# Patient Record
Sex: FEMALE | Race: BLACK OR AFRICAN AMERICAN | NOT HISPANIC OR LATINO | Employment: OTHER | URBAN - METROPOLITAN AREA
[De-identification: names, ages, dates, MRNs, and addresses within clinical notes are randomized per-mention and may not be internally consistent; named-entity substitution may affect disease eponyms.]

---

## 2017-01-09 ENCOUNTER — HOSPITAL ENCOUNTER (OUTPATIENT)
Dept: RADIOLOGY | Facility: CLINIC | Age: 67
Discharge: HOME | End: 2017-01-09
Attending: OBSTETRICS & GYNECOLOGY
Payer: MEDICARE

## 2017-01-09 DIAGNOSIS — M81.8 OTHER OSTEOPOROSIS WITHOUT CURRENT PATHOLOGICAL FRACTURE: ICD-10-CM

## 2017-01-09 DIAGNOSIS — M85.9 DISORDER OF BONE DENSITY AND STRUCTURE, UNSPECIFIED: ICD-10-CM

## 2017-01-09 PROCEDURE — 77080 DXA BONE DENSITY AXIAL: CPT | Mod: TC

## 2017-01-09 PROCEDURE — 77080 DXA BONE DENSITY AXIAL: CPT | Mod: 26,,, | Performed by: INTERNAL MEDICINE

## 2017-01-11 ENCOUNTER — TELEPHONE (OUTPATIENT)
Dept: OBSTETRICS AND GYNECOLOGY | Facility: CLINIC | Age: 67
End: 2017-01-11

## 2017-02-15 RX ORDER — ATENOLOL 25 MG/1
TABLET ORAL
Qty: 30 TABLET | Refills: 12 | Status: SHIPPED | OUTPATIENT
Start: 2017-02-15 | End: 2018-03-26

## 2017-02-16 ENCOUNTER — OFFICE VISIT (OUTPATIENT)
Dept: HEMATOLOGY/ONCOLOGY | Facility: CLINIC | Age: 67
End: 2017-02-16
Payer: MEDICARE

## 2017-02-16 ENCOUNTER — LAB VISIT (OUTPATIENT)
Dept: LAB | Facility: HOSPITAL | Age: 67
End: 2017-02-16
Attending: INTERNAL MEDICINE
Payer: MEDICARE

## 2017-02-16 VITALS
SYSTOLIC BLOOD PRESSURE: 159 MMHG | HEIGHT: 69 IN | TEMPERATURE: 98 F | HEART RATE: 62 BPM | WEIGHT: 275.81 LBS | RESPIRATION RATE: 18 BRPM | DIASTOLIC BLOOD PRESSURE: 88 MMHG | OXYGEN SATURATION: 100 % | BODY MASS INDEX: 40.85 KG/M2

## 2017-02-16 DIAGNOSIS — I10 ESSENTIAL HYPERTENSION: ICD-10-CM

## 2017-02-16 DIAGNOSIS — N28.1 CYST OF KIDNEY, ACQUIRED: ICD-10-CM

## 2017-02-16 DIAGNOSIS — D47.2 SMOLDERING MULTIPLE MYELOMA (SMM): Primary | ICD-10-CM

## 2017-02-16 DIAGNOSIS — C90.00 MULTIPLE MYELOMA: ICD-10-CM

## 2017-02-16 DIAGNOSIS — Q61.2 ADPKD (AUTOSOMAL DOMINANT POLYCYSTIC KIDNEY DISEASE): ICD-10-CM

## 2017-02-16 LAB
ALBUMIN SERPL BCP-MCNC: 3.4 G/DL
ALBUMIN SERPL BCP-MCNC: 3.4 G/DL
ALP SERPL-CCNC: 123 U/L
ALT SERPL W/O P-5'-P-CCNC: 14 U/L
ANION GAP SERPL CALC-SCNC: 5 MMOL/L
ANION GAP SERPL CALC-SCNC: 5 MMOL/L
AST SERPL-CCNC: 14 U/L
BASOPHILS # BLD AUTO: 0.01 K/UL
BASOPHILS NFR BLD: 0.2 %
BILIRUB SERPL-MCNC: 0.8 MG/DL
BUN SERPL-MCNC: 12 MG/DL
BUN SERPL-MCNC: 12 MG/DL
CALCIUM SERPL-MCNC: 9.3 MG/DL
CALCIUM SERPL-MCNC: 9.3 MG/DL
CHLORIDE SERPL-SCNC: 105 MMOL/L
CHLORIDE SERPL-SCNC: 105 MMOL/L
CO2 SERPL-SCNC: 30 MMOL/L
CO2 SERPL-SCNC: 30 MMOL/L
CREAT SERPL-MCNC: 0.9 MG/DL
CREAT SERPL-MCNC: 0.9 MG/DL
DIFFERENTIAL METHOD: ABNORMAL
EOSINOPHIL # BLD AUTO: 0 K/UL
EOSINOPHIL NFR BLD: 0.2 %
ERYTHROCYTE [DISTWIDTH] IN BLOOD BY AUTOMATED COUNT: 11.6 %
EST. GFR  (AFRICAN AMERICAN): >60 ML/MIN/1.73 M^2
EST. GFR  (AFRICAN AMERICAN): >60 ML/MIN/1.73 M^2
EST. GFR  (NON AFRICAN AMERICAN): >60 ML/MIN/1.73 M^2
EST. GFR  (NON AFRICAN AMERICAN): >60 ML/MIN/1.73 M^2
GLUCOSE SERPL-MCNC: 83 MG/DL
GLUCOSE SERPL-MCNC: 83 MG/DL
HCT VFR BLD AUTO: 37.4 %
HGB BLD-MCNC: 12.2 G/DL
IGA SERPL-MCNC: 105 MG/DL
IGG SERPL-MCNC: 1860 MG/DL
IGM SERPL-MCNC: 31 MG/DL
LYMPHOCYTES # BLD AUTO: 1.4 K/UL
LYMPHOCYTES NFR BLD: 32.8 %
MCH RBC QN AUTO: 31.1 PG
MCHC RBC AUTO-ENTMCNC: 32.6 %
MCV RBC AUTO: 95 FL
MONOCYTES # BLD AUTO: 0.3 K/UL
MONOCYTES NFR BLD: 7.4 %
NEUTROPHILS # BLD AUTO: 2.5 K/UL
NEUTROPHILS NFR BLD: 58.9 %
PHOSPHATE SERPL-MCNC: 2.8 MG/DL
PLATELET # BLD AUTO: 234 K/UL
PMV BLD AUTO: 10 FL
POTASSIUM SERPL-SCNC: 4.2 MMOL/L
POTASSIUM SERPL-SCNC: 4.2 MMOL/L
PROT SERPL-MCNC: 7.7 G/DL
RBC # BLD AUTO: 3.92 M/UL
SODIUM SERPL-SCNC: 140 MMOL/L
SODIUM SERPL-SCNC: 140 MMOL/L
WBC # BLD AUTO: 4.21 K/UL

## 2017-02-16 PROCEDURE — 3079F DIAST BP 80-89 MM HG: CPT | Mod: S$GLB,,, | Performed by: INTERNAL MEDICINE

## 2017-02-16 PROCEDURE — 99214 OFFICE O/P EST MOD 30 MIN: CPT | Mod: S$GLB,,, | Performed by: INTERNAL MEDICINE

## 2017-02-16 PROCEDURE — 99999 PR PBB SHADOW E&M-EST. PATIENT-LVL III: CPT | Mod: PBBFAC,,, | Performed by: INTERNAL MEDICINE

## 2017-02-16 PROCEDURE — 3077F SYST BP >= 140 MM HG: CPT | Mod: S$GLB,,, | Performed by: INTERNAL MEDICINE

## 2017-02-16 PROCEDURE — 99499 UNLISTED E&M SERVICE: CPT | Mod: S$GLB,,, | Performed by: INTERNAL MEDICINE

## 2017-02-16 RX ORDER — BENZONATATE 100 MG/1
100 CAPSULE ORAL DAILY
COMMUNITY
Start: 2017-01-22 | End: 2017-03-16 | Stop reason: ALTCHOICE

## 2017-02-16 RX ORDER — MULTIVITAMIN
0.4 TABLET ORAL DAILY
COMMUNITY
Start: 2016-12-15

## 2017-02-16 RX ORDER — OSELTAMIVIR PHOSPHATE 75 MG/1
75 CAPSULE ORAL 2 TIMES DAILY
COMMUNITY
Start: 2017-01-22 | End: 2017-03-16 | Stop reason: ALTCHOICE

## 2017-02-16 RX ORDER — LANOLIN ALCOHOL/MO/W.PET/CERES
400 CREAM (GRAM) TOPICAL DAILY
COMMUNITY
Start: 2016-11-17

## 2017-02-16 NOTE — PROGRESS NOTES
SECTION OF HEMATOLOGY AND BONE MARROW TRANSPLANT  Return Patient Visit   02/17/2017    CHIEF COMPLAINT:   No chief complaint on file.      HISTORY OF PRESENT ILLNESS:   Ms. Aranda is here for  IgG kappa smoldering myeloma.,  After reviewing epic it appears she was first noted have a paraprotein in 2010.  There is an unremarkable skeletal survey from 2010.  The first hematology clinic note I can find in epic is from  June 2013.  She has had 2 bone marrow biopsies.    The first from June 2013 showed approximtely 6% clonal plasma cells with a normal karyotype.   She also had an unremarkable skeletal survey at that time.  Appears she was monitored until October 2014 when a repeat bone marrow biopsy was done due to risking kappa light chains and FLCR.  It demonstrated an increase in clonal plasma cells to 13% qualifying her disease as smoldering multiple myeloma.    Serial monitoring since then has never revealed a M protein >3.  Her kappa free LC and FLCR continue to minimally increase. She is asymptomatic in clinic today.  She continues to follow up with Nephrology for her PCKD and her renal function appears stable.  She denies any focal pain or symptoms c/w anemia or hypercalcemia.   Continues to see her pcp dr. gamble regularly and is up to date on all age appropriate health screening.    She is currently working with her for URI symptoms.   Denies fever, chills, nightsweats, bleeding, brusing, lymphadenopathy, signs/symptoms of splenomegaly.        PAST MEDICAL HISTORY:   Past Medical History   Diagnosis Date    Allergy     Cholelithiases     Cyst of kidney, acquired     Diverticulitis     Elevated TSH     Glaucoma     Hx of colonic polyps     Hypertension 1981    Liver cyst     MGUS (monoclonal gammopathy of unknown significance)     Migraine headache     Mitral valve problem      leakage    Paraproteinemia     Tricuspid valve disease      leakage       PAST SURGICAL HISTORY:   Past Surgical History    Procedure Laterality Date    Appendectomy      Hysterectomy  1978 or 1979    Oophorectomy      Colonoscopy N/A 10/23/2015     Procedure: COLONOSCOPY;  Surgeon: Brandon Ruelas MD;  Location: [facility] ([address] [place] [address]);  Service: Endoscopy;  Laterality: N/A;  Had divertiulitis in 5/29/2015 with a recommendation for colonoscopy in 8 weeks    Dr. Ruelas is her GI physician       PAST SOCIAL HISTORY:   reports that she has quit smoking. She quit after 3.00 years of use. She has never used smokeless tobacco. She reports that she does not drink alcohol or use illicit drugs.    FAMILY HISTORY:  Family History   Problem Relation Age of Onset    Heart disease Mother      MI    Heart disease Maternal Aunt      MI    Heart disease Maternal Aunt      MI    Cancer Paternal Grandmother      GYN cancer - unknown cancer    Colon cancer Maternal Uncle     Hypertension Father     Heart disease Sister      fast heart rate    Cataracts Sister     Glaucoma Sister     Melanoma Neg Hx     Breast cancer Neg Hx     Ovarian cancer Neg Hx     Colon polyps Neg Hx     Rectal cancer Neg Hx     Stomach cancer Neg Hx     Esophageal cancer Neg Hx     Ulcerative colitis Neg Hx     Crohn's disease Neg Hx     Amblyopia Neg Hx     Blindness Neg Hx     Macular degeneration Neg Hx     Strabismus Neg Hx     Retinal detachment Neg Hx        CURRENT MEDICATIONS:   Current Outpatient Prescriptions   Medication Sig    atenolol (TENORMIN) 25 MG tablet TAKE 1 TABLET(25 MG) BY MOUTH EVERY DAY    benzonatate (TESSALON) 100 MG capsule Take 100 mg by mouth once daily.    conjugated estrogens (PREMARIN) vaginal cream Place vaginally daily as needed.     diclofenac sodium (VOLTAREN) 1 % Gel Apply 4 grams to effected area up to three times daily as needed.    isometheptene-apap-dichloralphenazone [id number] (MIDRIN) -325 mg per capsule Take 1 capsule by mouth as directed. 2 at onset then repeat 1 every 30-60min, max 5caps/attack  "(Patient taking differently: Take 1 capsule by mouth as directed. 2 at onset then repeat 1 every 30-60min, max 5caps/attack)    magnesium 500 mg Tab Take by mouth 2 (two) times daily.     magnesium oxide (MAG-OX) 400 mg tablet Take 400 mg by mouth once daily.    methimazole (TAPAZOLE) 5 MG Tab Take 1 tablet (5 mg total) by mouth 3 (three) times daily.    multivit with min-folic acid 0.4 mg Tab Take 0.4 mg by mouth once daily.    multivitamin capsule Take 1 capsule by mouth once daily.     ondansetron (ZOFRAN) 4 MG tablet Take 1-2 tablets (4-8 mg total) by mouth every 8 (eight) hours as needed for Nausea. (Patient taking differently: Take 4-8 mg by mouth every 8 (eight) hours as needed for Nausea. )    oseltamivir (TAMIFLU) 75 MG capsule Take 75 mg by mouth 2 (two) times daily.    quinapril (ACCUPRIL) 20 MG tablet Take 1 tablet (20 mg total) by mouth once daily.    rizatriptan (MAXALT) 5 MG tablet Take 5 mg by mouth every 2 (two) hours as needed for Migraine.     No current facility-administered medications for this visit.      ALLERGIES:   Review of patient's allergies indicates:  No Known Allergies      ASSESSMENTS:   PAIN ASSESSMENT (Patient reports Pain):   Vitals:    02/16/17 1020   BP: (!) 159/88   Pulse: 62   Resp: 18   Temp: 98.4 °F (36.9 °C)   TempSrc: Oral   SpO2: 100%   Weight: 125.1 kg (275 lb 12.7 oz)   Height: 5' 9" (1.753 m)   PainSc: 0-No pain     REVIEW OF SYSTEMS:     General ROS: negative  Psychological ROS: negative  Ophthalmic ROS: negative  ENT ROS: negative  Allergy and Immunology ROS: negative  Hematological and Lymphatic ROS: negative  Endocrine ROS: negative  Respiratory ROS: no cough, shortness of breath, or wheezing  Gastrointestinal ROS: no abdominal pain, change in bowel habits, or black or bloody stools  Genito-Urinary ROS: no dysuria, trouble voiding, or hematuria  Musculoskeletal ROS: negative  Neurological ROS: no TIA or stroke symptoms  Dermatological ROS: " negative  PHYSICAL EXAM:     Vitals:    02/16/17 1020   BP: (!) 159/88   Pulse: 62   Resp: 18   Temp: 98.4 °F (36.9 °C)       General - well developed, well nourished, no apparent distress  HEENT - oropharynx clear  Chest and Lung - clear to auscultation bilaterally   Cardiovascular - RRR with no MGR, normal S1 and S2  Abdomen-  soft, nontender, no palpable hepatomegaly or splenomegaly  Lymph - no palpable lymphadenopathy  Heme - no bruising, petechiae, pallor  Skin - no rashes or lesions  Psych - appropriate mood and affect      ECOG Performance Status: (foot note - ECOG PS provided by [employer]cology [employer]) 0 - Asymptomatic    Karnofsky Performance Score:  100%- Normal, No Complaints, No Evidence of Disease  DATA:   Lab Results   Component Value Date    WBC 4.21 02/16/2017    HGB 12.2 02/16/2017    HCT 37.4 02/16/2017    MCV 95 02/16/2017     02/16/2017     Gran #   Date Value Ref Range Status   02/16/2017 2.5 1.8 - 7.7 K/uL Final     Gran%   Date Value Ref Range Status   02/16/2017 58.9 38.0 - 73.0 % Final     Lymph #   Date Value Ref Range Status   02/16/2017 1.4 1.0 - 4.8 K/uL Final     Lymph%   Date Value Ref Range Status   02/16/2017 32.8 18.0 - 48.0 % Final     Kappa Free Light Chains   Date Value Ref Range Status   02/16/2017 16.85 (H) 0.33 - 1.94 mg/dL Final   11/18/2016 17.49 (H) 0.33 - 1.94 mg/dL Final   08/08/2016 15.17 (H) 0.33 - 1.94 mg/dL Final     Lambda Free Light Chains   Date Value Ref Range Status   02/16/2017 1.55 0.57 - 2.63 mg/dL Final   11/18/2016 1.25 0.57 - 2.63 mg/dL Final   08/08/2016 1.19 0.57 - 2.63 mg/dL Final     Kappa/Lambda FLC Ratio   Date Value Ref Range Status   02/16/2017 10.87 (H) 0.26 - 1.65 Final   11/18/2016 13.99 (H) 0.26 - 1.65 Final   08/08/2016 12.75 (H) 0.26 - 1.65 Final     Gamma grams/dl   Date Value Ref Range Status   12/08/2015 1.89 (H) 0.67 - 1.58 g/dL Final   07/16/2015 2.13 (H) 0.67 - 1.58 g/dL Final   04/16/2015 2.05 (H) 0.67 - 1.58 g/dL  Final     IgG - Serum   Date Value Ref Range Status   02/16/2017 1860 (H) 650 - 1600 mg/dL Final     Comment:     IgG Cord Blood Reference Range: 650-1600 mg/dL.     IgA   Date Value Ref Range Status   02/16/2017 105 40 - 350 mg/dL Final     Comment:     IgA Cord Blood Reference Range: <5 mg/dL.     IgM   Date Value Ref Range Status   02/16/2017 31 (L) 50 - 300 mg/dL Final     Comment:     IgM Cord Blood Reference Range: <25 mg/dL.       Bone Marrow Biopsy - 6/26/13  BONE MARROW ASPIRATE, BONE MARROW CLOT, AND BONE MARROW CORE BIOPSY WITH:  CELLULARITY= 70%, TRILINEAGE HEMATOPOIETIC ACTIVITY (M/E= 1.5:1).  PLASMA CELL DYSCRASIA, SEE COMMENT.  CD20  INCREASED STORAGE IRON.  ADEQUATE NUMBER OF MEGAKARYOCYTES.  COMMENT: Flow cytometry analysis of bone marrow aspirate shows lymph gate(27%) containing T and B cells.  No B cell clonality or T-cell aberrancy is evident. Blast gate is not increased. Plasma cell study shows a kappa light  chain restricted plasma cell population without coexpression of CD38/CD56.  Immunohistochemical studies were performed on the core biopsy for further architecture evaluation with adequate  positive and negative controls. Scattered mixed T cells (CD3 positive) and B cells (CD20 positive, cyclin D1  negative) are evident. About 6-7% plasma cells ( positive) are noted. 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 Correlate clinically and with a cytogenetics report.  Diagnosed by: Sylvie Mckeon M.D.  (Electronically Signed: 2013-06-27 15:43:02)  Microscopic Examination  BONE MARROW ASPIRATE DIFFERENTIAL:  Blasts---------------------------------------------- 2.5 %  Promyelocytes--------------------------------- 0 %  Myelocytes-------------------------------------- 4.5 %  Metamyelocytes------------------------------ 6 %  Bands---------------------------------------------- 5 %  PMN Neutrophils------------------------------ 19 %  Eosinophils------------------------------------------ 2  %  Basophils--------------------------------------- 0 %  Monocytes------------------------------------ 3.5 %  Lymphocytes------------------------------------- 25.5 %  Plasma cells------------------------------------------ 4.5 %  Erythroid precursors-------------------------- 27.5 %  BONE MARROW ASPIRATE:  Myeloid and erythroid maturation shows M:E ratio at 1.5:1. No significant dysplasia is evident. Stainable iron is  present without increased ringed sideroblasts. Megakaryocytes are seen.  BONE MARROW CLOT:  Cellularity is 70 % with trilineage hematopoiesis. No abnormal infiltrates are seen. Megakaryocytes are adequate in  number. Stainable iron is present.  BONE MARROW CORE BIOPSY:  Cellularity is 70 %. No abnormal infiltrates are seen. Stainable iron is increased. Megakaryocytes are adequate in  number      Bone marrow biopsy - 10/2/14  Bone marrow karyotype results: 46, XX[20], female karyotype.  Prolif (PCPD), FISH:  Comments: Abnormality Result #Abn/Total Cells --------------------------------------------------------- 14q32(IGH sep)  Abnormal 7/11 +14(3'IGH/5'IGH)x3 Normal 0/11 t(14;16) IGH/MAF fusion Abnormal  4/7 NOMENCLATURE: nuc eliza(IGHx3),(MAFx3),(IGH con MAFx2) [58 total plasma cells identified]  Interp, Plasma Cell Prolif (PCPD), FISH:  Comments: The result is abnormal and indicates a plasma cell clone with IGH/MAF fusion. Insufficient plasma cells  were observed with all other probe sets. Since only a limited number of plasma cells were identified, the  abnormalities associated with this plasma cell clone were not sufficiently evaluated and a specific prognostic  significance cannot be defined. Clinical and pathologic correlation is recommended.      SS/long bone survey - 5/22/15  Long bone survey does not show any lesions characteristic of multiple myeloma. Degenerative changes are seen in knees.      Skull shows no characteristic lesions of multiple myeloma. C5 shows a small cystic area anterior  superiorly that could be of focus of bone destruction or a benign cyst. Otherwise I don't see any evidence of multiple myeloma. Degenerative changes of a   moderate degree seen scattered throughout the entire spine.      ASSESSMENT AND PLAN:   Encounter Diagnosis   Name Primary?    Smoldering multiple myeloma (SMM) Yes     -her Kappa free LC has minimally decreased since our interval visit  -she has mild intermittent anemia since at least 2010, but this has normalized   -no other CRAB criteria or concerning clinical symptoms  -SS from November 2016 with JOSE; plan to repeat prior to our next appt in 3 months    -she continues to have smoldering myeloma with no indication for bisphophanates for anti myeloma therapy       Follow Up: -cbc, cmp, serum free light chains, quantitative immunoglobulins, serum electropheresis, serum immunofixation in 3 months  cbc, cmp, serum free light chains, quantitative immunoglobulins, serum electropheresis, serum immunofixation  And MD appt in 6 months  -instructed to call earlier is symptoms develop      Rasta Alba MD  Hematology/Oncology/Bone Marrow Transplant

## 2017-02-16 NOTE — Clinical Note
-cbc, cmp, serum free light chains, quantitative immunoglobulins, serum electropheresis, serum immunofixation in 3 months cbc, cmp, serum free light chains, quantitative immunoglobulins, serum electropheresis, serum immunofixation  And MD appt in 6 months

## 2017-02-17 LAB
KAPPA LC SER QL IA: 16.85 MG/DL
KAPPA LC/LAMBDA SER IA: 10.87
LAMBDA LC SER QL IA: 1.55 MG/DL

## 2017-03-06 ENCOUNTER — OFFICE VISIT (OUTPATIENT)
Dept: NEPHROLOGY | Facility: CLINIC | Age: 67
End: 2017-03-06
Payer: MEDICARE

## 2017-03-06 VITALS
OXYGEN SATURATION: 92 % | SYSTOLIC BLOOD PRESSURE: 120 MMHG | HEART RATE: 60 BPM | BODY MASS INDEX: 41.01 KG/M2 | WEIGHT: 276.88 LBS | HEIGHT: 69 IN | DIASTOLIC BLOOD PRESSURE: 80 MMHG

## 2017-03-06 DIAGNOSIS — I12.9 BENIGN HYPERTENSIVE RENAL DISEASE WITHOUT RENAL FAILURE: ICD-10-CM

## 2017-03-06 DIAGNOSIS — I10 ESSENTIAL HYPERTENSION: ICD-10-CM

## 2017-03-06 DIAGNOSIS — Q61.2 ADPKD (AUTOSOMAL DOMINANT POLYCYSTIC KIDNEY DISEASE): Primary | ICD-10-CM

## 2017-03-06 DIAGNOSIS — D47.2 SMOLDERING MULTIPLE MYELOMA (SMM): ICD-10-CM

## 2017-03-06 DIAGNOSIS — D89.2 PARAPROTEINEMIA: ICD-10-CM

## 2017-03-06 PROCEDURE — 3074F SYST BP LT 130 MM HG: CPT | Mod: S$GLB,,, | Performed by: INTERNAL MEDICINE

## 2017-03-06 PROCEDURE — 99213 OFFICE O/P EST LOW 20 MIN: CPT | Mod: S$GLB,,, | Performed by: INTERNAL MEDICINE

## 2017-03-06 PROCEDURE — 3079F DIAST BP 80-89 MM HG: CPT | Mod: S$GLB,,, | Performed by: INTERNAL MEDICINE

## 2017-03-06 PROCEDURE — 1160F RVW MEDS BY RX/DR IN RCRD: CPT | Mod: S$GLB,,, | Performed by: INTERNAL MEDICINE

## 2017-03-06 PROCEDURE — 99999 PR PBB SHADOW E&M-EST. PATIENT-LVL III: CPT | Mod: PBBFAC,,, | Performed by: INTERNAL MEDICINE

## 2017-03-06 PROCEDURE — 99499 UNLISTED E&M SERVICE: CPT | Mod: S$GLB,,, | Performed by: INTERNAL MEDICINE

## 2017-03-06 NOTE — PROGRESS NOTES
"Subjective:       Patient ID: Manju Aranda is a 66 y.o. Black or  female who presents for follow-up evaluation of CKD.     HPI   Mrs. Aranda is a very pleasant 65 year old female with a history of PCKD , liver cysts and MGUS with  and stable eGFR.  Baseline creatinine is 0.9.  Overall renal size is favorable, 15.0 cm and 14.1 cm.  She denies LUTS, hematuria, SOB, CP.  She drinks > 2 L of water daily, and is on  a low salt diet.  Several recent UA's with microscopic hematuria, no known UTI  No FH of PCKD      Review of Systems   Constitutional: Negative for activity change, appetite change, chills, diaphoresis, fatigue, fever and unexpected weight change.   HENT: Negative for congestion, facial swelling and trouble swallowing.    Eyes: Negative for pain, discharge, redness and visual disturbance.   Respiratory: Negative for cough, shortness of breath and wheezing.    Cardiovascular: Negative for chest pain, palpitations and leg swelling.   Gastrointestinal: Negative for abdominal distention, abdominal pain, constipation and diarrhea.   Endocrine: Negative for cold intolerance and heat intolerance.   Genitourinary: Negative for decreased urine volume, difficulty urinating, dysuria, flank pain, frequency and urgency.   Musculoskeletal: Negative for arthralgias, joint swelling and myalgias.   Skin: Negative for color change.   Allergic/Immunologic: Negative for immunocompromised state.   Neurological: Negative for dizziness, tremors, syncope, speech difficulty, weakness, light-headedness and numbness.   Hematological: Negative for adenopathy.   Psychiatric/Behavioral: Negative for agitation, confusion, decreased concentration and dysphoric mood.       Objective:     Blood pressure 120/80, pulse 60, height 5' 9" (1.753 m), weight 125.6 kg (276 lb 14.4 oz), SpO2 (!) 92 %.      Physical Exam   Constitutional: She is oriented to person, place, and time. She appears well-developed and well-nourished. "   HENT:   Head: Normocephalic and atraumatic.   Eyes: Conjunctivae and EOM are normal. Pupils are equal, round, and reactive to light.   Neck: Neck supple.   Cardiovascular: Normal rate, regular rhythm, normal heart sounds and intact distal pulses.    Pulmonary/Chest: Effort normal and breath sounds normal.   Abdominal: Soft. Bowel sounds are normal.   Musculoskeletal: Normal range of motion.   Neurological: She is alert and oriented to person, place, and time. She has normal reflexes.   Skin: Skin is warm and dry.   Psychiatric: She has a normal mood and affect. Her behavior is normal.   Nursing note and vitals reviewed.      Assessment:       1. ADPKD (autosomal dominant polycystic kidney disease)    2. Paraproteinemia    3. Benign hypertensive renal disease without renal failure    4. Essential hypertension    5. Smoldering multiple myeloma (SMM)        Plan:       1. CKD 2 with renal cysts: 67 yo AAF with PCKD and liver cysts, with no FH of PCKD. No HTN, No proteinuria, stable GFR.   Recent CT of Abdomen and pelvis reveals stable renal cysts. Serum creatinine stable as well.    Maintain urine output of 2.5 -3 L    No proteinuria, continue RAAS blockade    Hematuria: likely related to PCKD, but with MGUS, will refer to urology and complete urine cytology  May want to consider 24 hr stone w/u as well.     stable  Lab Results   Component Value Date    CREATININE 0.9 02/16/2017    CREATININE 0.9 02/16/2017     Protein Creatinine Ratios: Repeat next visit.       Acid-Base: stable  Lab Results   Component Value Date     02/16/2017     02/16/2017    K 4.2 02/16/2017    K 4.2 02/16/2017    CO2 30 (H) 02/16/2017    CO2 30 (H) 02/16/2017     2. HTN: Welll Controlled on ACE-I for renal protection.     3. Renal osteodystrophy: Monitor annually  Lab Results   Component Value Date    CALCIUM 9.3 02/16/2017    CALCIUM 9.3 02/16/2017    PHOS 2.8 02/16/2017       4. Anemia: At goal. Asymptomatic. Monitor.  On RAAS  blockade  Lab Results   Component Value Date    HGB 12.2 02/16/2017       6. Lipid management: stable  Lab Results   Component Value Date    LDLCALC 64.2 07/25/2016     7.  Referral: urology for hematuria    Follow up in one year with labs.

## 2017-03-06 NOTE — PATIENT INSTRUCTIONS
Urology referral  UA and urine cytology prior to urology visit    rtc 1 year    Drink 2.5-3 L fluid dalily

## 2017-03-16 ENCOUNTER — OFFICE VISIT (OUTPATIENT)
Dept: INTERNAL MEDICINE | Facility: CLINIC | Age: 67
End: 2017-03-16
Payer: MEDICARE

## 2017-03-16 VITALS
BODY MASS INDEX: 41.07 KG/M2 | SYSTOLIC BLOOD PRESSURE: 118 MMHG | HEIGHT: 69 IN | HEART RATE: 64 BPM | WEIGHT: 277.31 LBS | DIASTOLIC BLOOD PRESSURE: 80 MMHG | RESPIRATION RATE: 18 BRPM

## 2017-03-16 DIAGNOSIS — D89.2 PARAPROTEINEMIA: ICD-10-CM

## 2017-03-16 DIAGNOSIS — D47.2 SMOLDERING MULTIPLE MYELOMA (SMM): ICD-10-CM

## 2017-03-16 DIAGNOSIS — K76.89 LIVER CYST: ICD-10-CM

## 2017-03-16 DIAGNOSIS — Q61.2 ADPKD (AUTOSOMAL DOMINANT POLYCYSTIC KIDNEY DISEASE): ICD-10-CM

## 2017-03-16 DIAGNOSIS — I10 ESSENTIAL HYPERTENSION: ICD-10-CM

## 2017-03-16 DIAGNOSIS — Z00.00 ENCOUNTER FOR PREVENTIVE HEALTH EXAMINATION: Primary | ICD-10-CM

## 2017-03-16 DIAGNOSIS — I07.9 TRICUSPID VALVE DISEASE: ICD-10-CM

## 2017-03-16 DIAGNOSIS — E05.90 HYPERTHYROIDISM: ICD-10-CM

## 2017-03-16 DIAGNOSIS — I05.9 MITRAL VALVE DISORDER: ICD-10-CM

## 2017-03-16 DIAGNOSIS — R00.0 TACHYCARDIA: ICD-10-CM

## 2017-03-16 DIAGNOSIS — I12.9 BENIGN HYPERTENSIVE RENAL DISEASE WITHOUT RENAL FAILURE: ICD-10-CM

## 2017-03-16 DIAGNOSIS — E66.01 MORBID OBESITY, UNSPECIFIED OBESITY TYPE: ICD-10-CM

## 2017-03-16 DIAGNOSIS — I70.0 AORTIC ATHEROSCLEROSIS: ICD-10-CM

## 2017-03-16 DIAGNOSIS — G43.C0 PERIODIC HEADACHE SYNDROME, NOT INTRACTABLE: ICD-10-CM

## 2017-03-16 PROCEDURE — 99499 UNLISTED E&M SERVICE: CPT | Mod: S$GLB,,, | Performed by: NURSE PRACTITIONER

## 2017-03-16 PROCEDURE — 3079F DIAST BP 80-89 MM HG: CPT | Mod: S$GLB,,, | Performed by: NURSE PRACTITIONER

## 2017-03-16 PROCEDURE — G0439 PPPS, SUBSEQ VISIT: HCPCS | Mod: S$GLB,,, | Performed by: NURSE PRACTITIONER

## 2017-03-16 PROCEDURE — 99999 PR PBB SHADOW E&M-EST. PATIENT-LVL III: CPT | Mod: PBBFAC,,, | Performed by: NURSE PRACTITIONER

## 2017-03-16 PROCEDURE — 3074F SYST BP LT 130 MM HG: CPT | Mod: S$GLB,,, | Performed by: NURSE PRACTITIONER

## 2017-03-16 NOTE — PATIENT INSTRUCTIONS
Counseling and Referral of Other Preventative  (Italic type indicates deductible and co-insurance are waived)    Patient Name: Manju Aranda  Today's Date: 3/16/2017      SERVICE LIMITATIONS RECOMMENDATION    Vaccines    · Pneumococcal (once after 65)    · Influenza (annually)    · Hepatitis B (if medium/high risk)    · Prevnar 13      Hepatitis B medium/high risk factors:       - End-stage renal disease       - Hemophiliacs who received Factor VII or         IX concentrates       - Clients of institutions for the mentally             retarded       - Persons who live in the same house as          a HepB carrier       - Homosexual men       - Illicit injectable drug abusers     Pneumococcal: Done, no repeat necessary  DUE ON/AFTER 08/4/2017     Influenza: Done, repeat in one year   09/8/2016     Hepatitis B: N/A DEFER TO PCP FOR RECOMMENDATIONS     Prevnar 13: DONE; NO REPEAT IS NECESSARY  08/04/2016    Mammogram (biennial age 50-74)  Annually (age 40 or over)  Last done 09/28/2016, recommend to repeat every 1  years    Pap (up to age 70 and after 70 if unknown history or abnormal study last 10 years)    N/A     The USPSTF recommends against screening for cervical cancer in women older than age 65 years who have had adequate prior screening and are not otherwise at high risk for cervical cancer.      Colorectal cancer screening (to age 75)    · Fecal occult blood test (annual)  · Flexible sigmoidoscopy (5y)  · Screening colonoscopy (10y)  · Barium enema   Last done 10/23/2015, recommend to repeat every 3  years    Diabetes self-management training (no USPSTF recommendations)  Requires referral by treating physician for patient with diabetes or renal disease. 10 hours of initial DSMT sessions of no less than 30 minutes each in a continuous 12-month period. 2 hours of follow-up DSMT in subsequent years.  N/A    Bone mass measurements (age 65 & older, biennial)  Requires diagnosis related to osteoporosis or estrogen  deficiency. Biennial benefit unless patient has history of long-term glucocorticoid  Last done 01/09/2017, recommend to repeat every 2-4  years    Glaucoma screening (no USPSTF recommendation)  Diabetes mellitus, family history   , age 50 or over    American, age 65 or over  Last done 10/2016, recommend to repeat every 1  years    Medical nutrition therapy for diabetes or renal disease (no recommended schedule)  Requires referral by treating physician for patient with diabetes or renal disease or kidney transplant within the past 3 years.  Can be provided in same year as diabetes self-management training (DSMT), and CMS recommends medical nutrition therapy take place after DSMT. Up to 3 hours for initial year and 2 hours in subsequent years.  N/A    Cardiovascular screening blood tests (every 5 years)  · Fasting lipid panel  Order as a panel if possible  Last done 7/25/2016, recommend to repeat every 1  years    Diabetes screening tests (at least every 3 years, Medicare covers annually or at 6-month intervals for prediabetic patients)  · Fasting blood sugar (FBS) or glucose tolerance test (GTT)  Patient must be diagnosed with one of the following:       - Hypertension       - Dyslipidemia       - Obesity (BMI 30kg/m2)       - Previous elevated impaired FBS or GTT       ... or any two of the following:       - Overweight (BMI 25 but <30)       - Family history of diabetes       - Age 65 or older       - History of gestational diabetes or birth of baby weighing more than 9 pounds  Last done 02/16/2017, recommend to repeat every 6  months    Abdominal aortic aneurysm screening (once)  · Sonogram   Limited to patients who meet one of the following criteria:       - Men who are 65-75 years old and have smoked more than 100 cigarette in their lifetime       - Anyone with a family history of abdominal aortic aneurysm       - Anyone recommended for screening by the USPSTF  N/A    HIV screening  (annually for increased risk patients)  · HIV-1 and HIV-2 by EIA, or NOEL, rapid antibody test or oral mucosa transudate  Patients must be at increased risk for HIV infection per USPSTF guidelines or pregnant. Tests covered annually for patient at increased risk or as requested by the patient. Pregnant patients may receive up to 3 tests during pregnancy.  Risks discussed, screening is not recommended    Smoking cessation counseling (up to 8 sessions per year)  Patients must be asymptomatic of tobacco-related conditions to receive as a preventative service.  former smoker    Subsequent annual wellness visit  At least 12 months since last AWV  Return in one year     The following information is provided to all patients.  This information is to help you find resources for any of the problems found today that may be affecting your health:                Living healthy guide: www.[facility].[place].[facility]      Understanding Diabetes: www.diabetes.org      Eating healthy: www.cdc.gov/healthyweight      [facility] home safety checklist: www.cdc.gov/steadi/patient.html      Agency on Aging: www.goea.[place].[facility]      Alcoholics anonymous (AA): www.aa.org      Physical Activity: www.saji.nih.gov/al5himk      Tobacco use: www.quitwithusla.org     Aerobic Exercise for a Healthy Heart  Exercise is a lot more than an energy booster and a stress reliever. It also strengthens your heart muscle, lowers your blood pressure and cholesterol, and burns calories. It can also improve your resting muscle tone, and your mood.     Remember, some activity is better than none.    Choose an aerobic activity  Choose an activity that makes your heart and lungs work harder than they do when you rest or walk normally. This aerobic exercise can improve the way your heart and other muscles use oxygen. Make it fun by exercising with a friend and choosing an activity you enjoy. Here are some ideas:  · Walking  · Swimming  · Bicycling  · Stair  climbing  · Dancing  · Jogging  · Gardening  Exercise regularly  If you havent been exercising regularly,  get your doctors OK first. Then start slowly.  Here are some tips:  · Begin exercising 3 times a week for 5 to 10 minutes at a time.  · When you feel comfortable, add a few minutes each session.  · Slowly build up to exercising 3 to 4 times each week. Each session should last for 40 minutes, on average, and involve moderate- to vigorous-intensity physical activity.  · If you have been given nitroglycerin, be sure to carry it when you exercise.  · If you get chest pain (angina) when youre exercising, stop what youre doing, take your nitroglycerin, and call your doctor.  Date Last Reviewed: 6/2/2016 © 2000-2016 [employer]. [address] [place] [address], [place] [address]. All rights reserved. This information is not intended as a substitute for professional medical care. Always follow your healthcare professional's instructions.        Eating Heart-Healthy Food: Using the DASH Plan    Eating for your heart doesnt have to be hard or boring. You just need to know how to make healthier choices. The DASH eating plan has been developed to help you do just that. DASH stands for Dietary Approaches to Stop Hypertension. It is a plan that has been proven to be healthier for your heart and to lower your risk for high blood pressure. It can also help lower your risk for cancer, heart disease, osteoporosis, and diabetes.  Choosing from each food group  Choose foods from each of the food groups below each day. Try to get the recommended number of servings for each food group. The serving numbers are based on a diet of 2,000 calories a day. Talk to your doctor if youre unsure about your calorie needs. Along with getting the correct servings, the DASH plan also recommends a sodium intake less than 2,300 mg per day.        Grains  Servings: 6 to 8 a day  A serving is:  · 1 slice bread  · 1 ounce dry cereal  · Half  a cup cooked rice, pasta or cereal  Best choices: Whole grains and any grains high in fiber. Vegetables  Servings: 4 to 5 a day  A serving is:  · 1 cup raw leafy vegetable  · Half a cup cut-up raw or cooked vegetable  · Half a cup vegetable juice  Best choices: Fresh or frozen vegetables prepared without added salt or fat.   Fruits  Servings: 4 to 5 a day  A serving is:  · 1 medium fruit  · One-quarter cup dried fruit  · Half a cup fresh, frozen, or canned fruit  · Half a cup of 100% fruit juices  Best choices: A variety of fresh fruits of different colors. Whole fruits are a better choice than fruit juices. Low-fat or fat-free dairy  Servings: 2 to 3 a day  A serving is:  · 1 cup milk  · 1 cup yogurt  · One and a half ounces cheese  Best choices: Skim or 1% milk, low-fat or fat-free yogurt or buttermilk, and low-fat cheeses.         Lean meats, poultry, fish  Servings: 6 or fewer a day  A serving is:  · 1 ounce cooked meats, poultry, or fish  · 1 egg  Best choices: Lean poultry and fish. Trim away visible fat. Broil, grill, roast, or boil instead of frying. Remove skin from poultry before eating. Limit how much red meat you eat.  Nuts, seeds, beans  Servings: 4 to 5 a week  A serving is:  · One-third cup nuts (one and a half ounces)  · 2 tablespoons nut butter or seeds  · Half a cup cooked dry beans or legumes  Best choices: Dry roasted nuts with no salt added, lentils, kidney beans, garbanzo beans, and whole perez beans.   Fats and oils  Servings: 2 to 3 a day  A serving is:  · 1 teaspoon vegetable oil  · 1 teaspoon soft margarine  · 1 tablespoon mayonnaise  · 2 tablespoons salad dressing  Best choices: Nut and vegetable oils (nontropical vegetable oils), such as olive and canola oil. Sweets  Servings: 5 a week or fewer  A serving is:  · 1 tablespoon sugar, maple syrup, or honey  · 1 tablespoon jam or jelly  · 1 half-ounce jelly beans (about 15)  · 1 cup lemonade  Best choices: Dried fruit can be a satisfying  sweet. Choose low-fat sweets. And watch your serving sizes!      For more on the DASH eating plan, visit:  www.nhlbi.nih.gov/health/health-topics/topics/dash   Date Last Reviewed: 6/1/2016 © 2000-2016 [employer]. [address] [place] [address], [place] [address]. All rights reserved. This information is not intended as a substitute for professional medical care. Always follow your healthcare professional's instructions.

## 2017-03-16 NOTE — PROGRESS NOTES
"Manju Aranda presented for a  Medicare AWV and comprehensive Health Risk Assessment today. The following components were reviewed and updated:    · Medical history  · Family History  · Social history  · Allergies and Current Medications  · Health Risk Assessment  · Health Maintenance  · Care Team     ** See Completed Assessments for Annual Wellness Visit within the encounter summary.**       The following assessments were completed:  · Living Situation  · Depression Screening  · Timed Get Up and Go  · Whisper Test  · Cognitive Function Screening      · Nutrition Screening  · ADL Screening  · PAQ Screening    Vitals:    03/16/17 0915   BP: 118/80   BP Location: Left arm   Patient Position: Sitting   BP Method: Manual   Pulse: 64   Resp: 18   Weight: 125.8 kg (277 lb 5.4 oz)   Height: 5' 9" (1.753 m)     Body mass index is 40.96 kg/(m^2).  Physical Exam   Constitutional: She is oriented to person, place, and time. She appears well-developed and well-nourished.   HENT:   Head: Normocephalic and atraumatic.   Mouth/Throat: Oropharynx is clear and moist.   Eyes: Pupils are equal, round, and reactive to light.   Neck: Normal range of motion. Neck supple.   Cardiovascular: Normal rate, regular rhythm, normal heart sounds and intact distal pulses.  Exam reveals no gallop and no friction rub.    No murmur heard.  Pulmonary/Chest: Effort normal and breath sounds normal. No respiratory distress. She has no wheezes.   Abdominal: Soft. Bowel sounds are normal. She exhibits no distension. There is no tenderness.   obese   Musculoskeletal: Normal range of motion.   Neurological: She is alert and oriented to person, place, and time.   Skin: Skin is warm and dry.   Psychiatric: She has a normal mood and affect. Her behavior is normal.   Nursing note and vitals reviewed.        Diagnoses and health risks identified today and associated recommendations/orders:    1. Encounter for preventive health examination      2. Morbid obesity, " unspecified obesity type  Discussed with patient the importance of proper diet and regular exercise.  Literature [facility] database reviewed with patient. Followed by PCP.    -Ambulatory Referral to [facility]    3. Aortic atherosclerosis   Stable and controlled.   Noted on CT imaging dated 09/07/2016.  Continue current treatment plan as previously prescribed by PCP.       4. Smoldering multiple myeloma (SMM)  Stable and controlled. Continue current treatment plan as previously prescribed by Hem/Onc.          5. Benign hypertensive renal disease without renal failure  Stable and controlled. Continue current treatment plan as previously prescribed by Endocrinology.       6. Essential hypertension  Stable and controlled. Continue current treatment plan as previously prescribed by Nephrology.     -Ambulatory Referral to Medical Fitness    7.Hyperthyroidism   Stable and controlled. Continue current treatment plan as previously prescribed by PCP.       8.  ADPKD (autosomal dominant polycystic kidney disease)  Stable and controlled. Continue current treatment plan as previously prescribed by Nephrology.       9. Liver Cyst  Stable and controlled. Continue current treatment plan as previously prescribed by PCP.       10.Periodic headache syndrome, not intractable  Stable and controlled. Continue current treatment plan as previously prescribed by PCP.       11.  Paraproteinemia  Stable and controlled. Continue current treatment plan as previously prescribed by Nephrology.    12. Tricuspid valve disease  Stable and controlled. Continue current treatment plan as previously prescribed by PCP.       13. Tachycardia  Stable and controlled. Continue current treatment plan as previously prescribed by PCP.       14. Mitral valve disorder  Stable and controlled. Continue current treatment plan as previously prescribed by PCP.         Provided Manju with a 5-10 year written screening schedule and personal prevention  plan. Recommendations were developed using the USPSTF age appropriate recommendations. Education, counseling, and referrals were provided as needed. After Visit Summary printed and given to patient which includes a list of additional screenings\tests needed.    Return in about 7 weeks (around 5/2/2017) for Follow up with PCP, SOONER IF NEEDED, HRA VISIT IN 1 YEAR.    Wilma Arita, JOHNNAC

## 2017-03-16 NOTE — MR AVS SNAPSHOT
[facility] Internal Medicine  [address]  [place] [address]  Phone: [phone]  Fax: [phone]                  Manju Aranda   3/16/2017 9:00 AM   Office Visit    Description:  Female : 1950   Provider:  RUBEN ACOSTA 4   Department:  [facility]           Reason for Visit     Health Risk Assessment           Diagnoses this Visit        Comments    Morbid obesity, unspecified obesity type         Encounter for preventive health examination                To Do List           Future Appointments        Provider Department Dept Phone    3/28/2017 9:30 AM LAB, [facility]POINT[place] [facility] [phone]    2017 11:00 AM Wisam Aguiar II, MD [facility] - Endo/Diab/Metab [phone]    2017 9:30 AM Payton Garay MD [facility] - Internal Medicine [phone]    2017 10:00 AM LAB, HEMONC SAME DAY [facility] [phone]      Goals (5 Years of Data)     None      Follow-Up and Disposition     Return in about 7 weeks (around 2017) for Follow up with PCP, SOONER IF NEEDED, HRA VISIT IN 1 YEAR.      [facility]     [facility] - 24/7 Assistance  Registered nurses in the [facility] provide clinical advisement, health education, appointment booking, and other advisory services.  Call for this free service at [phone].             Medications           Message regarding Medications     Verify the changes and/or additions to your medication regime listed below are the same as discussed with your clinician today.  If any of these changes or additions are incorrect, please notify your healthcare provider.        STOP taking these medications     benzonatate (TESSALON) 100 MG capsule Take 100 mg by mouth once daily.    magnesium 500 mg Tab Take by mouth 2 (two) times daily.     multivitamin capsule Take 1 capsule by mouth once daily.     oseltamivir (TAMIFLU) 75 MG capsule  "Take 75 mg by mouth 2 (two) times daily.           Verify that the below list of medications is an accurate representation of the medications you are currently taking.  If none reported, the list may be blank. If incorrect, please contact your healthcare provider. Carry this list with you in case of emergency.           Current Medications     atenolol (TENORMIN) 25 MG tablet TAKE 1 TABLET(25 MG) BY MOUTH EVERY DAY    conjugated estrogens (PREMARIN) vaginal cream Place vaginally daily as needed.     diclofenac sodium (VOLTAREN) 1 % Gel Apply 4 grams to effected area up to three times daily as needed.    isometheptene-apap-dichloralphenazone [id number] (MIDRIN) -325 mg per capsule Take 1 capsule by mouth as directed. 2 at onset then repeat 1 every 30-60min, max 5caps/attack    magnesium oxide (MAG-OX) 400 mg tablet Take 400 mg by mouth once daily.    methimazole (TAPAZOLE) 5 MG Tab Take 1 tablet (5 mg total) by mouth 3 (three) times daily.    multivit with min-folic acid 0.4 mg Tab Take 0.4 mg by mouth once daily.    ondansetron (ZOFRAN) 4 MG tablet Take 1-2 tablets (4-8 mg total) by mouth every 8 (eight) hours as needed for Nausea.    quinapril (ACCUPRIL) 20 MG tablet Take 1 tablet (20 mg total) by mouth once daily.    rizatriptan (MAXALT) 5 MG tablet Take 5 mg by mouth every 2 (two) hours as needed for Migraine.           Clinical Reference Information           Your Vitals Were     BP Pulse Resp Height Weight BMI    118/80 (BP Location: Left arm, Patient Position: Sitting, BP Method: Manual) 64 18 5' 9" (1.753 m) 125.8 kg (277 lb 5.4 oz) 40.96 kg/m2      Blood Pressure          Most Recent Value    BP  118/80      Allergies as of 3/16/2017     No Known Allergies      Immunizations Administered on Date of Encounter - 3/16/2017     None      [facility] Sign-Up     Activating your [facility] account is as easy as 1-2-3!     1) Visit [email or web address], select Sign Up Now, enter this activation code and your date of " birth, then select Next.  Activation code not generated  Current Patient Portal Status: Account disabled      2) Create a username and password to use when you visit [facility] in the future and select a security question in case you lose your password and select Next.    3) Enter your e-mail address and click Sign Up!    Additional Information  If you have questions, please e-mail [employer] [email or web address] or call [phone] to talk to our [facility] staff. Remember, [facility] is NOT to be used for urgent needs. For medical emergencies, dial 911.         Instructions      Counseling and Referral of Other Preventative  (Italic type indicates deductible and co-insurance are waived)    Patient Name: Manju Aranda  Today's Date: 3/16/2017      SERVICE LIMITATIONS RECOMMENDATION    Vaccines    · Pneumococcal (once after 65)    · Influenza (annually)    · Hepatitis B (if medium/high risk)    · Prevnar 13      Hepatitis B medium/high risk factors:       - End-stage renal disease       - Hemophiliacs who received Factor VII or         IX concentrates       - Clients of institutions for the mentally             retarded       - Persons who live in the same house as          a HepB carrier       - Homosexual men       - Illicit injectable drug abusers     Pneumococcal: Done, no repeat necessary  DUE ON/AFTER 08/4/2017     Influenza: Done, repeat in one year   09/8/2016     Hepatitis B: N/A DEFER TO PCP FOR RECOMMENDATIONS     Prevnar 13: DONE; NO REPEAT IS NECESSARY  08/04/2016    Mammogram (biennial age 50-74)  Annually (age 40 or over)  Last done 09/28/2016, recommend to repeat every 1  years    Pap (up to age 70 and after 70 if unknown history or abnormal study last 10 years)    N/A     The USPSTF recommends against screening for cervical cancer in women older than age 65 years who have had adequate prior screening and are not otherwise at high risk for cervical cancer.      Colorectal cancer screening (to age 75)    · Fecal  occult blood test (annual)  · Flexible sigmoidoscopy (5y)  · Screening colonoscopy (10y)  · Barium enema   Last done 10/23/2015, recommend to repeat every 3  years    Diabetes self-management training (no USPSTF recommendations)  Requires referral by treating physician for patient with diabetes or renal disease. 10 hours of initial DSMT sessions of no less than 30 minutes each in a continuous 12-month period. 2 hours of follow-up DSMT in subsequent years.  N/A    Bone mass measurements (age 65 & older, biennial)  Requires diagnosis related to osteoporosis or estrogen deficiency. Biennial benefit unless patient has history of long-term glucocorticoid  Last done 01/09/2017, recommend to repeat every 2-4  years    Glaucoma screening (no USPSTF recommendation)  Diabetes mellitus, family history   , age 50 or over    American, age 65 or over  Last done 10/2016, recommend to repeat every 1  years    Medical nutrition therapy for diabetes or renal disease (no recommended schedule)  Requires referral by treating physician for patient with diabetes or renal disease or kidney transplant within the past 3 years.  Can be provided in same year as diabetes self-management training (DSMT), and CMS recommends medical nutrition therapy take place after DSMT. Up to 3 hours for initial year and 2 hours in subsequent years.  N/A    Cardiovascular screening blood tests (every 5 years)  · Fasting lipid panel  Order as a panel if possible  Last done 7/25/2016, recommend to repeat every 1  years    Diabetes screening tests (at least every 3 years, Medicare covers annually or at 6-month intervals for prediabetic patients)  · Fasting blood sugar (FBS) or glucose tolerance test (GTT)  Patient must be diagnosed with one of the following:       - Hypertension       - Dyslipidemia       - Obesity (BMI 30kg/m2)       - Previous elevated impaired FBS or GTT       ... or any two of the following:       - Overweight (BMI 25  but <30)       - Family history of diabetes       - Age 65 or older       - History of gestational diabetes or birth of baby weighing more than 9 pounds  Last done 02/16/2017, recommend to repeat every 6  months    Abdominal aortic aneurysm screening (once)  · Sonogram   Limited to patients who meet one of the following criteria:       - Men who are 65-75 years old and have smoked more than 100 cigarette in their lifetime       - Anyone with a family history of abdominal aortic aneurysm       - Anyone recommended for screening by the USPSTF  N/A    HIV screening (annually for increased risk patients)  · HIV-1 and HIV-2 by EIA, or NOEL, rapid antibody test or oral mucosa transudate  Patients must be at increased risk for HIV infection per USPSTF guidelines or pregnant. Tests covered annually for patient at increased risk or as requested by the patient. Pregnant patients may receive up to 3 tests during pregnancy.  Risks discussed, screening is not recommended    Smoking cessation counseling (up to 8 sessions per year)  Patients must be asymptomatic of tobacco-related conditions to receive as a preventative service.  former smoker    Subsequent annual wellness visit  At least 12 months since last AWV  Return in one year     The following information is provided to all patients.  This information is to help you find resources for any of the problems found today that may be affecting your health:                Living healthy guide: www.[facility].[place].gov      Understanding Diabetes: www.diabetes.org      Eating healthy: www.cdc.gov/healthyweight      CDC home safety checklist: www.cdc.gov/steadi/patient.html      Agency on Aging: www.goea.[place].[facility]      Alcoholics anonymous (AA): www.aa.org      Physical Activity: www.saji.nih.gov/zq7biso      Tobacco use: www.quitwithusla.org     Aerobic Exercise for a Healthy Heart  Exercise is a lot more than an energy booster and a stress reliever. It also strengthens your heart  muscle, lowers your blood pressure and cholesterol, and burns calories. It can also improve your resting muscle tone, and your mood.     Remember, some activity is better than none.    Choose an aerobic activity  Choose an activity that makes your heart and lungs work harder than they do when you rest or walk normally. This aerobic exercise can improve the way your heart and other muscles use oxygen. Make it fun by exercising with a friend and choosing an activity you enjoy. Here are some ideas:  · Walking  · Swimming  · Bicycling  · Stair climbing  · Dancing  · Jogging  · Gardening  Exercise regularly  If you havent been exercising regularly,  get your doctors OK first. Then start slowly.  Here are some tips:  · Begin exercising 3 times a week for 5 to 10 minutes at a time.  · When you feel comfortable, add a few minutes each session.  · Slowly build up to exercising 3 to 4 times each week. Each session should last for 40 minutes, on average, and involve moderate- to vigorous-intensity physical activity.  · If you have been given nitroglycerin, be sure to carry it when you exercise.  · If you get chest pain (angina) when youre exercising, stop what youre doing, take your nitroglycerin, and call your doctor.  Date Last Reviewed: 6/2/2016  © 2420-0895 [employer]. [address] [place] [address], [place] [address]. All rights reserved. This information is not intended as a substitute for professional medical care. Always follow your healthcare professional's instructions.        Eating Heart-Healthy Food: Using the DASH Plan    Eating for your heart doesnt have to be hard or boring. You just need to know how to make healthier choices. The DASH eating plan has been developed to help you do just that. DASH stands for Dietary Approaches to Stop Hypertension. It is a plan that has been proven to be healthier for your heart and to lower your risk for high blood pressure. It can also help lower your risk for  cancer, heart disease, osteoporosis, and diabetes.  Choosing from each food group  Choose foods from each of the food groups below each day. Try to get the recommended number of servings for each food group. The serving numbers are based on a diet of 2,000 calories a day. Talk to your doctor if youre unsure about your calorie needs. Along with getting the correct servings, the DASH plan also recommends a sodium intake less than 2,300 mg per day.        Grains  Servings: 6 to 8 a day  A serving is:  · 1 slice bread  · 1 ounce dry cereal  · Half a cup cooked rice, pasta or cereal  Best choices: Whole grains and any grains high in fiber. Vegetables  Servings: 4 to 5 a day  A serving is:  · 1 cup raw leafy vegetable  · Half a cup cut-up raw or cooked vegetable  · Half a cup vegetable juice  Best choices: Fresh or frozen vegetables prepared without added salt or fat.   Fruits  Servings: 4 to 5 a day  A serving is:  · 1 medium fruit  · One-quarter cup dried fruit  · Half a cup fresh, frozen, or canned fruit  · Half a cup of 100% fruit juices  Best choices: A variety of fresh fruits of different colors. Whole fruits are a better choice than fruit juices. Low-fat or fat-free dairy  Servings: 2 to 3 a day  A serving is:  · 1 cup milk  · 1 cup yogurt  · One and a half ounces cheese  Best choices: Skim or 1% milk, low-fat or fat-free yogurt or buttermilk, and low-fat cheeses.         Lean meats, poultry, fish  Servings: 6 or fewer a day  A serving is:  · 1 ounce cooked meats, poultry, or fish  · 1 egg  Best choices: Lean poultry and fish. Trim away visible fat. Broil, grill, roast, or boil instead of frying. Remove skin from poultry before eating. Limit how much red meat you eat.  Nuts, seeds, beans  Servings: 4 to 5 a week  A serving is:  · One-third cup nuts (one and a half ounces)  · 2 tablespoons nut butter or seeds  · Half a cup cooked dry beans or legumes  Best choices: Dry roasted nuts with no salt added, lentils,  kidney beans, garbanzo beans, and whole perez beans.   Fats and oils  Servings: 2 to 3 a day  A serving is:  · 1 teaspoon vegetable oil  · 1 teaspoon soft margarine  · 1 tablespoon mayonnaise  · 2 tablespoons salad dressing  Best choices: Nut and vegetable oils (nontropical vegetable oils), such as olive and canola oil. Sweets  Servings: 5 a week or fewer  A serving is:  · 1 tablespoon sugar, maple syrup, or honey  · 1 tablespoon jam or jelly  · 1 half-ounce jelly beans (about 15)  · 1 cup lemonade  Best choices: Dried fruit can be a satisfying sweet. Choose low-fat sweets. And watch your serving sizes!      For more on the DASH eating plan, visit:  www.nhlbi.nih.gov/health/health-topics/topics/dash   Date Last Reviewed: 6/1/2016  © 1764-1974 [employer]. [address] [place] [address]. All rights reserved. This information is not intended as a substitute for professional medical care. Always follow your healthcare professional's instructions.             Language Assistance Services     ATTENTION: Language assistance services are available, free of charge. Please call [phone].      ATENCIÓN: Si jorge villareal, tiene a yeager disposición servicios gratuitos de asistencia lingüística. Llame al [phone].     CHÚ Ý: N?u b?n nói Ti?ng Vi?t, có các d?ch v? h? tr? ngôn ng? mi?n phí dành cho b?n. G?i s? [phone].         [facility] complies with applicable Federal civil rights laws and does not discriminate on the basis of race, color, national origin, age, disability, or sex.

## 2017-03-23 ENCOUNTER — OFFICE VISIT (OUTPATIENT)
Dept: OPTOMETRY | Facility: CLINIC | Age: 67
End: 2017-03-23
Payer: MEDICARE

## 2017-03-23 DIAGNOSIS — H10.13 ALLERGIC CONJUNCTIVITIS, BILATERAL: Primary | ICD-10-CM

## 2017-03-23 DIAGNOSIS — H16.9 UNSPECIFIED KERATITIS: ICD-10-CM

## 2017-03-23 PROCEDURE — 99999 PR PBB SHADOW E&M-EST. PATIENT-LVL I: CPT | Mod: PBBFAC,,, | Performed by: OPTOMETRIST

## 2017-03-23 PROCEDURE — 92012 INTRM OPH EXAM EST PATIENT: CPT | Mod: S$GLB,,, | Performed by: OPTOMETRIST

## 2017-03-23 PROCEDURE — 99499 UNLISTED E&M SERVICE: CPT | Mod: S$GLB,,, | Performed by: OPTOMETRIST

## 2017-03-23 RX ORDER — FLUOROMETHOLONE 1 MG/ML
1 SUSPENSION/ DROPS OPHTHALMIC 4 TIMES DAILY
Qty: 10 ML | Refills: 0 | Status: SHIPPED | OUTPATIENT
Start: 2017-03-23 | End: 2017-04-06

## 2017-03-23 NOTE — PROGRESS NOTES
HPI     66 year old female  DLS: 10/20/2016  Pt states that OU is very itchy-no discharge or pain-  Using Alaway-no relief. Antihistamine triggers Migraines       Last edited by Nitin Kumar, OD on 3/23/2017  9:13 AM.         Assessment /Plan     For exam results, see Encounter Report.    Allergic conjunctivitis, bilateral  -FML QID OU    Unspecified keratitis  -     fluorometholone 0.1% (FML) 0.1 % DrpS; Place 1 drop into both eyes 4 (four) times daily.  Dispense: 10 mL; Refill: 0  -Above gtts, Follow up    RTC 1 wk

## 2017-03-23 NOTE — MR AVS SNAPSHOT
[facility]  [address]  [facility] [address]  Phone: [phone]  Fax: [phone]                  Manju Aranda   3/23/2017 9:15 AM   Office Visit    Description:  Female : 1950   Provider:  Nitin Kumar OD   Department:  [facility]           Reason for Visit     Itchy Eye           Diagnoses this Visit        Comments    Allergic conjunctivitis, bilateral    -  Primary     Unspecified keratitis                To Do List           Future Appointments        Provider Department Dept Phone    3/28/2017 9:30 AM LAB, APPOINT[place] [facility] [phone]    2017 11:00 AM MD [facility] Miguel II [facility] - Endo/Diab/Metab [phone]    2017 9:30 AM MD [facility] Sotomayor [facility] - Internal Medicine [phone]    2017 10:00 AM LAB, HEMONC SAME DAY [facility] [phone]      Goals (5 Years of Data)     None      Follow-Up and Disposition     Return in about 1 week (around 3/30/2017).       These Medications        Disp Refills Start End    fluorometholone 0.1% (FML) 0.1 % DrpS 10 mL 0 3/23/2017 2017    Place 1 drop into both eyes 4 (four) times daily. - Both Eyes    [id number] [address] AT [address] Ph #: [phone]         [facility]     [facility] -  Assistance  Registered nurses in the [facility] provide clinical advisement, health education, appointment booking, and other advisory services.  Call for this free service at [phone].             Medications           Message regarding Medications     Verify the changes and/or additions to your medication regime listed below are the same as discussed with your clinician today.  If any of these changes or additions are incorrect, please notify your healthcare provider.        START taking these NEW medications         Refills    fluorometholone 0.1% (FML) 0.1 % DrpS 0    Sig: Place 1 drop into both eyes 4 (four) times daily.    Class: Normal    Route: Both Eyes           Verify that the below list of medications is an accurate representation of the medications you are currently taking.  If none reported, the list may be blank. If incorrect, please contact your healthcare provider. Carry this list with you in case of emergency.           Current Medications     atenolol (TENORMIN) 25 MG tablet TAKE 1 TABLET(25 MG) BY MOUTH EVERY DAY    conjugated estrogens (PREMARIN) vaginal cream Place vaginally daily as needed.     diclofenac sodium (VOLTAREN) 1 % Gel Apply 4 grams to effected area up to three times daily as needed.    isometheptene-apap-dichloralphenazone [id number] (MIDRIN) -325 mg per capsule Take 1 capsule by mouth as directed. 2 at onset then repeat 1 every 30-60min, max 5caps/attack    magnesium oxide (MAG-OX) 400 mg tablet Take 400 mg by mouth once daily.    methimazole (TAPAZOLE) 5 MG Tab Take 1 tablet (5 mg total) by mouth 3 (three) times daily.    multivit with min-folic acid 0.4 mg Tab Take 0.4 mg by mouth once daily.    ondansetron (ZOFRAN) 4 MG tablet Take 1-2 tablets (4-8 mg total) by mouth every 8 (eight) hours as needed for Nausea.    quinapril (ACCUPRIL) 20 MG tablet Take 1 tablet (20 mg total) by mouth once daily.    rizatriptan (MAXALT) 5 MG tablet Take 5 mg by mouth every 2 (two) hours as needed for Migraine.    fluorometholone 0.1% (FML) 0.1 % DrpS Place 1 drop into both eyes 4 (four) times daily.           Clinical Reference Information           Allergies as of 3/23/2017     No Known Allergies      Immunizations Administered on Date of Encounter - 3/23/2017     None      [facility] Sign-Up     Activating your [facility] account is as easy as 1-2-3!     1) Visit [email or web address], select Sign Up Now, enter this activation code and your date of birth, then select Next.  Activation code not  generated  Current Patient Portal Status: Account disabled      2) Create a username and password to use when you visit [facility] in the future and select a security question in case you lose your password and select Next.    3) Enter your e-mail address and click Sign Up!    Additional Information  If you have questions, please e-mail [email or web address] or call [phone] to talk to our [facility] staff. Remember, [facility] is NOT to be used for urgent needs. For medical emergencies, dial 911.         Language Assistance Services     ATTENTION: Language assistance services are available, free of charge. Please call [phone].      ATENCIÓN: Si habla dionna, tiene a yeager disposición servicios gratuitos de asistencia lingüística. Llame al [phone].     CHÚ Ý: N?u b?n nói Ti?ng Vi?t, có các d?ch v? h? tr? ngôn ng? mi?n phí dành cho b?n. G?i s? [phone].         [facility] complies with applicable Federal civil rights laws and does not discriminate on the basis of race, color, national origin, age, disability, or sex.

## 2017-03-24 ENCOUNTER — TELEPHONE (OUTPATIENT)
Dept: UROLOGY | Facility: CLINIC | Age: 67
End: 2017-03-24

## 2017-03-24 NOTE — TELEPHONE ENCOUNTER
Please put patient on my schedule for 8:15 on Tuesday.   Microhematuria. Thanks.   Consult from Johnnie.

## 2017-03-28 ENCOUNTER — OFFICE VISIT (OUTPATIENT)
Dept: UROLOGY | Facility: CLINIC | Age: 67
End: 2017-03-28
Payer: MEDICARE

## 2017-03-28 ENCOUNTER — LAB VISIT (OUTPATIENT)
Dept: LAB | Facility: HOSPITAL | Age: 67
End: 2017-03-28
Attending: INTERNAL MEDICINE
Payer: MEDICARE

## 2017-03-28 VITALS
SYSTOLIC BLOOD PRESSURE: 122 MMHG | WEIGHT: 276.25 LBS | DIASTOLIC BLOOD PRESSURE: 68 MMHG | BODY MASS INDEX: 40.91 KG/M2 | HEIGHT: 69 IN | HEART RATE: 55 BPM

## 2017-03-28 DIAGNOSIS — I10 ESSENTIAL HYPERTENSION: ICD-10-CM

## 2017-03-28 DIAGNOSIS — D47.2 SMOLDERING MULTIPLE MYELOMA (SMM): ICD-10-CM

## 2017-03-28 DIAGNOSIS — Q61.2 ADPKD (AUTOSOMAL DOMINANT POLYCYSTIC KIDNEY DISEASE): ICD-10-CM

## 2017-03-28 DIAGNOSIS — R31.29 MICROHEMATURIA: Primary | ICD-10-CM

## 2017-03-28 DIAGNOSIS — E05.90 HYPERTHYROIDISM: ICD-10-CM

## 2017-03-28 LAB
AST SERPL-CCNC: 19 U/L
TSH SERPL DL<=0.005 MIU/L-ACNC: 2.33 UIU/ML

## 2017-03-28 PROCEDURE — 99499 UNLISTED E&M SERVICE: CPT | Mod: S$GLB,,, | Performed by: UROLOGY

## 2017-03-28 PROCEDURE — 99999 PR PBB SHADOW E&M-EST. PATIENT-LVL III: CPT | Mod: PBBFAC,,, | Performed by: UROLOGY

## 2017-03-28 PROCEDURE — 84450 TRANSFERASE (AST) (SGOT): CPT

## 2017-03-28 PROCEDURE — 99203 OFFICE O/P NEW LOW 30 MIN: CPT | Mod: S$GLB,,, | Performed by: UROLOGY

## 2017-03-28 PROCEDURE — 36415 COLL VENOUS BLD VENIPUNCTURE: CPT

## 2017-03-28 PROCEDURE — 3074F SYST BP LT 130 MM HG: CPT | Mod: S$GLB,,, | Performed by: UROLOGY

## 2017-03-28 PROCEDURE — 1160F RVW MEDS BY RX/DR IN RCRD: CPT | Mod: S$GLB,,, | Performed by: UROLOGY

## 2017-03-28 PROCEDURE — 3078F DIAST BP <80 MM HG: CPT | Mod: S$GLB,,, | Performed by: UROLOGY

## 2017-03-28 PROCEDURE — 88112 CYTOPATH CELL ENHANCE TECH: CPT | Mod: 26,,, | Performed by: PATHOLOGY

## 2017-03-28 PROCEDURE — 84443 ASSAY THYROID STIM HORMONE: CPT

## 2017-03-28 NOTE — LETTER
March 28, 2017      Kelsy Forrest MD  [address] [facility] [address]           [facility] - Urology 4th Floor  [address]  [place] [address]  Phone: [phone]          Patient: Manju Aranda   MR Number: [id number]   YOB: 1950   Date of Visit: 3/28/2017       Dear Dr. Kelsy Forrest:    Thank you for referring Manju Aranda to me for evaluation. Attached you will find relevant portions of my assessment and plan of care.    If you have questions, please do not hesitate to call me. I look forward to following Manju Aranda along with you.    Sincerely,    Jovanna Bui MD    Enclosure  CC:  No Recipients    If you would like to receive this communication electronically, please contact [email or web address] or [phone] to request more information on [employer] access.    For providers and/or their staff who would like to refer a patient to [facility], please contact us through our one-stop-shop provider referral line, [facility], at [phone].    If you feel you have received this communication in error or would no longer like to receive these types of communications, please e-mail [email or web address]

## 2017-03-28 NOTE — PROGRESS NOTES
Subjective:       Patient ID: Manju Aranda is a 66 y.o. female.    Chief Complaint: Hematuria (ref by Dr. Forrest)    HPI Comments: Manju Aranda is a 66 y.o. Female referred from Kelsy Forrest MD for microhematuria.    and  ua showed microhematuria.  She does have PCKD.   She has urinary frequency, but is on a diuretic. Every 2 hours.   Nocturia 3 times. She does limit fluids 2 hours before bedtime. No sleep apnea. No lower extremity edema.  No constipation.   No gross hematuria.   No recurrent UTI.   No incontinence.     H/o of diverticulitis. She is on a probiotic daily.  H/o of hysterectomy in 78.  , 2 vaginal, 2 c-sections.        Hematuria   Irritative symptoms include frequency and nocturia. Irritative symptoms do not include urgency. Pertinent negatives include no chills, dysuria or fever.       Past Surgical History:   Procedure Laterality Date    APPENDECTOMY      COLONOSCOPY N/A 10/23/2015    Procedure: COLONOSCOPY;  Surgeon: Brandon Ruelas MD;  Location: [address];  Service: Endoscopy;  Laterality: N/A;  Had divertiulitis in 2015 with a recommendation for colonoscopy in 8 weeks    Dr. Ruelas is her GI physician    HYSTERECTOMY   or     OOPHORECTOMY         Past Medical History:   Diagnosis Date    Allergy     Anemia     Arthritis     Cholelithiases     Cyst of kidney, acquired     Diverticulitis     Elevated TSH     Family history of Graves' disease: daughter, maternal aunt, maternal uncle 2016    Glaucoma     Graves disease     Hx of colonic polyps     Hypertension     Liver cyst     MGUS (monoclonal gammopathy of unknown significance)     Migraine headache     Mitral valve problem     leakage    Obesity     Paraproteinemia     Smoldering multiple myeloma     Tricuspid valve disease     leakage       Social History     Social History    Marital status: Single     Spouse name: N/A    Number of children: N/A     Years of education: N/A     Occupational History    RETIRED      Social History Main Topics    Smoking status: Former Smoker     Years: 3.00     Types: Cigarettes    Smokeless tobacco: Never Used    Alcohol use No    Drug use: No    Sexual activity: No     Other Topics Concern    Not on file     Social History Narrative       Family History   Problem Relation Age of Onset    Heart disease Mother      MI    Heart attack Mother     Heart disease Maternal Aunt      MI    Heart disease Maternal Aunt      MI    Cancer Paternal Grandmother      GYN cancer - unknown cancer    Colon cancer Maternal Uncle     Cancer Maternal Uncle     Hypertension Father     Heart disease Sister      fast heart rate    Cataracts Sister     Glaucoma Sister     Graves' disease Daughter     No Known Problems Son     No Known Problems Sister     No Known Problems Daughter     No Known Problems Son     Melanoma Neg Hx     Breast cancer Neg Hx     Ovarian cancer Neg Hx     Colon polyps Neg Hx     Rectal cancer Neg Hx     Stomach cancer Neg Hx     Esophageal cancer Neg Hx     Ulcerative colitis Neg Hx     Crohn's disease Neg Hx     Amblyopia Neg Hx     Blindness Neg Hx     Macular degeneration Neg Hx     Strabismus Neg Hx     Retinal detachment Neg Hx        Current Outpatient Prescriptions   Medication Sig Dispense Refill    atenolol (TENORMIN) 25 MG tablet TAKE 1 TABLET(25 MG) BY MOUTH EVERY DAY 30 tablet 12    conjugated estrogens (PREMARIN) vaginal cream Place vaginally daily as needed.       diclofenac sodium (VOLTAREN) 1 % Gel Apply 4 grams to effected area up to three times daily as needed. 1 Tube 3    fluorometholone 0.1% (FML) 0.1 % DrpS Place 1 drop into both eyes 4 (four) times daily. 10 mL 0    isometheptene-apap-dichloralphenazone [id number] (MIDRIN) -325 mg per capsule Take 1 capsule by mouth as directed. 2 at onset then repeat 1 every 30-60min, max 5caps/attack (Patient taking  differently: Take 1 capsule by mouth as directed. 2 at onset then repeat 1 every 30-60min, max 5caps/attack) 30 capsule 1    magnesium oxide (MAG-OX) 400 mg tablet Take 400 mg by mouth once daily.      methimazole (TAPAZOLE) 5 MG Tab Take 1 tablet (5 mg total) by mouth 3 (three) times daily. 90 tablet 11    multivit with min-folic acid 0.4 mg Tab Take 0.4 mg by mouth once daily.      ondansetron (ZOFRAN) 4 MG tablet Take 1-2 tablets (4-8 mg total) by mouth every 8 (eight) hours as needed for Nausea. (Patient taking differently: Take 4-8 mg by mouth every 8 (eight) hours as needed for Nausea. ) 20 tablet 3    quinapril (ACCUPRIL) 20 MG tablet Take 1 tablet (20 mg total) by mouth once daily. 90 tablet 3    rizatriptan (MAXALT) 5 MG tablet Take 5 mg by mouth every 2 (two) hours as needed for Migraine.       No current facility-administered medications for this visit.        Review of patient's allergies indicates:  No Known Allergies    Review of Systems   Constitutional: Negative for chills, fever and unexpected weight change.   HENT: Negative for nosebleeds.    Eyes: Negative for visual disturbance.   Respiratory: Negative for chest tightness.    Cardiovascular: Negative for chest pain.   Gastrointestinal: Negative for diarrhea.   Genitourinary: Positive for frequency, hematuria and nocturia. Negative for dysuria and urgency.   Musculoskeletal: Negative for myalgias.   Skin: Negative for rash.   Neurological: Negative for seizures.   Hematological: Does not bruise/bleed easily.   Psychiatric/Behavioral: Negative for behavioral problems.       Objective:      Physical Exam   Vitals reviewed.  Constitutional: She is oriented to person, place, and time. She appears well-developed and well-nourished.   HENT:   Head: Normocephalic and atraumatic.   Eyes: No scleral icterus.   Cardiovascular: Normal rate and regular rhythm.    Pulmonary/Chest: Effort normal. No respiratory distress.   Abdominal: She exhibits no mass.    Musculoskeletal: She exhibits no tenderness.   Lymphadenopathy:     She has no cervical adenopathy.   Neurological: She is alert and oriented to person, place, and time.   Skin: Skin is warm and dry. No rash noted.     Psychiatric: She has a normal mood and affect.     urine dip trace blood  Assessment:       1. Microhematuria    2. Smoldering multiple myeloma (SMM)    3. ADPKD (autosomal dominant polycystic kidney disease)    4. Essential hypertension        Plan:     The patient will be set up for a hematuria workup to include a CT urogram,  cystoscopy and urine culture.  A BMP will be ordered if not done in the last 60 days.  The risks and benefits of cystoscopy were discussed with the patient.     I would like to thank Kelsy Forrest MD for referral of this patient.

## 2017-03-30 ENCOUNTER — OFFICE VISIT (OUTPATIENT)
Dept: OPTOMETRY | Facility: CLINIC | Age: 67
End: 2017-03-30
Payer: MEDICARE

## 2017-03-30 ENCOUNTER — TELEPHONE (OUTPATIENT)
Dept: INTERNAL MEDICINE | Facility: CLINIC | Age: 67
End: 2017-03-30

## 2017-03-30 ENCOUNTER — OFFICE VISIT (OUTPATIENT)
Dept: INTERNAL MEDICINE | Facility: CLINIC | Age: 67
End: 2017-03-30
Payer: MEDICARE

## 2017-03-30 ENCOUNTER — HOSPITAL ENCOUNTER (OUTPATIENT)
Dept: RADIOLOGY | Facility: HOSPITAL | Age: 67
Discharge: HOME | End: 2017-03-30
Attending: INTERNAL MEDICINE
Payer: MEDICARE

## 2017-03-30 VITALS
BODY MASS INDEX: 40.98 KG/M2 | TEMPERATURE: 99 F | OXYGEN SATURATION: 97 % | HEIGHT: 69 IN | HEART RATE: 75 BPM | SYSTOLIC BLOOD PRESSURE: 118 MMHG | WEIGHT: 276.69 LBS | DIASTOLIC BLOOD PRESSURE: 80 MMHG

## 2017-03-30 DIAGNOSIS — K11.20 PAROTITIS: Primary | ICD-10-CM

## 2017-03-30 DIAGNOSIS — R22.1 NECK SWELLING: Primary | ICD-10-CM

## 2017-03-30 DIAGNOSIS — R22.1 NECK SWELLING: ICD-10-CM

## 2017-03-30 DIAGNOSIS — H16.143 THYGESON'S SUPERFICIAL PUNCTATE KERATITIS, BILATERAL: Primary | ICD-10-CM

## 2017-03-30 DIAGNOSIS — Z86.2 HISTORY OF AUTOIMMUNE DISEASE: ICD-10-CM

## 2017-03-30 PROCEDURE — 3079F DIAST BP 80-89 MM HG: CPT | Mod: S$GLB,,, | Performed by: INTERNAL MEDICINE

## 2017-03-30 PROCEDURE — 92012 INTRM OPH EXAM EST PATIENT: CPT | Mod: S$GLB,,, | Performed by: OPTOMETRIST

## 2017-03-30 PROCEDURE — 99999 PR PBB SHADOW E&M-EST. PATIENT-LVL III: CPT | Mod: PBBFAC,,, | Performed by: INTERNAL MEDICINE

## 2017-03-30 PROCEDURE — 70491 CT SOFT TISSUE NECK W/DYE: CPT | Mod: 26,,, | Performed by: RADIOLOGY

## 2017-03-30 PROCEDURE — 1160F RVW MEDS BY RX/DR IN RCRD: CPT | Mod: S$GLB,,, | Performed by: INTERNAL MEDICINE

## 2017-03-30 PROCEDURE — 3074F SYST BP LT 130 MM HG: CPT | Mod: S$GLB,,, | Performed by: OPTOMETRIST

## 2017-03-30 PROCEDURE — 3074F SYST BP LT 130 MM HG: CPT | Mod: S$GLB,,, | Performed by: INTERNAL MEDICINE

## 2017-03-30 PROCEDURE — 25500020 PHARM REV CODE 255: Performed by: INTERNAL MEDICINE

## 2017-03-30 PROCEDURE — 99999 PR PBB SHADOW E&M-EST. PATIENT-LVL III: CPT | Mod: PBBFAC,,, | Performed by: OPTOMETRIST

## 2017-03-30 PROCEDURE — 3078F DIAST BP <80 MM HG: CPT | Mod: S$GLB,,, | Performed by: OPTOMETRIST

## 2017-03-30 PROCEDURE — 99213 OFFICE O/P EST LOW 20 MIN: CPT | Mod: S$GLB,,, | Performed by: INTERNAL MEDICINE

## 2017-03-30 PROCEDURE — 70491 CT SOFT TISSUE NECK W/DYE: CPT | Mod: TC

## 2017-03-30 RX ORDER — AMOXICILLIN AND CLAVULANATE POTASSIUM 875; 125 MG/1; MG/1
1 TABLET, FILM COATED ORAL 2 TIMES DAILY
Qty: 20 TABLET | Refills: 0 | Status: SHIPPED | OUTPATIENT
Start: 2017-03-30 | End: 2017-08-14 | Stop reason: ALTCHOICE

## 2017-03-30 RX ADMIN — IOHEXOL 100 ML: 350 INJECTION, SOLUTION INTRAVENOUS at 03:03

## 2017-03-30 NOTE — PROGRESS NOTES
HPI     DLS:  3/23/17  Allergy response much better  Pt here for 1 week f/u keratitis OU.  Pt states she has noticed some   improvement OU since last visit. Pt denies pain OU.    FML QID OU (Today just once)       Last edited by Nitin Kumar, OD on 3/30/2017  9:35 AM.         Assessment /Plan     For exam results, see Encounter Report.    Fred's superficial punctate keratitis, bilateral  -Taper FML 3-2-1      RTC PRN, 12 mo annual

## 2017-03-30 NOTE — TELEPHONE ENCOUNTER
----- Message from Beckie Marsh MD sent at 3/30/2017  5:32 PM CDT -----  Ms. Aranda- our - has had dry itchy eyes for a few weeks and then yesterday developed a right swollen parotid gland  She had a CT ordered by urgent care- I know the differential could be infection but I was also worried about Sjogren's with her autoimmune history and wanted to see your thoughts  Thanks as always for taking such great care of her  Beckie

## 2017-03-30 NOTE — PROGRESS NOTES
Clinic Note  3/30/2017      Subjective:       Patient ID:  Manju is a 66 y.o. female being seen for an urgent care visit.    Chief Complaint: Facial Swelling    HPI Comments: New onset x1d of R submandibular swelling, pain.  Low grade temp.  No ill contacts.  No dysphagia, ear pain or sore throat.  No sob.      Review of Systems   Constitutional: Positive for fever. Negative for chills.   HENT: Negative for congestion, ear pain and sore throat.    Respiratory: Negative for cough, shortness of breath and wheezing.    Cardiovascular: Negative for chest pain.       Medication List with Changes/Refills   Current Medications    ATENOLOL (TENORMIN) 25 MG TABLET    TAKE 1 TABLET(25 MG) BY MOUTH EVERY DAY    CONJUGATED ESTROGENS (PREMARIN) VAGINAL CREAM    Place vaginally daily as needed.     DICLOFENAC SODIUM (VOLTAREN) 1 % GEL    Apply 4 grams to effected area up to three times daily as needed.    FLUOROMETHOLONE 0.1% (FML) 0.1 % DRPS    Place 1 drop into both eyes 4 (four) times daily.    ISOMETHEPTENE-APAP-DICHLORALPHENAZONE [id number] (MIDRIN) -325 MG PER CAPSULE    Take 1 capsule by mouth as directed. 2 at onset then repeat 1 every 30-60min, max 5caps/attack    MAGNESIUM OXIDE (MAG-OX) 400 MG TABLET    Take 400 mg by mouth once daily.    METHIMAZOLE (TAPAZOLE) 5 MG TAB    Take 1 tablet (5 mg total) by mouth 3 (three) times daily.    MULTIVIT WITH MIN-FOLIC ACID 0.4 MG TAB    Take 0.4 mg by mouth once daily.    ONDANSETRON (ZOFRAN) 4 MG TABLET    Take 1-2 tablets (4-8 mg total) by mouth every 8 (eight) hours as needed for Nausea.    QUINAPRIL (ACCUPRIL) 20 MG TABLET    Take 1 tablet (20 mg total) by mouth once daily.    RIZATRIPTAN (MAXALT) 5 MG TABLET    Take 5 mg by mouth every 2 (two) hours as needed for Migraine.       Patient Active Problem List   Diagnosis    Hypertension    Liver cyst    Paraproteinemia    Smoldering multiple myeloma (SMM)    Migraine headache    Mitral valve disorder     "Tricuspid valve disease    ADPKD (autosomal dominant polycystic kidney disease)    Benign hypertensive renal disease without renal failure    Tachycardia    Hyperthyroidism    Aortic atherosclerosis    Microhematuria           Objective:      /80 (BP Location: Right arm, Patient Position: Sitting, BP Method: Manual)  Pulse 75  Temp 99.1 °F (37.3 °C) (Oral)   Ht 5' 9" (1.753 m)  Wt 125.5 kg (276 lb 10.8 oz)  SpO2 97%  BMI 40.86 kg/m2  Estimated body mass index is 40.86 kg/(m^2) as calculated from the following:    Height as of this encounter: 5' 9" (1.753 m).    Weight as of this encounter: 125.5 kg (276 lb 10.8 oz).  Physical Exam   Constitutional: She is well-developed, well-nourished, and in no distress.   HENT:   Right Ear: Tympanic membrane normal.   Left Ear: Tympanic membrane normal.   Mouth/Throat: No oropharyngeal exudate, posterior oropharyngeal edema, posterior oropharyngeal erythema or tonsillar abscesses.   Neck: Trachea normal. No thyroid mass present.       Cardiovascular: Normal rate and normal heart sounds.    Pulmonary/Chest: Effort normal and breath sounds normal. No stridor.         Assessment and Plan:         Problem List Items Addressed This Visit     None      Visit Diagnoses     Neck swelling    -  Primary - concern for abscess. Will get CT scan today.  Also given mumps in area will check IgM for mumps and CBC.      Relevant Orders    CBC auto differential    Mumps Virus Ab (IgM)    CT Soft Tissue Neck WO Contrast          Follow Up:   Return if symptoms worsen or fail to improve.      Addendum:  CT scan with enlarged parotid gland.  No abscess. Some fat stranding and edema.  Will give augmentin + sour candy/saliva + hydration and warm compressess.  If worsens to ETC.        Jenaro Ramirez      "

## 2017-03-30 NOTE — MR AVS SNAPSHOT
[facility]  [address]  [facility] [address]  Phone: [phone]  Fax: [phone]                  Manju Aranda   3/30/2017 11:00 AM   Office Visit    Description:  Female : 1950   Provider:  Jenaro Ramirez MD   Department:  [facility]           Reason for Visit     Facial Swelling           Diagnoses this Visit        Comments    Neck swelling    -  Primary            To Do List           Future Appointments        Provider Department Dept Phone    3/30/2017 11:00 AM MD [facility] [phone]    3/30/2017 2:40 PM [facility] CT6 MOBILE LIMIT [address] [facility] [phone]    2017 11:00 AM Wisam Aguiar II, MD [facility] - Endo/Diab/Metab [phone]    2017 9:00 AM AIME, UROLOGY [facility] [phone]    4/10/2017 3:45 PM [facility] CT1 64- LIMIT [address] [facility] [phone]      Goals (5 Years of Data)     None      [facility]     [facility] Nurse Care Line -  Assistance  Unless otherwise directed by your provider, please contact [facility], our nurse care line that is available for  assistance.     Registered nurses in the [facility] Center provide: appointment scheduling, clinical advisement, health education, and other advisory services.  Call: [phone] (toll free)               Medications           Message regarding Medications     Verify the changes and/or additions to your medication regime listed below are the same as discussed with your clinician today.  If any of these changes or additions are incorrect, please notify your healthcare provider.             Verify that the below list of medications is an accurate representation of the medications you are currently taking.  If none reported, the list may be blank. If incorrect, please contact your healthcare provider. Carry this list with you  "in case of emergency.           Current Medications     atenolol (TENORMIN) 25 MG tablet TAKE 1 TABLET(25 MG) BY MOUTH EVERY DAY    conjugated estrogens (PREMARIN) vaginal cream Place vaginally daily as needed.     diclofenac sodium (VOLTAREN) 1 % Gel Apply 4 grams to effected area up to three times daily as needed.    fluorometholone 0.1% (FML) 0.1 % DrpS Place 1 drop into both eyes 4 (four) times daily.    isometheptene-apap-dichloralphenazone [id number] (MIDRIN) -325 mg per capsule Take 1 capsule by mouth as directed. 2 at onset then repeat 1 every 30-60min, max 5caps/attack    magnesium oxide (MAG-OX) 400 mg tablet Take 400 mg by mouth once daily.    methimazole (TAPAZOLE) 5 MG Tab Take 1 tablet (5 mg total) by mouth 3 (three) times daily.    multivit with min-folic acid 0.4 mg Tab Take 0.4 mg by mouth once daily.    ondansetron (ZOFRAN) 4 MG tablet Take 1-2 tablets (4-8 mg total) by mouth every 8 (eight) hours as needed for Nausea.    quinapril (ACCUPRIL) 20 MG tablet Take 1 tablet (20 mg total) by mouth once daily.    rizatriptan (MAXALT) 5 MG tablet Take 5 mg by mouth every 2 (two) hours as needed for Migraine.           Clinical Reference Information           Your Vitals Were     BP Pulse Temp Height Weight SpO2    118/80 (BP Location: Right arm, Patient Position: Sitting, BP Method: Manual) 75 99.1 °F (37.3 °C) (Oral) 5' 9" (1.753 m) 125.5 kg (276 lb 10.8 oz) 97%    BMI                40.86 kg/m2          Blood Pressure          Most Recent Value    BP  118/80      Allergies as of 3/30/2017     No Known Allergies      Immunizations Administered on Date of Encounter - 3/30/2017     None      Orders Placed During Today's Visit     Future Labs/Procedures Expected by Expires    CBC auto differential  3/30/2017 5/29/2018    CT Soft Tissue Neck WO Contrast  3/30/2017 3/30/2018    Mumps Virus Ab (IgM)  3/30/2017 5/29/2018      [email or web address] Sign-Up     Activating your [facility] account is as easy as 1-2-3! "     1) Visit [email or web address], select Sign Up Now, enter this activation code and your date of birth, then select Next.  Activation code not generated  Current Patient Portal Status: Account disabled      2) Create a username and password to use when you visit [facility] in the future and select a security question in case you lose your password and select Next.    3) Enter your e-mail address and click Sign Up!    Additional Information  If you have questions, please e-mail [email or web address] or call [phone] to talk to our [employer] staff. Remember, [email or web address] is NOT to be used for urgent needs. For medical emergencies, dial 911.         Language Assistance Services     ATTENTION: Language assistance services are available, free of charge. Please call [phone].      ATENCIÓN: Si jorge villareal, tiene a yeager disposición servicios gratuitos de asistencia lingüística. Llame al [phone].     CHÚ Ý: N?u b?n nói Ti?ng Vi?t, có các d?ch v? h? tr? ngôn ng? mi?n phí dành cho b?n. G?i s? [phone].         [facility] complies with applicable Federal civil rights laws and does not discriminate on the basis of race, color, national origin, age, disability, or sex.

## 2017-03-31 ENCOUNTER — TELEPHONE (OUTPATIENT)
Dept: ENDOCRINOLOGY | Facility: CLINIC | Age: 67
End: 2017-03-31

## 2017-03-31 NOTE — TELEPHONE ENCOUNTER
----- Message from Shira Kumar sent at 3/31/2017 10:33 AM CDT -----  Contact: Manju  tel:    [phone]  Needs to speak to the nurse about the scheduled appt. On 4/4/.      Pls call today.

## 2017-03-31 NOTE — TELEPHONE ENCOUNTER
Spoke with patient in detail whom stated she has a  on  the day of her appointment I instructed patient that the schedule is very tight and not a lot of manuevering to be done so patient just agreed that she will keep her appointment

## 2017-04-04 ENCOUNTER — OFFICE VISIT (OUTPATIENT)
Dept: ENDOCRINOLOGY | Facility: CLINIC | Age: 67
End: 2017-04-04
Payer: MEDICARE

## 2017-04-04 VITALS
DIASTOLIC BLOOD PRESSURE: 80 MMHG | WEIGHT: 275.13 LBS | BODY MASS INDEX: 40.75 KG/M2 | HEIGHT: 69 IN | SYSTOLIC BLOOD PRESSURE: 116 MMHG | HEART RATE: 60 BPM

## 2017-04-04 DIAGNOSIS — E05.90 HYPERTHYROIDISM: Primary | ICD-10-CM

## 2017-04-04 PROCEDURE — 99999 PR PBB SHADOW E&M-EST. PATIENT-LVL III: CPT | Mod: PBBFAC,,, | Performed by: INTERNAL MEDICINE

## 2017-04-04 PROCEDURE — 3074F SYST BP LT 130 MM HG: CPT | Mod: S$GLB,,, | Performed by: INTERNAL MEDICINE

## 2017-04-04 PROCEDURE — 99499 UNLISTED E&M SERVICE: CPT | Mod: S$PBB,,, | Performed by: INTERNAL MEDICINE

## 2017-04-04 PROCEDURE — 99214 OFFICE O/P EST MOD 30 MIN: CPT | Mod: S$GLB,,, | Performed by: INTERNAL MEDICINE

## 2017-04-04 PROCEDURE — 1160F RVW MEDS BY RX/DR IN RCRD: CPT | Mod: S$GLB,,, | Performed by: INTERNAL MEDICINE

## 2017-04-04 PROCEDURE — 3079F DIAST BP 80-89 MM HG: CPT | Mod: S$GLB,,, | Performed by: INTERNAL MEDICINE

## 2017-04-04 NOTE — PROGRESS NOTES
"CHIEF COMPLAINT:  Thyroid.    Consult from Dr. Payton Garay for hyperthyroidism.    HISTORY OF PRESENT ILLNESS:  The patient states that she went to the Emergency   Room because of shortness of breath and lab tests were obtained on 09/13/2016   that indicated that her TSH was depressed at 0.01 and free T4 was 1.75.  She   also had an elevated sed rate of 40.  She has also noted a feeling of   dehydration.  Her heart is beating fast and about a month ago, she had a viral   infection.  It was associated with stomach cramps and loss of appetite.  She has   a strong family history of diabetes.  Her daughter has Graves' disease.    Maternal aunt and uncle had thyroidectomies.  On 09/07/2016, she had IV contrast   with a CT scan.  At the time of the scan, her TSH was 0.1, so it is depressed   since then.  On 07/25/2016, her TSH was depressed at 0.019, but her free T4 was   normal.  TPO antibodies were measured and they are positive at   31.8 and TRAB also positive.  Review of the record shows that she has lost approximately 10 pounds   since 08/08/2016.    REVIEW OF SYSTEMS:  HEENT:  Good eyesight and hearing.  CARDIOPULMONARY:  Denies chest pain, cough or shortness of breath, has   tachycardia.  GASTROINTESTINAL:  Denies any current nausea and vomiting.  She did have this   upset stomach weeks ago.  GENITOURINARY:  Denies dysuria, hematuria, has occasional nocturia.  NEUROMUSCULAR:  Denies numbness, tingling, paresthesias or tremors.    PHYSICAL EXAMINATION:  VITAL SIGNS:  EYES:BMI:40.63 kg/m²  124.8 kg (275 lb 2.2 oz), 5' 9" (1.753 m)  60bpm, 116/80    No eye findings of Graves' disease.  No scleral icterus.  ENT:  No lesion on tongue, hard palate, soft palate or posterior pharynx.  NECK:  Thyroid is enlarged, estimated weight 30 g.  No definite nodules.  No   neck vein distention.  No tracheal deviation.  LYMPHATICS: Right posterior cervical lymph node tender   HEART:  Normal sinus rhythm.  No murmurs.  No rubs.  No " carotid bruits.  LUNGS:  Clear.  Percussion normal.  No respiratory difficulty.  ABDOMEN:  No masses, tenderness or organomegaly.  EXTREMITIES:  No clubbing, cyanosis, edema or tremor.  MUSCULOSKELETAL:  Gait normal.  Stance normal.  Good muscle strength in all four   extremities.  Results for orders placed or performed in visit on 03/30/17   CBC auto differential   Result Value Ref Range    WBC 5.85 3.90 - 12.70 K/uL    RBC 4.03 4.00 - 5.40 M/uL    Hemoglobin 12.5 12.0 - 16.0 g/dL    Hematocrit 38.8 37.0 - 48.5 %    MCV 96 82 - 98 fL    MCH 31.0 27.0 - 31.0 pg    MCHC 32.2 32.0 - 36.0 %    RDW 11.4 (L) 11.5 - 14.5 %    Platelets 231 150 - 350 K/uL    MPV 10.4 9.2 - 12.9 fL    Gran # 3.8 1.8 - 7.7 K/uL    Lymph # 1.5 1.0 - 4.8 K/uL    Mono # 0.6 0.3 - 1.0 K/uL    Eos # 0.0 0.0 - 0.5 K/uL    Baso # 0.01 0.00 - 0.20 K/uL    Gran% 64.4 38.0 - 73.0 %    Lymph% 25.3 18.0 - 48.0 %    Mono% 9.7 4.0 - 15.0 %    Eosinophil% 0.2 0.0 - 8.0 %    Basophil% 0.2 0.0 - 1.9 %    Differential Method Automated    MUMPS VIRUS ANTIBODIES, IGG AND IGM   Result Value Ref Range    Mumps IgG Positive     Mumps G Index Value 3.4     Mumps IgM Negative Negative    Mumps M Index Value 0.01 0.00 - 0.79     THYROID GLAND is enlarged. Lobes are mildly hetereogeneous.  Vascularity is slightly increased.    RIGHT LOBE of the thyroid measures:5.57x1.73x2.14 cm.    ISTHMUS:0.58 cm.    LEFT LOBE measures:5.44x1.68x2.39 cm.    LYMPH NODES:Benign.    COMPARISON:None       Impression      No nodules. Study consistent with autoimmune thyroid disease as the etiology for the hyperthyroidism.                 IMPRESSION:  Hyperthyroidism with a goiter, most likely this is autoimmune   thyroid disease, less likely would be subacute thyroiditis.  Being treated for URI  PLAN:  decrease methimazole to 5 mg twice   a day and follow up in approximately 4 months.   Call for problems and can see early or obtain TSH sooner

## 2017-04-04 NOTE — MR AVS SNAPSHOT
[facility] - Endo/Diab/Metab  [address]  [facility] [address]  Phone: [phone]  Fax: [phone]                  Manju Aranda   2017 11:00 AM   Office Visit    Description:  Female : 1950   Provider:  Wisam Aguiar II, MD   Department:  [facility] - Endo/Diab/Metab           Reason for Visit     Hyperthyroidism           Diagnoses this Visit        Comments    Hyperthyroidism    -  Primary            To Do List           Future Appointments        Provider Department Dept Phone    2017 9:00 AM AIME, UROLOGY [facility] [phone]    4/10/2017 3:45 PM NOM CT1 64- LIMIT 450 LBS [facility] [phone]    2017 9:30 AM Payton Garay MD [facility] - Internal Medicine [phone]    2017 10:00 AM LAB, HEMONC SAME DAY [facility] [phone]    2017 9:00 AM LAB, APPOINTMENT [place] [facility] [phone]      Goals (5 Years of Data)     None      Follow-Up and Disposition     Return in about 4 months (around 2017).    Follow-up and Disposition History      [facility]     [facility] Care [facility] -  Assistance  Unless otherwise directed by your provider, please contact [facility], our nurse care line that is available for  assistance.     Registered nurses in the [facility] provide: appointment scheduling, clinical advisement, health education, and other advisory services.  Call: [phone] (toll free)               Medications           Message regarding Medications     Verify the changes and/or additions to your medication regime listed below are the same as discussed with your clinician today.  If any of these changes or additions are incorrect, please notify your healthcare provider.             Verify that the below list of medications is an accurate representation of the medications you are currently taking.  If none  "reported, the list may be blank. If incorrect, please contact your healthcare provider. Carry this list with you in case of emergency.           Current Medications     amoxicillin-clavulanate 875-125mg (AUGMENTIN) 875-125 mg per tablet Take 1 tablet by mouth 2 (two) times daily.    atenolol (TENORMIN) 25 MG tablet TAKE 1 TABLET(25 MG) BY MOUTH EVERY DAY    conjugated estrogens (PREMARIN) vaginal cream Place vaginally daily as needed.     diclofenac sodium (VOLTAREN) 1 % Gel Apply 4 grams to effected area up to three times daily as needed.    fluorometholone 0.1% (FML) 0.1 % DrpS Place 1 drop into both eyes 4 (four) times daily.    isometheptene-apap-dichloralphenazone [id number] (MIDRIN) -325 mg per capsule Take 1 capsule by mouth as directed. 2 at onset then repeat 1 every 30-60min, max 5caps/attack    magnesium oxide (MAG-OX) 400 mg tablet Take 400 mg by mouth once daily.    methimazole (TAPAZOLE) 5 MG Tab Take 1 tablet (5 mg total) by mouth 3 (three) times daily.    multivit with min-folic acid 0.4 mg Tab Take 0.4 mg by mouth once daily.    quinapril (ACCUPRIL) 20 MG tablet Take 1 tablet (20 mg total) by mouth once daily.    ondansetron (ZOFRAN) 4 MG tablet Take 1-2 tablets (4-8 mg total) by mouth every 8 (eight) hours as needed for Nausea.    rizatriptan (MAXALT) 5 MG tablet Take 5 mg by mouth every 2 (two) hours as needed for Migraine.           Clinical Reference Information           Your Vitals Were     BP Pulse Height Weight BMI    116/80 (BP Location: Right arm, Patient Position: Sitting, BP Method: Manual) 60 5' 9" (1.753 m) 124.8 kg (275 lb 2.2 oz) 40.63 kg/m2      Blood Pressure          Most Recent Value    BP  116/80      Allergies as of 4/4/2017     No Known Allergies      Immunizations Administered on Date of Encounter - 4/4/2017     None      Orders Placed During Today's Visit     Future Labs/Procedures Expected by Expires    TSH  4/4/2017 6/3/2018    [facility]  4/4/2017 6/3/2018      Language " Assistance Services     ATTENTION: Language assistance services are available, free of charge. Please call [phone].      ATENCIÓN: Si habla halinaañol, tiene a yeager disposición servicios gratuitos de asistencia lingüística. Llame al [phone].     CHÚ Ý: N?u b?n nói Ti?ng Vi?t, có các d?ch v? h? tr? ngôn ng? mi?n phí dành cho b?n. G?i s? [phone].         [facility] complies with applicable Federal civil rights laws and does not discriminate on the basis of race, color, national origin, age, disability, or sex.

## 2017-04-07 ENCOUNTER — HOSPITAL ENCOUNTER (OUTPATIENT)
Dept: UROLOGY | Facility: HOSPITAL | Age: 67
Discharge: HOME | End: 2017-04-07
Attending: UROLOGY
Payer: MEDICARE

## 2017-04-07 VITALS
SYSTOLIC BLOOD PRESSURE: 148 MMHG | WEIGHT: 273.38 LBS | TEMPERATURE: 98 F | HEART RATE: 66 BPM | BODY MASS INDEX: 40.49 KG/M2 | DIASTOLIC BLOOD PRESSURE: 78 MMHG | RESPIRATION RATE: 18 BRPM | HEIGHT: 69 IN

## 2017-04-07 DIAGNOSIS — R31.29 MICROHEMATURIA: ICD-10-CM

## 2017-04-07 LAB
BILIRUB SERPL-MCNC: NORMAL MG/DL
BLOOD URINE, POC: 50
COLOR, POC UA: NORMAL
GLUCOSE UR QL STRIP: NORMAL
KETONES UR QL STRIP: NORMAL
LEUKOCYTE ESTERASE URINE, POC: NORMAL
NITRITE, POC UA: NORMAL
PH, POC UA: 5
PROTEIN, POC: NORMAL
SPECIFIC GRAVITY, POC UA: 1.01
UROBILINOGEN, POC UA: NORMAL

## 2017-04-07 PROCEDURE — 52000 CYSTOURETHROSCOPY: CPT

## 2017-04-07 PROCEDURE — 52000 CYSTOURETHROSCOPY: CPT | Mod: ,,, | Performed by: UROLOGY

## 2017-04-07 RX ORDER — LIDOCAINE HYDROCHLORIDE 20 MG/ML
10 JELLY TOPICAL
Status: DISCONTINUED | OUTPATIENT
Start: 2017-04-07 | End: 2018-03-26 | Stop reason: ALTCHOICE

## 2017-04-07 RX ORDER — LIDOCAINE HYDROCHLORIDE 20 MG/ML
10 JELLY TOPICAL
Status: COMPLETED | OUTPATIENT
Start: 2017-04-07 | End: 2017-04-07

## 2017-04-07 RX ORDER — CIPROFLOXACIN 500 MG/1
500 TABLET ORAL ONCE
Status: COMPLETED | OUTPATIENT
Start: 2017-04-07 | End: 2017-04-07

## 2017-04-07 RX ADMIN — CIPROFLOXACIN 500 MG: 500 TABLET ORAL at 09:04

## 2017-04-07 RX ADMIN — LIDOCAINE HYDROCHLORIDE 10 ML: 20 JELLY TOPICAL at 09:04

## 2017-04-07 NOTE — IP AVS SNAPSHOT
[facility]  [address] [facility] [address]  Phone: [phone]  Fax: [phone]                  Manju Aranda   2017  9:00 AM   Cystoscopy    Description:  Female : 1950   Provider:  A[facility] UROLOGY   Department:  [facility]           Visit Information     Date & Time Provider Department    2017  9:00 AM AIME UROLOGY [facility]      Recent Lab Values        5/10/2011 2016 3/7/2016 2016                  9:30 AM 11:00 AM  9:56 AM 11:29 PM        Urine Culture MULTIPLE ORGANISMS ISOLATED. NONE IN PREDOMINANCE  REPEAT IF CLINICALLY NECESSARY. - - -        Color YELLOW Yellow Yellow Yellow        Specific Gravity 1.022 1.020 1.020 1.020        pH 6.5 6.0 6.0 5.0        Nitrite Negative Negative Negative Negative        Ketones Negative Negative Negative Negative        Urobilinogen 0.2 Negative Negative Negative        Comment for Color at  9:30 AM on 5/10/2011:  If formed elements are not present in the microscopicexamination, they are NOT mentioned in the report.      Reason for Visit     Hematuria           Diagnoses this Visit        Comments    Microhematuria                To Do List           Your Scheduled Appointments     Apr 10, 2017  3:45 PM CDT   Ct Urogram with [facility] CT1 64- LIMIT 450 LBS   [facility] ([facility] )    [address] [facility] [id number] [phone]            May 02, 2017  9:30 AM CDT   Established Patient Visit with Payton Garay MD   [facility] - Internal Medicine ([facility])    [address]  [place] [address] [phone]            May 16, 2017 10:00 AM CDT   Non-Fasting Lab with LAB, HEMONC SAME DAY   [facility] ([facility])    [address]  [place] [id number] [phone]            Aug 07, 2017  9:00 AM CDT    Non-Fasting Lab with LAB, APPOINTMENT [place] [facility] ([facility])    [address]  [facility] [address] [phone]            Aug 14, 2017  9:30 AM CDT   Established Patient with MD Clay Miguel II - Endo/Diab/Metab ([facility] )    [address]  [facility] [address] [phone]              Goals (5 Years of Data)     None           Medications                ** Verify the list of medication(s) below is accurate and up to date. Carry this with you in case of emergency. If your medications have changed, please notify your healthcare provider.             Medication List      TAKE these medications        Additional Info                      amoxicillin-clavulanate 875-125mg 875-125 mg per tablet   Commonly known as:  AUGMENTIN   Quantity:  20 tablet   Refills:  0   Dose:  1 tablet    Instructions:  Take 1 tablet by mouth 2 (two) times daily.     Begin Date    AM    Noon    PM    Bedtime       atenolol 25 MG tablet   Commonly known as:  TENORMIN   Quantity:  30 tablet   Refills:  12    Instructions:  TAKE 1 TABLET(25 MG) BY MOUTH EVERY DAY     Begin Date    AM    Noon    PM    Bedtime       conjugated estrogens vaginal cream   Commonly known as:  PREMARIN   Refills:  0    Instructions:  Place vaginally daily as needed.     Begin Date    AM    Noon    PM    Bedtime       diclofenac sodium 1 % Gel   Commonly known as:  VOLTAREN   Quantity:  1 Tube   Refills:  3    Instructions:  Apply 4 grams to effected area up to three times daily as needed.     Begin Date    AM    Noon    PM    Bedtime       isometheptene-apap-dichloralphenazone [id number] -325 mg per capsule   Commonly known as:  MIDRIN   Quantity:  30 capsule   Refills:  1   Dose:  1 capsule    Instructions:  Take 1 capsule by mouth as directed. 2 at onset then repeat 1 every 30-60min, max 5caps/attack     Begin Date    AM    Noon    PM    Bedtime  "      magnesium oxide 400 mg tablet   Commonly known as:  MAG-OX   Refills:  0   Dose:  400 mg    Instructions:  Take 400 mg by mouth once daily.     Begin Date    AM    Noon    PM    Bedtime       methimazole 5 MG Tab   Commonly known as:  TAPAZOLE   Quantity:  90 tablet   Refills:  11   Dose:  5 mg    Instructions:  Take 1 tablet (5 mg total) by mouth 3 (three) times daily.     Begin Date    AM    Noon    PM    Bedtime       multivit with min-folic acid 0.4 mg Tab   Refills:  0   Dose:  0.4 mg    Instructions:  Take 0.4 mg by mouth once daily.     Begin Date    AM    Noon    PM    Bedtime       ondansetron 4 MG tablet   Commonly known as:  ZOFRAN   Quantity:  20 tablet   Refills:  3   Dose:  4-8 mg    Instructions:  Take 1-2 tablets (4-8 mg total) by mouth every 8 (eight) hours as needed for Nausea.     Begin Date    AM    Noon    PM    Bedtime       quinapril 20 MG tablet   Commonly known as:  ACCUPRIL   Quantity:  90 tablet   Refills:  3   Dose:  20 mg    Instructions:  Take 1 tablet (20 mg total) by mouth once daily.     Begin Date    AM    Noon    PM    Bedtime       rizatriptan 5 MG tablet   Commonly known as:  MAXALT   Refills:  0   Dose:  5 mg   Indications:  Migraine    Instructions:  Take 5 mg by mouth every 2 (two) hours as needed for Migraine.     Begin Date    AM    Noon    PM    Bedtime         ASK your doctor about these medications        Additional Info                      fluorometholone 0.1% 0.1 % Drps   Commonly known as:  FML   Quantity:  10 mL   Refills:  0   Dose:  1 drop   Ask about: Should I take this medication?    Instructions:  Place 1 drop into both eyes 4 (four) times daily.     Begin Date    AM    Noon    PM    Bedtime               Your Vitals Were     BP Pulse Temp Resp Height Weight    149/87 62 98.4 °F (36.9 °C) (Oral) 18 5' 9" (1.753 m) 124 kg (273 lb 5.9 oz)    BMI                40.37 kg/m2          Allergies as of 4/7/2017     No Known Allergies      Immunizations " Administered on Date of Encounter - 4/7/2017     None      Instructions    What to Expect After a Cystoscopy  For the next 24-48 hours, you may feel a mild burning when you urinate. This burning is normal and expected. Drink plenty of water to dilute the urine to help relieve the burning sensation. You may also see a small amount of blood in your urine after the procedure.    Unless you are already taking antibiotics, you may be given an antibiotic after the test to prevent infection.    Signs and Symptoms to Report  Call the [facility] at [phone] if you develop any of the following:  · Fever of 101 degrees or higher  · Chills or persistent bleeding  · Inability to urinate       Advance Directives     An advance directive is a document which, in the event you are no longer able to make decisions for yourself, tells your healthcare team what kind of treatment you do or do not want to receive, or who you would like to make those decisions for you.  If you do not currently have an advance directive, [facility] encourages you to create one.  For more information call:  [phone] ([phone]),  or log on to [email or web address].        [facility]     [facility] Care [facility] - 24/7 Assistance  Unless otherwise directed by your provider, please contact [facility], our nurse care line that is available for 24/7 assistance.     Registered nurses in the [facility] provide: appointment scheduling, clinical advisement, health education, and other advisory services.  Call: [phone] (toll free)          Language Assistance Services     ATTENTION: Language assistance services are available, free of charge. Please call [phone].      ATENCIÓN: Si habla español, tiene a yeager disposición servicios gratuitos de asistencia lingüística. Llame al [phone].     ELISA Ý: N?u b?n nói Ti?ng Vi?t, có các d?ch v? h? tr? ngôn ng? mi?n phí dàn cho  b?n. G?i s? [phone].         [facility] complies with applicable Federal civil rights laws and does not discriminate on the basis of race, color, national origin, age, disability, or sex.

## 2017-04-07 NOTE — H&P (VIEW-ONLY)
Subjective:       Patient ID: Manju Aranda is a 66 y.o. female.    Chief Complaint: Hematuria (ref by Dr. Forrest)    HPI Comments: Manju Aranda is a 66 y.o. Female referred from Kelsy Forrest MD for microhematuria.    and  ua showed microhematuria.  She does have PCKD.   She has urinary frequency, but is on a diuretic. Every 2 hours.   Nocturia 3 times. She does limit fluids 2 hours before bedtime. No sleep apnea. No lower extremity edema.  No constipation.   No gross hematuria.   No recurrent UTI.   No incontinence.     H/o of diverticulitis. She is on a probiotic daily.  H/o of hysterectomy in 78.  , 2 vaginal, 2 c-sections.        Hematuria   Irritative symptoms include frequency and nocturia. Irritative symptoms do not include urgency. Pertinent negatives include no chills, dysuria or fever.       Past Surgical History:   Procedure Laterality Date    APPENDECTOMY      COLONOSCOPY N/A 10/23/2015    Procedure: COLONOSCOPY;  Surgeon: Brandon Ruelas MD;  Location: [address];  Service: Endoscopy;  Laterality: N/A;  Had divertiulitis in 2015 with a recommendation for colonoscopy in 8 weeks    Dr. Ruelas is her GI physician    HYSTERECTOMY   or     OOPHORECTOMY         Past Medical History:   Diagnosis Date    Allergy     Anemia     Arthritis     Cholelithiases     Cyst of kidney, acquired     Diverticulitis     Elevated TSH     Family history of Graves' disease: daughter, maternal aunt, maternal uncle 2016    Glaucoma     Graves disease     Hx of colonic polyps     Hypertension     Liver cyst     MGUS (monoclonal gammopathy of unknown significance)     Migraine headache     Mitral valve problem     leakage    Obesity     Paraproteinemia     Smoldering multiple myeloma     Tricuspid valve disease     leakage       Social History     Social History    Marital status: Single     Spouse name: N/A    Number of children: N/A     Years of education: N/A     Occupational History    RETIRED      Social History Main Topics    Smoking status: Former Smoker     Years: 3.00     Types: Cigarettes    Smokeless tobacco: Never Used    Alcohol use No    Drug use: No    Sexual activity: No     Other Topics Concern    Not on file     Social History Narrative       Family History   Problem Relation Age of Onset    Heart disease Mother      MI    Heart attack Mother     Heart disease Maternal Aunt      MI    Heart disease Maternal Aunt      MI    Cancer Paternal Grandmother      GYN cancer - unknown cancer    Colon cancer Maternal Uncle     Cancer Maternal Uncle     Hypertension Father     Heart disease Sister      fast heart rate    Cataracts Sister     Glaucoma Sister     Graves' disease Daughter     No Known Problems Son     No Known Problems Sister     No Known Problems Daughter     No Known Problems Son     Melanoma Neg Hx     Breast cancer Neg Hx     Ovarian cancer Neg Hx     Colon polyps Neg Hx     Rectal cancer Neg Hx     Stomach cancer Neg Hx     Esophageal cancer Neg Hx     Ulcerative colitis Neg Hx     Crohn's disease Neg Hx     Amblyopia Neg Hx     Blindness Neg Hx     Macular degeneration Neg Hx     Strabismus Neg Hx     Retinal detachment Neg Hx        Current Outpatient Prescriptions   Medication Sig Dispense Refill    atenolol (TENORMIN) 25 MG tablet TAKE 1 TABLET(25 MG) BY MOUTH EVERY DAY 30 tablet 12    conjugated estrogens (PREMARIN) vaginal cream Place vaginally daily as needed.       diclofenac sodium (VOLTAREN) 1 % Gel Apply 4 grams to effected area up to three times daily as needed. 1 Tube 3    fluorometholone 0.1% (FML) 0.1 % DrpS Place 1 drop into both eyes 4 (four) times daily. 10 mL 0    isometheptene-apap-dichloralphenazone [id number] (MIDRIN) -325 mg per capsule Take 1 capsule by mouth as directed. 2 at onset then repeat 1 every 30-60min, max 5caps/attack (Patient taking  differently: Take 1 capsule by mouth as directed. 2 at onset then repeat 1 every 30-60min, max 5caps/attack) 30 capsule 1    magnesium oxide (MAG-OX) 400 mg tablet Take 400 mg by mouth once daily.      methimazole (TAPAZOLE) 5 MG Tab Take 1 tablet (5 mg total) by mouth 3 (three) times daily. 90 tablet 11    multivit with min-folic acid 0.4 mg Tab Take 0.4 mg by mouth once daily.      ondansetron (ZOFRAN) 4 MG tablet Take 1-2 tablets (4-8 mg total) by mouth every 8 (eight) hours as needed for Nausea. (Patient taking differently: Take 4-8 mg by mouth every 8 (eight) hours as needed for Nausea. ) 20 tablet 3    quinapril (ACCUPRIL) 20 MG tablet Take 1 tablet (20 mg total) by mouth once daily. 90 tablet 3    rizatriptan (MAXALT) 5 MG tablet Take 5 mg by mouth every 2 (two) hours as needed for Migraine.       No current facility-administered medications for this visit.        Review of patient's allergies indicates:  No Known Allergies    Review of Systems   Constitutional: Negative for chills, fever and unexpected weight change.   HENT: Negative for nosebleeds.    Eyes: Negative for visual disturbance.   Respiratory: Negative for chest tightness.    Cardiovascular: Negative for chest pain.   Gastrointestinal: Negative for diarrhea.   Genitourinary: Positive for frequency, hematuria and nocturia. Negative for dysuria and urgency.   Musculoskeletal: Negative for myalgias.   Skin: Negative for rash.   Neurological: Negative for seizures.   Hematological: Does not bruise/bleed easily.   Psychiatric/Behavioral: Negative for behavioral problems.       Objective:      Physical Exam   Vitals reviewed.  Constitutional: She is oriented to person, place, and time. She appears well-developed and well-nourished.   HENT:   Head: Normocephalic and atraumatic.   Eyes: No scleral icterus.   Cardiovascular: Normal rate and regular rhythm.    Pulmonary/Chest: Effort normal. No respiratory distress.   Abdominal: She exhibits no mass.    Musculoskeletal: She exhibits no tenderness.   Lymphadenopathy:     She has no cervical adenopathy.   Neurological: She is alert and oriented to person, place, and time.   Skin: Skin is warm and dry. No rash noted.     Psychiatric: She has a normal mood and affect.     urine dip trace blood  Assessment:       1. Microhematuria    2. Smoldering multiple myeloma (SMM)    3. ADPKD (autosomal dominant polycystic kidney disease)    4. Essential hypertension        Plan:     The patient will be set up for a hematuria workup to include a CT urogram,  cystoscopy and urine culture.  A BMP will be ordered if not done in the last 60 days.  The risks and benefits of cystoscopy were discussed with the patient.     I would like to thank Kelsy Forrest MD for referral of this patient.

## 2017-04-07 NOTE — PROCEDURES
Procedure: Flexible cysto-uretheroscopy    Pre Procedure Diagnosis:microhematuria    Post Procedure Diagnosis:Normal cystoscopy    Surgeon: Jovanna Bui MD    Anesthesia: 2% uro-jet lidocaine jelly for local analgesia    Flexible cysto-urethroscopy was performed after consent was obtained.  The risks and benefits were explained.    2% lidocaine urojet was used for local analgesia.  The genitalia was prepped and draped in the sterile fashion with betadine.    The flexible scope was advanced into the urethra and into the bladder.  Bilateral ureteral orifice were evaluated and noted to be normal with clear efflux.  The bladder was completely surveyed in a systematic fashion.   No bladder tumors or lesions were seen.  No strictures were noted.    The patient tolerated the procedure well without complication.    They will follow up for CT. I will call her with results

## 2017-04-07 NOTE — PATIENT INSTRUCTIONS
What to Expect After a Cystoscopy  For the next 24-48 hours, you may feel a mild burning when you urinate. This burning is normal and expected. Drink plenty of water to dilute the urine to help relieve the burning sensation. You may also see a small amount of blood in your urine after the procedure.    Unless you are already taking antibiotics, you may be given an antibiotic after the test to prevent infection.    Signs and Symptoms to Report  Call the [facility] at [phone] if you develop any of the following:  · Fever of 101 degrees or higher  · Chills or persistent bleeding  · Inability to urinate

## 2017-04-10 ENCOUNTER — HOSPITAL ENCOUNTER (OUTPATIENT)
Dept: RADIOLOGY | Facility: HOSPITAL | Age: 67
Discharge: HOME | End: 2017-04-10
Attending: UROLOGY
Payer: MEDICARE

## 2017-04-10 DIAGNOSIS — R31.29 MICROHEMATURIA: ICD-10-CM

## 2017-04-10 PROCEDURE — 74178 CT ABD&PLV WO CNTR FLWD CNTR: CPT | Mod: 26,,, | Performed by: RADIOLOGY

## 2017-04-10 PROCEDURE — 25500020 PHARM REV CODE 255: Performed by: UROLOGY

## 2017-04-10 PROCEDURE — 74178 CT ABD&PLV WO CNTR FLWD CNTR: CPT | Mod: TC

## 2017-04-10 RX ADMIN — IOHEXOL 125 ML: 350 INJECTION, SOLUTION INTRAVENOUS at 05:04

## 2017-04-18 ENCOUNTER — TELEPHONE (OUTPATIENT)
Dept: UROLOGY | Facility: CLINIC | Age: 67
End: 2017-04-18

## 2017-04-18 NOTE — TELEPHONE ENCOUNTER
----- Message from Jovanna Bui MD sent at 4/12/2017  8:35 PM CDT -----  CT showed renal cysts. This was present on previous imaging and appear benign. No cause for hematuria seen.   Follow up in 1 year in clinic.

## 2017-05-02 ENCOUNTER — OFFICE VISIT (OUTPATIENT)
Dept: INTERNAL MEDICINE | Facility: CLINIC | Age: 67
End: 2017-05-02
Payer: MEDICARE

## 2017-05-02 VITALS
BODY MASS INDEX: 40.81 KG/M2 | WEIGHT: 275.56 LBS | HEIGHT: 69 IN | DIASTOLIC BLOOD PRESSURE: 80 MMHG | OXYGEN SATURATION: 99 % | SYSTOLIC BLOOD PRESSURE: 128 MMHG | HEART RATE: 56 BPM

## 2017-05-02 DIAGNOSIS — I10 ESSENTIAL HYPERTENSION: ICD-10-CM

## 2017-05-02 DIAGNOSIS — E05.90 HYPERTHYROIDISM: ICD-10-CM

## 2017-05-02 DIAGNOSIS — R31.29 MICROHEMATURIA: ICD-10-CM

## 2017-05-02 DIAGNOSIS — K11.1 PAROTID GLAND ENLARGEMENT: Primary | ICD-10-CM

## 2017-05-02 PROCEDURE — 3074F SYST BP LT 130 MM HG: CPT | Mod: S$GLB,,, | Performed by: INTERNAL MEDICINE

## 2017-05-02 PROCEDURE — 1160F RVW MEDS BY RX/DR IN RCRD: CPT | Mod: S$GLB,,, | Performed by: INTERNAL MEDICINE

## 2017-05-02 PROCEDURE — 99499 UNLISTED E&M SERVICE: CPT | Mod: S$PBB,,, | Performed by: INTERNAL MEDICINE

## 2017-05-02 PROCEDURE — 99214 OFFICE O/P EST MOD 30 MIN: CPT | Mod: S$GLB,,, | Performed by: INTERNAL MEDICINE

## 2017-05-02 PROCEDURE — 3079F DIAST BP 80-89 MM HG: CPT | Mod: S$GLB,,, | Performed by: INTERNAL MEDICINE

## 2017-05-02 PROCEDURE — 99999 PR PBB SHADOW E&M-EST. PATIENT-LVL IV: CPT | Mod: PBBFAC,,, | Performed by: INTERNAL MEDICINE

## 2017-05-02 NOTE — PROGRESS NOTES
Subjective:       Patient ID: Manju Aranda is a 66 y.o. female.    Chief Complaint: Follow-up    HPI   Today the patient is feeling well.    Urgent Care:Dr. Ramirez  Enlarged Parotid: resolved on augmentin, warm compresses and sour candy      Endocrinology: Dr. Aguiar:   IMPRESSION: Hyperthyroidism with a goiter, most likely this is autoimmune   thyroid disease, less likely would be subacute thyroiditis.  Being treated for URI  PLAN: decrease methimazole to 5 mg twice   a day and follow up in approximately 4 months.   Call for problems and can see early or obtain TSH sooner    Optometry:  Dr. Kumar: eye irritation    Urology:hematuria  Dr. Bui  Studies complete: CT / Cystoscopy: will check [facility]    Nephrology  Dr. Forrest: CKD 2    ID: Smoldering Multiple Myeloma  Dr. DAYAMI Alba     -her Kappa free LC has minimally decreased since our interval visit  -she has mild intermittent anemia since at least 2010, but this has normalized   -no other CRAB criteria or concerning clinical symptoms  -SS from November 2016 with JOSE; plan to repeat prior to our next appt in 3 months    -she continues to have smoldering myeloma with no indication for bisphophanates for anti myeloma therapy   Follow Up: -cbc, cmp, serum free light chains, quantitative immunoglobulins, serum electropheresis, serum immunofixation in 3 months  cbc, cmp, serum free light chains, quantitative immunoglobulins, serum electropheresis, serum immunofixation And MD appt in 6 months  -instructed to call earlier is symptoms develop           Review of Systems   Constitutional: Negative for chills, fever and unexpected weight change.   HENT: Negative for trouble swallowing.    Respiratory: Negative for cough, shortness of breath and wheezing.    Cardiovascular: Negative for chest pain and palpitations.   Gastrointestinal: Negative for abdominal distention, abdominal pain, blood in stool and vomiting.   Musculoskeletal: Negative for back pain.        Objective:      Physical Exam   Constitutional: She is oriented to person, place, and time. She appears well-developed and well-nourished. No distress.   Neck: Carotid bruit is not present. No thyromegaly present.   Cardiovascular: Normal rate, regular rhythm and normal heart sounds.  PMI is not displaced.    Pulmonary/Chest: Effort normal and breath sounds normal. No respiratory distress.   Abdominal: Soft. Bowel sounds are normal. She exhibits no distension. There is no tenderness.   Musculoskeletal: She exhibits no edema.   Neurological: She is alert and oriented to person, place, and time.     parotid inflammation has decreased and normal size  Assessment:       1. Parotid gland enlargement    2. Hyperthyroidism    3. Microhematuria    4. Essential hypertension        Plan:       Manju was seen today for follow-up.    Diagnoses and all orders for this visit:    Parotid gland enlargement: Resolved on antibiotics    Hyperthyroidism: Followed in endocrinology with reduction of medication recently    Microhematuria: Reviewed workup and patient states she is to follow with urology yearly    Essential hypertension: Well controlled continue same medication      Return in about 3 months (around 8/7/2017) for EPP .    New Prescriptions    No medications on file       Modified Medications    No medications on file       No orders of the defined types were placed in this encounter.      Labs, studies and consults associated with this visit were reviewed

## 2017-05-02 NOTE — MR AVS SNAPSHOT
[facility]  [address]  [facility] [address]  Phone: [phone]  Fax: [phone]                  Manju Aranda   2017 9:30 AM   Office Visit    Description:  Female : 1950   Provider:  Payton Garay MD   Department:  [facility]           Reason for Visit     Follow-up           Diagnoses this Visit        Comments    Parotid gland enlargement    -  Primary     Hyperthyroidism         Microhematuria         Essential hypertension                To Do List           Future Appointments        Provider Department Dept Phone    2017 10:00 AM LAB, HEMONC SAME DAY [facility] [phone]    2017 9:00 AM Payton Garay MD [facility] Internal Medicine [phone]    2017 9:00 AM LAB, APPOINTMENT [place] [facility] [phone]    2017 9:30 AM Wisam Aguiar II, MD [facility] - Endo/Diab/Metab [phone]      Goals (5 Years of Data)     None      Follow-Up and Disposition     Return in about 3 months (around 2017) for EPP .      [facility]     [facility] Care [facility] -  Assistance  Unless otherwise directed by your provider, please contact [facility], our nurse care line that is available for  assistance.     Registered nurses in the [facility] provide: appointment scheduling, clinical advisement, health education, and other advisory services.  Call: [phone] (toll free)               Medications           Message regarding Medications     Verify the changes and/or additions to your medication regime listed below are the same as discussed with your clinician today.  If any of these changes or additions are incorrect, please notify your healthcare provider.             Verify that the below list of medications is an accurate representation of the medications you are currently taking.  If none reported, the list may be blank. If  "incorrect, please contact your healthcare provider. Carry this list with you in case of emergency.           Current Medications     amoxicillin-clavulanate 875-125mg (AUGMENTIN) 875-125 mg per tablet Take 1 tablet by mouth 2 (two) times daily.    atenolol (TENORMIN) 25 MG tablet TAKE 1 TABLET(25 MG) BY MOUTH EVERY DAY    conjugated estrogens (PREMARIN) vaginal cream Place vaginally daily as needed.     diclofenac sodium (VOLTAREN) 1 % Gel Apply 4 grams to effected area up to three times daily as needed.    isometheptene-apap-dichloralphenazone [id number] (MIDRIN) -325 mg per capsule Take 1 capsule by mouth as directed. 2 at onset then repeat 1 every 30-60min, max 5caps/attack    magnesium oxide (MAG-OX) 400 mg tablet Take 400 mg by mouth once daily.    methimazole (TAPAZOLE) 5 MG Tab Take 1 tablet (5 mg total) by mouth 3 (three) times daily.    multivit with min-folic acid 0.4 mg Tab Take 0.4 mg by mouth once daily.    ondansetron (ZOFRAN) 4 MG tablet Take 1-2 tablets (4-8 mg total) by mouth every 8 (eight) hours as needed for Nausea.    quinapril (ACCUPRIL) 20 MG tablet Take 1 tablet (20 mg total) by mouth once daily.    rizatriptan (MAXALT) 5 MG tablet Take 5 mg by mouth every 2 (two) hours as needed for Migraine.           Clinical Reference Information           Your Vitals Were     BP Pulse Height Weight SpO2 BMI    128/80 56 5' 9" (1.753 m) 125 kg (275 lb 9.2 oz) 99% 40.7 kg/m2      Blood Pressure          Most Recent Value    BP  128/80      Allergies as of 5/2/2017     No Known Allergies      Immunizations Administered on Date of Encounter - 5/2/2017     None      Language Assistance Services     ATTENTION: Language assistance services are available, free of charge. Please call [phone].      ATENCIÓN: Si deangelola dionna, tiene a yeager disposición servicios gratuitos de asistencia lingüística. Llame al [phone].     CHÚ Ý: N?u b?n nói Ti?ng Vi?t, có các d?ch v? h? tr? ngmaddy ng? mi?n kar lynch " ale monk?n. G?i s? [phone].         [facility] complies with applicable Federal civil rights laws and does not discriminate on the basis of race, color, national origin, age, disability, or sex.

## 2017-05-16 ENCOUNTER — LAB VISIT (OUTPATIENT)
Dept: LAB | Facility: HOSPITAL | Age: 67
End: 2017-05-16
Attending: INTERNAL MEDICINE
Payer: MEDICARE

## 2017-05-16 DIAGNOSIS — D47.2 SMOLDERING MULTIPLE MYELOMA (SMM): ICD-10-CM

## 2017-05-16 LAB
ALBUMIN SERPL BCP-MCNC: 3.4 G/DL
ALP SERPL-CCNC: 132 U/L
ALT SERPL W/O P-5'-P-CCNC: 14 U/L
ANION GAP SERPL CALC-SCNC: 7 MMOL/L
AST SERPL-CCNC: 18 U/L
BASOPHILS # BLD AUTO: 0 K/UL
BASOPHILS NFR BLD: 0 %
BILIRUB SERPL-MCNC: 0.5 MG/DL
BUN SERPL-MCNC: 15 MG/DL
CALCIUM SERPL-MCNC: 9.4 MG/DL
CHLORIDE SERPL-SCNC: 107 MMOL/L
CO2 SERPL-SCNC: 29 MMOL/L
CREAT SERPL-MCNC: 1 MG/DL
DIFFERENTIAL METHOD: ABNORMAL
EOSINOPHIL # BLD AUTO: 0 K/UL
EOSINOPHIL NFR BLD: 0.7 %
ERYTHROCYTE [DISTWIDTH] IN BLOOD BY AUTOMATED COUNT: 11.7 %
EST. GFR  (AFRICAN AMERICAN): >60 ML/MIN/1.73 M^2
EST. GFR  (NON AFRICAN AMERICAN): 59.3 ML/MIN/1.73 M^2
GLUCOSE SERPL-MCNC: 84 MG/DL
HCT VFR BLD AUTO: 38.7 %
HGB BLD-MCNC: 12.1 G/DL
IGA SERPL-MCNC: 101 MG/DL
IGG SERPL-MCNC: 2033 MG/DL
IGM SERPL-MCNC: 32 MG/DL
LYMPHOCYTES # BLD AUTO: 1.3 K/UL
LYMPHOCYTES NFR BLD: 43.3 %
MCH RBC QN AUTO: 30.3 PG
MCHC RBC AUTO-ENTMCNC: 31.3 %
MCV RBC AUTO: 97 FL
MONOCYTES # BLD AUTO: 0.4 K/UL
MONOCYTES NFR BLD: 12.4 %
NEUTROPHILS # BLD AUTO: 1.3 K/UL
NEUTROPHILS NFR BLD: 42.9 %
PLATELET # BLD AUTO: 216 K/UL
PMV BLD AUTO: 10.3 FL
POTASSIUM SERPL-SCNC: 4.3 MMOL/L
PROT SERPL-MCNC: 7.8 G/DL
RBC # BLD AUTO: 3.99 M/UL
SODIUM SERPL-SCNC: 143 MMOL/L
WBC # BLD AUTO: 2.98 K/UL

## 2017-05-16 PROCEDURE — 84165 PROTEIN E-PHORESIS SERUM: CPT | Mod: 26,,, | Performed by: PATHOLOGY

## 2017-05-16 PROCEDURE — 86334 IMMUNOFIX E-PHORESIS SERUM: CPT | Mod: 26,,, | Performed by: PATHOLOGY

## 2017-05-16 PROCEDURE — 86334 IMMUNOFIX E-PHORESIS SERUM: CPT

## 2017-05-16 PROCEDURE — 84165 PROTEIN E-PHORESIS SERUM: CPT

## 2017-05-16 PROCEDURE — 36415 COLL VENOUS BLD VENIPUNCTURE: CPT

## 2017-05-16 PROCEDURE — 82784 ASSAY IGA/IGD/IGG/IGM EACH: CPT | Mod: 59

## 2017-05-16 PROCEDURE — 85025 COMPLETE CBC W/AUTO DIFF WBC: CPT

## 2017-05-16 PROCEDURE — 80053 COMPREHEN METABOLIC PANEL: CPT

## 2017-05-16 PROCEDURE — 83520 IMMUNOASSAY QUANT NOS NONAB: CPT | Mod: 59

## 2017-05-17 LAB
ALBUMIN SERPL ELPH-MCNC: 3.89 G/DL
ALPHA1 GLOB SERPL ELPH-MCNC: 0.3 G/DL
ALPHA2 GLOB SERPL ELPH-MCNC: 0.68 G/DL
B-GLOBULIN SERPL ELPH-MCNC: 0.77 G/DL
GAMMA GLOB SERPL ELPH-MCNC: 1.96 G/DL
INTERPRETATION SERPL IFE-IMP: NORMAL
KAPPA LC SER QL IA: 15.65 MG/DL
KAPPA LC/LAMBDA SER IA: 13.61
LAMBDA LC SER QL IA: 1.15 MG/DL
PROT SERPL-MCNC: 7.6 G/DL

## 2017-05-18 LAB
PATHOLOGIST INTERPRETATION IFE: NORMAL
PATHOLOGIST INTERPRETATION SPE: NORMAL

## 2017-06-20 ENCOUNTER — HOSPITAL ENCOUNTER (OUTPATIENT)
Dept: RADIOLOGY | Facility: HOSPITAL | Age: 67
Discharge: HOME | End: 2017-06-20
Attending: ORTHOPAEDIC SURGERY
Payer: MEDICARE

## 2017-06-20 ENCOUNTER — OFFICE VISIT (OUTPATIENT)
Dept: SPORTS MEDICINE | Facility: CLINIC | Age: 67
End: 2017-06-20
Payer: MEDICARE

## 2017-06-20 VITALS
BODY MASS INDEX: 40.73 KG/M2 | SYSTOLIC BLOOD PRESSURE: 148 MMHG | TEMPERATURE: 98 F | HEART RATE: 73 BPM | DIASTOLIC BLOOD PRESSURE: 84 MMHG | HEIGHT: 69 IN | WEIGHT: 275 LBS

## 2017-06-20 DIAGNOSIS — M25.569 KNEE PAIN, UNSPECIFIED CHRONICITY, UNSPECIFIED LATERALITY: ICD-10-CM

## 2017-06-20 DIAGNOSIS — M25.40 JOINT EFFUSION: ICD-10-CM

## 2017-06-20 DIAGNOSIS — M25.569 KNEE PAIN, UNSPECIFIED CHRONICITY, UNSPECIFIED LATERALITY: Primary | ICD-10-CM

## 2017-06-20 PROCEDURE — 99999 PR PBB SHADOW E&M-EST. PATIENT-LVL III: CPT | Mod: PBBFAC,,, | Performed by: ORTHOPAEDIC SURGERY

## 2017-06-20 PROCEDURE — 20610 DRAIN/INJ JOINT/BURSA W/O US: CPT | Mod: RT,S$GLB,, | Performed by: ORTHOPAEDIC SURGERY

## 2017-06-20 PROCEDURE — 73564 X-RAY EXAM KNEE 4 OR MORE: CPT | Mod: TC,50,PO

## 2017-06-20 PROCEDURE — 99203 OFFICE O/P NEW LOW 30 MIN: CPT | Mod: 25,S$GLB,, | Performed by: ORTHOPAEDIC SURGERY

## 2017-06-20 PROCEDURE — 73564 X-RAY EXAM KNEE 4 OR MORE: CPT | Mod: 26,50,, | Performed by: RADIOLOGY

## 2017-06-20 NOTE — PROGRESS NOTES
CC: Right knee pain    66 y.o. Female with a history of right knee pain. Pain started 1 week ago after a fall onto a flexed knee onto a cement surface. She reports persistent pain since then.  She states that the pain is severe and not responding to any conservative care.      She reports that the pain and weakness. It also bothers her at night.    - mechanical symptoms, - instability    Is not affecting ADLs.  Pain is 4/10 at it's worst.    REVIEW OF SYSTEMS:   Constitution: Negative. Negative for chills, fever and night sweats.   HENT: Negative for congestion and headaches.    Eyes: Negative for blurred vision, left vision loss and right vision loss.   Cardiovascular: Negative for chest pain and syncope.   Respiratory: Negative for cough and shortness of breath.    Endocrine: Negative for polydipsia, polyphagia and polyuria.   Hematologic/Lymphatic: Negative for bleeding problem. Does not bruise/bleed easily.   Skin: Negative for dry skin, itching and rash.   Musculoskeletal: Negative for falls. Positive for right knee pain and  muscle weakness.   Gastrointestinal: Negative for abdominal pain and bowel incontinence.   Genitourinary: Negative for bladder incontinence and nocturia.   Neurological: Negative for disturbances in coordination, loss of balance and seizures.   Psychiatric/Behavioral: Negative for depression. The patient does not have insomnia.    Allergic/Immunologic: Negative for hives and persistent infections.     PAST MEDICAL HISTORY:   Past Medical History:   Diagnosis Date    Allergy     Anemia     Arthritis     Cholelithiases     Cyst of kidney, acquired     Diverticulitis     Elevated TSH     Family history of Graves' disease: daughter, maternal aunt, maternal uncle 9/13/2016    Glaucoma     Graves disease     Hx of colonic polyps     Hypertension 1981    Liver cyst     MGUS (monoclonal gammopathy of unknown significance)     Migraine headache     Mitral valve problem     leakage     Obesity     Paraproteinemia     Smoldering multiple myeloma 2013    Tricuspid valve disease     leakage       PAST SURGICAL HISTORY:   Past Surgical History:   Procedure Laterality Date    APPENDECTOMY      COLONOSCOPY N/A 10/23/2015    Procedure: COLONOSCOPY;  Surgeon: Brandon Ruelas MD;  Location: [facility] ([address] [place] [address]);  Service: Endoscopy;  Laterality: N/A;  Had divertiulitis in 5/29/2015 with a recommendation for colonoscopy in 8 weeks    Dr. Ruelas is her GI physician    HYSTERECTOMY  1978 or 1979    OOPHORECTOMY         FAMILY HISTORY:   Family History   Problem Relation Age of Onset    Heart disease Mother      MI    Heart attack Mother     Heart disease Maternal Aunt      MI    Heart disease Maternal Aunt      MI    Cancer Paternal Grandmother      GYN cancer - unknown cancer    Colon cancer Maternal Uncle     Cancer Maternal Uncle     Hypertension Father     Heart disease Sister      fast heart rate    Cataracts Sister     Glaucoma Sister     Graves' disease Daughter     No Known Problems Son     No Known Problems Sister     No Known Problems Daughter     No Known Problems Son     Melanoma Neg Hx     Breast cancer Neg Hx     Ovarian cancer Neg Hx     Colon polyps Neg Hx     Rectal cancer Neg Hx     Stomach cancer Neg Hx     Esophageal cancer Neg Hx     Ulcerative colitis Neg Hx     Crohn's disease Neg Hx     Amblyopia Neg Hx     Blindness Neg Hx     Macular degeneration Neg Hx     Strabismus Neg Hx     Retinal detachment Neg Hx        SOCIAL HISTORY:   Social History     Social History    Marital status: Single     Spouse name: N/A    Number of children: N/A    Years of education: N/A     Occupational History    RETIRED      Social History Main Topics    Smoking status: Former Smoker     Years: 3.00     Types: Cigarettes    Smokeless tobacco: Never Used    Alcohol use No    Drug use: No    Sexual activity: No     Other Topics Concern    Not on file      Social History Narrative    No narrative on file       MEDICATIONS:     Current Outpatient Prescriptions:     amoxicillin-clavulanate 875-125mg (AUGMENTIN) 875-125 mg per tablet, Take 1 tablet by mouth 2 (two) times daily., Disp: 20 tablet, Rfl: 0    atenolol (TENORMIN) 25 MG tablet, TAKE 1 TABLET(25 MG) BY MOUTH EVERY DAY, Disp: 30 tablet, Rfl: 12    conjugated estrogens (PREMARIN) vaginal cream, Place vaginally daily as needed. , Disp: , Rfl:     diclofenac sodium (VOLTAREN) 1 % Gel, Apply 4 grams to effected area up to three times daily as needed., Disp: 1 Tube, Rfl: 3    isometheptene-apap-dichloralphenazone [id number] (MIDRIN) -325 mg per capsule, Take 1 capsule by mouth as directed. 2 at onset then repeat 1 every 30-60min, max 5caps/attack (Patient taking differently: Take 1 capsule by mouth as directed. 2 at onset then repeat 1 every 30-60min, max 5caps/attack), Disp: 30 capsule, Rfl: 1    magnesium oxide (MAG-OX) 400 mg tablet, Take 400 mg by mouth once daily., Disp: , Rfl:     methimazole (TAPAZOLE) 5 MG Tab, Take 1 tablet (5 mg total) by mouth 3 (three) times daily. (Patient taking differently: Take 5 mg by mouth 2 (two) times daily. ), Disp: 90 tablet, Rfl: 11    multivit with min-folic acid 0.4 mg Tab, Take 0.4 mg by mouth once daily., Disp: , Rfl:     ondansetron (ZOFRAN) 4 MG tablet, Take 1-2 tablets (4-8 mg total) by mouth every 8 (eight) hours as needed for Nausea. (Patient taking differently: Take 4-8 mg by mouth every 8 (eight) hours as needed for Nausea. ), Disp: 20 tablet, Rfl: 3    quinapril (ACCUPRIL) 20 MG tablet, Take 1 tablet (20 mg total) by mouth once daily., Disp: 90 tablet, Rfl: 3    rizatriptan (MAXALT) 5 MG tablet, Take 5 mg by mouth every 2 (two) hours as needed for Migraine., Disp: , Rfl:     Current Facility-Administered Medications:     lidocaine HCl 2% urojet 10 mL, 10 mL, Urethral, 1 time in Clinic/HOD, Jovanna Bui MD    ALLERGIES:   Review of  "patient's allergies indicates:  No Known Allergies    VITAL SIGNS:   BP (!) 148/84   Pulse 73   Temp 98 °F (36.7 °C) (Oral)   Ht 5' 9" (1.753 m)   Wt 124.7 kg (275 lb)   BMI 40.61 kg/m²      PHYSICAL EXAMINATION:  General:  The patient is alert and oriented x 3.  Mood is pleasant.  Observation of ears, eyes and nose reveal no gross abnormalities.  No labored breathing observed.    RIGHT KNEE EXAMINATION     OBSERVATION / INSPECTION   Gait:   Slightly antalgic   Alignment:  Neutral   Scars:   None   Muscle atrophy: Mild  Effusion:  Moderate  Warmth:  None   Discoloration:   none     TENDERNESS / CREPITUS (T / C):          T / C      T / C   Patella   + / -   Lateral joint line   + / -   Peripatellar medial  -  Medial joint line    - / -   Peripatellar lateral -  Medial plica   - / -   Patellar tendon -   Popliteal fossa   - / -   Quad tendon   +   Gastrocnemius   -   Prepatellar Bursa - / -   Quadricep   -   Tibial tubercle  -  Thigh/hamstring  -   Pes anserine/HS -  Fibula    -   ITB   - / -  Tibia     -   Tib/fib joint  - / -  LCL    -     MFC   - / -   MCL: Proximal  -    LFC   - / -    Distal   -          ROM: (* = pain)  PASSIVE   ACTIVE    Left :   5 / 0 / 115   5 / 0 / 105     Right :    5 / 0 / 95   5 / 0 / 90    Patellofemoral examination:  See above noted areas of tenderness.   Patella position    Subluxation / dislocation: Centered           Sup. / Inf;   Normal   Crepitus (PF):    Absent   Patellar Mobility:       Medial-lateral:   Normal    Superior-inferior:  Normal    Inferior tilt   Normal    Patellar tendon:  Normal   Lateral tilt:    Normal   J-sign:     None   Patellofemoral grind:   No pain       MENISCAL SIGNS:     Pain on terminal extension:  -  Pain on terminal flexion:  -  Rebeccas maneuver:  + (for pain)  Squat     NT    LIGAMENT EXAMINATION:  ACL / Lachman:  normal (-1 to 2mm)    PCL-Post.  drawer: normal 0 to 2mm  MCL- Valgus:  normal 0 to 2mm  LCL- Varus:  normal 0 to 2mm  Pivot " shift: normal (Equal)   Dial Test: difference c/w other side   At 30° flexion: normal (< 5°)    At 90° flexion: normal (< 5°)   Reverse Pivot Shift:   normal (Equal)     STRENGTH: (* = with pain) PAINFUL SIDE   Quadricep   5/5   Hamstrin/5    EXTREMITY NEURO-VASCULAR EXAMINATION:   Sensation:  Grossly intact to light touch all dermatomal regions.   Motor Function:  Fully intact motor function at hip, knee, foot and ankle    DTRs;  quadriceps and  achilles 2+.  No clonus and downgoing Babinski.    Vascular status:  DP and PT pulses 2+, brisk capillary refill, symmetric.       IMAGING:    X-rays including standing, weight bearing AP and flexion bilateral knees, lateral and merchant views ordered and images reviewed by me show:    No fracture, dislocation or other pathology   Medial compartment: mild degenerative changes   Lateral compartment: severe degenerative changes   Patellofemoral compartment: moderatedegenerative changes        ASSESSMENT:    Right Knee Pain, probable quad tendon strain    PLAN:   1. Aspiration (50cc hematoma) to right knee  2. Referral to PT  3. Follow-up PRN    All questions were answered, pt will contact us for questions or concerns in the interim.

## 2017-06-22 NOTE — PROCEDURES
Large Joint Aspiration/Injection  Date/Time: 6/20/2017 3:30 PM  Performed by: MARSHA CASTELLANOS  Authorized by: MARSHA CASTELLANOS     Consent Done?:  Yes (Verbal)  Indications:  Pain  Procedure site marked: Yes    Timeout: Prior to procedure the correct patient, procedure, and site was verified      Location:  Knee  Site:  R knee  Prep: Patient was prepped and draped in usual sterile fashion    Ultrasonic Guidance for needle placement: No  Needle size:  22 G  Approach:  Superior  Aspirate amount (ml):  50  Patient tolerance:  Patient tolerated the procedure well with no immediate complications

## 2017-08-02 ENCOUNTER — OFFICE VISIT (OUTPATIENT)
Dept: INTERNAL MEDICINE | Facility: CLINIC | Age: 67
End: 2017-08-02
Payer: MEDICARE

## 2017-08-02 VITALS
HEIGHT: 69 IN | WEIGHT: 279.31 LBS | OXYGEN SATURATION: 97 % | DIASTOLIC BLOOD PRESSURE: 82 MMHG | HEART RATE: 65 BPM | SYSTOLIC BLOOD PRESSURE: 126 MMHG | BODY MASS INDEX: 41.37 KG/M2

## 2017-08-02 DIAGNOSIS — Z00.00 ANNUAL PHYSICAL EXAM: Primary | ICD-10-CM

## 2017-08-02 DIAGNOSIS — E78.00 PURE HYPERCHOLESTEROLEMIA: ICD-10-CM

## 2017-08-02 DIAGNOSIS — R92.30 DENSE BREAST: ICD-10-CM

## 2017-08-02 DIAGNOSIS — Z23 IMMUNIZATION DUE: ICD-10-CM

## 2017-08-02 PROCEDURE — 99397 PER PM REEVAL EST PAT 65+ YR: CPT | Mod: S$GLB,,, | Performed by: INTERNAL MEDICINE

## 2017-08-02 PROCEDURE — G0009 ADMIN PNEUMOCOCCAL VACCINE: HCPCS | Mod: S$GLB,,, | Performed by: INTERNAL MEDICINE

## 2017-08-02 PROCEDURE — 99999 PR PBB SHADOW E&M-EST. PATIENT-LVL IV: CPT | Mod: PBBFAC,,, | Performed by: INTERNAL MEDICINE

## 2017-08-02 PROCEDURE — 99499 UNLISTED E&M SERVICE: CPT | Mod: S$GLB,,, | Performed by: INTERNAL MEDICINE

## 2017-08-02 PROCEDURE — 90732 PPSV23 VACC 2 YRS+ SUBQ/IM: CPT | Mod: S$GLB,,, | Performed by: INTERNAL MEDICINE

## 2017-08-02 NOTE — PROGRESS NOTES
Subjective:      Patient ID: Manju Aranda is a 66 y.o. female.    Chief Complaint: Annual Exam    HPI:  HPI   Patient is here for Annual Exam: Patient reports she had a fall on the right knee and was seen by Dr. Bui    Annual exam:   Colonoscopy :  patient believes 5 years ( history of polyps and diverticulitis): Colonoscopy 10/23/2015: 5 polyps removed: Dr. Esparza follow up in 3 years due in 2018  Endoscopy: : gastric polyp may repeat in 2018 with colonoscopy  Mammogram :last year mass was found to be benign  Gyn: Dr. Dooley every 2 years  Optho: Dr. Kumar   Flu: due in the fall  Tetanus: gave prescription  Shingles: done  Pneumovax: due at 66  Prevnar 13 given     Past Medical History:  Hypertension since age [age]  Diverticulitis/losis  Hx of polyps  Kidney cyst Right: large simple cyst Bosniak l 12 cm and a [place] II  Cholelithiasis  Small liver cysts  Paraprotein  Elevated protein MGUS  Multiple myeloma  normal ultraquant CRP  Mildly elevated TSH  Migraines  mild mitral valve leakage  mild tricuspid valve leakage  biatrial enlargement    Past Surgical History:  Hysterectomy ( no complications)    Med: see med card  Allergies: see med card      Family History:  Mother D age 43: heart attack  2 maternal aunts both  of MI  1st cousin heart problems: mild angina  Father L 85 2015 HBP ( dementia) on hospice: Passages  Sibling: Sisters: Tiffanie takes a pill (beats too fast)      Social History:  Single, 4 children all healthy, never smoked, no alcohol  Works       Consultants:  Dr. Ruelas: Diverticulitis/polyps due colonoscopy   Dr. Alba  Multiple myeloma: no treatment every 3 month blood work  MsLit Brisa Jairo : polycystic liver 2017  Nephrology: nurse practitioner  2017 Polycystic kidney Dr. Bui: RBC in urine  Dr. Dooley every 2 years  Ms. Lela Chambers  migraines 2017  Dr. Young: no skin cancers  Dr. Aguiar   IMPRESSION:  Hyperthyroidism with a  goiter, most likely this is autoimmune   thyroid disease, less likely would be subacute thyroiditis.  Being treated for URI  PLAN:  decrease methimazole to 5 mg twice   a day and follow up in approximately 4 months.   Call for problems and can see early or obtain TSH sooner     Patient Active Problem List   Diagnosis    Hypertension    Liver cyst    Paraproteinemia    Smoldering multiple myeloma (SMM)    Migraine headache    Mitral valve disorder    Tricuspid valve disease    ADPKD (autosomal dominant polycystic kidney disease)    Benign hypertensive renal disease without renal failure    Tachycardia    Hyperthyroidism    Aortic atherosclerosis    Microhematuria     Past Medical History:   Diagnosis Date    Allergy     Anemia     Arthritis     Cholelithiases     Cyst of kidney, acquired     Diverticulitis     Elevated TSH     Family history of Graves' disease: daughter, maternal aunt, maternal uncle 9/13/2016    Glaucoma     Graves disease     Hx of colonic polyps     Hypertension 1981    Liver cyst     MGUS (monoclonal gammopathy of unknown significance)     Migraine headache     Mitral valve problem     leakage    Obesity     Paraproteinemia     Smoldering multiple myeloma 2013    Tricuspid valve disease     leakage     Past Surgical History:   Procedure Laterality Date    APPENDECTOMY      COLONOSCOPY N/A 10/23/2015    Procedure: COLONOSCOPY;  Surgeon: Brandon Ruelas MD;  Location: [facility] ([address] [place] [address]);  Service: Endoscopy;  Laterality: N/A;  Had divertiulitis in 5/29/2015 with a recommendation for colonoscopy in 8 weeks    Dr. Ruelas is her GI physician    HYSTERECTOMY  1978 or 1979    OOPHORECTOMY       Family History   Problem Relation Age of Onset    Heart disease Mother      MI    Heart attack Mother     Heart disease Maternal Aunt      MI    Heart disease Maternal Aunt      MI    Cancer Paternal Grandmother      GYN cancer - unknown cancer    Colon cancer  "Maternal Uncle     Cancer Maternal Uncle     Hypertension Father     Heart disease Sister      fast heart rate    Cataracts Sister     Glaucoma Sister     Graves' disease Daughter     No Known Problems Son     No Known Problems Sister     No Known Problems Daughter     No Known Problems Son     Melanoma Neg Hx     Breast cancer Neg Hx     Ovarian cancer Neg Hx     Colon polyps Neg Hx     Rectal cancer Neg Hx     Stomach cancer Neg Hx     Esophageal cancer Neg Hx     Ulcerative colitis Neg Hx     Crohn's disease Neg Hx     Amblyopia Neg Hx     Blindness Neg Hx     Macular degeneration Neg Hx     Strabismus Neg Hx     Retinal detachment Neg Hx      Review of Systems   Constitutional: Negative for chills, fatigue, fever and unexpected weight change.   HENT: Negative for trouble swallowing.    Respiratory: Negative for cough, shortness of breath and wheezing.    Cardiovascular: Negative for chest pain, palpitations and leg swelling.   Gastrointestinal: Negative for abdominal distention, abdominal pain, blood in stool and vomiting.     Objective:     Vitals:    08/02/17 0839   BP: 126/82   Pulse: 65   SpO2: 97%   Weight: 126.7 kg (279 lb 5.2 oz)   Height: 5' 9" (1.753 m)   PainSc: 0-No pain     Body mass index is 41.25 kg/m².  Physical Exam   Constitutional: She is oriented to person, place, and time. She appears well-developed and well-nourished. No distress.   Neck: Carotid bruit is not present. No thyromegaly present.   Cardiovascular: Normal rate, regular rhythm and normal heart sounds.  PMI is not displaced.    Pulmonary/Chest: Effort normal and breath sounds normal. No respiratory distress.   Abdominal: Soft. Bowel sounds are normal. She exhibits no distension. There is no tenderness.   Musculoskeletal: She exhibits no edema.   Neurological: She is alert and oriented to person, place, and time.     Assessment:     1. Annual physical exam    2. Dense breast    3. Immunization due    4. Pure " hypercholesterolemia      Plan:   Manju was seen today for annual exam.    Diagnoses and all orders for this visit:    Annual physical exam: updated and reviewed screening    Dense breast  -     Mammo Digital Diagnostic Bilat with CAD; Future    Immunization due  -     (In Office Administered) Pneumococcal Polysaccharide Vaccine (23 Valent) (SQ/IM)    Pure hypercholesterolemia: will add to 8/2017  -     Lipid panel; Future    Reviewed all medical problems    Return in about 6 months (around 2/2/2018) for Follow up.         Medication List          Accurate as of 8/2/17  9:43 AM. If you have any questions, ask your nurse or doctor.               CHANGE how you take these medications    isometheptene-apap-dichloralphenazone [id number] -325 mg per capsule  Commonly known as:  MIDRIN  Take 1 capsule by mouth as directed. 2 at onset then repeat 1 every 30-60min, max 5caps/attack  What changed:  additional instructions     methimazole 5 MG Tab  Commonly known as:  TAPAZOLE  Take 1 tablet (5 mg total) by mouth 3 (three) times daily.  What changed:  when to take this        CONTINUE taking these medications    amoxicillin-clavulanate 875-125mg 875-125 mg per tablet  Commonly known as:  AUGMENTIN  Take 1 tablet by mouth 2 (two) times daily.     atenolol 25 MG tablet  Commonly known as:  TENORMIN  TAKE 1 TABLET(25 MG) BY MOUTH EVERY DAY     conjugated estrogens vaginal cream  Commonly known as:  PREMARIN     diclofenac sodium 1 % Gel  Commonly known as:  VOLTAREN  Apply 4 grams to effected area up to three times daily as needed.     magnesium oxide 400 mg tablet  Commonly known as:  MAG-OX     multivit with min-folic acid 0.4 mg Tab     ondansetron 4 MG tablet  Commonly known as:  ZOFRAN  Take 1-2 tablets (4-8 mg total) by mouth every 8 (eight) hours as needed for Nausea.     quinapril 20 MG tablet  Commonly known as:  ACCUPRIL  Take 1 tablet (20 mg total) by mouth once daily.     rizatriptan 5 MG tablet  Commonly  known as:  MAXALT

## 2017-08-07 ENCOUNTER — LAB VISIT (OUTPATIENT)
Dept: LAB | Facility: OTHER | Age: 67
End: 2017-08-07
Attending: INTERNAL MEDICINE
Payer: MEDICARE

## 2017-08-07 ENCOUNTER — TELEPHONE (OUTPATIENT)
Dept: INTERNAL MEDICINE | Facility: CLINIC | Age: 67
End: 2017-08-07

## 2017-08-07 DIAGNOSIS — R79.89 DECREASED THYROID STIMULATING HORMONE (TSH) LEVEL: ICD-10-CM

## 2017-08-07 DIAGNOSIS — E78.00 PURE HYPERCHOLESTEROLEMIA: ICD-10-CM

## 2017-08-07 DIAGNOSIS — D47.2 SMOLDERING MULTIPLE MYELOMA (SMM): ICD-10-CM

## 2017-08-07 DIAGNOSIS — E05.90 HYPERTHYROIDISM: ICD-10-CM

## 2017-08-07 LAB
ALBUMIN SERPL BCP-MCNC: 3.3 G/DL
ALP SERPL-CCNC: 122 U/L
ALT SERPL W/O P-5'-P-CCNC: 17 U/L
ANION GAP SERPL CALC-SCNC: 8 MMOL/L
AST SERPL-CCNC: 16 U/L
BASOPHILS # BLD AUTO: 0.01 K/UL
BASOPHILS NFR BLD: 0.3 %
BILIRUB SERPL-MCNC: 0.6 MG/DL
BUN SERPL-MCNC: 18 MG/DL
CALCIUM SERPL-MCNC: 9.3 MG/DL
CHLORIDE SERPL-SCNC: 106 MMOL/L
CHOLEST/HDLC SERPL: 3 {RATIO}
CO2 SERPL-SCNC: 27 MMOL/L
CREAT SERPL-MCNC: 0.9 MG/DL
DIFFERENTIAL METHOD: ABNORMAL
EOSINOPHIL # BLD AUTO: 0 K/UL
EOSINOPHIL NFR BLD: 0.6 %
ERYTHROCYTE [DISTWIDTH] IN BLOOD BY AUTOMATED COUNT: 11.6 %
EST. GFR  (AFRICAN AMERICAN): >60 ML/MIN/1.73 M^2
EST. GFR  (NON AFRICAN AMERICAN): >60 ML/MIN/1.73 M^2
GLUCOSE SERPL-MCNC: 84 MG/DL
HCT VFR BLD AUTO: 38.3 %
HDL/CHOLESTEROL RATIO: 33.6 %
HDLC SERPL-MCNC: 146 MG/DL
HDLC SERPL-MCNC: 49 MG/DL
HGB BLD-MCNC: 12.1 G/DL
LDLC SERPL CALC-MCNC: 78.6 MG/DL
LYMPHOCYTES # BLD AUTO: 1.4 K/UL
LYMPHOCYTES NFR BLD: 40.5 %
MCH RBC QN AUTO: 30.5 PG
MCHC RBC AUTO-ENTMCNC: 31.6 G/DL
MCV RBC AUTO: 97 FL
MONOCYTES # BLD AUTO: 0.4 K/UL
MONOCYTES NFR BLD: 10.3 %
NEUTROPHILS # BLD AUTO: 1.6 K/UL
NEUTROPHILS NFR BLD: 48 %
NONHDLC SERPL-MCNC: 97 MG/DL
PLATELET # BLD AUTO: 224 K/UL
PMV BLD AUTO: 10.4 FL
POTASSIUM SERPL-SCNC: 4.1 MMOL/L
PROT SERPL-MCNC: 7.7 G/DL
RBC # BLD AUTO: 3.97 M/UL
SODIUM SERPL-SCNC: 141 MMOL/L
TRIGL SERPL-MCNC: 92 MG/DL
TSH SERPL DL<=0.005 MIU/L-ACNC: 1.75 UIU/ML
TSH SERPL DL<=0.005 MIU/L-ACNC: 1.75 UIU/ML
WBC # BLD AUTO: 3.41 K/UL

## 2017-08-07 PROCEDURE — 80053 COMPREHEN METABOLIC PANEL: CPT

## 2017-08-07 PROCEDURE — 80061 LIPID PANEL: CPT

## 2017-08-07 PROCEDURE — 85025 COMPLETE CBC W/AUTO DIFF WBC: CPT

## 2017-08-07 PROCEDURE — 36415 COLL VENOUS BLD VENIPUNCTURE: CPT

## 2017-08-07 PROCEDURE — 84443 ASSAY THYROID STIM HORMONE: CPT

## 2017-08-08 ENCOUNTER — TELEPHONE (OUTPATIENT)
Dept: INTERNAL MEDICINE | Facility: CLINIC | Age: 67
End: 2017-08-08

## 2017-08-11 ENCOUNTER — TELEPHONE (OUTPATIENT)
Dept: HEMATOLOGY/ONCOLOGY | Facility: CLINIC | Age: 67
End: 2017-08-11

## 2017-08-11 NOTE — TELEPHONE ENCOUNTER
Called a patient and scheduled her appt for labs and to see MD with her over the phone.  Reminder also placed in the mail.

## 2017-08-11 NOTE — TELEPHONE ENCOUNTER
"----- Message from Luis Alba MD sent at 8/11/2017  9:49 AM CDT -----  Em,  She was supposed to have labs below ordered and an appt with me in august.     "Follow Up: -cbc, cmp, serum free light chains, quantitative immunoglobulins, serum electropheresis, serum immunofixation in 3 months  cbc, cmp, serum free light chains, quantitative immunoglobulins, serum electropheresis, serum immunofixation  And MD appt in 6 months  -instructed to call earlier is symptoms develop"      Only some of  the labs were linked and drawn and she doesn't have an appt set up with me.  This is a smoldering myeloma patient.  If she were to have a complication from this it could be life threatening and we would have missed it.   I know this continues to be a recurring issue.    If this is a competency issue with our schedulers then that needs to be addressed.  If we simply do not have enough staff than that also needs to be addressed. If  our leadership is not responding to your requests to have adequate staff then I would like to know so I can go to them to express that myself.    I am not trying to be difficult, only trying to look out for my patients which is my duty.  I also know you are doing the same and I appreciate it a lot.   I want to be able to go to administration and advocate for you, us, and our patients but I cant if you dont tell me specifically what you think needs to be fixed.   Dr. LINDQUIST"

## 2017-08-14 ENCOUNTER — LAB VISIT (OUTPATIENT)
Dept: LAB | Facility: HOSPITAL | Age: 67
End: 2017-08-14
Attending: INTERNAL MEDICINE
Payer: MEDICARE

## 2017-08-14 ENCOUNTER — OFFICE VISIT (OUTPATIENT)
Dept: ENDOCRINOLOGY | Facility: CLINIC | Age: 67
End: 2017-08-14
Payer: MEDICARE

## 2017-08-14 VITALS
HEIGHT: 69 IN | SYSTOLIC BLOOD PRESSURE: 128 MMHG | BODY MASS INDEX: 41.5 KG/M2 | DIASTOLIC BLOOD PRESSURE: 76 MMHG | WEIGHT: 280.19 LBS | HEART RATE: 73 BPM

## 2017-08-14 DIAGNOSIS — E05.00 GRAVES DISEASE: ICD-10-CM

## 2017-08-14 DIAGNOSIS — D47.2 SMOLDERING MULTIPLE MYELOMA (SMM): ICD-10-CM

## 2017-08-14 DIAGNOSIS — E05.90 HYPERTHYROIDISM: Primary | ICD-10-CM

## 2017-08-14 LAB
ALBUMIN SERPL BCP-MCNC: 3.5 G/DL
ALP SERPL-CCNC: 124 U/L
ALT SERPL W/O P-5'-P-CCNC: 16 U/L
ANION GAP SERPL CALC-SCNC: 6 MMOL/L
AST SERPL-CCNC: 18 U/L
BASOPHILS # BLD AUTO: 0.01 K/UL
BASOPHILS NFR BLD: 0.3 %
BILIRUB SERPL-MCNC: 0.8 MG/DL
BUN SERPL-MCNC: 15 MG/DL
CALCIUM SERPL-MCNC: 9.6 MG/DL
CHLORIDE SERPL-SCNC: 106 MMOL/L
CO2 SERPL-SCNC: 29 MMOL/L
CREAT SERPL-MCNC: 0.9 MG/DL
DIFFERENTIAL METHOD: ABNORMAL
EOSINOPHIL # BLD AUTO: 0 K/UL
EOSINOPHIL NFR BLD: 0.5 %
ERYTHROCYTE [DISTWIDTH] IN BLOOD BY AUTOMATED COUNT: 11.7 %
EST. GFR  (AFRICAN AMERICAN): >60 ML/MIN/1.73 M^2
EST. GFR  (NON AFRICAN AMERICAN): >60 ML/MIN/1.73 M^2
GLUCOSE SERPL-MCNC: 81 MG/DL
HCT VFR BLD AUTO: 37.3 %
HGB BLD-MCNC: 12.2 G/DL
IGA SERPL-MCNC: 117 MG/DL
IGG SERPL-MCNC: 2023 MG/DL
IGM SERPL-MCNC: 36 MG/DL
LYMPHOCYTES # BLD AUTO: 1.6 K/UL
LYMPHOCYTES NFR BLD: 41.7 %
MCH RBC QN AUTO: 30.3 PG
MCHC RBC AUTO-ENTMCNC: 32.7 G/DL
MCV RBC AUTO: 93 FL
MONOCYTES # BLD AUTO: 0.3 K/UL
MONOCYTES NFR BLD: 7.8 %
NEUTROPHILS # BLD AUTO: 1.9 K/UL
NEUTROPHILS NFR BLD: 49.7 %
PLATELET # BLD AUTO: 253 K/UL
PMV BLD AUTO: 10.3 FL
POTASSIUM SERPL-SCNC: 4.2 MMOL/L
PROT SERPL-MCNC: 8 G/DL
RBC # BLD AUTO: 4.02 M/UL
SODIUM SERPL-SCNC: 141 MMOL/L
WBC # BLD AUTO: 3.74 K/UL

## 2017-08-14 PROCEDURE — 80053 COMPREHEN METABOLIC PANEL: CPT

## 2017-08-14 PROCEDURE — 84165 PROTEIN E-PHORESIS SERUM: CPT | Mod: 26,,, | Performed by: PATHOLOGY

## 2017-08-14 PROCEDURE — 3008F BODY MASS INDEX DOCD: CPT | Mod: S$GLB,,, | Performed by: INTERNAL MEDICINE

## 2017-08-14 PROCEDURE — 86334 IMMUNOFIX E-PHORESIS SERUM: CPT | Mod: 26,,, | Performed by: PATHOLOGY

## 2017-08-14 PROCEDURE — 86334 IMMUNOFIX E-PHORESIS SERUM: CPT

## 2017-08-14 PROCEDURE — 3078F DIAST BP <80 MM HG: CPT | Mod: S$GLB,,, | Performed by: INTERNAL MEDICINE

## 2017-08-14 PROCEDURE — 99499 UNLISTED E&M SERVICE: CPT | Mod: S$GLB,,, | Performed by: INTERNAL MEDICINE

## 2017-08-14 PROCEDURE — 84165 PROTEIN E-PHORESIS SERUM: CPT

## 2017-08-14 PROCEDURE — 36415 COLL VENOUS BLD VENIPUNCTURE: CPT

## 2017-08-14 PROCEDURE — 83520 IMMUNOASSAY QUANT NOS NONAB: CPT | Mod: 59

## 2017-08-14 PROCEDURE — 99214 OFFICE O/P EST MOD 30 MIN: CPT | Mod: S$GLB,,, | Performed by: INTERNAL MEDICINE

## 2017-08-14 PROCEDURE — 99999 PR PBB SHADOW E&M-EST. PATIENT-LVL III: CPT | Mod: PBBFAC,,, | Performed by: INTERNAL MEDICINE

## 2017-08-14 PROCEDURE — 82784 ASSAY IGA/IGD/IGG/IGM EACH: CPT | Mod: 59

## 2017-08-14 PROCEDURE — 85025 COMPLETE CBC W/AUTO DIFF WBC: CPT

## 2017-08-14 PROCEDURE — 3074F SYST BP LT 130 MM HG: CPT | Mod: S$GLB,,, | Performed by: INTERNAL MEDICINE

## 2017-08-14 NOTE — PROGRESS NOTES
Subjective:      Patient ID: Manju Aranda is a 66 y.o. female.    Chief Complaint:  Hyperthyroidism      History of Present Illness  Ms. Berrios is a 66 y.o F presenting to the endocrinology clinic for a 4 month f/u secondary to hyperthyroidism currently on methimazole 5mg BID (was switched from TID on 11/30/2016). Patient reports she is doing well and is physically active. Reports she is tolerating the medicine well and believes her symptoms are well controlled. Denies any blurry vision, changes in vision, eye pain, voice change, dysphagia, palpations, SOB, change in appetite, WL/WG, tremors or weakness.        Review of Systems   Constitutional: Negative for activity change, appetite change, chills, fatigue, fever and unexpected weight change.   HENT: Negative for trouble swallowing and voice change.    Eyes: Negative for photophobia, pain and visual disturbance.   Respiratory: Negative for chest tightness and shortness of breath.    Cardiovascular: Negative for chest pain, palpitations and leg swelling.   Gastrointestinal: Negative for abdominal distention, abdominal pain, diarrhea, nausea and vomiting.   Endocrine: Negative for polyphagia and polyuria.   Genitourinary: Negative for dysuria and flank pain.   Musculoskeletal: Negative for back pain, joint swelling and myalgias.   Skin: Negative for color change and pallor.   Neurological: Negative for tremors, syncope, weakness and headaches.   Hematological: Negative for adenopathy. Does not bruise/bleed easily.       Objective:   Physical Exam   Constitutional: She is oriented to person, place, and time. She appears well-developed and well-nourished.   HENT:   Head: Normocephalic and atraumatic.   Eyes: Conjunctivae and EOM are normal. Pupils are equal, round, and reactive to light.   No signs of exophthalmos  No lid lag   Neck: Normal range of motion. Neck supple. Thyromegaly present.   Thyroid mildly enlarged estimated weight 40 g.    No definite nodules    Cardiovascular: Normal rate, regular rhythm and normal heart sounds.    Pulmonary/Chest: Effort normal and breath sounds normal.   Abdominal: Soft. Bowel sounds are normal.   Musculoskeletal: Normal range of motion.   No pretibial myxedema   Lymphadenopathy:     She has no cervical adenopathy.   Neurological: She is alert and oriented to person, place, and time. She has normal strength. She displays no tremor and normal reflexes.   Skin: Skin is warm and dry.   Psychiatric: She has a normal mood and affect. Her behavior is normal. Judgment and thought content normal.       Lab Review:   Lab Visit on 08/07/2017   Component Date Value    TSH 08/07/2017 1.746     WBC 08/07/2017 3.41*    RBC 08/07/2017 3.97*    Hemoglobin 08/07/2017 12.1     Hematocrit 08/07/2017 38.3     MCV 08/07/2017 97     MCH 08/07/2017 30.5     MCHC 08/07/2017 31.6*    RDW 08/07/2017 11.6     Platelets 08/07/2017 224     MPV 08/07/2017 10.4     Gran # 08/07/2017 1.6*    Lymph # 08/07/2017 1.4     Mono # 08/07/2017 0.4     Eos # 08/07/2017 0.0     Baso # 08/07/2017 0.01     Gran% 08/07/2017 48.0     Lymph% 08/07/2017 40.5     Mono% 08/07/2017 10.3     Eosinophil% 08/07/2017 0.6     Basophil% 08/07/2017 0.3     Differential Method 08/07/2017 Automated     Sodium 08/07/2017 141     Potassium 08/07/2017 4.1     Chloride 08/07/2017 106     CO2 08/07/2017 27     Glucose 08/07/2017 84     BUN, Bld 08/07/2017 18     Creatinine 08/07/2017 0.9     Calcium 08/07/2017 9.3     Total Protein 08/07/2017 7.7     Albumin 08/07/2017 3.3*    Total Bilirubin 08/07/2017 0.6     Alkaline Phosphatase 08/07/2017 122     AST 08/07/2017 16     ALT 08/07/2017 17     Anion Gap 08/07/2017 8     eGFR if African American 08/07/2017 >60     eGFR if non  Amer* 08/07/2017 >60     TSH 08/07/2017 1.746     Cholesterol 08/07/2017 146     Triglycerides 08/07/2017 92     HDL 08/07/2017 49     LDL Cholesterol 08/07/2017 78.6      HDL/Chol Ratio 08/07/2017 33.6     Total Cholesterol/HDL Ra* 08/07/2017 3.0     Non-HDL Cholesterol 08/07/2017 97        Assessment:     1. Hyperthyroidism  TSH    THYROTROPIN RECEPTOR ANTIBODY    CBC auto differential    Comprehensive metabolic panel      Hyperthyroidism. This diagnosis was discussed and reviewed with the patient including the advantages of drug therapy, radioactive iodine and surgery.      Plan:       # Hyperthyroidism   TSH w/in normal limits, patient not complaining of any symptoms, PE unremarkable. Labs not significant for hepatic dysfunction. Will continue current treatment.    Continue methimazole 5 mg BID    Follow up in approximately 4 months.    Ordered labs for next visit: CBC, TSH, CMP, Thyrotropin Receptor AB       Next visit  - will compare TRAB for possible resolution

## 2017-08-15 PROBLEM — E05.00 GRAVES DISEASE: Status: ACTIVE | Noted: 2017-08-15

## 2017-08-15 LAB
ALBUMIN SERPL ELPH-MCNC: 3.84 G/DL
ALPHA1 GLOB SERPL ELPH-MCNC: 0.32 G/DL
ALPHA2 GLOB SERPL ELPH-MCNC: 0.72 G/DL
B-GLOBULIN SERPL ELPH-MCNC: 0.81 G/DL
GAMMA GLOB SERPL ELPH-MCNC: 2 G/DL
INTERPRETATION SERPL IFE-IMP: NORMAL
KAPPA LC SER QL IA: 10.35 MG/DL
KAPPA LC/LAMBDA SER IA: 9.08
LAMBDA LC SER QL IA: 1.14 MG/DL
PATHOLOGIST INTERPRETATION IFE: NORMAL
PATHOLOGIST INTERPRETATION SPE: NORMAL
PROT SERPL-MCNC: 7.7 G/DL

## 2017-08-15 NOTE — PROGRESS NOTES
I have personally taken the history and examined this patient and agree with the resident's or fellow's note as stated above   Will plan to treat 12 more months unless patient becomes hypothyroid. Graves' disease.    Wisam Aguiar MD, FACE

## 2017-08-17 ENCOUNTER — TELEPHONE (OUTPATIENT)
Dept: HEMATOLOGY/ONCOLOGY | Facility: CLINIC | Age: 67
End: 2017-08-17

## 2017-08-17 NOTE — TELEPHONE ENCOUNTER
----- Message from Luis Alba MD sent at 8/17/2017 12:44 PM CDT -----  Can you call mrs. Aranda and let her know her labs show no evidence of any progression of myeloma and we will continue to just monitor.  Good news.  Thank you   ----- Message -----  From: [facility] [employer] [facility]  Sent: 8/14/2017  11:12 AM  To: Luis Alba MD        Spoke with pt @ 108pm on 08/17/17 and explained that her labs show no evidence of any progression of myeloma and we will continue to just monitor. Pt verbalized understanding.  Karen

## 2017-10-16 ENCOUNTER — TELEPHONE (OUTPATIENT)
Dept: INTERNAL MEDICINE | Facility: CLINIC | Age: 67
End: 2017-10-16

## 2017-10-16 DIAGNOSIS — Z12.31 ENCOUNTER FOR SCREENING MAMMOGRAM FOR BREAST CANCER: Primary | ICD-10-CM

## 2017-10-16 NOTE — TELEPHONE ENCOUNTER
----- Message from Rebekah Perez sent at 10/16/2017 12:27 PM CDT -----  Contact: Home: [phone]   Requesting an order for a her mammo to be done in October.

## 2017-10-16 NOTE — TELEPHONE ENCOUNTER
Requesting new orders for mammogram to be done in October. The order in August was . Please advise. Thank you

## 2017-10-23 ENCOUNTER — OFFICE VISIT (OUTPATIENT)
Dept: OPTOMETRY | Facility: CLINIC | Age: 67
End: 2017-10-23
Payer: MEDICARE

## 2017-10-23 DIAGNOSIS — I10 ESSENTIAL HYPERTENSION: Primary | ICD-10-CM

## 2017-10-23 DIAGNOSIS — H52.03 HYPEROPIA WITH ASTIGMATISM AND PRESBYOPIA, BILATERAL: ICD-10-CM

## 2017-10-23 DIAGNOSIS — H02.88B MEIBOMIAN GLAND DYSFUNCTION (MGD), BILATERAL, BOTH UPPER AND LOWER LIDS: ICD-10-CM

## 2017-10-23 DIAGNOSIS — Z13.5 GLAUCOMA SCREENING: ICD-10-CM

## 2017-10-23 DIAGNOSIS — H02.88A MEIBOMIAN GLAND DYSFUNCTION (MGD), BILATERAL, BOTH UPPER AND LOWER LIDS: ICD-10-CM

## 2017-10-23 DIAGNOSIS — H52.4 HYPEROPIA WITH ASTIGMATISM AND PRESBYOPIA, BILATERAL: ICD-10-CM

## 2017-10-23 DIAGNOSIS — H52.203 HYPEROPIA WITH ASTIGMATISM AND PRESBYOPIA, BILATERAL: ICD-10-CM

## 2017-10-23 DIAGNOSIS — H25.13 NUCLEAR SCLEROSIS, BILATERAL: ICD-10-CM

## 2017-10-23 PROCEDURE — 92014 COMPRE OPH EXAM EST PT 1/>: CPT | Mod: S$GLB,,, | Performed by: OPTOMETRIST

## 2017-10-23 PROCEDURE — 99499 UNLISTED E&M SERVICE: CPT | Mod: S$GLB,,, | Performed by: OPTOMETRIST

## 2017-10-23 PROCEDURE — 99999 PR PBB SHADOW E&M-EST. PATIENT-LVL II: CPT | Mod: PBBFAC,,, | Performed by: OPTOMETRIST

## 2017-10-23 PROCEDURE — 92015 DETERMINE REFRACTIVE STATE: CPT | Mod: S$GLB,,, | Performed by: OPTOMETRIST

## 2017-10-23 NOTE — PROGRESS NOTES
HPI      is here for annual eye exam, pt reports dry eyes and using   Systane Balance BID. Stopped FML eye drops after 1st month.    (-)Flashes (-)Floaters  (+)Itch, (+)tear, (+)burn, (+)Dryness.   (-)Photophobia  (-)Glare (-)diplopia (-) headaches          Last edited by Nitin Kumar, OD on 10/23/2017 10:40 AM. (History)            Assessment /Plan     For exam results, see Encounter Report.    Essential hypertension  -No retinopathy noted today.  Continued control with primary care physician and annual comprehensive eye exam.    Nuclear sclerosis, bilateral  -Educated patient on presence of cataracts at today's exam, monitor at annual dilated fundus exam. 5+ years surgical estimate.    Meibomian gland dysfunction (MGD), bilateral, both upper and lower lids  -Graves Disease, allergies  -reviewed Restasis option and pt prefers to increase OTC first  -Systane Balance QID    Glaucoma screening  -Monitor with annual eye exam and IOP check    Hyperopia with astigmatism and presbyopia, bilateral  Eyeglass Final Rx     Eyeglass Final Rx       Sphere Cylinder Axis Dist VA Add    Right +3.00 +1.00 180 20/25 +2.50    Left +3.00 +1.25 180 20/30 +2.50    Expiration Date:  10/24/2018                  RTC 1 yr

## 2017-10-24 ENCOUNTER — HOSPITAL ENCOUNTER (OUTPATIENT)
Dept: RADIOLOGY | Facility: HOSPITAL | Age: 67
Discharge: HOME | End: 2017-10-24
Attending: INTERNAL MEDICINE
Payer: MEDICARE

## 2017-10-24 VITALS — WEIGHT: 280 LBS | BODY MASS INDEX: 41.47 KG/M2 | HEIGHT: 69 IN

## 2017-10-24 DIAGNOSIS — Z12.31 ENCOUNTER FOR SCREENING MAMMOGRAM FOR BREAST CANCER: ICD-10-CM

## 2017-10-24 PROCEDURE — 77067 SCR MAMMO BI INCL CAD: CPT | Mod: 26,,, | Performed by: RADIOLOGY

## 2017-10-24 PROCEDURE — 77067 SCR MAMMO BI INCL CAD: CPT | Mod: TC

## 2017-10-24 PROCEDURE — 77063 BREAST TOMOSYNTHESIS BI: CPT | Mod: 26,,, | Performed by: RADIOLOGY

## 2017-10-25 ENCOUNTER — OFFICE VISIT (OUTPATIENT)
Dept: SLEEP MEDICINE | Facility: CLINIC | Age: 67
End: 2017-10-25
Payer: MEDICARE

## 2017-10-25 VITALS
SYSTOLIC BLOOD PRESSURE: 128 MMHG | HEIGHT: 69 IN | WEIGHT: 280 LBS | DIASTOLIC BLOOD PRESSURE: 80 MMHG | HEART RATE: 74 BPM | BODY MASS INDEX: 41.47 KG/M2

## 2017-10-25 DIAGNOSIS — G43.009 MIGRAINE WITHOUT AURA AND WITHOUT STATUS MIGRAINOSUS, NOT INTRACTABLE: Primary | ICD-10-CM

## 2017-10-25 PROCEDURE — 99999 PR PBB SHADOW E&M-EST. PATIENT-LVL III: CPT | Mod: PBBFAC,,, | Performed by: NURSE PRACTITIONER

## 2017-10-25 PROCEDURE — 99214 OFFICE O/P EST MOD 30 MIN: CPT | Mod: S$GLB,,, | Performed by: NURSE PRACTITIONER

## 2017-10-25 PROCEDURE — 99499 UNLISTED E&M SERVICE: CPT | Mod: S$GLB,,, | Performed by: NURSE PRACTITIONER

## 2017-10-25 RX ORDER — RIZATRIPTAN BENZOATE 10 MG/1
10 TABLET ORAL
Qty: 12 TABLET | Refills: 5 | Status: SHIPPED | OUTPATIENT
Start: 2017-10-25 | End: 2018-11-06 | Stop reason: SDUPTHER

## 2017-10-25 NOTE — PROGRESS NOTES
"Manju Aranda returns today for mgt of migraines.     Since last seen denies interval medical change. Reports headache q 2 mos. Cumen/herbs/pepper/basil/rosemary/highly seasoned food/seafood are triggers. Abortive meds remain effective. HA remain same location/nature. Maxalt works well.   [place] walk [place].         HISTORY:  10/5/15:   She was last seen 3/31/15.  She continues on Magnesium 250mg 2 tabs bid for migraines prevention, but stopped Riboflavin 100mg bid and CoQ10 100mg bid due to worsening HA in past. No recurrent morning headache. No longer keeping HA diaries but reports since last seen HA remain infrequent, occuring <5/month. None on recent cruise! Spicy foods, stress, lack of sleep, and weather are known triggers. HA remain typically mild-moderate (only few severe HA in interim), lasting 2-4+ hrs. Longer duration when at work and unable to take Maxalt due to sleepiness. At work she will take Midrin 2 tabs at onset which remains very helpful within 30min, not having to repeat dose. She has a prodrome (tingling of the forehead) but no auras, before the gradual onset of non-radiating frontal (center or right) throbbing or pressure pains associated with photo/ phonophobia and nausea. She denies vomiting, focal neurological deficits or autonomic deficits. Other triggers remain gatigue, and stress.  Resting helps while movement can intensify pain. HIT-6 score - 54      10/17/16: Reports stable frequency of headaches, ~ 1-2 /month. Midrin remains effective. No severe episodes. No zofran use, but this does help nausea prn. Headache remains same nature/location. HIT=6 score= 53. Having knee pain. Interim dx hyperthyroidism. She has lost weight on new medication/dx. Continues to take Mag 500mg twice daily, no loose stools "helps my arthritis too".       ROS: As above. In addition. Denies sleep disruptions, am headaches, otherwise a balance review of 10-systems is negative.     AST/ALT 8/4/17 " "normal  FH: HA     PHYSICAL EXAM:   General: W/D, morbid obese, not in distress.   /80   Pulse 74   Ht 5' 9" (1.753 m)   Wt 127 kg (279 lb 15.8 oz)   BMI 41.35 kg/m²         IMPRESSION:   Migraine without aura, stable  ADPKD    PLAN:   Preventative therapy: Continue Mag 250mg 2 tabs bid  Abortive therapy: Continue Maxalt 5-10 mg tab 1 tab PO q 2h, up to maximum of 30 mg/day. Do not delay treatment to avoid progression of a migraine.   Continue Zofran 4-8mg PO q8h prn for HA/nausea   Continue Midrin prn mild-mod HA as prescribed      Continue to regulate sleep, mealtimes, try to reduce stress and to avoid known headache triggers/aggravating factors    RTC 12 months, sooner if needed  "

## 2017-11-27 ENCOUNTER — TELEPHONE (OUTPATIENT)
Dept: HEMATOLOGY/ONCOLOGY | Facility: CLINIC | Age: 67
End: 2017-11-27

## 2017-11-27 NOTE — TELEPHONE ENCOUNTER
Called patient and informed her Dr. Alba is out ill , I rescheduled her appt for tomorrow to Wednesdya at 9:40 with her over the phone. Patient will come and get her lab work done tomorrow morning.

## 2017-11-28 ENCOUNTER — LAB VISIT (OUTPATIENT)
Dept: LAB | Facility: HOSPITAL | Age: 67
End: 2017-11-28
Attending: INTERNAL MEDICINE
Payer: MEDICARE

## 2017-11-28 DIAGNOSIS — D47.2 SMOLDERING MULTIPLE MYELOMA (SMM): ICD-10-CM

## 2017-11-28 LAB
ALBUMIN SERPL BCP-MCNC: 3.2 G/DL
ALP SERPL-CCNC: 119 U/L
ALT SERPL W/O P-5'-P-CCNC: 17 U/L
ANION GAP SERPL CALC-SCNC: 5 MMOL/L
AST SERPL-CCNC: 18 U/L
BASOPHILS # BLD AUTO: 0.01 K/UL
BASOPHILS NFR BLD: 0.3 %
BILIRUB SERPL-MCNC: 0.8 MG/DL
BUN SERPL-MCNC: 16 MG/DL
CALCIUM SERPL-MCNC: 9.7 MG/DL
CHLORIDE SERPL-SCNC: 107 MMOL/L
CO2 SERPL-SCNC: 29 MMOL/L
CREAT SERPL-MCNC: 1 MG/DL
DIFFERENTIAL METHOD: ABNORMAL
EOSINOPHIL # BLD AUTO: 0 K/UL
EOSINOPHIL NFR BLD: 0.3 %
ERYTHROCYTE [DISTWIDTH] IN BLOOD BY AUTOMATED COUNT: 11.5 %
EST. GFR  (AFRICAN AMERICAN): >60 ML/MIN/1.73 M^2
EST. GFR  (NON AFRICAN AMERICAN): 58.8 ML/MIN/1.73 M^2
GLUCOSE SERPL-MCNC: 90 MG/DL
HCT VFR BLD AUTO: 36.3 %
HGB BLD-MCNC: 11.3 G/DL
IGA SERPL-MCNC: 105 MG/DL
IGG SERPL-MCNC: 1989 MG/DL
IGM SERPL-MCNC: 33 MG/DL
IMM GRANULOCYTES # BLD AUTO: 0.01 K/UL
IMM GRANULOCYTES NFR BLD AUTO: 0.3 %
LYMPHOCYTES # BLD AUTO: 1.1 K/UL
LYMPHOCYTES NFR BLD: 36.1 %
MCH RBC QN AUTO: 29.7 PG
MCHC RBC AUTO-ENTMCNC: 31.1 G/DL
MCV RBC AUTO: 95 FL
MONOCYTES # BLD AUTO: 0.3 K/UL
MONOCYTES NFR BLD: 8.4 %
NEUTROPHILS # BLD AUTO: 1.6 K/UL
NEUTROPHILS NFR BLD: 54.6 %
NRBC BLD-RTO: 0 /100 WBC
PLATELET # BLD AUTO: 241 K/UL
PMV BLD AUTO: 10 FL
POTASSIUM SERPL-SCNC: 4.5 MMOL/L
PROT SERPL-MCNC: 7.6 G/DL
RBC # BLD AUTO: 3.81 M/UL
SODIUM SERPL-SCNC: 141 MMOL/L
WBC # BLD AUTO: 2.99 K/UL

## 2017-11-28 PROCEDURE — 83520 IMMUNOASSAY QUANT NOS NONAB: CPT | Mod: 59

## 2017-11-28 PROCEDURE — 36415 COLL VENOUS BLD VENIPUNCTURE: CPT

## 2017-11-28 PROCEDURE — 84165 PROTEIN E-PHORESIS SERUM: CPT | Mod: 26,,, | Performed by: PATHOLOGY

## 2017-11-28 PROCEDURE — 86334 IMMUNOFIX E-PHORESIS SERUM: CPT

## 2017-11-28 PROCEDURE — 80053 COMPREHEN METABOLIC PANEL: CPT

## 2017-11-28 PROCEDURE — 82784 ASSAY IGA/IGD/IGG/IGM EACH: CPT | Mod: 59

## 2017-11-28 PROCEDURE — 86334 IMMUNOFIX E-PHORESIS SERUM: CPT | Mod: 26,,, | Performed by: PATHOLOGY

## 2017-11-28 PROCEDURE — 84165 PROTEIN E-PHORESIS SERUM: CPT

## 2017-11-28 PROCEDURE — 85025 COMPLETE CBC W/AUTO DIFF WBC: CPT

## 2017-11-29 ENCOUNTER — OFFICE VISIT (OUTPATIENT)
Dept: HEMATOLOGY/ONCOLOGY | Facility: CLINIC | Age: 67
End: 2017-11-29
Payer: MEDICARE

## 2017-11-29 VITALS
HEART RATE: 63 BPM | DIASTOLIC BLOOD PRESSURE: 80 MMHG | RESPIRATION RATE: 18 BRPM | HEIGHT: 69 IN | SYSTOLIC BLOOD PRESSURE: 157 MMHG | TEMPERATURE: 99 F | BODY MASS INDEX: 41.11 KG/M2 | OXYGEN SATURATION: 99 % | WEIGHT: 277.56 LBS

## 2017-11-29 DIAGNOSIS — D47.2 SMOLDERING MYELOMA: Primary | ICD-10-CM

## 2017-11-29 LAB
ALBUMIN SERPL ELPH-MCNC: 3.62 G/DL
ALPHA1 GLOB SERPL ELPH-MCNC: 0.3 G/DL
ALPHA2 GLOB SERPL ELPH-MCNC: 0.68 G/DL
B-GLOBULIN SERPL ELPH-MCNC: 0.73 G/DL
GAMMA GLOB SERPL ELPH-MCNC: 1.86 G/DL
INTERPRETATION SERPL IFE-IMP: NORMAL
KAPPA LC SER QL IA: 11.33 MG/DL
KAPPA LC/LAMBDA SER IA: 9.6
LAMBDA LC SER QL IA: 1.18 MG/DL
PATHOLOGIST INTERPRETATION IFE: NORMAL
PATHOLOGIST INTERPRETATION SPE: NORMAL
PROT SERPL-MCNC: 7.2 G/DL

## 2017-11-29 PROCEDURE — 99499 UNLISTED E&M SERVICE: CPT | Mod: S$GLB,,, | Performed by: INTERNAL MEDICINE

## 2017-11-29 PROCEDURE — 99214 OFFICE O/P EST MOD 30 MIN: CPT | Mod: S$GLB,,, | Performed by: INTERNAL MEDICINE

## 2017-11-29 PROCEDURE — 99999 PR PBB SHADOW E&M-EST. PATIENT-LVL IV: CPT | Mod: PBBFAC,,, | Performed by: INTERNAL MEDICINE

## 2017-11-29 NOTE — PROGRESS NOTES
SECTION OF HEMATOLOGY AND BONE MARROW TRANSPLANT  Return Patient Visit   11/30/2017    CHIEF COMPLAINT:   No chief complaint on file.      HISTORY OF PRESENT ILLNESS:   Ms. Aranda is here for  IgG kappa smoldering myeloma. Follow up.  No changes In clinical status since our last appt.          PAST MEDICAL HISTORY:   Past Medical History:   Diagnosis Date    Allergy     Anemia     Arthritis     Cholelithiases     Cyst of kidney, acquired     Diverticulitis     Elevated TSH     Family history of Graves' disease: daughter, maternal aunt, maternal uncle 9/13/2016    Glaucoma     Graves disease     Hx of colonic polyps     Hypertension 1981    Liver cyst     MGUS (monoclonal gammopathy of unknown significance)     Migraine headache     Mitral valve problem     leakage    Obesity     Paraproteinemia     Smoldering multiple myeloma 2013    Tricuspid valve disease     leakage       PAST SURGICAL HISTORY:   Past Surgical History:   Procedure Laterality Date    APPENDECTOMY      COLONOSCOPY N/A 10/23/2015    Procedure: COLONOSCOPY;  Surgeon: Brandon Ruelas MD;  Location: [address]);  Service: Endoscopy;  Laterality: N/A;  Had divertiulitis in 5/29/2015 with a recommendation for colonoscopy in 8 weeks    Dr. Ruelas is her GI physician    HYSTERECTOMY  1978 or 1979    OOPHORECTOMY         PAST SOCIAL HISTORY:   reports that she has quit smoking. Her smoking use included Cigarettes. She quit after 3.00 years of use. She has never used smokeless tobacco. She reports that she does not drink alcohol or use drugs.    FAMILY HISTORY:  Family History   Problem Relation Age of Onset    Heart disease Mother      MI    Heart attack Mother     Heart disease Maternal Aunt      MI    Heart disease Maternal Aunt      MI    Cancer Paternal Grandmother      GYN cancer - unknown cancer    Colon cancer Maternal Uncle     Cancer Maternal Uncle     Hypertension Father     Heart disease Sister       fast heart rate    Cataracts Sister     Glaucoma Sister     Graves' disease Daughter     No Known Problems Son     No Known Problems Sister     No Known Problems Daughter     No Known Problems Son     Melanoma Neg Hx     Breast cancer Neg Hx     Ovarian cancer Neg Hx     Colon polyps Neg Hx     Rectal cancer Neg Hx     Stomach cancer Neg Hx     Esophageal cancer Neg Hx     Ulcerative colitis Neg Hx     Crohn's disease Neg Hx     Amblyopia Neg Hx     Blindness Neg Hx     Macular degeneration Neg Hx     Strabismus Neg Hx     Retinal detachment Neg Hx        CURRENT MEDICATIONS:   Current Outpatient Prescriptions   Medication Sig    atenolol (TENORMIN) 25 MG tablet TAKE 1 TABLET(25 MG) BY MOUTH EVERY DAY    conjugated estrogens (PREMARIN) vaginal cream Place vaginally daily as needed.     diclofenac sodium (VOLTAREN) 1 % Gel Apply 4 grams to effected area up to three times daily as needed.    isometheptene-apap-dichloralphenazone [id number] (MIDRIN) -325 mg per capsule Take 1 capsule by mouth as directed. 2 at onset then repeat 1 every 30-60min, max 5caps/attack (Patient taking differently: Take 1 capsule by mouth as directed. 2 at onset then repeat 1 every 30-60min, max 5caps/attack)    magnesium oxide (MAG-OX) 400 mg tablet Take 400 mg by mouth once daily.    multivit with min-folic acid 0.4 mg Tab Take 0.4 mg by mouth once daily.    ondansetron (ZOFRAN) 4 MG tablet Take 1-2 tablets (4-8 mg total) by mouth every 8 (eight) hours as needed for Nausea. (Patient taking differently: Take 4-8 mg by mouth every 8 (eight) hours as needed for Nausea. )    quinapril (ACCUPRIL) 20 MG tablet Take 1 tablet (20 mg total) by mouth once daily.    rizatriptan (MAXALT) 10 MG tablet Take 1 tablet (10 mg total) by mouth every 2 (two) hours as needed for Migraine. Max 30mg/24hs    methimazole (TAPAZOLE) 5 MG Tab Take 1 tablet (5 mg total) by mouth 3 (three) times daily. (Patient taking differently:  "Take 5 mg by mouth 2 (two) times daily. )     Current Facility-Administered Medications   Medication    lidocaine HCl 2% urojet 10 mL     ALLERGIES:   Review of patient's allergies indicates:  No Known Allergies      ASSESSMENTS:   PAIN ASSESSMENT (Patient reports Pain):   Vitals:    11/29/17 0947   BP: (!) 157/80   Pulse: 63   Resp: 18   Temp: 98.7 °F (37.1 °C)   TempSrc: Oral   SpO2: 99%   Weight: 125.9 kg (277 lb 9 oz)   Height: 5' 9" (1.753 m)   PainSc: 0-No pain     REVIEW OF SYSTEMS:     General ROS: negative  Psychological ROS: negative  Ophthalmic ROS: negative  ENT ROS: negative  Allergy and Immunology ROS: negative  Hematological and Lymphatic ROS: negative  Endocrine ROS: negative  Respiratory ROS: no cough, shortness of breath, or wheezing  Gastrointestinal ROS: no abdominal pain, change in bowel habits, or black or bloody stools  Genito-Urinary ROS: no dysuria, trouble voiding, or hematuria  Musculoskeletal ROS: negative  Neurological ROS: no TIA or stroke symptoms  Dermatological ROS: negative  PHYSICAL EXAM:     Vitals:    11/29/17 0947   BP: (!) 157/80   Pulse: 63   Resp: 18   Temp: 98.7 °F (37.1 °C)       General - well developed, well nourished, no apparent distress  HEENT - oropharynx clear  Chest and Lung - clear to auscultation bilaterally   Cardiovascular - RRR with no MGR, normal S1 and S2  Abdomen-  soft, nontender, no palpable hepatomegaly or splenomegaly  Lymph - no palpable lymphadenopathy  Heme - no bruising, petechiae, pallor  Skin - no rashes or lesions  Psych - appropriate mood and affect      ECOG Performance Status: (foot note - ECOG PS provided by Eastern Cooperative Oncology Group) 0 - Asymptomatic    Karnofsky Performance Score:  100%- Normal, No Complaints, No Evidence of Disease  DATA:   Lab Results   Component Value Date    WBC 2.99 (L) 11/28/2017    HGB 11.3 (L) 11/28/2017    HCT 36.3 (L) 11/28/2017    MCV 95 11/28/2017     11/28/2017     Gran #   Date Value Ref Range " Status   11/28/2017 1.6 (L) 1.8 - 7.7 K/uL Final     Gran%   Date Value Ref Range Status   11/28/2017 54.6 38.0 - 73.0 % Final     Lymph #   Date Value Ref Range Status   11/28/2017 1.1 1.0 - 4.8 K/uL Final     Lymph%   Date Value Ref Range Status   11/28/2017 36.1 18.0 - 48.0 % Final     Kappa Free Light Chains   Date Value Ref Range Status   11/28/2017 11.33 (H) 0.33 - 1.94 mg/dL Final   08/14/2017 10.35 (H) 0.33 - 1.94 mg/dL Final   05/16/2017 15.65 (H) 0.33 - 1.94 mg/dL Final     Lambda Free Light Chains   Date Value Ref Range Status   11/28/2017 1.18 0.57 - 2.63 mg/dL Final   08/14/2017 1.14 0.57 - 2.63 mg/dL Final   05/16/2017 1.15 0.57 - 2.63 mg/dL Final     Kappa/Lambda FLC Ratio   Date Value Ref Range Status   11/28/2017 9.60 (H) 0.26 - 1.65 Final   08/14/2017 9.08 (H) 0.26 - 1.65 Final   05/16/2017 13.61 (H) 0.26 - 1.65 Final     Gamma grams/dl   Date Value Ref Range Status   11/28/2017 1.86 (H) 0.67 - 1.58 g/dL Final   08/14/2017 2.00 (H) 0.67 - 1.58 g/dL Final   05/16/2017 1.96 (H) 0.67 - 1.58 g/dL Final     IgG - Serum   Date Value Ref Range Status   11/28/2017 1989 (H) 650 - 1600 mg/dL Final     Comment:     IgG Cord Blood Reference Range: 650-1600 mg/dL.     IgA   Date Value Ref Range Status   11/28/2017 105 40 - 350 mg/dL Final     Comment:     IgA Cord Blood Reference Range: <5 mg/dL.     IgM   Date Value Ref Range Status   11/28/2017 33 (L) 50 - 300 mg/dL Final     Comment:     IgM Cord Blood Reference Range: <25 mg/dL.       Bone Marrow Biopsy - 6/26/13  BONE MARROW ASPIRATE, BONE MARROW CLOT, AND BONE MARROW CORE BIOPSY WITH:  CELLULARITY= 70%, TRILINEAGE HEMATOPOIETIC ACTIVITY (M/E= 1.5:1).  PLASMA CELL DYSCRASIA, SEE COMMENT.  CD20  INCREASED STORAGE IRON.  ADEQUATE NUMBER OF MEGAKARYOCYTES.  COMMENT: Flow cytometry analysis of bone marrow aspirate shows lymph gate(27%) containing T and B cells.  No B cell clonality or T-cell aberrancy is evident. Blast gate is not increased. Plasma cell study  shows a kappa light  chain restricted plasma cell population without coexpression of CD38/CD56.  Immunohistochemical studies were performed on the core biopsy for further architecture evaluation with adequate  positive and negative controls. Scattered mixed T cells (CD3 positive) and B cells (CD20 positive, cyclin D1  negative) are evident. About 6-7% plasma cells ( positive) are noted. 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 Correlate clinically and with a cytogenetics report.  Diagnosed by: Sylvie Mckeon M.D.  (Electronically Signed: 2013-06-27 15:43:02)  Microscopic Examination  BONE MARROW ASPIRATE DIFFERENTIAL:  Blasts---------------------------------------------- 2.5 %  Promyelocytes--------------------------------- 0 %  Myelocytes-------------------------------------- 4.5 %  Metamyelocytes------------------------------ 6 %  Bands---------------------------------------------- 5 %  PMN Neutrophils------------------------------ 19 %  Eosinophils------------------------------------------ 2 %  Basophils--------------------------------------- 0 %  Monocytes------------------------------------ 3.5 %  Lymphocytes------------------------------------- 25.5 %  Plasma cells------------------------------------------ 4.5 %  Erythroid precursors-------------------------- 27.5 %  BONE MARROW ASPIRATE:  Myeloid and erythroid maturation shows M:E ratio at 1.5:1. No significant dysplasia is evident. Stainable iron is  present without increased ringed sideroblasts. Megakaryocytes are seen.  BONE MARROW CLOT:  Cellularity is 70 % with trilineage hematopoiesis. No abnormal infiltrates are seen. Megakaryocytes are adequate in  number. Stainable iron is present.  BONE MARROW CORE BIOPSY:  Cellularity is 70 %. No abnormal infiltrates are seen. Stainable iron is increased. Megakaryocytes are adequate in  number      Bone marrow biopsy - 10/2/14  Bone marrow karyotype results: 46, XX[20], female karyotype.  Michael ([facility]),  FISH:  Comments: Abnormality Result #Abn/Total Cells --------------------------------------------------------- 14q32(IGH sep)  Abnormal 7/11 +14(3'IGH/5'IGH)x3 Normal 0/11 t(14;16) IGH/MAF fusion Abnormal  4/7 NOMENCLATURE: nuc eliza(IGHx3),(MAFx3),(IGH con MAFx2) [58 total plasma cells identified]  Interp, Plasma Cell Prolif ([facility]), FISH:  Comments: The result is abnormal and indicates a plasma cell clone with IGH/MAF fusion. Insufficient plasma cells  were observed with all other probe sets. Since only a limited number of plasma cells were identified, the  abnormalities associated with this plasma cell clone were not sufficiently evaluated and a specific prognostic  significance cannot be defined. Clinical and pathologic correlation is recommended.      SS/long bone survey - 5/22/15  Long bone survey does not show any lesions characteristic of multiple myeloma. Degenerative changes are seen in knees.      Skull shows no characteristic lesions of multiple myeloma. C5 shows a small cystic area anterior superiorly that could be of focus of bone destruction or a benign cyst. Otherwise I don't see any evidence of multiple myeloma. Degenerative changes of a   moderate degree seen scattered throughout the entire spine.      ASSESSMENT AND PLAN:   Encounter Diagnosis   Name Primary?    Smoldering myeloma Yes      IgG kappa smoldering myeloma  Mild intermittent leukopenia/anemia are chronic and unchanged and pre date myeloma diagnosis   Patient continues to smolder with no new CRAB criteria and stable m-spike, SFLC, and quant IgG  She is due for annual imaging with SS/LBS; will order and  Call her with results     Follow Up: cbc, cmp, serum free light chains, quantitative immunoglobulins, serum electropheresis, serum immunofixation and MD appt in 6 months   -instructed to call earlier is symptoms develop      Rasta Alba MD  Hematology/Oncology/Bone Marrow Transplant

## 2017-11-29 NOTE — Clinical Note
-please schedule long bone survey and skeletal survey in the next 1-2 weeks  -cbc, cmp, serum free light chains, quantitative immunoglobulins, serum electropheresis, serum immunofixation and MD appt in 6 months

## 2017-12-07 DIAGNOSIS — I10 ESSENTIAL HYPERTENSION: ICD-10-CM

## 2017-12-07 RX ORDER — QUINAPRIL 20 MG/1
TABLET ORAL
Qty: 90 TABLET | Refills: 3 | Status: SHIPPED | OUTPATIENT
Start: 2017-12-07 | End: 2019-01-08 | Stop reason: SDUPTHER

## 2017-12-12 DIAGNOSIS — J06.9 UPPER RESPIRATORY TRACT INFECTION, UNSPECIFIED TYPE: Primary | ICD-10-CM

## 2017-12-12 RX ORDER — AZITHROMYCIN 250 MG/1
TABLET, FILM COATED ORAL
Qty: 6 TABLET | Refills: 0 | Status: SHIPPED | OUTPATIENT
Start: 2017-12-12 | End: 2017-12-17

## 2017-12-19 ENCOUNTER — HOSPITAL ENCOUNTER (OUTPATIENT)
Dept: RADIOLOGY | Facility: OTHER | Age: 67
Discharge: HOME | End: 2017-12-19
Attending: INTERNAL MEDICINE
Payer: MEDICARE

## 2017-12-19 DIAGNOSIS — D47.2 SMOLDERING MYELOMA: ICD-10-CM

## 2017-12-19 PROCEDURE — 77073 BONE LENGTH STUDIES: CPT

## 2017-12-19 PROCEDURE — 77075 RADEX OSSEOUS SURVEY COMPL: CPT | Mod: 26,59,77, | Performed by: INTERNAL MEDICINE

## 2017-12-19 PROCEDURE — 77075 RADEX OSSEOUS SURVEY COMPL: CPT | Mod: TC

## 2017-12-19 PROCEDURE — 77075 RADEX OSSEOUS SURVEY COMPL: CPT | Mod: 26,59,, | Performed by: INTERNAL MEDICINE

## 2017-12-20 ENCOUNTER — TELEPHONE (OUTPATIENT)
Dept: HEMATOLOGY/ONCOLOGY | Facility: CLINIC | Age: 67
End: 2017-12-20

## 2017-12-20 NOTE — TELEPHONE ENCOUNTER
----- Message from Luis Alba MD sent at 12/20/2017  1:12 PM CST -----  Can you please call mrs. Aranda and let her know her xrays look good with no myeloma lesions.   Thank you   ----- Message -----  From: Jose, Rad Results In  Sent: 12/20/2017   8:25 AM  To: Luis Alba MD      Spoke with pt and explained that  her know her xrays look good with no myeloma lesions per Dr. Alba, pt verbalized understanding.  Karen

## 2018-02-05 ENCOUNTER — PES CALL (OUTPATIENT)
Dept: ADMINISTRATIVE | Facility: CLINIC | Age: 68
End: 2018-02-05

## 2018-02-08 ENCOUNTER — OFFICE VISIT (OUTPATIENT)
Dept: INTERNAL MEDICINE | Facility: CLINIC | Age: 68
End: 2018-02-08
Payer: MEDICARE

## 2018-02-08 VITALS
WEIGHT: 281.75 LBS | DIASTOLIC BLOOD PRESSURE: 74 MMHG | HEART RATE: 69 BPM | HEIGHT: 69 IN | OXYGEN SATURATION: 97 % | SYSTOLIC BLOOD PRESSURE: 128 MMHG | BODY MASS INDEX: 41.73 KG/M2

## 2018-02-08 DIAGNOSIS — E78.00 PURE HYPERCHOLESTEROLEMIA: ICD-10-CM

## 2018-02-08 DIAGNOSIS — D47.2 SMOLDERING MULTIPLE MYELOMA (SMM): ICD-10-CM

## 2018-02-08 DIAGNOSIS — E05.90 HYPERTHYROIDISM: ICD-10-CM

## 2018-02-08 DIAGNOSIS — I70.0 AORTIC ATHEROSCLEROSIS: ICD-10-CM

## 2018-02-08 DIAGNOSIS — I10 ESSENTIAL HYPERTENSION: Primary | ICD-10-CM

## 2018-02-08 DIAGNOSIS — E05.00 GRAVES DISEASE: ICD-10-CM

## 2018-02-08 DIAGNOSIS — I77.1 TORTUOUS AORTA: ICD-10-CM

## 2018-02-08 PROCEDURE — 1159F MED LIST DOCD IN RCRD: CPT | Mod: S$GLB,,, | Performed by: INTERNAL MEDICINE

## 2018-02-08 PROCEDURE — 1126F AMNT PAIN NOTED NONE PRSNT: CPT | Mod: S$GLB,,, | Performed by: INTERNAL MEDICINE

## 2018-02-08 PROCEDURE — 99499 UNLISTED E&M SERVICE: CPT | Mod: S$GLB,,, | Performed by: INTERNAL MEDICINE

## 2018-02-08 PROCEDURE — 99214 OFFICE O/P EST MOD 30 MIN: CPT | Mod: S$GLB,,, | Performed by: INTERNAL MEDICINE

## 2018-02-08 PROCEDURE — 99999 PR PBB SHADOW E&M-EST. PATIENT-LVL III: CPT | Mod: PBBFAC,,, | Performed by: INTERNAL MEDICINE

## 2018-02-08 PROCEDURE — 99213 OFFICE O/P EST LOW 20 MIN: CPT | Performed by: INTERNAL MEDICINE

## 2018-02-08 PROCEDURE — 3008F BODY MASS INDEX DOCD: CPT | Mod: S$GLB,,, | Performed by: INTERNAL MEDICINE

## 2018-02-08 RX ORDER — ROSUVASTATIN CALCIUM 5 MG/1
5 TABLET, COATED ORAL NIGHTLY
Qty: 30 TABLET | Refills: 3 | Status: SHIPPED | OUTPATIENT
Start: 2018-02-08 | End: 2018-04-03 | Stop reason: SDUPTHER

## 2018-02-08 NOTE — PROGRESS NOTES
Subjective:      Patient ID: Manju Aranda is a 67 y.o. female.    Chief Complaint: Follow-up    HPI:  HPI   Taking the 2 ASCVD scores 6.8 to 7.4%. Patient is not on cholesterol medication  Blood pressure is excellent    Annual exam:   Colonoscopy : 2010 patient believes 5 years ( history of polyps and diverticulitis): Colonoscopy 10/23/2015: 5 polyps removed: Dr. Esparza follow up in 3 years due in 2018  Endoscopy:2011 : gastric polyp may repeat in 2018 with colonoscopy  Mammogram due 10/24  Gyn: Dr. Dooley every 2 years  Optho: Dr. Kumar 2017  Flu: done 2017  Tetanus: 8/2017  Shingles: done  Pneumovax: done  Prevnar 13 done      Consultants:  Dr. Ruelas: Diverticulitis/polyps due colonoscopy 2018 in 10/2018  Dr. Alba  Multiple myeloma: no treatment every 6   months  Ms. Brisa Gill : polycystic liver reminder for 2/2018  Nephrology: nurse practitioner  2017 Polycystic kidney Dr. Bui: RBC in urine  Dr. Dooley every 2 years  Ms. Lela Chambers  migraines 2017  Dr. Young: no skin cancers  Dr. Aguiar 2017  IMPRESSION:  Hyperthyroidism with a goiter, most likely this is autoimmune   thyroid disease, less likely would be subacute thyroiditis.  Being treated for URI  PLAN:  decrease methimazole to 5 mg twice   a day and follow up in approximately 4 months.   Call for problems and can see early or obtain TSH sooner    Patient Active Problem List   Diagnosis    Hypertension    Liver cyst    Paraproteinemia    Smoldering multiple myeloma (SMM)    Migraine headache    Mitral valve disorder    Tricuspid valve disease    ADPKD (autosomal dominant polycystic kidney disease)    Benign hypertensive renal disease without renal failure    Tachycardia    Hyperthyroidism    Aortic atherosclerosis    Microhematuria    Graves disease    Tortuous aorta     Past Medical History:   Diagnosis Date    Allergy     Anemia     Arthritis     Cholelithiases     Cyst of kidney, acquired      Diverticulitis     Elevated TSH     Family history of Graves' disease: daughter, maternal aunt, maternal uncle 9/13/2016    Glaucoma     Graves disease     Hx of colonic polyps     Hypertension 1981    Liver cyst     MGUS (monoclonal gammopathy of unknown significance)     Migraine headache     Mitral valve problem     leakage    Obesity     Paraproteinemia     Smoldering multiple myeloma 2013    Tricuspid valve disease     leakage     Past Surgical History:   Procedure Laterality Date    APPENDECTOMY      COLONOSCOPY N/A 10/23/2015    Procedure: COLONOSCOPY;  Surgeon: Brandon Ruelas MD;  Location: [facility] ([address] [place] [address]);  Service: Endoscopy;  Laterality: N/A;  Had divertiulitis in 5/29/2015 with a recommendation for colonoscopy in 8 weeks    Dr. Ruelas is her GI physician    HYSTERECTOMY  1978 or 1979    OOPHORECTOMY       Family History   Problem Relation Age of Onset    Heart disease Mother      MI    Heart attack Mother     Heart disease Maternal Aunt      MI    Heart disease Maternal Aunt      MI    Cancer Paternal Grandmother      GYN cancer - unknown cancer    Colon cancer Maternal Uncle     Cancer Maternal Uncle     Hypertension Father     Heart disease Sister      fast heart rate    Cataracts Sister     Glaucoma Sister     Graves' disease Daughter     No Known Problems Son     No Known Problems Sister     No Known Problems Daughter     No Known Problems Son     Melanoma Neg Hx     Breast cancer Neg Hx     Ovarian cancer Neg Hx     Colon polyps Neg Hx     Rectal cancer Neg Hx     Stomach cancer Neg Hx     Esophageal cancer Neg Hx     Ulcerative colitis Neg Hx     Crohn's disease Neg Hx     Amblyopia Neg Hx     Blindness Neg Hx     Macular degeneration Neg Hx     Strabismus Neg Hx     Retinal detachment Neg Hx      Review of Systems   Constitutional: Negative for chills, fever and unexpected weight change.   HENT: Negative for trouble swallowing.   "  Respiratory: Negative for cough, shortness of breath and wheezing.    Cardiovascular: Negative for chest pain and palpitations.   Gastrointestinal: Negative for abdominal distention, abdominal pain, blood in stool and vomiting.   Musculoskeletal: Negative for back pain.     Objective:     Vitals:    02/08/18 1102   BP: 128/74   Pulse: 69   SpO2: 97%   Weight: 127.8 kg (281 lb 12 oz)   Height: 5' 9" (1.753 m)   PainSc: 0-No pain     Body mass index is 41.61 kg/m².  Physical Exam   Constitutional: She is oriented to person, place, and time. She appears well-developed and well-nourished. No distress.   Neck: Carotid bruit is not present. No thyromegaly present.   Cardiovascular: Normal rate, regular rhythm and normal heart sounds.  PMI is not displaced.    Pulmonary/Chest: Effort normal and breath sounds normal. No respiratory distress.   Abdominal: Soft. Bowel sounds are normal. She exhibits no distension. There is no tenderness.   Musculoskeletal: She exhibits no edema.   Neurological: She is alert and oriented to person, place, and time.     Assessment:     1. Essential hypertension    2. Tortuous aorta    3. Aortic atherosclerosis    4. Smoldering multiple myeloma (SMM)    5. Hyperthyroidism    6. Graves disease      Plan:   Manju was seen today for follow-up.    Diagnoses and all orders for this visit:    Essential hypertension: hypertension will controlled    Tortuous aorta: no asa LDL low ASCVD 6.8 to 7.4  discussion of cholesterol begin rosuvastatin 5 mg at bedtime with follow up in 6 weeks    Aortic atherosclerosis: no asa LDL low  ASCVD 6.8 to 7.4     Smoldering multiple myeloma (SMM): following every 6 months    Hyperthyroidism: followed in Endo    Graves disease: followed in Endo      Follow-up in about 6 months (around 8/3/2018).     Medication List          Accurate as of 2/8/18 11:44 AM. If you have any questions, ask your nurse or doctor.               CHANGE how you take these medications  "   isometheptene-apap-dichloralphenazone [id number] -325 mg per capsule  Commonly known as:  MIDRIN  Take 1 capsule by mouth as directed. 2 at onset then repeat 1 every 30-60min, max 5caps/attack  What changed:  additional instructions     methIMAzole 5 MG Tab  Commonly known as:  TAPAZOLE  Take 1 tablet (5 mg total) by mouth 3 (three) times daily.  What changed:  when to take this        CONTINUE taking these medications    atenolol 25 MG tablet  Commonly known as:  TENORMIN  TAKE 1 TABLET(25 MG) BY MOUTH EVERY DAY     conjugated estrogens vaginal cream  Commonly known as:  PREMARIN     diclofenac sodium 1 % Gel  Commonly known as:  VOLTAREN  Apply 4 grams to effected area up to three times daily as needed.     magnesium oxide 400 mg tablet  Commonly known as:  MAG-OX     multivit with min-folic acid 0.4 mg Tab     ondansetron 4 MG tablet  Commonly known as:  ZOFRAN  Take 1-2 tablets (4-8 mg total) by mouth every 8 (eight) hours as needed for Nausea.     quinapril 20 MG tablet  Commonly known as:  ACCUPRIL  TAKE 1 TABLET BY MOUTH EVERY DAY     rizatriptan 10 MG tablet  Commonly known as:  MAXALT  Take 1 tablet (10 mg total) by mouth every 2 (two) hours as needed for Migraine. Max 30mg/24hs

## 2018-03-14 ENCOUNTER — LAB VISIT (OUTPATIENT)
Dept: LAB | Facility: OTHER | Age: 68
End: 2018-03-14
Payer: MEDICARE

## 2018-03-14 DIAGNOSIS — I12.9 BENIGN HYPERTENSIVE RENAL DISEASE WITHOUT RENAL FAILURE: ICD-10-CM

## 2018-03-14 DIAGNOSIS — D47.2 SMOLDERING MULTIPLE MYELOMA (SMM): ICD-10-CM

## 2018-03-14 DIAGNOSIS — I10 ESSENTIAL HYPERTENSION: ICD-10-CM

## 2018-03-14 DIAGNOSIS — D89.2 PARAPROTEINEMIA: ICD-10-CM

## 2018-03-14 DIAGNOSIS — Q61.2 ADPKD (AUTOSOMAL DOMINANT POLYCYSTIC KIDNEY DISEASE): ICD-10-CM

## 2018-03-14 DIAGNOSIS — E78.00 PURE HYPERCHOLESTEROLEMIA: ICD-10-CM

## 2018-03-14 LAB
ALBUMIN SERPL BCP-MCNC: 3.6 G/DL
ALBUMIN SERPL BCP-MCNC: 3.6 G/DL
ALP SERPL-CCNC: 103 U/L
ALT SERPL W/O P-5'-P-CCNC: 25 U/L
ANION GAP SERPL CALC-SCNC: 8 MMOL/L
ANION GAP SERPL CALC-SCNC: 8 MMOL/L
AST SERPL-CCNC: 34 U/L
BILIRUB SERPL-MCNC: 0.9 MG/DL
BUN SERPL-MCNC: 15 MG/DL
BUN SERPL-MCNC: 15 MG/DL
CALCIUM SERPL-MCNC: 9.7 MG/DL
CALCIUM SERPL-MCNC: 9.7 MG/DL
CHLORIDE SERPL-SCNC: 104 MMOL/L
CHLORIDE SERPL-SCNC: 104 MMOL/L
CHOLEST SERPL-MCNC: 108 MG/DL
CHOLEST/HDLC SERPL: 2.2 {RATIO}
CO2 SERPL-SCNC: 28 MMOL/L
CO2 SERPL-SCNC: 28 MMOL/L
CREAT SERPL-MCNC: 0.9 MG/DL
CREAT SERPL-MCNC: 0.9 MG/DL
EST. GFR  (AFRICAN AMERICAN): >60 ML/MIN/1.73 M^2
EST. GFR  (AFRICAN AMERICAN): >60 ML/MIN/1.73 M^2
EST. GFR  (NON AFRICAN AMERICAN): >60 ML/MIN/1.73 M^2
EST. GFR  (NON AFRICAN AMERICAN): >60 ML/MIN/1.73 M^2
GLUCOSE SERPL-MCNC: 80 MG/DL
GLUCOSE SERPL-MCNC: 80 MG/DL
HDLC SERPL-MCNC: 49 MG/DL
HDLC SERPL: 45.4 %
LDLC SERPL CALC-MCNC: 48.8 MG/DL
NONHDLC SERPL-MCNC: 59 MG/DL
PHOSPHATE SERPL-MCNC: 3.1 MG/DL
POTASSIUM SERPL-SCNC: 4.2 MMOL/L
POTASSIUM SERPL-SCNC: 4.2 MMOL/L
PROT SERPL-MCNC: 8 G/DL
SODIUM SERPL-SCNC: 140 MMOL/L
SODIUM SERPL-SCNC: 140 MMOL/L
TRIGL SERPL-MCNC: 51 MG/DL

## 2018-03-14 PROCEDURE — 36415 COLL VENOUS BLD VENIPUNCTURE: CPT

## 2018-03-14 PROCEDURE — 80053 COMPREHEN METABOLIC PANEL: CPT

## 2018-03-14 PROCEDURE — 80069 RENAL FUNCTION PANEL: CPT

## 2018-03-14 PROCEDURE — 80061 LIPID PANEL: CPT

## 2018-03-26 ENCOUNTER — OFFICE VISIT (OUTPATIENT)
Dept: INTERNAL MEDICINE | Facility: CLINIC | Age: 68
End: 2018-03-26
Payer: MEDICARE

## 2018-03-26 ENCOUNTER — OFFICE VISIT (OUTPATIENT)
Dept: NEPHROLOGY | Facility: CLINIC | Age: 68
End: 2018-03-26
Payer: MEDICARE

## 2018-03-26 ENCOUNTER — LAB VISIT (OUTPATIENT)
Dept: LAB | Facility: HOSPITAL | Age: 68
End: 2018-03-26
Attending: INTERNAL MEDICINE
Payer: MEDICARE

## 2018-03-26 VITALS
HEIGHT: 69 IN | SYSTOLIC BLOOD PRESSURE: 120 MMHG | BODY MASS INDEX: 40.85 KG/M2 | HEART RATE: 64 BPM | WEIGHT: 275.81 LBS | DIASTOLIC BLOOD PRESSURE: 76 MMHG | OXYGEN SATURATION: 100 %

## 2018-03-26 VITALS
HEART RATE: 70 BPM | BODY MASS INDEX: 40.85 KG/M2 | WEIGHT: 275.81 LBS | HEIGHT: 69 IN | SYSTOLIC BLOOD PRESSURE: 122 MMHG | DIASTOLIC BLOOD PRESSURE: 70 MMHG | OXYGEN SATURATION: 97 %

## 2018-03-26 DIAGNOSIS — E05.00 GRAVES DISEASE: ICD-10-CM

## 2018-03-26 DIAGNOSIS — I10 HYPERTENSION, UNSPECIFIED TYPE: ICD-10-CM

## 2018-03-26 DIAGNOSIS — E05.90 HYPERTHYROIDISM: ICD-10-CM

## 2018-03-26 DIAGNOSIS — D47.2 SMOLDERING MULTIPLE MYELOMA (SMM): ICD-10-CM

## 2018-03-26 DIAGNOSIS — Z00.00 ENCOUNTER FOR PREVENTIVE HEALTH EXAMINATION: Primary | ICD-10-CM

## 2018-03-26 DIAGNOSIS — I70.0 AORTIC ATHEROSCLEROSIS: ICD-10-CM

## 2018-03-26 DIAGNOSIS — R31.29 MICROHEMATURIA: ICD-10-CM

## 2018-03-26 DIAGNOSIS — E66.01 MORBID OBESITY: ICD-10-CM

## 2018-03-26 DIAGNOSIS — Q61.2 ADPKD (AUTOSOMAL DOMINANT POLYCYSTIC KIDNEY DISEASE): Primary | ICD-10-CM

## 2018-03-26 DIAGNOSIS — I77.1 TORTUOUS AORTA: ICD-10-CM

## 2018-03-26 DIAGNOSIS — Q61.2 ADPKD (AUTOSOMAL DOMINANT POLYCYSTIC KIDNEY DISEASE): ICD-10-CM

## 2018-03-26 DIAGNOSIS — I05.9 MITRAL VALVE DISORDER: ICD-10-CM

## 2018-03-26 DIAGNOSIS — K76.89 LIVER CYST: ICD-10-CM

## 2018-03-26 DIAGNOSIS — I10 ESSENTIAL HYPERTENSION: ICD-10-CM

## 2018-03-26 DIAGNOSIS — I12.9 BENIGN HYPERTENSIVE RENAL DISEASE WITHOUT RENAL FAILURE: ICD-10-CM

## 2018-03-26 DIAGNOSIS — I07.9 TRICUSPID VALVE DISEASE: ICD-10-CM

## 2018-03-26 DIAGNOSIS — G43.C0 PERIODIC HEADACHE SYNDROME, NOT INTRACTABLE: ICD-10-CM

## 2018-03-26 LAB
BACTERIA #/AREA URNS AUTO: ABNORMAL /HPF
BILIRUB UR QL STRIP: NEGATIVE
CLARITY UR REFRACT.AUTO: CLEAR
COLOR UR AUTO: YELLOW
CREAT UR-MCNC: 142 MG/DL
CREAT UR-MCNC: 142 MG/DL
GLUCOSE UR QL STRIP: NEGATIVE
HGB UR QL STRIP: ABNORMAL
KETONES UR QL STRIP: NEGATIVE
LEUKOCYTE ESTERASE UR QL STRIP: NEGATIVE
MICROALBUMIN UR DL<=1MG/L-MCNC: 11 UG/ML
MICROALBUMIN/CREATININE RATIO: 7.7 UG/MG
MICROSCOPIC COMMENT: ABNORMAL
NITRITE UR QL STRIP: NEGATIVE
PH UR STRIP: 6 [PH] (ref 5–8)
PROT UR QL STRIP: NEGATIVE
PROT UR-MCNC: 11 MG/DL
PROT/CREAT RATIO, UR: 0.08
RBC #/AREA URNS AUTO: 5 /HPF (ref 0–4)
SP GR UR STRIP: 1.02 (ref 1–1.03)
URN SPEC COLLECT METH UR: ABNORMAL
UROBILINOGEN UR STRIP-ACNC: NEGATIVE EU/DL
WBC #/AREA URNS AUTO: 1 /HPF (ref 0–5)

## 2018-03-26 PROCEDURE — 99999 PR PBB SHADOW E&M-EST. PATIENT-LVL III: CPT | Mod: PBBFAC,,, | Performed by: INTERNAL MEDICINE

## 2018-03-26 PROCEDURE — 84156 ASSAY OF PROTEIN URINE: CPT

## 2018-03-26 PROCEDURE — 99999 PR PBB SHADOW E&M-EST. PATIENT-LVL IV: CPT | Mod: PBBFAC,,, | Performed by: NURSE PRACTITIONER

## 2018-03-26 PROCEDURE — 81001 URINALYSIS AUTO W/SCOPE: CPT

## 2018-03-26 PROCEDURE — 3078F DIAST BP <80 MM HG: CPT | Mod: CPTII,S$GLB,, | Performed by: INTERNAL MEDICINE

## 2018-03-26 PROCEDURE — 3074F SYST BP LT 130 MM HG: CPT | Mod: CPTII,S$GLB,, | Performed by: INTERNAL MEDICINE

## 2018-03-26 PROCEDURE — 99213 OFFICE O/P EST LOW 20 MIN: CPT | Mod: S$GLB,,, | Performed by: INTERNAL MEDICINE

## 2018-03-26 PROCEDURE — 99499 UNLISTED E&M SERVICE: CPT | Mod: S$GLB,,, | Performed by: NURSE PRACTITIONER

## 2018-03-26 PROCEDURE — 82043 UR ALBUMIN QUANTITATIVE: CPT

## 2018-03-26 PROCEDURE — G0439 PPPS, SUBSEQ VISIT: HCPCS | Mod: S$GLB,,, | Performed by: NURSE PRACTITIONER

## 2018-03-26 PROCEDURE — 99499 UNLISTED E&M SERVICE: CPT | Mod: S$GLB,,, | Performed by: INTERNAL MEDICINE

## 2018-03-26 RX ORDER — LANOLIN ALCOHOL/MO/W.PET/CERES
CREAM (GRAM) TOPICAL DAILY
COMMUNITY
Start: 2018-01-18 | End: 2018-03-26 | Stop reason: SDUPTHER

## 2018-03-26 NOTE — PATIENT INSTRUCTIONS
Counseling and Referral of Other Preventative  (Italic type indicates deductible and co-insurance are waived)    Patient Name: Manju Aranda  Today's Date: 3/26/2018    Health Maintenance       Date Due Completion Date    Colonoscopy 10/23/2018 10/23/2015    Mammogram 10/24/2018 10/24/2017    High Dose Statin 03/26/2019 3/26/2018    DEXA SCAN 01/09/2020 1/9/2017    Lipid Panel 03/14/2023 3/14/2018    TETANUS VACCINE 08/08/2027 8/8/2017 (Done)    Override on 8/8/2017: Done ([facility])        No orders of the defined types were placed in this encounter.    The following information is provided to all patients.  This information is to help you find resources for any of the problems found today that may be affecting your health:                Living healthy guide: www.[facility].[place].gov      Understanding Diabetes: www.diabetes.org      Eating healthy: www.cdc.gov/healthyweight      [facility] [place] 2[place] home safety checklist: www.cdc.gov/steadi/patient.html      Agency on Aging: www.goea.[place].gov      Alcoholics anonymous (AA): www.aa.org      Physical Activity: www.saji.nih.gov/ho7ncyw      Tobacco use: www.quitwithusla.org

## 2018-03-26 NOTE — PROGRESS NOTES
Subjective:       Patient ID: Manju Aranda is a 67 y.o. Black or  female who presents for follow-up evaluation of CKD.   Interval Hx:  Patient feels well.  Had UA at [facility] and results are ot available   She saw uolgy last year and had cysto a nd  CT urogram for microhematuria, all wnl, of note r mild hydro that may have been due to large R renal cyst, but no further w/u indicated.  She follows every 6 mo with hematology for IgG kappa monoclonal protein,MGUS  HPI   Mrs. Aranda is a very pleasant 65 year old female with a history of PCKD , liver cysts and MGUS with  and stable eGFR.  Baseline creatinine is 0.9.  Overall renal size is favorable, 15.0 cm and 14.1 cm.  She denies LUTS, hematuria, SOB, CP.  She drinks > 2 L of water daily, and is on  a low salt diet.  Several recent UA's with microscopic hematuria, no known UTI  No FH of PCKD      Review of Systems   Constitutional: Negative for activity change, appetite change, chills, diaphoresis, fatigue, fever and unexpected weight change.   HENT: Negative for congestion, facial swelling and trouble swallowing.    Eyes: Negative for pain, discharge, redness and visual disturbance.   Respiratory: Negative for cough, shortness of breath and wheezing.    Cardiovascular: Negative for chest pain, palpitations and leg swelling.   Gastrointestinal: Negative for abdominal distention, abdominal pain, constipation and diarrhea.   Endocrine: Negative for cold intolerance and heat intolerance.   Genitourinary: Negative for decreased urine volume, difficulty urinating, dysuria, flank pain, frequency and urgency.   Musculoskeletal: Negative for arthralgias, joint swelling and myalgias.   Skin: Negative for color change.   Allergic/Immunologic: Negative for immunocompromised state.   Neurological: Negative for dizziness, tremors, syncope, speech difficulty, weakness, light-headedness and numbness.   Hematological: Negative for adenopathy.  "  Psychiatric/Behavioral: Negative for agitation, confusion, decreased concentration and dysphoric mood.       Objective:     Blood pressure 120/76, pulse 64, height 5' 9" (1.753 m), weight 125.1 kg (275 lb 12.7 oz), SpO2 100 %.      Physical Exam   Constitutional: She is oriented to person, place, and time. She appears well-developed and well-nourished.   HENT:   Head: Normocephalic and atraumatic.   Eyes: Conjunctivae and EOM are normal. Pupils are equal, round, and reactive to light.   Neck: Neck supple.   Cardiovascular: Normal rate, regular rhythm, normal heart sounds and intact distal pulses.    Pulmonary/Chest: Effort normal and breath sounds normal.   Abdominal: Soft. Bowel sounds are normal.   Musculoskeletal: Normal range of motion.   Neurological: She is alert and oriented to person, place, and time. She has normal reflexes.   Skin: Skin is warm and dry.   Psychiatric: She has a normal mood and affect. Her behavior is normal.   Nursing note and vitals reviewed.      Assessment:       No diagnosis found.    Plan:       1. CKD 2 with renal cysts: 65 yo AAF with PCKD and liver cysts, with no FH of PCKD. No HTN, No proteinuria, stable GFR.   Recent CT of Abdomen and pelvis reveals stable renal cysts. Serum creatinine stable as well.    Maintain urine output of 2.5 -3 L    No proteinuria, continue RAAS blockade    Hematuria: likely related to PCKD, but with MGUS, will refer to urology and complete urine cytology  May want to consider 24 hr stone w/u as well.     stable  Lab Results   Component Value Date    CREATININE 0.9 03/14/2018    CREATININE 0.9 03/14/2018     Protein Creatinine Ratios: Repeat next visit.       Acid-Base: stable  Lab Results   Component Value Date     03/14/2018     03/14/2018    K 4.2 03/14/2018    K 4.2 03/14/2018    CO2 28 03/14/2018    CO2 28 03/14/2018     2. HTN: Welll Controlled on ACE-I for renal protection.     3. Renal osteodystrophy: Monitor annually  Lab Results "   Component Value Date    CALCIUM 9.7 03/14/2018    CALCIUM 9.7 03/14/2018    PHOS 3.1 03/14/2018       4. Anemia: At goal. Asymptomatic. Monitor.  On RAAS blockade  Lab Results   Component Value Date    HGB 11.3 (L) 11/28/2017       6. Lipid management: stable  Lab Results   Component Value Date    LDLCALC 48.8 (L) 03/14/2018     7.  Referral: urology for hematuria    Follow up in one year with labs.

## 2018-03-26 NOTE — PROGRESS NOTES
"Manju Aranda presented for a  Medicare AWV and comprehensive Health Risk Assessment today. The following components were reviewed and updated:    · Medical history  · Family History  · Social history  · Allergies and Current Medications  · Health Risk Assessment  · Health Maintenance  · Care Team     ** See Completed Assessments for Annual Wellness Visit within the encounter summary.**       The following assessments were completed:  · Living Situation  · CAGE  · Depression Screening  · Timed Get Up and Go  · Whisper Test  · Cognitive Function Screening  ·   ·   · Nutrition Screening  · ADL Screening  · PAQ Screening    Vitals:    03/26/18 0904   BP: 122/70   Pulse: 70   SpO2: 97%   Weight: 125.1 kg (275 lb 12.7 oz)   Height: 5' 9" (1.753 m)     Body mass index is 40.73 kg/m².  Physical Exam   Constitutional: She is oriented to person, place, and time. She appears well-developed.   obese   HENT:   Head: Normocephalic and atraumatic.   Nose: Nose normal.   Eyes: Conjunctivae and EOM are normal.   Cardiovascular: Normal rate, regular rhythm, normal heart sounds and intact distal pulses.    No murmur heard.  Pulmonary/Chest: Effort normal and breath sounds normal.   Musculoskeletal: Normal range of motion.   Neurological: She is alert and oriented to person, place, and time.   Skin: Skin is warm and dry.   Psychiatric: She has a normal mood and affect. Her behavior is normal. Judgment and thought content normal.   Nursing note and vitals reviewed.        Diagnoses and health risks identified today and associated recommendations/orders:    1. Encounter for preventive health examination  Assessment performed. Health maintenance updated. Chart review completed.    2. Aortic atherosclerosis  Noted on imaging. Stable with blood pressure control and statin therapy. Followed by PCP.    3. Smoldering multiple myeloma (SMM)  Chronic. Stable with current regimen. Followed by Hematology Oncology.    4. Tortuous aorta  Noted on " imaging. Stable with blood pressure control and statin therapy. Followed by PCP.    5. Tricuspid valve disease  Stable. Chronic. Followed by PCP.    6. ADPKD (autosomal dominant polycystic kidney disease)  Chronic. Stable with current regimen. Followed by Nephrology.    7. Benign hypertensive renal disease without renal failure  Chronic. Stable with current regimen. Followed by Nephrology.    8. Graves disease  Chronic. Stable with current regimen. Followed by Endocrinology.    9. Morbid obesity  6 lbs weight loss noted since last visit. Patient working very hard with lifestyle modifications. Now eating healthier and exercises a minimum 3 days weekly. Encouragement provided. Followed by PCP.    10. Hyperthyroidism  Chronic. Stable with current regimen. Followed by Endocrinology.    11. Liver cyst  Chronic. Stable with current regimen. Followed by Hepatology.    12. Mitral valve disorder  Chronic. Stable. Followed by PCP.    13. Essential hypertension  Chronic. Stable with current regimen. Followed by PCP.    14. Periodic headache syndrome, not intractable  Chronic. Stable with current medication regimen and avoiding food triggers. Followed by Neurology.      Provided Manju with a 5-10 year written screening schedule and personal prevention plan. Recommendations were developed using the USPSTF age appropriate recommendations. Education, counseling, and referrals were provided as needed. After Visit Summary printed and given to patient which includes a list of additional screenings\tests needed.    Follow-up for follow up with Primary Care Provider as instructed, ;sooner if problems, HRA in 1 year.    FRANCA Downs

## 2018-03-26 NOTE — PATIENT INSTRUCTIONS
1. Labs: urine today all other labs one month     2.  Medications: no change         5. BP:  Take BP and pulse  twice daily for one week, record              Bring results  to next visit.              Goal :   <130/80    6. Diet:  National Kidney Foundation:  www.kidney.org       Sodium: < 2000 milligrams daily including all food and drink      (one teaspoon of table salt has 2300 milligrams of sodium)         Phosphorus: <1000mg daily     Drink 2.5-3 L daily       Vaccines: please check with your PCP and be sure to have an annual flu vaccine and keep up to date with your pneumococcal vaccine for pneumonia      Please avoid or minimize all NSAIDS (ibuprofen, motrin, aleve, indocin, naprosyn, BC powder, mobic, relafen, alleve, and any others) to minimize the risk to your kidneys    Please avoid Proton pump inhibitors unless specifically necessary and speak with your PCP about alternatives such as H2 blockers     Return to clinic: one year

## 2018-03-27 ENCOUNTER — TELEPHONE (OUTPATIENT)
Dept: NEPHROLOGY | Facility: CLINIC | Age: 68
End: 2018-03-27

## 2018-03-27 NOTE — TELEPHONE ENCOUNTER
----- Message from Kelsy Forrest MD sent at 3/26/2018  5:27 PM CDT -----  Your urien continues to dmeonstrate some microscopic blood, that Dr. Bui has evaluaed. There is no protien and your kdieny function is normal, so I would not evaluate further at this tiem

## 2018-04-03 ENCOUNTER — OFFICE VISIT (OUTPATIENT)
Dept: INTERNAL MEDICINE | Facility: CLINIC | Age: 68
End: 2018-04-03
Payer: MEDICARE

## 2018-04-03 VITALS
OXYGEN SATURATION: 97 % | SYSTOLIC BLOOD PRESSURE: 134 MMHG | HEART RATE: 88 BPM | HEIGHT: 69 IN | DIASTOLIC BLOOD PRESSURE: 82 MMHG | BODY MASS INDEX: 40.85 KG/M2 | WEIGHT: 275.81 LBS

## 2018-04-03 DIAGNOSIS — E78.00 PURE HYPERCHOLESTEROLEMIA: Primary | ICD-10-CM

## 2018-04-03 PROCEDURE — 3079F DIAST BP 80-89 MM HG: CPT | Mod: CPTII,S$GLB,, | Performed by: INTERNAL MEDICINE

## 2018-04-03 PROCEDURE — 99999 PR PBB SHADOW E&M-EST. PATIENT-LVL III: CPT | Mod: PBBFAC,,, | Performed by: INTERNAL MEDICINE

## 2018-04-03 PROCEDURE — 99499 UNLISTED E&M SERVICE: CPT | Mod: S$GLB,,, | Performed by: INTERNAL MEDICINE

## 2018-04-03 PROCEDURE — 99213 OFFICE O/P EST LOW 20 MIN: CPT | Mod: S$GLB,,, | Performed by: INTERNAL MEDICINE

## 2018-04-03 PROCEDURE — 3075F SYST BP GE 130 - 139MM HG: CPT | Mod: CPTII,S$GLB,, | Performed by: INTERNAL MEDICINE

## 2018-04-03 RX ORDER — ROSUVASTATIN CALCIUM 5 MG/1
5 TABLET, COATED ORAL NIGHTLY
Qty: 90 TABLET | Refills: 3 | Status: SHIPPED | OUTPATIENT
Start: 2018-04-03 | End: 2018-10-01

## 2018-04-03 NOTE — PROGRESS NOTES
Subjective:      Patient ID: Manju Aranda is a 67 y.o. female.    Chief Complaint: Follow-up    HPI:  HPI   Follow up of starting rosuvastatin: doing well, no side effects. Lab was done prior to the appt.  Patient Active Problem List   Diagnosis    Hypertension    Liver cyst    Paraproteinemia    Smoldering multiple myeloma (SMM)    Migraine headache    Mitral valve disorder    Tricuspid valve disease    ADPKD (autosomal dominant polycystic kidney disease)    Benign hypertensive renal disease without renal failure    Tachycardia    Hyperthyroidism    Aortic atherosclerosis    Microhematuria    Graves disease    Tortuous aorta    Morbid obesity     Past Medical History:   Diagnosis Date    Allergy     Anemia     Arthritis     Cholelithiases     Cyst of kidney, acquired     Diverticulitis     Elevated TSH     Family history of Graves' disease: daughter, maternal aunt, maternal uncle 9/13/2016    Glaucoma     Graves disease     Hx of colonic polyps     Hypertension 1981    Liver cyst     MGUS (monoclonal gammopathy of unknown significance)     Migraine headache     Mitral valve problem     leakage    Obesity     Paraproteinemia     Smoldering multiple myeloma 2013    Tricuspid valve disease     leakage     Past Surgical History:   Procedure Laterality Date    APPENDECTOMY      COLONOSCOPY N/A 10/23/2015    Procedure: COLONOSCOPY;  Surgeon: Brandon Ruelas MD;  Location: [facility] ([address] [place] [address]);  Service: Endoscopy;  Laterality: N/A;  Had divertiulitis in 5/29/2015 with a recommendation for colonoscopy in 8 weeks    Dr. Ruelas is her GI physician    HYSTERECTOMY  1978 or 1979    OOPHORECTOMY       Family History   Problem Relation Age of Onset    Heart disease Mother      MI    Heart attack Mother     Heart disease Maternal Aunt      MI    Heart disease Maternal Aunt      MI    Cancer Paternal Grandmother      GYN cancer - unknown cancer    Colon cancer Maternal Uncle   "   Cancer Maternal Uncle     Hypertension Father     Heart disease Sister      fast heart rate    Cataracts Sister     Glaucoma Sister     Graves' disease Daughter     No Known Problems Son     No Known Problems Sister     No Known Problems Daughter     No Known Problems Son     Melanoma Neg Hx     Breast cancer Neg Hx     Ovarian cancer Neg Hx     Colon polyps Neg Hx     Rectal cancer Neg Hx     Stomach cancer Neg Hx     Esophageal cancer Neg Hx     Ulcerative colitis Neg Hx     Crohn's disease Neg Hx     Amblyopia Neg Hx     Blindness Neg Hx     Macular degeneration Neg Hx     Strabismus Neg Hx     Retinal detachment Neg Hx      Review of Systems   Constitutional: Negative for activity change, chills, fever and unexpected weight change.   Respiratory: Negative for cough, chest tightness, shortness of breath and wheezing.    Cardiovascular: Negative for chest pain, palpitations and leg swelling.     Objective:     Vitals:    04/03/18 1120   BP: 134/82   Pulse: 88   SpO2: 97%   Weight: 125.1 kg (275 lb 12.7 oz)   Height: 5' 9" (1.753 m)   PainSc: 0-No pain     Body mass index is 40.73 kg/m².  Physical Exam   Constitutional: She appears well-developed and well-nourished.   Neck: No JVD present. No thyromegaly present.   Cardiovascular: Normal rate, normal heart sounds and intact distal pulses.    Pulmonary/Chest: Effort normal and breath sounds normal. No respiratory distress.     Assessment:     1. Pure hypercholesterolemia      Plan:   Manju was seen today for follow-up.    Diagnoses and all orders for this visit:    Pure hypercholesterolemia: continue rosuvastatin, very good response    Other orders  -     rosuvastatin (CRESTOR) 5 MG tablet; Take 1 tablet (5 mg total) by mouth every evening.      Follow-up in about 4 months (around 8/2/2018) for Annual exam.     Medication List          Accurate as of 4/3/18  8:35 PM. If you have any questions, ask your nurse or doctor.             "   CHANGE how you take these medications    isometheptene-apap-dichloralphenazone [id number] -325 mg per capsule  Commonly known as:  MIDRIN  Take 1 capsule by mouth as directed. 2 at onset then repeat 1 every 30-60min, max 5caps/attack  What changed:  additional instructions     methIMAzole 5 MG Tab  Commonly known as:  TAPAZOLE  Take 1 tablet (5 mg total) by mouth 3 (three) times daily.  What changed:  when to take this     rosuvastatin 5 MG tablet  Commonly known as:  CRESTOR  Take 1 tablet (5 mg total) by mouth every evening.  What changed:  additional instructions  Changed by:  Payton Garay MD        CONTINUE taking these medications    magnesium oxide 400 mg tablet  Commonly known as:  MAG-OX     multivit with min-folic acid 0.4 mg Tab     ondansetron 4 MG tablet  Commonly known as:  ZOFRAN  Take 1-2 tablets (4-8 mg total) by mouth every 8 (eight) hours as needed for Nausea.     quinapril 20 MG tablet  Commonly known as:  ACCUPRIL  TAKE 1 TABLET BY MOUTH EVERY DAY     rizatriptan 10 MG tablet  Commonly known as:  MAXALT  Take 1 tablet (10 mg total) by mouth every 2 (two) hours as needed for Migraine. Max 30mg/24hs           Where to Get Your Medications      These medications were sent to [id number] - [facility] [address] AT [place] [address]  [address] [facility] [address]    Phone:  [phone]   · rosuvastatin 5 MG tablet

## 2018-04-17 ENCOUNTER — OFFICE VISIT (OUTPATIENT)
Dept: UROLOGY | Facility: CLINIC | Age: 68
End: 2018-04-17
Payer: MEDICARE

## 2018-04-17 VITALS
HEART RATE: 64 BPM | HEIGHT: 69 IN | BODY MASS INDEX: 41.04 KG/M2 | SYSTOLIC BLOOD PRESSURE: 119 MMHG | DIASTOLIC BLOOD PRESSURE: 80 MMHG | WEIGHT: 277.13 LBS

## 2018-04-17 DIAGNOSIS — I10 HYPERTENSION, UNSPECIFIED TYPE: ICD-10-CM

## 2018-04-17 DIAGNOSIS — Q61.2 ADPKD (AUTOSOMAL DOMINANT POLYCYSTIC KIDNEY DISEASE): Primary | ICD-10-CM

## 2018-04-17 DIAGNOSIS — R31.29 MICROHEMATURIA: ICD-10-CM

## 2018-04-17 DIAGNOSIS — E66.01 MORBID OBESITY: ICD-10-CM

## 2018-04-17 PROCEDURE — 99999 PR PBB SHADOW E&M-EST. PATIENT-LVL III: CPT | Mod: PBBFAC,,, | Performed by: UROLOGY

## 2018-04-17 PROCEDURE — 99213 OFFICE O/P EST LOW 20 MIN: CPT | Mod: S$GLB,,, | Performed by: UROLOGY

## 2018-04-17 PROCEDURE — 99499 UNLISTED E&M SERVICE: CPT | Mod: S$GLB,,, | Performed by: UROLOGY

## 2018-04-17 PROCEDURE — 3079F DIAST BP 80-89 MM HG: CPT | Mod: CPTII,S$GLB,, | Performed by: UROLOGY

## 2018-04-17 PROCEDURE — 3074F SYST BP LT 130 MM HG: CPT | Mod: CPTII,S$GLB,, | Performed by: UROLOGY

## 2018-04-17 NOTE — PROGRESS NOTES
Subjective:       Patient ID: Manju Aranda is a 67 y.o. female.    Chief Complaint: Follow-up (yearly)    Manju Aranda is a 67 y.o. Female here for follow up of microhematuria.   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    2018 5 RBC   and  ua showed microhematuria. 8-10 RBC.  No gross hematuria.    She does have PCKD.     She has urinary frequency, but is on a diuretic. Every 2 hours. No change since last year.   Nocturia 3 times. She does limit fluids 2 hours before bedtime. No sleep apnea. No lower extremity edema.  No constipation.   No gross hematuria.   No recurrent UTI.   No incontinence.     H/o of diverticulitis. She is on a probiotic daily.  H/o of hysterectomy in 78.  , 2 vaginal, 2 c-sections.          Hematuria   Irritative symptoms include frequency and nocturia. Irritative symptoms do not include urgency. Pertinent negatives include no chills, dysuria or fever.       Past Surgical History:   Procedure Laterality Date    APPENDECTOMY      COLONOSCOPY N/A 10/23/2015    Procedure: COLONOSCOPY;  Surgeon: Brandon Ruelas MD;  Location: [facility] ([address] [place] [address]);  Service: Endoscopy;  Laterality: N/A;  Had divertiulitis in 2015 with a recommendation for colonoscopy in 8 weeks    Dr. Ruelas is her GI physician    HYSTERECTOMY   or     OOPHORECTOMY         Past Medical History:   Diagnosis Date    Allergy     Anemia     Arthritis     Cholelithiases     Cyst of kidney, acquired     Diverticulitis     Elevated TSH     Family history of Graves' disease: daughter, maternal aunt, maternal uncle 2016    Glaucoma     Graves disease     Hx of colonic polyps     Hypertension     Liver cyst     MGUS (monoclonal gammopathy of unknown significance)     Migraine headache     Mitral valve problem     leakage    Obesity     Paraproteinemia     Smoldering multiple myeloma 2013    Tricuspid valve disease     leakage       Social History     Social History    Marital  status: Single     Spouse name: N/A    Number of children: N/A    Years of education: N/A     Occupational History    RETIRED      Social History Main Topics    Smoking status: Former Smoker     Years: 3.00     Types: Cigarettes    Smokeless tobacco: Never Used    Alcohol use No    Drug use: No    Sexual activity: No     Other Topics Concern    Not on file     Social History Narrative    No narrative on file       Family History   Problem Relation Age of Onset    Heart disease Mother      MI    Heart attack Mother     Heart disease Maternal Aunt      MI    Heart disease Maternal Aunt      MI    Cancer Paternal Grandmother      GYN cancer - unknown cancer    Colon cancer Maternal Uncle     Cancer Maternal Uncle     Hypertension Father     Heart disease Sister      fast heart rate    Cataracts Sister     Glaucoma Sister     Graves' disease Daughter     No Known Problems Son     No Known Problems Sister     No Known Problems Daughter     No Known Problems Son     Melanoma Neg Hx     Breast cancer Neg Hx     Ovarian cancer Neg Hx     Colon polyps Neg Hx     Rectal cancer Neg Hx     Stomach cancer Neg Hx     Esophageal cancer Neg Hx     Ulcerative colitis Neg Hx     Crohn's disease Neg Hx     Amblyopia Neg Hx     Blindness Neg Hx     Macular degeneration Neg Hx     Strabismus Neg Hx     Retinal detachment Neg Hx        Current Outpatient Prescriptions   Medication Sig Dispense Refill    isometheptene-apap-dichloralphenazone [id number] (MIDRIN) -325 mg per capsule Take 1 capsule by mouth as directed. 2 at onset then repeat 1 every 30-60min, max 5caps/attack (Patient taking differently: Take 1 capsule by mouth as directed. 2 at onset then repeat 1 every 30-60min, max 5caps/attack) 30 capsule 1    magnesium oxide (MAG-OX) 400 mg tablet Take 400 mg by mouth once daily.      multivit with min-folic acid 0.4 mg Tab Take 0.4 mg by mouth once daily.      ondansetron (ZOFRAN) 4  MG tablet Take 1-2 tablets (4-8 mg total) by mouth every 8 (eight) hours as needed for Nausea. (Patient taking differently: Take 4-8 mg by mouth every 8 (eight) hours as needed for Nausea. ) 20 tablet 3    quinapril (ACCUPRIL) 20 MG tablet TAKE 1 TABLET BY MOUTH EVERY DAY 90 tablet 3    rizatriptan (MAXALT) 10 MG tablet Take 1 tablet (10 mg total) by mouth every 2 (two) hours as needed for Migraine. Max 30mg/24hs 12 tablet 5    rosuvastatin (CRESTOR) 5 MG tablet Take 1 tablet (5 mg total) by mouth every evening. 90 tablet 3    methimazole (TAPAZOLE) 5 MG Tab Take 1 tablet (5 mg total) by mouth 3 (three) times daily. (Patient taking differently: Take 5 mg by mouth 2 (two) times daily. ) 90 tablet 11     No current facility-administered medications for this visit.        Review of patient's allergies indicates:  No Known Allergies    Review of Systems   Constitutional: Negative for chills, fever and unexpected weight change.   HENT: Negative for nosebleeds.    Eyes: Negative for visual disturbance.   Respiratory: Negative for chest tightness.    Cardiovascular: Negative for chest pain.   Gastrointestinal: Negative for diarrhea.   Genitourinary: Positive for frequency, hematuria and nocturia. Negative for dysuria and urgency.   Musculoskeletal: Negative for myalgias.   Skin: Negative for rash.   Neurological: Negative for seizures.   Hematological: Does not bruise/bleed easily.   Psychiatric/Behavioral: Negative for behavioral problems.       Objective:      Physical Exam   Vitals reviewed.  Constitutional: She is oriented to person, place, and time. She appears well-developed and well-nourished.   HENT:   Head: Normocephalic and atraumatic.   Eyes: No scleral icterus.   Cardiovascular: Normal rate and regular rhythm.    Pulmonary/Chest: Effort normal. No respiratory distress.   Abdominal: She exhibits no mass.   Musculoskeletal: She exhibits no tenderness.   Lymphadenopathy:     She has no cervical adenopathy.    Neurological: She is alert and oriented to person, place, and time.   Skin: Skin is warm and dry. No rash noted.     Psychiatric: She has a normal mood and affect.   3/26/18 urine micro showed 5 RBC  Assessment:       1. ADPKD (autosomal dominant polycystic kidney disease)    2. Microhematuria    3. Morbid obesity    4. Hypertension, unspecified type        Plan:    urinalysis and symptoms in 1 year.  Consider repeat ultrasound in the future for ADPKD.

## 2018-06-25 ENCOUNTER — LAB VISIT (OUTPATIENT)
Dept: LAB | Facility: HOSPITAL | Age: 68
End: 2018-06-25
Payer: MEDICARE

## 2018-06-25 DIAGNOSIS — D47.2 SMOLDERING MYELOMA: ICD-10-CM

## 2018-06-25 LAB
ALBUMIN SERPL BCP-MCNC: 3.3 G/DL
ALP SERPL-CCNC: 102 U/L
ALT SERPL W/O P-5'-P-CCNC: 13 U/L
ANION GAP SERPL CALC-SCNC: 6 MMOL/L
AST SERPL-CCNC: 14 U/L
BASOPHILS # BLD AUTO: 0.01 K/UL
BASOPHILS NFR BLD: 0.3 %
BILIRUB SERPL-MCNC: 0.7 MG/DL
BUN SERPL-MCNC: 17 MG/DL
CALCIUM SERPL-MCNC: 9.7 MG/DL
CHLORIDE SERPL-SCNC: 105 MMOL/L
CO2 SERPL-SCNC: 28 MMOL/L
CREAT SERPL-MCNC: 0.9 MG/DL
DIFFERENTIAL METHOD: ABNORMAL
EOSINOPHIL # BLD AUTO: 0 K/UL
EOSINOPHIL NFR BLD: 0.3 %
ERYTHROCYTE [DISTWIDTH] IN BLOOD BY AUTOMATED COUNT: 11.3 %
EST. GFR  (AFRICAN AMERICAN): >60 ML/MIN/1.73 M^2
EST. GFR  (NON AFRICAN AMERICAN): >60 ML/MIN/1.73 M^2
GLUCOSE SERPL-MCNC: 83 MG/DL
HCT VFR BLD AUTO: 35.2 %
HGB BLD-MCNC: 10.8 G/DL
IGA SERPL-MCNC: 101 MG/DL
IGG SERPL-MCNC: 2258 MG/DL
IGM SERPL-MCNC: 30 MG/DL
IMM GRANULOCYTES # BLD AUTO: 0.01 K/UL
IMM GRANULOCYTES NFR BLD AUTO: 0.3 %
LYMPHOCYTES # BLD AUTO: 1.4 K/UL
LYMPHOCYTES NFR BLD: 36.5 %
MCH RBC QN AUTO: 29.7 PG
MCHC RBC AUTO-ENTMCNC: 30.7 G/DL
MCV RBC AUTO: 97 FL
MONOCYTES # BLD AUTO: 0.3 K/UL
MONOCYTES NFR BLD: 9.1 %
NEUTROPHILS # BLD AUTO: 2 K/UL
NEUTROPHILS NFR BLD: 53.5 %
NRBC BLD-RTO: 0 /100 WBC
PLATELET # BLD AUTO: 241 K/UL
PMV BLD AUTO: 10.3 FL
POTASSIUM SERPL-SCNC: 4.3 MMOL/L
PROT SERPL-MCNC: 7.7 G/DL
RBC # BLD AUTO: 3.64 M/UL
SODIUM SERPL-SCNC: 139 MMOL/L
WBC # BLD AUTO: 3.75 K/UL

## 2018-06-25 PROCEDURE — 83520 IMMUNOASSAY QUANT NOS NONAB: CPT

## 2018-06-25 PROCEDURE — 86334 IMMUNOFIX E-PHORESIS SERUM: CPT

## 2018-06-25 PROCEDURE — 36415 COLL VENOUS BLD VENIPUNCTURE: CPT

## 2018-06-25 PROCEDURE — 82784 ASSAY IGA/IGD/IGG/IGM EACH: CPT | Mod: 59

## 2018-06-25 PROCEDURE — 84165 PROTEIN E-PHORESIS SERUM: CPT

## 2018-06-25 PROCEDURE — 86334 IMMUNOFIX E-PHORESIS SERUM: CPT | Mod: 26,,, | Performed by: PATHOLOGY

## 2018-06-25 PROCEDURE — 85025 COMPLETE CBC W/AUTO DIFF WBC: CPT

## 2018-06-25 PROCEDURE — 80053 COMPREHEN METABOLIC PANEL: CPT

## 2018-06-25 PROCEDURE — 84165 PROTEIN E-PHORESIS SERUM: CPT | Mod: 26,,, | Performed by: PATHOLOGY

## 2018-06-26 ENCOUNTER — OFFICE VISIT (OUTPATIENT)
Dept: HEMATOLOGY/ONCOLOGY | Facility: CLINIC | Age: 68
End: 2018-06-26
Payer: MEDICARE

## 2018-06-26 VITALS
TEMPERATURE: 98 F | HEIGHT: 69 IN | BODY MASS INDEX: 41.07 KG/M2 | RESPIRATION RATE: 18 BRPM | SYSTOLIC BLOOD PRESSURE: 142 MMHG | HEART RATE: 68 BPM | DIASTOLIC BLOOD PRESSURE: 66 MMHG | OXYGEN SATURATION: 97 % | WEIGHT: 277.31 LBS

## 2018-06-26 DIAGNOSIS — D47.2 SMOLDERING MYELOMA: Primary | ICD-10-CM

## 2018-06-26 LAB
ALBUMIN SERPL ELPH-MCNC: 3.51 G/DL
ALPHA1 GLOB SERPL ELPH-MCNC: 0.31 G/DL
ALPHA2 GLOB SERPL ELPH-MCNC: 0.73 G/DL
B-GLOBULIN SERPL ELPH-MCNC: 0.78 G/DL
GAMMA GLOB SERPL ELPH-MCNC: 2.07 G/DL
INTERPRETATION SERPL IFE-IMP: NORMAL
KAPPA LC SER QL IA: 21.24 MG/DL
KAPPA LC/LAMBDA SER IA: 14.65
LAMBDA LC SER QL IA: 1.45 MG/DL
PATHOLOGIST INTERPRETATION IFE: NORMAL
PATHOLOGIST INTERPRETATION SPE: NORMAL
PROT SERPL-MCNC: 7.4 G/DL

## 2018-06-26 PROCEDURE — 99499 UNLISTED E&M SERVICE: CPT | Mod: S$GLB,,, | Performed by: INTERNAL MEDICINE

## 2018-06-26 PROCEDURE — 99214 OFFICE O/P EST MOD 30 MIN: CPT | Mod: S$GLB,,, | Performed by: INTERNAL MEDICINE

## 2018-06-26 PROCEDURE — 3077F SYST BP >= 140 MM HG: CPT | Mod: CPTII,S$GLB,, | Performed by: INTERNAL MEDICINE

## 2018-06-26 PROCEDURE — 99999 PR PBB SHADOW E&M-EST. PATIENT-LVL IV: CPT | Mod: PBBFAC,,, | Performed by: INTERNAL MEDICINE

## 2018-06-26 PROCEDURE — 3078F DIAST BP <80 MM HG: CPT | Mod: CPTII,S$GLB,, | Performed by: INTERNAL MEDICINE

## 2018-06-26 NOTE — Clinical Note
-cbc, cmp, serum free light chains, quantitative immunoglobulins, serum electropheresis, serum immunofixation , skeletal survey, long bone survey, in 6 months -MD appt 3 days after above testing

## 2018-06-26 NOTE — PROGRESS NOTES
SECTION OF HEMATOLOGY AND BONE MARROW TRANSPLANT  Return Patient Visit   06/27/2018    CHIEF COMPLAINT:   No chief complaint on file.      HISTORY OF PRESENT ILLNESS:   Ms. Aranda is here for  IgG kappa smoldering myeloma. Follow up.  No changes In clinical status since our last appt.   Feels well.  No focal pain. Denies fever, chills, nightsweats, bleeding, brusing, lymphadenopathy, signs/symptoms of splenomegaly.              PAST MEDICAL HISTORY:   Past Medical History:   Diagnosis Date    Allergy     Anemia     Arthritis     Cholelithiases     Cyst of kidney, acquired     Diverticulitis     Elevated TSH     Family history of Graves' disease: daughter, maternal aunt, maternal uncle 9/13/2016    Glaucoma     Graves disease     Hx of colonic polyps     Hypertension 1981    Liver cyst     MGUS (monoclonal gammopathy of unknown significance)     Migraine headache     Mitral valve problem     leakage    Obesity     Paraproteinemia     Smoldering multiple myeloma 2013    Tricuspid valve disease     leakage       PAST SURGICAL HISTORY:   Past Surgical History:   Procedure Laterality Date    APPENDECTOMY      COLONOSCOPY N/A 10/23/2015    Procedure: COLONOSCOPY;  Surgeon: Brandon Ruelas MD;  Location: [facility] ([address] [place] [address]);  Service: Endoscopy;  Laterality: N/A;  Had divertiulitis in 5/29/2015 with a recommendation for colonoscopy in 8 weeks    Dr. Ruelas is her GI physician    HYSTERECTOMY  1978 or 1979    OOPHORECTOMY         PAST SOCIAL HISTORY:   reports that she has quit smoking. Her smoking use included Cigarettes. She quit after 3.00 years of use. She has never used smokeless tobacco. She reports that she does not drink alcohol or use drugs.    FAMILY HISTORY:  Family History   Problem Relation Age of Onset    Heart disease Mother         MI    Heart attack Mother     Heart disease Maternal Aunt         MI    Heart disease Maternal Aunt         MI    Cancer Paternal Grandmother          GYN cancer - unknown cancer    Colon cancer Maternal Uncle     Cancer Maternal Uncle     Hypertension Father     Heart disease Sister         fast heart rate    Cataracts Sister     Glaucoma Sister     Graves' disease Daughter     No Known Problems Son     No Known Problems Sister     No Known Problems Daughter     No Known Problems Son     Melanoma Neg Hx     Breast cancer Neg Hx     Ovarian cancer Neg Hx     Colon polyps Neg Hx     Rectal cancer Neg Hx     Stomach cancer Neg Hx     Esophageal cancer Neg Hx     Ulcerative colitis Neg Hx     Crohn's disease Neg Hx     Amblyopia Neg Hx     Blindness Neg Hx     Macular degeneration Neg Hx     Strabismus Neg Hx     Retinal detachment Neg Hx        CURRENT MEDICATIONS:   Current Outpatient Prescriptions   Medication Sig    isometheptene-apap-dichloralphenazone [id number] (MIDRIN) -325 mg per capsule Take 1 capsule by mouth as directed. 2 at onset then repeat 1 every 30-60min, max 5caps/attack (Patient taking differently: Take 1 capsule by mouth as directed. 2 at onset then repeat 1 every 30-60min, max 5caps/attack)    magnesium oxide (MAG-OX) 400 mg tablet Take 400 mg by mouth once daily.    multivit with min-folic acid 0.4 mg Tab Take 0.4 mg by mouth once daily.    ondansetron (ZOFRAN) 4 MG tablet Take 1-2 tablets (4-8 mg total) by mouth every 8 (eight) hours as needed for Nausea. (Patient taking differently: Take 4-8 mg by mouth every 8 (eight) hours as needed for Nausea. )    quinapril (ACCUPRIL) 20 MG tablet TAKE 1 TABLET BY MOUTH EVERY DAY    rizatriptan (MAXALT) 10 MG tablet Take 1 tablet (10 mg total) by mouth every 2 (two) hours as needed for Migraine. Max 30mg/24hs    rosuvastatin (CRESTOR) 5 MG tablet Take 1 tablet (5 mg total) by mouth every evening.    methimazole (TAPAZOLE) 5 MG Tab Take 1 tablet (5 mg total) by mouth 3 (three) times daily. (Patient taking differently: Take 5 mg by mouth 2 (two) times daily. )  "    No current facility-administered medications for this visit.      ALLERGIES:   Review of patient's allergies indicates:  No Known Allergies      ASSESSMENTS:   PAIN ASSESSMENT (Patient reports Pain):   Vitals:    06/26/18 0824   BP: (!) 142/66   Pulse: 68   Resp: 18   Temp: 98 °F (36.7 °C)   TempSrc: Oral   SpO2: 97%   Weight: 125.8 kg (277 lb 5.4 oz)   Height: 5' 9" (1.753 m)   PainSc: 0-No pain     REVIEW OF SYSTEMS:     General ROS: negative  Psychological ROS: negative  Ophthalmic ROS: negative  ENT ROS: negative  Allergy and Immunology ROS: negative  Hematological and Lymphatic ROS: negative  Endocrine ROS: negative  Respiratory ROS: no cough, shortness of breath, or wheezing  Gastrointestinal ROS: no abdominal pain, change in bowel habits, or black or bloody stools  Genito-Urinary ROS: no dysuria, trouble voiding, or hematuria  Musculoskeletal ROS: negative  Neurological ROS: no TIA or stroke symptoms  Dermatological ROS: negative  PHYSICAL EXAM:     Vitals:    06/26/18 0824   BP: (!) 142/66   Pulse: 68   Resp: 18   Temp: 98 °F (36.7 °C)       General - well developed, well nourished, no apparent distress  HEENT - oropharynx clear  Chest and Lung - clear to auscultation bilaterally   Cardiovascular - RRR with no MGR, normal S1 and S2  Abdomen-  soft, nontender, no palpable hepatomegaly or splenomegaly  Lymph - no palpable lymphadenopathy  Heme - no bruising, petechiae, pallor  Skin - no rashes or lesions  Psych - appropriate mood and affect      ECOG Performance Status: (foot note - ECOG PS provided by Eastern Cooperative Oncology Group) 0 - Asymptomatic    Karnofsky Performance Score:  100%- Normal, No Complaints, No Evidence of Disease  DATA:   Lab Results   Component Value Date    WBC 3.75 (L) 06/25/2018    HGB 10.8 (L) 06/25/2018    HCT 35.2 (L) 06/25/2018    MCV 97 06/25/2018     06/25/2018     Gran # (ANC)   Date Value Ref Range Status   06/25/2018 2.0 1.8 - 7.7 K/uL Final     Gran%   Date " Value Ref Range Status   06/25/2018 53.5 38.0 - 73.0 % Final     Lymph #   Date Value Ref Range Status   06/25/2018 1.4 1.0 - 4.8 K/uL Final     Lymph%   Date Value Ref Range Status   06/25/2018 36.5 18.0 - 48.0 % Final     Kappa Free Light Chains   Date Value Ref Range Status   06/25/2018 21.24 (H) 0.33 - 1.94 mg/dL Final   11/28/2017 11.33 (H) 0.33 - 1.94 mg/dL Final   08/14/2017 10.35 (H) 0.33 - 1.94 mg/dL Final     Lambda Free Light Chains   Date Value Ref Range Status   06/25/2018 1.45 0.57 - 2.63 mg/dL Final   11/28/2017 1.18 0.57 - 2.63 mg/dL Final   08/14/2017 1.14 0.57 - 2.63 mg/dL Final     Kappa/Lambda FLC Ratio   Date Value Ref Range Status   06/25/2018 14.65 (H) 0.26 - 1.65 Final   11/28/2017 9.60 (H) 0.26 - 1.65 Final   08/14/2017 9.08 (H) 0.26 - 1.65 Final     Gamma grams/dl   Date Value Ref Range Status   06/25/2018 2.07 (H) 0.67 - 1.58 g/dL Final   11/28/2017 1.86 (H) 0.67 - 1.58 g/dL Final   08/14/2017 2.00 (H) 0.67 - 1.58 g/dL Final     IgG - Serum   Date Value Ref Range Status   06/25/2018 2258 (H) 650 - 1600 mg/dL Final     Comment:     IgG Cord Blood Reference Range: 650-1600 mg/dL.     IgA   Date Value Ref Range Status   06/25/2018 101 40 - 350 mg/dL Final     Comment:     IgA Cord Blood Reference Range: <5 mg/dL.     IgM   Date Value Ref Range Status   06/25/2018 30 (L) 50 - 300 mg/dL Final     Comment:     IgM Cord Blood Reference Range: <25 mg/dL.       Bone Marrow Biopsy - 6/26/13  BONE MARROW ASPIRATE, BONE MARROW CLOT, AND BONE MARROW CORE BIOPSY WITH:  CELLULARITY= 70%, TRILINEAGE HEMATOPOIETIC ACTIVITY (M/E= 1.5:1).  PLASMA CELL DYSCRASIA, SEE COMMENT.  CD20  INCREASED STORAGE IRON.  ADEQUATE NUMBER OF MEGAKARYOCYTES.  COMMENT: Flow cytometry analysis of bone marrow aspirate shows lymph gate(27%) containing T and B cells.  No B cell clonality or T-cell aberrancy is evident. Blast gate is not increased. Plasma cell study shows a kappa light  chain restricted plasma cell population  without coexpression of CD38/CD56.  Immunohistochemical studies were performed on the core biopsy for further architecture evaluation with adequate  positive and negative controls. Scattered mixed T cells (CD3 positive) and B cells (CD20 positive, cyclin D1  negative) are evident. About 6-7% plasma cells ( positive) are noted. 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 Correlate clinically and with a cytogenetics report.  Diagnosed by: Sylvie Mckeon M.D.  (Electronically Signed: 2013-06-27 15:43:02)  Microscopic Examination  BONE MARROW ASPIRATE DIFFERENTIAL:  Blasts---------------------------------------------- 2.5 %  Promyelocytes--------------------------------- 0 %  Myelocytes-------------------------------------- 4.5 %  Metamyelocytes------------------------------ 6 %  Bands---------------------------------------------- 5 %  PMN Neutrophils------------------------------ 19 %  Eosinophils------------------------------------------ 2 %  Basophils--------------------------------------- 0 %  Monocytes------------------------------------ 3.5 %  Lymphocytes------------------------------------- 25.5 %  Plasma cells------------------------------------------ 4.5 %  Erythroid precursors-------------------------- 27.5 %  BONE MARROW ASPIRATE:  Myeloid and erythroid maturation shows M:E ratio at 1.5:1. No significant dysplasia is evident. Stainable iron is  present without increased ringed sideroblasts. Megakaryocytes are seen.  BONE MARROW CLOT:  Cellularity is 70 % with trilineage hematopoiesis. No abnormal infiltrates are seen. Megakaryocytes are adequate in  number. Stainable iron is present.  BONE MARROW CORE BIOPSY:  Cellularity is 70 %. No abnormal infiltrates are seen. Stainable iron is increased. Megakaryocytes are adequate in  number      Bone marrow biopsy - 10/2/14  Bone marrow karyotype results: 46, XX[20], female karyotype.  Michael (MORE), FISH:  Comments: Abnormality Result #Abn/Total Cells  --------------------------------------------------------- 14q32(IGH sep)  Abnormal 7/11 +14(3'IGH/5'IGH)x3 Normal 0/11 t(14;16) IGH/MAF fusion Abnormal  4/7 NOMENCLATURE: nuc eliza(IGHx3),(MAFx3),(IGH con MAFx2) [58 total plasma cells identified]  Interp, Plasma Cell Prolif (PCPD), FISH:  Comments: The result is abnormal and indicates a plasma cell clone with IGH/MAF fusion. Insufficient plasma cells  were observed with all other probe sets. Since only a limited number of plasma cells were identified, the  abnormalities associated with this plasma cell clone were not sufficiently evaluated and a specific prognostic  significance cannot be defined. Clinical and pathologic correlation is recommended.    Met survey - dec 2017  Impression      No detrimental change with no osseous lesion.      Electronically signed by: Kera Gordillo MD  Date: 12/20/17  Time: 08:23          ASSESSMENT AND PLAN:   Encounter Diagnosis   Name Primary?    Smoldering myeloma Yes      IgG kappa smoldering myeloma  Mild intermittent leukopenia/anemia are chronic and unchanged and pre date myeloma diagnosis   Patient continues to smolder with no new CRAB criteria; she has stable m -protein but light chains and IgG levels increased from interval visit; as such will repeat labs in 3 months as opposed to 6 months  Last met survey dec 2017 with no new lesions; next due dec 2018   continue monitoring with fu in 6 months  Educated on symptoms for which to seek attn with us earlier     Follow Up: cbc, cmp, serum free light chains, quantitative immunoglobulins, serum electropheresis, serum immunofixation lab only in 3 months  -same labs,  skeletal survey, long bone survey and MD appt in 6 months   -instructed to call earlier is symptoms develop      Rasta Alba MD  Hematology/Oncology/Bone Marrow Transplant

## 2018-06-27 DIAGNOSIS — G43.719 INTRACTABLE CHRONIC COMMON MIGRAINE WITHOUT AURA: ICD-10-CM

## 2018-06-27 NOTE — TELEPHONE ENCOUNTER
----- Message from Lary Peña sent at 6/27/2018  1:35 PM CDT -----  Contact: KEESHA DUARTE [[id number]]  Can the clinic reply in [facility]: No      Please refill the medication(s) listed below. The patient can be reached at this phone number  once it is called into the pharmacy.      Medication #1 isometheptene-apap-dichloralphenazone [id number] (MIDRIN) -325 mg per capsule 30 capsule             Preferred Pharmacy:  On [facility]

## 2018-08-07 RX ORDER — METHIMAZOLE 5 MG/1
TABLET ORAL
Qty: 90 TABLET | Refills: 0 | Status: CANCELLED | OUTPATIENT
Start: 2018-08-07

## 2018-08-14 DIAGNOSIS — E05.90 HYPERTHYROIDISM: Primary | ICD-10-CM

## 2018-08-14 DIAGNOSIS — E05.90 HYPERTHYROIDISM: ICD-10-CM

## 2018-08-14 RX ORDER — METHIMAZOLE 5 MG/1
5 TABLET ORAL 3 TIMES DAILY
Qty: 90 TABLET | Refills: 0 | Status: SHIPPED | OUTPATIENT
Start: 2018-08-14 | End: 2019-03-07

## 2018-08-15 RX ORDER — METHIMAZOLE 5 MG/1
TABLET ORAL
Qty: 270 TABLET | Refills: 0 | OUTPATIENT
Start: 2018-08-15

## 2018-08-28 ENCOUNTER — HOSPITAL ENCOUNTER (OUTPATIENT)
Dept: RADIOLOGY | Facility: HOSPITAL | Age: 68
Discharge: HOME | End: 2018-08-28
Attending: INTERNAL MEDICINE
Payer: MEDICARE

## 2018-08-28 ENCOUNTER — OFFICE VISIT (OUTPATIENT)
Dept: INTERNAL MEDICINE | Facility: CLINIC | Age: 68
End: 2018-08-28
Payer: MEDICARE

## 2018-08-28 VITALS
HEIGHT: 69 IN | OXYGEN SATURATION: 97 % | BODY MASS INDEX: 40.79 KG/M2 | HEART RATE: 72 BPM | DIASTOLIC BLOOD PRESSURE: 78 MMHG | SYSTOLIC BLOOD PRESSURE: 122 MMHG | WEIGHT: 275.38 LBS

## 2018-08-28 DIAGNOSIS — E03.9 HYPOTHYROIDISM, UNSPECIFIED TYPE: ICD-10-CM

## 2018-08-28 DIAGNOSIS — R10.84 GENERALIZED ABDOMINAL PAIN: ICD-10-CM

## 2018-08-28 DIAGNOSIS — R10.84 ABDOMINAL PAIN, GENERALIZED: Primary | ICD-10-CM

## 2018-08-28 LAB
BILIRUB UR QL STRIP: NEGATIVE
CLARITY UR REFRACT.AUTO: CLEAR
COLOR UR AUTO: YELLOW
GLUCOSE UR QL STRIP: NEGATIVE
HGB UR QL STRIP: ABNORMAL
KETONES UR QL STRIP: NEGATIVE
LEUKOCYTE ESTERASE UR QL STRIP: ABNORMAL
MICROSCOPIC COMMENT: ABNORMAL
NITRITE UR QL STRIP: NEGATIVE
PH UR STRIP: 5 [PH] (ref 5–8)
PROT UR QL STRIP: NEGATIVE
RBC #/AREA URNS AUTO: 7 /HPF (ref 0–4)
SP GR UR STRIP: 1.02 (ref 1–1.03)
SQUAMOUS #/AREA URNS AUTO: 2 /HPF
URN SPEC COLLECT METH UR: ABNORMAL
UROBILINOGEN UR STRIP-ACNC: 2 EU/DL
WBC #/AREA URNS AUTO: 6 /HPF (ref 0–5)

## 2018-08-28 PROCEDURE — 81001 URINALYSIS AUTO W/SCOPE: CPT

## 2018-08-28 PROCEDURE — 74177 CT ABD & PELVIS W/CONTRAST: CPT | Mod: TC

## 2018-08-28 PROCEDURE — 99999 PR PBB SHADOW E&M-EST. PATIENT-LVL IV: CPT | Mod: PBBFAC,,, | Performed by: INTERNAL MEDICINE

## 2018-08-28 PROCEDURE — 3074F SYST BP LT 130 MM HG: CPT | Mod: CPTII,S$GLB,, | Performed by: INTERNAL MEDICINE

## 2018-08-28 PROCEDURE — 74177 CT ABD & PELVIS W/CONTRAST: CPT | Mod: 26,,, | Performed by: RADIOLOGY

## 2018-08-28 PROCEDURE — 87086 URINE CULTURE/COLONY COUNT: CPT

## 2018-08-28 PROCEDURE — 99214 OFFICE O/P EST MOD 30 MIN: CPT | Mod: S$GLB,,, | Performed by: INTERNAL MEDICINE

## 2018-08-28 PROCEDURE — 3078F DIAST BP <80 MM HG: CPT | Mod: CPTII,S$GLB,, | Performed by: INTERNAL MEDICINE

## 2018-08-28 PROCEDURE — 25500020 PHARM REV CODE 255: Performed by: INTERNAL MEDICINE

## 2018-08-28 RX ADMIN — IOHEXOL 15 ML: 350 INJECTION, SOLUTION INTRAVENOUS at 06:08

## 2018-08-28 RX ADMIN — IOHEXOL 100 ML: 350 INJECTION, SOLUTION INTRAVENOUS at 06:08

## 2018-08-28 NOTE — PROGRESS NOTES
Subjective:      Patient ID: Manju Aranda is a 67 y.o. female.    Chief Complaint: Abdominal Pain    HPI:  Abdominal Pain   This is a new problem. The current episode started in the past 7 days. The onset quality is gradual. The problem occurs constantly. The problem has been gradually worsening. Pain location: lower stomach below the umbilicus. The quality of the pain is aching and cramping. The abdominal pain does not radiate. Associated symptoms include flatus. Pertinent negatives include no belching, constipation, diarrhea, dysuria, fever, frequency, headaches, nausea or vomiting. Associated symptoms comments: Loss of appetite, flatus. Exacerbated by: any food aggravates it. The pain is relieved by nothing. She has tried nothing for the symptoms. The treatment provided no relief. There is no history of abdominal surgery or gallstones.   Patient has had a history of diverticulitis .  Last colonoscopy 10/2015 she is due this year for the colonoscopy  Patient Active Problem List   Diagnosis    Hypertension    Liver cyst    Paraproteinemia    Smoldering multiple myeloma (SMM)    Migraine headache    Mitral valve disorder    Tricuspid valve disease    ADPKD (autosomal dominant polycystic kidney disease)    Benign hypertensive renal disease without renal failure    Tachycardia    Hyperthyroidism    Aortic atherosclerosis    Microhematuria    Graves disease    Tortuous aorta    Morbid obesity     Past Medical History:   Diagnosis Date    Allergy     Anemia     Arthritis     Cholelithiases     Cyst of kidney, acquired     Diverticulitis     Elevated TSH     Family history of Graves' disease: daughter, maternal aunt, maternal uncle 9/13/2016    Glaucoma     Graves disease     Hx of colonic polyps     Hypertension 1981    Liver cyst     MGUS (monoclonal gammopathy of unknown significance)     Migraine headache     Mitral valve problem     leakage    Obesity     Paraproteinemia      "Smoldering multiple myeloma 2013    Tricuspid valve disease     leakage     Past Surgical History:   Procedure Laterality Date    APPENDECTOMY      HYSTERECTOMY  1978 or 1979    OOPHORECTOMY       Family History   Problem Relation Age of Onset    Heart disease Mother         MI    Heart attack Mother     Heart disease Maternal Aunt         MI    Heart disease Maternal Aunt         MI    Cancer Paternal Grandmother         GYN cancer - unknown cancer    Colon cancer Maternal Uncle     Cancer Maternal Uncle     Hypertension Father     Heart disease Sister         fast heart rate    Cataracts Sister     Glaucoma Sister     Graves' disease Daughter     No Known Problems Son     No Known Problems Sister     No Known Problems Daughter     No Known Problems Son     Melanoma Neg Hx     Breast cancer Neg Hx     Ovarian cancer Neg Hx     Colon polyps Neg Hx     Rectal cancer Neg Hx     Stomach cancer Neg Hx     Esophageal cancer Neg Hx     Ulcerative colitis Neg Hx     Crohn's disease Neg Hx     Amblyopia Neg Hx     Blindness Neg Hx     Macular degeneration Neg Hx     Strabismus Neg Hx     Retinal detachment Neg Hx      Review of Systems   Constitutional: Negative for fever.   Gastrointestinal: Positive for abdominal pain and flatus. Negative for constipation, diarrhea, nausea and vomiting.   Genitourinary: Negative for dysuria and frequency.   Neurological: Negative for headaches.     Objective:     Vitals:    08/28/18 1114   BP: 122/78   Pulse: 72   SpO2: 97%   Weight: 124.9 kg (275 lb 5.7 oz)   Height: 5' 9" (1.753 m)   PainSc:   7   PainLoc: Abdomen     Body mass index is 40.66 kg/m².  Physical Exam   Constitutional: She is oriented to person, place, and time. She appears well-developed and well-nourished. No distress.   Neck: Carotid bruit is not present. No thyromegaly present.   Cardiovascular: Normal rate, regular rhythm and normal heart sounds. PMI is not displaced. "   Pulmonary/Chest: Effort normal and breath sounds normal. No respiratory distress.   Abdominal: She exhibits no distension. There is no tenderness.   Generalized abdominal pain with lower abdominal pain that is nonfocal   Musculoskeletal: She exhibits no edema.   Neurological: She is alert and oriented to person, place, and time.     Assessment:     1. Abdominal pain, generalized    2. Hypothyroidism, unspecified type    3. Generalized abdominal pain      Plan:     Problem List Items Addressed This Visit     None      Visit Diagnoses     Abdominal pain, generalized    -  Primary    Relevant Orders    CBC auto differential (Completed)    Comprehensive metabolic panel (Completed)    Sedimentation rate (Completed)    C-reactive protein (Completed)    Urinalysis (Completed)    Urine culture    Hypothyroidism, unspecified type        Relevant Orders    TSH (Completed)    Generalized abdominal pain        Relevant Orders    CT Abdomen Pelvis With Contrast    Creatinine, serum (Completed)        Follow-up in about 1 day (around 8/29/2018).     Medication List           Accurate as of 8/28/18  8:06 PM. If you have any questions, ask your nurse or doctor.               CONTINUE taking these medications    isometheptene-apap-dichloralphenazone [id number] -325 mg per capsule  Commonly known as:  MIDRIN  Take 1 capsule by mouth every hour as needed. 2 at onset then repeat 1 every 30-60min, max 5caps/attack     magnesium oxide 400 mg tablet  Commonly known as:  MAG-OX     methIMAzole 5 MG Tab  Commonly known as:  TAPAZOLE  Take 1 tablet (5 mg total) by mouth 3 (three) times daily.     multivit with min-folic acid 0.4 mg Tab     ondansetron 4 MG tablet  Commonly known as:  ZOFRAN  Take 1-2 tablets (4-8 mg total) by mouth every 8 (eight) hours as needed for Nausea.     quinapril 20 MG tablet  Commonly known as:  ACCUPRIL  TAKE 1 TABLET BY MOUTH EVERY DAY     rizatriptan 10 MG tablet  Commonly known as:  MAXALT  Take 1  tablet (10 mg total) by mouth every 2 (two) hours as needed for Migraine. Max 30mg/24hs     rosuvastatin 5 MG tablet  Commonly known as:  CRESTOR  Take 1 tablet (5 mg total) by mouth every evening.

## 2018-08-29 ENCOUNTER — OFFICE VISIT (OUTPATIENT)
Dept: INTERNAL MEDICINE | Facility: CLINIC | Age: 68
End: 2018-08-29
Payer: MEDICARE

## 2018-08-29 VITALS
OXYGEN SATURATION: 96 % | BODY MASS INDEX: 40.73 KG/M2 | DIASTOLIC BLOOD PRESSURE: 82 MMHG | HEART RATE: 69 BPM | SYSTOLIC BLOOD PRESSURE: 126 MMHG | HEIGHT: 69 IN | WEIGHT: 275 LBS

## 2018-08-29 DIAGNOSIS — R10.84 ABDOMINAL PAIN, GENERALIZED: Primary | ICD-10-CM

## 2018-08-29 LAB — BACTERIA UR CULT: NO GROWTH

## 2018-08-29 PROCEDURE — 3074F SYST BP LT 130 MM HG: CPT | Mod: CPTII,S$GLB,, | Performed by: INTERNAL MEDICINE

## 2018-08-29 PROCEDURE — 99214 OFFICE O/P EST MOD 30 MIN: CPT | Mod: S$GLB,,, | Performed by: INTERNAL MEDICINE

## 2018-08-29 PROCEDURE — 99999 PR PBB SHADOW E&M-EST. PATIENT-LVL III: CPT | Mod: PBBFAC,,, | Performed by: INTERNAL MEDICINE

## 2018-08-29 PROCEDURE — 3079F DIAST BP 80-89 MM HG: CPT | Mod: CPTII,S$GLB,, | Performed by: INTERNAL MEDICINE

## 2018-08-29 RX ORDER — AMOXICILLIN AND CLAVULANATE POTASSIUM 875; 125 MG/1; MG/1
1 TABLET, FILM COATED ORAL 2 TIMES DAILY
Qty: 14 TABLET | Refills: 0 | Status: SHIPPED | OUTPATIENT
Start: 2018-08-29 | End: 2018-10-01

## 2018-08-29 NOTE — PROGRESS NOTES
Subjective:      Patient ID: Manju Aranda is a 67 y.o. female.    Chief Complaint: Follow-up    HPI:  HPI  Patient was asked to return at 24 hr for discussion of both laboratory studies and CT scan as related to her complained yesterday of lower abdominal discomfort.  Her sed rate as well as CRP were elevated but her white count was low, her hemoglobin was increased and she had no significant changes on her CMP.  A CT scan was done which did not show any sabina diverticulitis although no was made of diverticula.  She has a history of red cells in the urine and this was present in about the same range she has always had there was no evidence of overt infection.  In discussion today it seemed like based on her symptoms that she may have an early diverticulitis is she has had this problem in the past and we discussed treating this.  Patient Active Problem List   Diagnosis    Hypertension    Liver cyst    Paraproteinemia    Smoldering multiple myeloma (SMM)    Migraine headache    Mitral valve disorder    Tricuspid valve disease    ADPKD (autosomal dominant polycystic kidney disease)    Benign hypertensive renal disease without renal failure    Tachycardia    Hyperthyroidism    Aortic atherosclerosis    Microhematuria    Graves disease    Tortuous aorta    Morbid obesity    Abdominal pain, generalized     Past Medical History:   Diagnosis Date    Allergy     Anemia     Arthritis     Cholelithiases     Cyst of kidney, acquired     Diverticulitis     Elevated TSH     Family history of Graves' disease: daughter, maternal aunt, maternal uncle 9/13/2016    Glaucoma     Graves disease     Hx of colonic polyps     Hypertension 1981    Liver cyst     MGUS (monoclonal gammopathy of unknown significance)     Migraine headache     Mitral valve problem     leakage    Obesity     Paraproteinemia     Smoldering multiple myeloma 2013    Tricuspid valve disease     leakage     Past Surgical  "History:   Procedure Laterality Date    APPENDECTOMY      HYSTERECTOMY  1978 or 1979    OOPHORECTOMY       Family History   Problem Relation Age of Onset    Heart disease Mother         MI    Heart attack Mother     Heart disease Maternal Aunt         MI    Heart disease Maternal Aunt         MI    Cancer Paternal Grandmother         GYN cancer - unknown cancer    Colon cancer Maternal Uncle     Cancer Maternal Uncle     Hypertension Father     Heart disease Sister         fast heart rate    Cataracts Sister     Glaucoma Sister     Graves' disease Daughter     No Known Problems Son     No Known Problems Sister     No Known Problems Daughter     No Known Problems Son     Melanoma Neg Hx     Breast cancer Neg Hx     Ovarian cancer Neg Hx     Colon polyps Neg Hx     Rectal cancer Neg Hx     Stomach cancer Neg Hx     Esophageal cancer Neg Hx     Ulcerative colitis Neg Hx     Crohn's disease Neg Hx     Amblyopia Neg Hx     Blindness Neg Hx     Macular degeneration Neg Hx     Strabismus Neg Hx     Retinal detachment Neg Hx      Review of Systems  Objective:     Vitals:    08/29/18 0807   BP: 126/82   Pulse: 69   SpO2: 96%   Weight: 124.7 kg (275 lb)   Height: 5' 9" (1.753 m)   PainSc:   7   PainLoc: Generalized     Body mass index is 40.61 kg/m².  Physical Exam   Constitutional: She is oriented to person, place, and time. She appears well-developed and well-nourished. No distress.   Neck: Carotid bruit is not present. No thyromegaly present.   Cardiovascular: Normal rate, regular rhythm and normal heart sounds. PMI is not displaced.   Pulmonary/Chest: Effort normal and breath sounds normal. No respiratory distress.   Abdominal: She exhibits no distension. There is no tenderness.   Lower abdominal pain, nonspecific   Musculoskeletal: She exhibits no edema.   Neurological: She is alert and oriented to person, place, and time.     Assessment:     1. Abdominal pain, generalized      Plan: "     Problem List Items Addressed This Visit     Abdominal pain, generalized - Primary    Current Assessment & Plan     Lower abdominal pain treated as diverticulitis with Augmentin 875 twice a day for 7 days.  Patient was given the option of following up with me in 24 hr versus 1 week and she feels that 1 week would be acceptable.         Relevant Medications    amoxicillin-clavulanate 875-125mg (AUGMENTIN) 875-125 mg per tablet        Follow-up in about 1 week (around 9/5/2018) for Follow up abdominal pain.     Medication List           Accurate as of 8/29/18 10:02 AM. If you have any questions, ask your nurse or doctor.               START taking these medications    amoxicillin-clavulanate 875-125mg 875-125 mg per tablet  Commonly known as:  AUGMENTIN  Take 1 tablet by mouth 2 (two) times daily.  Started by:  Payton Garay MD        CONTINUE taking these medications    isometheptene-apap-dichloralphenazone 248-82-327pi -325 mg per capsule  Commonly known as:  MIDRIN  Take 1 capsule by mouth every hour as needed. 2 at onset then repeat 1 every 30-60min, max 5caps/attack     magnesium oxide 400 mg tablet  Commonly known as:  MAG-OX     methIMAzole 5 MG Tab  Commonly known as:  TAPAZOLE  Take 1 tablet (5 mg total) by mouth 3 (three) times daily.     multivit with min-folic acid 0.4 mg Tab     ondansetron 4 MG tablet  Commonly known as:  ZOFRAN  Take 1-2 tablets (4-8 mg total) by mouth every 8 (eight) hours as needed for Nausea.     quinapril 20 MG tablet  Commonly known as:  ACCUPRIL  TAKE 1 TABLET BY MOUTH EVERY DAY     rizatriptan 10 MG tablet  Commonly known as:  MAXALT  Take 1 tablet (10 mg total) by mouth every 2 (two) hours as needed for Migraine. Max 30mg/24hs     rosuvastatin 5 MG tablet  Commonly known as:  CRESTOR  Take 1 tablet (5 mg total) by mouth every evening.           Where to Get Your Medications      These medications were sent to [facility] [address] [facility] [address]  [address], [facility] [address]    Phone:  [phone]   · amoxicillin-clavulanate 875-125mg 875-125 mg per tablet

## 2018-08-29 NOTE — ASSESSMENT & PLAN NOTE
Lower abdominal pain treated as diverticulitis with Augmentin 875 twice a day for 7 days.  Patient was given the option of following up with me in 24 hr versus 1 week and she feels that 1 week would be acceptable.

## 2018-09-05 ENCOUNTER — OFFICE VISIT (OUTPATIENT)
Dept: INTERNAL MEDICINE | Facility: CLINIC | Age: 68
End: 2018-09-05
Payer: MEDICARE

## 2018-09-05 VITALS
SYSTOLIC BLOOD PRESSURE: 122 MMHG | OXYGEN SATURATION: 97 % | HEART RATE: 67 BPM | BODY MASS INDEX: 41.37 KG/M2 | DIASTOLIC BLOOD PRESSURE: 78 MMHG | HEIGHT: 69 IN | WEIGHT: 279.31 LBS

## 2018-09-05 DIAGNOSIS — Z12.31 ENCOUNTER FOR SCREENING MAMMOGRAM FOR BREAST CANCER: ICD-10-CM

## 2018-09-05 DIAGNOSIS — Z12.11 COLON CANCER SCREENING: ICD-10-CM

## 2018-09-05 DIAGNOSIS — K57.92 DIVERTICULITIS: Primary | ICD-10-CM

## 2018-09-05 PROCEDURE — 99214 OFFICE O/P EST MOD 30 MIN: CPT | Mod: PBBFAC | Performed by: INTERNAL MEDICINE

## 2018-09-05 PROCEDURE — 99999 PR PBB SHADOW E&M-EST. PATIENT-LVL IV: CPT | Mod: PBBFAC,,, | Performed by: INTERNAL MEDICINE

## 2018-09-05 PROCEDURE — 3074F SYST BP LT 130 MM HG: CPT | Mod: CPTII,,, | Performed by: INTERNAL MEDICINE

## 2018-09-05 PROCEDURE — 1101F PT FALLS ASSESS-DOCD LE1/YR: CPT | Mod: CPTII,,, | Performed by: INTERNAL MEDICINE

## 2018-09-05 PROCEDURE — 99213 OFFICE O/P EST LOW 20 MIN: CPT | Mod: S$PBB,,, | Performed by: INTERNAL MEDICINE

## 2018-09-05 PROCEDURE — 3078F DIAST BP <80 MM HG: CPT | Mod: CPTII,,, | Performed by: INTERNAL MEDICINE

## 2018-09-05 NOTE — PROGRESS NOTES
Subjective:      Patient ID: Manju Aranda is a 67 y.o. female.    Chief Complaint: Follow-up    HPI:  HPI  Diverticulitis: symptoms resolved after the 2nd Augmentin , slight diarrhea  Patient Active Problem List   Diagnosis    Hypertension    Liver cyst    Paraproteinemia    Smoldering multiple myeloma (SMM)    Migraine headache    Mitral valve disorder    Tricuspid valve disease    ADPKD (autosomal dominant polycystic kidney disease)    Benign hypertensive renal disease without renal failure    Tachycardia    Hyperthyroidism    Aortic atherosclerosis    Microhematuria    Graves disease    Tortuous aorta    Morbid obesity    Abdominal pain, generalized     Past Medical History:   Diagnosis Date    Allergy     Anemia     Arthritis     Cholelithiases     Cyst of kidney, acquired     Diverticulitis     Elevated TSH     Family history of Graves' disease: daughter, maternal aunt, maternal uncle 9/13/2016    Glaucoma     Graves disease     Hx of colonic polyps     Hypertension 1981    Liver cyst     MGUS (monoclonal gammopathy of unknown significance)     Migraine headache     Mitral valve problem     leakage    Obesity     Paraproteinemia     Smoldering multiple myeloma 2013    Tricuspid valve disease     leakage     Past Surgical History:   Procedure Laterality Date    APPENDECTOMY      HYSTERECTOMY  1978 or 1979    OOPHORECTOMY       Family History   Problem Relation Age of Onset    Heart disease Mother         MI    Heart attack Mother     Heart disease Maternal Aunt         MI    Heart disease Maternal Aunt         MI    Cancer Paternal Grandmother         GYN cancer - unknown cancer    Colon cancer Maternal Uncle     Cancer Maternal Uncle     Hypertension Father     Heart disease Sister         fast heart rate    Cataracts Sister     Glaucoma Sister     Graves' disease Daughter     No Known Problems Son     No Known Problems Sister     No Known Problems  "Daughter     No Known Problems Son     Melanoma Neg Hx     Breast cancer Neg Hx     Ovarian cancer Neg Hx     Colon polyps Neg Hx     Rectal cancer Neg Hx     Stomach cancer Neg Hx     Esophageal cancer Neg Hx     Ulcerative colitis Neg Hx     Crohn's disease Neg Hx     Amblyopia Neg Hx     Blindness Neg Hx     Macular degeneration Neg Hx     Strabismus Neg Hx     Retinal detachment Neg Hx      Review of Systems   Constitutional: Negative for activity change, chills, fever and unexpected weight change.   Respiratory: Negative for cough, chest tightness, shortness of breath and wheezing.    Cardiovascular: Negative for chest pain, palpitations and leg swelling.      Symptoms have resolved  Objective:     Vitals:    09/05/18 1009   BP: 122/78   Pulse: 67   SpO2: 97%   Weight: 126.7 kg (279 lb 5.2 oz)   Height: 5' 9" (1.753 m)   PainSc: 0-No pain     Body mass index is 41.25 kg/m².  Physical Exam   Constitutional: She is oriented to person, place, and time. She appears well-developed and well-nourished. No distress.   Neck: Carotid bruit is not present. No thyromegaly present.   Cardiovascular: Normal rate, regular rhythm and normal heart sounds. PMI is not displaced.   Pulmonary/Chest: Effort normal and breath sounds normal. No respiratory distress.   Abdominal: Soft. Bowel sounds are normal. She exhibits no distension. There is no tenderness.   Musculoskeletal: She exhibits no edema.   Neurological: She is alert and oriented to person, place, and time.     Assessment:     1. Diverticulitis      Plan:     Problem List Items Addressed This Visit     None      Visit Diagnoses     Diverticulitis    -  Primary      Symptoms have resolved, complete antibiotics and increase fiber in diet as tolerated  Follow-up if symptoms worsen or fail to improve.     Medication List           Accurate as of 9/5/18 10:31 AM. If you have any questions, ask your nurse or doctor.               CONTINUE taking these medications "    amoxicillin-clavulanate 875-125mg 875-125 mg per tablet  Commonly known as:  AUGMENTIN  Take 1 tablet by mouth 2 (two) times daily.     isometheptene-apap-dichloralphenazone [id number] -325 mg per capsule  Commonly known as:  MIDRIN  Take 1 capsule by mouth every hour as needed. 2 at onset then repeat 1 every 30-60min, max 5caps/attack     magnesium oxide 400 mg tablet  Commonly known as:  MAG-OX     methIMAzole 5 MG Tab  Commonly known as:  TAPAZOLE  Take 1 tablet (5 mg total) by mouth 3 (three) times daily.     multivit with min-folic acid 0.4 mg Tab     ondansetron 4 MG tablet  Commonly known as:  ZOFRAN  Take 1-2 tablets (4-8 mg total) by mouth every 8 (eight) hours as needed for Nausea.     quinapril 20 MG tablet  Commonly known as:  ACCUPRIL  TAKE 1 TABLET BY MOUTH EVERY DAY     rizatriptan 10 MG tablet  Commonly known as:  MAXALT  Take 1 tablet (10 mg total) by mouth every 2 (two) hours as needed for Migraine. Max 30mg/24hs     rosuvastatin 5 MG tablet  Commonly known as:  CRESTOR  Take 1 tablet (5 mg total) by mouth every evening.

## 2018-09-24 DIAGNOSIS — Z12.11 SPECIAL SCREENING FOR MALIGNANT NEOPLASMS, COLON: Primary | ICD-10-CM

## 2018-09-24 RX ORDER — POLYETHYLENE GLYCOL 3350, SODIUM SULFATE ANHYDROUS, SODIUM BICARBONATE, SODIUM CHLORIDE, POTASSIUM CHLORIDE 236; 22.74; 6.74; 5.86; 2.97 G/4L; G/4L; G/4L; G/4L; G/4L
4 POWDER, FOR SOLUTION ORAL ONCE
Qty: 4000 ML | Refills: 0 | Status: SHIPPED | OUTPATIENT
Start: 2018-09-24 | End: 2018-10-02

## 2018-10-01 ENCOUNTER — LAB VISIT (OUTPATIENT)
Dept: LAB | Facility: HOSPITAL | Age: 68
End: 2018-10-01
Attending: INTERNAL MEDICINE
Payer: MEDICARE

## 2018-10-01 ENCOUNTER — OFFICE VISIT (OUTPATIENT)
Dept: HEMATOLOGY/ONCOLOGY | Facility: CLINIC | Age: 68
End: 2018-10-01
Payer: MEDICARE

## 2018-10-01 VITALS
TEMPERATURE: 98 F | HEART RATE: 68 BPM | OXYGEN SATURATION: 100 % | WEIGHT: 276.44 LBS | SYSTOLIC BLOOD PRESSURE: 133 MMHG | DIASTOLIC BLOOD PRESSURE: 63 MMHG | BODY MASS INDEX: 40.94 KG/M2 | HEIGHT: 69 IN

## 2018-10-01 DIAGNOSIS — D47.2 SMOLDERING MULTIPLE MYELOMA (SMM): Primary | ICD-10-CM

## 2018-10-01 DIAGNOSIS — D47.2 SMOLDERING MULTIPLE MYELOMA (SMM): ICD-10-CM

## 2018-10-01 LAB
ALBUMIN SERPL BCP-MCNC: 3.1 G/DL
ALP SERPL-CCNC: 106 U/L
ALT SERPL W/O P-5'-P-CCNC: 12 U/L
ANION GAP SERPL CALC-SCNC: 7 MMOL/L
AST SERPL-CCNC: 14 U/L
BASOPHILS # BLD AUTO: 0.03 K/UL
BASOPHILS NFR BLD: 0.7 %
BILIRUB SERPL-MCNC: 0.5 MG/DL
BUN SERPL-MCNC: 13 MG/DL
CALCIUM SERPL-MCNC: 9.4 MG/DL
CHLORIDE SERPL-SCNC: 107 MMOL/L
CO2 SERPL-SCNC: 28 MMOL/L
CREAT SERPL-MCNC: 0.9 MG/DL
DIFFERENTIAL METHOD: ABNORMAL
EOSINOPHIL # BLD AUTO: 0 K/UL
EOSINOPHIL NFR BLD: 0.5 %
ERYTHROCYTE [DISTWIDTH] IN BLOOD BY AUTOMATED COUNT: 11.7 %
EST. GFR  (AFRICAN AMERICAN): >60 ML/MIN/1.73 M^2
EST. GFR  (NON AFRICAN AMERICAN): >60 ML/MIN/1.73 M^2
GLUCOSE SERPL-MCNC: 87 MG/DL
HCT VFR BLD AUTO: 36 %
HGB BLD-MCNC: 10.7 G/DL
IGA SERPL-MCNC: 111 MG/DL
IGG SERPL-MCNC: 2272 MG/DL
IGM SERPL-MCNC: 25 MG/DL
IMM GRANULOCYTES # BLD AUTO: 0.01 K/UL
IMM GRANULOCYTES NFR BLD AUTO: 0.2 %
LYMPHOCYTES # BLD AUTO: 1.4 K/UL
LYMPHOCYTES NFR BLD: 32.6 %
MCH RBC QN AUTO: 29.4 PG
MCHC RBC AUTO-ENTMCNC: 29.7 G/DL
MCV RBC AUTO: 99 FL
MONOCYTES # BLD AUTO: 0.4 K/UL
MONOCYTES NFR BLD: 9.6 %
NEUTROPHILS # BLD AUTO: 2.4 K/UL
NEUTROPHILS NFR BLD: 56.4 %
NRBC BLD-RTO: 0 /100 WBC
PLATELET # BLD AUTO: 255 K/UL
PMV BLD AUTO: 9.9 FL
POTASSIUM SERPL-SCNC: 3.8 MMOL/L
PROT SERPL-MCNC: 7.9 G/DL
RBC # BLD AUTO: 3.64 M/UL
SODIUM SERPL-SCNC: 142 MMOL/L
WBC # BLD AUTO: 4.29 K/UL

## 2018-10-01 PROCEDURE — 86334 IMMUNOFIX E-PHORESIS SERUM: CPT | Mod: 26,,, | Performed by: PATHOLOGY

## 2018-10-01 PROCEDURE — 99499 UNLISTED E&M SERVICE: CPT | Mod: HCNC,S$GLB,, | Performed by: INTERNAL MEDICINE

## 2018-10-01 PROCEDURE — 3078F DIAST BP <80 MM HG: CPT | Mod: CPTII,,, | Performed by: INTERNAL MEDICINE

## 2018-10-01 PROCEDURE — 1101F PT FALLS ASSESS-DOCD LE1/YR: CPT | Mod: CPTII,,, | Performed by: INTERNAL MEDICINE

## 2018-10-01 PROCEDURE — 99999 PR PBB SHADOW E&M-EST. PATIENT-LVL III: CPT | Mod: PBBFAC,,, | Performed by: INTERNAL MEDICINE

## 2018-10-01 PROCEDURE — 99214 OFFICE O/P EST MOD 30 MIN: CPT | Mod: S$PBB,,, | Performed by: INTERNAL MEDICINE

## 2018-10-01 PROCEDURE — 99213 OFFICE O/P EST LOW 20 MIN: CPT | Mod: PBBFAC | Performed by: INTERNAL MEDICINE

## 2018-10-01 PROCEDURE — 84165 PROTEIN E-PHORESIS SERUM: CPT

## 2018-10-01 PROCEDURE — 83520 IMMUNOASSAY QUANT NOS NONAB: CPT | Mod: 59

## 2018-10-01 PROCEDURE — 3075F SYST BP GE 130 - 139MM HG: CPT | Mod: CPTII,,, | Performed by: INTERNAL MEDICINE

## 2018-10-01 PROCEDURE — 84165 PROTEIN E-PHORESIS SERUM: CPT | Mod: 26,,, | Performed by: PATHOLOGY

## 2018-10-01 PROCEDURE — 36415 COLL VENOUS BLD VENIPUNCTURE: CPT

## 2018-10-01 PROCEDURE — 85025 COMPLETE CBC W/AUTO DIFF WBC: CPT

## 2018-10-01 PROCEDURE — 80053 COMPREHEN METABOLIC PANEL: CPT

## 2018-10-01 PROCEDURE — 86334 IMMUNOFIX E-PHORESIS SERUM: CPT

## 2018-10-01 PROCEDURE — 82784 ASSAY IGA/IGD/IGG/IGM EACH: CPT | Mod: 59

## 2018-10-01 NOTE — Clinical Note
cbc, cmp, serum free light chains, quantitative immunoglobulins, serum electropheresis, serum immunofixation, skeletal survey, and MD appt in 3 months

## 2018-10-01 NOTE — PROGRESS NOTES
SECTION OF HEMATOLOGY AND BONE MARROW TRANSPLANT  Return Patient Visit   10/02/2018    CHIEF COMPLAINT:   Chief Complaint   Patient presents with    Smoldering myeloma       HISTORY OF PRESENT ILLNESS:   Ms. Aranda is here for  IgG kappa smoldering myeloma. Follow up.  No changes In clinical status since our last appt.   Feels well.  No focal pain. Denies fever, chills, nightsweats, bleeding, brusing, lymphadenopathy, signs/symptoms of splenomegaly.              PAST MEDICAL HISTORY:   Past Medical History:   Diagnosis Date    Allergy     Anemia     Arthritis     Cholelithiases     Cyst of kidney, acquired     Diverticulitis     Elevated TSH     Family history of Graves' disease: daughter, maternal aunt, maternal uncle 9/13/2016    Glaucoma     Graves disease     Hx of colonic polyps     Hypertension 1981    Liver cyst     MGUS (monoclonal gammopathy of unknown significance)     Migraine headache     Mitral valve problem     leakage    Obesity     Paraproteinemia     Smoldering multiple myeloma 2013    Tricuspid valve disease     leakage       PAST SURGICAL HISTORY:   Past Surgical History:   Procedure Laterality Date    APPENDECTOMY      COLONOSCOPY N/A 10/23/2015    Procedure: COLONOSCOPY;  Surgeon: Brandon Ruelas MD;  Location: [facility] ([address] [place] [address]);  Service: Endoscopy;  Laterality: N/A;  Had divertiulitis in 5/29/2015 with a recommendation for colonoscopy in 8 weeks    Dr. Ruelas is her GI physician    COLONOSCOPY N/A 10/23/2015    Performed by Brandon Ruelas MD at [facility] (4TH FLR)    HYSTERECTOMY  1978 or 1979    OOPHORECTOMY         PAST SOCIAL HISTORY:   reports that she has quit smoking. Her smoking use included cigarettes. She quit after 3.00 years of use. she has never used smokeless tobacco. She reports that she does not drink alcohol or use drugs.    FAMILY HISTORY:  Family History   Problem Relation Age of Onset    Heart disease Mother         MI    Heart attack  Mother     Heart disease Maternal Aunt         MI    Heart disease Maternal Aunt         MI    Cancer Paternal Grandmother         GYN cancer - unknown cancer    Colon cancer Maternal Uncle     Cancer Maternal Uncle     Hypertension Father     Heart disease Sister         fast heart rate    Cataracts Sister     Glaucoma Sister     Graves' disease Daughter     No Known Problems Son     No Known Problems Sister     No Known Problems Daughter     No Known Problems Son     Melanoma Neg Hx     Breast cancer Neg Hx     Ovarian cancer Neg Hx     Colon polyps Neg Hx     Rectal cancer Neg Hx     Stomach cancer Neg Hx     Esophageal cancer Neg Hx     Ulcerative colitis Neg Hx     Crohn's disease Neg Hx     Amblyopia Neg Hx     Blindness Neg Hx     Macular degeneration Neg Hx     Strabismus Neg Hx     Retinal detachment Neg Hx        CURRENT MEDICATIONS:   Current Outpatient Medications   Medication Sig    isometheptene-apap-dichloralphenazone [id number] (MIDRIN) -325 mg per capsule Take 1 capsule by mouth every hour as needed. 2 at onset then repeat 1 every 30-60min, max 5caps/attack    magnesium oxide (MAG-OX) 400 mg tablet Take 400 mg by mouth once daily.    methIMAzole (TAPAZOLE) 5 MG Tab Take 1 tablet (5 mg total) by mouth 3 (three) times daily.    multivit with min-folic acid 0.4 mg Tab Take 0.4 mg by mouth once daily.    ondansetron (ZOFRAN) 4 MG tablet Take 1-2 tablets (4-8 mg total) by mouth every 8 (eight) hours as needed for Nausea. (Patient taking differently: Take 4-8 mg by mouth every 8 (eight) hours as needed for Nausea. )    quinapril (ACCUPRIL) 20 MG tablet TAKE 1 TABLET BY MOUTH EVERY DAY    rizatriptan (MAXALT) 10 MG tablet Take 1 tablet (10 mg total) by mouth every 2 (two) hours as needed for Migraine. Max 30mg/24hs    polyethylene glycol (GOLYTELY,NULYTELY) 236-22.74-6.74 -5.86 gram suspension Take 4,000 mLs (4 L total) by mouth once. for 1 dose     No current  "facility-administered medications for this visit.      ALLERGIES:   Review of patient's allergies indicates:  No Known Allergies      ASSESSMENTS:   PAIN ASSESSMENT (Patient reports Pain):   Vitals:    10/01/18 1117   BP: 133/63   Pulse: 68   Temp: 98 °F (36.7 °C)   SpO2: 100%   Weight: 125.4 kg (276 lb 7.3 oz)   Height: 5' 9" (1.753 m)   PainSc: 0-No pain     REVIEW OF SYSTEMS:     General ROS: negative  Psychological ROS: negative  Ophthalmic ROS: negative  ENT ROS: negative  Allergy and Immunology ROS: negative  Hematological and Lymphatic ROS: negative  Endocrine ROS: negative  Respiratory ROS: no cough, shortness of breath, or wheezing  Gastrointestinal ROS: no abdominal pain, change in bowel habits, or black or bloody stools  Genito-Urinary ROS: no dysuria, trouble voiding, or hematuria  Musculoskeletal ROS: negative  Neurological ROS: no TIA or stroke symptoms  Dermatological ROS: negative  PHYSICAL EXAM:   Vitals:    10/01/18 1117   BP: 133/63   Pulse: 68   Temp: 98 °F (36.7 °C)       General - well developed, well nourished, no apparent distress  HEENT - oropharynx clear  Chest and Lung - clear to auscultation bilaterally   Cardiovascular - RRR with no MGR, normal S1 and S2  Abdomen-  soft, nontender, no palpable hepatomegaly or splenomegaly  Lymph - no palpable lymphadenopathy  Heme - no bruising, petechiae, pallor  Skin - no rashes or lesions  Psych - appropriate mood and affect      ECOG Performance Status: (foot note - ECOG PS provided by Eastern Cooperative Oncology Group) 0 - Asymptomatic    Karnofsky Performance Score:  100%- Normal, No Complaints, No Evidence of Disease  DATA:   Lab Results   Component Value Date    WBC 4.29 10/01/2018    HGB 10.7 (L) 10/01/2018    HCT 36.0 (L) 10/01/2018    MCV 99 (H) 10/01/2018     10/01/2018     Gran # (ANC)   Date Value Ref Range Status   10/01/2018 2.4 1.8 - 7.7 K/uL Final     Gran%   Date Value Ref Range Status   10/01/2018 56.4 38.0 - 73.0 % Final "     Lymph #   Date Value Ref Range Status   10/01/2018 1.4 1.0 - 4.8 K/uL Final     Lymph%   Date Value Ref Range Status   10/01/2018 32.6 18.0 - 48.0 % Final     Kappa Free Light Chains   Date Value Ref Range Status   10/01/2018 17.49 (H) 0.33 - 1.94 mg/dL Final   06/25/2018 21.24 (H) 0.33 - 1.94 mg/dL Final   11/28/2017 11.33 (H) 0.33 - 1.94 mg/dL Final     Lambda Free Light Chains   Date Value Ref Range Status   10/01/2018 1.54 0.57 - 2.63 mg/dL Final   06/25/2018 1.45 0.57 - 2.63 mg/dL Final   11/28/2017 1.18 0.57 - 2.63 mg/dL Final     Kappa/Lambda FLC Ratio   Date Value Ref Range Status   10/01/2018 11.36 (H) 0.26 - 1.65 Final   06/25/2018 14.65 (H) 0.26 - 1.65 Final   11/28/2017 9.60 (H) 0.26 - 1.65 Final     Gamma grams/dl   Date Value Ref Range Status   06/25/2018 2.07 (H) 0.67 - 1.58 g/dL Final   11/28/2017 1.86 (H) 0.67 - 1.58 g/dL Final   08/14/2017 2.00 (H) 0.67 - 1.58 g/dL Final     IgG - Serum   Date Value Ref Range Status   10/01/2018 2272 (H) 650 - 1600 mg/dL Final     Comment:     IgG Cord Blood Reference Range: 650-1600 mg/dL.     IgA   Date Value Ref Range Status   10/01/2018 111 40 - 350 mg/dL Final     Comment:     IgA Cord Blood Reference Range: <5 mg/dL.     IgM   Date Value Ref Range Status   10/01/2018 25 (L) 50 - 300 mg/dL Final     Comment:     IgM Cord Blood Reference Range: <25 mg/dL.       Bone Marrow Biopsy - 6/26/13  BONE MARROW ASPIRATE, BONE MARROW CLOT, AND BONE MARROW CORE BIOPSY WITH:  CELLULARITY= 70%, TRILINEAGE HEMATOPOIETIC ACTIVITY (M/E= 1.5:1).  PLASMA CELL DYSCRASIA, SEE COMMENT.  CD20  INCREASED STORAGE IRON.  ADEQUATE NUMBER OF MEGAKARYOCYTES.  COMMENT: Flow cytometry analysis of bone marrow aspirate shows lymph gate(27%) containing T and B cells.  No B cell clonality or T-cell aberrancy is evident. Blast gate is not increased. Plasma cell study shows a kappa light  chain restricted plasma cell population without coexpression of CD38/CD56.  Immunohistochemical studies  were performed on the core biopsy for further architecture evaluation with adequate  positive and negative controls. Scattered mixed T cells (CD3 positive) and B cells (CD20 positive, cyclin D1  negative) are evident. About 6-7% plasma cells ( positive) are noted. 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 Correlate clinically and with a cytogenetics report.  Diagnosed by: Sylvie Mckeon M.D.  (Electronically Signed: 2013-06-27 15:43:02)  Microscopic Examination  BONE MARROW ASPIRATE DIFFERENTIAL:  Blasts---------------------------------------------- 2.5 %  Promyelocytes--------------------------------- 0 %  Myelocytes-------------------------------------- 4.5 %  Metamyelocytes------------------------------ 6 %  Bands---------------------------------------------- 5 %  PMN Neutrophils------------------------------ 19 %  Eosinophils------------------------------------------ 2 %  Basophils--------------------------------------- 0 %  Monocytes------------------------------------ 3.5 %  Lymphocytes------------------------------------- 25.5 %  Plasma cells------------------------------------------ 4.5 %  Erythroid precursors-------------------------- 27.5 %  BONE MARROW ASPIRATE:  Myeloid and erythroid maturation shows M:E ratio at 1.5:1. No significant dysplasia is evident. Stainable iron is  present without increased ringed sideroblasts. Megakaryocytes are seen.  BONE MARROW CLOT:  Cellularity is 70 % with trilineage hematopoiesis. No abnormal infiltrates are seen. Megakaryocytes are adequate in  number. Stainable iron is present.  BONE MARROW CORE BIOPSY:  Cellularity is 70 %. No abnormal infiltrates are seen. Stainable iron is increased. Megakaryocytes are adequate in  number      Bone marrow biopsy - 10/2/14  Bone marrow karyotype results: 46, XX[20], female karyotype.  Michael (MORE), FISH:  Comments: Abnormality Result #Abn/Total Cells --------------------------------------------------------- 14q32([facility]  sep)  Abnormal 7/11 +14(3'IGH/5'IGH)x3 Normal 0/11 t(14;16) IGH/MAF fusion Abnormal  4/7 NOMENCLATURE: nuc eliza(IGHx3),(MAFx3),(IGH con MAFx2) [58 total plasma cells identified]  Interp, Plasma Cell Prolif (PCPD), FISH:  Comments: The result is abnormal and indicates a plasma cell clone with IGH/MAF fusion. Insufficient plasma cells  were observed with all other probe sets. Since only a limited number of plasma cells were identified, the  abnormalities associated with this plasma cell clone were not sufficiently evaluated and a specific prognostic  significance cannot be defined. Clinical and pathologic correlation is recommended.    Met survey - dec 2017  Impression      No detrimental change with no osseous lesion.      Electronically signed by: Kera Gordillo MD  Date: 12/20/17  Time: 08:23          ASSESSMENT AND PLAN:   Encounter Diagnosis   Name Primary?    Smoldering multiple myeloma (SMM) Yes      IgG kappa smoldering myeloma  Mild intermittent leukopenia/anemia are chronic and pre date myeloma diagnosis ; anemia slightly worsened today;   Suspect Patient continues to smolder with no new overt CRAB criteria; she has stable   light chains and IgG levels today; m-spike pending ; given there is slight worsening of anemia ,  will repeat labs in 3 months as opposed to 6 months  Next met survey due dec 2018  Educated on symptoms for which to seek attn with us earlier     Follow Up: cbc, cmp, serum free light chains, quantitative immunoglobulins, serum electropheresis, serum immunofixation, skeletal survey, and MD appt in 3 months       Rasta Alba MD  Hematology/Oncology/Bone Marrow Transplant

## 2018-10-02 LAB
ALBUMIN SERPL ELPH-MCNC: 3.47 G/DL
ALPHA1 GLOB SERPL ELPH-MCNC: 0.31 G/DL
ALPHA2 GLOB SERPL ELPH-MCNC: 0.73 G/DL
B-GLOBULIN SERPL ELPH-MCNC: 0.78 G/DL
GAMMA GLOB SERPL ELPH-MCNC: 2.11 G/DL
INTERPRETATION SERPL IFE-IMP: NORMAL
KAPPA LC SER QL IA: 17.49 MG/DL
KAPPA LC/LAMBDA SER IA: 11.36
LAMBDA LC SER QL IA: 1.54 MG/DL
PATHOLOGIST INTERPRETATION IFE: NORMAL
PATHOLOGIST INTERPRETATION SPE: NORMAL
PROT SERPL-MCNC: 7.4 G/DL

## 2018-10-03 ENCOUNTER — TELEPHONE (OUTPATIENT)
Dept: HEMATOLOGY/ONCOLOGY | Facility: CLINIC | Age: 68
End: 2018-10-03

## 2018-10-24 ENCOUNTER — OFFICE VISIT (OUTPATIENT)
Dept: OPTOMETRY | Facility: CLINIC | Age: 68
End: 2018-10-24
Payer: COMMERCIAL

## 2018-10-24 DIAGNOSIS — H52.4 HYPEROPIA WITH ASTIGMATISM AND PRESBYOPIA, BILATERAL: ICD-10-CM

## 2018-10-24 DIAGNOSIS — H52.203 HYPEROPIA WITH ASTIGMATISM AND PRESBYOPIA, BILATERAL: ICD-10-CM

## 2018-10-24 DIAGNOSIS — Z01.00 EYE EXAM, ROUTINE: Primary | ICD-10-CM

## 2018-10-24 DIAGNOSIS — H52.03 HYPEROPIA WITH ASTIGMATISM AND PRESBYOPIA, BILATERAL: ICD-10-CM

## 2018-10-24 PROCEDURE — 99999 PR PBB SHADOW E&M-EST. PATIENT-LVL III: CPT | Mod: PBBFAC,,, | Performed by: OPTOMETRIST

## 2018-10-24 PROCEDURE — 92015 DETERMINE REFRACTIVE STATE: CPT | Mod: S$GLB,,, | Performed by: OPTOMETRIST

## 2018-10-24 PROCEDURE — 92014 COMPRE OPH EXAM EST PT 1/>: CPT | Mod: S$GLB,,, | Performed by: OPTOMETRIST

## 2018-10-24 NOTE — PROGRESS NOTES
HPI      is here for annual [facility] vision exam.    (-)Flashes (-)Floaters  (+)Itch, (+)tear, (+)burn, (+)Dryness.   (-)Photophobia  (-)Glare (-)diplopia (-) headaches          Last edited by Nitin Kumar, OD on 10/24/2018 10:24 AM. (History)            Assessment /Plan     For exam results, see Encounter Report.    Eye exam, routine  -[facility]   -Educated patient on presence of cataracts at today's exam, monitor at annual dilated fundus exam. 5+ years surgical estimate.    Hyperopia with astigmatism and presbyopia, bilateral  Eyeglass Final Rx     Eyeglass Final Rx       Sphere Cylinder Axis Dist VA Add    Right +2.75 +1.00 175 20/25++ +2.50    Left +2.75 +1.25 005 20/20 +2.50    Type:  PAL    Expiration Date:  10/25/2019                  RTC 1 yr

## 2018-10-29 ENCOUNTER — HOSPITAL ENCOUNTER (OUTPATIENT)
Dept: RADIOLOGY | Facility: HOSPITAL | Age: 68
Discharge: HOME | End: 2018-10-29
Attending: INTERNAL MEDICINE
Payer: MEDICARE

## 2018-10-29 DIAGNOSIS — Z12.31 ENCOUNTER FOR SCREENING MAMMOGRAM FOR BREAST CANCER: ICD-10-CM

## 2018-10-29 PROCEDURE — 77063 BREAST TOMOSYNTHESIS BI: CPT | Mod: 26,,, | Performed by: RADIOLOGY

## 2018-10-29 PROCEDURE — 77067 SCR MAMMO BI INCL CAD: CPT | Mod: 26,,, | Performed by: RADIOLOGY

## 2018-10-29 PROCEDURE — 77067 SCR MAMMO BI INCL CAD: CPT | Mod: TC

## 2018-10-29 PROCEDURE — 77063 BREAST TOMOSYNTHESIS BI: CPT | Mod: TC

## 2018-10-31 ENCOUNTER — TELEPHONE (OUTPATIENT)
Dept: INTERNAL MEDICINE | Facility: CLINIC | Age: 68
End: 2018-10-31

## 2018-11-01 ENCOUNTER — TELEPHONE (OUTPATIENT)
Dept: INTERNAL MEDICINE | Facility: CLINIC | Age: 68
End: 2018-11-01

## 2018-11-05 ENCOUNTER — ANESTHESIA (OUTPATIENT)
Dept: ENDOSCOPY | Facility: HOSPITAL | Age: 68
End: 2018-11-05
Payer: MEDICARE

## 2018-11-05 ENCOUNTER — ANESTHESIA EVENT (OUTPATIENT)
Dept: ENDOSCOPY | Facility: HOSPITAL | Age: 68
End: 2018-11-05
Payer: MEDICARE

## 2018-11-05 ENCOUNTER — HOSPITAL ENCOUNTER (OUTPATIENT)
Facility: HOSPITAL | Age: 68
Discharge: HOME | End: 2018-11-05
Attending: INTERNAL MEDICINE | Admitting: INTERNAL MEDICINE
Payer: MEDICARE

## 2018-11-05 VITALS
BODY MASS INDEX: 40.88 KG/M2 | TEMPERATURE: 98 F | HEART RATE: 63 BPM | HEIGHT: 69 IN | OXYGEN SATURATION: 98 % | WEIGHT: 276 LBS | DIASTOLIC BLOOD PRESSURE: 75 MMHG | RESPIRATION RATE: 18 BRPM | SYSTOLIC BLOOD PRESSURE: 174 MMHG

## 2018-11-05 DIAGNOSIS — Z86.010 HISTORY OF ADENOMATOUS POLYP OF COLON: ICD-10-CM

## 2018-11-05 PROBLEM — Z86.0101 HISTORY OF ADENOMATOUS POLYP OF COLON: Status: ACTIVE | Noted: 2018-11-05

## 2018-11-05 PROCEDURE — 45385 COLONOSCOPY W/LESION REMOVAL: CPT | Mod: HCNC,PT,, | Performed by: INTERNAL MEDICINE

## 2018-11-05 PROCEDURE — 25000003 PHARM REV CODE 250: Performed by: INTERNAL MEDICINE

## 2018-11-05 PROCEDURE — 25000003 PHARM REV CODE 250: Performed by: NURSE ANESTHETIST, CERTIFIED REGISTERED

## 2018-11-05 PROCEDURE — 27201089 HC SNARE, DISP (ANY): Mod: HCNC | Performed by: INTERNAL MEDICINE

## 2018-11-05 PROCEDURE — 37000008 HC ANESTHESIA 1ST 15 MINUTES: Mod: HCNC | Performed by: INTERNAL MEDICINE

## 2018-11-05 PROCEDURE — 88305 TISSUE EXAM BY PATHOLOGIST: CPT | Mod: 26,HCNC,, | Performed by: PATHOLOGY

## 2018-11-05 PROCEDURE — 88305 TISSUE EXAM BY PATHOLOGIST: CPT | Mod: HCNC | Performed by: PATHOLOGY

## 2018-11-05 PROCEDURE — 45385 COLONOSCOPY W/LESION REMOVAL: CPT | Mod: HCNC | Performed by: INTERNAL MEDICINE

## 2018-11-05 PROCEDURE — 37000009 HC ANESTHESIA EA ADD 15 MINS: Mod: HCNC | Performed by: INTERNAL MEDICINE

## 2018-11-05 PROCEDURE — 63600175 PHARM REV CODE 636 W HCPCS: Performed by: NURSE ANESTHETIST, CERTIFIED REGISTERED

## 2018-11-05 PROCEDURE — E9220 PRA ENDO ANESTHESIA: HCPCS | Mod: PT,,, | Performed by: NURSE ANESTHETIST, CERTIFIED REGISTERED

## 2018-11-05 RX ORDER — PROPOFOL 10 MG/ML
VIAL (ML) INTRAVENOUS CONTINUOUS PRN
Status: DISCONTINUED | OUTPATIENT
Start: 2018-11-05 | End: 2018-11-05

## 2018-11-05 RX ORDER — PROPOFOL 10 MG/ML
VIAL (ML) INTRAVENOUS
Status: DISCONTINUED | OUTPATIENT
Start: 2018-11-05 | End: 2018-11-05

## 2018-11-05 RX ORDER — LIDOCAINE HCL/PF 100 MG/5ML
SYRINGE (ML) INTRAVENOUS
Status: DISCONTINUED | OUTPATIENT
Start: 2018-11-05 | End: 2018-11-05

## 2018-11-05 RX ORDER — SODIUM CHLORIDE 0.9 % (FLUSH) 0.9 %
3 SYRINGE (ML) INJECTION
Status: DISCONTINUED | OUTPATIENT
Start: 2018-11-05 | End: 2018-11-05 | Stop reason: HOSPADM

## 2018-11-05 RX ORDER — GLYCOPYRROLATE 0.2 MG/ML
INJECTION INTRAMUSCULAR; INTRAVENOUS
Status: DISCONTINUED | OUTPATIENT
Start: 2018-11-05 | End: 2018-11-05

## 2018-11-05 RX ORDER — SODIUM CHLORIDE 9 MG/ML
INJECTION, SOLUTION INTRAVENOUS CONTINUOUS
Status: DISCONTINUED | OUTPATIENT
Start: 2018-11-05 | End: 2018-11-05 | Stop reason: HOSPADM

## 2018-11-05 RX ADMIN — LIDOCAINE HYDROCHLORIDE 40 MG: 20 INJECTION, SOLUTION INTRAVENOUS at 12:11

## 2018-11-05 RX ADMIN — PROPOFOL 200 MCG/KG/MIN: 10 INJECTION, EMULSION INTRAVENOUS at 12:11

## 2018-11-05 RX ADMIN — SODIUM CHLORIDE: 0.9 INJECTION, SOLUTION INTRAVENOUS at 10:11

## 2018-11-05 RX ADMIN — GLYCOPYRROLATE 0.2 MG: 0.2 INJECTION, SOLUTION INTRAMUSCULAR; INTRAVENOUS at 01:11

## 2018-11-05 RX ADMIN — PROPOFOL 100 MG: 10 INJECTION, EMULSION INTRAVENOUS at 12:11

## 2018-11-05 NOTE — [employer] PATIENT INSTRUCTIONS
Discharge Summary/Instructions after an Endoscopic Procedure  Patient Name: Manju Aranda  Patient MRN: [id number]  Patient YOB: 1950  Monday, November 05, 2018  Brandon Ruelas MD  RESTRICTIONS:  During your procedure today, you received medications for sedation.  These   medications may affect your judgment, balance and coordination.  Therefore,   for 24 hours, you have the following restrictions:   - DO NOT drive a car, operate machinery, make legal/financial decisions,   sign important papers or drink alcohol.    ACTIVITY:  Today: no heavy lifting, straining or running due to procedural   sedation/anesthesia.  The following day: return to full activity including work.  DIET:  Eat and drink normally unless instructed otherwise.     TREATMENT FOR COMMON SIDE EFFECTS:  - Mild abdominal pain, nausea, belching, bloating or excessive gas:  rest,   eat lightly and use a heating pad.  - Sore Throat: treat with throat lozenges and/or gargle with warm salt   water.  - Because air was used during the procedure, expelling large amounts of air   from your rectum or belching is normal.  - If a bowel prep was taken, you may not have a bowel movement for 1-3 days.    This is normal.  SYMPTOMS TO WATCH FOR AND REPORT TO YOUR PHYSICIAN:  1. Abdominal pain or bloating, other than gas cramps.  2. Chest pain.  3. Back pain.  4. Signs of infection such as: chills or fever occurring within 24 hours   after the procedure.  5. Rectal bleeding, which would show as bright red, maroon, or black stools.   (A tablespoon of blood from the rectum is not serious, especially if   hemorrhoids are present.)  6. Vomiting.  7. Weakness or dizziness.  GO DIRECTLY TO THE NEAREST EMERGENCY ROOM IF YOU HAVE ANY OF THE FOLLOWING:      Difficulty breathing              Chills and/or fever over 101 F   Persistent vomiting and/or vomiting blood   Severe abdominal pain   Severe chest pain   Black, tarry stools   Bleeding- more than one  tablespoon   Any other symptom or condition that you feel may need urgent attention  Your doctor recommends these additional instructions:  If any biopsies were taken, your doctors clinic will contact you in 1 to 2   weeks with any results.  - Discharge patient to home.   - Await pathology results.   - Telephone endoscopist for pathology results in 2 weeks.   - Repeat colonoscopy in 5 years for surveillance based on pathology results.     - The findings and recommendations were discussed with the patient.   - Return to referring physician.  For questions, problems or results please call your physician - Brandon Ruelas MD at Work:  [phone].  [facility], EMERGENCY ROOM PHONE NUMBER: [phone]  IF A COMPLICATION OR EMERGENCY SITUATION ARISES AND YOU ARE UNABLE TO REACH   YOUR PHYSICIAN - GO DIRECTLY TO THE EMERGENCY ROOM.  Brandon Ruelas MD  11/5/2018 1:23:28 PM  This report has been verified and signed electronically.  [employer]

## 2018-11-05 NOTE — ANESTHESIA PREPROCEDURE EVALUATION
11/05/2018  Manju Aranda is a 68 y.o., female.   Past Medical History:   Diagnosis Date    Allergy     Anemia     Arthritis     Cholelithiases     Cyst of kidney, acquired     Diverticulitis     Elevated TSH     Family history of Graves' disease: daughter, maternal aunt, maternal uncle 9/13/2016    Glaucoma     Graves disease     Hx of colonic polyps     Hypertension 1981    Liver cyst     MGUS (monoclonal gammopathy of unknown significance)     Migraine headache     Mitral valve problem     leakage    Obesity     Paraproteinemia     Smoldering multiple myeloma 2013    Tricuspid valve disease     leakage     Past Surgical History:   Procedure Laterality Date    APPENDECTOMY      COLONOSCOPY N/A 10/23/2015    Procedure: COLONOSCOPY;  Surgeon: Brandon Ruelas MD;  [facility]  Service: Endoscopy;  Laterality: N/A;  Had divertiulitis in 5/29/2015 with a recommendation for colonoscopy in 8 weeks    Dr. Ruelas is her GI physician    COLONOSCOPY N/A 10/23/2015    [facility]    HYSTERECTOMY  1978 or 1979    OOPHORECTOMY           Anesthesia Evaluation    I have reviewed the Patient Summary Reports.     I have reviewed the Medications.     Review of Systems  Anesthesia Hx:  Denies Hx of Anesthetic complications  Neg history of prior surgery. Denies Family Hx of Anesthesia complications.   Denies Personal Hx of Anesthesia complications.   Cardiovascular:   Exercise tolerance: good    Hepatic/GI:   Bowel Prep.        Physical Exam  General:  Well nourished, Obesity    Airway/Jaw/Neck:  Airway Findings: Mouth Opening: Normal Tongue: Normal  General Airway Assessment: Adult  Mallampati: I  TM Distance: Normal, at least 6 cm         Dental:  DENTAL FINDINGS: Normal   Chest/Lungs:  Chest/Lungs Clear    Heart/Vascular:  Heart  Findings: Normal       Mental Status:  Mental Status Findings:  Alert and Oriented         Anesthesia Plan  Type of Anesthesia, risks & benefits discussed:  Anesthesia Type:  general  Patient's Preference:   Intra-op Monitoring Plan: standard ASA monitors  Intra-op Monitoring Plan Comments:   Post Op Pain Control Plan:   Post Op Pain Control Plan Comments:   Induction:   IV  Beta Blocker:  Patient is not currently on a Beta-Blocker (No further documentation required).       Informed Consent: Patient understands risks and agrees with Anesthesia plan.  Questions answered. Anesthesia consent signed with patient.  ASA Score: 2     Day of Surgery Review of History & Physical:    H&P update referred to the provider.         Ready For Surgery From Anesthesia Perspective.

## 2018-11-05 NOTE — ANESTHESIA POSTPROCEDURE EVALUATION
"Anesthesia Post Evaluation    Patient: Manju Aranda    Procedure(s) Performed: Procedure(s) (LRB):  COLONOSCOPY (N/A)    Final Anesthesia Type: general  Patient location during evaluation: PACU  Patient participation: Yes- Able to Participate  Level of consciousness: awake and alert and oriented  Post-procedure vital signs: reviewed and stable  Pain management: adequate  Airway patency: patent  PONV status at discharge: No PONV  Anesthetic complications: no      Cardiovascular status: stable  Respiratory status: unassisted, spontaneous ventilation and room air  Hydration status: euvolemic  Follow-up not needed.        Visit Vitals  /83   Pulse 73   Temp 36.6 °C (97.9 °F)   Resp 20   Ht 5' 9" (1.753 m)   Wt 125.2 kg (276 lb)   SpO2 97%   Breastfeeding? No   BMI 40.76 kg/m²       Pain/Kayleigh Score: Pain Assessment Performed: Yes (11/5/2018  1:38 PM)  Presence of Pain: non-verbal indicators absent (11/5/2018  1:38 PM)  Kayleigh Score: 10 (11/5/2018  1:38 PM)        "

## 2018-11-05 NOTE — TRANSFER OF CARE
"Anesthesia Transfer of Care Note    Patient: Manju Aranda    Procedure(s) Performed: Procedure(s) (LRB):  COLONOSCOPY (N/A)    Patient location: GI    Anesthesia Type: general    Transport from OR: Transported from OR on room air with adequate spontaneous ventilation    Post pain: adequate analgesia    Post assessment: no apparent anesthetic complications and tolerated procedure well    Post vital signs: stable    Level of consciousness: sedated and responds to stimulation    Nausea/Vomiting: no nausea/vomiting    Complications: none    Transfer of care protocol was followed      Last vitals:   Visit Vitals  BP (!) 110/51   Pulse 73   Temp 36.6 °C (97.9 °F)   Resp 16   Ht 5' 9" (1.753 m)   Wt 125.2 kg (276 lb)   SpO2 97%   Breastfeeding? No   BMI 40.76 kg/m²     "

## 2018-11-05 NOTE — H&P
[facility]  History & Physical    Subjective:      Chief Complaint/Reason for Admission:     Colonoscopy    Manju Aranda is a 68 y.o. female.    Past Medical History:   Diagnosis Date    Allergy     Anemia     Arthritis     Cholelithiases     Cyst of kidney, acquired     Diverticulitis     Elevated TSH     Family history of Graves' disease: daughter, maternal aunt, maternal uncle 9/13/2016    Glaucoma     Graves disease     Hx of colonic polyps     Hypertension 1981    Liver cyst     MGUS (monoclonal gammopathy of unknown significance)     Migraine headache     Mitral valve problem     leakage    Obesity     Paraproteinemia     Smoldering multiple myeloma 2013    Tricuspid valve disease     leakage     Past Surgical History:   Procedure Laterality Date    APPENDECTOMY      COLONOSCOPY N/A 10/23/2015    Procedure: COLONOSCOPY;  Surgeon: Brandon Ruleas MD;  Location: [facility] (4TH FLR);  Service: Endoscopy;  Laterality: N/A;  Had divertiulitis in 5/29/2015 with a recommendation for colonoscopy in 8 weeks    Dr. Ruelas is her GI physician    COLONOSCOPY N/A 10/23/2015    Performed by Brandon [facility] MD at [facility] (4TH FLR)    HYSTERECTOMY  1978 or 1979    OOPHORECTOMY       Family History   Problem Relation Age of Onset    Heart disease Mother         MI    Heart attack Mother     Heart disease Maternal Aunt         MI    Heart disease Maternal Aunt         MI    Cancer Paternal Grandmother         GYN cancer - unknown cancer    Colon cancer Maternal Uncle     Cancer Maternal Uncle         colon ca    Hypertension Father     Heart disease Sister         fast heart rate    Cataracts Sister     Glaucoma Sister     Graves' disease Daughter     No Known Problems Son     No Known Problems Sister     No Known Problems Daughter     No Known Problems Son     Melanoma Neg Hx     Breast cancer Neg Hx     Ovarian cancer Neg Hx     Colon polyps Neg Hx      Rectal cancer Neg Hx     Stomach cancer Neg Hx     Esophageal cancer Neg Hx     Ulcerative colitis Neg Hx     Crohn's disease Neg Hx     Amblyopia Neg Hx     Blindness Neg Hx     Macular degeneration Neg Hx     Strabismus Neg Hx     Retinal detachment Neg Hx      Social History     Tobacco Use    Smoking status: Former Smoker     Years: 3.00     Types: Cigarettes    Smokeless tobacco: Never Used   Substance Use Topics    Alcohol use: No    Drug use: No       PTA Medications   Medication Sig    magnesium oxide (MAG-OX) 400 mg tablet Take 400 mg by mouth once daily.    methIMAzole (TAPAZOLE) 5 MG Tab Take 1 tablet (5 mg total) by mouth 3 (three) times daily.    multivit with min-folic acid 0.4 mg Tab Take 0.4 mg by mouth once daily.    quinapril (ACCUPRIL) 20 MG tablet TAKE 1 TABLET BY MOUTH EVERY DAY    rizatriptan (MAXALT) 10 MG tablet Take 1 tablet (10 mg total) by mouth every 2 (two) hours as needed for Migraine. Max 30mg/24hs    isometheptene-apap-dichloralphenazone [id number] (MIDRIN) -325 mg per capsule Take 1 capsule by mouth every hour as needed. 2 at onset then repeat 1 every 30-60min, max 5caps/attack    ondansetron (ZOFRAN) 4 MG tablet Take 1-2 tablets (4-8 mg total) by mouth every 8 (eight) hours as needed for Nausea. (Patient taking differently: Take 4-8 mg by mouth every 8 (eight) hours as needed for Nausea. )     Review of patient's allergies indicates:  No Known Allergies     Review of Systems   Constitutional: Negative for chills and fever.   Respiratory: Negative for shortness of breath.    Cardiovascular: Negative for chest pain.   Gastrointestinal: Negative for abdominal pain.       Objective:      Vital Signs (Most Recent)  Temp: 97.7 °F (36.5 °C) (11/05/18 1036)  Pulse: 66 (11/05/18 1036)  Resp: 16 (11/05/18 1036)  BP: (!) 167/86 (11/05/18 1036)  SpO2: 98 % (11/05/18 1036)    Vital Signs Range (Last 24H):  Temp:  [97.7 °F (36.5 °C)]   Pulse:  [66]   Resp:  [16]    BP: (167)/(86)   SpO2:  [98 %]     Physical Exam   Constitutional: She is oriented to person, place, and time. She appears well-developed and well-nourished.   Cardiovascular: Normal rate.   Pulmonary/Chest: Effort normal.   Neurological: She is alert and oriented to person, place, and time.   Skin: Skin is warm and dry.   Psychiatric: She has a normal mood and affect. Her behavior is normal. Judgment and thought content normal.           Assessment:      Active Hospital Problems    Diagnosis  POA    History of adenomatous polyp of colon [Z86.010]  Not Applicable      Resolved Hospital Problems   No resolved problems to display.       Plan:    Surveillance colonoscopy history of colon adenomas

## 2018-11-06 ENCOUNTER — OFFICE VISIT (OUTPATIENT)
Dept: SLEEP MEDICINE | Facility: CLINIC | Age: 68
End: 2018-11-06
Payer: MEDICARE

## 2018-11-06 VITALS
WEIGHT: 277.31 LBS | DIASTOLIC BLOOD PRESSURE: 70 MMHG | HEIGHT: 69 IN | BODY MASS INDEX: 41.07 KG/M2 | SYSTOLIC BLOOD PRESSURE: 120 MMHG | HEART RATE: 74 BPM

## 2018-11-06 DIAGNOSIS — G43.009 MIGRAINE WITHOUT AURA AND WITHOUT STATUS MIGRAINOSUS, NOT INTRACTABLE: ICD-10-CM

## 2018-11-06 PROCEDURE — 3078F DIAST BP <80 MM HG: CPT | Mod: CPTII,HCNC,S$GLB, | Performed by: NURSE PRACTITIONER

## 2018-11-06 PROCEDURE — 3074F SYST BP LT 130 MM HG: CPT | Mod: CPTII,HCNC,S$GLB, | Performed by: NURSE PRACTITIONER

## 2018-11-06 PROCEDURE — 99213 OFFICE O/P EST LOW 20 MIN: CPT | Mod: HCNC,S$GLB,, | Performed by: NURSE PRACTITIONER

## 2018-11-06 PROCEDURE — 1101F PT FALLS ASSESS-DOCD LE1/YR: CPT | Mod: CPTII,HCNC,S$GLB, | Performed by: NURSE PRACTITIONER

## 2018-11-06 PROCEDURE — 99999 PR PBB SHADOW E&M-EST. PATIENT-LVL III: CPT | Mod: PBBFAC,HCNC,, | Performed by: NURSE PRACTITIONER

## 2018-11-06 RX ORDER — RIZATRIPTAN BENZOATE 10 MG/1
10 TABLET ORAL
Qty: 12 TABLET | Refills: 5 | Status: SHIPPED | OUTPATIENT
Start: 2018-11-06 | End: 2019-12-16 | Stop reason: SDUPTHER

## 2018-11-06 NOTE — PROGRESS NOTES
"Manju Aranda returns today for mgt of migraines.     Since last seen denies interval medical change except colonoscopy yesterday. Reports headache infrequent except recently 5d due to weather change.  Cumen/herbs/pepper/basil/rosemary/highly seasoned food/seafood are triggers. Abortive meds remain effective/maxalt. Midrin too costly.  HA remain same location/nature  2# loss        HISTORY:  10/5/15:   She was last seen 3/31/15.  She continues on Magnesium 250mg 2 tabs bid for migraines prevention, but stopped Riboflavin 100mg bid and CoQ10 100mg bid due to worsening HA in past. No recurrent morning headache. No longer keeping HA diaries but reports since last seen HA remain infrequent, occuring <5/month. None on recent cruise! Spicy foods, stress, lack of sleep, and weather are known triggers. HA remain typically mild-moderate (only few severe HA in interim), lasting 2-4+ hrs. Longer duration when at work and unable to take Maxalt due to sleepiness. At work she will take Midrin 2 tabs at onset which remains very helpful within 30min, not having to repeat dose. She has a prodrome (tingling of the forehead) but no auras, before the gradual onset of non-radiating frontal (center or right) throbbing or pressure pains associated with photo/ phonophobia and nausea. She denies vomiting, focal neurological deficits or autonomic deficits. Other triggers remain gatigue, and stress.  Resting helps while movement can intensify pain. HIT-6 score - 54      10/17/16: Reports stable frequency of headaches, ~ 1-2 /month. Midrin remains effective. No severe episodes. No zofran use, but this does help nausea prn. Headache remains same nature/location. HIT=6 score= 53. Having knee pain. Interim dx hyperthyroidism. She has lost weight on new medication/dx. Continues to take Mag 500mg twice daily, no loose stools "helps my arthritis too".     10/25/17:  Since last seen denies interval medical change. Reports headache q 2 mos. " "Cumen/herbs/pepper/basil/rosemary/highly seasoned food/seafood are triggers. Abortive meds remain effective. HA remain same location/nature. Maxalt works well.   [place]       ROS: As above. In addition, otherwise a balance review of 10-systems is negative.     AST/ALT 10/1/18 normal  FH: HA     PHYSICAL EXAM:   General: W/D, morbid obese, not in distress.   /70   Pulse 74   Ht 5' 9" (1.753 m)   Wt 125.8 kg (277 lb 5.4 oz)   BMI 40.96 kg/m²         IMPRESSION:   Migraine without aura, stable  ADPKD    PLAN:   Preventative therapy: Continue Mag 250mg 2 tabs bid  Abortive therapy: Continue Maxalt 5-10 mg tab 1 tab PO q 2h, up to maximum of 30 mg/day. Do not delay treatment to avoid progression of a migraine.   Continue Zofran 4-8mg PO q8h prn for HA/nausea     Continue to regulate sleep, mealtimes, try to reduce stress and to avoid known headache triggers/aggravating factors    RTC 12 months, sooner if needed  "

## 2018-11-08 ENCOUNTER — TELEPHONE (OUTPATIENT)
Dept: GASTROENTEROLOGY | Facility: CLINIC | Age: 68
End: 2018-11-08

## 2018-11-08 NOTE — TELEPHONE ENCOUNTER
----- Message from Brandon Ruelas MD sent at 11/8/2018  4:03 PM CST -----  Rebekah - please tell Manju her colon polyps were benign and recommend her next surveillance colonoscopy in 5-years.    SPECIMEN  1) Ascending colon, 2 mm polyp.  2) Cecum, 2 mm polyp.  FINAL PATHOLOGIC DIAGNOSIS  1  Ascending colon, 2 mm polyp:  Juvenile (retention) polyp.  2  Cecum, 2 mm polyp:  Hyperplastic polyp.  Diagnosed by: Rell Merritt M.D.

## 2018-11-12 ENCOUNTER — TELEPHONE (OUTPATIENT)
Dept: ENDOSCOPY | Facility: HOSPITAL | Age: 68
End: 2018-11-12

## 2018-12-05 ENCOUNTER — TELEPHONE (OUTPATIENT)
Dept: INTERNAL MEDICINE | Facility: CLINIC | Age: 68
End: 2018-12-05

## 2018-12-05 ENCOUNTER — HOSPITAL ENCOUNTER (OUTPATIENT)
Dept: RADIOLOGY | Facility: HOSPITAL | Age: 68
Discharge: HOME | End: 2018-12-05
Attending: INTERNAL MEDICINE
Payer: MEDICARE

## 2018-12-05 ENCOUNTER — OFFICE VISIT (OUTPATIENT)
Dept: INTERNAL MEDICINE | Facility: CLINIC | Age: 68
End: 2018-12-05
Payer: MEDICARE

## 2018-12-05 VITALS
BODY MASS INDEX: 40.94 KG/M2 | SYSTOLIC BLOOD PRESSURE: 124 MMHG | HEART RATE: 75 BPM | OXYGEN SATURATION: 98 % | WEIGHT: 276.44 LBS | HEIGHT: 69 IN | DIASTOLIC BLOOD PRESSURE: 82 MMHG

## 2018-12-05 DIAGNOSIS — R10.9 FLANK PAIN: ICD-10-CM

## 2018-12-05 DIAGNOSIS — R10.9 RIGHT FLANK PAIN: Primary | ICD-10-CM

## 2018-12-05 LAB
BILIRUB UR QL STRIP: NEGATIVE
CLARITY UR REFRACT.AUTO: CLEAR
COLOR UR AUTO: YELLOW
GLUCOSE UR QL STRIP: NEGATIVE
HGB UR QL STRIP: ABNORMAL
KETONES UR QL STRIP: NEGATIVE
LEUKOCYTE ESTERASE UR QL STRIP: NEGATIVE
MICROSCOPIC COMMENT: ABNORMAL
NITRITE UR QL STRIP: NEGATIVE
PH UR STRIP: 6 [PH] (ref 5–8)
PROT UR QL STRIP: NEGATIVE
RBC #/AREA URNS AUTO: 10 /HPF (ref 0–4)
SP GR UR STRIP: 1.02 (ref 1–1.03)
SQUAMOUS #/AREA URNS AUTO: 1 /HPF
URN SPEC COLLECT METH UR: ABNORMAL
WBC #/AREA URNS AUTO: 0 /HPF (ref 0–5)

## 2018-12-05 PROCEDURE — 99999 PR PBB SHADOW E&M-EST. PATIENT-LVL III: CPT | Mod: PBBFAC,HCNC,, | Performed by: INTERNAL MEDICINE

## 2018-12-05 PROCEDURE — 81001 URINALYSIS AUTO W/SCOPE: CPT | Mod: HCNC

## 2018-12-05 PROCEDURE — 74176 CT ABD & PELVIS W/O CONTRAST: CPT | Mod: 26,HCNC,, | Performed by: INTERNAL MEDICINE

## 2018-12-05 PROCEDURE — 3079F DIAST BP 80-89 MM HG: CPT | Mod: CPTII,HCNC,S$GLB, | Performed by: INTERNAL MEDICINE

## 2018-12-05 PROCEDURE — 74176 CT ABD & PELVIS W/O CONTRAST: CPT | Mod: TC,HCNC

## 2018-12-05 PROCEDURE — 99214 OFFICE O/P EST MOD 30 MIN: CPT | Mod: HCNC,S$GLB,, | Performed by: INTERNAL MEDICINE

## 2018-12-05 PROCEDURE — 3074F SYST BP LT 130 MM HG: CPT | Mod: CPTII,HCNC,S$GLB, | Performed by: INTERNAL MEDICINE

## 2018-12-05 PROCEDURE — 1101F PT FALLS ASSESS-DOCD LE1/YR: CPT | Mod: CPTII,HCNC,S$GLB, | Performed by: INTERNAL MEDICINE

## 2018-12-05 RX ORDER — GABAPENTIN 300 MG/1
300 CAPSULE ORAL NIGHTLY
Qty: 14 CAPSULE | Refills: 0 | Status: SHIPPED | OUTPATIENT
Start: 2018-12-05 | End: 2018-12-12

## 2018-12-05 NOTE — PATIENT INSTRUCTIONS
Initially for pain:  1000 mg of tylenol three times a day    If kidney is ruled out then I will start gabapentin for skin pain maybe the result of shingles.

## 2018-12-05 NOTE — PROGRESS NOTES
Subjective:      Patient ID: Manju Aranda is a 68 y.o. female.    Chief Complaint: Flank Pain (right side )    HPI:  HPI   Patient has a history of polycystic kidney disease. She comes in today for a sharp pain in the right flank area that feels at this if it is tearing.  She states that originally it started off as a soreness but now has developed into a sharp pain. The pain is in the right flank and comes around to the abdomen laterally but does not go more centrally.  She states that the pain is off and on.  She has had no change in her urine.  She has no history of kidney stone but does have a significant cyst in the right kidney.  Tylenol has helped a little.  Her appetite is not as good and she has felt fatigued.  Walking is more difficulty with her bowel movements are not affected by this.    We discussed whether she had had any type of skin lesions thinking that this also could be a distribution of shingles but she is not aware of any but states that this does feels more superficial than deep    Patient Active Problem List   Diagnosis    Hypertension    Liver cyst    Paraproteinemia    Smoldering multiple myeloma (SMM)    Migraine headache    Mitral valve disorder    Tricuspid valve disease    ADPKD (autosomal dominant polycystic kidney disease)    Benign hypertensive renal disease without renal failure    Tachycardia    Hyperthyroidism    Aortic atherosclerosis    Microhematuria    Graves disease    Tortuous aorta    Morbid obesity    Abdominal pain, generalized    History of adenomatous polyp of colon     Past Medical History:   Diagnosis Date    Allergy     Anemia     Arthritis     Cholelithiases     Cyst of kidney, acquired     Diverticulitis     Elevated TSH     Family history of Graves' disease: daughter, maternal aunt, maternal uncle 9/13/2016    Glaucoma     Graves disease     Hx of colonic polyps     Hypertension 1981    Liver cyst     MGUS (monoclonal  gammopathy of unknown significance)     Migraine headache     Mitral valve problem     leakage    Obesity     Paraproteinemia     Smoldering multiple myeloma 2013    Tricuspid valve disease     leakage     Past Surgical History:   Procedure Laterality Date    APPENDECTOMY      COLONOSCOPY N/A 10/23/2015    Procedure: COLONOSCOPY;  Surgeon: Brandon Ruelas MD;  Location: [facility] (4TH FLR);  Service: Endoscopy;  Laterality: N/A;  Had divertiulitis in 5/29/2015 with a recommendation for colonoscopy in 8 weeks    Dr. Ruelas is her GI physician    COLONOSCOPY N/A 11/5/2018    Procedure: COLONOSCOPY;  Surgeon: Brandon Ruelas MD;  Location: [facility] (4TH FLR);  Service: Endoscopy;  Laterality: N/A;  Dr. Ruelas did the last one multiple polyps, patient had recent diverticulitis should not schedule before Oct 10th    COLONOSCOPY N/A 11/5/2018    Performed by Brandon Ruelas MD at [facility] (4TH FLR)    COLONOSCOPY N/A 10/23/2015    Performed by Brandon Ruelas MD at [facility] (4TH FLR)    HYSTERECTOMY  1978 or 1979    OOPHORECTOMY       Family History   Problem Relation Age of Onset    Heart disease Mother         MI    Heart attack Mother     Heart disease Maternal Aunt         MI    Heart disease Maternal Aunt         MI    Cancer Paternal Grandmother         GYN cancer - unknown cancer    Colon cancer Maternal Uncle     Cancer Maternal Uncle         colon ca    Hypertension Father     Heart disease Sister         fast heart rate    Cataracts Sister     Glaucoma Sister     Graves' disease Daughter     No Known Problems Son     No Known Problems Sister     No Known Problems Daughter     No Known Problems Son     Melanoma Neg Hx     Breast cancer Neg Hx     Ovarian cancer Neg Hx     Colon polyps Neg Hx     Rectal cancer Neg Hx     Stomach cancer Neg Hx     Esophageal cancer Neg Hx     Ulcerative colitis Neg Hx     Crohn's disease Neg Hx     Amblyopia Neg Hx      "Blindness Neg Hx     Macular degeneration Neg Hx     Strabismus Neg Hx     Retinal detachment Neg Hx      Review of Systems   Constitutional: Negative for chills, fever and unexpected weight change.   HENT: Negative for trouble swallowing.    Respiratory: Negative for cough, shortness of breath and wheezing.    Cardiovascular: Negative for chest pain and palpitations.   Gastrointestinal: Negative for abdominal distention, abdominal pain, blood in stool and vomiting.   Musculoskeletal: Negative for back pain.        Right flank pain as above     Objective:     Vitals:    12/05/18 0712   BP: 124/82   Pulse: 75   SpO2: 98%   Weight: 125.4 kg (276 lb 7.3 oz)   Height: 5' 9" (1.753 m)   PainSc:   4   PainLoc: Abdomen     Body mass index is 40.83 kg/m².  Physical Exam   Constitutional: She is oriented to person, place, and time. She appears well-developed and well-nourished. No distress.   Neck: Carotid bruit is not present. No thyromegaly present.   Cardiovascular: Normal rate, regular rhythm and normal heart sounds. PMI is not displaced.   Pulmonary/Chest: Effort normal and breath sounds normal. No respiratory distress.   Abdominal: Soft. Bowel sounds are normal. She exhibits no distension. There is no tenderness.   Musculoskeletal: She exhibits no edema.   Neurological: She is alert and oriented to person, place, and time.   Skin:   This follows a dermatome from the right flank area and to the lateral right abdomen     Assessment:     1. Right flank pain    2. Flank pain      Plan:   Manju was seen today for flank pain.    Diagnoses and all orders for this visit:  I have discussed with the patient that we will rule out any kidney abnormality and then if negative will treat as if it may have been shingles with gabapentin and follow-up in 1 week    Right flank pain  -     Urinalysis  -     CBC auto differential; Future  -     Comprehensive metabolic panel; Future  -     Sedimentation rate; Future    Flank pain  -   "   CT Renal Stone Study ABD Pelvis WO; Future      Initially for pain:  1000 mg of tylenol three times a day    If kidney is ruled out then I will start gabapentin for skin pain maybe the result of shingles.    Problem List Items Addressed This Visit     None      Visit Diagnoses     Right flank pain    -  Primary    Relevant Orders    Urinalysis    CBC auto differential    Comprehensive metabolic panel    Sedimentation rate    Flank pain        Relevant Orders    CT Renal Stone Study ABD Pelvis WO        Orders Placed This Encounter   Procedures    CT Renal Stone Study ABD Pelvis WO     Standing Status:   Future     Standing Expiration Date:   12/5/2019     Scheduling Instructions:      Patient has polycystic kidney disease    Urinalysis    CBC auto differential     Standing Status:   Future     Standing Expiration Date:   2/3/2019    Comprehensive metabolic panel     Standing Status:   Future     Standing Expiration Date:   2/3/2019    Sedimentation rate     Standing Status:   Future     Standing Expiration Date:   12/5/2019     Follow-up in about 1 week (around 12/12/2018) for Follow up.     Medication List           Accurate as of 12/5/18  8:11 AM. If you have any questions, ask your nurse or doctor.               CONTINUE taking these medications    magnesium oxide 400 mg (241.3 mg magnesium) tablet  Commonly known as:  MAG-OX     methIMAzole 5 MG Tab  Commonly known as:  TAPAZOLE  Take 1 tablet (5 mg total) by mouth 3 (three) times daily.     multivit with min-folic acid 0.4 mg Tab     ondansetron 4 MG tablet  Commonly known as:  ZOFRAN  Take 1-2 tablets (4-8 mg total) by mouth every 8 (eight) hours as needed for Nausea.     quinapril 20 MG tablet  Commonly known as:  ACCUPRIL  TAKE 1 TABLET BY MOUTH EVERY DAY     rizatriptan 10 MG tablet  Commonly known as:  MAXALT  Take 1 tablet (10 mg total) by mouth every 2 (two) hours as needed for Migraine. Max 30mg/24hs

## 2018-12-06 NOTE — TELEPHONE ENCOUNTER
I spoke to the patient and will start gabapentin 300 mg nightly for concern that this may be related to shingles. GML

## 2018-12-12 ENCOUNTER — OFFICE VISIT (OUTPATIENT)
Dept: INTERNAL MEDICINE | Facility: CLINIC | Age: 68
End: 2018-12-12
Payer: MEDICARE

## 2018-12-12 VITALS
OXYGEN SATURATION: 97 % | SYSTOLIC BLOOD PRESSURE: 122 MMHG | HEART RATE: 80 BPM | BODY MASS INDEX: 40.23 KG/M2 | DIASTOLIC BLOOD PRESSURE: 68 MMHG | WEIGHT: 271.63 LBS | HEIGHT: 69 IN

## 2018-12-12 DIAGNOSIS — M79.2 NERVE PAIN: Primary | ICD-10-CM

## 2018-12-12 PROCEDURE — 3078F DIAST BP <80 MM HG: CPT | Mod: CPTII,HCNC,S$GLB, | Performed by: INTERNAL MEDICINE

## 2018-12-12 PROCEDURE — 1101F PT FALLS ASSESS-DOCD LE1/YR: CPT | Mod: CPTII,HCNC,S$GLB, | Performed by: INTERNAL MEDICINE

## 2018-12-12 PROCEDURE — 3074F SYST BP LT 130 MM HG: CPT | Mod: CPTII,HCNC,S$GLB, | Performed by: INTERNAL MEDICINE

## 2018-12-12 PROCEDURE — 99999 PR PBB SHADOW E&M-EST. PATIENT-LVL III: CPT | Mod: PBBFAC,HCNC,, | Performed by: INTERNAL MEDICINE

## 2018-12-12 PROCEDURE — 99213 OFFICE O/P EST LOW 20 MIN: CPT | Mod: HCNC,S$GLB,, | Performed by: INTERNAL MEDICINE

## 2018-12-12 RX ORDER — GABAPENTIN 300 MG/1
300 CAPSULE ORAL 3 TIMES DAILY
Qty: 90 CAPSULE | Refills: 1 | Status: SHIPPED | OUTPATIENT
Start: 2018-12-12 | End: 2019-01-07 | Stop reason: SDUPTHER

## 2018-12-12 NOTE — PROGRESS NOTES
Subjective:      Patient ID: Manju Aranda is a 68 y.o. female.    Chief Complaint: Follow-up    HPI:  HPI   Nerve pain: right flank to lateral aspect and may be related to shingles that was not present as an eruption. CT negative for other etiologies.  Patient Active Problem List   Diagnosis    Hypertension    Liver cyst    Paraproteinemia    Smoldering multiple myeloma (SMM)    Migraine headache    Mitral valve disorder    Tricuspid valve disease    ADPKD (autosomal dominant polycystic kidney disease)    Benign hypertensive renal disease without renal failure    Tachycardia    Hyperthyroidism    Aortic atherosclerosis    Microhematuria    Graves disease    Tortuous aorta    Morbid obesity    Abdominal pain, generalized    History of adenomatous polyp of colon     Past Medical History:   Diagnosis Date    Allergy     Anemia     Arthritis     Cholelithiases     Cyst of kidney, acquired     Diverticulitis     Elevated TSH     Family history of Graves' disease: daughter, maternal aunt, maternal uncle 9/13/2016    Glaucoma     Graves disease     Hx of colonic polyps     Hypertension 1981    Liver cyst     MGUS (monoclonal gammopathy of unknown significance)     Migraine headache     Mitral valve problem     leakage    Obesity     Paraproteinemia     Smoldering multiple myeloma 2013    Tricuspid valve disease     leakage     Past Surgical History:   Procedure Laterality Date    APPENDECTOMY      COLONOSCOPY N/A 10/23/2015    Procedure: COLONOSCOPY;  Surgeon: Brandon Ruelas MD;  Location: [address]);  Service: Endoscopy;  Laterality: N/A;  Had divertiulitis in 5/29/2015 with a recommendation for colonoscopy in 8 weeks    Dr. Ruelas is her GI physician    COLONOSCOPY N/A 11/5/2018    Procedure: COLONOSCOPY;  Surgeon: Brandon Ruelas MD;  Location: [facility] ([address] [place] [address]);  Service: Endoscopy;  Laterality: N/A;  Dr. Ruelas did the last one multiple polyps, patient  "had recent diverticulitis should not schedule before Oct 10th    COLONOSCOPY N/A 11/5/2018    Performed by Brandon Ruelas MD at [facility] (4TH FLR)    COLONOSCOPY N/A 10/23/2015    Performed by Brandon [facility]uelas MD at [facility] ENDO (4TH FLR)    HYSTERECTOMY  1978 or 1979    OOPHORECTOMY       Family History   Problem Relation Age of Onset    Heart disease Mother         MI    Heart attack Mother     Heart disease Maternal Aunt         MI    Heart disease Maternal Aunt         MI    Cancer Paternal Grandmother         GYN cancer - unknown cancer    Colon cancer Maternal Uncle     Cancer Maternal Uncle         colon ca    Hypertension Father     Heart disease Sister         fast heart rate    Cataracts Sister     Glaucoma Sister     Graves' disease Daughter     No Known Problems Son     No Known Problems Sister     No Known Problems Daughter     No Known Problems Son     Melanoma Neg Hx     Breast cancer Neg Hx     Ovarian cancer Neg Hx     Colon polyps Neg Hx     Rectal cancer Neg Hx     Stomach cancer Neg Hx     Esophageal cancer Neg Hx     Ulcerative colitis Neg Hx     Crohn's disease Neg Hx     Amblyopia Neg Hx     Blindness Neg Hx     Macular degeneration Neg Hx     Strabismus Neg Hx     Retinal detachment Neg Hx      Review of Systems   Constitutional: Negative for activity change, chills, fever and unexpected weight change.   Respiratory: Negative for cough, chest tightness, shortness of breath and wheezing.    Cardiovascular: Negative for chest pain, palpitations and leg swelling.     Objective:     Vitals:    12/12/18 0842   BP: 122/68   Pulse: 80   SpO2: 97%   Weight: 123.2 kg (271 lb 9.7 oz)   Height: 5' 9" (1.753 m)   PainSc:   3     Body mass index is 40.11 kg/m².  Physical Exam   Constitutional: She appears well-developed and well-nourished.   Neck: No JVD present. No thyromegaly present.   Cardiovascular: Normal rate, normal heart sounds and intact distal pulses. "   Pulmonary/Chest: Effort normal and breath sounds normal. No respiratory distress.   Very superficial pain on right flank and lateral aspect  Assessment:     1. Nerve pain      Plan:   Manju was seen today for follow-up.    Diagnoses and all orders for this visit:    Nerve pain: Slowly increase gabapentin to 300 mg tid  -     gabapentin (NEURONTIN) 300 MG capsule; Take 1 capsule (300 mg total) by mouth 3 (three) times daily.        Problem List Items Addressed This Visit     None      Visit Diagnoses     Nerve pain    -  Primary    Relevant Medications    gabapentin (NEURONTIN) 300 MG capsule        No orders of the defined types were placed in this encounter.    Follow-up in about 3 weeks (around 1/2/2019) for Follow up nerve pain.     Medication List           Accurate as of 12/12/18  9:28 AM. If you have any questions, ask your nurse or doctor.               CHANGE how you take these medications    gabapentin 300 MG capsule  Commonly known as:  NEURONTIN  Take 1 capsule (300 mg total) by mouth 3 (three) times daily.  What changed:  when to take this  Changed by:  Payton Garay MD        CONTINUE taking these medications    magnesium oxide 400 mg (241.3 mg magnesium) tablet  Commonly known as:  MAG-OX     methIMAzole 5 MG Tab  Commonly known as:  TAPAZOLE  Take 1 tablet (5 mg total) by mouth 3 (three) times daily.     multivit with min-folic acid 0.4 mg Tab     ondansetron 4 MG tablet  Commonly known as:  ZOFRAN  Take 1-2 tablets (4-8 mg total) by mouth every 8 (eight) hours as needed for Nausea.     quinapril 20 MG tablet  Commonly known as:  ACCUPRIL  TAKE 1 TABLET BY MOUTH EVERY DAY     rizatriptan 10 MG tablet  Commonly known as:  MAXALT  Take 1 tablet (10 mg total) by mouth every 2 (two) hours as needed for Migraine. Max 30mg/24hs           Where to Get Your Medications      These medications were sent to [facility] [id number] [place], [place] - [address]  [address]  [address] [facility] [address]    Phone:  [phone]   · gabapentin 300 MG capsule

## 2019-01-07 ENCOUNTER — OFFICE VISIT (OUTPATIENT)
Dept: INTERNAL MEDICINE | Facility: CLINIC | Age: 69
End: 2019-01-07
Payer: MEDICARE

## 2019-01-07 VITALS
SYSTOLIC BLOOD PRESSURE: 126 MMHG | WEIGHT: 279.31 LBS | HEIGHT: 69 IN | HEART RATE: 70 BPM | DIASTOLIC BLOOD PRESSURE: 78 MMHG | OXYGEN SATURATION: 95 % | BODY MASS INDEX: 41.37 KG/M2

## 2019-01-07 DIAGNOSIS — D47.2 SMOLDERING MULTIPLE MYELOMA (SMM): ICD-10-CM

## 2019-01-07 DIAGNOSIS — M79.2 NERVE PAIN: Primary | ICD-10-CM

## 2019-01-07 DIAGNOSIS — I70.0 AORTIC ATHEROSCLEROSIS: ICD-10-CM

## 2019-01-07 DIAGNOSIS — I77.1 TORTUOUS AORTA: ICD-10-CM

## 2019-01-07 PROBLEM — E66.9 OBESITY WITH SERIOUS COMORBIDITY: Status: ACTIVE | Noted: 2018-03-26

## 2019-01-07 PROCEDURE — 3078F PR MOST RECENT DIASTOLIC BLOOD PRESSURE < 80 MM HG: ICD-10-PCS | Mod: CPTII,HCNC,S$GLB, | Performed by: INTERNAL MEDICINE

## 2019-01-07 PROCEDURE — 99999 PR PBB SHADOW E&M-EST. PATIENT-LVL III: ICD-10-PCS | Mod: PBBFAC,HCNC,, | Performed by: INTERNAL MEDICINE

## 2019-01-07 PROCEDURE — 3074F SYST BP LT 130 MM HG: CPT | Mod: CPTII,HCNC,S$GLB, | Performed by: INTERNAL MEDICINE

## 2019-01-07 PROCEDURE — 99999 PR PBB SHADOW E&M-EST. PATIENT-LVL III: CPT | Mod: PBBFAC,HCNC,, | Performed by: INTERNAL MEDICINE

## 2019-01-07 PROCEDURE — 99213 OFFICE O/P EST LOW 20 MIN: CPT | Mod: HCNC,S$GLB,, | Performed by: INTERNAL MEDICINE

## 2019-01-07 PROCEDURE — 3074F PR MOST RECENT SYSTOLIC BLOOD PRESSURE < 130 MM HG: ICD-10-PCS | Mod: CPTII,HCNC,S$GLB, | Performed by: INTERNAL MEDICINE

## 2019-01-07 PROCEDURE — 99499 UNLISTED E&M SERVICE: CPT | Mod: HCNC,S$GLB,, | Performed by: INTERNAL MEDICINE

## 2019-01-07 PROCEDURE — 99213 PR OFFICE/OUTPT VISIT, EST, LEVL III, 20-29 MIN: ICD-10-PCS | Mod: HCNC,S$GLB,, | Performed by: INTERNAL MEDICINE

## 2019-01-07 PROCEDURE — 1101F PT FALLS ASSESS-DOCD LE1/YR: CPT | Mod: CPTII,HCNC,S$GLB, | Performed by: INTERNAL MEDICINE

## 2019-01-07 PROCEDURE — 99499 RISK ADDL DX/OHS AUDIT: ICD-10-PCS | Mod: HCNC,S$GLB,, | Performed by: INTERNAL MEDICINE

## 2019-01-07 PROCEDURE — 3078F DIAST BP <80 MM HG: CPT | Mod: CPTII,HCNC,S$GLB, | Performed by: INTERNAL MEDICINE

## 2019-01-07 PROCEDURE — 1101F PR PT FALLS ASSESS DOC 0-1 FALLS W/OUT INJ PAST YR: ICD-10-PCS | Mod: CPTII,HCNC,S$GLB, | Performed by: INTERNAL MEDICINE

## 2019-01-07 RX ORDER — GABAPENTIN 300 MG/1
300 CAPSULE ORAL 3 TIMES DAILY
Qty: 90 CAPSULE | Refills: 1 | Status: SHIPPED | OUTPATIENT
Start: 2019-01-07 | End: 2019-03-07

## 2019-01-07 NOTE — PROGRESS NOTES
Subjective:      Patient ID: Manju Aranda is a 68 y.o. female.    Chief Complaint: Follow-up    HPI:  HPI   Patient is here for a follow up of what  Is believed to be nerve pain by process of elimination of other etiologies by scan and the superficial nature of the pain. At the last appt she was asked to increase gabapentin 300 mg tid. Since then her pain has improved and she has tried to taper the med but the pain returned.  Patient Active Problem List   Diagnosis    Hypertension    Liver cyst    Paraproteinemia    Smoldering multiple myeloma (SMM)    Migraine headache    Mitral valve disorder    Tricuspid valve disease    ADPKD (autosomal dominant polycystic kidney disease)    Benign hypertensive renal disease without renal failure    Tachycardia    Hyperthyroidism    Aortic atherosclerosis    Microhematuria    Graves disease    Tortuous aorta    Obesity with serious comorbidity    Abdominal pain, generalized    History of adenomatous polyp of colon     Past Medical History:   Diagnosis Date    Allergy     Anemia     Arthritis     Cholelithiases     Cyst of kidney, acquired     Diverticulitis     Elevated TSH     Family history of Graves' disease: daughter, maternal aunt, maternal uncle 9/13/2016    Glaucoma     Graves disease     Hx of colonic polyps     Hypertension 1981    Liver cyst     MGUS (monoclonal gammopathy of unknown significance)     Migraine headache     Mitral valve problem     leakage    Obesity     Paraproteinemia     Smoldering multiple myeloma 2013    Tricuspid valve disease     leakage     Past Surgical History:   Procedure Laterality Date    APPENDECTOMY      COLONOSCOPY N/A 11/5/2018    [facility] ([facility])    COLONOSCOPY N/A 10/23/2015    [facility] ([facility])    HYSTERECTOMY  1978 or 1979    OOPHORECTOMY       Family History   Problem Relation Age of Onset    Heart  "disease Mother         MI    Heart attack Mother     Heart disease Maternal Aunt         MI    Heart disease Maternal Aunt         MI    Cancer Paternal Grandmother         GYN cancer - unknown cancer    Colon cancer Maternal Uncle     Cancer Maternal Uncle         colon ca    Hypertension Father     Heart disease Sister         fast heart rate    Cataracts Sister     Glaucoma Sister     Graves' disease Daughter     No Known Problems Son     No Known Problems Sister     No Known Problems Daughter     No Known Problems Son     Melanoma Neg Hx     Breast cancer Neg Hx     Ovarian cancer Neg Hx     Colon polyps Neg Hx     Rectal cancer Neg Hx     Stomach cancer Neg Hx     Esophageal cancer Neg Hx     Ulcerative colitis Neg Hx     Crohn's disease Neg Hx     Amblyopia Neg Hx     Blindness Neg Hx     Macular degeneration Neg Hx     Strabismus Neg Hx     Retinal detachment Neg Hx      Review of Systems   Constitutional: Negative for activity change, chills, fever and unexpected weight change.   Respiratory: Negative for cough, chest tightness, shortness of breath and wheezing.    Cardiovascular: Negative for chest pain, palpitations and leg swelling.     Objective:     Vitals:    01/07/19 0941   BP: 126/78   Pulse: 70   SpO2: 95%   Weight: 126.7 kg (279 lb 5.2 oz)   Height: 5' 9" (1.753 m)   PainSc: 0-No pain     Body mass index is 41.25 kg/m².  Physical Exam   Constitutional: She appears well-developed and well-nourished.   Neck: No JVD present. No thyromegaly present.   Cardiovascular: Normal rate, normal heart sounds and intact distal pulses.   Pulmonary/Chest: Effort normal and breath sounds normal. No respiratory distress.     Assessment:     1. Nerve pain    2. Aortic atherosclerosis    3. Smoldering multiple myeloma (SMM)    4. Tortuous aorta      Plan:   Manju was seen today for follow-up.    Diagnoses and all orders for this visit:    Nerve pain  Comments:  Return to gabapentin " "three times a day 300 and follow up in 2 months. May try to taper after a month  Orders:  -     gabapentin (NEURONTIN) 300 MG capsule; Take 1 capsule (300 mg total) by mouth 3 (three) times daily.    Aortic atherosclerosis    Smoldering multiple myeloma (SMM)    Tortuous aorta        Problem List Items Addressed This Visit     Smoldering multiple myeloma (SMM)    Current Assessment & Plan     Chronic Active Condition:    There are no signs or symptoms of disease progression,reviewed test results and response to treatment seem adequate, discussion completed no additional ordering, continue same medications.    Patient sees Dr. Alba on Wed         Aortic atherosclerosis    Overview     Noted on CT imaging dated 09/07/2016         Current Assessment & Plan     Chronic Active Condition:    There are no signs or symptoms of disease progression,reviewed test results and response to treatment seem adequate, discussion completed no additional ordering, continue same medications    Not on statin or asa.           Tortuous aorta    Overview     Location in Record and Date:  [facility] Chest Non-Coronary-9/7/2016    "aorta is mildly tortuous without evidence for aneurysm or dissection."    Location in Record and Date:  CXR-9/7/2016    "Mild tortuosity of the thoracic aorta."  Other Chronic Conditions:  HTN     Please document in your clinic note and add to the Problem List if the patient has:     Tortuous aorta  Other   Clinically Undetermined          Current Assessment & Plan     Chronic Active Condition:    There are no signs or symptoms of disease progression,reviewed test results and response to treatment seem adequate, discussion completed no additional ordering, continue same medications.    No asa or statin           Other Visit Diagnoses     Nerve pain    -  Primary    Return to gabapentin three times a day 300 and follow up in 2 months. May try to taper after a month    Relevant Medications    gabapentin (NEURONTIN) " 300 MG capsule        No orders of the defined types were placed in this encounter.    Follow-up in about 2 months (around 3/7/2019) for Follow up nerve pain.     Medication List           Accurate as of 1/7/19  9:59 AM. If you have any questions, ask your nurse or doctor.               CONTINUE taking these medications    gabapentin 300 MG capsule  Commonly known as:  NEURONTIN  Take 1 capsule (300 mg total) by mouth 3 (three) times daily.     magnesium oxide 400 mg (241.3 mg magnesium) tablet  Commonly known as:  MAG-OX     methIMAzole 5 MG Tab  Commonly known as:  TAPAZOLE  Take 1 tablet (5 mg total) by mouth 3 (three) times daily.     multivit with min-folic acid 0.4 mg Tab     ondansetron 4 MG tablet  Commonly known as:  ZOFRAN  Take 1-2 tablets (4-8 mg total) by mouth every 8 (eight) hours as needed for Nausea.     quinapril 20 MG tablet  Commonly known as:  ACCUPRIL  TAKE 1 TABLET BY MOUTH EVERY DAY     rizatriptan 10 MG tablet  Commonly known as:  MAXALT  Take 1 tablet (10 mg total) by mouth every 2 (two) hours as needed for Migraine. Max 30mg/24hs           Where to Get Your Medications      These medications were sent to [id number] - [facility] [address] AT [address]  [address] [place] [address]    Phone:  [phone]   · gabapentin 300 MG capsule

## 2019-01-07 NOTE — ASSESSMENT & PLAN NOTE
Chronic Active Condition:    There are no signs or symptoms of disease progression,reviewed test results and response to treatment seem adequate, discussion completed no additional ordering, continue same medications    Not on statin or asa.

## 2019-01-07 NOTE — ASSESSMENT & PLAN NOTE
Chronic Active Condition:    There are no signs or symptoms of disease progression,reviewed test results and response to treatment seem adequate, discussion completed no additional ordering, continue same medications.    No asa or statin

## 2019-01-07 NOTE — PROGRESS NOTES
Patient, Manju Aranda (MRN #[id number]), presented with a recorded BMI of 41.25 kg/m^2 consistent with the definition of morbid obesity (ICD-10 E66.01). The patient's morbid obesity was monitored, evaluated, addressed and/or treated. This addendum to the medical record is made on 01/07/2019.

## 2019-01-07 NOTE — ASSESSMENT & PLAN NOTE
Chronic Active Condition:    There are no signs or symptoms of disease progression,reviewed test results and response to treatment seem adequate, discussion completed no additional ordering, continue same medications.    Patient sees Dr. Alba on Wed

## 2019-01-07 NOTE — ASSESSMENT & PLAN NOTE
Chronic Active Condition:    There are no signs or symptoms of disease progression,reviewed test results and response to treatment seem adequate, discussion completed no additional ordering, continue same medications.    Diet strategy discussed

## 2019-01-08 DIAGNOSIS — I10 ESSENTIAL HYPERTENSION: ICD-10-CM

## 2019-01-08 RX ORDER — QUINAPRIL 20 MG/1
TABLET ORAL
Qty: 90 TABLET | Refills: 0 | Status: SHIPPED | OUTPATIENT
Start: 2019-01-08 | End: 2019-04-10 | Stop reason: SDUPTHER

## 2019-01-09 ENCOUNTER — OFFICE VISIT (OUTPATIENT)
Dept: HEMATOLOGY/ONCOLOGY | Facility: CLINIC | Age: 69
End: 2019-01-09
Payer: MEDICARE

## 2019-01-09 ENCOUNTER — HOSPITAL ENCOUNTER (OUTPATIENT)
Dept: RADIOLOGY | Facility: HOSPITAL | Age: 69
Discharge: HOME | End: 2019-01-09
Attending: INTERNAL MEDICINE
Payer: MEDICARE

## 2019-01-09 VITALS
WEIGHT: 280.88 LBS | DIASTOLIC BLOOD PRESSURE: 86 MMHG | TEMPERATURE: 99 F | SYSTOLIC BLOOD PRESSURE: 132 MMHG | HEIGHT: 69 IN | BODY MASS INDEX: 41.6 KG/M2 | OXYGEN SATURATION: 98 % | HEART RATE: 72 BPM

## 2019-01-09 DIAGNOSIS — D47.2 SMOLDERING MULTIPLE MYELOMA (SMM): ICD-10-CM

## 2019-01-09 DIAGNOSIS — R93.7 ABNORMAL X-RAY OF BONE: ICD-10-CM

## 2019-01-09 DIAGNOSIS — D47.2 SMOLDERING MULTIPLE MYELOMA (SMM): Primary | ICD-10-CM

## 2019-01-09 PROCEDURE — 99999 PR PBB SHADOW E&M-EST. PATIENT-LVL III: CPT | Mod: PBBFAC,HCNC,, | Performed by: INTERNAL MEDICINE

## 2019-01-09 PROCEDURE — 3075F SYST BP GE 130 - 139MM HG: CPT | Mod: CPTII,HCNC,S$GLB, | Performed by: INTERNAL MEDICINE

## 2019-01-09 PROCEDURE — 1101F PT FALLS ASSESS-DOCD LE1/YR: CPT | Mod: CPTII,HCNC,S$GLB, | Performed by: INTERNAL MEDICINE

## 2019-01-09 PROCEDURE — 3075F PR MOST RECENT SYSTOLIC BLOOD PRESS GE 130-139MM HG: ICD-10-PCS | Mod: CPTII,HCNC,S$GLB, | Performed by: INTERNAL MEDICINE

## 2019-01-09 PROCEDURE — 99214 PR OFFICE/OUTPT VISIT, EST, LEVL IV, 30-39 MIN: ICD-10-PCS | Mod: HCNC,S$GLB,, | Performed by: INTERNAL MEDICINE

## 2019-01-09 PROCEDURE — 3079F DIAST BP 80-89 MM HG: CPT | Mod: CPTII,HCNC,S$GLB, | Performed by: INTERNAL MEDICINE

## 2019-01-09 PROCEDURE — 77075 RADEX OSSEOUS SURVEY COMPL: CPT | Mod: TC,HCNC

## 2019-01-09 PROCEDURE — 77075 RADEX OSSEOUS SURVEY COMPL: CPT | Mod: 26,HCNC,, | Performed by: RADIOLOGY

## 2019-01-09 PROCEDURE — 99499 RISK ADDL DX/OHS AUDIT: ICD-10-PCS | Mod: HCNC,S$GLB,, | Performed by: INTERNAL MEDICINE

## 2019-01-09 PROCEDURE — 77075 XR METASTATIC SURVEY: ICD-10-PCS | Mod: 26,HCNC,, | Performed by: RADIOLOGY

## 2019-01-09 PROCEDURE — 3079F PR MOST RECENT DIASTOLIC BLOOD PRESSURE 80-89 MM HG: ICD-10-PCS | Mod: CPTII,HCNC,S$GLB, | Performed by: INTERNAL MEDICINE

## 2019-01-09 PROCEDURE — 99214 OFFICE O/P EST MOD 30 MIN: CPT | Mod: HCNC,S$GLB,, | Performed by: INTERNAL MEDICINE

## 2019-01-09 PROCEDURE — 1101F PR PT FALLS ASSESS DOC 0-1 FALLS W/OUT INJ PAST YR: ICD-10-PCS | Mod: CPTII,HCNC,S$GLB, | Performed by: INTERNAL MEDICINE

## 2019-01-09 PROCEDURE — 99999 PR PBB SHADOW E&M-EST. PATIENT-LVL III: ICD-10-PCS | Mod: PBBFAC,HCNC,, | Performed by: INTERNAL MEDICINE

## 2019-01-09 PROCEDURE — 99499 UNLISTED E&M SERVICE: CPT | Mod: HCNC,S$GLB,, | Performed by: INTERNAL MEDICINE

## 2019-01-09 NOTE — PROGRESS NOTES
SECTION OF HEMATOLOGY AND BONE MARROW TRANSPLANT  Return Patient Visit   01/10/2019    CHIEF COMPLAINT:   Chief Complaint   Patient presents with    Smoldering multiple myeloma (SMM)       HISTORY OF PRESENT ILLNESS:   Ms. Aranda is here for  IgG kappa smoldering myeloma. Follow up.  No changes In clinical status since our last appt.   Feels well.  No focal pain. Denies fever, chills, nightsweats, bleeding, brusing, lymphadenopathy, signs/symptoms of splenomegaly.              PAST MEDICAL HISTORY:   Past Medical History:   Diagnosis Date    Allergy     Anemia     Arthritis     Cholelithiases     Cyst of kidney, acquired     Diverticulitis     Elevated TSH     Family history of Graves' disease: daughter, maternal aunt, maternal uncle 9/13/2016    Glaucoma     Graves disease     Hx of colonic polyps     Hypertension 1981    Liver cyst     MGUS (monoclonal gammopathy of unknown significance)     Migraine headache     Mitral valve problem     leakage    Obesity     Paraproteinemia     Smoldering multiple myeloma 2013    Tricuspid valve disease     leakage       PAST SURGICAL HISTORY:   Past Surgical History:   Procedure Laterality Date    APPENDECTOMY      COLONOSCOPY N/A 11/5/2018    [facility]formed by [facility] at [facility] ENDO (4TH FLR)    COLONOSCOPY N/A 10/23/2015    Performed by [facility] at [facility] ENDO (4TH FLR)    HYSTERECTOMY  1978 or 1979    OOPHORECTOMY         PAST SOCIAL HISTORY:   reports that she has quit smoking. Her smoking use included cigarettes. She quit after 3.00 years of use. she has never used smokeless tobacco. She reports that she does not drink alcohol or use drugs.    FAMILY HISTORY:  Family History   Problem Relation Age of Onset    Heart disease Mother         MI    Heart attack Mother     Heart disease Maternal Aunt         MI    Heart disease Maternal Aunt         MI    Cancer Paternal Grandmother         GYN cancer - unknown cancer    Colon  "cancer Maternal Uncle     Cancer Maternal Uncle         colon ca    Hypertension Father     Heart disease Sister         fast heart rate    Cataracts Sister     Glaucoma Sister     Graves' disease Daughter     No Known Problems Son     No Known Problems Sister     No Known Problems Daughter     No Known Problems Son     Melanoma Neg Hx     Breast cancer Neg Hx     Ovarian cancer Neg Hx     Colon polyps Neg Hx     Rectal cancer Neg Hx     Stomach cancer Neg Hx     Esophageal cancer Neg Hx     Ulcerative colitis Neg Hx     Crohn's disease Neg Hx     Amblyopia Neg Hx     Blindness Neg Hx     Macular degeneration Neg Hx     Strabismus Neg Hx     Retinal detachment Neg Hx        CURRENT MEDICATIONS:   Current Outpatient Medications   Medication Sig    gabapentin (NEURONTIN) 300 MG capsule Take 1 capsule (300 mg total) by mouth 3 (three) times daily.    magnesium oxide (MAG-OX) 400 mg tablet Take 400 mg by mouth once daily.    methIMAzole (TAPAZOLE) 5 MG Tab Take 1 tablet (5 mg total) by mouth 3 (three) times daily.    multivit with min-folic acid 0.4 mg Tab Take 0.4 mg by mouth once daily.    ondansetron (ZOFRAN) 4 MG tablet Take 1-2 tablets (4-8 mg total) by mouth every 8 (eight) hours as needed for Nausea. (Patient taking differently: Take 4-8 mg by mouth every 8 (eight) hours as needed for Nausea. )    quinapril (ACCUPRIL) 20 MG tablet TAKE 1 TABLET BY MOUTH EVERY DAY    rizatriptan (MAXALT) 10 MG tablet Take 1 tablet (10 mg total) by mouth every 2 (two) hours as needed for Migraine. Max 30mg/24hs     No current facility-administered medications for this visit.      ALLERGIES:   Review of patient's allergies indicates:  No Known Allergies      ASSESSMENTS:   PAIN ASSESSMENT (Patient reports Pain):   Vitals:    01/09/19 1351   BP: 132/86   Pulse: 72   Temp: 98.5 °F (36.9 °C)   SpO2: 98%   Weight: 127.4 kg (280 lb 13.9 oz)   Height: 5' 9" (1.753 m)   PainSc: 0-No pain     REVIEW OF " SYSTEMS:     General ROS: negative  Psychological ROS: negative  Ophthalmic ROS: negative  ENT ROS: negative  Allergy and Immunology ROS: negative  Hematological and Lymphatic ROS: negative  Endocrine ROS: negative  Respiratory ROS: no cough, shortness of breath, or wheezing  Gastrointestinal ROS: no abdominal pain, change in bowel habits, or black or bloody stools  Genito-Urinary ROS: no dysuria, trouble voiding, or hematuria  Musculoskeletal ROS: negative  Neurological ROS: no TIA or stroke symptoms  Dermatological ROS: negative  PHYSICAL EXAM:   Vitals:    01/09/19 1351   BP: 132/86   Pulse: 72   Temp: 98.5 °F (36.9 °C)       General - well developed, well nourished, no apparent distress  HEENT - oropharynx clear  Chest and Lung - clear to auscultation bilaterally   Cardiovascular - RRR with no MGR, normal S1 and S2  Abdomen-  soft, nontender, no palpable hepatomegaly or splenomegaly  Lymph - no palpable lymphadenopathy  Heme - no bruising, petechiae, pallor  Skin - no rashes or lesions  Psych - appropriate mood and affect      ECOG Performance Status: (foot note - ECOG PS provided by Eastern Cooperative Oncology Group) 0 - Asymptomatic    Karnofsky Performance Score:  100%- Normal, No Complaints, No Evidence of Disease  DATA:   Lab Results   Component Value Date    WBC 3.83 (L) 01/09/2019    HGB 11.3 (L) 01/09/2019    HCT 36.5 (L) 01/09/2019    MCV 98 01/09/2019     01/09/2019     Gran # (ANC)   Date Value Ref Range Status   01/09/2019 2.0 1.8 - 7.7 K/uL Final     Gran%   Date Value Ref Range Status   01/09/2019 51.2 38.0 - 73.0 % Final     Lymph #   Date Value Ref Range Status   01/09/2019 1.5 1.0 - 4.8 K/uL Final     Lymph%   Date Value Ref Range Status   01/09/2019 39.4 18.0 - 48.0 % Final     Kappa Free Light Chains   Date Value Ref Range Status   01/09/2019 15.73 (H) 0.33 - 1.94 mg/dL Final   10/01/2018 17.49 (H) 0.33 - 1.94 mg/dL Final   06/25/2018 21.24 (H) 0.33 - 1.94 mg/dL Final     Lambda Free  Light Chains   Date Value Ref Range Status   01/09/2019 1.51 0.57 - 2.63 mg/dL Final   10/01/2018 1.54 0.57 - 2.63 mg/dL Final   06/25/2018 1.45 0.57 - 2.63 mg/dL Final     Kappa/Lambda FLC Ratio   Date Value Ref Range Status   01/09/2019 10.42 (H) 0.26 - 1.65 Final   10/01/2018 11.36 (H) 0.26 - 1.65 Final   06/25/2018 14.65 (H) 0.26 - 1.65 Final     Gamma grams/dl   Date Value Ref Range Status   01/09/2019 2.09 (H) 0.67 - 1.58 g/dL Final   10/01/2018 2.11 (H) 0.67 - 1.58 g/dL Final   06/25/2018 2.07 (H) 0.67 - 1.58 g/dL Final     IgG - Serum   Date Value Ref Range Status   01/09/2019 2220 (H) 650 - 1600 mg/dL Final     Comment:     IgG Cord Blood Reference Range: 650-1600 mg/dL.     IgA   Date Value Ref Range Status   01/09/2019 104 40 - 350 mg/dL Final     Comment:     IgA Cord Blood Reference Range: <5 mg/dL.     IgM   Date Value Ref Range Status   01/09/2019 33 (L) 50 - 300 mg/dL Final     Comment:     IgM Cord Blood Reference Range: <25 mg/dL.       Bone Marrow Biopsy - 6/26/13  BONE MARROW ASPIRATE, BONE MARROW CLOT, AND BONE MARROW CORE BIOPSY WITH:  CELLULARITY= 70%, TRILINEAGE HEMATOPOIETIC ACTIVITY (M/E= 1.5:1).  PLASMA CELL DYSCRASIA, SEE COMMENT.  CD20  INCREASED STORAGE IRON.  ADEQUATE NUMBER OF MEGAKARYOCYTES.  COMMENT: Flow cytometry analysis of bone marrow aspirate shows lymph gate(27%) containing T and B cells.  No B cell clonality or T-cell aberrancy is evident. Blast gate is not increased. Plasma cell study shows a kappa light  chain restricted plasma cell population without coexpression of CD38/CD56.  Immunohistochemical studies were performed on the core biopsy for further architecture evaluation with adequate  positive and negative controls. Scattered mixed T cells (CD3 positive) and B cells (CD20 positive, cyclin D1  negative) are evident. About 6-7% plasma cells ( positive) are noted. 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 Correlate clinically and with a cytogenetics  report.  Diagnosed by: Sylvie Mckeon M.D.  (Electronically Signed: 2013-06-27 15:43:02)  Microscopic Examination  BONE MARROW ASPIRATE DIFFERENTIAL:  Blasts---------------------------------------------- 2.5 %  Promyelocytes--------------------------------- 0 %  Myelocytes-------------------------------------- 4.5 %  Metamyelocytes------------------------------ 6 %  Bands---------------------------------------------- 5 %  PMN Neutrophils------------------------------ 19 %  Eosinophils------------------------------------------ 2 %  Basophils--------------------------------------- 0 %  Monocytes------------------------------------ 3.5 %  Lymphocytes------------------------------------- 25.5 %  Plasma cells------------------------------------------ 4.5 %  Erythroid precursors-------------------------- 27.5 %  BONE MARROW ASPIRATE:  Myeloid and erythroid maturation shows M:E ratio at 1.5:1. No significant dysplasia is evident. Stainable iron is  present without increased ringed sideroblasts. Megakaryocytes are seen.  BONE MARROW CLOT:  Cellularity is 70 % with trilineage hematopoiesis. No abnormal infiltrates are seen. Megakaryocytes are adequate in  number. Stainable iron is present.  BONE MARROW CORE BIOPSY:  Cellularity is 70 %. No abnormal infiltrates are seen. Stainable iron is increased. Megakaryocytes are adequate in  number      Bone marrow biopsy - 10/2/14  Bone marrow karyotype results: 46, XX[20], female karyotype.  Prolif (PCPD), FISH:  Comments: Abnormality Result #Abn/Total Cells --------------------------------------------------------- 14q32(IGH sep)  Abnormal 7/11 +14(3'IGH/5'IGH)x3 Normal 0/11 t(14;16) IGH/MAF fusion Abnormal  4/7 NOMENCLATURE: nuc elzia(IGHx3),(MAFx3),(IGH con MAFx2) [58 total plasma cells identified]  Interp, Plasma Cell Prolif (PCPD), FISH:  Comments: The result is abnormal and indicates a plasma cell clone with IGH/MAF fusion. Insufficient plasma cells  were observed with all other probe  sets. Since only a limited number of plasma cells were identified, the  abnormalities associated with this plasma cell clone were not sufficiently evaluated and a specific prognostic  significance cannot be defined. Clinical and pathologic correlation is recommended.       Met survey - jan 2019    COMPARISON:  December 2017.    FINDINGS:  Probably dural calcifications overlie the frontal and parietal lobes similar.  No lytic lesions.  Mild degenerative change cervical spine.  Degenerative change of the thoracic spine.  There is some irregularity about the L5 vertebral body though partially obscured by the sacral wings.  Facet arthropathy lower lumbar levels.  No convincing lytic lesions in the long bones.  There is some vague lucency in the proximal right tibial metaphysis of uncertain significance.      Impression       There is some irregularity of the L5 vertebral body though unfortunately obscured on the lateral by the iliac wings.  Additionally there is a vague lucency in the proximal right tibia.  Dedicated views may be helpful.    This report was flagged in Epic as abnormal.      Electronically signed by: Thad Avila MD  Date: 01/09/2019  Time: 11:59         ASSESSMENT AND PLAN:   Encounter Diagnoses   Name Primary?    Smoldering multiple myeloma (SMM) Yes    Abnormal x-ray of bone       IgG kappa smoldering myeloma  Mild intermittent leukopenia/anemia are chronic and pre date myeloma diagnosis ; stable  Met survey jan 2019 with some mild changes that I suspect are not lesions, particularly in light of normal lumbar spine ct bone imaging incidentally done for renal stone samuels dec 2019; she has no pain   However given irregularities (L5, right tibia on met survey),  out of abundance of caution will obtain a PET WB to rule out osseous lesions  Suspect Patient continues to smolder with no new overt CRAB criteria; she has stable   light chains and IgG levels today; m-spike pending        Follow Up: - pet scan  in next 1-2 weeks   -cbc, cmp, serum free light chains, quantitative immunoglobulins, serum electropheresis, serum immunofixation and MD appt in 6 months ; will call pt with pet results and any need for earlier fu    Rasta Alba MD  Hematology/Oncology/Bone Marrow Transplant

## 2019-01-09 NOTE — Clinical Note
- pet scan in next 1-2 weeks -cbc, cmp, serum free light chains, quantitative immunoglobulins, serum electropheresis, serum immunofixation and MD appt in 6 months

## 2019-02-06 ENCOUNTER — OFFICE VISIT (OUTPATIENT)
Dept: OBSTETRICS AND GYNECOLOGY | Facility: CLINIC | Age: 69
End: 2019-02-06
Attending: OBSTETRICS & GYNECOLOGY
Payer: MEDICARE

## 2019-02-06 VITALS
BODY MASS INDEX: 41.11 KG/M2 | WEIGHT: 277.56 LBS | DIASTOLIC BLOOD PRESSURE: 84 MMHG | SYSTOLIC BLOOD PRESSURE: 132 MMHG | HEIGHT: 69 IN

## 2019-02-06 DIAGNOSIS — Z01.419 VISIT FOR GYNECOLOGIC EXAMINATION: Primary | ICD-10-CM

## 2019-02-06 PROCEDURE — 99999 PR PBB SHADOW E&M-EST. PATIENT-LVL III: CPT | Mod: PBBFAC,HCNC,, | Performed by: OBSTETRICS & GYNECOLOGY

## 2019-02-06 PROCEDURE — 99999 PR PBB SHADOW E&M-EST. PATIENT-LVL III: ICD-10-PCS | Mod: PBBFAC,HCNC,, | Performed by: OBSTETRICS & GYNECOLOGY

## 2019-02-06 PROCEDURE — G0101 PR CA SCREEN;PELVIC/BREAST EXAM: ICD-10-PCS | Mod: HCNC,S$GLB,, | Performed by: OBSTETRICS & GYNECOLOGY

## 2019-02-06 PROCEDURE — G0101 CA SCREEN;PELVIC/BREAST EXAM: HCPCS | Mod: HCNC,S$GLB,, | Performed by: OBSTETRICS & GYNECOLOGY

## 2019-02-08 NOTE — PROGRESS NOTES
CC: Well woman exam    Manju Aranda is a 68 y.o. female  presents for a well woman exam.  No issues, problems, or complaints.      Past Medical History:   Diagnosis Date    Allergy     Anemia     Arthritis     Cholelithiases     Cyst of kidney, acquired     Diverticulitis     Elevated TSH     Family history of Graves' disease: daughter, maternal aunt, maternal uncle 2016    Glaucoma     Graves disease     Hx of colonic polyps     Hypertension     Liver cyst     MGUS (monoclonal gammopathy of unknown significance)     Migraine headache     Mitral valve problem     leakage    Obesity     Paraproteinemia     Smoldering multiple myeloma     Tricuspid valve disease     leakage     Past Surgical History:   Procedure Laterality Date    APPENDECTOMY      COLONOSCOPY N/A 2018    Performed [facility] at [facility] ENDO (4TH FLR)    COLONOSCOPY N/A 10/23/2015    Performed by [facility] at [facility] ENDO (4TH FLR)    HYSTERECTOMY   or     OOPHORECTOMY       Family History   Problem Relation Age of Onset    Heart disease Mother         MI    Heart attack Mother     Heart disease Maternal Aunt         MI    Heart disease Maternal Aunt         MI    Cancer Paternal Grandmother         GYN cancer - unknown cancer    Colon cancer Maternal Uncle     Cancer Maternal Uncle         colon ca    Hypertension Father     Heart disease Sister         fast heart rate    Cataracts Sister     Glaucoma Sister     Graves' disease Daughter     No Known Problems Son     No Known Problems Sister     No Known Problems Daughter     No Known Problems Son     Melanoma Neg Hx     Breast cancer Neg Hx     Ovarian cancer Neg Hx     Colon polyps Neg Hx     Rectal cancer Neg Hx     Stomach cancer Neg Hx     Esophageal cancer Neg Hx     Ulcerative colitis Neg Hx     Crohn's disease Neg Hx     Amblyopia Neg Hx     Blindness Neg Hx     Macular degeneration  "Neg Hx     Strabismus Neg Hx     Retinal detachment Neg Hx      Social History     Tobacco Use    Smoking status: Former Smoker     Years: 3.00     Types: Cigarettes    Smokeless tobacco: Never Used   Substance Use Topics    Alcohol use: No    Drug use: No     OB History      Para Term  AB Living    4 4 4     4    SAB TAB Ectopic Multiple Live Births            4          /84 (BP Location: Left arm, Patient Position: Sitting, BP Method: Large (Manual))   Ht 5' 9" (1.753 m)   Wt 125.9 kg (277 lb 9 oz)   BMI 40.99 kg/m²     ROS:  GENERAL: Denies weight gain or weight loss. Feeling well overall.   SKIN: Denies rash or lesions.   HEAD: Denies head injury or headache.   NODES: Denies enlarged lymph nodes.   CHEST: Denies chest pain or shortness of breath.   CARDIOVASCULAR: Denies palpitations or left sided chest pain.   ABDOMEN: No abdominal pain, constipation, diarrhea, nausea, vomiting or rectal bleeding.   URINARY: No frequency, dysuria, hematuria, or burning on urination.  REPRODUCTIVE: See HPI.   BREASTS: The patient performs breast self-examination and denies pain, lumps, or nipple discharge.   HEMATOLOGIC: No easy bruisability or excessive bleeding.   MUSCULOSKELETAL: Denies joint pain or swelling.   NEUROLOGIC: Denies syncope or weakness.   PSYCHIATRIC: Denies depression, anxiety or mood swings.    PE:   APPEARANCE: Well nourished, well developed, in no acute distress.  AFFECT: WNL, alert and oriented x 3.  SKIN: No acne or hirsutism.  NECK: Neck symmetric without masses or thyromegaly.  NODES: No inguinal, cervical, axillary or femoral lymph node enlargement.  CHEST: Good respiratory effort.   ABDOMEN: Soft. No tenderness or masses. No hepatosplenomegaly. No hernias.  BREASTS: Symmetrical, no skin changes or visible lesions. No palpable masses, nipple discharge bilaterally.  PELVIC: Normal external female genitalia without lesions. Normal hair distribution. Adequate perineal body, " normal urethral meatus. Vagina atrophic without lesions or discharge. No significant cystocele or rectocele. Bimanual exam shows uterus and cervix to be surgically absent. Adnexa without masses or tenderness.  RECTAL: Rectovaginal exam confirms above with normal sphincter tone, no masses.  EXTREMITIES: No edema.      ICD-10-CM ICD-9-CM    1. Visit for gynecologic examination Z01.419 V72.31            Patient was counseled today on A.C.S. Pap guidelines and recommendations for yearly pelvic exams, mammograms and monthly self breast exams; to see her PCP for other health maintenance.     Follow-up in about 2 years (around 2/6/2021).

## 2019-02-14 ENCOUNTER — TELEPHONE (OUTPATIENT)
Dept: HEMATOLOGY/ONCOLOGY | Facility: CLINIC | Age: 69
End: 2019-02-14

## 2019-02-15 ENCOUNTER — TELEPHONE (OUTPATIENT)
Dept: HEMATOLOGY/ONCOLOGY | Facility: CLINIC | Age: 69
End: 2019-02-15

## 2019-02-21 ENCOUNTER — HOSPITAL ENCOUNTER (OUTPATIENT)
Dept: RADIOLOGY | Facility: HOSPITAL | Age: 69
Discharge: HOME | End: 2019-02-21
Attending: INTERNAL MEDICINE
Payer: MEDICARE

## 2019-02-21 DIAGNOSIS — D47.2 SMOLDERING MULTIPLE MYELOMA (SMM): Primary | ICD-10-CM

## 2019-02-21 DIAGNOSIS — D47.2 SMOLDERING MULTIPLE MYELOMA (SMM): ICD-10-CM

## 2019-02-21 LAB — POCT GLUCOSE: 88 MG/DL (ref 70–110)

## 2019-02-21 PROCEDURE — 78815 NM PET CT ROUTINE: ICD-10-PCS | Mod: 26,HCNC,PS, | Performed by: RADIOLOGY

## 2019-02-21 PROCEDURE — 78815 PET IMAGE W/CT SKULL-THIGH: CPT | Mod: TC,HCNC,PI

## 2019-02-21 PROCEDURE — A9552 F18 FDG: HCPCS | Mod: HCNC

## 2019-02-21 PROCEDURE — 78815 PET IMAGE W/CT SKULL-THIGH: CPT | Mod: 26,HCNC,PS, | Performed by: RADIOLOGY

## 2019-02-27 ENCOUNTER — HOSPITAL ENCOUNTER (OUTPATIENT)
Dept: RADIOLOGY | Facility: HOSPITAL | Age: 69
Discharge: HOME | End: 2019-02-27
Attending: INTERNAL MEDICINE
Payer: MEDICARE

## 2019-02-27 DIAGNOSIS — D47.2 SMOLDERING MULTIPLE MYELOMA (SMM): ICD-10-CM

## 2019-02-27 LAB
CREAT SERPL-MCNC: 1 MG/DL (ref 0.5–1.4)
SAMPLE: NORMAL

## 2019-02-27 PROCEDURE — 73720 MRI LWR EXTREMITY W/O&W/DYE: CPT | Mod: 26,HCNC,RT, | Performed by: RADIOLOGY

## 2019-02-27 PROCEDURE — 73720 MRI TIBIA FIBULA W WO CONTRAST RIGHT: ICD-10-PCS | Mod: 26,HCNC,RT, | Performed by: RADIOLOGY

## 2019-02-27 PROCEDURE — A9585 GADOBUTROL INJECTION: HCPCS | Mod: HCNC | Performed by: INTERNAL MEDICINE

## 2019-02-27 PROCEDURE — 73720 MRI LWR EXTREMITY W/O&W/DYE: CPT | Mod: TC,HCNC,RT

## 2019-02-27 PROCEDURE — 25500020 PHARM REV CODE 255: Mod: HCNC | Performed by: INTERNAL MEDICINE

## 2019-02-27 RX ORDER — GADOBUTROL 604.72 MG/ML
10 INJECTION INTRAVENOUS
Status: COMPLETED | OUTPATIENT
Start: 2019-02-27 | End: 2019-02-27

## 2019-02-27 RX ADMIN — GADOBUTROL 10 ML: 604.72 INJECTION INTRAVENOUS at 09:02

## 2019-02-28 ENCOUNTER — TELEPHONE (OUTPATIENT)
Dept: HEMATOLOGY/ONCOLOGY | Facility: CLINIC | Age: 69
End: 2019-02-28

## 2019-03-07 ENCOUNTER — OFFICE VISIT (OUTPATIENT)
Dept: INTERNAL MEDICINE | Facility: CLINIC | Age: 69
End: 2019-03-07
Payer: MEDICARE

## 2019-03-07 VITALS
DIASTOLIC BLOOD PRESSURE: 88 MMHG | WEIGHT: 277.75 LBS | HEIGHT: 69 IN | OXYGEN SATURATION: 98 % | BODY MASS INDEX: 41.22 KG/M2 | WEIGHT: 278.31 LBS | HEIGHT: 69 IN | DIASTOLIC BLOOD PRESSURE: 90 MMHG | BODY MASS INDEX: 41.14 KG/M2 | HEART RATE: 82 BPM | SYSTOLIC BLOOD PRESSURE: 136 MMHG | HEART RATE: 64 BPM | SYSTOLIC BLOOD PRESSURE: 128 MMHG

## 2019-03-07 DIAGNOSIS — M79.2 NERVE PAIN: Primary | ICD-10-CM

## 2019-03-07 DIAGNOSIS — I77.1 TORTUOUS AORTA: ICD-10-CM

## 2019-03-07 DIAGNOSIS — I70.0 AORTIC ATHEROSCLEROSIS: ICD-10-CM

## 2019-03-07 DIAGNOSIS — D89.2 PARAPROTEINEMIA: ICD-10-CM

## 2019-03-07 DIAGNOSIS — E05.90 HYPERTHYROIDISM: ICD-10-CM

## 2019-03-07 DIAGNOSIS — D47.2 SMOLDERING MULTIPLE MYELOMA (SMM): ICD-10-CM

## 2019-03-07 DIAGNOSIS — Q61.2 ADPKD (AUTOSOMAL DOMINANT POLYCYSTIC KIDNEY DISEASE): ICD-10-CM

## 2019-03-07 DIAGNOSIS — Z13.83 SCREENING FOR RESPIRATORY CONDITION: ICD-10-CM

## 2019-03-07 DIAGNOSIS — G43.C0 PERIODIC HEADACHE SYNDROME, NOT INTRACTABLE: ICD-10-CM

## 2019-03-07 DIAGNOSIS — I12.9 BENIGN HYPERTENSIVE RENAL DISEASE WITHOUT RENAL FAILURE: ICD-10-CM

## 2019-03-07 DIAGNOSIS — E66.01 MORBID OBESITY WITH BMI OF 40.0-44.9, ADULT: ICD-10-CM

## 2019-03-07 DIAGNOSIS — Z86.010 HISTORY OF ADENOMATOUS POLYP OF COLON: ICD-10-CM

## 2019-03-07 DIAGNOSIS — Z00.00 ENCOUNTER FOR PREVENTIVE HEALTH EXAMINATION: Primary | ICD-10-CM

## 2019-03-07 DIAGNOSIS — E05.00 GRAVES DISEASE: ICD-10-CM

## 2019-03-07 DIAGNOSIS — I10 HYPERTENSION, UNSPECIFIED TYPE: ICD-10-CM

## 2019-03-07 LAB
POST FEV1 FVC: NORMAL
POST FEV1: NORMAL
POST FVC: NORMAL
PRE FEV1 FVC: 78.62
PRE FEV1: 1.98
PRE FVC: 2.52
PREDICTED FEV1: 83
PREDICTED FVC: 83

## 2019-03-07 PROCEDURE — 3074F SYST BP LT 130 MM HG: CPT | Mod: HCNC,CPTII,S$GLB, | Performed by: INTERNAL MEDICINE

## 2019-03-07 PROCEDURE — 99213 PR OFFICE/OUTPT VISIT, EST, LEVL III, 20-29 MIN: ICD-10-PCS | Mod: HCNC,S$GLB,, | Performed by: INTERNAL MEDICINE

## 2019-03-07 PROCEDURE — G0439 PR MEDICARE ANNUAL WELLNESS SUBSEQUENT VISIT: ICD-10-PCS | Mod: HCNC,S$GLB,, | Performed by: NURSE PRACTITIONER

## 2019-03-07 PROCEDURE — 1101F PT FALLS ASSESS-DOCD LE1/YR: CPT | Mod: HCNC,CPTII,S$GLB, | Performed by: INTERNAL MEDICINE

## 2019-03-07 PROCEDURE — 3079F PR MOST RECENT DIASTOLIC BLOOD PRESSURE 80-89 MM HG: ICD-10-PCS | Mod: HCNC,CPTII,S$GLB, | Performed by: INTERNAL MEDICINE

## 2019-03-07 PROCEDURE — 99213 OFFICE O/P EST LOW 20 MIN: CPT | Mod: HCNC,S$GLB,, | Performed by: INTERNAL MEDICINE

## 2019-03-07 PROCEDURE — 99999 PR PBB SHADOW E&M-EST. PATIENT-LVL III: ICD-10-PCS | Mod: PBBFAC,HCNC,, | Performed by: INTERNAL MEDICINE

## 2019-03-07 PROCEDURE — 1101F PR PT FALLS ASSESS DOC 0-1 FALLS W/OUT INJ PAST YR: ICD-10-PCS | Mod: HCNC,CPTII,S$GLB, | Performed by: INTERNAL MEDICINE

## 2019-03-07 PROCEDURE — 3080F PR MOST RECENT DIASTOLIC BLOOD PRESSURE >= 90 MM HG: ICD-10-PCS | Mod: HCNC,CPTII,S$GLB, | Performed by: NURSE PRACTITIONER

## 2019-03-07 PROCEDURE — 3075F PR MOST RECENT SYSTOLIC BLOOD PRESS GE 130-139MM HG: ICD-10-PCS | Mod: HCNC,CPTII,S$GLB, | Performed by: NURSE PRACTITIONER

## 2019-03-07 PROCEDURE — 3074F PR MOST RECENT SYSTOLIC BLOOD PRESSURE < 130 MM HG: ICD-10-PCS | Mod: HCNC,CPTII,S$GLB, | Performed by: INTERNAL MEDICINE

## 2019-03-07 PROCEDURE — 3080F DIAST BP >= 90 MM HG: CPT | Mod: HCNC,CPTII,S$GLB, | Performed by: NURSE PRACTITIONER

## 2019-03-07 PROCEDURE — G0439 PPPS, SUBSEQ VISIT: HCPCS | Mod: HCNC,S$GLB,, | Performed by: NURSE PRACTITIONER

## 2019-03-07 PROCEDURE — 99999 PR PBB SHADOW E&M-EST. PATIENT-LVL IV: CPT | Mod: PBBFAC,HCNC,, | Performed by: NURSE PRACTITIONER

## 2019-03-07 PROCEDURE — 99999 PR PBB SHADOW E&M-EST. PATIENT-LVL IV: ICD-10-PCS | Mod: PBBFAC,HCNC,, | Performed by: NURSE PRACTITIONER

## 2019-03-07 PROCEDURE — 3079F DIAST BP 80-89 MM HG: CPT | Mod: HCNC,CPTII,S$GLB, | Performed by: INTERNAL MEDICINE

## 2019-03-07 PROCEDURE — 3075F SYST BP GE 130 - 139MM HG: CPT | Mod: HCNC,CPTII,S$GLB, | Performed by: NURSE PRACTITIONER

## 2019-03-07 PROCEDURE — 99999 PR PBB SHADOW E&M-EST. PATIENT-LVL III: CPT | Mod: PBBFAC,HCNC,, | Performed by: INTERNAL MEDICINE

## 2019-03-07 RX ORDER — GABAPENTIN 300 MG/1
300 CAPSULE ORAL NIGHTLY
Qty: 30 CAPSULE | Refills: 3 | Status: SHIPPED | OUTPATIENT
Start: 2019-03-07 | End: 2020-03-05

## 2019-03-07 RX ORDER — ROSUVASTATIN CALCIUM 5 MG/1
5 TABLET, COATED ORAL DAILY
Qty: 90 TABLET | Refills: 3 | Status: SHIPPED | OUTPATIENT
Start: 2019-03-07 | End: 2020-03-01 | Stop reason: SDUPTHER

## 2019-03-07 NOTE — PROGRESS NOTES
I offered to discuss end of life issues, including information on how to make advance directives that the patient could use to name someone who would make medical decisions on their behalf if they became too ill to make themselves.    ___Patient declined  _X_Patient is interested, Ioffered to discuss.

## 2019-03-07 NOTE — PROGRESS NOTES
Subjective:      Patient ID: Manju Aranda is a 68 y.o. female.    Chief Complaint: Follow-up    HPI:  HPI   Nerve pain: tapered off gabapentin 300mg tid but still has some intermittent pain.  We discussed restarting gabapentin at a lighter dose.  She has no other medical problems today  Patient Active Problem List   Diagnosis    Hypertension    Liver cyst    Paraproteinemia    Smoldering multiple myeloma (SMM)    Migraine headache    Mitral valve disorder    Tricuspid valve disease    ADPKD (autosomal dominant polycystic kidney disease)    Benign hypertensive renal disease without renal failure    Tachycardia    Hyperthyroidism    Aortic atherosclerosis    Microhematuria    Graves disease    Tortuous aorta    Morbid obesity with BMI of 40.0-44.9, adult    Abdominal pain, generalized    History of adenomatous polyp of colon     Past Medical History:   Diagnosis Date    Allergy     Anemia     Arthritis     Cholelithiases     Cyst of kidney, acquired     Diverticulitis     Elevated TSH     Family history of Graves' disease: daughter, maternal aunt, maternal uncle 9/13/2016    Glaucoma     Graves disease     Hx of colonic polyps     Hypertension 1981    Liver cyst     MGUS (monoclonal gammopathy of unknown significance)     Migraine headache     Mitral valve problem     leakage    Obesity     Paraproteinemia     Smoldering multiple myeloma 2013    Tricuspid valve disease     leakage     Past Surgical History:   Procedure Laterality Date    APPENDECTOMY      COLONOSCOPY N/A 11/5/2018    Performed by [facility] MD at [facility] ENDO (4TH FLR)    COLONOSCOPY N/A 10/23/2015    Performed by Brnadon Ruelas MD at [facility] ENDO (4TH FLR)    HYSTERECTOMY  1978 or 1979    OOPHORECTOMY       Family History   Problem Relation Age of Onset    Heart disease Mother         MI    Heart attack Mother     Heart disease Maternal Aunt         MI    Heart disease Maternal Aunt         MI  "   Cancer Paternal Grandmother         GYN cancer - unknown cancer    Colon cancer Maternal Uncle     Cancer Maternal Uncle         colon ca    Hypertension Father     Heart disease Sister         fast heart rate    Cataracts Sister     Glaucoma Sister     Graves' disease Daughter     No Known Problems Son     No Known Problems Sister     No Known Problems Daughter     No Known Problems Son     Melanoma Neg Hx     Breast cancer Neg Hx     Ovarian cancer Neg Hx     Colon polyps Neg Hx     Rectal cancer Neg Hx     Stomach cancer Neg Hx     Esophageal cancer Neg Hx     Ulcerative colitis Neg Hx     Crohn's disease Neg Hx     Amblyopia Neg Hx     Blindness Neg Hx     Macular degeneration Neg Hx     Strabismus Neg Hx     Retinal detachment Neg Hx      Review of Systems   Constitutional: Negative for activity change, chills, fever and unexpected weight change.   Respiratory: Negative for cough, chest tightness, shortness of breath and wheezing.    Cardiovascular: Negative for chest pain, palpitations and leg swelling.     Objective:     Vitals:    03/07/19 0929   BP: 128/88   Pulse: 82   SpO2: 98%   Weight: 126 kg (277 lb 12.5 oz)   Height: 5' 9" (1.753 m)   PainSc: 0-No pain     Body mass index is 41.02 kg/m².  Physical Exam   Constitutional: She appears well-developed and well-nourished.   Neck: No JVD present. No thyromegaly present.   Cardiovascular: Normal rate, normal heart sounds and intact distal pulses.   Pulmonary/Chest: Effort normal and breath sounds normal. No respiratory distress.     Assessment:     1. Nerve pain    2. Screening for respiratory condition      Plan:   Manju was seen today for follow-up.    Diagnoses and all orders for this visit:    Nerve pain:  I have suggested that the patient take 3 months of gabapentin 300 mg at bedtime.    Screening for respiratory condition  -     Mobile Spirometry With/Without Bronchodilator - [facility] Only    Other orders  -     " gabapentin (NEURONTIN) 300 MG capsule; Take 1 capsule (300 mg total) by mouth every evening. For nerve pain  -     rosuvastatin (CRESTOR) 5 MG tablet; Take 1 tablet (5 mg total) by mouth once daily.        Problem List Items Addressed This Visit     None      Visit Diagnoses     Nerve pain    -  Primary    Screening for respiratory condition        Relevant Orders    Mobile Spirometry With/Without Bronchodilator - [facility] (Completed)        Orders Placed This Encounter   Procedures    Mobile Spirometry With/Without Bronchodilator - [address] Only     Follow-up in about 3 months (around 6/7/2019) for Follow up in 3 months.     Medication List           Accurate as of 3/7/19 10:05 AM. If you have any questions, ask your nurse or doctor.               START taking these medications    rosuvastatin 5 MG tablet  Commonly known as:  CRESTOR  Take 1 tablet (5 mg total) by mouth once daily.  Started by:  Payton Garay MD        CHANGE how you take these medications    gabapentin 300 MG capsule  Commonly known as:  NEURONTIN  Take 1 capsule (300 mg total) by mouth every evening. For nerve pain  What changed:    · when to take this  · additional instructions  Changed by:  Payton Garay MD        CONTINUE taking these medications    magnesium oxide 400 mg (241.3 mg magnesium) tablet  Commonly known as:  MAG-OX     multivit with min-folic acid 0.4 mg Tab     ondansetron 4 MG tablet  Commonly known as:  ZOFRAN  Take 1-2 tablets (4-8 mg total) by mouth every 8 (eight) hours as needed for Nausea.     quinapril 20 MG tablet  Commonly known as:  ACCUPRIL  TAKE 1 TABLET BY MOUTH EVERY DAY     rizatriptan 10 MG tablet  Commonly known as:  MAXALT  Take 1 tablet (10 mg total) by mouth every 2 (two) hours as needed for Migraine. Max 30mg/24hs        STOP taking these medications    methIMAzole 5 MG Tab  Commonly known as:  TAPAZOLE  Stopped by:  Shira Taylor NP           Where to Get Your Medications      These  medications were sent to [id number] - [place], [place] - [address]  [address], [facility] [address]    Phone:  [phone]   · gabapentin 300 MG capsule  · rosuvastatin 5 MG tablet

## 2019-03-07 NOTE — PROGRESS NOTES
"Manju Aranda presented for a  Medicare AWV and comprehensive Health Risk Assessment today. The following components were reviewed and updated:    · Medical history  · Family History  · Social history  · Allergies and Current Medications  · Health Risk Assessment  · Health Maintenance  · Care Team     ** See Completed Assessments for Annual Wellness Visit within the encounter summary.**       The following assessments were completed:  · Living Situation  · CAGE  · Depression Screening  · Timed Get Up and Go  · Whisper Test  · Cognitive Function Screening  · Nutrition Screening  · ADL Screening  · PAQ Screening        Vitals:    03/07/19 0851   BP: (!) 136/90   BP Location: Left arm   Patient Position: Sitting   Pulse: 64   Weight: 126.2 kg (278 lb 5.3 oz)   Height: 5' 9" (1.753 m)     Body mass index is 41.1 kg/m².  Physical Exam   Constitutional: She is oriented to person, place, and time. She appears well-developed and well-nourished. No distress.   Obese stature   HENT:   Head: Normocephalic and atraumatic.   Eyes: No scleral icterus.   Neck: Normal range of motion. Neck supple.   Cardiovascular: Normal rate, regular rhythm, normal heart sounds and intact distal pulses.   Pulmonary/Chest: Effort normal and breath sounds normal. No respiratory distress.   Musculoskeletal: She exhibits no edema.   Neurological: She is alert and oriented to person, place, and time.   Skin: Skin is warm and dry. She is not diaphoretic.   Psychiatric: She has a normal mood and affect. Her behavior is normal.       Diagnoses and health risks identified today and associated recommendations/orders:    1. Encounter for preventive health examination  Assessments completed  Preventive health recommendations reviewed    2. Aortic atherosclerosis  Stable. Seen on CT from 09/07/16  Followed by PCP.     3. Tortuous aorta  Stable. Seen on CTA from 09/07/16  Controlled with current medical therapy  Followed by PCP.     4. Smoldering multiple " myeloma (SMM)  Stable.   Followed by hem/onc.     5. Paraproteinemia  Stable.   Followed by hem/onc     6. Morbid obesity with BMI of 40.0-44.9, adult  Stable.   Patient exercises daily  Followed by PCP.     7. Periodic headache syndrome, not intractable  Stable.   Controlled with current medical therapy  Followed by neurology.     8. Hypertension, unspecified type  Stable. Mildly elevated at today's visit.  Patient did not take her blood pressure medication this morning.  She is seeing her PCP today after her hra visit and will have it rechecked.   Controlled with current medical therapy  Followed by PCP.     9. Benign hypertensive renal disease without renal failure  Stable.   Controlled with current medical therapy  Followed by PCP.     10. ADPKD (autosomal dominant polycystic kidney disease)  Stable.   Followed by PCP.     11. Graves disease  Stable.   Followed by endocrine.     12. Hyperthyroidism  Stable.   Followed by endocrine    13. History of adenomatous polyp of colon  Stable.   Last colonoscopy was 11/2018  Followed by PCP.     Provided Majnu with a 5-10 year written screening schedule and personal prevention plan. Recommendations were developed using the USPSTF age appropriate recommendations. Education, counseling, and referrals were provided as needed. After Visit Summary printed and given to patient which includes a list of additional screenings\tests needed.    Follow-up for a routine visit with your primary care provider. .    Shira Taylor NP

## 2019-03-07 NOTE — PATIENT INSTRUCTIONS
Counseling and Referral of Other Preventative  (Italic type indicates deductible and co-insurance are waived)    Patient Name: Manju Aranda  Today's Date: 3/7/2019    Health Maintenance       Date Due Completion Date    High Dose Statin 10/19/1972 ---    Mammogram 10/29/2019 10/29/2018    DEXA SCAN 01/09/2020 1/9/2017    Lipid Panel 03/14/2023 3/14/2018    Colonoscopy 11/05/2023 11/5/2018    TETANUS VACCINE 09/26/2028 9/26/2018    Override on 8/8/2017: Done ([facility])        No orders of the defined types were placed in this encounter.    The following information is provided to all patients.  This information is to help you find resources for any of the problems found today that may be affecting your health:                Living healthy guide: www.[facility].[place].gov      Understanding Diabetes: www.diabetes.org      Eating healthy: www.cdc.gov/healthyweight      CDC home safety checklist: www.cdc.gov/steadi/patient.html      Agency on Aging: www.goea.[place].gov      Alcoholics anonymous (AA): www.aa.org      Physical Activity: www.saji.nih.gov/do9fnew      Tobacco use: www.quitwithusla.org

## 2019-04-03 ENCOUNTER — OFFICE VISIT (OUTPATIENT)
Dept: INTERNAL MEDICINE | Facility: CLINIC | Age: 69
End: 2019-04-03
Payer: MEDICARE

## 2019-04-03 VITALS
SYSTOLIC BLOOD PRESSURE: 134 MMHG | HEIGHT: 69 IN | OXYGEN SATURATION: 97 % | HEART RATE: 77 BPM | DIASTOLIC BLOOD PRESSURE: 84 MMHG | BODY MASS INDEX: 40.59 KG/M2 | WEIGHT: 274.06 LBS

## 2019-04-03 DIAGNOSIS — R53.83 FATIGUE, UNSPECIFIED TYPE: ICD-10-CM

## 2019-04-03 DIAGNOSIS — B34.9 VIRAL ILLNESS: Primary | ICD-10-CM

## 2019-04-03 DIAGNOSIS — M17.11 PRIMARY OSTEOARTHRITIS OF RIGHT KNEE: ICD-10-CM

## 2019-04-03 PROCEDURE — 3075F SYST BP GE 130 - 139MM HG: CPT | Mod: HCNC,CPTII,S$GLB, | Performed by: INTERNAL MEDICINE

## 2019-04-03 PROCEDURE — 3079F PR MOST RECENT DIASTOLIC BLOOD PRESSURE 80-89 MM HG: ICD-10-PCS | Mod: HCNC,CPTII,S$GLB, | Performed by: INTERNAL MEDICINE

## 2019-04-03 PROCEDURE — 99214 PR OFFICE/OUTPT VISIT, EST, LEVL IV, 30-39 MIN: ICD-10-PCS | Mod: HCNC,S$GLB,, | Performed by: INTERNAL MEDICINE

## 2019-04-03 PROCEDURE — 99214 OFFICE O/P EST MOD 30 MIN: CPT | Mod: HCNC,S$GLB,, | Performed by: INTERNAL MEDICINE

## 2019-04-03 PROCEDURE — 3079F DIAST BP 80-89 MM HG: CPT | Mod: HCNC,CPTII,S$GLB, | Performed by: INTERNAL MEDICINE

## 2019-04-03 PROCEDURE — 1101F PR PT FALLS ASSESS DOC 0-1 FALLS W/OUT INJ PAST YR: ICD-10-PCS | Mod: HCNC,CPTII,S$GLB, | Performed by: INTERNAL MEDICINE

## 2019-04-03 PROCEDURE — 99999 PR PBB SHADOW E&M-EST. PATIENT-LVL IV: CPT | Mod: PBBFAC,HCNC,, | Performed by: INTERNAL MEDICINE

## 2019-04-03 PROCEDURE — 3075F PR MOST RECENT SYSTOLIC BLOOD PRESS GE 130-139MM HG: ICD-10-PCS | Mod: HCNC,CPTII,S$GLB, | Performed by: INTERNAL MEDICINE

## 2019-04-03 PROCEDURE — 1101F PT FALLS ASSESS-DOCD LE1/YR: CPT | Mod: HCNC,CPTII,S$GLB, | Performed by: INTERNAL MEDICINE

## 2019-04-03 PROCEDURE — 99999 PR PBB SHADOW E&M-EST. PATIENT-LVL IV: ICD-10-PCS | Mod: PBBFAC,HCNC,, | Performed by: INTERNAL MEDICINE

## 2019-04-03 NOTE — PROGRESS NOTES
"Subjective:       Patient ID: Manju Aranda is a 68 y.o. female.    Chief Complaint: Leg Pain (right); Cough; Fever; Fatigue; and Generalized Body Aches    HPI Became ill 2 weeks ago with stuffy nose, head congestion, malaise.   She feels "worse every day."  Dry cough.  Clear nasal drainage.  No ear or throat pain.  No fever.  Cloricidin not helpful.  Has taken tylenol for suspected fever.  No sob, wheezing.    Also c/o muscle pain.  She had flu vax.  At this point nasal congestion and cough are minimal.    Also c/o R lower leg pain beginning a few days ago.  Worse with weight bearing.  No back pain.  No edema.  Saw Ortho in past for knee oa.  She was worried about DVT.     Last labs and clinic notes reviewed.    Crestor restarted last month.      Review of Systems   Constitutional: Negative for fever and unexpected weight change.   HENT: Negative for congestion and postnasal drip.    Eyes: Negative for pain, discharge and visual disturbance.   Respiratory: Negative for cough, chest tightness, shortness of breath and wheezing.    Cardiovascular: Negative for chest pain and leg swelling.   Gastrointestinal: Negative for abdominal pain, constipation, diarrhea and nausea.   Genitourinary: Negative for difficulty urinating, dysuria and hematuria.   Skin: Negative for rash.   Neurological: Negative for headaches.   Psychiatric/Behavioral: Negative for dysphoric mood and sleep disturbance. The patient is not nervous/anxious.        Objective:      Physical Exam   Constitutional: She is oriented to person, place, and time. She appears well-developed and well-nourished. No distress.   HENT:   Head: Normocephalic and atraumatic.   Mouth/Throat: Oropharynx is clear and moist. No oropharyngeal exudate.   Tm's clear   Neck: Neck supple.   Cardiovascular: Normal rate and regular rhythm.   Pulmonary/Chest: Effort normal and breath sounds normal. No respiratory distress. She has no wheezes. She has no rales. "   Musculoskeletal:        Right knee: Normal.   Lymphadenopathy:     She has no cervical adenopathy.   Neurological: She is alert and oriented to person, place, and time.   Psychiatric: She has a normal mood and affect. Her behavior is normal.       Assessment:       1. Viral illness    2. Primary osteoarthritis of right knee    3. Fatigue, unspecified type                     - out o fproportion to uri symptoms  Plan:       Manju was seen today for leg pain, cough, fever, fatigue and generalized body aches.    Diagnoses and all orders for this visit:    Viral illness  -     Cancel: Influenza A & B by Molecular    Primary osteoarthritis of right knee  -     Ambulatory consult to Orthopedics    Fatigue, unspecified type        Stop cloricidin -  Likely contributing to fatigue.       Tylenol for pain       Repeat labs if no better.

## 2019-04-10 DIAGNOSIS — I10 ESSENTIAL HYPERTENSION: ICD-10-CM

## 2019-04-10 RX ORDER — QUINAPRIL 20 MG/1
TABLET ORAL
Qty: 90 TABLET | Refills: 3 | Status: SHIPPED | OUTPATIENT
Start: 2019-04-10 | End: 2020-03-01 | Stop reason: SDUPTHER

## 2019-06-07 DIAGNOSIS — K57.92 DIVERTICULITIS: Primary | ICD-10-CM

## 2019-06-07 RX ORDER — METRONIDAZOLE 500 MG/1
500 TABLET ORAL 3 TIMES DAILY
Qty: 21 TABLET | Refills: 1 | Status: SHIPPED | OUTPATIENT
Start: 2019-06-07 | End: 2019-06-20

## 2019-06-07 RX ORDER — CIPROFLOXACIN 250 MG/1
250 TABLET, FILM COATED ORAL 2 TIMES DAILY
Qty: 14 TABLET | Refills: 1 | Status: SHIPPED | OUTPATIENT
Start: 2019-06-07 | End: 2019-06-20

## 2019-06-20 ENCOUNTER — LAB VISIT (OUTPATIENT)
Dept: LAB | Facility: HOSPITAL | Age: 69
End: 2019-06-20
Attending: INTERNAL MEDICINE
Payer: MEDICARE

## 2019-06-20 ENCOUNTER — OFFICE VISIT (OUTPATIENT)
Dept: INTERNAL MEDICINE | Facility: CLINIC | Age: 69
End: 2019-06-20
Payer: MEDICARE

## 2019-06-20 VITALS
HEART RATE: 77 BPM | OXYGEN SATURATION: 93 % | WEIGHT: 279.56 LBS | DIASTOLIC BLOOD PRESSURE: 80 MMHG | HEIGHT: 69 IN | BODY MASS INDEX: 41.41 KG/M2 | SYSTOLIC BLOOD PRESSURE: 130 MMHG

## 2019-06-20 DIAGNOSIS — Z86.39 HISTORY OF HYPERTHYROIDISM: ICD-10-CM

## 2019-06-20 DIAGNOSIS — E78.00 PURE HYPERCHOLESTEROLEMIA: ICD-10-CM

## 2019-06-20 DIAGNOSIS — Q61.2 ADPKD (AUTOSOMAL DOMINANT POLYCYSTIC KIDNEY DISEASE): ICD-10-CM

## 2019-06-20 DIAGNOSIS — R31.29 MICROHEMATURIA: ICD-10-CM

## 2019-06-20 DIAGNOSIS — E78.00 PURE HYPERCHOLESTEROLEMIA: Primary | ICD-10-CM

## 2019-06-20 DIAGNOSIS — M79.2 NERVE PAIN: ICD-10-CM

## 2019-06-20 DIAGNOSIS — K76.89 LIVER CYST: ICD-10-CM

## 2019-06-20 LAB
ALBUMIN SERPL BCP-MCNC: 3.7 G/DL (ref 3.5–5.2)
ALP SERPL-CCNC: 97 U/L (ref 55–135)
ALT SERPL W/O P-5'-P-CCNC: 16 U/L (ref 10–44)
ANION GAP SERPL CALC-SCNC: 7 MMOL/L (ref 8–16)
AST SERPL-CCNC: 17 U/L (ref 10–40)
BILIRUB SERPL-MCNC: 0.6 MG/DL (ref 0.1–1)
BUN SERPL-MCNC: 19 MG/DL (ref 8–23)
CALCIUM SERPL-MCNC: 10 MG/DL (ref 8.7–10.5)
CHLORIDE SERPL-SCNC: 105 MMOL/L (ref 95–110)
CHOLEST SERPL-MCNC: 137 MG/DL (ref 120–199)
CHOLEST/HDLC SERPL: 2.6 {RATIO} (ref 2–5)
CO2 SERPL-SCNC: 29 MMOL/L (ref 23–29)
CREAT SERPL-MCNC: 1 MG/DL (ref 0.5–1.4)
EST. GFR  (AFRICAN AMERICAN): >60 ML/MIN/1.73 M^2
EST. GFR  (NON AFRICAN AMERICAN): 58 ML/MIN/1.73 M^2
GLUCOSE SERPL-MCNC: 78 MG/DL (ref 70–110)
HDLC SERPL-MCNC: 52 MG/DL (ref 40–75)
HDLC SERPL: 38 % (ref 20–50)
LDLC SERPL CALC-MCNC: 69.2 MG/DL (ref 63–159)
NONHDLC SERPL-MCNC: 85 MG/DL
POTASSIUM SERPL-SCNC: 4.4 MMOL/L (ref 3.5–5.1)
PROT SERPL-MCNC: 8 G/DL (ref 6–8.4)
SODIUM SERPL-SCNC: 141 MMOL/L (ref 136–145)
TRIGL SERPL-MCNC: 79 MG/DL (ref 30–150)
TSH SERPL DL<=0.005 MIU/L-ACNC: 1.56 UIU/ML (ref 0.4–4)

## 2019-06-20 PROCEDURE — 80061 LIPID PANEL: CPT | Mod: HCNC

## 2019-06-20 PROCEDURE — 99999 PR PBB SHADOW E&M-EST. PATIENT-LVL IV: CPT | Mod: PBBFAC,HCNC,, | Performed by: INTERNAL MEDICINE

## 2019-06-20 PROCEDURE — 3079F DIAST BP 80-89 MM HG: CPT | Mod: HCNC,CPTII,S$GLB, | Performed by: INTERNAL MEDICINE

## 2019-06-20 PROCEDURE — 80053 COMPREHEN METABOLIC PANEL: CPT | Mod: HCNC

## 2019-06-20 PROCEDURE — 99999 PR PBB SHADOW E&M-EST. PATIENT-LVL IV: ICD-10-PCS | Mod: PBBFAC,HCNC,, | Performed by: INTERNAL MEDICINE

## 2019-06-20 PROCEDURE — 99214 PR OFFICE/OUTPT VISIT, EST, LEVL IV, 30-39 MIN: ICD-10-PCS | Mod: HCNC,S$GLB,, | Performed by: INTERNAL MEDICINE

## 2019-06-20 PROCEDURE — 99214 OFFICE O/P EST MOD 30 MIN: CPT | Mod: HCNC,S$GLB,, | Performed by: INTERNAL MEDICINE

## 2019-06-20 PROCEDURE — 1101F PT FALLS ASSESS-DOCD LE1/YR: CPT | Mod: HCNC,CPTII,S$GLB, | Performed by: INTERNAL MEDICINE

## 2019-06-20 PROCEDURE — 3075F SYST BP GE 130 - 139MM HG: CPT | Mod: HCNC,CPTII,S$GLB, | Performed by: INTERNAL MEDICINE

## 2019-06-20 PROCEDURE — 3075F PR MOST RECENT SYSTOLIC BLOOD PRESS GE 130-139MM HG: ICD-10-PCS | Mod: HCNC,CPTII,S$GLB, | Performed by: INTERNAL MEDICINE

## 2019-06-20 PROCEDURE — 36415 COLL VENOUS BLD VENIPUNCTURE: CPT | Mod: HCNC

## 2019-06-20 PROCEDURE — 1101F PR PT FALLS ASSESS DOC 0-1 FALLS W/OUT INJ PAST YR: ICD-10-PCS | Mod: HCNC,CPTII,S$GLB, | Performed by: INTERNAL MEDICINE

## 2019-06-20 PROCEDURE — 84443 ASSAY THYROID STIM HORMONE: CPT | Mod: HCNC

## 2019-06-20 PROCEDURE — 3079F PR MOST RECENT DIASTOLIC BLOOD PRESSURE 80-89 MM HG: ICD-10-PCS | Mod: HCNC,CPTII,S$GLB, | Performed by: INTERNAL MEDICINE

## 2019-06-20 NOTE — PROGRESS NOTES
Subjective:      Patient ID: Manju Aranda is a 68 y.o. female.    Chief Complaint: Follow-up    HPI:  HPI   Patient here for follow up:  Patient is taking rosuvastatin 5 mg daily with no side effectsTaking the 2 ASCVD scores 6.8 to 7.4%. Patient is not on cholesterol medication    Blood pressure is excellent, patient is tired     Annual exam:   Colonoscopy : 2010 patient believes 5 years ( history of polyps and diverticulitis): Colonoscopy 10/23/2015: 5 polyps removed: Dr. Esparza  2018: 2 polyps follow up 5 years  Endoscopy:2011 : gastric polyp   Mammogram : 10/2018  Gyn: Dr. Dooley every 2 years  Optho: Dr. Kumar 2019  Flu: due in the fall  Tetanus: 8/2017  Shingles: done Shingrix discussed  Pneumovax: done  Prevnar 13 done       Consultants:  Dr. Ruelas: Diverticulitis/polyps due colonoscopy 2023  Dr. Alba  Multiple myeloma: no treatment every 6   months  Ms. Brisa Gill : polycystic liver reminder for 2/2018  Nephrology: nurse practitioner  2017 Polycystic kidney Dr. Bui: RBC in urine  Nephrology: Needs new physician  Dr. Dooley every 2 years  Ms. Lela Chambers  migraines 2017  Dermatology: no skin cancers  Dr. Aguiar 2017  IMPRESSION:  Hyperthyroidism with a goiter, most likely this is autoimmune   thyroid disease, less likely would be subacute thyroiditis.  Being treated for URI  PLAN:  decrease methimazole to 5 mg twice   a day and follow up in approximately 4 months.   Call for problems and can see early or obtain TSH sooner         Patient Active Problem List   Diagnosis    Hypertension    Liver cyst    Paraproteinemia    Smoldering multiple myeloma (SMM)    Migraine headache    Mitral valve disorder    Tricuspid valve disease    ADPKD (autosomal dominant polycystic kidney disease)    Benign hypertensive renal disease without renal failure    Tachycardia    Hyperthyroidism    Aortic atherosclerosis    Microhematuria    Graves disease    Tortuous aorta     Morbid obesity with BMI of 40.0-44.9, adult    Abdominal pain, generalized    History of adenomatous polyp of colon    History of hyperthyroidism     Past Medical History:   Diagnosis Date    Allergy     Anemia     Arthritis     Cholelithiases     Cyst of kidney, acquired     Diverticulitis     Elevated TSH     Family history of Graves' disease: daughter, maternal aunt, maternal uncle 9/13/2016    Glaucoma     Graves disease     Hx of colonic polyps     Hypertension 1981    Liver cyst     MGUS (monoclonal gammopathy of unknown significance)     Migraine headache     Mitral valve problem     leakage    Obesity     Paraproteinemia     Smoldering multiple myeloma 2013    Tricuspid valve disease     leakage     Past Surgical History:   Procedure Laterality Date    APPENDECTOMY      COLONOSCOPY N/A 11/5/2018    [facility]    COLONOSCOPY N/A 10/23/2015    [facility] ([facility]TH FLR)    HYSTERECTOMY  1978 or 1979    OOPHORECTOMY       Family History   Problem Relation Age of Onset    Heart disease Mother         MI    Heart attack Mother     Heart disease Maternal Aunt         MI    Heart disease Maternal Aunt         MI    Cancer Paternal Grandmother         GYN cancer - unknown cancer    Colon cancer Maternal Uncle     Cancer Maternal Uncle         colon ca    Hypertension Father     Heart disease Sister         fast heart rate    Cataracts Sister     Glaucoma Sister     Graves' disease Daughter     No Known Problems Son     No Known Problems Sister     No Known Problems Daughter     No Known Problems Son     Melanoma Neg Hx     Breast cancer Neg Hx     Ovarian cancer Neg Hx     Colon polyps Neg Hx     Rectal cancer Neg Hx     Stomach cancer Neg Hx     Esophageal cancer Neg Hx     Ulcerative colitis Neg Hx     Crohn's disease Neg Hx     Amblyopia Neg Hx     Blindness Neg Hx     Macular  "degeneration Neg Hx     Strabismus Neg Hx     Retinal detachment Neg Hx      Review of Systems   Constitutional: Negative for chills, fever and unexpected weight change.   HENT: Negative for trouble swallowing.    Respiratory: Negative for cough, shortness of breath and wheezing.    Cardiovascular: Negative for chest pain and palpitations.   Gastrointestinal: Negative for abdominal distention, abdominal pain, blood in stool and vomiting.   Musculoskeletal: Negative for back pain.     Objective:     Vitals:    06/20/19 1014   BP: 130/80   Pulse: 77   SpO2: (!) 93%   Weight: 126.8 kg (279 lb 8.7 oz)   Height: 5' 9" (1.753 m)   PainSc: 0-No pain     Body mass index is 41.28 kg/m².  Physical Exam   Constitutional: She is oriented to person, place, and time. She appears well-developed and well-nourished. No distress.   HENT:   Right Ear: Tympanic membrane and ear canal normal.   Left Ear: Tympanic membrane and ear canal normal.   Nose: No sinus tenderness or nasal deformity.   Mouth/Throat: No oropharyngeal exudate or posterior oropharyngeal erythema.   Eyes: Pupils are equal, round, and reactive to light. Conjunctivae, EOM and lids are normal.   Neck: Carotid bruit is not present. No thyromegaly present.   Cardiovascular: Normal rate, regular rhythm and intact distal pulses.   Pulmonary/Chest: Effort normal and breath sounds normal. No respiratory distress.   Abdominal: Soft. Bowel sounds are normal. There is no tenderness.   Musculoskeletal: She exhibits no edema or tenderness.   Lymphadenopathy:        Head (right side): No submandibular adenopathy present.        Head (left side): No submandibular adenopathy present.     She has no cervical adenopathy.     She has no axillary adenopathy.        Right: No inguinal adenopathy present.        Left: No inguinal adenopathy present.   Neurological: She is alert and oriented to person, place, and time. She has normal strength.   Skin: Skin is intact. No lesion noted. "   Psychiatric: She has a normal mood and affect. Her speech is normal and behavior is normal.     Assessment:     1. Pure hypercholesterolemia    2. Liver cyst    3. ADPKD (autosomal dominant polycystic kidney disease)    4. History of hyperthyroidism    5. Nerve pain    6. Microhematuria      Plan:   Manju was seen today for follow-up.    Diagnoses and all orders for this visit:    Pure hypercholesterolemia  Comments:  Followed on statin  Orders:  -     Lipid panel; Future  -     Comprehensive metabolic panel; Future    Liver cyst  Comments:  Follow up with hepatology, last appt some time ago  Orders:  -     Ambulatory Referral to Hepatology    ADPKD (autosomal dominant polycystic kidney disease)  Comments:  Followed in Nephrology  Orders:  -     Ambulatory consult to Nephrology    History of hyperthyroidism  Comments:  Normal thyroid  Orders:  -     TSH; Future    Nerve pain  Comments:  Continue gabapentin    Microhematuria  Comments:  Followed in Urology        Problem List Items Addressed This Visit     Liver cyst    Relevant Orders    Ambulatory Referral to Hepatology    ADPKD (autosomal dominant polycystic kidney disease)    Relevant Orders    Ambulatory consult to Nephrology    Microhematuria    History of hyperthyroidism    Relevant Orders    TSH (Completed)      Other Visit Diagnoses     Pure hypercholesterolemia    -  Primary    Followed on statin    Relevant Orders    Lipid panel (Completed)    Comprehensive metabolic panel (Completed)    Nerve pain        Continue gabapentin        Orders Placed This Encounter   Procedures    TSH     Standing Status:   Future     Number of Occurrences:   1     Standing Expiration Date:   8/19/2019    Lipid panel     Standing Status:   Future     Number of Occurrences:   1     Standing Expiration Date:   8/19/2019    Comprehensive metabolic panel     Standing Status:   Future     Number of Occurrences:   1     Standing Expiration Date:   8/19/2019    Ambulatory  Referral to Hepatology     Referral Priority:   Routine     Referral Type:   Consultation     Referral Reason:   Specialty Services Required     Number of Visits Requested:   1    Ambulatory consult to Nephrology     Referral Priority:   Routine     Referral Type:   Consultation     Referral Reason:   Specialty Services Required     Requested Specialty:   Nephrology     Number of Visits Requested:   1     Follow up in about 6 months (around 12/20/2019) for Follow up.     Medication List           Accurate as of 6/20/19  9:43 PM. If you have any questions, ask your nurse or doctor.               CONTINUE taking these medications    gabapentin 300 MG capsule  Commonly known as:  NEURONTIN  Take 1 capsule (300 mg total) by mouth every evening. For nerve pain     magnesium oxide 400 mg (241.3 mg magnesium) tablet  Commonly known as:  MAG-OX     multivit with min-folic acid 0.4 mg Tab     ondansetron 4 MG tablet  Commonly known as:  ZOFRAN  Take 1-2 tablets (4-8 mg total) by mouth every 8 (eight) hours as needed for Nausea.     quinapril 20 MG tablet  Commonly known as:  ACCUPRIL  TAKE 1 TABLET BY MOUTH EVERY DAY     rizatriptan 10 MG tablet  Commonly known as:  MAXALT  Take 1 tablet (10 mg total) by mouth every 2 (two) hours as needed for Migraine. Max 30mg/24hs     rosuvastatin 5 MG tablet  Commonly known as:  CRESTOR  Take 1 tablet (5 mg total) by mouth once daily.        STOP taking these medications    ciprofloxacin HCl 250 MG tablet  Commonly known as:  CIPRO  Stopped by:  Payton Garay MD     metroNIDAZOLE 500 MG tablet  Commonly known as:  FLAGYL  Stopped by:  Payton Garay MD

## 2019-06-25 ENCOUNTER — TELEPHONE (OUTPATIENT)
Dept: HEMATOLOGY/ONCOLOGY | Facility: CLINIC | Age: 69
End: 2019-06-25

## 2019-06-25 NOTE — TELEPHONE ENCOUNTER
----- Message from Lindy Abbasi sent at 6/25/2019 12:43 PM CDT -----  Contact: pt   Pt cristino;led to schedule appt   Callback#[phone]  Thank You  ERNESTINE Abbasi

## 2019-07-08 ENCOUNTER — TELEPHONE (OUTPATIENT)
Dept: INTERNAL MEDICINE | Facility: CLINIC | Age: 69
End: 2019-07-08

## 2019-07-08 NOTE — TELEPHONE ENCOUNTER
----- Message from Mercy Major sent at 7/8/2019  3:59 PM CDT -----  Contact: Patient [phone]  Type: Orders Request    What orders/ testing are being requested? EPP    Is there a future appointment scheduled for the patient with PCP? Yes    When? 9/10/19

## 2019-07-10 ENCOUNTER — TELEPHONE (OUTPATIENT)
Dept: NEPHROLOGY | Facility: CLINIC | Age: 69
End: 2019-07-10

## 2019-07-10 DIAGNOSIS — Q61.2 ADPKD (AUTOSOMAL DOMINANT POLYCYSTIC KIDNEY DISEASE): Primary | ICD-10-CM

## 2019-07-29 ENCOUNTER — OFFICE VISIT (OUTPATIENT)
Dept: HEPATOLOGY | Facility: CLINIC | Age: 69
End: 2019-07-29
Attending: INTERNAL MEDICINE
Payer: MEDICARE

## 2019-07-29 VITALS
SYSTOLIC BLOOD PRESSURE: 138 MMHG | OXYGEN SATURATION: 95 % | HEART RATE: 78 BPM | HEIGHT: 69 IN | WEIGHT: 280.63 LBS | BODY MASS INDEX: 41.56 KG/M2 | RESPIRATION RATE: 18 BRPM | DIASTOLIC BLOOD PRESSURE: 92 MMHG

## 2019-07-29 DIAGNOSIS — K76.89 LIVER CYST: Primary | ICD-10-CM

## 2019-07-29 PROCEDURE — 99999 PR PBB SHADOW E&M-EST. PATIENT-LVL III: ICD-10-PCS | Mod: PBBFAC,HCNC,, | Performed by: INTERNAL MEDICINE

## 2019-07-29 PROCEDURE — 3080F DIAST BP >= 90 MM HG: CPT | Mod: HCNC,CPTII,S$GLB, | Performed by: INTERNAL MEDICINE

## 2019-07-29 PROCEDURE — 3075F PR MOST RECENT SYSTOLIC BLOOD PRESS GE 130-139MM HG: ICD-10-PCS | Mod: HCNC,CPTII,S$GLB, | Performed by: INTERNAL MEDICINE

## 2019-07-29 PROCEDURE — 1101F PT FALLS ASSESS-DOCD LE1/YR: CPT | Mod: HCNC,CPTII,S$GLB, | Performed by: INTERNAL MEDICINE

## 2019-07-29 PROCEDURE — 1101F PR PT FALLS ASSESS DOC 0-1 FALLS W/OUT INJ PAST YR: ICD-10-PCS | Mod: HCNC,CPTII,S$GLB, | Performed by: INTERNAL MEDICINE

## 2019-07-29 PROCEDURE — 3075F SYST BP GE 130 - 139MM HG: CPT | Mod: HCNC,CPTII,S$GLB, | Performed by: INTERNAL MEDICINE

## 2019-07-29 PROCEDURE — 99203 OFFICE O/P NEW LOW 30 MIN: CPT | Mod: HCNC,S$GLB,, | Performed by: INTERNAL MEDICINE

## 2019-07-29 PROCEDURE — 99999 PR PBB SHADOW E&M-EST. PATIENT-LVL III: CPT | Mod: PBBFAC,HCNC,, | Performed by: INTERNAL MEDICINE

## 2019-07-29 PROCEDURE — 3080F PR MOST RECENT DIASTOLIC BLOOD PRESSURE >= 90 MM HG: ICD-10-PCS | Mod: HCNC,CPTII,S$GLB, | Performed by: INTERNAL MEDICINE

## 2019-07-29 PROCEDURE — 99203 PR OFFICE/OUTPT VISIT, NEW, LEVL III, 30-44 MIN: ICD-10-PCS | Mod: HCNC,S$GLB,, | Performed by: INTERNAL MEDICINE

## 2019-07-29 NOTE — PROGRESS NOTES
HEPATOLOGY CONSULTATION    Referring Physician: Dr. ABHISHEK Garay  Current Corresponding Physician: Dr. ABHISHEK Garay    Reason for Consultation: Consultation for evaluation of Liver Cyst    History of Present Illness: Manju Aranda is a 68 y.o. femalewho presents for evaluation of   Chief Complaint   Patient presents with    Liver Cyst   DATE OF VISIT:  07/29/2019.    Manju Aranda was seen today in the [facility]atology Clinic having been referred for   simple hepatic cysts that have been visualized both on a dedicated abdominal CT   scan as well as a renal CT study.  They are very small.  She has no symptoms   related to abdominal pain.  There is no prior history of chronic liver disease.    Her liver biochemistry and liver function is normal.      NG/HN  dd: 07/29/2019 09:22:01 (CDT)  td: 07/29/2019 23:47:59 (CDT)  Doc ID   #[id number]  Job ID #[id number]    CC:         Past Medical History:   Diagnosis Date    Allergy     Anemia     Arthritis     Cholelithiases     Cyst of kidney, acquired     Diverticulitis     Elevated TSH     Family history of Graves' disease: daughter, maternal aunt, maternal uncle 9/13/2016    Glaucoma     Graves disease     Hx of colonic polyps     Hypertension 1981    Liver cyst     MGUS (monoclonal gammopathy of unknown significance)     Migraine headache     Mitral valve problem     leakage    Obesity     Paraproteinemia     Smoldering multiple myeloma 2013    Tricuspid valve disease     leakage     Outpatient Encounter Medications as of 7/29/2019   Medication Sig Dispense Refill    gabapentin (NEURONTIN) 300 MG capsule Take 1 capsule (300 mg total) by mouth every evening. For nerve pain 30 capsule 3    magnesium oxide (MAG-OX) 400 mg tablet Take 400 mg by mouth once daily.      multivit with min-folic acid 0.4 mg Tab Take 0.4 mg by mouth once daily.      ondansetron (ZOFRAN) 4 MG tablet Take 1-2 tablets (4-8 mg total) by mouth every 8 (eight) hours as needed for Nausea.  (Patient taking differently: Take 4-8 mg by mouth every 8 (eight) hours as needed for Nausea. ) 20 tablet 3    quinapril (ACCUPRIL) 20 MG tablet TAKE 1 TABLET BY MOUTH EVERY DAY 90 tablet 3    rizatriptan (MAXALT) 10 MG tablet Take 1 tablet (10 mg total) by mouth every 2 (two) hours as needed for Migraine. Max 30mg/24hs 12 tablet 5    rosuvastatin (CRESTOR) 5 MG tablet Take 1 tablet (5 mg total) by mouth once daily. 90 tablet 3     No facility-administered encounter medications on file as of 7/29/2019.      Review of patient's allergies indicates:  No Known Allergies  Family History   Problem Relation Age of Onset    Heart disease Mother         MI    Heart attack Mother     Heart disease Maternal Aunt         MI    Heart disease Maternal Aunt         MI    Cancer Paternal Grandmother         GYN cancer - unknown cancer    Colon cancer Maternal Uncle     Cancer Maternal Uncle         colon ca    Hypertension Father     Heart disease Sister         fast heart rate    Cataracts Sister     Glaucoma Sister     Graves' disease Daughter     No Known Problems Son     No Known Problems Sister     No Known Problems Daughter     No Known Problems Son     Melanoma Neg Hx     Breast cancer Neg Hx     Ovarian cancer Neg Hx     Colon polyps Neg Hx     Rectal cancer Neg Hx     Stomach cancer Neg Hx     Esophageal cancer Neg Hx     Ulcerative colitis Neg Hx     Crohn's disease Neg Hx     Amblyopia Neg Hx     Blindness Neg Hx     Macular degeneration Neg Hx     Strabismus Neg Hx     Retinal detachment Neg Hx        Social History     Socioeconomic History    Marital status: Single     Spouse name: Not on file    Number of children: Not on file    Years of education: Not on file    Highest education level: Not on file   Occupational History    Occupation: RETIRED   Social Needs    Financial resource strain: Not on file    Food insecurity:     Worry: Not on file     Inability: Not on file     Transportation needs:     Medical: Not on file     Non-medical: Not on file   Tobacco Use    Smoking status: Former Smoker     Years: 3.00     Types: Cigarettes    Smokeless tobacco: Never Used   Substance and Sexual Activity    Alcohol use: No    Drug use: No    Sexual activity: Never     Birth control/protection: Post-menopausal   Lifestyle    Physical activity:     Days per week: Not on file     Minutes per session: Not on file    Stress: Not on file   Relationships    Social connections:     Talks on phone: Not on file     Gets together: Not on file     Attends [employer] service: Not on file     Active member of club or organization: Not on file     Attends meetings of clubs or organizations: Not on file     Relationship status: Not on file   Other Topics Concern    Are you pregnant or think you may be? Not Asked    Breast-feeding Not Asked   Social History Narrative    Not on file     Review of Systems   Constitutional: Negative for appetite change, fatigue and unexpected weight change.   HENT: Negative for ear pain, hearing loss, sore throat and trouble swallowing.    Eyes: Negative for visual disturbance.   Respiratory: Negative for cough and shortness of breath.    Cardiovascular: Negative for chest pain and palpitations.   Gastrointestinal: Negative for abdominal pain, nausea and vomiting.   Genitourinary: Negative for difficulty urinating and dysuria.   Musculoskeletal: Negative for arthralgias and back pain.   Skin: Negative for rash.   Neurological: Negative for tremors, seizures and headaches.   Psychiatric/Behavioral: Negative for agitation and decreased concentration.     Vitals:    07/29/19 0910   BP: (!) 138/92   Pulse: 78   Resp: 18       Physical Exam   Constitutional: She is oriented to person, place, and time. She appears well-developed and well-nourished.   HENT:   Right Ear: External ear normal.   Left Ear: External ear normal.   Mouth/Throat: Oropharynx is clear and moist.   Eyes:  No scleral icterus.   Cardiovascular: Normal rate, regular rhythm and normal heart sounds. Exam reveals no gallop and no friction rub.   No murmur heard.  Pulmonary/Chest: Effort normal and breath sounds normal. No respiratory distress. She has no wheezes.   Abdominal: Soft. Bowel sounds are normal. She exhibits no distension and no mass. There is no tenderness.   Musculoskeletal: Normal range of motion. She exhibits no edema.   Lymphadenopathy:     She has no cervical adenopathy.   Neurological: She is alert and oriented to person, place, and time. She has normal strength.   Skin: Skin is warm, dry and intact. She is not diaphoretic. No pallor.       Computed MELD-Na score unavailable. Necessary lab results were not found in the last year.  Computed MELD score unavailable. Necessary lab results were not found in the last year.    Lab Results   Component Value Date    GLU 78 06/20/2019    BUN 19 06/20/2019    CREATININE 1.0 06/20/2019    CALCIUM 10.0 06/20/2019     06/20/2019    K 4.4 06/20/2019     06/20/2019    PROT 8.0 06/20/2019    CO2 29 06/20/2019    ANIONGAP 7 (L) 06/20/2019    WBC 3.83 (L) 01/09/2019    RBC 3.74 (L) 01/09/2019    HGB 11.3 (L) 01/09/2019    HCT 36.5 (L) 01/09/2019    MCV 98 01/09/2019    MCH 30.2 01/09/2019    MCHC 31.0 (L) 01/09/2019     Lab Results   Component Value Date    RDW 12.0 01/09/2019     01/09/2019    MPV 10.3 01/09/2019    GRAN 2.0 01/09/2019    GRAN 51.2 01/09/2019    LYMPH 1.5 01/09/2019    LYMPH 39.4 01/09/2019    MONO 0.3 01/09/2019    MONO 8.1 01/09/2019    EOSINOPHIL 0.3 01/09/2019    BASOPHIL 0.5 01/09/2019    EOS 0.0 01/09/2019    BASO 0.02 01/09/2019    APTT 26.1 05/01/2013    BNP <10 04/30/2013    CHOL 137 06/20/2019    TRIG 79 06/20/2019    HDL 52 06/20/2019    CHOLHDL 38.0 06/20/2019    TOTALCHOLEST 2.6 06/20/2019    ALBUMIN 3.7 06/20/2019    BILIDIR 0.2 11/01/2016    AST 17 06/20/2019    ALT 16 06/20/2019    ALKPHOS 97 06/20/2019    MG 1.8  09/08/2016    LABPROT 11.2 05/01/2013    INR 1.0 05/01/2013       Assessment and Plan:  Patient Active Problem List   Diagnosis    Hypertension    Liver cyst    Paraproteinemia    Smoldering multiple myeloma (SMM)    Migraine headache    Mitral valve disorder    Tricuspid valve disease    ADPKD (autosomal dominant polycystic kidney disease)    Benign hypertensive renal disease without renal failure    Tachycardia    Hyperthyroidism    Aortic atherosclerosis    Microhematuria    Graves disease    Tortuous aorta    Morbid obesity with BMI of 40.0-44.9, adult    Abdominal pain, generalized    History of adenomatous polyp of colon    History of hyperthyroidism     Manju Aranda is a 68 y.o. female withLiver Cyst    Impression:  1. Simple hepatic cysts in the absence of evidence of chronic liver disease    Plan:  Will order ultrasound to establish whether they can be visualized on ultrasound  Follow up every 1-2 years with ultrasound

## 2019-07-30 ENCOUNTER — OFFICE VISIT (OUTPATIENT)
Dept: UROLOGY | Facility: CLINIC | Age: 69
End: 2019-07-30
Payer: MEDICARE

## 2019-07-30 VITALS
WEIGHT: 281.06 LBS | DIASTOLIC BLOOD PRESSURE: 80 MMHG | HEART RATE: 71 BPM | BODY MASS INDEX: 41.63 KG/M2 | HEIGHT: 69 IN | SYSTOLIC BLOOD PRESSURE: 137 MMHG

## 2019-07-30 DIAGNOSIS — N28.1 RENAL CYST: ICD-10-CM

## 2019-07-30 DIAGNOSIS — R31.29 MICROHEMATURIA: Primary | ICD-10-CM

## 2019-07-30 LAB
HYALINE CASTS UR QL AUTO: 1 /LPF
MICROSCOPIC COMMENT: NORMAL
RBC #/AREA URNS AUTO: 4 /HPF (ref 0–4)
SQUAMOUS #/AREA URNS AUTO: 0 /HPF
WBC #/AREA URNS AUTO: 3 /HPF (ref 0–5)

## 2019-07-30 PROCEDURE — 3079F DIAST BP 80-89 MM HG: CPT | Mod: HCNC,CPTII,S$GLB, | Performed by: UROLOGY

## 2019-07-30 PROCEDURE — 99214 OFFICE O/P EST MOD 30 MIN: CPT | Mod: HCNC,S$GLB,, | Performed by: UROLOGY

## 2019-07-30 PROCEDURE — 99999 PR PBB SHADOW E&M-EST. PATIENT-LVL III: CPT | Mod: PBBFAC,HCNC,, | Performed by: UROLOGY

## 2019-07-30 PROCEDURE — 3079F PR MOST RECENT DIASTOLIC BLOOD PRESSURE 80-89 MM HG: ICD-10-PCS | Mod: HCNC,CPTII,S$GLB, | Performed by: UROLOGY

## 2019-07-30 PROCEDURE — 3075F SYST BP GE 130 - 139MM HG: CPT | Mod: HCNC,CPTII,S$GLB, | Performed by: UROLOGY

## 2019-07-30 PROCEDURE — 3075F PR MOST RECENT SYSTOLIC BLOOD PRESS GE 130-139MM HG: ICD-10-PCS | Mod: HCNC,CPTII,S$GLB, | Performed by: UROLOGY

## 2019-07-30 PROCEDURE — 99214 PR OFFICE/OUTPT VISIT, EST, LEVL IV, 30-39 MIN: ICD-10-PCS | Mod: HCNC,S$GLB,, | Performed by: UROLOGY

## 2019-07-30 PROCEDURE — 99999 PR PBB SHADOW E&M-EST. PATIENT-LVL III: ICD-10-PCS | Mod: PBBFAC,HCNC,, | Performed by: UROLOGY

## 2019-07-30 PROCEDURE — 81001 URINALYSIS AUTO W/SCOPE: CPT | Mod: HCNC

## 2019-07-30 PROCEDURE — 1101F PR PT FALLS ASSESS DOC 0-1 FALLS W/OUT INJ PAST YR: ICD-10-PCS | Mod: HCNC,CPTII,S$GLB, | Performed by: UROLOGY

## 2019-07-30 PROCEDURE — 1101F PT FALLS ASSESS-DOCD LE1/YR: CPT | Mod: HCNC,CPTII,S$GLB, | Performed by: UROLOGY

## 2019-07-30 NOTE — PROGRESS NOTES
Subjective:       Patient ID: Manju Aranda is a 68 y.o. female.    Chief Complaint: Follow-up    Manju Aranda is a 67 y.o. Female here for follow up of microhematuria.   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    2018 5 RBC and 10 RBC   and  ua showed microhematuria. 8-10 RBC.  No gross hematuria.    She does have PCKD.     She has urinary frequency, but is on a diuretic. Every 2 hours. No change since last year.   Nocturia 3 times. She does limit fluids 2 hours before bedtime. No sleep apnea. No lower extremity edema.  No constipation.   No gross hematuria. Cysto  normal  No recurrent UTI.   No incontinence.     H/o of diverticulitis. She is on a probiotic daily.  H/o of hysterectomy in 78.  , 2 vaginal, 2 c-sections.     CT  Genitourinary: Normal in size and location.  Redemonstration of multiple bilateral renal cysts in this patient with autosomal dominant polycystic kidney disease.  The largest cyst within the right kidney is stable in size measuring 11.7 cm, however has wall thickening that has overall increased since 2016.  There are 2 small lesions within the right kidney that appear isodense on today's exam and an additional cyst at the lower pole with a hyperdense peripheral focus, best seen on coronal.  Bladder demonstrates smooth contours without bladder wall thickening.    Reproductive organs: Status post hysterectomy and oophorectomy.    GI tract/Mesentery: Stomach is normal appearance.  Visualized loops of small and large bowel are normal in caliber without evidence for inflammation or obstruction.  Status post appendectomy.  There is colonic diverticulosis and evidence for diverticulitis.    Peritoneal Space: No abdominopelvic ascites or intraperitoneal free air.    Retroperitoneum: No significant adenopathy.    Abdominal wall: Normal.    Vasculature: Abdominal aorta is normal in caliber with minimal calcific atherosclerosis.    Bones: Stable degenerative changes without acute  fracture or bony destructive process.              Hematuria   Irritative symptoms include frequency and nocturia. Irritative symptoms do not include urgency. Pertinent negatives include no chills, dysuria or fever.       Past Surgical History:   Procedure Laterality Date    APPENDECTOMY      COLONOSCOPY N/A 11/5/2018    Performed by Brandon R[facility]as [facility] at [facility] (4TH FLR)    COLONOSCOPY N/A 10/23/2015    Performed by Brandon [facility]as MD at [facility] (4TH FLR)    HYSTERECTOMY  1978 or 1979    OOPHORECTOMY         Past Medical History:   Diagnosis Date    Allergy     Anemia     Arthritis     Cholelithiases     Cyst of kidney, acquired     Diverticulitis     Elevated TSH     Family history of Graves' disease: daughter, maternal aunt, maternal uncle 9/13/2016    Glaucoma     Graves disease     Hx of colonic polyps     Hypertension 1981    Liver cyst     MGUS (monoclonal gammopathy of unknown significance)     Migraine headache     Mitral valve problem     leakage    Obesity     Paraproteinemia     Smoldering multiple myeloma 2013    Tricuspid valve disease     leakage       Social History     Socioeconomic History    Marital status: Single     Spouse name: Not on file    Number of children: Not on file    Years of education: Not on file    Highest education level: Not on file   Occupational History    Occupation: RETIRED   Social Needs    Financial resource strain: Not on file    Food insecurity:     Worry: Not on file     Inability: Not on file    Transportation needs:     Medical: Not on file     Non-medical: Not on file   Tobacco Use    Smoking status: Former Smoker     Years: 3.00     Types: Cigarettes    Smokeless tobacco: Never Used   Substance and Sexual Activity    Alcohol use: No    Drug use: No    Sexual activity: Never     Birth control/protection: Post-menopausal   Lifestyle    Physical activity:     Days per week: Not on file     Minutes per session: Not on file     Stress: Not on file   Relationships    Social connections:     Talks on phone: Not on file     Gets together: Not on file     Attends Mandaen service: Not on file     Active member of club or organization: Not on file     Attends meetings of clubs or organizations: Not on file     Relationship status: Not on file   Other Topics Concern    Are you pregnant or think you may be? Not Asked    Breast-feeding Not Asked   Social History Narrative    Not on file       Family History   Problem Relation Age of Onset    Heart disease Mother         MI    Heart attack Mother     Heart disease Maternal Aunt         MI    Heart disease Maternal Aunt         MI    Cancer Paternal Grandmother         GYN cancer - unknown cancer    Colon cancer Maternal Uncle     Cancer Maternal Uncle         colon ca    Hypertension Father     Heart disease Sister         fast heart rate    Cataracts Sister     Glaucoma Sister     Graves' disease Daughter     No Known Problems Son     No Known Problems Sister     No Known Problems Daughter     No Known Problems Son     Melanoma Neg Hx     Breast cancer Neg Hx     Ovarian cancer Neg Hx     Colon polyps Neg Hx     Rectal cancer Neg Hx     Stomach cancer Neg Hx     Esophageal cancer Neg Hx     Ulcerative colitis Neg Hx     Crohn's disease Neg Hx     Amblyopia Neg Hx     Blindness Neg Hx     Macular degeneration Neg Hx     Strabismus Neg Hx     Retinal detachment Neg Hx        Current Outpatient Medications   Medication Sig Dispense Refill    gabapentin (NEURONTIN) 300 MG capsule Take 1 capsule (300 mg total) by mouth every evening. For nerve pain 30 capsule 3    magnesium oxide (MAG-OX) 400 mg tablet Take 400 mg by mouth once daily.      multivit with min-folic acid 0.4 mg Tab Take 0.4 mg by mouth once daily.      ondansetron (ZOFRAN) 4 MG tablet Take 1-2 tablets (4-8 mg total) by mouth every 8 (eight) hours as needed for Nausea. (Patient taking differently:  Take 4-8 mg by mouth every 8 (eight) hours as needed for Nausea. ) 20 tablet 3    quinapril (ACCUPRIL) 20 MG tablet TAKE 1 TABLET BY MOUTH EVERY DAY 90 tablet 3    rizatriptan (MAXALT) 10 MG tablet Take 1 tablet (10 mg total) by mouth every 2 (two) hours as needed for Migraine. Max 30mg/24hs 12 tablet 5    rosuvastatin (CRESTOR) 5 MG tablet Take 1 tablet (5 mg total) by mouth once daily. 90 tablet 3     No current facility-administered medications for this visit.        Review of patient's allergies indicates:  No Known Allergies    Review of Systems   Constitutional: Negative for chills, fever and unexpected weight change.   HENT: Negative for nosebleeds.    Eyes: Negative for visual disturbance.   Respiratory: Negative for chest tightness.    Cardiovascular: Negative for chest pain.   Gastrointestinal: Negative for diarrhea.   Genitourinary: Positive for frequency, hematuria and nocturia. Negative for dysuria and urgency.   Musculoskeletal: Negative for myalgias.   Skin: Negative for rash.   Neurological: Negative for seizures.   Hematological: Does not bruise/bleed easily.   Psychiatric/Behavioral: Negative for behavioral problems.       Objective:      Physical Exam   Vitals reviewed.  Constitutional: She is oriented to person, place, and time. She appears well-developed and well-nourished.   HENT:   Head: Normocephalic and atraumatic.   Eyes: No scleral icterus.   Cardiovascular: Normal rate and regular rhythm.    Pulmonary/Chest: Effort normal. No respiratory distress.   Abdominal: She exhibits no mass.   Musculoskeletal: She exhibits no tenderness.   Lymphadenopathy:     She has no cervical adenopathy.   Neurological: She is alert and oriented to person, place, and time.   Skin: Skin is warm and dry. No rash noted.     Psychiatric: She has a normal mood and affect.   urine dip 50 blood  Assessment:       1. BPH with urinary obstruction    2. Renal cyst        Plan:    urinalysis today and repeat UA and  symptoms in 1 year.  Repeat ultrasound in the future for ADPKD.   Films all personally reviewed

## 2019-07-31 ENCOUNTER — TELEPHONE (OUTPATIENT)
Dept: UROLOGY | Facility: CLINIC | Age: 69
End: 2019-07-31

## 2019-07-31 NOTE — TELEPHONE ENCOUNTER
Spoke to the patient and she verbally understood.    Jung      Message from Jovanna Bui MD sent at 7/30/2019  5:35 PM CDT -----  Let patient know only 4 red blood cells in urine

## 2019-08-02 ENCOUNTER — HOSPITAL ENCOUNTER (OUTPATIENT)
Dept: RADIOLOGY | Facility: HOSPITAL | Age: 69
Discharge: HOME | End: 2019-08-02
Attending: UROLOGY
Payer: MEDICARE

## 2019-08-02 ENCOUNTER — HOSPITAL ENCOUNTER (OUTPATIENT)
Dept: RADIOLOGY | Facility: HOSPITAL | Age: 69
Discharge: HOME | End: 2019-08-02
Attending: INTERNAL MEDICINE
Payer: MEDICARE

## 2019-08-02 DIAGNOSIS — N28.1 RENAL CYST: ICD-10-CM

## 2019-08-02 DIAGNOSIS — K76.89 LIVER CYST: ICD-10-CM

## 2019-08-02 PROCEDURE — 76700 US ABDOMEN COMPLETE: ICD-10-PCS | Mod: 26,HCNC,, | Performed by: RADIOLOGY

## 2019-08-02 PROCEDURE — 76770 US KIDNEY: ICD-10-PCS | Mod: 26,HCNC,, | Performed by: RADIOLOGY

## 2019-08-02 PROCEDURE — 76700 US EXAM ABDOM COMPLETE: CPT | Mod: TC,HCNC

## 2019-08-02 PROCEDURE — 76700 US EXAM ABDOM COMPLETE: CPT | Mod: 26,HCNC,, | Performed by: RADIOLOGY

## 2019-08-02 PROCEDURE — 76770 US EXAM ABDO BACK WALL COMP: CPT | Mod: TC,HCNC

## 2019-08-02 PROCEDURE — 76770 US EXAM ABDO BACK WALL COMP: CPT | Mod: 26,HCNC,, | Performed by: RADIOLOGY

## 2019-08-05 ENCOUNTER — TELEPHONE (OUTPATIENT)
Dept: HEPATOLOGY | Facility: CLINIC | Age: 69
End: 2019-08-05

## 2019-08-05 NOTE — TELEPHONE ENCOUNTER
Patient called on home/mobile number [phone]. No Answer.  Left message that [facility] Dr De La [facility]. He reviewed your recent US Abd and stated it was stable.  Please call us back for any questions at [phone], my name is Reina.  Results and message placed in the mail.

## 2019-08-05 NOTE — TELEPHONE ENCOUNTER
----- Message from Doc De La Cruz MD sent at 8/4/2019  6:33 PM CDT -----  Results reviewed. Please advise u/s stable.

## 2019-08-15 ENCOUNTER — TELEPHONE (OUTPATIENT)
Dept: UROLOGY | Facility: CLINIC | Age: 69
End: 2019-08-15

## 2019-08-15 DIAGNOSIS — N28.1 RENAL CYST: Primary | ICD-10-CM

## 2019-08-15 NOTE — TELEPHONE ENCOUNTER
A message was left on the patient's VM. She was told to call the office if she has any questions are concerns.    Jung

## 2019-08-20 ENCOUNTER — LAB VISIT (OUTPATIENT)
Dept: LAB | Facility: HOSPITAL | Age: 69
End: 2019-08-20
Attending: INTERNAL MEDICINE
Payer: MEDICARE

## 2019-08-20 ENCOUNTER — OFFICE VISIT (OUTPATIENT)
Dept: HEMATOLOGY/ONCOLOGY | Facility: CLINIC | Age: 69
End: 2019-08-20
Payer: MEDICARE

## 2019-08-20 VITALS
DIASTOLIC BLOOD PRESSURE: 72 MMHG | HEART RATE: 74 BPM | BODY MASS INDEX: 41.11 KG/M2 | WEIGHT: 277.56 LBS | HEIGHT: 69 IN | TEMPERATURE: 99 F | OXYGEN SATURATION: 98 % | SYSTOLIC BLOOD PRESSURE: 136 MMHG | RESPIRATION RATE: 16 BRPM

## 2019-08-20 DIAGNOSIS — D47.2 SMOLDERING MULTIPLE MYELOMA (SMM): ICD-10-CM

## 2019-08-20 DIAGNOSIS — D47.2 SMOLDERING MULTIPLE MYELOMA (SMM): Primary | ICD-10-CM

## 2019-08-20 DIAGNOSIS — Q61.2 ADPKD (AUTOSOMAL DOMINANT POLYCYSTIC KIDNEY DISEASE): ICD-10-CM

## 2019-08-20 LAB
ALBUMIN SERPL BCP-MCNC: 3.4 G/DL (ref 3.5–5.2)
ALBUMIN SERPL BCP-MCNC: 3.4 G/DL (ref 3.5–5.2)
ALP SERPL-CCNC: 98 U/L (ref 55–135)
ALT SERPL W/O P-5'-P-CCNC: 13 U/L (ref 10–44)
ANION GAP SERPL CALC-SCNC: 7 MMOL/L (ref 8–16)
ANION GAP SERPL CALC-SCNC: 7 MMOL/L (ref 8–16)
AST SERPL-CCNC: 17 U/L (ref 10–40)
BASOPHILS # BLD AUTO: 0.01 K/UL (ref 0–0.2)
BASOPHILS # BLD AUTO: 0.01 K/UL (ref 0–0.2)
BASOPHILS NFR BLD: 0.3 % (ref 0–1.9)
BASOPHILS NFR BLD: 0.3 % (ref 0–1.9)
BILIRUB SERPL-MCNC: 0.5 MG/DL (ref 0.1–1)
BUN SERPL-MCNC: 18 MG/DL (ref 8–23)
BUN SERPL-MCNC: 18 MG/DL (ref 8–23)
CALCIUM SERPL-MCNC: 10.2 MG/DL (ref 8.7–10.5)
CALCIUM SERPL-MCNC: 10.2 MG/DL (ref 8.7–10.5)
CHLORIDE SERPL-SCNC: 105 MMOL/L (ref 95–110)
CHLORIDE SERPL-SCNC: 105 MMOL/L (ref 95–110)
CO2 SERPL-SCNC: 28 MMOL/L (ref 23–29)
CO2 SERPL-SCNC: 28 MMOL/L (ref 23–29)
CREAT SERPL-MCNC: 1 MG/DL (ref 0.5–1.4)
CREAT SERPL-MCNC: 1 MG/DL (ref 0.5–1.4)
DIFFERENTIAL METHOD: ABNORMAL
DIFFERENTIAL METHOD: ABNORMAL
EOSINOPHIL # BLD AUTO: 0 K/UL (ref 0–0.5)
EOSINOPHIL # BLD AUTO: 0 K/UL (ref 0–0.5)
EOSINOPHIL NFR BLD: 0.3 % (ref 0–8)
EOSINOPHIL NFR BLD: 0.3 % (ref 0–8)
ERYTHROCYTE [DISTWIDTH] IN BLOOD BY AUTOMATED COUNT: 11.3 % (ref 11.5–14.5)
ERYTHROCYTE [DISTWIDTH] IN BLOOD BY AUTOMATED COUNT: 11.3 % (ref 11.5–14.5)
EST. GFR  (AFRICAN AMERICAN): >60 ML/MIN/1.73 M^2
EST. GFR  (AFRICAN AMERICAN): >60 ML/MIN/1.73 M^2
EST. GFR  (NON AFRICAN AMERICAN): 58.4 ML/MIN/1.73 M^2
EST. GFR  (NON AFRICAN AMERICAN): 58.4 ML/MIN/1.73 M^2
GLUCOSE SERPL-MCNC: 82 MG/DL (ref 70–110)
GLUCOSE SERPL-MCNC: 82 MG/DL (ref 70–110)
HCT VFR BLD AUTO: 37.9 % (ref 37–48.5)
HCT VFR BLD AUTO: 37.9 % (ref 37–48.5)
HGB BLD-MCNC: 11.8 G/DL (ref 12–16)
HGB BLD-MCNC: 11.8 G/DL (ref 12–16)
IGA SERPL-MCNC: 104 MG/DL (ref 40–350)
IGG SERPL-MCNC: 2143 MG/DL (ref 650–1600)
IGM SERPL-MCNC: 31 MG/DL (ref 50–300)
IMM GRANULOCYTES # BLD AUTO: 0.01 K/UL (ref 0–0.04)
IMM GRANULOCYTES # BLD AUTO: 0.01 K/UL (ref 0–0.04)
IMM GRANULOCYTES NFR BLD AUTO: 0.3 % (ref 0–0.5)
IMM GRANULOCYTES NFR BLD AUTO: 0.3 % (ref 0–0.5)
LYMPHOCYTES # BLD AUTO: 1.4 K/UL (ref 1–4.8)
LYMPHOCYTES # BLD AUTO: 1.4 K/UL (ref 1–4.8)
LYMPHOCYTES NFR BLD: 37.2 % (ref 18–48)
LYMPHOCYTES NFR BLD: 37.2 % (ref 18–48)
MCH RBC QN AUTO: 30.1 PG (ref 27–31)
MCH RBC QN AUTO: 30.1 PG (ref 27–31)
MCHC RBC AUTO-ENTMCNC: 31.1 G/DL (ref 32–36)
MCHC RBC AUTO-ENTMCNC: 31.1 G/DL (ref 32–36)
MCV RBC AUTO: 97 FL (ref 82–98)
MCV RBC AUTO: 97 FL (ref 82–98)
MONOCYTES # BLD AUTO: 0.3 K/UL (ref 0.3–1)
MONOCYTES # BLD AUTO: 0.3 K/UL (ref 0.3–1)
MONOCYTES NFR BLD: 8.5 % (ref 4–15)
MONOCYTES NFR BLD: 8.5 % (ref 4–15)
NEUTROPHILS # BLD AUTO: 2 K/UL (ref 1.8–7.7)
NEUTROPHILS # BLD AUTO: 2 K/UL (ref 1.8–7.7)
NEUTROPHILS NFR BLD: 53.4 % (ref 38–73)
NEUTROPHILS NFR BLD: 53.4 % (ref 38–73)
NRBC BLD-RTO: 0 /100 WBC
NRBC BLD-RTO: 0 /100 WBC
PHOSPHATE SERPL-MCNC: 2.6 MG/DL (ref 2.7–4.5)
PLATELET # BLD AUTO: 235 K/UL (ref 150–350)
PLATELET # BLD AUTO: 235 K/UL (ref 150–350)
PMV BLD AUTO: 10.1 FL (ref 9.2–12.9)
PMV BLD AUTO: 10.1 FL (ref 9.2–12.9)
POTASSIUM SERPL-SCNC: 4.1 MMOL/L (ref 3.5–5.1)
POTASSIUM SERPL-SCNC: 4.1 MMOL/L (ref 3.5–5.1)
PROT SERPL-MCNC: 8 G/DL (ref 6–8.4)
PTH-INTACT SERPL-MCNC: 65 PG/ML (ref 9–77)
RBC # BLD AUTO: 3.92 M/UL (ref 4–5.4)
RBC # BLD AUTO: 3.92 M/UL (ref 4–5.4)
SODIUM SERPL-SCNC: 140 MMOL/L (ref 136–145)
SODIUM SERPL-SCNC: 140 MMOL/L (ref 136–145)
WBC # BLD AUTO: 3.76 K/UL (ref 3.9–12.7)
WBC # BLD AUTO: 3.76 K/UL (ref 3.9–12.7)

## 2019-08-20 PROCEDURE — 80053 COMPREHEN METABOLIC PANEL: CPT | Mod: HCNC

## 2019-08-20 PROCEDURE — 82784 ASSAY IGA/IGD/IGG/IGM EACH: CPT | Mod: 59,HCNC

## 2019-08-20 PROCEDURE — 84165 PROTEIN E-PHORESIS SERUM: CPT | Mod: HCNC

## 2019-08-20 PROCEDURE — 1101F PT FALLS ASSESS-DOCD LE1/YR: CPT | Mod: HCNC,CPTII,S$GLB, | Performed by: INTERNAL MEDICINE

## 2019-08-20 PROCEDURE — 99499 UNLISTED E&M SERVICE: CPT | Mod: HCNC,S$GLB,, | Performed by: INTERNAL MEDICINE

## 2019-08-20 PROCEDURE — 99999 PR PBB SHADOW E&M-EST. PATIENT-LVL III: CPT | Mod: PBBFAC,HCNC,, | Performed by: INTERNAL MEDICINE

## 2019-08-20 PROCEDURE — 85025 COMPLETE CBC W/AUTO DIFF WBC: CPT | Mod: HCNC

## 2019-08-20 PROCEDURE — 84165 PATHOLOGIST INTERPRETATION SPE: ICD-10-PCS | Mod: 26,HCNC,, | Performed by: PATHOLOGY

## 2019-08-20 PROCEDURE — 86334 IMMUNOFIX E-PHORESIS SERUM: CPT | Mod: 26,HCNC,, | Performed by: PATHOLOGY

## 2019-08-20 PROCEDURE — 84165 PROTEIN E-PHORESIS SERUM: CPT | Mod: 26,HCNC,, | Performed by: PATHOLOGY

## 2019-08-20 PROCEDURE — 3075F PR MOST RECENT SYSTOLIC BLOOD PRESS GE 130-139MM HG: ICD-10-PCS | Mod: HCNC,CPTII,S$GLB, | Performed by: INTERNAL MEDICINE

## 2019-08-20 PROCEDURE — 99214 OFFICE O/P EST MOD 30 MIN: CPT | Mod: HCNC,S$GLB,, | Performed by: INTERNAL MEDICINE

## 2019-08-20 PROCEDURE — 86334 PATHOLOGIST INTERPRETATION IFE: ICD-10-PCS | Mod: 26,HCNC,, | Performed by: PATHOLOGY

## 2019-08-20 PROCEDURE — 99214 PR OFFICE/OUTPT VISIT, EST, LEVL IV, 30-39 MIN: ICD-10-PCS | Mod: HCNC,S$GLB,, | Performed by: INTERNAL MEDICINE

## 2019-08-20 PROCEDURE — 83970 ASSAY OF PARATHORMONE: CPT | Mod: HCNC

## 2019-08-20 PROCEDURE — 3078F DIAST BP <80 MM HG: CPT | Mod: HCNC,CPTII,S$GLB, | Performed by: INTERNAL MEDICINE

## 2019-08-20 PROCEDURE — 36415 COLL VENOUS BLD VENIPUNCTURE: CPT | Mod: HCNC

## 2019-08-20 PROCEDURE — 99499 RISK ADDL DX/OHS AUDIT: ICD-10-PCS | Mod: HCNC,S$GLB,, | Performed by: INTERNAL MEDICINE

## 2019-08-20 PROCEDURE — 1101F PR PT FALLS ASSESS DOC 0-1 FALLS W/OUT INJ PAST YR: ICD-10-PCS | Mod: HCNC,CPTII,S$GLB, | Performed by: INTERNAL MEDICINE

## 2019-08-20 PROCEDURE — 86334 IMMUNOFIX E-PHORESIS SERUM: CPT | Mod: HCNC

## 2019-08-20 PROCEDURE — 80069 RENAL FUNCTION PANEL: CPT | Mod: HCNC

## 2019-08-20 PROCEDURE — 3075F SYST BP GE 130 - 139MM HG: CPT | Mod: HCNC,CPTII,S$GLB, | Performed by: INTERNAL MEDICINE

## 2019-08-20 PROCEDURE — 83520 IMMUNOASSAY QUANT NOS NONAB: CPT | Mod: 59,HCNC

## 2019-08-20 PROCEDURE — 99999 PR PBB SHADOW E&M-EST. PATIENT-LVL III: ICD-10-PCS | Mod: PBBFAC,HCNC,, | Performed by: INTERNAL MEDICINE

## 2019-08-20 PROCEDURE — 3078F PR MOST RECENT DIASTOLIC BLOOD PRESSURE < 80 MM HG: ICD-10-PCS | Mod: HCNC,CPTII,S$GLB, | Performed by: INTERNAL MEDICINE

## 2019-08-20 NOTE — Clinical Note
cbc, cmp, serum free light chains, quantitative immunoglobulins, serum electropheresis, serum immunofixation , metastatic survey, and md appt in 6 months

## 2019-08-20 NOTE — PROGRESS NOTES
SECTION OF HEMATOLOGY AND BONE MARROW TRANSPLANT  Return Patient Visit   08/22/2019    CHIEF COMPLAINT:   No chief complaint on file.      HISTORY OF PRESENT ILLNESS:   68 y.o. female'; Ms. Aranda is here for  IgG kappa smoldering myeloma. Follow up.  No changes In clinical status since our last appt.   Feels well.  No focal pain. Denies fever, chills, nightsweats, bleeding, brusing, lymphadenopathy, signs/symptoms of splenomegaly, focal pain, neuropathy, recurrent infection             PAST MEDICAL HISTORY:   Past Medical History:   Diagnosis Date    Allergy     Anemia     Arthritis     Cholelithiases     Cyst of kidney, acquired     Diverticulitis     Elevated TSH     Family history of Graves' disease: daughter, maternal aunt, maternal uncle 9/13/2016    Glaucoma     Graves disease     Hx of colonic polyps     Hypertension 1981    Liver cyst     MGUS (monoclonal gammopathy of unknown significance)     Migraine headache     Mitral valve problem     leakage    Obesity     Paraproteinemia     Smoldering multiple myeloma 2013    Tricuspid valve disease     leakage       PAST SURGICAL HISTORY:   Past Surgical History:   Procedure Laterality Date    APPENDECTOMY      COLONOSCOPY N/A 11/5/2018    Performed by Brandon [facility]uelas MD at [facility] (4TH FLR)    COLONOSCOPY N/A 10/23/2015    Performed by [facility]as MD at [facility] (4TH FLR)    HYSTERECTOMY  1978 or 1979    OOPHORECTOMY         PAST SOCIAL HISTORY:   reports that she has quit smoking. Her smoking use included cigarettes. She quit after 3.00 years of use. She has never used smokeless tobacco. She reports that she does not drink alcohol or use drugs.    FAMILY HISTORY:  Family History   Problem Relation Age of Onset    Heart disease Mother         MI    Heart attack Mother     Heart disease Maternal Aunt         MI    Heart disease Maternal Aunt         MI    Cancer Paternal Grandmother         GYN cancer - unknown cancer     "Colon cancer Maternal Uncle     Cancer Maternal Uncle         colon ca    Hypertension Father     Heart disease Sister         fast heart rate    Cataracts Sister     Glaucoma Sister     Graves' disease Daughter     No Known Problems Son     No Known Problems Sister     No Known Problems Daughter     No Known Problems Son     Melanoma Neg Hx     Breast cancer Neg Hx     Ovarian cancer Neg Hx     Colon polyps Neg Hx     Rectal cancer Neg Hx     Stomach cancer Neg Hx     Esophageal cancer Neg Hx     Ulcerative colitis Neg Hx     Crohn's disease Neg Hx     Amblyopia Neg Hx     Blindness Neg Hx     Macular degeneration Neg Hx     Strabismus Neg Hx     Retinal detachment Neg Hx        CURRENT MEDICATIONS:   Current Outpatient Medications   Medication Sig    gabapentin (NEURONTIN) 300 MG capsule Take 1 capsule (300 mg total) by mouth every evening. For nerve pain    magnesium oxide (MAG-OX) 400 mg tablet Take 400 mg by mouth once daily.    multivit with min-folic acid 0.4 mg Tab Take 0.4 mg by mouth once daily.    ondansetron (ZOFRAN) 4 MG tablet Take 1-2 tablets (4-8 mg total) by mouth every 8 (eight) hours as needed for Nausea. (Patient taking differently: Take 4-8 mg by mouth every 8 (eight) hours as needed for Nausea. )    quinapril (ACCUPRIL) 20 MG tablet TAKE 1 TABLET BY MOUTH EVERY DAY    rizatriptan (MAXALT) 10 MG tablet Take 1 tablet (10 mg total) by mouth every 2 (two) hours as needed for Migraine. Max 30mg/24hs    rosuvastatin (CRESTOR) 5 MG tablet Take 1 tablet (5 mg total) by mouth once daily.     No current facility-administered medications for this visit.      ALLERGIES:   Review of patient's allergies indicates:  No Known Allergies      ASSESSMENTS:   PAIN ASSESSMENT (Patient reports Pain):   Vitals:    08/20/19 1012   BP: 136/72   Pulse: 74   Resp: 16   Temp: 98.5 °F (36.9 °C)   TempSrc: Oral   SpO2: 98%   Weight: 125.9 kg (277 lb 9 oz)   Height: 5' 9" (1.753 m)   PainSc: " 0-No pain     REVIEW OF SYSTEMS:     General ROS: negative  Psychological ROS: negative  Ophthalmic ROS: negative  ENT ROS: negative  Allergy and Immunology ROS: negative  Hematological and Lymphatic ROS: negative  Endocrine ROS: negative  Respiratory ROS: no cough, shortness of breath, or wheezing  Gastrointestinal ROS: no abdominal pain, change in bowel habits, or black or bloody stools  Genito-Urinary ROS: no dysuria, trouble voiding, or hematuria  Musculoskeletal ROS: negative  Neurological ROS: no TIA or stroke symptoms  Dermatological ROS: negative  PHYSICAL EXAM:   Vitals:    08/20/19 1012   BP: 136/72   Pulse: 74   Resp: 16   Temp: 98.5 °F (36.9 °C)       General - well developed, well nourished, no apparent distress  HEENT - oropharynx clear  Chest and Lung - clear to auscultation bilaterally   Cardiovascular - RRR with no MGR, normal S1 and S2  Abdomen-  soft, nontender, no palpable hepatomegaly or splenomegaly  Lymph - no palpable lymphadenopathy  Heme - no bruising, petechiae, pallor  Skin - no rashes or lesions  Psych - appropriate mood and affect      ECOG Performance Status: (foot note - ECOG PS provided by Eastern Cooperative Oncology Group) 0 - Asymptomatic    Karnofsky Performance Score:  100%- Normal, No Complaints, No Evidence of Disease  DATA:   Lab Results   Component Value Date    WBC 3.76 (L) 08/20/2019    WBC 3.76 (L) 08/20/2019    HGB 11.8 (L) 08/20/2019    HGB 11.8 (L) 08/20/2019    HCT 37.9 08/20/2019    HCT 37.9 08/20/2019    MCV 97 08/20/2019    MCV 97 08/20/2019     08/20/2019     08/20/2019     Gran # (ANC)   Date Value Ref Range Status   08/20/2019 2.0 1.8 - 7.7 K/uL Final   08/20/2019 2.0 1.8 - 7.7 K/uL Final     Gran%   Date Value Ref Range Status   08/20/2019 53.4 38.0 - 73.0 % Final   08/20/2019 53.4 38.0 - 73.0 % Final     Lymph #   Date Value Ref Range Status   08/20/2019 1.4 1.0 - 4.8 K/uL Final   08/20/2019 1.4 1.0 - 4.8 K/uL Final     Lymph%   Date Value Ref  Range Status   08/20/2019 37.2 18.0 - 48.0 % Final   08/20/2019 37.2 18.0 - 48.0 % Final     Kappa Free Light Chains   Date Value Ref Range Status   08/20/2019 16.21 (H) 0.33 - 1.94 mg/dL Final   01/09/2019 15.73 (H) 0.33 - 1.94 mg/dL Final   10/01/2018 17.49 (H) 0.33 - 1.94 mg/dL Final     Lambda Free Light Chains   Date Value Ref Range Status   08/20/2019 1.28 0.57 - 2.63 mg/dL Final   01/09/2019 1.51 0.57 - 2.63 mg/dL Final   10/01/2018 1.54 0.57 - 2.63 mg/dL Final     Kappa/Lambda FLC Ratio   Date Value Ref Range Status   08/20/2019 12.66 (H) 0.26 - 1.65 Final   01/09/2019 10.42 (H) 0.26 - 1.65 Final   10/01/2018 11.36 (H) 0.26 - 1.65 Final     Gamma grams/dl   Date Value Ref Range Status   08/20/2019 2.01 (H) 0.67 - 1.58 g/dL Final   01/09/2019 2.09 (H) 0.67 - 1.58 g/dL Final   10/01/2018 2.11 (H) 0.67 - 1.58 g/dL Final     IgG - Serum   Date Value Ref Range Status   08/20/2019 2143 (H) 650 - 1600 mg/dL Final     Comment:     IgG Cord Blood Reference Range: 650-1600 mg/dL.     IgA   Date Value Ref Range Status   08/20/2019 104 40 - 350 mg/dL Final     Comment:     IgA Cord Blood Reference Range: <5 mg/dL.     IgM   Date Value Ref Range Status   08/20/2019 31 (L) 50 - 300 mg/dL Final     Comment:     IgM Cord Blood Reference Range: <25 mg/dL.       Bone Marrow Biopsy - 6/26/13  BONE MARROW ASPIRATE, BONE MARROW CLOT, AND BONE MARROW CORE BIOPSY WITH:  CELLULARITY= 70%, TRILINEAGE HEMATOPOIETIC ACTIVITY (M/E= 1.5:1).  PLASMA CELL DYSCRASIA, SEE COMMENT.  CD20  INCREASED STORAGE IRON.  ADEQUATE NUMBER OF MEGAKARYOCYTES.  COMMENT: Flow cytometry analysis of bone marrow aspirate shows lymph gate(27%) containing T and B cells.  No B cell clonality or T-cell aberrancy is evident. Blast gate is not increased. Plasma cell study shows a kappa light  chain restricted plasma cell population without coexpression of CD38/CD56.  Immunohistochemical studies were performed on the core biopsy for further architecture evaluation  with adequate  positive and negative controls. Scattered mixed T cells (CD3 positive) and B cells (CD20 positive, cyclin D1  negative) are evident. About 6-7% plasma cells ( positive) are noted. 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 Correlate clinically and with a cytogenetics report.  Diagnosed by: Sylvie Mckeon M.D.  (Electronically Signed: 2013-06-27 15:43:02)  Microscopic Examination  BONE MARROW ASPIRATE DIFFERENTIAL:  Blasts---------------------------------------------- 2.5 %  Promyelocytes--------------------------------- 0 %  Myelocytes-------------------------------------- 4.5 %  Metamyelocytes------------------------------ 6 %  Bands---------------------------------------------- 5 %  PMN Neutrophils------------------------------ 19 %  Eosinophils------------------------------------------ 2 %  Basophils--------------------------------------- 0 %  Monocytes------------------------------------ 3.5 %  Lymphocytes------------------------------------- 25.5 %  Plasma cells------------------------------------------ 4.5 %  Erythroid precursors-------------------------- 27.5 %  BONE MARROW ASPIRATE:  Myeloid and erythroid maturation shows M:E ratio at 1.5:1. No significant dysplasia is evident. Stainable iron is  present without increased ringed sideroblasts. Megakaryocytes are seen.  BONE MARROW CLOT:  Cellularity is 70 % with trilineage hematopoiesis. No abnormal infiltrates are seen. Megakaryocytes are adequate in  number. Stainable iron is present.  BONE MARROW CORE BIOPSY:  Cellularity is 70 %. No abnormal infiltrates are seen. Stainable iron is increased. Megakaryocytes are adequate in  number      Bone marrow biopsy - 10/2/14  Bone marrow karyotype results: 46, XX[20], female karyotype.  Michael (MORE), FISH:  Comments: Abnormality Result #Abn/Total Cells --------------------------------------------------------- 14q32(IGH sep)  Abnormal 7/11 +14(3'IGH/5'IGH)x3 Normal 0/11 t(14;16) IGH/MAF fusion  Abnormal  4/7 NOMENCLATURE: nuc eliza(IGHx3),(MAFx3),(IGH con MAFx2) [58 total plasma cells identified]  Interp, Plasma Cell Prolif (PCPD), FISH:  Comments: The result is abnormal and indicates a plasma cell clone with IGH/MAF fusion. Insufficient plasma cells  were observed with all other probe sets. Since only a limited number of plasma cells were identified, the  abnormalities associated with this plasma cell clone were not sufficiently evaluated and a specific prognostic  significance cannot be defined. Clinical and pathologic correlation is recommended.       Met survey - jan 2019    COMPARISON:  December 2017.    FINDINGS:  Probably dural calcifications overlie the frontal and parietal lobes similar.  No lytic lesions.  Mild degenerative change cervical spine.  Degenerative change of the thoracic spine.  There is some irregularity about the L5 vertebral body though partially obscured by the sacral wings.  Facet arthropathy lower lumbar levels.  No convincing lytic lesions in the long bones.  There is some vague lucency in the proximal right tibial metaphysis of uncertain significance.      Impression       There is some irregularity of the L5 vertebral body though unfortunately obscured on the lateral by the iliac wings.  Additionally there is a vague lucency in the proximal right tibia.  Dedicated views may be helpful.    This report was flagged in Epic as abnormal.      Electronically signed by: Thad Avila MD  Date: 01/09/2019  Time: 11:59         ASSESSMENT AND PLAN:   Encounter Diagnosis   Name Primary?    Smoldering multiple myeloma (SMM) Yes      IgG kappa smoldering myeloma diagnosed 2010 under observation since that time; previous pt of Dr. Lora; transitioned care to me sept 2015.    Mild intermittent leukopenia/anemia are chronic and pre date myeloma diagnosis ; stable  Met survey jan 2019 with some mild changes posibly lytic lesions; subsequent fu feb 2019 pet scan was normal ; next Met survey  due feb 2020  Patient continues to smolder with no new overt CRAB criteria; she has stable   light chains and IgG levels stable  today; m-spike pending    educated on symptoms for which to seek attn in our clinic earlier     Follow Up:     cbc, cmp, serum free light chains, quantitative immunoglobulins, serum electropheresis, serum immunofixation and met survey in 6 months        Rasta Alba MD  Hematology/Oncology/Bone Marrow Transplant       cbc, cmp, serum free light chains, quantitative immunoglobulins, serum electropheresis, serum immunofixation and met survey in 6 months

## 2019-08-21 LAB
ALBUMIN SERPL ELPH-MCNC: 3.75 G/DL (ref 3.35–5.55)
ALPHA1 GLOB SERPL ELPH-MCNC: 0.32 G/DL (ref 0.17–0.41)
ALPHA2 GLOB SERPL ELPH-MCNC: 0.74 G/DL (ref 0.43–0.99)
B-GLOBULIN SERPL ELPH-MCNC: 0.78 G/DL (ref 0.5–1.1)
GAMMA GLOB SERPL ELPH-MCNC: 2.01 G/DL (ref 0.67–1.58)
INTERPRETATION SERPL IFE-IMP: NORMAL
KAPPA LC SER QL IA: 16.21 MG/DL (ref 0.33–1.94)
KAPPA LC/LAMBDA SER IA: 12.66 (ref 0.26–1.65)
LAMBDA LC SER QL IA: 1.28 MG/DL (ref 0.57–2.63)
PROT SERPL-MCNC: 7.6 G/DL (ref 6–8.4)

## 2019-08-22 ENCOUNTER — OFFICE VISIT (OUTPATIENT)
Dept: NEPHROLOGY | Facility: CLINIC | Age: 69
End: 2019-08-22
Payer: MEDICARE

## 2019-08-22 VITALS
SYSTOLIC BLOOD PRESSURE: 130 MMHG | HEART RATE: 74 BPM | OXYGEN SATURATION: 99 % | WEIGHT: 280 LBS | HEIGHT: 69 IN | BODY MASS INDEX: 41.47 KG/M2 | DIASTOLIC BLOOD PRESSURE: 80 MMHG

## 2019-08-22 DIAGNOSIS — Q61.2 ADPKD (AUTOSOMAL DOMINANT POLYCYSTIC KIDNEY DISEASE): Primary | ICD-10-CM

## 2019-08-22 DIAGNOSIS — R79.9 ABNORMAL FINDING OF BLOOD CHEMISTRY: ICD-10-CM

## 2019-08-22 DIAGNOSIS — D47.2 SMOLDERING MULTIPLE MYELOMA (SMM): ICD-10-CM

## 2019-08-22 DIAGNOSIS — I12.9 BENIGN HYPERTENSIVE RENAL DISEASE WITHOUT RENAL FAILURE: ICD-10-CM

## 2019-08-22 DIAGNOSIS — G43.C0 PERIODIC HEADACHE SYNDROME, NOT INTRACTABLE: ICD-10-CM

## 2019-08-22 DIAGNOSIS — I10 HYPERTENSION, UNSPECIFIED TYPE: ICD-10-CM

## 2019-08-22 DIAGNOSIS — R31.29 MICROHEMATURIA: ICD-10-CM

## 2019-08-22 LAB
PATHOLOGIST INTERPRETATION IFE: NORMAL
PATHOLOGIST INTERPRETATION SPE: NORMAL

## 2019-08-22 PROCEDURE — 99999 PR PBB SHADOW E&M-EST. PATIENT-LVL III: CPT | Mod: PBBFAC,HCNC,GC, | Performed by: HOSPITALIST

## 2019-08-22 PROCEDURE — 99213 OFFICE O/P EST LOW 20 MIN: CPT | Mod: HCNC,GC,S$GLB, | Performed by: HOSPITALIST

## 2019-08-22 PROCEDURE — 99213 PR OFFICE/OUTPT VISIT, EST, LEVL III, 20-29 MIN: ICD-10-PCS | Mod: HCNC,GC,S$GLB, | Performed by: HOSPITALIST

## 2019-08-22 PROCEDURE — 99999 PR PBB SHADOW E&M-EST. PATIENT-LVL III: ICD-10-PCS | Mod: PBBFAC,HCNC,GC, | Performed by: HOSPITALIST

## 2019-08-22 NOTE — PROGRESS NOTES
Subjective:       Patient ID: Manju Aranda is a 68 y.o. 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 she reports no changes in frequency of urination, no shortness of breath, no chest pain, no palpitation, has a blood pressure cuff at home and reports normal blood pressure. Denies any hematuria, bone pain or fever, edema, chills. She follows up with Hematology due to IgG kappa smoldering myeloma diagnosed 2010 under observation since that time; Met survey jan 2019 with some mild changes posibly lytic lesions. Next Met survey due feb 2020. She was seen by Hematology on 08/20 where they told her labs were normal and that she will be seen again on Feb 2019. She also follows up with Urology every year due to intermittent hematuria. Denies any overt hematuria, last seen on 07/31/2019 by Urology, UA was normal. Also followed by Hepatology for cysts in Liver.       Review of Systems   Constitutional: Negative for activity change, appetite change, chills, diaphoresis, fatigue, fever and unexpected weight change.   HENT: Negative for congestion, facial swelling and trouble swallowing.    Eyes: Negative for pain, discharge, redness and visual disturbance.   Respiratory: Negative for cough, shortness of breath and wheezing.    Cardiovascular: Negative for chest pain, palpitations and leg swelling.   Gastrointestinal: Negative for abdominal distention, abdominal pain, constipation and diarrhea.   Endocrine: Negative for cold intolerance and heat intolerance.   Genitourinary: Negative for decreased urine volume, difficulty urinating, dysuria, flank pain, frequency and urgency.   Musculoskeletal: Negative for arthralgias, joint swelling and myalgias.   Skin: Negative for color change.   Allergic/Immunologic: Negative for immunocompromised state.   Neurological: Negative for dizziness, tremors, syncope, speech difficulty,  "weakness, light-headedness and numbness.   Hematological: Negative for adenopathy.   Psychiatric/Behavioral: Negative for agitation, confusion, decreased concentration and dysphoric mood.       Objective:     Blood pressure 130/80, pulse 74, height 5' 9" (1.753 m), weight 127 kg (279 lb 15.8 oz), SpO2 99 %.      Physical Exam   Constitutional: She is oriented to person, place, and time. She appears well-developed and well-nourished.   HENT:   Head: Normocephalic and atraumatic.   Eyes: Pupils are equal, round, and reactive to light. Conjunctivae and EOM are normal.   Neck: Neck supple.   Cardiovascular: Normal rate, regular rhythm, normal heart sounds and intact distal pulses.   Pulmonary/Chest: Effort normal and breath sounds normal.   Abdominal: Soft. Bowel sounds are normal.   Musculoskeletal: Normal range of motion.   Neurological: She is alert and oriented to person, place, and time. She has normal reflexes.   Skin: Skin is warm and dry.   Psychiatric: She has a normal mood and affect. Her behavior is normal.   Nursing note and vitals reviewed.      Assessment:       No diagnosis found.    Plan:       1. CKD 2 with renal cysts: 69 yo AAF with PCKD and liver cysts, with no FH of PCKD. No HTN, No proteinuria, stable GFR.      No proteinuria, no hematuria on UA, continue RAAS blockade    Hematuria: likely related to PCKD, but with IgG kappa smoldering myeloma, recently was seen by Hematology oncology for the MGUS. Labs stable, will need follow up with hematology.   Seen by hepatology for liver cysts, US abdominal ordered.     stable  Lab Results   Component Value Date    CREATININE 1.0 08/20/2019    CREATININE 1.0 08/20/2019   Albumin/creatinine ratio:   Results for KEESHA DUARTE (MRN [id number]) as of 8/22/2019 13:25   Ref. Range 3/26/2018 12:37   Microalbum.,U,Random Latest Units: ug/mL 11.0   Creatinine, Random Ur Latest Ref Range: 15.0 - 325.0 mg/dL 142.0   MICROALB/CREAT RATIO Latest Ref Range: 0.0 - 30.0 " ug/mg 7.7         Acid-Base: stable  Lab Results   Component Value Date     08/20/2019     08/20/2019    K 4.1 08/20/2019    K 4.1 08/20/2019    CO2 28 08/20/2019    CO2 28 08/20/2019     2. HTN: Welll Controlled on ACE-I for renal protection.     3. Renal osteodystrophy: Monitor annually  Lab Results   Component Value Date    PTH 65.0 08/20/2019    CALCIUM 10.2 08/20/2019    CALCIUM 10.2 08/20/2019    PHOS 2.6 (L) 08/20/2019       4. Anemia: At goal. Asymptomatic. Monitor.  On RAAS blockade  Lab Results   Component Value Date    HGB 11.8 (L) 08/20/2019    HGB 11.8 (L) 08/20/2019       6. Lipid management: stable  Lab Results   Component Value Date    LDLCALC 69.2 06/20/2019     7.  Referral: cont to follow up with Hematology every 6 months and Urology     Follow up in one year with labs.     Jb Hernandez MD  Nephrology fellow  [facility]

## 2019-08-22 NOTE — PATIENT INSTRUCTIONS
Follow up with Urology, hepatology and hematology, return in 1 year. Continue to measure blood pressure at home.

## 2019-08-22 NOTE — PROGRESS NOTES
I have personally interviewed and examined the patient. Clinical, laboratorial and imaging information available in medical records were reveiwed by me. I agree with the assessment and recommendations provided by Dr. Negron who was under my supervision.

## 2019-08-26 ENCOUNTER — TELEPHONE (OUTPATIENT)
Dept: HEMATOLOGY/ONCOLOGY | Facility: CLINIC | Age: 69
End: 2019-08-26

## 2019-09-10 ENCOUNTER — OFFICE VISIT (OUTPATIENT)
Dept: INTERNAL MEDICINE | Facility: CLINIC | Age: 69
End: 2019-09-10
Payer: MEDICARE

## 2019-09-10 VITALS
WEIGHT: 281.31 LBS | SYSTOLIC BLOOD PRESSURE: 100 MMHG | BODY MASS INDEX: 41.67 KG/M2 | HEART RATE: 83 BPM | OXYGEN SATURATION: 96 % | DIASTOLIC BLOOD PRESSURE: 70 MMHG | HEIGHT: 69 IN

## 2019-09-10 DIAGNOSIS — E05.00 GRAVES DISEASE: ICD-10-CM

## 2019-09-10 DIAGNOSIS — Q61.2 ADPKD (AUTOSOMAL DOMINANT POLYCYSTIC KIDNEY DISEASE): ICD-10-CM

## 2019-09-10 DIAGNOSIS — Z12.31 ENCOUNTER FOR SCREENING MAMMOGRAM FOR BREAST CANCER: ICD-10-CM

## 2019-09-10 DIAGNOSIS — D47.2 SMOLDERING MULTIPLE MYELOMA (SMM): ICD-10-CM

## 2019-09-10 DIAGNOSIS — I10 ESSENTIAL HYPERTENSION: Primary | ICD-10-CM

## 2019-09-10 PROBLEM — R10.84 ABDOMINAL PAIN, GENERALIZED: Status: RESOLVED | Noted: 2018-08-29 | Resolved: 2019-09-10

## 2019-09-10 PROCEDURE — 99999 PR PBB SHADOW E&M-EST. PATIENT-LVL III: ICD-10-PCS | Mod: PBBFAC,HCNC,, | Performed by: INTERNAL MEDICINE

## 2019-09-10 PROCEDURE — 3078F DIAST BP <80 MM HG: CPT | Mod: HCNC,CPTII,S$GLB, | Performed by: INTERNAL MEDICINE

## 2019-09-10 PROCEDURE — 99397 PER PM REEVAL EST PAT 65+ YR: CPT | Mod: HCNC,S$GLB,, | Performed by: INTERNAL MEDICINE

## 2019-09-10 PROCEDURE — 3074F SYST BP LT 130 MM HG: CPT | Mod: HCNC,CPTII,S$GLB, | Performed by: INTERNAL MEDICINE

## 2019-09-10 PROCEDURE — 3074F PR MOST RECENT SYSTOLIC BLOOD PRESSURE < 130 MM HG: ICD-10-PCS | Mod: HCNC,CPTII,S$GLB, | Performed by: INTERNAL MEDICINE

## 2019-09-10 PROCEDURE — 99397 PR PREVENTIVE VISIT,EST,65 & OVER: ICD-10-PCS | Mod: HCNC,S$GLB,, | Performed by: INTERNAL MEDICINE

## 2019-09-10 PROCEDURE — 99999 PR PBB SHADOW E&M-EST. PATIENT-LVL III: CPT | Mod: PBBFAC,HCNC,, | Performed by: INTERNAL MEDICINE

## 2019-09-10 PROCEDURE — 3078F PR MOST RECENT DIASTOLIC BLOOD PRESSURE < 80 MM HG: ICD-10-PCS | Mod: HCNC,CPTII,S$GLB, | Performed by: INTERNAL MEDICINE

## 2019-09-10 NOTE — PROGRESS NOTES
Subjective:      Patient ID: Manju Aranda is a 68 y.o. female.    Chief Complaint: Follow-up (annual review/ follow up)    HPI:  HPI   Follow up all medical problems :  Discussed changing timing of rosuvastatin to daytime because of chest discomfort    Nerve pain discussed: comes and goes and less and less.    Each problem and screening was reviewed   The following assessments were completed    Following thyroid:    Living situation: independent  CAGE: no alcohol  Depression screening: none  Timed Get Up and Go: normal  Whisper Test: good  Cognitive Function Screening: normal  Nutrition Screening: nrmal  ADL Screening  Self       Annual exam:   Colonoscopy : 2010 patient believes 5 years ( history of polyps and diverticulitis): Colonoscopy 10/23/2015: 5 polyps removed: 11/2018 2 polyps follow up in 5 years Dr. Ruelas  Endoscopy:2011 : gastric polyp  Mammogram :orders in for 2019  Gyn: Dr. Dooley every 2 years  Optho: Dr. Kumar yearly  Flu: due in the fall  Tetanus: done  Shingles: done  Shingrix discussed  Pneumovax: done  Prevnar 13 done    Consultants:  Dr. Eben Kumar  Thyroid: 6/2019 Was a patient of Dr. Aguiar       Past Medical History:  Hypertension since age 1981  Diverticulitis/losis  Hx of polyps  Kidney cyst Right: large simple cyst Bosniak l 12 cm and a [place] II  Cholelithiasis  Small liver cysts  Paraprotein  Elevated protein MGUS  Multiple myeloma  normal ultraquant CRP  Mildly elevated TSH  Migraines  mild mitral valve leakage  mild tricuspid valve leakage  biatrial enlargement   Patient Active Problem List   Diagnosis    Hypertension    Liver cyst    Paraproteinemia    Smoldering multiple myeloma (SMM)    Migraine headache    Mitral valve disorder    Tricuspid valve disease    ADPKD (autosomal dominant polycystic kidney disease)    Benign hypertensive renal disease without renal failure    Tachycardia    Aortic atherosclerosis     Microhematuria    Graves disease    Tortuous aorta    Morbid obesity with BMI of 40.0-44.9, adult    History of adenomatous polyp of colon    History of hyperthyroidism     Past Medical History:   Diagnosis Date    Allergy     Anemia     Arthritis     Cholelithiases     Cyst of kidney, acquired     Diverticulitis     Elevated TSH     Family history of Graves' disease: daughter, maternal aunt, maternal uncle 9/13/2016    Glaucoma     Graves disease     Hx of colonic polyps     Hypertension 1981    Liver cyst     MGUS (monoclonal gammopathy of unknown significance)     Migraine headache     Mitral valve problem     leakage    Obesity     Paraproteinemia     Smoldering multiple myeloma 2013    Tricuspid valve disease     leakage     Past Surgical History:   Procedure Laterality Date    APPENDECTOMY      COLONOSCOPY N/A 11/5/2018    [facility]formed [facility] at [facility]    COLONOSCOPY N/A 10/23/2015    [facility] ([facility]    HYSTERECTOMY  1978 or 1979    OOPHORECTOMY       Family History   Problem Relation Age of Onset    Heart disease Mother         MI    Heart attack Mother     Heart disease Maternal Aunt         MI    Heart disease Maternal Aunt         MI    Cancer Paternal Grandmother         GYN cancer - unknown cancer    Colon cancer Maternal Uncle     Cancer Maternal Uncle         colon ca    Hypertension Father     Heart disease Sister         fast heart rate    Cataracts Sister     Glaucoma Sister     Graves' disease Daughter     No Known Problems Son     No Known Problems Sister     No Known Problems Daughter     No Known Problems Son     Melanoma Neg Hx     Breast cancer Neg Hx     Ovarian cancer Neg Hx     Colon polyps Neg Hx     Rectal cancer Neg Hx     Stomach cancer Neg Hx     Esophageal cancer Neg Hx     Ulcerative colitis Neg Hx     Crohn's disease Neg Hx     Amblyopia Neg Hx     Blindness  "Neg Hx     Macular degeneration Neg Hx     Strabismus Neg Hx     Retinal detachment Neg Hx      Review of Systems   Constitutional: Negative for chills, fever and unexpected weight change.   HENT: Negative for trouble swallowing.    Respiratory: Negative for cough, shortness of breath and wheezing.    Cardiovascular: Negative for chest pain and palpitations.   Gastrointestinal: Negative for abdominal distention, abdominal pain, blood in stool and vomiting.   Musculoskeletal: Negative for back pain.     Objective:     Vitals:    09/10/19 0822   BP: 100/70   Pulse: 83   SpO2: 96%   Weight: 127.6 kg (281 lb 4.9 oz)   Height: 5' 9" (1.753 m)   PainSc: 0-No pain     Body mass index is 41.54 kg/m².  Physical Exam   Constitutional: She is oriented to person, place, and time. She appears well-developed and well-nourished. No distress.   Neck: Carotid bruit is not present. No thyromegaly present.   Cardiovascular: Normal rate, regular rhythm and normal heart sounds. PMI is not displaced.   Pulmonary/Chest: Effort normal and breath sounds normal. No respiratory distress.   Abdominal: Soft. Bowel sounds are normal. She exhibits no distension. There is no tenderness.   Musculoskeletal: She exhibits no edema.   Neurological: She is alert and oriented to person, place, and time.     Assessment:     1. Essential hypertension    2. Encounter for screening mammogram for breast cancer    3. ADPKD (autosomal dominant polycystic kidney disease)    4. Graves disease    5. Smoldering multiple myeloma (SMM)      Plan:   Manju was seen today for follow-up.    Diagnoses and all orders for this visit:    Essential hypertension  Comments:  Blood pressure well controlled continue same medication    Encounter for screening mammogram for breast cancer  Comments:  Orders placed  Orders:  -     Mammo Digital Screening Bilat w/ Ag; Future    ADPKD (autosomal dominant polycystic kidney disease)  Comments:  Follow-up in Urology has been " completed    Graves disease  Comments:  Thyroid status stable    Smoldering multiple myeloma (SMM)  Comments:  Patient has follow-up in Oncology        Problem List Items Addressed This Visit     Hypertension - Primary    Smoldering multiple myeloma (SMM)    ADPKD (autosomal dominant polycystic kidney disease)    Graves disease      Other Visit Diagnoses     Encounter for screening mammogram for breast cancer        Orders placed    Relevant Orders    Mammo Digital Screening Bilat w/ Ag        Orders Placed This Encounter   Procedures    Mammo Digital Screening Bilat w/ Ag     Standing Status:   Future     Standing Expiration Date:   11/9/2020     Order Specific Question:   May the Radiologist modify the order per protocol to meet the clinical needs of the patient?     Answer:   Yes     Follow up in about 6 months (around 3/10/2020) for Follow up.     Medication List           Accurate as of 9/10/19  7:44 PM. If you have any questions, ask your nurse or doctor.               CONTINUE taking these medications    gabapentin 300 MG capsule  Commonly known as:  NEURONTIN  Take 1 capsule (300 mg total) by mouth every evening. For nerve pain     magnesium oxide 400 mg (241.3 mg magnesium) tablet  Commonly known as:  MAG-OX     multivit with min-folic acid 0.4 mg Tab     ondansetron 4 MG tablet  Commonly known as:  ZOFRAN  Take 1-2 tablets (4-8 mg total) by mouth every 8 (eight) hours as needed for Nausea.     quinapril 20 MG tablet  Commonly known as:  ACCUPRIL  TAKE 1 TABLET BY MOUTH EVERY DAY     rizatriptan 10 MG tablet  Commonly known as:  MAXALT  Take 1 tablet (10 mg total) by mouth every 2 (two) hours as needed for Migraine. Max 30mg/24hs     rosuvastatin 5 MG tablet  Commonly known as:  CRESTOR  Take 1 tablet (5 mg total) by mouth once daily.

## 2019-11-04 ENCOUNTER — HOSPITAL ENCOUNTER (OUTPATIENT)
Dept: RADIOLOGY | Facility: HOSPITAL | Age: 69
Discharge: HOME | End: 2019-11-04
Attending: INTERNAL MEDICINE
Payer: MEDICARE

## 2019-11-04 DIAGNOSIS — Z12.31 ENCOUNTER FOR SCREENING MAMMOGRAM FOR BREAST CANCER: ICD-10-CM

## 2019-11-04 PROCEDURE — 77067 MAMMO DIGITAL SCREENING BILAT WITH TOMOSYNTHESIS_CAD: ICD-10-PCS | Mod: 26,HCNC,, | Performed by: RADIOLOGY

## 2019-11-04 PROCEDURE — 77067 SCR MAMMO BI INCL CAD: CPT | Mod: 26,HCNC,, | Performed by: RADIOLOGY

## 2019-11-04 PROCEDURE — 77063 BREAST TOMOSYNTHESIS BI: CPT | Mod: 26,HCNC,, | Performed by: RADIOLOGY

## 2019-11-04 PROCEDURE — 77067 SCR MAMMO BI INCL CAD: CPT | Mod: TC,HCNC

## 2019-11-04 PROCEDURE — 77063 MAMMO DIGITAL SCREENING BILAT WITH TOMOSYNTHESIS_CAD: ICD-10-PCS | Mod: 26,HCNC,, | Performed by: RADIOLOGY

## 2019-12-08 DIAGNOSIS — K57.92 DIVERTICULITIS: Primary | ICD-10-CM

## 2019-12-08 RX ORDER — METRONIDAZOLE 500 MG/1
500 TABLET ORAL 3 TIMES DAILY
Qty: 21 TABLET | Refills: 1 | Status: SHIPPED | OUTPATIENT
Start: 2019-12-08 | End: 2019-12-15

## 2019-12-08 RX ORDER — CIPROFLOXACIN 250 MG/1
500 TABLET, FILM COATED ORAL 2 TIMES DAILY
Qty: 14 TABLET | Refills: 0 | Status: SHIPPED | OUTPATIENT
Start: 2019-12-08 | End: 2019-12-15

## 2019-12-16 DIAGNOSIS — G43.009 MIGRAINE WITHOUT AURA AND WITHOUT STATUS MIGRAINOSUS, NOT INTRACTABLE: ICD-10-CM

## 2019-12-17 ENCOUNTER — TELEPHONE (OUTPATIENT)
Dept: SLEEP MEDICINE | Facility: CLINIC | Age: 69
End: 2019-12-17

## 2019-12-17 RX ORDER — RIZATRIPTAN BENZOATE 10 MG/1
TABLET ORAL
Qty: 12 TABLET | Refills: 0 | Status: SHIPPED | OUTPATIENT
Start: 2019-12-17 | End: 2020-01-07 | Stop reason: SDUPTHER

## 2020-01-03 ENCOUNTER — TELEPHONE (OUTPATIENT)
Dept: HEPATOLOGY | Facility: CLINIC | Age: 70
End: 2020-01-03

## 2020-01-06 DIAGNOSIS — K76.89 LIVER CYST: Primary | ICD-10-CM

## 2020-01-06 DIAGNOSIS — R93.2 ABNORMAL FINDINGS ON DIAGNOSTIC IMAGING OF LIVER: ICD-10-CM

## 2020-01-07 ENCOUNTER — TELEPHONE (OUTPATIENT)
Dept: HEPATOLOGY | Facility: CLINIC | Age: 70
End: 2020-01-07

## 2020-01-07 ENCOUNTER — OFFICE VISIT (OUTPATIENT)
Dept: SLEEP MEDICINE | Facility: CLINIC | Age: 70
End: 2020-01-07
Payer: MEDICARE

## 2020-01-07 VITALS
WEIGHT: 278.88 LBS | HEART RATE: 80 BPM | HEIGHT: 69 IN | BODY MASS INDEX: 41.31 KG/M2 | DIASTOLIC BLOOD PRESSURE: 84 MMHG | SYSTOLIC BLOOD PRESSURE: 136 MMHG

## 2020-01-07 DIAGNOSIS — G43.009 MIGRAINE WITHOUT AURA AND WITHOUT STATUS MIGRAINOSUS, NOT INTRACTABLE: ICD-10-CM

## 2020-01-07 PROCEDURE — 3079F DIAST BP 80-89 MM HG: CPT | Mod: HCNC,CPTII,S$GLB, | Performed by: NURSE PRACTITIONER

## 2020-01-07 PROCEDURE — 99999 PR PBB SHADOW E&M-EST. PATIENT-LVL III: ICD-10-PCS | Mod: PBBFAC,HCNC,, | Performed by: NURSE PRACTITIONER

## 2020-01-07 PROCEDURE — 1159F MED LIST DOCD IN RCRD: CPT | Mod: HCNC,S$GLB,, | Performed by: NURSE PRACTITIONER

## 2020-01-07 PROCEDURE — 3079F PR MOST RECENT DIASTOLIC BLOOD PRESSURE 80-89 MM HG: ICD-10-PCS | Mod: HCNC,CPTII,S$GLB, | Performed by: NURSE PRACTITIONER

## 2020-01-07 PROCEDURE — 3075F SYST BP GE 130 - 139MM HG: CPT | Mod: HCNC,CPTII,S$GLB, | Performed by: NURSE PRACTITIONER

## 2020-01-07 PROCEDURE — 99213 PR OFFICE/OUTPT VISIT, EST, LEVL III, 20-29 MIN: ICD-10-PCS | Mod: HCNC,S$GLB,, | Performed by: NURSE PRACTITIONER

## 2020-01-07 PROCEDURE — 99213 OFFICE O/P EST LOW 20 MIN: CPT | Mod: HCNC,S$GLB,, | Performed by: NURSE PRACTITIONER

## 2020-01-07 PROCEDURE — 1101F PR PT FALLS ASSESS DOC 0-1 FALLS W/OUT INJ PAST YR: ICD-10-PCS | Mod: HCNC,CPTII,S$GLB, | Performed by: NURSE PRACTITIONER

## 2020-01-07 PROCEDURE — 1126F PR PAIN SEVERITY QUANTIFIED, NO PAIN PRESENT: ICD-10-PCS | Mod: HCNC,S$GLB,, | Performed by: NURSE PRACTITIONER

## 2020-01-07 PROCEDURE — 1101F PT FALLS ASSESS-DOCD LE1/YR: CPT | Mod: HCNC,CPTII,S$GLB, | Performed by: NURSE PRACTITIONER

## 2020-01-07 PROCEDURE — 1159F PR MEDICATION LIST DOCUMENTED IN MEDICAL RECORD: ICD-10-PCS | Mod: HCNC,S$GLB,, | Performed by: NURSE PRACTITIONER

## 2020-01-07 PROCEDURE — 99999 PR PBB SHADOW E&M-EST. PATIENT-LVL III: CPT | Mod: PBBFAC,HCNC,, | Performed by: NURSE PRACTITIONER

## 2020-01-07 PROCEDURE — 1126F AMNT PAIN NOTED NONE PRSNT: CPT | Mod: HCNC,S$GLB,, | Performed by: NURSE PRACTITIONER

## 2020-01-07 PROCEDURE — 3075F PR MOST RECENT SYSTOLIC BLOOD PRESS GE 130-139MM HG: ICD-10-PCS | Mod: HCNC,CPTII,S$GLB, | Performed by: NURSE PRACTITIONER

## 2020-01-07 RX ORDER — RIZATRIPTAN BENZOATE 10 MG/1
TABLET ORAL
Qty: 12 TABLET | Refills: 11 | Status: SHIPPED | OUTPATIENT
Start: 2020-01-07 | End: 2021-01-15 | Stop reason: SDUPTHER

## 2020-01-07 NOTE — PROGRESS NOTES
"Manju Aranda returns today for mgt of migraines.     Since last seen denies interval medical change. Some months has 1-2x/months, others 4-5. Zofran helps. Ran out of/maxalt last month. Biggest known trigger is weather change.  Cumen/herbs/pepper/basil/rosemary/highly seasoned food/seafood are other triggers. Abortive meds remain effective/maxalt. Midrin too costly.  HA remain same location/nature        HISTORY:  10/5/15:   She was last seen 3/31/15.  She continues on Magnesium 250mg 2 tabs bid for migraines prevention, but stopped Riboflavin 100mg bid and CoQ10 100mg bid due to worsening HA in past. No recurrent morning headache. No longer keeping HA diaries but reports since last seen HA remain infrequent, occuring <5/month. None on recent cruise! Spicy foods, stress, lack of sleep, and weather are known triggers. HA remain typically mild-moderate (only few severe HA in interim), lasting 2-4+ hrs. Longer duration when at work and unable to take Maxalt due to sleepiness. At work she will take Midrin 2 tabs at onset which remains very helpful within 30min, not having to repeat dose. She has a prodrome (tingling of the forehead) but no auras, before the gradual onset of non-radiating frontal (center or right) throbbing or pressure pains associated with photo/ phonophobia and nausea. She denies vomiting, focal neurological deficits or autonomic deficits. Other triggers remain gatigue, and stress.  Resting helps while movement can intensify pain. HIT-6 score - 54      10/17/16: Reports stable frequency of headaches, ~ 1-2 /month. Midrin remains effective. No severe episodes. No zofran use, but this does help nausea prn. Headache remains same nature/location. HIT=6 score= 53. Having knee pain. Interim dx hyperthyroidism. She has lost weight on new medication/dx. Continues to take Mag 500mg twice daily, no loose stools "helps my arthritis too".     10/25/17:  Since last seen denies interval medical change. Reports " "headache q 2 mos. Cumen/herbs/pepper/basil/rosemary/highly seasoned food/seafood are triggers. Abortive meds remain effective. HA remain same location/nature. Maxalt works well.   Trying to walk at [employer] track.     11/2018:  Since last seen denies interval medical change except colonoscopy yesterday. Reports headache infrequent except recently 5d due to weather change.  Cumen/herbs/pepper/basil/rosemary/highly seasoned food/seafood are triggers. Abortive meds remain effective/maxalt. Midrin too costly.  HA remain same location/nature  2# loss        ROS: As above. In addition, otherwise a balance review of 10-systems is negative.     ALT/AST normal 8/2019  FH: HA     PHYSICAL EXAM:   General: W/D, morbid obese, not in distress.   /84   Pulse 80   Ht 5' 9" (1.753 m)   Wt 126.5 kg (278 lb 14.1 oz)   BMI 41.18 kg/m²         IMPRESSION:   Migraine without aura, stable  ADPKD    PLAN:   Preventative therapy: Continue Mag 250mg 2 tabs bid  Abortive therapy: Continue Maxalt 5-10 mg tab 1 tab PO q 2h, up to maximum of 30 mg/day. Do not delay treatment to avoid progression of a migraine.   Continue Zofran 4-8mg PO q8h prn for HA/nausea     Continue to regulate sleep, mealtimes, try to reduce stress and to avoid known headache triggers/aggravating factors    RTC 12 months, sooner if needed  "

## 2020-01-07 NOTE — TELEPHONE ENCOUNTER
MA spoke to the pt and scheduled her labs and US. I will call her in March once the schedule opens up or if an opening comes up before 2/24

## 2020-01-08 ENCOUNTER — TELEPHONE (OUTPATIENT)
Dept: HEPATOLOGY | Facility: CLINIC | Age: 70
End: 2020-01-08

## 2020-01-08 NOTE — TELEPHONE ENCOUNTER
MA spoke to the pt and scheduled her appointment. I also mailed the reminder and told her to call me back if she needs to reschedule.

## 2020-01-15 ENCOUNTER — PES CALL (OUTPATIENT)
Dept: ADMINISTRATIVE | Facility: CLINIC | Age: 70
End: 2020-01-15

## 2020-01-17 ENCOUNTER — HOSPITAL ENCOUNTER (OUTPATIENT)
Dept: RADIOLOGY | Facility: HOSPITAL | Age: 70
Discharge: HOME | End: 2020-01-17
Attending: INTERNAL MEDICINE
Payer: MEDICARE

## 2020-01-17 DIAGNOSIS — K76.89 LIVER CYST: ICD-10-CM

## 2020-01-17 PROCEDURE — 76700 US EXAM ABDOM COMPLETE: CPT | Mod: 26,HCNC,, | Performed by: RADIOLOGY

## 2020-01-17 PROCEDURE — 76700 US ABDOMEN COMPLETE: ICD-10-PCS | Mod: 26,HCNC,, | Performed by: RADIOLOGY

## 2020-01-17 PROCEDURE — 76700 US EXAM ABDOM COMPLETE: CPT | Mod: TC,HCNC

## 2020-01-20 ENCOUNTER — TELEPHONE (OUTPATIENT)
Dept: HEPATOLOGY | Facility: CLINIC | Age: 70
End: 2020-01-20

## 2020-01-20 NOTE — TELEPHONE ENCOUNTER
----- Message from Doc De La Cruz MD sent at 1/19/2020  9:46 AM CST -----  Results reviewed. Please advise u/s stable.

## 2020-01-20 NOTE — TELEPHONE ENCOUNTER
----- Message from Doc De La Cruz MD sent at 1/19/2020  9:46 AM CST -----  Results reviewed. Please advise labs are stable.

## 2020-01-31 RX ORDER — ELETRIPTAN HYDROBROMIDE 40 MG/1
40 TABLET, FILM COATED ORAL
Qty: 12 TABLET | Refills: 5 | Status: SHIPPED | OUTPATIENT
Start: 2020-01-31 | End: 2020-06-02 | Stop reason: CLARIF

## 2020-02-03 ENCOUNTER — OFFICE VISIT (OUTPATIENT)
Dept: HEMATOLOGY/ONCOLOGY | Facility: CLINIC | Age: 70
End: 2020-02-03
Payer: MEDICARE

## 2020-02-03 ENCOUNTER — TELEPHONE (OUTPATIENT)
Dept: SLEEP MEDICINE | Facility: CLINIC | Age: 70
End: 2020-02-03

## 2020-02-03 ENCOUNTER — LAB VISIT (OUTPATIENT)
Dept: LAB | Facility: HOSPITAL | Age: 70
End: 2020-02-03
Attending: INTERNAL MEDICINE
Payer: MEDICARE

## 2020-02-03 VITALS
SYSTOLIC BLOOD PRESSURE: 138 MMHG | BODY MASS INDEX: 41.11 KG/M2 | HEART RATE: 66 BPM | DIASTOLIC BLOOD PRESSURE: 66 MMHG | TEMPERATURE: 98 F | OXYGEN SATURATION: 96 % | WEIGHT: 277.56 LBS | HEIGHT: 69 IN | RESPIRATION RATE: 16 BRPM

## 2020-02-03 DIAGNOSIS — D47.2 SMOLDERING MULTIPLE MYELOMA (SMM): Primary | ICD-10-CM

## 2020-02-03 DIAGNOSIS — D47.2 SMOLDERING MULTIPLE MYELOMA (SMM): ICD-10-CM

## 2020-02-03 LAB
IGA SERPL-MCNC: 96 MG/DL (ref 40–350)
IGG SERPL-MCNC: 2046 MG/DL (ref 650–1600)
IGM SERPL-MCNC: 28 MG/DL (ref 50–300)

## 2020-02-03 PROCEDURE — 99999 PR PBB SHADOW E&M-EST. PATIENT-LVL III: ICD-10-PCS | Mod: PBBFAC,HCNC,, | Performed by: INTERNAL MEDICINE

## 2020-02-03 PROCEDURE — 84165 PROTEIN E-PHORESIS SERUM: CPT | Mod: HCNC

## 2020-02-03 PROCEDURE — 86334 IMMUNOFIX E-PHORESIS SERUM: CPT | Mod: 26,HCNC,, | Performed by: PATHOLOGY

## 2020-02-03 PROCEDURE — 1126F AMNT PAIN NOTED NONE PRSNT: CPT | Mod: HCNC,S$GLB,, | Performed by: INTERNAL MEDICINE

## 2020-02-03 PROCEDURE — 3075F SYST BP GE 130 - 139MM HG: CPT | Mod: HCNC,CPTII,S$GLB, | Performed by: INTERNAL MEDICINE

## 2020-02-03 PROCEDURE — 3075F PR MOST RECENT SYSTOLIC BLOOD PRESS GE 130-139MM HG: ICD-10-PCS | Mod: HCNC,CPTII,S$GLB, | Performed by: INTERNAL MEDICINE

## 2020-02-03 PROCEDURE — 1159F MED LIST DOCD IN RCRD: CPT | Mod: HCNC,S$GLB,, | Performed by: INTERNAL MEDICINE

## 2020-02-03 PROCEDURE — 99999 PR PBB SHADOW E&M-EST. PATIENT-LVL III: CPT | Mod: PBBFAC,HCNC,, | Performed by: INTERNAL MEDICINE

## 2020-02-03 PROCEDURE — 1159F PR MEDICATION LIST DOCUMENTED IN MEDICAL RECORD: ICD-10-PCS | Mod: HCNC,S$GLB,, | Performed by: INTERNAL MEDICINE

## 2020-02-03 PROCEDURE — 99499 UNLISTED E&M SERVICE: CPT | Mod: HCNC,S$GLB,, | Performed by: INTERNAL MEDICINE

## 2020-02-03 PROCEDURE — 99214 OFFICE O/P EST MOD 30 MIN: CPT | Mod: HCNC,S$GLB,, | Performed by: INTERNAL MEDICINE

## 2020-02-03 PROCEDURE — 1101F PT FALLS ASSESS-DOCD LE1/YR: CPT | Mod: HCNC,CPTII,S$GLB, | Performed by: INTERNAL MEDICINE

## 2020-02-03 PROCEDURE — 1126F PR PAIN SEVERITY QUANTIFIED, NO PAIN PRESENT: ICD-10-PCS | Mod: HCNC,S$GLB,, | Performed by: INTERNAL MEDICINE

## 2020-02-03 PROCEDURE — 3078F DIAST BP <80 MM HG: CPT | Mod: HCNC,CPTII,S$GLB, | Performed by: INTERNAL MEDICINE

## 2020-02-03 PROCEDURE — 99214 PR OFFICE/OUTPT VISIT, EST, LEVL IV, 30-39 MIN: ICD-10-PCS | Mod: HCNC,S$GLB,, | Performed by: INTERNAL MEDICINE

## 2020-02-03 PROCEDURE — 86334 PATHOLOGIST INTERPRETATION IFE: ICD-10-PCS | Mod: 26,HCNC,, | Performed by: PATHOLOGY

## 2020-02-03 PROCEDURE — 86334 IMMUNOFIX E-PHORESIS SERUM: CPT | Mod: HCNC

## 2020-02-03 PROCEDURE — 82784 ASSAY IGA/IGD/IGG/IGM EACH: CPT | Mod: 59,HCNC

## 2020-02-03 PROCEDURE — 99499 RISK ADDL DX/OHS AUDIT: ICD-10-PCS | Mod: HCNC,S$GLB,, | Performed by: INTERNAL MEDICINE

## 2020-02-03 PROCEDURE — 84165 PATHOLOGIST INTERPRETATION SPE: ICD-10-PCS | Mod: 26,HCNC,, | Performed by: PATHOLOGY

## 2020-02-03 PROCEDURE — 1101F PR PT FALLS ASSESS DOC 0-1 FALLS W/OUT INJ PAST YR: ICD-10-PCS | Mod: HCNC,CPTII,S$GLB, | Performed by: INTERNAL MEDICINE

## 2020-02-03 PROCEDURE — 36415 COLL VENOUS BLD VENIPUNCTURE: CPT | Mod: HCNC

## 2020-02-03 PROCEDURE — 83520 IMMUNOASSAY QUANT NOS NONAB: CPT | Mod: 59,HCNC

## 2020-02-03 PROCEDURE — 84165 PROTEIN E-PHORESIS SERUM: CPT | Mod: 26,HCNC,, | Performed by: PATHOLOGY

## 2020-02-03 PROCEDURE — 3078F PR MOST RECENT DIASTOLIC BLOOD PRESSURE < 80 MM HG: ICD-10-PCS | Mod: HCNC,CPTII,S$GLB, | Performed by: INTERNAL MEDICINE

## 2020-02-03 NOTE — PROGRESS NOTES
SECTION OF HEMATOLOGY AND BONE MARROW TRANSPLANT  Return Patient Visit   02/04/2020    CHIEF COMPLAINT:   No chief complaint on file.      HISTORY OF PRESENT ILLNESS:   69 y.o. female'; Ms. Aranda is here for  IgG kappa smoldering myeloma. Follow up.  No changes In clinical status since our last appt.   Feels well.  No focal pain. Denies fever, chills, nightsweats, bleeding, brusing, lymphadenopathy, signs/symptoms of splenomegaly, focal pain, neuropathy, recurrent infection   PAST MEDICAL HISTORY:   Past Medical History:   Diagnosis Date    Allergy     Anemia     Arthritis     Cholelithiases     Cyst of kidney, acquired     Diverticulitis     Elevated TSH     Family history of Graves' disease: daughter, maternal aunt, maternal uncle 9/13/2016    Glaucoma     Graves disease     Hx of colonic polyps     Hypertension 1981    Liver cyst     MGUS (monoclonal gammopathy of unknown significance)     Migraine headache     Mitral valve problem     leakage    Obesity     Paraproteinemia     Smoldering multiple myeloma 2013    Tricuspid valve disease     leakage       PAST SURGICAL HISTORY:   Past Surgical History:   Procedure Laterality Date    APPENDECTOMY      COLONOSCOPY N/A 10/23/2015    Procedure: COLONOSCOPY;  Surgeon: Brandon Ruelas MD;  Location: [facility] ([address] [place] [address]);  Service: Endoscopy;  Laterality: N/A;  Had divertiulitis in 5/29/2015 with a recommendation for colonoscopy in 8 weeks    Dr. Ruelas is her GI physician    COLONOSCOPY N/A 11/5/2018    Procedure: COLONOSCOPY;  Surgeon: Brandon Ruelas MD;  Location: [facility] ([address] [place] [address]);  Service: Endoscopy;  Laterality: N/A;  Dr. Ruelas did the last one multiple polyps, patient had recent diverticulitis should not schedule before Oct 10th    HYSTERECTOMY  1978 or 1979    OOPHORECTOMY         PAST SOCIAL HISTORY:   reports that she has quit smoking. Her smoking use included cigarettes. She quit after 3.00 years of use. She has never used  smokeless tobacco. She reports that she does not drink alcohol or use drugs.    FAMILY HISTORY:  Family History   Problem Relation Age of Onset    Heart disease Mother         MI    Heart attack Mother     Heart disease Maternal Aunt         MI    Heart disease Maternal Aunt         MI    Cancer Paternal Grandmother         GYN cancer - unknown cancer    Colon cancer Maternal Uncle     Cancer Maternal Uncle         colon ca    Hypertension Father     Heart disease Sister         fast heart rate    Cataracts Sister     Glaucoma Sister     Graves' disease Daughter     No Known Problems Son     No Known Problems Sister     No Known Problems Daughter     No Known Problems Son     Melanoma Neg Hx     Breast cancer Neg Hx     Ovarian cancer Neg Hx     Colon polyps Neg Hx     Rectal cancer Neg Hx     Stomach cancer Neg Hx     Esophageal cancer Neg Hx     Ulcerative colitis Neg Hx     Crohn's disease Neg Hx     Amblyopia Neg Hx     Blindness Neg Hx     Macular degeneration Neg Hx     Strabismus Neg Hx     Retinal detachment Neg Hx        CURRENT MEDICATIONS:   Current Outpatient Medications   Medication Sig    eletriptan (RELPAX) 40 MG tablet Take 1 tablet (40 mg total) by mouth every 2 (two) hours as needed. may repeat in 2 hours if necessary    gabapentin (NEURONTIN) 300 MG capsule Take 1 capsule (300 mg total) by mouth every evening. For nerve pain    magnesium oxide (MAG-OX) 400 mg tablet Take 400 mg by mouth once daily.    multivit with min-folic acid 0.4 mg Tab Take 0.4 mg by mouth once daily.    ondansetron (ZOFRAN) 4 MG tablet Take 1-2 tablets (4-8 mg total) by mouth every 8 (eight) hours as needed for Nausea. (Patient taking differently: Take 4-8 mg by mouth every 8 (eight) hours as needed for Nausea. )    quinapril (ACCUPRIL) 20 MG tablet TAKE 1 TABLET BY MOUTH EVERY DAY    rizatriptan (MAXALT) 10 MG tablet TAKE 1 TABLET(10 MG) BY MOUTH EVERY 2 HOURS AS NEEDED FOR MIGRAINE. MAX  "30 MG/ 24 AT BEDTIME    rosuvastatin (CRESTOR) 5 MG tablet Take 1 tablet (5 mg total) by mouth once daily. (Patient not taking: Reported on 1/7/2020)     No current facility-administered medications for this visit.      ALLERGIES:   Review of patient's allergies indicates:  No Known Allergies      ASSESSMENTS:   PAIN ASSESSMENT (Patient reports Pain):   Vitals:    02/03/20 1126   BP: 138/66   Pulse: 66   Resp: 16   Temp: 98.3 °F (36.8 °C)   TempSrc: Oral   SpO2: 96%   Weight: 125.9 kg (277 lb 9 oz)   Height: 5' 9" (1.753 m)   PainSc: 0-No pain     REVIEW OF SYSTEMS:     General ROS: negative  Psychological ROS: negative  Ophthalmic ROS: negative  ENT ROS: negative  Allergy and Immunology ROS: negative  Hematological and Lymphatic ROS: negative  Endocrine ROS: negative  Respiratory ROS: no cough, shortness of breath, or wheezing  Gastrointestinal ROS: no abdominal pain, change in bowel habits, or black or bloody stools  Genito-Urinary ROS: no dysuria, trouble voiding, or hematuria  Musculoskeletal ROS: negative  Neurological ROS: no TIA or stroke symptoms  Dermatological ROS: negative  PHYSICAL EXAM:   Vitals:    02/03/20 1126   BP: 138/66   Pulse: 66   Resp: 16   Temp: 98.3 °F (36.8 °C)       General - well developed, well nourished, no apparent distress  HEENT - oropharynx clear  Chest and Lung - clear to auscultation bilaterally   Cardiovascular - RRR with no MGR, normal S1 and S2  Abdomen-  soft, nontender, no palpable hepatomegaly or splenomegaly  Lymph - no palpable lymphadenopathy  Heme - no bruising, petechiae, pallor  Skin - no rashes or lesions  Psych - appropriate mood and affect      ECOG Performance Status: (foot note - ECOG PS provided by Eastern Cooperative Oncology Group) 0 - Asymptomatic    Karnofsky Performance Score:  100%- Normal, No Complaints, No Evidence of Disease  DATA:   Lab Results   Component Value Date    WBC 3.08 (L) 01/17/2020    HGB 12.1 01/17/2020    HCT 37.6 01/17/2020    MCV 96 " 01/17/2020     01/17/2020     Gran # (ANC)   Date Value Ref Range Status   01/17/2020 1.3 (L) 1.8 - 7.7 K/uL Final     Gran%   Date Value Ref Range Status   01/17/2020 43.9 38.0 - 73.0 % Final     Lymph #   Date Value Ref Range Status   01/17/2020 1.4 1.0 - 4.8 K/uL Final     Lymph%   Date Value Ref Range Status   01/17/2020 46.4 18.0 - 48.0 % Final     Kappa Free Light Chains   Date Value Ref Range Status   02/03/2020 15.85 (H) 0.33 - 1.94 mg/dL Final   08/20/2019 16.21 (H) 0.33 - 1.94 mg/dL Final   01/09/2019 15.73 (H) 0.33 - 1.94 mg/dL Final     Lambda Free Light Chains   Date Value Ref Range Status   02/03/2020 1.30 0.57 - 2.63 mg/dL Final   08/20/2019 1.28 0.57 - 2.63 mg/dL Final   01/09/2019 1.51 0.57 - 2.63 mg/dL Final     Kappa/Lambda FLC Ratio   Date Value Ref Range Status   02/03/2020 12.19 (H) 0.26 - 1.65 Final   08/20/2019 12.66 (H) 0.26 - 1.65 Final   01/09/2019 10.42 (H) 0.26 - 1.65 Final     Gamma grams/dl   Date Value Ref Range Status   08/20/2019 2.01 (H) 0.67 - 1.58 g/dL Final   01/09/2019 2.09 (H) 0.67 - 1.58 g/dL Final   10/01/2018 2.11 (H) 0.67 - 1.58 g/dL Final     IgG - Serum   Date Value Ref Range Status   02/03/2020 2046 (H) 650 - 1600 mg/dL Final     Comment:     IgG Cord Blood Reference Range: 650-1600 mg/dL.     IgA   Date Value Ref Range Status   02/03/2020 96 40 - 350 mg/dL Final     Comment:     IgA Cord Blood Reference Range: <5 mg/dL.     IgM   Date Value Ref Range Status   02/03/2020 28 (L) 50 - 300 mg/dL Final     Comment:     IgM Cord Blood Reference Range: <25 mg/dL.          ASSESSMENT AND PLAN:   Encounter Diagnosis   Name Primary?    Smoldering multiple myeloma (SMM) Yes      IgG kappa smoldering myeloma diagnosed 2010 under observation since that time; previous pt of Dr. Lora; transitioned care to me sept 2015.    Mild intermittent leukopenia/anemia are chronic and pre date myeloma diagnosis ; stable  Met survey jan 2019 with some mild changes posibly lytic lesions;  subsequent fu feb 2019 pet scan was normal ; next Met survey due feb 2020; will schedule today   Patient continues to smolder with no new overt CRAB criteria; she has mildly improved  light chains and IgG levels   today; m-protein  pending    educated on symptoms for which to seek attn in our clinic earlier     Follow Up:     -please schedule metastatic survey 2/6 am  -cbc, cmp, serum free light chains, quantitative immunoglobulins, serum electropheresis, serum immunofixation and md appt in 6 months     Rasta Alba MD  Hematology/Oncology/Bone Marrow Transplant

## 2020-02-03 NOTE — TELEPHONE ENCOUNTER
----- Message from Lela Chambers NP sent at 1/31/2020  5:23 PM CST -----  Let her know I e-sent in Relpax, see if that's covered. They usually also sent letter stating the alternative ones they do cover or that on their formulary list or she can check with her ins          ----- Message -----  From: Allie Worrell MA  Sent: 1/31/2020   3:00 PM CST  To: Lela Chambers NP    Contacted patient , she stated the insurance company said they will soon stop coverage for the Maxalt rx. And wants to know will there be a substitute rx for her migraines. Please advise

## 2020-02-03 NOTE — Clinical Note
-  serum free light chains, quantitative immunoglobulins, serum electropheresis, serum immunofixation today (dont need cbc or cmp)-please schedule metastatic survey 2/6 am-cbc, cmp, serum free light chains, quantitative immunoglobulins, serum electropheresis, serum immunofixation and md appt in 6 months

## 2020-02-04 LAB
ALBUMIN SERPL ELPH-MCNC: 3.82 G/DL (ref 3.35–5.55)
ALPHA1 GLOB SERPL ELPH-MCNC: 0.33 G/DL (ref 0.17–0.41)
ALPHA2 GLOB SERPL ELPH-MCNC: 0.71 G/DL (ref 0.43–0.99)
B-GLOBULIN SERPL ELPH-MCNC: 0.76 G/DL (ref 0.5–1.1)
GAMMA GLOB SERPL ELPH-MCNC: 1.98 G/DL (ref 0.67–1.58)
INTERPRETATION SERPL IFE-IMP: NORMAL
KAPPA LC SER QL IA: 15.85 MG/DL (ref 0.33–1.94)
KAPPA LC/LAMBDA SER IA: 12.19 (ref 0.26–1.65)
LAMBDA LC SER QL IA: 1.3 MG/DL (ref 0.57–2.63)
PATHOLOGIST INTERPRETATION IFE: NORMAL
PATHOLOGIST INTERPRETATION SPE: NORMAL
PROT SERPL-MCNC: 7.6 G/DL (ref 6–8.4)

## 2020-02-04 NOTE — PROGRESS NOTES
Patient, Manju Aranda (MRN #[id number]), presented with a recorded BMI of 40.99 kg/m^2 consistent with the definition of morbid obesity (ICD-10 E66.01). The patient's morbid obesity was monitored, evaluated, addressed and/or treated. This addendum to the medical record is made on 02/04/2020.

## 2020-02-06 ENCOUNTER — HOSPITAL ENCOUNTER (OUTPATIENT)
Dept: RADIOLOGY | Facility: HOSPITAL | Age: 70
Discharge: HOME | End: 2020-02-06
Attending: INTERNAL MEDICINE
Payer: MEDICARE

## 2020-02-06 DIAGNOSIS — D47.2 SMOLDERING MULTIPLE MYELOMA (SMM): ICD-10-CM

## 2020-02-06 PROCEDURE — 77075 RADEX OSSEOUS SURVEY COMPL: CPT | Mod: 26,HCNC,, | Performed by: RADIOLOGY

## 2020-02-06 PROCEDURE — 77075 XR METASTATIC SURVEY: ICD-10-PCS | Mod: 26,HCNC,, | Performed by: RADIOLOGY

## 2020-02-06 PROCEDURE — 77075 RADEX OSSEOUS SURVEY COMPL: CPT | Mod: TC,HCNC

## 2020-02-11 ENCOUNTER — TELEPHONE (OUTPATIENT)
Dept: SLEEP MEDICINE | Facility: CLINIC | Age: 70
End: 2020-02-11

## 2020-02-11 NOTE — TELEPHONE ENCOUNTER
----- Message from Elida Lewis sent at 2/11/2020 11:40 AM CST -----  Contact: KEESHA DUARTE [[id number]]  Name of Who is Calling: KEESHA DUARTE [[id number]]     What is the request in detail:KEESHA DUARTE [[id number]] is requesting a call back in regards to RX for migraines  Please contact to further discuss and advise      Can the clinic reply by [facility]:  no    [facility]  [phone] (home)

## 2020-02-19 ENCOUNTER — PATIENT OUTREACH (OUTPATIENT)
Dept: ADMINISTRATIVE | Facility: HOSPITAL | Age: 70
End: 2020-02-19

## 2020-02-19 DIAGNOSIS — M94.9 DISORDER OF BONE AND ARTICULAR CARTILAGE: Primary | ICD-10-CM

## 2020-02-19 DIAGNOSIS — M89.9 DISORDER OF BONE AND ARTICULAR CARTILAGE: Primary | ICD-10-CM

## 2020-02-19 DIAGNOSIS — Z78.0 ASYMPTOMATIC MENOPAUSAL STATE: ICD-10-CM

## 2020-02-24 ENCOUNTER — TELEPHONE (OUTPATIENT)
Dept: SLEEP MEDICINE | Facility: CLINIC | Age: 70
End: 2020-02-24

## 2020-02-24 NOTE — TELEPHONE ENCOUNTER
----- Message from Nile Grace sent at 2/24/2020  2:45 PM CST -----  Contact: Deepali Tobar)  Name of Who is Calling: Deepali Tobar)      What is the request in detail: Would like to speak with staff in regards to additional clinical questions for eletriptan (RELPAX) 40 MG tablet      Can the clinic reply by [facility]: no      What Number to Call Back if not in [facility]: [phone]   Ref#[id number]

## 2020-02-26 ENCOUNTER — TELEPHONE (OUTPATIENT)
Dept: SLEEP MEDICINE | Facility: CLINIC | Age: 70
End: 2020-02-26

## 2020-02-26 NOTE — TELEPHONE ENCOUNTER
Did verbal appeal with ins rep to get maxalt covered. amerge is the alternative formulary option.

## 2020-03-01 ENCOUNTER — TELEPHONE (OUTPATIENT)
Dept: INTERNAL MEDICINE | Facility: CLINIC | Age: 70
End: 2020-03-01

## 2020-03-01 DIAGNOSIS — Z86.39 HISTORY OF HYPERTHYROIDISM: Primary | ICD-10-CM

## 2020-03-01 DIAGNOSIS — I10 ESSENTIAL HYPERTENSION: ICD-10-CM

## 2020-03-01 RX ORDER — GABAPENTIN 300 MG/1
300 CAPSULE ORAL NIGHTLY
Qty: 30 CAPSULE | Refills: 3 | Status: CANCELLED | OUTPATIENT
Start: 2020-03-01 | End: 2021-03-01

## 2020-03-02 ENCOUNTER — OFFICE VISIT (OUTPATIENT)
Dept: HEPATOLOGY | Facility: CLINIC | Age: 70
End: 2020-03-02
Attending: INTERNAL MEDICINE
Payer: MEDICARE

## 2020-03-02 VITALS
DIASTOLIC BLOOD PRESSURE: 77 MMHG | HEIGHT: 69 IN | SYSTOLIC BLOOD PRESSURE: 134 MMHG | RESPIRATION RATE: 18 BRPM | WEIGHT: 279.31 LBS | OXYGEN SATURATION: 96 % | BODY MASS INDEX: 41.37 KG/M2 | HEART RATE: 77 BPM

## 2020-03-02 DIAGNOSIS — K76.89 LIVER CYST: Primary | ICD-10-CM

## 2020-03-02 PROCEDURE — 99999 PR PBB SHADOW E&M-EST. PATIENT-LVL III: ICD-10-PCS | Mod: PBBFAC,HCNC,, | Performed by: INTERNAL MEDICINE

## 2020-03-02 PROCEDURE — 99213 OFFICE O/P EST LOW 20 MIN: CPT | Mod: HCNC,S$GLB,, | Performed by: INTERNAL MEDICINE

## 2020-03-02 PROCEDURE — 1159F PR MEDICATION LIST DOCUMENTED IN MEDICAL RECORD: ICD-10-PCS | Mod: HCNC,S$GLB,, | Performed by: INTERNAL MEDICINE

## 2020-03-02 PROCEDURE — 3075F PR MOST RECENT SYSTOLIC BLOOD PRESS GE 130-139MM HG: ICD-10-PCS | Mod: HCNC,CPTII,S$GLB, | Performed by: INTERNAL MEDICINE

## 2020-03-02 PROCEDURE — 3078F DIAST BP <80 MM HG: CPT | Mod: HCNC,CPTII,S$GLB, | Performed by: INTERNAL MEDICINE

## 2020-03-02 PROCEDURE — 1126F PR PAIN SEVERITY QUANTIFIED, NO PAIN PRESENT: ICD-10-PCS | Mod: HCNC,S$GLB,, | Performed by: INTERNAL MEDICINE

## 2020-03-02 PROCEDURE — 1126F AMNT PAIN NOTED NONE PRSNT: CPT | Mod: HCNC,S$GLB,, | Performed by: INTERNAL MEDICINE

## 2020-03-02 PROCEDURE — 3075F SYST BP GE 130 - 139MM HG: CPT | Mod: HCNC,CPTII,S$GLB, | Performed by: INTERNAL MEDICINE

## 2020-03-02 PROCEDURE — 99999 PR PBB SHADOW E&M-EST. PATIENT-LVL III: CPT | Mod: PBBFAC,HCNC,, | Performed by: INTERNAL MEDICINE

## 2020-03-02 PROCEDURE — 1101F PT FALLS ASSESS-DOCD LE1/YR: CPT | Mod: HCNC,CPTII,S$GLB, | Performed by: INTERNAL MEDICINE

## 2020-03-02 PROCEDURE — 99213 PR OFFICE/OUTPT VISIT, EST, LEVL III, 20-29 MIN: ICD-10-PCS | Mod: HCNC,S$GLB,, | Performed by: INTERNAL MEDICINE

## 2020-03-02 PROCEDURE — 1101F PR PT FALLS ASSESS DOC 0-1 FALLS W/OUT INJ PAST YR: ICD-10-PCS | Mod: HCNC,CPTII,S$GLB, | Performed by: INTERNAL MEDICINE

## 2020-03-02 PROCEDURE — 3078F PR MOST RECENT DIASTOLIC BLOOD PRESSURE < 80 MM HG: ICD-10-PCS | Mod: HCNC,CPTII,S$GLB, | Performed by: INTERNAL MEDICINE

## 2020-03-02 PROCEDURE — 1159F MED LIST DOCD IN RCRD: CPT | Mod: HCNC,S$GLB,, | Performed by: INTERNAL MEDICINE

## 2020-03-02 NOTE — TELEPHONE ENCOUNTER
Please ask the patient if she would like to complete the tsh and lipid panel prior to the appt Thurs    Lipid panel  TSH

## 2020-03-02 NOTE — PROGRESS NOTES
Subjective:      Patient ID: Manju Aranda is a 69 y.o. female.    Chief Complaint: Follow-up    HPI:  HPI   Prior to appt: TSH and Lipid panel  The last visit with me was 9/2019: discussed changing rosuvastatin to daytime.    Rosuvastatin: now taking it in the daytime  Nerve pain: resolved  Hypertension: at goal      Health Maintenance:  Bone density scheduled  Shingrix discussed    Since the last appt:  Hem/Onc: 2/3/2020 for smoldering mm  Sleep med: Lela Chambers 1/7/2020 migraine  Hepatology to be schedule in 3/2020: and labs and ultrasound were to be scheduled    Living situation: independent  CAGE: no alcohol  Depression screening: none  Timed Get Up and Go: normal  Whisper Test: good  Cognitive Function Screening: normal  Nutrition Screening: nrmal  ADL Screening  Self        Annual exam:   Colonoscopy : 2010 patient believes 5 years ( history of polyps and diverticulitis): Colonoscopy 10/23/2015: 5 polyps removed: 11/2018 2 polyps follow up in 5 years Dr. Ruelas  Endoscopy:2011 : gastric polyp  Mammogram 11/2019  Gyn: Dr. Dooley every 2 years  Optho: Dr. Kumar yearly  Flu: due in the fall  Tetanus: done  Shingles: done  Shingrix discussed  Pneumovax: done  Prevnar 13 done     Consultants:  Dr. Eben De La Cruz: follow up in one year  Dr. Kumar  Thyroid: 6/2019 Was a patient of Dr. Aguiar  Nephrology 8/22/2019 follow up in one year          Patient Active Problem List   Diagnosis    Hypertension    Liver cyst    Paraproteinemia    Smoldering multiple myeloma (SMM)    Migraine headache    Mitral valve disorder    Tricuspid valve disease    ADPKD (autosomal dominant polycystic kidney disease)    Benign hypertensive renal disease without renal failure    Tachycardia    Aortic atherosclerosis    Microhematuria    Graves disease    Tortuous aorta    Morbid obesity with BMI of 40.0-44.9, adult    History of adenomatous polyp of colon    History of hyperthyroidism     Family history of diabetes mellitus     Past Medical History:   Diagnosis Date    Allergy     Anemia     Arthritis     Cholelithiases     Cyst of kidney, acquired     Diverticulitis     Elevated TSH     Family history of Graves' disease: daughter, maternal aunt, maternal uncle 9/13/2016    Glaucoma     Graves disease     Hx of colonic polyps     Hypertension 1981    Liver cyst     MGUS (monoclonal gammopathy of unknown significance)     Migraine headache     Mitral valve problem     leakage    Obesity     Paraproteinemia     Smoldering multiple myeloma 2013    Tricuspid valve disease     leakage     Past Surgical History:   Procedure Laterality Date    APPENDECTOMY      COLONOSCOPY N/A 10/23/2015    Procedure: COLONOSCOPY;  Surgeon: Brandon Ruelas MD;  Location: [facility] ([address] [place] [address]);  Service: Endoscopy;  Laterality: N/A;  Had divertiulitis in 5/29/2015 with a recommendation for colonoscopy in 8 weeks    Dr. Ruelas is her GI physician    COLONOSCOPY N/A 11/5/2018    Procedure: COLONOSCOPY;  Surgeon: Brandon Ruelas MD;  [facility]  Service: Endoscopy;  Laterality: N/A;  Dr. Ruelas did the last one multiple polyps, patient had recent diverticulitis should not schedule before Oct 10th    HYSTERECTOMY  1978 or 1979    OOPHORECTOMY       Family History   Problem Relation Age of Onset    Heart disease Mother         MI    Heart attack Mother     Heart disease Maternal Aunt         MI    Heart disease Maternal Aunt         MI    Cancer Paternal Grandmother         GYN cancer - unknown cancer    Colon cancer Maternal Uncle     Cancer Maternal Uncle         colon ca    Hypertension Father     Heart disease Sister         fast heart rate    Cataracts Sister     Glaucoma Sister     Graves' disease Daughter     No Known Problems Son     No Known Problems Sister     No Known Problems Daughter     No Known Problems Son     Melanoma Neg Hx     Breast  "cancer Neg Hx     Ovarian cancer Neg Hx     Colon polyps Neg Hx     Rectal cancer Neg Hx     Stomach cancer Neg Hx     Esophageal cancer Neg Hx     Ulcerative colitis Neg Hx     Crohn's disease Neg Hx     Amblyopia Neg Hx     Blindness Neg Hx     Macular degeneration Neg Hx     Strabismus Neg Hx     Retinal detachment Neg Hx      Review of Systems   Constitutional: Negative for chills, fever and unexpected weight change.   HENT: Negative for trouble swallowing.    Respiratory: Negative for cough, shortness of breath and wheezing.    Cardiovascular: Negative for chest pain and palpitations.   Gastrointestinal: Negative for abdominal distention, abdominal pain, blood in stool and vomiting.   Musculoskeletal: Negative for back pain.     Objective:     Vitals:    03/05/20 0804   BP: 130/72   Pulse: 80   SpO2: 99%   Weight: 127.8 kg (281 lb 12 oz)   Height: 5' 9" (1.753 m)   PainSc: 0-No pain     Body mass index is 41.61 kg/m².  Physical Exam   Constitutional: She is oriented to person, place, and time. She appears well-developed and well-nourished. No distress.   Neck: Carotid bruit is not present. No thyromegaly present.   Cardiovascular: Normal rate, regular rhythm and normal heart sounds. PMI is not displaced.   Pulmonary/Chest: Effort normal and breath sounds normal. No respiratory distress.   Abdominal: Soft. Bowel sounds are normal. She exhibits no distension. There is no tenderness.   Musculoskeletal: She exhibits no edema.   Neurological: She is alert and oriented to person, place, and time.     Assessment:     1. Essential hypertension    2. Hyperlipidemia, unspecified hyperlipidemia type    3. ADPKD (autosomal dominant polycystic kidney disease)    4. Nerve pain    5. Family history of diabetes mellitus    6. Morbid obesity with BMI of 40.0-44.9, adult    7. Aortic atherosclerosis      Plan:   Manju was seen today for follow-up.    Diagnoses and all orders for this visit:    Essential " hypertension  Comments:  BP continue same med, at goal  Orders:  -     quinapril (ACCUPRIL) 20 MG tablet; Take 1 tablet (20 mg total) by mouth once daily.    Hyperlipidemia, unspecified hyperlipidemia type  Comments:  At goal continue same med  Orders:  -     rosuvastatin (CRESTOR) 5 MG tablet; Take 1 tablet (5 mg total) by mouth once daily.    ADPKD (autosomal dominant polycystic kidney disease)  Comments:  Stable     Nerve pain  Comments:  Resolved    Family history of diabetes mellitus    Morbid obesity with BMI of 40.0-44.9, adult  Comments:  Discussed , weight stable, walks every day, patient describes herself as a chips    Aortic atherosclerosis  Comments:  On a statin will add asa    Other orders  -     Cancel: gabapentin (NEURONTIN) 300 MG capsule; Take 1 capsule (300 mg total) by mouth every evening. For nerve pain        Problem List Items Addressed This Visit     Hypertension - Primary    Relevant Medications    quinapril (ACCUPRIL) 20 MG tablet    ADPKD (autosomal dominant polycystic kidney disease)    Aortic atherosclerosis    Overview     Noted on CT imaging dated 09/07/2016         Morbid obesity with BMI of 40.0-44.9, adult    Family history of diabetes mellitus      Other Visit Diagnoses     Hyperlipidemia, unspecified hyperlipidemia type        At goal continue same med    Relevant Medications    rosuvastatin (CRESTOR) 5 MG tablet    Nerve pain        Resolved        No orders of the defined types were placed in this encounter.    Follow up in about 6 months (around 9/5/2020) for Annual exam : no prior lab  needed.     Medication List           Accurate as of March 5, 2020  9:11 AM. If you have any questions, ask your nurse or doctor.               CONTINUE taking these medications    aspirin 81 MG Chew     eletriptan 40 MG tablet  Commonly known as:  RELPAX  Take 1 tablet (40 mg total) by mouth every 2 (two) hours as needed. may repeat in 2 hours if necessary     magnesium oxide 400 mg (241.3 mg  magnesium) tablet  Commonly known as:  MAG-OX     multivit with min-folic acid 0.4 mg Tab     ondansetron 4 MG tablet  Commonly known as:  ZOFRAN  Take 1-2 tablets (4-8 mg total) by mouth every 8 (eight) hours as needed for Nausea.     quinapril 20 MG tablet  Commonly known as:  ACCUPRIL  Take 1 tablet (20 mg total) by mouth once daily.     rizatriptan 10 MG tablet  Commonly known as:  MAXALT  TAKE 1 TABLET(10 MG) BY MOUTH EVERY 2 HOURS AS NEEDED FOR MIGRAINE. MAX 30 MG/ 24 AT BEDTIME     rosuvastatin 5 MG tablet  Commonly known as:  CRESTOR  Take 1 tablet (5 mg total) by mouth once daily.           Where to Get Your Medications      These medications were sent to [id number] - [place], [place] - [address]  [address] [facility] [address]    Phone:  [phone]   · quinapril 20 MG tablet  · rosuvastatin 5 MG tablet

## 2020-03-02 NOTE — PROGRESS NOTES
HEPATOLOGY CONSULTATION    Referring Physician: Dr. ABHISHEK Garay  Current Corresponding Physician: Dr. ABHISHEK Garay    Reason for Consultation: Consultation for evaluation of Abnormal Abdominal/Liver Imaging    History of Present Illness: Manju Aranda is a 69 y.o. femalewho presents for evaluation of   Chief Complaint   Patient presents with    Abnormal Abdominal/Liver Imaging    This 69-year-old woman has a history of simple hepatic cysts.  A recent abdominal ultrasound showed 2 cysts 1 measuring 1.3 cm and the other measuring 0.9 cm.  She has no abdominal symptoms.  There is no history of chronic liver disease.  The size of the cysts have not changed.    Past Medical History:   Diagnosis Date    Allergy     Anemia     Arthritis     Cholelithiases     Cyst of kidney, acquired     Diverticulitis     Elevated TSH     Family history of Graves' disease: daughter, maternal aunt, maternal uncle 9/13/2016    Glaucoma     Graves disease     Hx of colonic polyps     Hypertension 1981    Liver cyst     MGUS (monoclonal gammopathy of unknown significance)     Migraine headache     Mitral valve problem     leakage    Obesity     Paraproteinemia     Smoldering multiple myeloma 2013    Tricuspid valve disease     leakage     Outpatient Encounter Medications as of 3/2/2020   Medication Sig Dispense Refill    gabapentin (NEURONTIN) 300 MG capsule Take 1 capsule (300 mg total) by mouth every evening. For nerve pain 30 capsule 3    magnesium oxide (MAG-OX) 400 mg tablet Take 400 mg by mouth once daily.      multivit with min-folic acid 0.4 mg Tab Take 0.4 mg by mouth once daily.      ondansetron (ZOFRAN) 4 MG tablet Take 1-2 tablets (4-8 mg total) by mouth every 8 (eight) hours as needed for Nausea. (Patient taking differently: Take 4-8 mg by mouth every 8 (eight) hours as needed for Nausea. ) 20 tablet 3    quinapril (ACCUPRIL) 20 MG tablet TAKE 1 TABLET BY MOUTH EVERY DAY 90 tablet 3    rizatriptan  (MAXALT) 10 MG tablet TAKE 1 TABLET(10 MG) BY MOUTH EVERY 2 HOURS AS NEEDED FOR MIGRAINE. MAX 30 MG/ 24 AT BEDTIME 12 tablet 11    rosuvastatin (CRESTOR) 5 MG tablet Take 1 tablet (5 mg total) by mouth once daily. 90 tablet 3    eletriptan (RELPAX) 40 MG tablet Take 1 tablet (40 mg total) by mouth every 2 (two) hours as needed. may repeat in 2 hours if necessary 12 tablet 5     No facility-administered encounter medications on file as of 3/2/2020.      Review of patient's allergies indicates:  No Known Allergies  Family History   Problem Relation Age of Onset    Heart disease Mother         MI    Heart attack Mother     Heart disease Maternal Aunt         MI    Heart disease Maternal Aunt         MI    Cancer Paternal Grandmother         GYN cancer - unknown cancer    Colon cancer Maternal Uncle     Cancer Maternal Uncle         colon ca    Hypertension Father     Heart disease Sister         fast heart rate    Cataracts Sister     Glaucoma Sister     Graves' disease Daughter     No Known Problems Son     No Known Problems Sister     No Known Problems Daughter     No Known Problems Son     Melanoma Neg Hx     Breast cancer Neg Hx     Ovarian cancer Neg Hx     Colon polyps Neg Hx     Rectal cancer Neg Hx     Stomach cancer Neg Hx     Esophageal cancer Neg Hx     Ulcerative colitis Neg Hx     Crohn's disease Neg Hx     Amblyopia Neg Hx     Blindness Neg Hx     Macular degeneration Neg Hx     Strabismus Neg Hx     Retinal detachment Neg Hx        Social History     Socioeconomic History    Marital status: Single     Spouse name: Not on file    Number of children: Not on file    Years of education: Not on file    Highest education level: Not on file   Occupational History    Occupation: RETIRED   Social Needs    Financial resource strain: Not on file    Food insecurity:     Worry: Not on file     Inability: Not on file    Transportation needs:     Medical: Not on file      Non-medical: Not on file   Tobacco Use    Smoking status: Former Smoker     Years: 3.00     Types: Cigarettes    Smokeless tobacco: Never Used   Substance and Sexual Activity    Alcohol use: No    Drug use: No    Sexual activity: Never     Birth control/protection: Post-menopausal   Lifestyle    Physical activity:     Days per week: Not on file     Minutes per session: Not on file    Stress: Not on file   Relationships    Social connections:     Talks on phone: Not on file     Gets together: Not on file     Attends Jain service: Not on file     Active member of club or organization: Not on file     Attends meetings of clubs or organizations: Not on file     Relationship status: Not on file   Other Topics Concern    Are you pregnant or think you may be? Not Asked    Breast-feeding Not Asked   Social History Narrative    Not on file     Review of Systems   Constitutional: Negative for appetite change, fatigue and unexpected weight change.   HENT: Negative for ear pain, hearing loss, sore throat and trouble swallowing.    Eyes: Negative for visual disturbance.   Respiratory: Negative for cough and shortness of breath.    Cardiovascular: Negative for chest pain and palpitations.   Gastrointestinal: Negative for abdominal pain, nausea and vomiting.   Genitourinary: Negative for difficulty urinating and dysuria.   Musculoskeletal: Negative for arthralgias and back pain.   Skin: Negative for rash.   Neurological: Negative for tremors, seizures and headaches.   Psychiatric/Behavioral: Negative for agitation and decreased concentration.     Vitals:    03/02/20 0935   BP: 134/77   Pulse: 77   Resp: 18       Physical Exam   Constitutional: She is oriented to person, place, and time. She appears well-developed and well-nourished.   HENT:   Right Ear: External ear normal.   Left Ear: External ear normal.   Mouth/Throat: Oropharynx is clear and moist.   Eyes: No scleral icterus.   Cardiovascular: Normal rate,  regular rhythm and normal heart sounds. Exam reveals no gallop and no friction rub.   No murmur heard.  Pulmonary/Chest: Effort normal and breath sounds normal. No respiratory distress. She has no wheezes.   Abdominal: Soft. Bowel sounds are normal. She exhibits no distension and no mass. There is no tenderness.   Musculoskeletal: Normal range of motion. She exhibits no edema.   Lymphadenopathy:     She has no cervical adenopathy.   Neurological: She is alert and oriented to person, place, and time. She has normal strength.   Skin: Skin is warm, dry and intact. She is not diaphoretic. No pallor.       MELD-Na score: 7 at 1/17/2020 10:00 AM  MELD score: 7 at 1/17/2020 10:00 AM  Calculated from:  Serum Creatinine: 1.1 mg/dL at 1/17/2020 10:00 AM  Serum Sodium: 139 mmol/L (Rounded to 137 mmol/L) at 1/17/2020 10:00 AM  Total Bilirubin: 0.5 mg/dL (Rounded to 1 mg/dL) at 1/17/2020 10:00 AM  INR(ratio): 1.0 at 1/17/2020 10:00 AM  Age: 69 years    Lab Results   Component Value Date    GLU 82 01/17/2020    BUN 16 01/17/2020    CREATININE 1.1 01/17/2020    CALCIUM 9.9 01/17/2020     01/17/2020    K 4.2 01/17/2020     01/17/2020    PROT 8.2 01/17/2020    CO2 29 01/17/2020    ANIONGAP 7 (L) 01/17/2020    WBC 3.08 (L) 01/17/2020    RBC 3.93 (L) 01/17/2020    HGB 12.1 01/17/2020    HCT 37.6 01/17/2020    MCV 96 01/17/2020    MCH 30.8 01/17/2020    MCHC 32.2 01/17/2020     Lab Results   Component Value Date    RDW 11.3 (L) 01/17/2020     01/17/2020    MPV 10.1 01/17/2020    GRAN 1.3 (L) 01/17/2020    GRAN 43.9 01/17/2020    LYMPH 1.4 01/17/2020    LYMPH 46.4 01/17/2020    MONO 0.3 01/17/2020    MONO 9.1 01/17/2020    EOSINOPHIL 0.3 01/17/2020    BASOPHIL 0.3 01/17/2020    EOS 0.0 01/17/2020    BASO 0.01 01/17/2020    APTT 26.1 05/01/2013    BNP <10 04/30/2013    CHOL 137 06/20/2019    TRIG 79 06/20/2019    HDL 52 06/20/2019    CHOLHDL 38.0 06/20/2019    TOTALCHOLEST 2.6 06/20/2019    ALBUMIN 3.7 01/17/2020     BILIDIR 0.2 11/01/2016    AST 15 01/17/2020    ALT 11 01/17/2020    ALKPHOS 88 01/17/2020    MG 1.8 09/08/2016    LABPROT 10.4 01/17/2020    INR 1.0 01/17/2020       Assessment and Plan:  Patient Active Problem List   Diagnosis    Hypertension    Liver cyst    Paraproteinemia    Smoldering multiple myeloma (SMM)    Migraine headache    Mitral valve disorder    Tricuspid valve disease    ADPKD (autosomal dominant polycystic kidney disease)    Benign hypertensive renal disease without renal failure    Tachycardia    Aortic atherosclerosis    Microhematuria    Graves disease    Tortuous aorta    Morbid obesity with BMI of 40.0-44.9, adult    History of adenomatous polyp of colon    History of hyperthyroidism     Manju Aranda is a 69 y.o. female withAbnormal Abdominal/Liver Imaging    Impression:  Simple hepatic cysts in the absence of evidence of chronic liver disease    Plan:  RTC in 1 year  Follow up every 1-2 years with ultrasound

## 2020-03-03 ENCOUNTER — LAB VISIT (OUTPATIENT)
Dept: LAB | Facility: HOSPITAL | Age: 70
End: 2020-03-03
Attending: INTERNAL MEDICINE
Payer: MEDICARE

## 2020-03-03 DIAGNOSIS — Z86.39 HISTORY OF HYPERTHYROIDISM: ICD-10-CM

## 2020-03-03 DIAGNOSIS — I10 ESSENTIAL HYPERTENSION: ICD-10-CM

## 2020-03-03 LAB
CHOLEST SERPL-MCNC: 148 MG/DL (ref 120–199)
CHOLEST/HDLC SERPL: 2.7 {RATIO} (ref 2–5)
HDLC SERPL-MCNC: 54 MG/DL (ref 40–75)
HDLC SERPL: 36.5 % (ref 20–50)
LDLC SERPL CALC-MCNC: 82.8 MG/DL (ref 63–159)
NONHDLC SERPL-MCNC: 94 MG/DL
TRIGL SERPL-MCNC: 56 MG/DL (ref 30–150)
TSH SERPL DL<=0.005 MIU/L-ACNC: 1.89 UIU/ML (ref 0.4–4)

## 2020-03-03 PROCEDURE — 84443 ASSAY THYROID STIM HORMONE: CPT | Mod: HCNC

## 2020-03-03 PROCEDURE — 36415 COLL VENOUS BLD VENIPUNCTURE: CPT | Mod: HCNC

## 2020-03-03 PROCEDURE — 80061 LIPID PANEL: CPT | Mod: HCNC

## 2020-03-04 ENCOUNTER — TELEPHONE (OUTPATIENT)
Dept: INTERNAL MEDICINE | Facility: CLINIC | Age: 70
End: 2020-03-04

## 2020-03-05 ENCOUNTER — OFFICE VISIT (OUTPATIENT)
Dept: INTERNAL MEDICINE | Facility: CLINIC | Age: 70
End: 2020-03-05
Payer: MEDICARE

## 2020-03-05 VITALS
HEIGHT: 69 IN | WEIGHT: 281.75 LBS | SYSTOLIC BLOOD PRESSURE: 130 MMHG | HEART RATE: 80 BPM | BODY MASS INDEX: 41.73 KG/M2 | OXYGEN SATURATION: 99 % | DIASTOLIC BLOOD PRESSURE: 72 MMHG

## 2020-03-05 DIAGNOSIS — E78.5 HYPERLIPIDEMIA, UNSPECIFIED HYPERLIPIDEMIA TYPE: ICD-10-CM

## 2020-03-05 DIAGNOSIS — E66.01 MORBID OBESITY WITH BMI OF 40.0-44.9, ADULT: ICD-10-CM

## 2020-03-05 DIAGNOSIS — M79.2 NERVE PAIN: ICD-10-CM

## 2020-03-05 DIAGNOSIS — Z83.3 FAMILY HISTORY OF DIABETES MELLITUS: ICD-10-CM

## 2020-03-05 DIAGNOSIS — Q61.2 ADPKD (AUTOSOMAL DOMINANT POLYCYSTIC KIDNEY DISEASE): ICD-10-CM

## 2020-03-05 DIAGNOSIS — I10 ESSENTIAL HYPERTENSION: Primary | ICD-10-CM

## 2020-03-05 DIAGNOSIS — I70.0 AORTIC ATHEROSCLEROSIS: ICD-10-CM

## 2020-03-05 PROBLEM — M46.96 UNSPECIFIED INFLAMMATORY SPONDYLOPATHY, LUMBAR REGION: Status: RESOLVED | Noted: 2020-03-05 | Resolved: 2020-03-05

## 2020-03-05 PROBLEM — M46.96 UNSPECIFIED INFLAMMATORY SPONDYLOPATHY, LUMBAR REGION: Status: ACTIVE | Noted: 2020-03-05

## 2020-03-05 PROCEDURE — 1159F PR MEDICATION LIST DOCUMENTED IN MEDICAL RECORD: ICD-10-PCS | Mod: HCNC,S$GLB,, | Performed by: INTERNAL MEDICINE

## 2020-03-05 PROCEDURE — 99999 PR PBB SHADOW E&M-EST. PATIENT-LVL III: CPT | Mod: PBBFAC,HCNC,, | Performed by: INTERNAL MEDICINE

## 2020-03-05 PROCEDURE — 99499 UNLISTED E&M SERVICE: CPT | Mod: HCNC,S$GLB,, | Performed by: INTERNAL MEDICINE

## 2020-03-05 PROCEDURE — 99499 RISK ADDL DX/OHS AUDIT: ICD-10-PCS | Mod: HCNC,S$GLB,, | Performed by: INTERNAL MEDICINE

## 2020-03-05 PROCEDURE — 3075F SYST BP GE 130 - 139MM HG: CPT | Mod: HCNC,CPTII,S$GLB, | Performed by: INTERNAL MEDICINE

## 2020-03-05 PROCEDURE — 99999 PR PBB SHADOW E&M-EST. PATIENT-LVL III: ICD-10-PCS | Mod: PBBFAC,HCNC,, | Performed by: INTERNAL MEDICINE

## 2020-03-05 PROCEDURE — 1126F AMNT PAIN NOTED NONE PRSNT: CPT | Mod: HCNC,S$GLB,, | Performed by: INTERNAL MEDICINE

## 2020-03-05 PROCEDURE — 1101F PR PT FALLS ASSESS DOC 0-1 FALLS W/OUT INJ PAST YR: ICD-10-PCS | Mod: HCNC,CPTII,S$GLB, | Performed by: INTERNAL MEDICINE

## 2020-03-05 PROCEDURE — 99214 OFFICE O/P EST MOD 30 MIN: CPT | Mod: HCNC,S$GLB,, | Performed by: INTERNAL MEDICINE

## 2020-03-05 PROCEDURE — 99214 PR OFFICE/OUTPT VISIT, EST, LEVL IV, 30-39 MIN: ICD-10-PCS | Mod: HCNC,S$GLB,, | Performed by: INTERNAL MEDICINE

## 2020-03-05 PROCEDURE — 1101F PT FALLS ASSESS-DOCD LE1/YR: CPT | Mod: HCNC,CPTII,S$GLB, | Performed by: INTERNAL MEDICINE

## 2020-03-05 PROCEDURE — 1126F PR PAIN SEVERITY QUANTIFIED, NO PAIN PRESENT: ICD-10-PCS | Mod: HCNC,S$GLB,, | Performed by: INTERNAL MEDICINE

## 2020-03-05 PROCEDURE — 3078F PR MOST RECENT DIASTOLIC BLOOD PRESSURE < 80 MM HG: ICD-10-PCS | Mod: HCNC,CPTII,S$GLB, | Performed by: INTERNAL MEDICINE

## 2020-03-05 PROCEDURE — 1159F MED LIST DOCD IN RCRD: CPT | Mod: HCNC,S$GLB,, | Performed by: INTERNAL MEDICINE

## 2020-03-05 PROCEDURE — 3078F DIAST BP <80 MM HG: CPT | Mod: HCNC,CPTII,S$GLB, | Performed by: INTERNAL MEDICINE

## 2020-03-05 PROCEDURE — 3075F PR MOST RECENT SYSTOLIC BLOOD PRESS GE 130-139MM HG: ICD-10-PCS | Mod: HCNC,CPTII,S$GLB, | Performed by: INTERNAL MEDICINE

## 2020-03-05 RX ORDER — ROSUVASTATIN CALCIUM 5 MG/1
5 TABLET, COATED ORAL DAILY
Qty: 90 TABLET | Refills: 3 | Status: SHIPPED | OUTPATIENT
Start: 2020-03-05 | End: 2020-05-13

## 2020-03-05 RX ORDER — QUINAPRIL 20 MG/1
20 TABLET ORAL DAILY
Qty: 90 TABLET | Refills: 3 | Status: SHIPPED | OUTPATIENT
Start: 2020-03-05 | End: 2020-06-03

## 2020-03-05 RX ORDER — NAPROXEN SODIUM 220 MG/1
81 TABLET, FILM COATED ORAL DAILY
COMMUNITY

## 2020-04-16 DIAGNOSIS — B37.9 YEAST INFECTION: Primary | ICD-10-CM

## 2020-04-16 RX ORDER — FLUCONAZOLE 100 MG/1
100 TABLET ORAL DAILY
Qty: 3 TABLET | Refills: 1 | Status: SHIPPED | OUTPATIENT
Start: 2020-04-16 | End: 2020-05-16

## 2020-05-02 ENCOUNTER — HOSPITAL ENCOUNTER (OUTPATIENT)
Facility: OTHER | Age: 70
Discharge: HOME | End: 2020-05-03
Attending: EMERGENCY MEDICINE | Admitting: INTERNAL MEDICINE
Payer: MEDICARE

## 2020-05-02 DIAGNOSIS — B37.9 YEAST INFECTION: ICD-10-CM

## 2020-05-02 DIAGNOSIS — R07.9 CHEST PAIN: Primary | ICD-10-CM

## 2020-05-02 LAB
ALBUMIN SERPL BCP-MCNC: 4 G/DL (ref 3.5–5.2)
ALP SERPL-CCNC: 96 U/L (ref 55–135)
ALT SERPL W/O P-5'-P-CCNC: 19 U/L (ref 10–44)
ANION GAP SERPL CALC-SCNC: 9 MMOL/L (ref 8–16)
AST SERPL-CCNC: 20 U/L (ref 10–40)
BASOPHILS # BLD AUTO: 0.01 K/UL (ref 0–0.2)
BASOPHILS NFR BLD: 0.2 % (ref 0–1.9)
BILIRUB SERPL-MCNC: 0.5 MG/DL (ref 0.1–1)
BUN SERPL-MCNC: 13 MG/DL (ref 8–23)
CALCIUM SERPL-MCNC: 10.2 MG/DL (ref 8.7–10.5)
CHLORIDE SERPL-SCNC: 103 MMOL/L (ref 95–110)
CO2 SERPL-SCNC: 27 MMOL/L (ref 23–29)
CREAT SERPL-MCNC: 1.4 MG/DL (ref 0.5–1.4)
DIFFERENTIAL METHOD: ABNORMAL
EOSINOPHIL # BLD AUTO: 0 K/UL (ref 0–0.5)
EOSINOPHIL NFR BLD: 0.2 % (ref 0–8)
ERYTHROCYTE [DISTWIDTH] IN BLOOD BY AUTOMATED COUNT: 10.9 % (ref 11.5–14.5)
EST. GFR  (AFRICAN AMERICAN): 45 ML/MIN/1.73 M^2
EST. GFR  (NON AFRICAN AMERICAN): 39 ML/MIN/1.73 M^2
GLUCOSE SERPL-MCNC: 93 MG/DL (ref 70–110)
HCT VFR BLD AUTO: 42 % (ref 37–48.5)
HGB BLD-MCNC: 13.3 G/DL (ref 12–16)
IMM GRANULOCYTES # BLD AUTO: 0.01 K/UL (ref 0–0.04)
IMM GRANULOCYTES NFR BLD AUTO: 0.2 % (ref 0–0.5)
LYMPHOCYTES # BLD AUTO: 1.4 K/UL (ref 1–4.8)
LYMPHOCYTES NFR BLD: 33.9 % (ref 18–48)
MAGNESIUM SERPL-MCNC: 1.9 MG/DL (ref 1.6–2.6)
MCH RBC QN AUTO: 30.3 PG (ref 27–31)
MCHC RBC AUTO-ENTMCNC: 31.7 G/DL (ref 32–36)
MCV RBC AUTO: 96 FL (ref 82–98)
MONOCYTES # BLD AUTO: 0.4 K/UL (ref 0.3–1)
MONOCYTES NFR BLD: 9.1 % (ref 4–15)
NEUTROPHILS # BLD AUTO: 2.3 K/UL (ref 1.8–7.7)
NEUTROPHILS NFR BLD: 56.4 % (ref 38–73)
NRBC BLD-RTO: 0 /100 WBC
PHOSPHATE SERPL-MCNC: 2.9 MG/DL (ref 2.7–4.5)
PLATELET # BLD AUTO: 241 K/UL (ref 150–350)
PMV BLD AUTO: 10.1 FL (ref 9.2–12.9)
POTASSIUM SERPL-SCNC: 4.5 MMOL/L (ref 3.5–5.1)
PROT SERPL-MCNC: 8.8 G/DL (ref 6–8.4)
RBC # BLD AUTO: 4.39 M/UL (ref 4–5.4)
SARS-COV-2 RDRP RESP QL NAA+PROBE: NEGATIVE
SODIUM SERPL-SCNC: 139 MMOL/L (ref 136–145)
TROPONIN I SERPL DL<=0.01 NG/ML-MCNC: 0.01 NG/ML (ref 0–0.03)
TROPONIN I SERPL DL<=0.01 NG/ML-MCNC: <0.006 NG/ML (ref 0–0.03)
TROPONIN I SERPL DL<=0.01 NG/ML-MCNC: <0.006 NG/ML (ref 0–0.03)
TSH SERPL DL<=0.005 MIU/L-ACNC: 1.41 UIU/ML (ref 0.4–4)
WBC # BLD AUTO: 4.07 K/UL (ref 3.9–12.7)

## 2020-05-02 PROCEDURE — 83735 ASSAY OF MAGNESIUM: CPT | Mod: HCNC

## 2020-05-02 PROCEDURE — 93010 ELECTROCARDIOGRAM REPORT: CPT | Mod: HCNC,76,, | Performed by: INTERNAL MEDICINE

## 2020-05-02 PROCEDURE — 84484 ASSAY OF TROPONIN QUANT: CPT | Mod: HCNC

## 2020-05-02 PROCEDURE — 84100 ASSAY OF PHOSPHORUS: CPT | Mod: HCNC

## 2020-05-02 PROCEDURE — U0002 COVID-19 LAB TEST NON-CDC: HCPCS | Mod: HCNC

## 2020-05-02 PROCEDURE — 84484 ASSAY OF TROPONIN QUANT: CPT | Mod: 91,HCNC

## 2020-05-02 PROCEDURE — 36000 PLACE NEEDLE IN VEIN: CPT | Mod: HCNC

## 2020-05-02 PROCEDURE — 25000003 PHARM REV CODE 250: Mod: HCNC | Performed by: EMERGENCY MEDICINE

## 2020-05-02 PROCEDURE — 80053 COMPREHEN METABOLIC PANEL: CPT | Mod: HCNC

## 2020-05-02 PROCEDURE — 93010 ELECTROCARDIOGRAM REPORT: CPT | Mod: HCNC,,, | Performed by: INTERNAL MEDICINE

## 2020-05-02 PROCEDURE — 93005 ELECTROCARDIOGRAM TRACING: CPT | Mod: HCNC

## 2020-05-02 PROCEDURE — 85025 COMPLETE CBC W/AUTO DIFF WBC: CPT | Mod: HCNC

## 2020-05-02 PROCEDURE — 96372 THER/PROPH/DIAG INJ SC/IM: CPT

## 2020-05-02 PROCEDURE — G0378 HOSPITAL OBSERVATION PER HR: HCPCS | Mod: HCNC

## 2020-05-02 PROCEDURE — 63600175 PHARM REV CODE 636 W HCPCS: Mod: HCNC | Performed by: PHYSICIAN ASSISTANT

## 2020-05-02 PROCEDURE — 93041 RHYTHM ECG TRACING: CPT | Mod: HCNC

## 2020-05-02 PROCEDURE — 99900035 HC TECH TIME PER 15 MIN (STAT): Mod: HCNC

## 2020-05-02 PROCEDURE — 36415 COLL VENOUS BLD VENIPUNCTURE: CPT | Mod: HCNC

## 2020-05-02 PROCEDURE — 93010 EKG 12-LEAD: ICD-10-PCS | Mod: HCNC,76,, | Performed by: INTERNAL MEDICINE

## 2020-05-02 PROCEDURE — 99220 PR INITIAL OBSERVATION CARE,LEVL III: ICD-10-PCS | Mod: HCNC,,, | Performed by: PHYSICIAN ASSISTANT

## 2020-05-02 PROCEDURE — 94761 N-INVAS EAR/PLS OXIMETRY MLT: CPT | Mod: HCNC

## 2020-05-02 PROCEDURE — 84443 ASSAY THYROID STIM HORMONE: CPT | Mod: HCNC

## 2020-05-02 PROCEDURE — 99220 PR INITIAL OBSERVATION CARE,LEVL III: CPT | Mod: HCNC,,, | Performed by: PHYSICIAN ASSISTANT

## 2020-05-02 PROCEDURE — 99285 EMERGENCY DEPT VISIT HI MDM: CPT | Mod: 25,HCNC

## 2020-05-02 RX ORDER — QUINAPRIL 10 MG/1
20 TABLET ORAL DAILY
Status: DISCONTINUED | OUTPATIENT
Start: 2020-05-03 | End: 2020-05-03 | Stop reason: HOSPADM

## 2020-05-02 RX ORDER — ROSUVASTATIN CALCIUM 5 MG/1
5 TABLET, COATED ORAL NIGHTLY
Status: DISCONTINUED | OUTPATIENT
Start: 2020-05-02 | End: 2020-05-02

## 2020-05-02 RX ORDER — ONDANSETRON 8 MG/1
8 TABLET, ORALLY DISINTEGRATING ORAL EVERY 8 HOURS PRN
Status: DISCONTINUED | OUTPATIENT
Start: 2020-05-02 | End: 2020-05-03 | Stop reason: HOSPADM

## 2020-05-02 RX ORDER — ACETAMINOPHEN 325 MG/1
650 TABLET ORAL EVERY 4 HOURS PRN
Status: DISCONTINUED | OUTPATIENT
Start: 2020-05-02 | End: 2020-05-03 | Stop reason: HOSPADM

## 2020-05-02 RX ORDER — HYDRALAZINE HYDROCHLORIDE 25 MG/1
25 TABLET, FILM COATED ORAL EVERY 8 HOURS PRN
Status: DISCONTINUED | OUTPATIENT
Start: 2020-05-02 | End: 2020-05-03 | Stop reason: HOSPADM

## 2020-05-02 RX ORDER — ENOXAPARIN SODIUM 100 MG/ML
40 INJECTION SUBCUTANEOUS EVERY 24 HOURS
Status: DISCONTINUED | OUTPATIENT
Start: 2020-05-02 | End: 2020-05-03 | Stop reason: HOSPADM

## 2020-05-02 RX ORDER — ASPIRIN 325 MG
325 TABLET ORAL
Status: COMPLETED | OUTPATIENT
Start: 2020-05-02 | End: 2020-05-02

## 2020-05-02 RX ORDER — SODIUM CHLORIDE 0.9 % (FLUSH) 0.9 %
10 SYRINGE (ML) INJECTION
Status: DISCONTINUED | OUTPATIENT
Start: 2020-05-02 | End: 2020-05-03 | Stop reason: HOSPADM

## 2020-05-02 RX ORDER — LANOLIN ALCOHOL/MO/W.PET/CERES
400 CREAM (GRAM) TOPICAL DAILY
Status: DISCONTINUED | OUTPATIENT
Start: 2020-05-03 | End: 2020-05-03 | Stop reason: HOSPADM

## 2020-05-02 RX ORDER — NAPROXEN SODIUM 220 MG/1
81 TABLET, FILM COATED ORAL DAILY
Status: DISCONTINUED | OUTPATIENT
Start: 2020-05-03 | End: 2020-05-03 | Stop reason: HOSPADM

## 2020-05-02 RX ADMIN — ASPIRIN 325 MG ORAL TABLET 325 MG: 325 PILL ORAL at 10:05

## 2020-05-02 RX ADMIN — ENOXAPARIN SODIUM 40 MG: 100 INJECTION SUBCUTANEOUS at 05:05

## 2020-05-02 NOTE — ASSESSMENT & PLAN NOTE
- follows with Heme/onco IgG kappa smoldering myeloma diagnosed 2010    - Mild intermittent leukopenia/anemia are chronic and pre date myeloma diagnosis; stable  - Met survey jan 2019 with some mild changes posibly lytic lesions; subsequent fu feb 2019 pet scan was normal ; No evidence of metastatic disease or multiple myeloma. 2/2020

## 2020-05-02 NOTE — SUBJECTIVE & OBJECTIVE
Past Medical History:   Diagnosis Date    Allergy     Anemia     Arthritis     Cholelithiases     Cyst of kidney, acquired     Diverticulitis     Elevated TSH     Family history of Graves' disease: daughter, maternal aunt, maternal uncle 9/13/2016    Glaucoma     Graves disease     Hx of colonic polyps     Hypertension 1981    Liver cyst     MGUS (monoclonal gammopathy of unknown significance)     Migraine headache     Mitral valve problem     leakage    Obesity     Paraproteinemia     Smoldering multiple myeloma 2013    Tricuspid valve disease     leakage       Past Surgical History:   Procedure Laterality Date    APPENDECTOMY      COLONOSCOPY N/A 10/23/2015    Procedure: COLONOSCOPY;  Surgeon: Brandon Ruelas MD;  Location: [facility] ([address] [place] [address]);  Service: Endoscopy;  Laterality: N/A;  Had divertiulitis in 5/29/2015 with a recommendation for colonoscopy in 8 weeks    Dr. Ruelas is her GI physician    COLONOSCOPY N/A 11/5/2018    Procedure: COLONOSCOPY;  Surgeon: Brandon Ruelas MD;  [facility]  Service: Endoscopy;  Laterality: N/A;  Dr. Ruelas did the last one multiple polyps, patient had recent diverticulitis should not schedule before Oct 10th    HYSTERECTOMY  1978 or 1979    OOPHORECTOMY         Review of patient's allergies indicates:  No Known Allergies    No current facility-administered medications on file prior to encounter.      Current Outpatient Medications on File Prior to Encounter   Medication Sig    aspirin 81 MG Chew Take 81 mg by mouth once daily.    fluconazole (DIFLUCAN) 100 MG tablet Take 1 tablet (100 mg total) by mouth once daily.    magnesium oxide (MAG-OX) 400 mg tablet Take 400 mg by mouth once daily.    multivit with min-folic acid 0.4 mg Tab Take 0.4 mg by mouth once daily.    quinapril (ACCUPRIL) 20 MG tablet Take 1 tablet (20 mg total) by mouth once daily.    rosuvastatin (CRESTOR) 5 MG tablet Take 1 tablet (5 mg total) by mouth  once daily.    eletriptan (RELPAX) 40 MG tablet Take 1 tablet (40 mg total) by mouth every 2 (two) hours as needed. may repeat in 2 hours if necessary    ondansetron (ZOFRAN) 4 MG tablet Take 1-2 tablets (4-8 mg total) by mouth every 8 (eight) hours as needed for Nausea. (Patient taking differently: Take 4-8 mg by mouth every 8 (eight) hours as needed for Nausea. )    rizatriptan (MAXALT) 10 MG tablet TAKE 1 TABLET(10 MG) BY MOUTH EVERY 2 HOURS AS NEEDED FOR MIGRAINE. MAX 30 MG/ 24 AT BEDTIME     Family History     Problem Relation (Age of Onset)    Cancer Paternal Grandmother, Maternal Uncle    Cataracts Sister    Colon cancer Maternal Uncle    Glaucoma Sister    Graves' disease Daughter    Heart attack Mother    Heart disease Mother, Maternal Aunt, Maternal Aunt, Sister    Hypertension Father    No Known Problems Son, Sister, Daughter, Son        Tobacco Use    Smoking status: Former Smoker     Years: 3.00     Types: Cigarettes    Smokeless tobacco: Never Used   Substance and Sexual Activity    Alcohol use: No    Drug use: No    Sexual activity: Never     Birth control/protection: Post-menopausal     Review of Systems   Constitutional: Negative for activity change, appetite change, chills and fever.   HENT: Negative for congestion, rhinorrhea and sore throat.    Eyes: Negative.    Respiratory: Negative for cough, shortness of breath and wheezing.    Cardiovascular: Positive for chest pain and palpitations. Negative for leg swelling.   Gastrointestinal: Negative for abdominal distention, abdominal pain, constipation, diarrhea, nausea and vomiting.   Endocrine: Negative for polydipsia and polyuria.   Genitourinary: Negative for difficulty urinating, frequency and hematuria.   Musculoskeletal: Negative for back pain and neck pain.   Skin: Negative.    Neurological: Negative for dizziness, syncope, weakness, light-headedness, numbness and headaches.   Psychiatric/Behavioral: Negative for confusion and  decreased concentration.     Objective:     Vital Signs (Most Recent):  Temp: 98.5 °F (36.9 °C) (05/02/20 1450)  Pulse: 76 (05/02/20 1450)  Resp: 18 (05/02/20 1450)  BP: (!) 182/99 (05/02/20 1450)  SpO2: 95 % (05/02/20 1450) Vital Signs (24h Range):  Temp:  [98.5 °F (36.9 °C)-98.8 °F (37.1 °C)] 98.5 °F (36.9 °C)  Pulse:  [] 76  Resp:  [14-23] 18  SpO2:  [90 %-100 %] 95 %  BP: (126-190)/() 182/99     Weight: 122.9 kg (270 lb 15.1 oz)  Body mass index is 40.01 kg/m².    Physical Exam   Constitutional: She is oriented to person, place, and time. She appears well-developed and well-nourished. No distress.   HENT:   Head: Normocephalic and atraumatic.   Eyes: Conjunctivae are normal. No scleral icterus.   Neck: Normal range of motion. Neck supple.   Cardiovascular: Normal rate, regular rhythm, normal heart sounds and intact distal pulses.   Pulmonary/Chest: Effort normal and breath sounds normal. No respiratory distress. She has no wheezes.   Abdominal: Soft. Bowel sounds are normal. She exhibits no distension. There is no tenderness.   Musculoskeletal: Normal range of motion. She exhibits no edema.   Neurological: She is alert and oriented to person, place, and time.   Skin: Skin is warm and dry. Capillary refill takes less than 2 seconds. She is not diaphoretic.   Psychiatric: She has a normal mood and affect.   Nursing note and vitals reviewed.          Significant Labs:   CBC:   Recent Labs   Lab 05/02/20  1002   WBC 4.07   HGB 13.3   HCT 42.0        CMP:   Recent Labs   Lab 05/02/20  1002      K 4.5      CO2 27   GLU 93   BUN 13   CREATININE 1.4   CALCIUM 10.2   PROT 8.8*   ALBUMIN 4.0   BILITOT 0.5   ALKPHOS 96   AST 20   ALT 19   ANIONGAP 9   EGFRNONAA 39*     Magnesium:   Recent Labs   Lab 05/02/20  1002   MG 1.9     Troponin:   Recent Labs   Lab 05/02/20  1002   TROPONINI <0.006     All pertinent labs within the past 24 hours have been reviewed.    Significant Imaging:   Imaging  Results    None

## 2020-05-02 NOTE — H&P
"[facility] Medicine  History & Physical    Patient Name: Manju Aranda  MRN: [id number]  Admission Date: 5/2/2020  Attending Physician: Siomara Aguilera MD   Primary Care Provider: Payton Garay MD         Patient information was obtained from patient, past medical records and ER records.     Subjective:     Principal Problem:Chest pain    Chief Complaint:   Chief Complaint   Patient presents with    Chest Pain     pt with c/o chest pain and sob x 2 hours.         HPI: 70 y/o female with Hx HTN, Smoldering multiple myeloma (SMM), ADPKD (autosomal dominant polycystic kidney disease), Morbid obesity, migraines presents to ED with c/o "palpitations" and chest pain, non-radiating, that started around 3:00 this morning and awoke her from her sleep. States the pain feels more like "pressure". Episodes last a few minutes. States she tried to belch to relieve symptoms with no improvement. States she feels like her heart is "racing" during the episodes. She took a shower hoping symptoms would subside, decided to come to ED. Denies any HA, lightheadedness, N/V/D, abdominal pain or any other symptoms. Denies tobacco, alcohol or illicit drug use. Family Hx MI, mother MI at 41. ED evaluation labs unremarkable, ECG Sinus rhythm with occasional PVC's, troponin negative. Placed in Observation.     Past Medical History:   Diagnosis Date    Allergy     Anemia     Arthritis     Cholelithiases     Cyst of kidney, acquired     Diverticulitis     Elevated TSH     Family history of Graves' disease: daughter, maternal aunt, maternal uncle 9/13/2016    Glaucoma     Graves disease     Hx of colonic polyps     Hypertension 1981    Liver cyst     MGUS (monoclonal gammopathy of unknown significance)     Migraine headache     Mitral valve problem     leakage    Obesity     Paraproteinemia     Smoldering multiple myeloma 2013    Tricuspid valve disease     leakage       Past Surgical History: "   Procedure Laterality Date    APPENDECTOMY      COLONOSCOPY N/A 10/23/2015    Procedure: COLONOSCOPY;  Surgeon: Brandon Ruelas MD;  [facility]  Service: Endoscopy;  Laterality: N/A;  Had divertiulitis in 5/29/2015 with a recommendation for colonoscopy in 8 weeks    Dr. Ruelas is her GI physician    COLONOSCOPY N/A 11/5/2018    Procedure: COLONOSCOPY;  Surgeon: Brandon Ruelas MD;  [facility]  Service: Endoscopy;  Laterality: N/A;  Dr. Ruelas did the last one multiple polyps, patient had recent diverticulitis should not schedule before Oct 10th    HYSTERECTOMY  1978 or 1979    OOPHORECTOMY         Review of patient's allergies indicates:  No Known Allergies    No current facility-administered medications on file prior to encounter.      Current Outpatient Medications on File Prior to Encounter   Medication Sig    aspirin 81 MG Chew Take 81 mg by mouth once daily.    fluconazole (DIFLUCAN) 100 MG tablet Take 1 tablet (100 mg total) by mouth once daily.    magnesium oxide (MAG-OX) 400 mg tablet Take 400 mg by mouth once daily.    multivit with min-folic acid 0.4 mg Tab Take 0.4 mg by mouth once daily.    quinapril (ACCUPRIL) 20 MG tablet Take 1 tablet (20 mg total) by mouth once daily.    rosuvastatin (CRESTOR) 5 MG tablet Take 1 tablet (5 mg total) by mouth once daily.    eletriptan (RELPAX) 40 MG tablet Take 1 tablet (40 mg total) by mouth every 2 (two) hours as needed. may repeat in 2 hours if necessary    ondansetron (ZOFRAN) 4 MG tablet Take 1-2 tablets (4-8 mg total) by mouth every 8 (eight) hours as needed for Nausea. (Patient taking differently: Take 4-8 mg by mouth every 8 (eight) hours as needed for Nausea. )    rizatriptan (MAXALT) 10 MG tablet TAKE 1 TABLET(10 MG) BY MOUTH EVERY 2 HOURS AS NEEDED FOR MIGRAINE. MAX 30 MG/ 24 AT BEDTIME     Family History     Problem Relation (Age of Onset)    Cancer Paternal Grandmother, Maternal Uncle     Cataracts Sister    Colon cancer Maternal Uncle    Glaucoma Sister    Graves' disease Daughter    Heart attack Mother    Heart disease Mother, Maternal Aunt, Maternal Aunt, Sister    Hypertension Father    No Known Problems Son, Sister, Daughter, Son        Tobacco Use    Smoking status: Former Smoker     Years: 3.00     Types: Cigarettes    Smokeless tobacco: Never Used   Substance and Sexual Activity    Alcohol use: No    Drug use: No    Sexual activity: Never     Birth control/protection: Post-menopausal     Review of Systems   Constitutional: Negative for activity change, appetite change, chills and fever.   HENT: Negative for congestion, rhinorrhea and sore throat.    Eyes: Negative.    Respiratory: Negative for cough, shortness of breath and wheezing.    Cardiovascular: Positive for chest pain and palpitations. Negative for leg swelling.   Gastrointestinal: Negative for abdominal distention, abdominal pain, constipation, diarrhea, nausea and vomiting.   Endocrine: Negative for polydipsia and polyuria.   Genitourinary: Negative for difficulty urinating, frequency and hematuria.   Musculoskeletal: Negative for back pain and neck pain.   Skin: Negative.    Neurological: Negative for dizziness, syncope, weakness, light-headedness, numbness and headaches.   Psychiatric/Behavioral: Negative for confusion and decreased concentration.     Objective:     Vital Signs (Most Recent):  Temp: 98.5 °F (36.9 °C) (05/02/20 1450)  Pulse: 76 (05/02/20 1450)  Resp: 18 (05/02/20 1450)  BP: (!) 182/99 (05/02/20 1450)  SpO2: 95 % (05/02/20 1450) Vital Signs (24h Range):  Temp:  [98.5 °F (36.9 °C)-98.8 °F (37.1 °C)] 98.5 °F (36.9 °C)  Pulse:  [] 76  Resp:  [14-23] 18  SpO2:  [90 %-100 %] 95 %  BP: (126-190)/() 182/99     Weight: 122.9 kg (270 lb 15.1 oz)  Body mass index is 40.01 kg/m².    Physical Exam   Constitutional: She is oriented to person, place, and time. She appears well-developed and well-nourished. No  distress.   HENT:   Head: Normocephalic and atraumatic.   Eyes: Conjunctivae are normal. No scleral icterus.   Neck: Normal range of motion. Neck supple.   Cardiovascular: Normal rate, regular rhythm, normal heart sounds and intact distal pulses.   Pulmonary/Chest: Effort normal and breath sounds normal. No respiratory distress. She has no wheezes.   Abdominal: Soft. Bowel sounds are normal. She exhibits no distension. There is no tenderness.   Musculoskeletal: Normal range of motion. She exhibits no edema.   Neurological: She is alert and oriented to person, place, and time.   Skin: Skin is warm and dry. Capillary refill takes less than 2 seconds. She is not diaphoretic.   Psychiatric: She has a normal mood and affect.   Nursing note and vitals reviewed.          Significant Labs:   CBC:   Recent Labs   Lab 05/02/20  1002   WBC 4.07   HGB 13.3   HCT 42.0        CMP:   Recent Labs   Lab 05/02/20  1002      K 4.5      CO2 27   GLU 93   BUN 13   CREATININE 1.4   CALCIUM 10.2   PROT 8.8*   ALBUMIN 4.0   BILITOT 0.5   ALKPHOS 96   AST 20   ALT 19   ANIONGAP 9   EGFRNONAA 39*     Magnesium:   Recent Labs   Lab 05/02/20  1002   MG 1.9     Troponin:   Recent Labs   Lab 05/02/20  1002   TROPONINI <0.006     All pertinent labs within the past 24 hours have been reviewed.    Significant Imaging:   Imaging Results    None           Assessment/Plan:     * Chest pain  - palpitations with chest pressure  - ECG sinus with PVC's  - 1st troponin negative, will trend  - monitor on tele  - HEART SCORE 4      Morbid obesity with BMI of 40.0-44.9, adult  - noted  - states she has been trying to lose weight  - discussed heart healthy diet      ADPKD (autosomal dominant polycystic kidney disease)  - GFR sl lower than baseline  - follows with Nephrology  - CKD 2 with renal cysts with no FH of PCKD   - continue RAAS blockade      Smoldering multiple myeloma (SMM)  - follows with Heme/onco IgG kappa smoldering myeloma  diagnosed 2010    - Mild intermittent leukopenia/anemia are chronic and pre date myeloma diagnosis; stable  - Met survey jan 2019 with some mild changes posibly lytic lesions; subsequent fu feb 2019 pet scan was normal ; No evidence of metastatic disease or multiple myeloma. 2/2020    Hypertension  - elevated on admission   - resume home medications quinapril 20 mg daily  - PRN hydralazine  - monitor      VTE Risk Mitigation (From admission, onward)         Ordered     enoxaparin injection 40 mg  Daily      05/02/20 1435     IP VTE HIGH RISK PATIENT  Once      05/02/20 1435     Place sequential compression device  Until discontinued      05/02/20 1435                   Erin Lewis PA-C  Department of Hospital Medicine   [facility]

## 2020-05-02 NOTE — ED NOTES
"Pt came in to ED today with c/o chest pain x2 hrs with some " heart skipping",  Pt brought to room #1, MD at bedside, cardiac monitoring started EKG obtained, locked bed in lowest position, rails for safety, call light within reach and advised to use for assistance. Awaiting MD orders.   "

## 2020-05-02 NOTE — ASSESSMENT & PLAN NOTE
- elevated on admission   - resume home medications quinapril 20 mg daily  - PRN hydralazine  - monitor

## 2020-05-02 NOTE — ED PROVIDER NOTES
"Encounter Date: 5/2/2020    SCRIBE #1 NOTE: I, Brisa Funes, am scribing for, and in the presence of, Dr. Head.       History     Chief Complaint   Patient presents with    Chest Pain     pt with c/o chest pain and sob x 2 hours.      Time seen by provider: 9:28 AM    This is a 69 y.o. female with hx of HTN and tricuspid valve disease who presents with complaint of intermittent chest pain for two hours. She felt at baseline upon waking up this morning. After she took a shower, she began having palpitations with associated chest discomfort. The pain is described as "sharp" and "like something sitting in my chest." No exacerbating or alleviating factors. She reports shortness of breath and diaphoresis. She denies nausea, light headedness, or dizziness. She takes ASA 81 mg, but did not take it today. She has not taken rosuvastatin 5 mg in five days. She had cardiac stress test one to two years ago with no significant findings.    The history is provided by the patient.     Review of patient's allergies indicates:  No Known Allergies  Past Medical History:   Diagnosis Date    Allergy     Anemia     Arthritis     Cholelithiases     Cyst of kidney, acquired     Diverticulitis     Elevated TSH     Family history of Graves' disease: daughter, maternal aunt, maternal uncle 9/13/2016    Glaucoma     Graves disease     Hx of colonic polyps     Hypertension 1981    Liver cyst     MGUS (monoclonal gammopathy of unknown significance)     Migraine headache     Mitral valve problem     leakage    Obesity     Paraproteinemia     Smoldering multiple myeloma 2013    Tricuspid valve disease     leakage     Past Surgical History:   Procedure Laterality Date    APPENDECTOMY      COLONOSCOPY N/A 10/23/2015    Procedure: COLONOSCOPY;  Surgeon: Brandon Ruelas MD;  Location: [facility] ([address] [place] [address]);  Service: Endoscopy;  Laterality: N/A;  Had divertiulitis in 5/29/2015 with a recommendation for colonoscopy in 8 " weeks    Dr. Ruelas is her GI physician    COLONOSCOPY N/A 11/5/2018    Procedure: COLONOSCOPY;  Surgeon: Brandon Ruelas MD;  Location: [facility] ([address] [place] [address]);  Service: Endoscopy;  Laterality: N/A;  Dr. Ruelas did the last one multiple polyps, patient had recent diverticulitis should not schedule before Oct 10th    HYSTERECTOMY  1978 or 1979    OOPHORECTOMY       Family History   Problem Relation Age of Onset    Heart disease Mother         MI    Heart attack Mother     Heart disease Maternal Aunt         MI    Heart disease Maternal Aunt         MI    Cancer Paternal Grandmother         GYN cancer - unknown cancer    Colon cancer Maternal Uncle     Cancer Maternal Uncle         colon ca    Hypertension Father     Heart disease Sister         fast heart rate    Cataracts Sister     Glaucoma Sister     Graves' disease Daughter     No Known Problems Son     No Known Problems Sister     No Known Problems Daughter     No Known Problems Son     Melanoma Neg Hx     Breast cancer Neg Hx     Ovarian cancer Neg Hx     Colon polyps Neg Hx     Rectal cancer Neg Hx     Stomach cancer Neg Hx     Esophageal cancer Neg Hx     Ulcerative colitis Neg Hx     Crohn's disease Neg Hx     Amblyopia Neg Hx     Blindness Neg Hx     Macular degeneration Neg Hx     Strabismus Neg Hx     Retinal detachment Neg Hx      Social History     Tobacco Use    Smoking status: Former Smoker     Years: 3.00     Types: Cigarettes    Smokeless tobacco: Never Used   Substance Use Topics    Alcohol use: No    Drug use: No     Review of Systems   Constitutional: Positive for diaphoresis. Negative for fever.   HENT: Negative for congestion.    Respiratory: Positive for shortness of breath. Negative for cough.    Cardiovascular: Positive for chest pain and palpitations.   Gastrointestinal: Negative for diarrhea, nausea and vomiting.   Genitourinary: Negative for dysuria.   Musculoskeletal: Negative for back pain.    Skin: Negative for rash.   Neurological: Negative for dizziness, weakness and light-headedness.   Psychiatric/Behavioral: Negative for confusion.       Physical Exam     Initial Vitals [05/02/20 0917]   BP Pulse Resp Temp SpO2   (!) 186/109 (!) 113 20 98.8 °F (37.1 °C) 97 %      MAP       --         Physical Exam    Nursing note and vitals reviewed.  Constitutional: She appears well-developed and well-nourished. She is not diaphoretic. No distress.   Overweight.   HENT:   Head: Normocephalic and atraumatic.   Mucous membranes are moist.   Eyes: EOM are normal. Pupils are equal, round, and reactive to light.   No pallor or icterus.   Neck: Normal range of motion. Neck supple.   Cardiovascular: Normal rate, regular rhythm and normal heart sounds. Exam reveals no gallop and no friction rub.    No murmur heard.  Pulmonary/Chest: Breath sounds normal. No respiratory distress. She has no wheezes. She has no rhonchi. She has no rales. She exhibits no tenderness.   Musculoskeletal: Normal range of motion. She exhibits no edema or tenderness.   Neurological: She is alert and oriented to person, place, and time.   Skin: Skin is warm and dry.   Psychiatric: Her mood appears anxious.         ED Course   Procedures  Labs Reviewed   CBC W/ AUTO DIFFERENTIAL - Abnormal; Notable for the following components:       Result Value    Mean Corpuscular Hemoglobin Conc 31.7 (*)     RDW 10.9 (*)     All other components within normal limits   COMPREHENSIVE METABOLIC PANEL - Abnormal; Notable for the following components:    Total Protein 8.8 (*)     eGFR if  45 (*)     eGFR if non  39 (*)     All other components within normal limits   TROPONIN I   SARS-COV-2 RNA AMPLIFICATION, QUAL     EKG Readings: (Independently Interpreted)   Normal sinus rhythm at rate of 97. No ischemia, arrhythmia, or pericarditis.     ECG Results          EKG 12-lead (In process)  Result time 05/02/20 10:44:50    In process by  Interface, Lab In [facility] (05/02/20 10:44:50)                 Narrative:    Test Reason : R07.9,    Vent. Rate : 097 BPM     Atrial Rate : 097 BPM     P-R Int : 174 ms          QRS Dur : 086 ms      QT Int : 328 ms       P-R-T Axes : 030 -15 032 degrees     QTc Int : 416 ms    Sinus rhythm with sinus arrhythmia with occasional Premature ventricular  complexes  Minimal voltage criteria for LVH, may be normal variant  Borderline Abnormal ECG      Referred By: [id number]   SELF           Confirmed By:                             Imaging Results    None          Medical Decision Making:   History:   Old Medical Records: I decided to obtain old medical records.  Independently Interpreted Test(s):   I have ordered and independently interpreted EKG Reading(s) - see prior notes  Clinical Tests:   Lab Tests: Ordered and Reviewed  Medical Tests: Ordered and Reviewed            Scribe Attestation:   Scribe #1: I performed the above scribed service and the documentation accurately describes the services I performed. I attest to the accuracy of the note.    Attending Attestation:           Physician Attestation for Scribe:  Physician Attestation Statement for Scribe #1: I, Dr. Head, reviewed documentation, as scribed by Brisa Funes in my presence, and it is both accurate and complete.                 ED Course as of May 02 1355   Sat May 02, 2020   1130 Reassessed. No further episodes of chest pain.    [AC]   1142 Discussed case with Dr. Stephenson, and will admit patient to Dr. Aguilera.    [AC]      ED Course User Index  [AC] Brisa Funes          Patient presents with onset of palpitations and intermittent chest pain a few hours prior to arrival.  Describes it as nonexertional, variably sharp or pressure sensation associated with shortness of breath and diaphoresis.  On exam initially appeared anxious, elevated blood pressure.  This normalized without intervention.  Did not have further episodes of chest pain in the emergency  department.  Initial EKG and troponin were negative however given the patient's risk factors, quality of the pain, feel that she should be admitted for serial enzymes and evaluation by Cardiology.  Case discussed with the hospitalist service.      Clinical Impression:     1. Chest pain              ED Disposition Condition    Observation                           Rakan Head II, MD  05/02/20 [id number]

## 2020-05-02 NOTE — ASSESSMENT & PLAN NOTE
- GFR sl lower than baseline  - follows with Nephrology  - CKD 2 with renal cysts with no FH of PCKD   - continue RAAS blockade

## 2020-05-02 NOTE — HPI
"70 y/o female with Hx HTN, Smoldering multiple myeloma (SMM), ADPKD (autosomal dominant polycystic kidney disease), Morbid obesity, migraines presents to ED with c/o "palpitations" and chest pain, non-radiating, that started around 3:00 this morning and awoke her from her sleep. States the pain feels more like "pressure". Episodes last a few minutes. States she tried to belch to relieve symptoms with no improvement. States she feels like her heart is "racing" during the episodes. She took a shower hoping symptoms would subside, decided to come to ED. Denies any HA, lightheadedness, N/V/D, abdominal pain or any other symptoms. Denies tobacco, alcohol or illicit drug use. Family Hx MI, mother MI at 41. ED evaluation labs unremarkable, ECG Sinus rhythm with occasional PVC's, troponin negative. Placed in Observation.   "

## 2020-05-02 NOTE — PLAN OF CARE
Pt in room watching tv at moment. AAO * 4. VSS. No complaints of CP at moment. Tele monitor in place. Pt ambulated in room without difficulty. PRN BP medications ordered. Pt updated on POC and verbalized understanding. Call bell placed in reach of pt. Comfort and safety needs addressed. Condition stable.

## 2020-05-02 NOTE — ED NOTES
Called to floor ( [phone]), spoke to another RN there who states that RN accepting pt will have to call this RN back a she is hanging blood and cannot come to phone. Will await return call.

## 2020-05-02 NOTE — ASSESSMENT & PLAN NOTE
- palpitations with chest pressure  - ECG sinus with PVC's  - 1st troponin negative, will trend  - monitor on tele  - HEART SCORE 4

## 2020-05-03 VITALS
RESPIRATION RATE: 19 BRPM | SYSTOLIC BLOOD PRESSURE: 124 MMHG | WEIGHT: 270.94 LBS | HEIGHT: 69 IN | BODY MASS INDEX: 40.13 KG/M2 | TEMPERATURE: 99 F | HEART RATE: 72 BPM | DIASTOLIC BLOOD PRESSURE: 75 MMHG | OXYGEN SATURATION: 98 %

## 2020-05-03 PROCEDURE — 25000003 PHARM REV CODE 250: Mod: HCNC | Performed by: PHYSICIAN ASSISTANT

## 2020-05-03 PROCEDURE — G0378 HOSPITAL OBSERVATION PER HR: HCPCS | Mod: HCNC

## 2020-05-03 PROCEDURE — 99217 PR OBSERVATION CARE DISCHARGE: CPT | Mod: HCNC,,, | Performed by: PHYSICIAN ASSISTANT

## 2020-05-03 PROCEDURE — 99217 PR OBSERVATION CARE DISCHARGE: ICD-10-PCS | Mod: HCNC,,, | Performed by: PHYSICIAN ASSISTANT

## 2020-05-03 RX ADMIN — Medication 400 MG: at 08:05

## 2020-05-03 RX ADMIN — ASPIRIN 81 MG 81 MG: 81 TABLET ORAL at 08:05

## 2020-05-03 RX ADMIN — QUINAPRIL 20 MG: 10 TABLET ORAL at 08:05

## 2020-05-03 NOTE — DISCHARGE SUMMARY
"[facility] Hospital Medicine  Discharge Summary      Patient Name: Manju Aranda  MRN: [id number]  Admission Date: 5/2/2020  Hospital Length of Stay: 0 days  Discharge Date and Time: 5/3/2020  9:56 AM  Attending Physician: No att. providers found   Discharging Provider: Erin Lewis PA-C  Primary Care Provider: Payton Garay MD      HPI:   70 y/o female with Hx HTN, Smoldering multiple myeloma (SMM), ADPKD (autosomal dominant polycystic kidney disease), Morbid obesity, migraines presents to ED with c/o "palpitations" and chest pain, non-radiating, that started around 3:00 this morning and awoke her from her sleep. States the pain feels more like "pressure". Episodes last a few minutes. States she tried to belch to relieve symptoms with no improvement. States she feels like her heart is "racing" during the episodes. She took a shower hoping symptoms would subside, decided to come to ED. Denies any HA, lightheadedness, N/V/D, abdominal pain or any other symptoms. Denies tobacco, alcohol or illicit drug use. Family Hx MI, mother MI at 41. ED evaluation labs unremarkable, ECG Sinus rhythm with occasional PVC's, troponin negative. Placed in Observation.     * No surgery found *      Hospital Course:   70 y/o female admitted with palpitations associated with chest pressure. ECG sinus with PVC's. troponins negative x3, no events on telemetry. No further symptoms. Discussed with patient for cardiology follow up. Stable for discharge home.      Consults:     No new Assessment & Plan notes have been filed under this hospital service since the last note was generated.  Service: Hospital Medicine    Final Active Diagnoses:    Diagnosis Date Noted POA    PRINCIPAL PROBLEM:  Chest pain [R07.9] 05/02/2020 Yes    Morbid obesity with BMI of 40.0-44.9, adult [E66.01, Z68.41] 03/26/2018 Not Applicable    ADPKD (autosomal dominant polycystic kidney disease) [Q61.2] 11/12/2012 Not Applicable    " Hypertension [I10]  Yes    Smoldering multiple myeloma (SMM) [C90.00]  Yes      Problems Resolved During this Admission:       Discharged Condition: stable    Disposition: Home or Self Care    Follow Up:  Follow-up Information     Payton Garay MD.    Specialty:  Internal Medicine  Contact information:  [address]  [facility] [address]  [phone]             [facility] CARDIOLOGY.    Specialty:  Cardiology  Contact information:  [address] [facility] [address]  [phone]               Patient Instructions:      Ambulatory referral/consult to Cardiology   Standing Status: Future   Referral Priority: Routine Referral Type: Consultation   Referral Reason: Specialty Services Required   Requested Specialty: Cardiology   Number of Visits Requested: 1     Diet Cardiac     Notify your health care provider if you experience any of the following:  temperature >100.4     Notify your health care provider if you experience any of the following:  persistent nausea and vomiting or diarrhea     Notify your health care provider if you experience any of the following:  severe uncontrolled pain     Notify your health care provider if you experience any of the following:  difficulty breathing or increased cough     Activity as tolerated       Significant Diagnostic Studies: Labs:   Recent Labs   Lab 05/02/20  2230   TROPONINI 0.011      and All labs within the past 24 hours have been reviewed    Pending Diagnostic Studies:     None         Medications:  Reconciled Home Medications:      Medication List      CONTINUE taking these medications    aspirin 81 MG Chew  Take 81 mg by mouth once daily.     eletriptan 40 MG tablet  Commonly known as:  RELPAX  Take 1 tablet (40 mg total) by mouth every 2 (two) hours as needed. may repeat in 2 hours if necessary     fluconazole 100 MG tablet  Commonly known as:  DIFLUCAN  Take 1 tablet (100 mg total) by mouth once daily.     magnesium oxide 400 mg (241.3 mg  magnesium) tablet  Commonly known as:  MAG-OX  Take 400 mg by mouth once daily.     multivit with min-folic acid 0.4 mg Tab  Take 0.4 mg by mouth once daily.     ondansetron 4 MG tablet  Commonly known as:  ZOFRAN  Take 1-2 tablets (4-8 mg total) by mouth every 8 (eight) hours as needed for Nausea.     quinapriL 20 MG tablet  Commonly known as:  ACCUPRIL  Take 1 tablet (20 mg total) by mouth once daily.     rizatriptan 10 MG tablet  Commonly known as:  MAXALT  TAKE 1 TABLET(10 MG) BY MOUTH EVERY 2 HOURS AS NEEDED FOR MIGRAINE. MAX 30 MG/ 24 AT BEDTIME     rosuvastatin 5 MG tablet  Commonly known as:  CRESTOR  Take 1 tablet (5 mg total) by mouth once daily.  Pt states not taking anymore          Indwelling Lines/Drains at time of discharge:   Lines/Drains/Airways     None                 Time spent on the discharge of patient: >30 minutes  Patient was seen and examined on the date of discharge and determined to be suitable for discharge.         Erin Lewis PA-C  Department of Hospital Medicine  [facility]

## 2020-05-03 NOTE — PLAN OF CARE
Pt is awake, alert, and oriented. No C/O pain my shift. Vital Signs stable.   No S/S of distress.

## 2020-05-03 NOTE — NURSING
Pt AAOx4, NAD noted. Pt denied pain, SOB, N/V/D. Morning medications given as ordered. IV discontinued, cath intact. Cardiac monitor removed. Discharge instructions reviewed with pt, pt verbalized understanding. Pt transported off of unit via wheelchair.

## 2020-05-04 NOTE — HOSPITAL COURSE
70 y/o female admitted with palpitations associated with chest pressure. ECG sinus with PVC's. troponins negative x3, no events on telemetry. No further symptoms. Discussed with patient for cardiology follow up. Stable for discharge home.

## 2020-05-13 ENCOUNTER — OFFICE VISIT (OUTPATIENT)
Dept: CARDIOLOGY | Facility: CLINIC | Age: 70
End: 2020-05-13
Payer: MEDICARE

## 2020-05-13 VITALS
DIASTOLIC BLOOD PRESSURE: 67 MMHG | WEIGHT: 272.5 LBS | SYSTOLIC BLOOD PRESSURE: 103 MMHG | HEART RATE: 88 BPM | BODY MASS INDEX: 40.36 KG/M2 | HEIGHT: 69 IN

## 2020-05-13 DIAGNOSIS — D47.2 SMOLDERING MULTIPLE MYELOMA (SMM): ICD-10-CM

## 2020-05-13 DIAGNOSIS — R07.9 CHEST PAIN, UNSPECIFIED TYPE: ICD-10-CM

## 2020-05-13 DIAGNOSIS — R07.9 CHEST PAIN: ICD-10-CM

## 2020-05-13 DIAGNOSIS — I10 ESSENTIAL HYPERTENSION: Primary | ICD-10-CM

## 2020-05-13 DIAGNOSIS — R00.2 PALPITATIONS: ICD-10-CM

## 2020-05-13 PROCEDURE — 1101F PR PT FALLS ASSESS DOC 0-1 FALLS W/OUT INJ PAST YR: ICD-10-PCS | Mod: HCNC,CPTII,S$GLB, | Performed by: INTERNAL MEDICINE

## 2020-05-13 PROCEDURE — 3074F SYST BP LT 130 MM HG: CPT | Mod: HCNC,CPTII,S$GLB, | Performed by: INTERNAL MEDICINE

## 2020-05-13 PROCEDURE — 3074F PR MOST RECENT SYSTOLIC BLOOD PRESSURE < 130 MM HG: ICD-10-PCS | Mod: HCNC,CPTII,S$GLB, | Performed by: INTERNAL MEDICINE

## 2020-05-13 PROCEDURE — 1101F PT FALLS ASSESS-DOCD LE1/YR: CPT | Mod: HCNC,CPTII,S$GLB, | Performed by: INTERNAL MEDICINE

## 2020-05-13 PROCEDURE — 99204 PR OFFICE/OUTPT VISIT, NEW, LEVL IV, 45-59 MIN: ICD-10-PCS | Mod: HCNC,S$GLB,, | Performed by: INTERNAL MEDICINE

## 2020-05-13 PROCEDURE — 99204 OFFICE O/P NEW MOD 45 MIN: CPT | Mod: HCNC,S$GLB,, | Performed by: INTERNAL MEDICINE

## 2020-05-13 PROCEDURE — 3078F PR MOST RECENT DIASTOLIC BLOOD PRESSURE < 80 MM HG: ICD-10-PCS | Mod: HCNC,CPTII,S$GLB, | Performed by: INTERNAL MEDICINE

## 2020-05-13 PROCEDURE — 3078F DIAST BP <80 MM HG: CPT | Mod: HCNC,CPTII,S$GLB, | Performed by: INTERNAL MEDICINE

## 2020-05-13 PROCEDURE — 1159F PR MEDICATION LIST DOCUMENTED IN MEDICAL RECORD: ICD-10-PCS | Mod: HCNC,S$GLB,, | Performed by: INTERNAL MEDICINE

## 2020-05-13 PROCEDURE — 1126F PR PAIN SEVERITY QUANTIFIED, NO PAIN PRESENT: ICD-10-PCS | Mod: HCNC,S$GLB,, | Performed by: INTERNAL MEDICINE

## 2020-05-13 PROCEDURE — 99999 PR PBB SHADOW E&M-EST. PATIENT-LVL IV: ICD-10-PCS | Mod: PBBFAC,HCNC,, | Performed by: INTERNAL MEDICINE

## 2020-05-13 PROCEDURE — 99999 PR PBB SHADOW E&M-EST. PATIENT-LVL IV: CPT | Mod: PBBFAC,HCNC,, | Performed by: INTERNAL MEDICINE

## 2020-05-13 PROCEDURE — 1159F MED LIST DOCD IN RCRD: CPT | Mod: HCNC,S$GLB,, | Performed by: INTERNAL MEDICINE

## 2020-05-13 PROCEDURE — 1126F AMNT PAIN NOTED NONE PRSNT: CPT | Mod: HCNC,S$GLB,, | Performed by: INTERNAL MEDICINE

## 2020-05-13 NOTE — PROGRESS NOTES
Subjective:   Patient ID:  Manju Aranda is a 69 y.o. female who presents for evaluation of Chest Pain (hospital discharge 05/03/20) and Palpitations      HPI: She presents today for evaluation of chest discomfort and palpitations. She was seen in the ER at [facility] for these symptoms on 5/2. Troponins were negative and her ECG showed sinus rhythm with PAC's. Other blood work was within normal limits. She describes awakening with a tightness or heaviness in her chest which felt like she needed to burp. She drank some coke to try and relieve it. She also reports feeling her heart fluttering and racing. Since her DC from  [facility], the chest pressure has not returned, but she continues to have intermittent palpitations. She reports first noticing them after being started on crestor about 4 weeks ago. She stopped the medication about 2 weeks but her symptoms remain. She has a family history of premature CAD. She had an echo don in 2016 which was normal.     Past Medical History:   Diagnosis Date    Allergy     Anemia     Arthritis     Cholelithiases     Cyst of kidney, acquired     Diverticulitis     Elevated TSH     Family history of Graves' disease: daughter, maternal aunt, maternal uncle 9/13/2016    Glaucoma     Graves disease     Hx of colonic polyps     Hypertension 1981    Liver cyst     MGUS (monoclonal gammopathy of unknown significance)     Migraine headache     Mitral valve problem     leakage    Obesity     Paraproteinemia     Smoldering multiple myeloma 2013    Tricuspid valve disease     leakage       Past Surgical History:   Procedure Laterality Date    APPENDECTOMY      COLONOSCOPY N/A 10/23/2015    Procedure: COLONOSCOPY;  Surgeon: Brandon Ruelas MD;  Location: [address]);  Service: Endoscopy;  Laterality: N/A;  Had divertiulitis in 5/29/2015 with a recommendation for colonoscopy in 8 weeks    Dr. Ruelas is her GI physician    COLONOSCOPY N/A 11/5/2018     Procedure: COLONOSCOPY;  Surgeon: Brandon Ruelas MD;  Location: [facility] ([address] [place] [address]);  Service: Endoscopy;  Laterality: N/A;  Dr. Ruelas did the last one multiple polyps, patient had recent diverticulitis should not schedule before Oct 10th    HYSTERECTOMY   or     OOPHORECTOMY         Social History     Socioeconomic History    Marital status: Single     Spouse name: Not on file    Number of children: Not on file    Years of education: Not on file    Highest education level: Not on file   Occupational History    Occupation: RETIRED   Social Needs    Financial resource strain: Not on file    Food insecurity:     Worry: Not on file     Inability: Not on file    Transportation needs:     Medical: Not on file     Non-medical: Not on file   Tobacco Use    Smoking status: Former Smoker     Years: 3.00     Types: Cigarettes     Last attempt to quit: 1995     Years since quittin.3    Smokeless tobacco: Never Used   Substance and Sexual Activity    Alcohol use: No    Drug use: No    Sexual activity: Never     Birth control/protection: Post-menopausal   Lifestyle    Physical activity:     Days per week: Not on file     Minutes per session: Not on file    Stress: Not on file   Relationships    Social connections:     Talks on phone: Not on file     Gets together: Not on file     Attends Denominational service: Not on file     Active member of club or organization: Not on file     Attends meetings of clubs or organizations: Not on file     Relationship status: Not on file   Other Topics Concern    Are you pregnant or think you may be? Not Asked    Breast-feeding Not Asked   Social History Narrative    Not on file       Family History   Problem Relation Age of Onset    Heart disease Mother         MI    Heart attack Mother     Heart disease Maternal Aunt         MI    Heart disease Maternal Aunt         MI    Cancer Paternal Grandmother         GYN cancer - unknown cancer    Colon  cancer Maternal Uncle     Cancer Maternal Uncle         colon ca    Hypertension Father     Heart disease Sister         fast heart rate    Cataracts Sister     Glaucoma Sister     Graves' disease Daughter     No Known Problems Son     No Known Problems Sister     No Known Problems Daughter     No Known Problems Son     Melanoma Neg Hx     Breast cancer Neg Hx     Ovarian cancer Neg Hx     Colon polyps Neg Hx     Rectal cancer Neg Hx     Stomach cancer Neg Hx     Esophageal cancer Neg Hx     Ulcerative colitis Neg Hx     Crohn's disease Neg Hx     Amblyopia Neg Hx     Blindness Neg Hx     Macular degeneration Neg Hx     Strabismus Neg Hx     Retinal detachment Neg Hx        Patient's Medications   New Prescriptions    No medications on file   Previous Medications    ASPIRIN 81 MG CHEW    Take 81 mg by mouth once daily.    ELETRIPTAN (RELPAX) 40 MG TABLET    Take 1 tablet (40 mg total) by mouth every 2 (two) hours as needed. may repeat in 2 hours if necessary    FLUCONAZOLE (DIFLUCAN) 100 MG TABLET    Take 1 tablet (100 mg total) by mouth once daily.    MAGNESIUM OXIDE (MAG-OX) 400 MG TABLET    Take 400 mg by mouth once daily.    MULTIVIT WITH MIN-FOLIC ACID 0.4 MG TAB    Take 0.4 mg by mouth once daily.    ONDANSETRON (ZOFRAN) 4 MG TABLET    Take 1-2 tablets (4-8 mg total) by mouth every 8 (eight) hours as needed for Nausea.    QUINAPRIL (ACCUPRIL) 20 MG TABLET    Take 1 tablet (20 mg total) by mouth once daily.    RIZATRIPTAN (MAXALT) 10 MG TABLET    TAKE 1 TABLET(10 MG) BY MOUTH EVERY 2 HOURS AS NEEDED FOR MIGRAINE. MAX 30 MG/ 24 AT BEDTIME   Modified Medications    No medications on file   Discontinued Medications    ROSUVASTATIN (CRESTOR) 5 MG TABLET    Take 1 tablet (5 mg total) by mouth once daily.       Review of Systems   Constitution: Positive for malaise/fatigue. Negative for weight gain.   HENT: Negative for hearing loss.    Eyes: Negative for visual disturbance.   Cardiovascular:  "Positive for chest pain and palpitations. Negative for claudication, dyspnea on exertion, leg swelling, near-syncope, orthopnea, paroxysmal nocturnal dyspnea and syncope.   Respiratory: Negative for cough, shortness of breath, sleep disturbances due to breathing, snoring and wheezing.    Endocrine: Negative for cold intolerance, heat intolerance, polydipsia, polyphagia and polyuria.   Hematologic/Lymphatic: Negative for bleeding problem. Does not bruise/bleed easily.   Skin: Negative for rash and suspicious lesions.   Musculoskeletal: Negative for arthritis, falls, joint pain, muscle weakness and myalgias.   Gastrointestinal: Negative for abdominal pain, change in bowel habit, constipation, diarrhea, heartburn, hematochezia, melena and nausea.   Genitourinary: Negative for hematuria and nocturia.   Neurological: Positive for headaches. Negative for excessive daytime sleepiness, dizziness, light-headedness, loss of balance and weakness.   Psychiatric/Behavioral: Negative for depression. The patient is not nervous/anxious.    Allergic/Immunologic: Negative for environmental allergies.       /67 (BP Location: Left arm, Patient Position: Sitting, BP Method: X-Large (Automatic))   Pulse 88   Ht 5' 9" (1.753 m)   Wt 123.6 kg (272 lb 7.8 oz)   BMI 40.24 kg/m²     Objective:   Physical Exam   Constitutional: She is oriented to person, place, and time. She appears well-developed and well-nourished.        HENT:   Head: Normocephalic and atraumatic.   Mouth/Throat: Oropharynx is clear and moist.   Eyes: Pupils are equal, round, and reactive to light. Conjunctivae and EOM are normal. No scleral icterus.   Neck: Normal range of motion. Neck supple. No hepatojugular reflux and no JVD present. No tracheal deviation present. No thyromegaly present.   Cardiovascular: Normal rate, regular rhythm, normal heart sounds and intact distal pulses. PMI is not displaced.   Pulses:       Carotid pulses are 2+ on the right side, and " 2+ on the left side.       Radial pulses are 2+ on the right side, and 2+ on the left side.        Dorsalis pedis pulses are 2+ on the right side, and 2+ on the left side.        Posterior tibial pulses are 2+ on the right side, and 2+ on the left side.   Pulmonary/Chest: Effort normal and breath sounds normal.   Abdominal: Soft. Bowel sounds are normal. She exhibits no distension and no mass. There is no hepatosplenomegaly. There is no tenderness.   Musculoskeletal: She exhibits no edema or tenderness.   Lymphadenopathy:     She has no cervical adenopathy.   Neurological: She is alert and oriented to person, place, and time.   Skin: Skin is warm and dry. No rash noted. No cyanosis or erythema. Nails show no clubbing.   Psychiatric: She has a normal mood and affect. Her speech is normal and behavior is normal.       Lab Results   Component Value Date     05/02/2020    K 4.5 05/02/2020     05/02/2020    CO2 27 05/02/2020    BUN 13 05/02/2020    CREATININE 1.4 05/02/2020    GLU 93 05/02/2020    MG 1.9 05/02/2020    AST 20 05/02/2020    ALT 19 05/02/2020    ALBUMIN 4.0 05/02/2020    PROT 8.8 (H) 05/02/2020    BILITOT 0.5 05/02/2020    WBC 4.07 05/02/2020    HGB 13.3 05/02/2020    HCT 42.0 05/02/2020    MCV 96 05/02/2020     05/02/2020    INR 1.0 01/17/2020    TSH 1.408 05/02/2020    CHOL 148 03/03/2020    HDL 54 03/03/2020    LDLCALC 82.8 03/03/2020    TRIG 56 03/03/2020    BNP <10 04/30/2013       Assessment:     1. Essential hypertension : Blood pressure is at goal. I have made no changes. Continue current regimen.   2. Chest pain : She has not had a recent ischemia evaluation. Will get DARRIUS for further evaluation given risk factors.   3. Smoldering multiple myeloma (SMM) : Follows with Dr. Alba.   4. Chest pain, unspecified type    5. Palpitations : She had PAC's on her ECG. Her labs were normal. I have ordered an event monitor to assess for any sustained arrhythmias like afib. I also referred  her to sleep medicine for a sleep study.       Plan:     Manju was seen today for chest pain and palpitations.    Diagnoses and all orders for this visit:    Essential hypertension  -     Stress Echo Which stress agent will be used? Exercise; Future  -     Cardiac event monitor; Future  -     Ambulatory referral/consult to Sleep Disorders; Future    Chest pain  -     Ambulatory referral/consult to Cardiology  -     Stress Echo Which stress agent will be used? Exercise; Future  -     Cardiac event monitor; Future    Smoldering multiple myeloma (SMM)  -     Stress Echo Which stress agent will be used? Exercise; Future  -     Cardiac event monitor; Future    Chest pain, unspecified type  -     Stress Echo Which stress agent will be used? Exercise; Future  -     Cardiac event monitor; Future    Palpitations  -     Stress Echo Which stress agent will be used? Exercise; Future  -     Cardiac event monitor; Future        Thank you for allowing me to participate in this patient's care. Please do not hesitate to contact me with any questions or concerns.

## 2020-05-13 NOTE — LETTER
May 13, 2020      Erin Lewis PA-C  [address]  [facility] [address]           [facility] - Cardiology  [address]  [place] [address]  Phone: [phone]          Patient: Manju Aranda   MR Number: [id number]   YOB: 1950   Date of Visit: 5/13/2020       Dear Erin Lewis:    Thank you for referring Manju Aranda to me for evaluation. Attached you will find relevant portions of my assessment and plan of care.    If you have questions, please do not hesitate to call me. I look forward to following Manju Aranda along with you.    Sincerely,    Stacey Broderick MD    Enclosure  CC:  No Recipients    If you would like to receive this communication electronically, please contact [email or web address] or [phone] to request more information on [employer] access.    For providers and/or their staff who would like to refer a patient to [facility], please contact us through our one-stop-shop provider referral line, [facility], at [phone].    If you feel you have received this communication in error or would no longer like to receive these types of communications, please e-mail [email or web address]

## 2020-05-13 NOTE — PATIENT INSTRUCTIONS
Following a heart health diet such as the Mediterranean Diet is recommended in addition to 150 minutes a week of moderate intensity exercise to lower cholesterol, maintain a healthy body weight, and improve overall cardiovascular health.

## 2020-05-14 ENCOUNTER — TELEPHONE (OUTPATIENT)
Dept: CARDIOLOGY | Facility: HOSPITAL | Age: 70
End: 2020-05-14

## 2020-05-18 ENCOUNTER — CLINICAL SUPPORT (OUTPATIENT)
Dept: CARDIOLOGY | Facility: HOSPITAL | Age: 70
End: 2020-05-18
Attending: INTERNAL MEDICINE
Payer: MEDICARE

## 2020-05-18 DIAGNOSIS — I10 ESSENTIAL HYPERTENSION: ICD-10-CM

## 2020-05-18 DIAGNOSIS — D47.2 SMOLDERING MULTIPLE MYELOMA (SMM): ICD-10-CM

## 2020-05-18 DIAGNOSIS — R00.2 PALPITATIONS: ICD-10-CM

## 2020-05-18 DIAGNOSIS — R07.9 CHEST PAIN: ICD-10-CM

## 2020-05-18 DIAGNOSIS — R07.9 CHEST PAIN, UNSPECIFIED TYPE: ICD-10-CM

## 2020-05-18 PROCEDURE — 93272 ECG/REVIEW INTERPRET ONLY: CPT | Mod: HCNC,,, | Performed by: INTERNAL MEDICINE

## 2020-05-18 PROCEDURE — 93272 CARDIAC EVENT MONITOR (CUPID ONLY): ICD-10-PCS | Mod: HCNC,,, | Performed by: INTERNAL MEDICINE

## 2020-05-18 PROCEDURE — 93271 ECG/MONITORING AND ANALYSIS: CPT | Mod: HCNC

## 2020-05-21 ENCOUNTER — TELEPHONE (OUTPATIENT)
Dept: CARDIOLOGY | Facility: CLINIC | Age: 70
End: 2020-05-21

## 2020-05-21 ENCOUNTER — HOSPITAL ENCOUNTER (OUTPATIENT)
Dept: CARDIOLOGY | Facility: CLINIC | Age: 70
Discharge: HOME | End: 2020-05-21
Attending: INTERNAL MEDICINE
Payer: MEDICARE

## 2020-05-21 VITALS — WEIGHT: 270 LBS | HEIGHT: 69 IN | BODY MASS INDEX: 39.99 KG/M2

## 2020-05-21 DIAGNOSIS — D47.2 SMOLDERING MULTIPLE MYELOMA (SMM): ICD-10-CM

## 2020-05-21 DIAGNOSIS — R00.2 PALPITATIONS: ICD-10-CM

## 2020-05-21 DIAGNOSIS — I10 ESSENTIAL HYPERTENSION: ICD-10-CM

## 2020-05-21 DIAGNOSIS — R07.9 CHEST PAIN: ICD-10-CM

## 2020-05-21 DIAGNOSIS — R07.9 CHEST PAIN, UNSPECIFIED TYPE: ICD-10-CM

## 2020-05-21 LAB
ASCENDING AORTA: 3.42 CM
AV INDEX (PROSTH): 0.95
AV MEAN GRADIENT: 4 MMHG
AV PEAK GRADIENT: 7 MMHG
AV VALVE AREA: 4.22 CM2
AV VELOCITY RATIO: 0.72
BSA FOR ECHO PROCEDURE: 2.44 M2
CV ECHO LV RWT: 0.31 CM
CV STRESS BASE HR: 76 BPM
DIASTOLIC BLOOD PRESSURE: 90 MMHG
DOP CALC AO PEAK VEL: 1.34 M/S
DOP CALC AO VTI: 21.07 CM
DOP CALC LVOT AREA: 4.4 CM2
DOP CALC LVOT DIAMETER: 2.38 CM
DOP CALC LVOT PEAK VEL: 0.96 M/S
DOP CALC LVOT STROKE VOLUME: 88.89 CM3
DOP CALCLVOT PEAK VEL VTI: 19.99 CM
E WAVE DECELERATION TIME: 274.27 MSEC
E/A RATIO: 0.53
E/E' RATIO: 8.67 M/S
ECHO LV POSTERIOR WALL: 0.85 CM (ref 0.6–1.1)
FRACTIONAL SHORTENING: 37 % (ref 28–44)
INTERVENTRICULAR SEPTUM: 1 CM (ref 0.6–1.1)
IVRT: 108.47 MSEC
LA MAJOR: 5.87 CM
LA MINOR: 6.38 CM
LA WIDTH: 3.78 CM
LEFT ATRIUM SIZE: 4.14 CM
LEFT ATRIUM VOLUME INDEX: 34.7 ML/M2
LEFT ATRIUM VOLUME: 81.33 CM3
LEFT INTERNAL DIMENSION IN SYSTOLE: 3.42 CM (ref 2.1–4)
LEFT VENTRICLE DIASTOLIC VOLUME INDEX: 61.49 ML/M2
LEFT VENTRICLE DIASTOLIC VOLUME: 144.3 ML
LEFT VENTRICLE MASS INDEX: 81 G/M2
LEFT VENTRICLE SYSTOLIC VOLUME INDEX: 20.5 ML/M2
LEFT VENTRICLE SYSTOLIC VOLUME: 48.1 ML
LEFT VENTRICULAR INTERNAL DIMENSION IN DIASTOLE: 5.45 CM (ref 3.5–6)
LEFT VENTRICULAR MASS: 189.58 G
LV LATERAL E/E' RATIO: 6.5 M/S
LV SEPTAL E/E' RATIO: 13 M/S
MV PEAK A VEL: 0.99 M/S
MV PEAK E VEL: 0.52 M/S
OHS CV CPX 1 MINUTE RECOVERY HEART RATE: 120 BPM
OHS CV CPX 85 PERCENT MAX PREDICTED HEART RATE MALE: 124
OHS CV CPX ESTIMATED METS: 9
OHS CV CPX MAX PREDICTED HEART RATE: 146
OHS CV CPX PATIENT IS FEMALE: 1
OHS CV CPX PATIENT IS MALE: 0
OHS CV CPX PEAK DIASTOLIC BLOOD PRESSURE: 78 MMHG
OHS CV CPX PEAK HEAR RATE: 131 BPM
OHS CV CPX PEAK RATE PRESSURE PRODUCT: NORMAL
OHS CV CPX PEAK SYSTOLIC BLOOD PRESSURE: 156 MMHG
OHS CV CPX PERCENT MAX PREDICTED HEART RATE ACHIEVED: 90
OHS CV CPX RATE PRESSURE PRODUCT PRESENTING: 9956
PISA TR MAX VEL: 2.66 M/S
PULM VEIN S/D RATIO: 1.47
PV PEAK D VEL: 0.32 M/S
PV PEAK S VEL: 0.47 M/S
RA MAJOR: 5.45 CM
RA PRESSURE: 3 MMHG
RA WIDTH: 3.8 CM
RIGHT VENTRICULAR END-DIASTOLIC DIMENSION: 2.98 CM
RV TISSUE DOPPLER FREE WALL SYSTOLIC VELOCITY 1 (APICAL 4 CHAMBER VIEW): 11.9 CM/S
SINUS: 3.59 CM
STJ: 3.15 CM
STRESS ECHO POST EXERCISE DUR MIN: 5 MINUTES
STRESS ECHO POST EXERCISE DUR SEC: 34 SECONDS
SYSTOLIC BLOOD PRESSURE: 131 MMHG
TDI LATERAL: 0.08 M/S
TDI SEPTAL: 0.04 M/S
TDI: 0.06 M/S
TR MAX PG: 28 MMHG
TRICUSPID ANNULAR PLANE SYSTOLIC EXCURSION: 1.84 CM
TV REST PULMONARY ARTERY PRESSURE: 31 MMHG

## 2020-05-21 PROCEDURE — 93351 STRESS TTE COMPLETE: CPT | Mod: HCNC

## 2020-05-21 PROCEDURE — 93351 STRESS TTE COMPLETE: CPT | Mod: 26,HCNC,, | Performed by: INTERNAL MEDICINE

## 2020-05-21 PROCEDURE — 93325 DOPPLER ECHO COLOR FLOW MAPG: CPT | Mod: 26,HCNC,, | Performed by: INTERNAL MEDICINE

## 2020-05-21 PROCEDURE — 93320 DOPPLER ECHO COMPLETE: CPT | Mod: 26,HCNC,, | Performed by: INTERNAL MEDICINE

## 2020-05-21 PROCEDURE — 93325 STRESS ECHO (CUPID ONLY): ICD-10-PCS | Mod: 26,HCNC,, | Performed by: INTERNAL MEDICINE

## 2020-05-21 PROCEDURE — 93320 STRESS ECHO (CUPID ONLY): ICD-10-PCS | Mod: 26,HCNC,, | Performed by: INTERNAL MEDICINE

## 2020-05-21 PROCEDURE — 93351 STRESS ECHO (CUPID ONLY): ICD-10-PCS | Mod: 26,HCNC,, | Performed by: INTERNAL MEDICINE

## 2020-05-21 NOTE — TELEPHONE ENCOUNTER
----- Message from Stacey Broderick MD sent at 5/21/2020 10:44 AM CDT -----  The stress test was normal. The heart function is strong and there was no evidence of any significant blockages in the coronary arteries. Some PAC's were seen which would explain her palpitations. Monitor results pending.

## 2020-06-02 ENCOUNTER — OFFICE VISIT (OUTPATIENT)
Dept: SLEEP MEDICINE | Facility: CLINIC | Age: 70
End: 2020-06-02
Payer: MEDICARE

## 2020-06-02 VITALS
HEART RATE: 74 BPM | BODY MASS INDEX: 40.79 KG/M2 | HEIGHT: 69 IN | DIASTOLIC BLOOD PRESSURE: 86 MMHG | SYSTOLIC BLOOD PRESSURE: 139 MMHG | WEIGHT: 275.38 LBS

## 2020-06-02 DIAGNOSIS — G47.30 SLEEP APNEA, UNSPECIFIED TYPE: Primary | ICD-10-CM

## 2020-06-02 PROCEDURE — 99999 PR PBB SHADOW E&M-EST. PATIENT-LVL III: ICD-10-PCS | Mod: PBBFAC,HCNC,, | Performed by: NURSE PRACTITIONER

## 2020-06-02 PROCEDURE — 99999 PR PBB SHADOW E&M-EST. PATIENT-LVL III: CPT | Mod: PBBFAC,HCNC,, | Performed by: NURSE PRACTITIONER

## 2020-06-02 PROCEDURE — 1126F AMNT PAIN NOTED NONE PRSNT: CPT | Mod: HCNC,S$GLB,, | Performed by: NURSE PRACTITIONER

## 2020-06-02 PROCEDURE — 1126F PR PAIN SEVERITY QUANTIFIED, NO PAIN PRESENT: ICD-10-PCS | Mod: HCNC,S$GLB,, | Performed by: NURSE PRACTITIONER

## 2020-06-02 PROCEDURE — 1159F PR MEDICATION LIST DOCUMENTED IN MEDICAL RECORD: ICD-10-PCS | Mod: HCNC,S$GLB,, | Performed by: NURSE PRACTITIONER

## 2020-06-02 PROCEDURE — 3079F PR MOST RECENT DIASTOLIC BLOOD PRESSURE 80-89 MM HG: ICD-10-PCS | Mod: HCNC,CPTII,S$GLB, | Performed by: NURSE PRACTITIONER

## 2020-06-02 PROCEDURE — 3075F SYST BP GE 130 - 139MM HG: CPT | Mod: HCNC,CPTII,S$GLB, | Performed by: NURSE PRACTITIONER

## 2020-06-02 PROCEDURE — 1101F PR PT FALLS ASSESS DOC 0-1 FALLS W/OUT INJ PAST YR: ICD-10-PCS | Mod: HCNC,CPTII,S$GLB, | Performed by: NURSE PRACTITIONER

## 2020-06-02 PROCEDURE — 3079F DIAST BP 80-89 MM HG: CPT | Mod: HCNC,CPTII,S$GLB, | Performed by: NURSE PRACTITIONER

## 2020-06-02 PROCEDURE — 99214 OFFICE O/P EST MOD 30 MIN: CPT | Mod: HCNC,S$GLB,, | Performed by: NURSE PRACTITIONER

## 2020-06-02 PROCEDURE — 3075F PR MOST RECENT SYSTOLIC BLOOD PRESS GE 130-139MM HG: ICD-10-PCS | Mod: HCNC,CPTII,S$GLB, | Performed by: NURSE PRACTITIONER

## 2020-06-02 PROCEDURE — 1159F MED LIST DOCD IN RCRD: CPT | Mod: HCNC,S$GLB,, | Performed by: NURSE PRACTITIONER

## 2020-06-02 PROCEDURE — 99214 PR OFFICE/OUTPT VISIT, EST, LEVL IV, 30-39 MIN: ICD-10-PCS | Mod: HCNC,S$GLB,, | Performed by: NURSE PRACTITIONER

## 2020-06-02 PROCEDURE — 1101F PT FALLS ASSESS-DOCD LE1/YR: CPT | Mod: HCNC,CPTII,S$GLB, | Performed by: NURSE PRACTITIONER

## 2020-06-02 NOTE — PROGRESS NOTES
Manju Aranda returns today for new condition, eval of sleep  She had recent admission for CP, negative workup thus far.Has 30d holter monitor on. Needs eval for sleep. +snoring and + nocturia 1-2x. HA remain stable. Occasional daytime sleepiness. Having ongoing palpitations. Side sleeper.     Echo EF 55%  AST/ALT normal 5/2020  Some months has headaches 1-2x/months, others 4-5. Zofran helps as does Maxalt prn. Biggest known trigger is weather change.  Cumen/herbs/pepper/basil/rosemary/highly seasoned food/seafood are other triggers.HA remain same location/nature from 2015        HISTORY aLL VB:  10/5/15:   She was last seen 3/31/15.  She continues on Magnesium 250mg 2 tabs bid for migraines prevention, but stopped Riboflavin 100mg bid and CoQ10 100mg bid due to worsening HA in past. No recurrent morning headache. No longer keeping HA diaries but reports since last seen HA remain infrequent, occuring <5/month. None on recent cruise! Spicy foods, stress, lack of sleep, and weather are known triggers. HA remain typically mild-moderate (only few severe HA in interim), lasting 2-4+ hrs. Longer duration when at work and unable to take Maxalt due to sleepiness. At work she will take Midrin 2 tabs at onset which remains very helpful within 30min, not having to repeat dose. She has a prodrome (tingling of the forehead) but no auras, before the gradual onset of non-radiating frontal (center or right) throbbing or pressure pains associated with photo/ phonophobia and nausea. She denies vomiting, focal neurological deficits or autonomic deficits. Other triggers remain gatigue, and stress.  Resting helps while movement can intensify pain. HIT-6 score - 54      10/17/16: Reports stable frequency of headaches, ~ 1-2 /month. Midrin remains effective. No severe episodes. No zofran use, but this does help nausea prn. Headache remains same nature/location. HIT=6 score= 53. Having knee pain. Interim dx hyperthyroidism. She has  "lost weight on new medication/dx. Continues to take Mag 500mg twice daily, no loose stools "helps my arthritis too".     10/25/17:  Since last seen denies interval medical change. Reports headache q 2 mos. Cumen/herbs/pepper/basil/rosemary/highly seasoned food/seafood are triggers. Abortive meds remain effective. HA remain same location/nature. Maxalt works well.   Trying to walk at [employer] track.     11/2018:  Since last seen denies interval medical change except colonoscopy yesterday. Reports headache infrequent except recently 5d due to weather change.  Cumen/herbs/pepper/basil/rosemary/highly seasoned food/seafood are triggers. Abortive meds remain effective/maxalt. Midrin too costly.  HA remain same location/nature  2# loss        ROS: As above. In addition, otherwise a balance review of 10-systems is negative.       PHYSICAL EXAM:   General: W/D, morbid obese, not in distress.   /86   Pulse 74   Ht 5' 9" (1.753 m)   Wt 124.9 kg (275 lb 5.7 oz)   BMI 40.66 kg/m²         IMPRESSION:   Migraine without aura, stable  Unspecified sleep apnea  ADPKD    PLAN:   Preventative therapy: Continue Mag 250mg 2 tabs bid  Abortive therapy: Continue Maxalt 5-10 mg tab 1 tab PO q 2h, up to maximum of 30 mg/day. Do not delay treatment to avoid progression of a migraine.   Continue Zofran 4-8mg PO q8h prn for HA/nausea     Continue to regulate sleep, mealtimes, try to reduce stress and to avoid known headache triggers/aggravating factors  PSG, plan PHONE review/results (most likely pap trial if + )    RTC 12 months, sooner if needed  "

## 2020-06-15 ENCOUNTER — TELEPHONE (OUTPATIENT)
Dept: INTERNAL MEDICINE | Facility: CLINIC | Age: 70
End: 2020-06-15

## 2020-06-16 ENCOUNTER — OFFICE VISIT (OUTPATIENT)
Dept: INTERNAL MEDICINE | Facility: CLINIC | Age: 70
End: 2020-06-16
Payer: MEDICARE

## 2020-06-16 VITALS
DIASTOLIC BLOOD PRESSURE: 88 MMHG | HEIGHT: 69 IN | BODY MASS INDEX: 41.04 KG/M2 | SYSTOLIC BLOOD PRESSURE: 120 MMHG | WEIGHT: 277.13 LBS | HEART RATE: 74 BPM

## 2020-06-16 DIAGNOSIS — I07.9 TRICUSPID VALVE DISEASE: ICD-10-CM

## 2020-06-16 DIAGNOSIS — Z86.39 HISTORY OF HYPERTHYROIDISM: ICD-10-CM

## 2020-06-16 DIAGNOSIS — G43.009 MIGRAINE WITHOUT AURA AND WITHOUT STATUS MIGRAINOSUS, NOT INTRACTABLE: ICD-10-CM

## 2020-06-16 DIAGNOSIS — I10 ESSENTIAL HYPERTENSION: ICD-10-CM

## 2020-06-16 DIAGNOSIS — R00.0 TACHYCARDIA: ICD-10-CM

## 2020-06-16 DIAGNOSIS — I70.0 AORTIC ATHEROSCLEROSIS: ICD-10-CM

## 2020-06-16 DIAGNOSIS — I05.9 MITRAL VALVE DISORDER: ICD-10-CM

## 2020-06-16 DIAGNOSIS — K76.89 LIVER CYST: ICD-10-CM

## 2020-06-16 DIAGNOSIS — I77.1 TORTUOUS AORTA: ICD-10-CM

## 2020-06-16 DIAGNOSIS — D89.2 PARAPROTEINEMIA: ICD-10-CM

## 2020-06-16 DIAGNOSIS — E05.00 GRAVES DISEASE: ICD-10-CM

## 2020-06-16 DIAGNOSIS — I12.9 BENIGN HYPERTENSIVE RENAL DISEASE WITHOUT RENAL FAILURE: ICD-10-CM

## 2020-06-16 DIAGNOSIS — R00.2 PALPITATIONS: ICD-10-CM

## 2020-06-16 DIAGNOSIS — D47.2 SMOLDERING MULTIPLE MYELOMA (SMM): ICD-10-CM

## 2020-06-16 DIAGNOSIS — N18.30 STAGE 3 CHRONIC KIDNEY DISEASE: ICD-10-CM

## 2020-06-16 DIAGNOSIS — Q61.2 ADPKD (AUTOSOMAL DOMINANT POLYCYSTIC KIDNEY DISEASE): ICD-10-CM

## 2020-06-16 DIAGNOSIS — R31.29 MICROHEMATURIA: ICD-10-CM

## 2020-06-16 DIAGNOSIS — Z86.010 HISTORY OF ADENOMATOUS POLYP OF COLON: ICD-10-CM

## 2020-06-16 DIAGNOSIS — Z00.00 ENCOUNTER FOR PREVENTIVE HEALTH EXAMINATION: Primary | ICD-10-CM

## 2020-06-16 DIAGNOSIS — E66.01 MORBID OBESITY WITH BMI OF 40.0-44.9, ADULT: ICD-10-CM

## 2020-06-16 PROBLEM — R07.9 CHEST PAIN: Status: RESOLVED | Noted: 2020-05-02 | Resolved: 2020-06-16

## 2020-06-16 PROCEDURE — 3074F SYST BP LT 130 MM HG: CPT | Mod: HCNC,CPTII,S$GLB, | Performed by: NURSE PRACTITIONER

## 2020-06-16 PROCEDURE — 99499 RISK ADDL DX/OHS AUDIT: ICD-10-PCS | Mod: HCNC,S$GLB,, | Performed by: NURSE PRACTITIONER

## 2020-06-16 PROCEDURE — 3079F DIAST BP 80-89 MM HG: CPT | Mod: HCNC,CPTII,S$GLB, | Performed by: NURSE PRACTITIONER

## 2020-06-16 PROCEDURE — 99999 PR PBB SHADOW E&M-EST. PATIENT-LVL IV: CPT | Mod: PBBFAC,HCNC,, | Performed by: NURSE PRACTITIONER

## 2020-06-16 PROCEDURE — 99499 UNLISTED E&M SERVICE: CPT | Mod: HCNC,S$GLB,, | Performed by: NURSE PRACTITIONER

## 2020-06-16 PROCEDURE — 99999 PR PBB SHADOW E&M-EST. PATIENT-LVL IV: ICD-10-PCS | Mod: PBBFAC,HCNC,, | Performed by: NURSE PRACTITIONER

## 2020-06-16 PROCEDURE — 3079F PR MOST RECENT DIASTOLIC BLOOD PRESSURE 80-89 MM HG: ICD-10-PCS | Mod: HCNC,CPTII,S$GLB, | Performed by: NURSE PRACTITIONER

## 2020-06-16 PROCEDURE — 3074F PR MOST RECENT SYSTOLIC BLOOD PRESSURE < 130 MM HG: ICD-10-PCS | Mod: HCNC,CPTII,S$GLB, | Performed by: NURSE PRACTITIONER

## 2020-06-16 PROCEDURE — G0439 PPPS, SUBSEQ VISIT: HCPCS | Mod: HCNC,S$GLB,, | Performed by: NURSE PRACTITIONER

## 2020-06-16 PROCEDURE — G0439 PR MEDICARE ANNUAL WELLNESS SUBSEQUENT VISIT: ICD-10-PCS | Mod: HCNC,S$GLB,, | Performed by: NURSE PRACTITIONER

## 2020-06-16 RX ORDER — ASCORBIC ACID 500 MG
500 TABLET ORAL DAILY
COMMUNITY

## 2020-06-16 RX ORDER — CHOLECALCIFEROL (VITAMIN D3) 25 MCG
1000 TABLET ORAL DAILY
COMMUNITY
End: 2023-04-10

## 2020-06-16 NOTE — PATIENT INSTRUCTIONS
Counseling and Referral of Other Preventative  (Italic type indicates deductible and co-insurance are waived)    Patient Name: Manju Aranda  Today's Date: 6/16/2020    Health Maintenance       Date Due Completion Date    High Dose Statin 10/19/1972 Obtain new shingles vaccine - SHINGRIX - when available    Shingles Vaccine (2 of 3) 09/23/2014 7/29/2014    DEXA SCAN 01/09/2020 1/9/2017    Mammogram 11/04/2020 11/4/2019    Colorectal Cancer Screening 11/05/2023 11/5/2018    Lipid Panel 03/03/2025 3/3/2020    TETANUS VACCINE 09/26/2028 9/26/2018    Override on 8/8/2017: Done ([facility])        No orders of the defined types were placed in this encounter.    The following information is provided to all patients.  This information is to help you find resources for any of the problems found today that may be affecting your health:                Living healthy guide: www.[facility].[place].gov      Understanding Diabetes: www.diabetes.org      Eating healthy: www.cdc.gov/healthyweight      CDC home safety checklist: www.cdc.gov/steadi/patient.html      Agency on Aging: www.goea.[place].[facility]      Alcoholics anonymous (AA): www.aa.org      Physical Activity: www.saji.nih.gov/oc4fios      Tobacco use: www.quitwithusla.org

## 2020-06-16 NOTE — PROGRESS NOTES
"Manju Aranda presented for a  Medicare AWV and comprehensive Health Risk Assessment today. The following components were reviewed and updated:    · Medical history  · Family History  · Social history  · Allergies and Current Medications  · Health Risk Assessment  · Health Maintenance  · Care Team     ** See Completed Assessments for Annual Wellness Visit within the encounter summary.**       The following assessments were completed:  · Living Situation  · CAGE  · Depression Screening  · Timed Get Up and Go  · Whisper Test  · Cognitive Function Screening      ·   · Nutrition Screening  · ADL Screening  · PAQ Screening    Vitals:    06/16/20 0858   BP: 120/88   BP Location: Left arm   Pulse: 74   Weight: 125.7 kg (277 lb 1.9 oz)   Height: 5' 9" (1.753 m)     Body mass index is 40.92 kg/m².  Physical Exam  Vitals signs and nursing note reviewed.   Constitutional:       Appearance: She is well-developed.   HENT:      Head: Normocephalic.   Cardiovascular:      Rate and Rhythm: Normal rate and regular rhythm.   Pulmonary:      Effort: Pulmonary effort is normal.      Breath sounds: Normal breath sounds.   Abdominal:      General: Bowel sounds are normal.      Palpations: Abdomen is soft.   Musculoskeletal: Normal range of motion.   Neurological:      Mental Status: She is alert and oriented to person, place, and time.      Motor: No abnormal muscle tone.           Diagnoses and health risks identified today and associated recommendations/orders:    1. Encounter for preventive health examination  Here for Health Risk Assessment/Annual Wellness Visit.  Health maintenance reviewed and updated. Follow up in one year. Prescription given for Shingrix.    2. Essential hypertension  Chronic, stable on current medications. Followed by PCP.    3. Aortic atherosclerosis  Chronic, stable on current medications. Noted CTA Chest 9/07/16.  Followed by PCP.    4. Tortuous aorta  Chronic, stable on current medications. Followed by " PCP.    5. Mitral valve disorder  Chronic, stable. Followed by PCP, Cardiology.    6. Tricuspid valve disease  Chronic, stable. Followed by PCP, Cardiology.    7. Tachycardia  Chronic, stable. Followed by PCP, Cardiology.    8. Palpitations  Chronic, stable. Followed by PCP, Cardiology.    9. Migraine without aura and without status migrainosus, not intractable  Chronic, stable on current medications. Followed by PCP, Nephrology.    10. ADPKD (autosomal dominant polycystic kidney disease)  Chronic, stable on current medications. Followed by PCP, Nephrology.    11. Benign hypertensive renal disease without renal failure  Chronic, stable on current medications. Followed by PCP, Nephrology.    12. Microhematuria  Chronic, stable on current medications. Followed by PCP, Nephrology.    13. Stage 3 chronic kidney disease  Chronic, stable on current medications. Followed by PCP, Nephrology.    14. Paraproteinemia  Chronic, stable. Followed by Oncology, PCP.    15. Smoldering multiple myeloma (SMM)  Chronic, stable. Followed by Oncology, PCP.    16. Morbid obesity with BMI of 40.0-44.9, adult  Chronic, reports she is walking 5 times weekly. Followed by PCP.    17. Graves disease  Stable. Followed by PCP.     18. History of hyperthyroidism  Stable. Followed by PCP.     19. Liver cyst  Chronic, stable. Followed by PCP, Hepatology.    20. History of adenomatous polyp of colon  Stable. Next colonoscopy due 11/23. Followed by PCP.      Provided Manju with a 5-10 year written screening schedule and personal prevention plan. Recommendations were developed using the USPSTF age appropriate recommendations. Education, counseling, and referrals were provided as needed. After Visit Summary printed and given to patient which includes a list of additional screenings\tests needed.    Follow up in 3 months (on 9/14/2020).with PCP    Yanet Thomas NP

## 2020-06-19 DIAGNOSIS — G47.30 SLEEP APNEA, UNSPECIFIED TYPE: Primary | ICD-10-CM

## 2020-06-22 ENCOUNTER — TELEPHONE (OUTPATIENT)
Dept: SLEEP MEDICINE | Facility: OTHER | Age: 70
End: 2020-06-22

## 2020-06-23 NOTE — PROGRESS NOTES
Called pt to give her results of event monitor but no answer. Left message on VM asking her to call us back.

## 2020-06-24 ENCOUNTER — TELEPHONE (OUTPATIENT)
Dept: CARDIOLOGY | Facility: CLINIC | Age: 70
End: 2020-06-24

## 2020-06-24 NOTE — TELEPHONE ENCOUNTER
----- Message from Stacey Broderick MD sent at 6/23/2020  6:53 AM CDT -----  The event monitor did not show any afib or other sustained arrhythmia, only some extra heart beats which correlated with her symptoms. No treatment is necessary.

## 2020-06-30 ENCOUNTER — OFFICE VISIT (OUTPATIENT)
Dept: UROLOGY | Facility: CLINIC | Age: 70
End: 2020-06-30
Payer: MEDICARE

## 2020-06-30 VITALS
HEIGHT: 69 IN | DIASTOLIC BLOOD PRESSURE: 81 MMHG | SYSTOLIC BLOOD PRESSURE: 127 MMHG | HEART RATE: 64 BPM | BODY MASS INDEX: 41.28 KG/M2 | WEIGHT: 278.69 LBS

## 2020-06-30 DIAGNOSIS — Z86.39 HISTORY OF HYPERTHYROIDISM: ICD-10-CM

## 2020-06-30 DIAGNOSIS — Q61.2 ADPKD (AUTOSOMAL DOMINANT POLYCYSTIC KIDNEY DISEASE): ICD-10-CM

## 2020-06-30 DIAGNOSIS — I12.9 BENIGN HYPERTENSIVE RENAL DISEASE WITHOUT RENAL FAILURE: Primary | ICD-10-CM

## 2020-06-30 DIAGNOSIS — I10 ESSENTIAL HYPERTENSION: ICD-10-CM

## 2020-06-30 DIAGNOSIS — R31.29 MICROHEMATURIA: ICD-10-CM

## 2020-06-30 DIAGNOSIS — E66.01 MORBID OBESITY WITH BMI OF 40.0-44.9, ADULT: ICD-10-CM

## 2020-06-30 PROCEDURE — 3079F DIAST BP 80-89 MM HG: CPT | Mod: HCNC,CPTII,S$GLB, | Performed by: UROLOGY

## 2020-06-30 PROCEDURE — 3008F BODY MASS INDEX DOCD: CPT | Mod: HCNC,CPTII,S$GLB, | Performed by: UROLOGY

## 2020-06-30 PROCEDURE — 1159F PR MEDICATION LIST DOCUMENTED IN MEDICAL RECORD: ICD-10-PCS | Mod: HCNC,S$GLB,, | Performed by: UROLOGY

## 2020-06-30 PROCEDURE — 99999 PR PBB SHADOW E&M-EST. PATIENT-LVL III: CPT | Mod: PBBFAC,HCNC,, | Performed by: UROLOGY

## 2020-06-30 PROCEDURE — 3074F PR MOST RECENT SYSTOLIC BLOOD PRESSURE < 130 MM HG: ICD-10-PCS | Mod: HCNC,CPTII,S$GLB, | Performed by: UROLOGY

## 2020-06-30 PROCEDURE — 1126F PR PAIN SEVERITY QUANTIFIED, NO PAIN PRESENT: ICD-10-PCS | Mod: HCNC,S$GLB,, | Performed by: UROLOGY

## 2020-06-30 PROCEDURE — 1101F PT FALLS ASSESS-DOCD LE1/YR: CPT | Mod: HCNC,CPTII,S$GLB, | Performed by: UROLOGY

## 2020-06-30 PROCEDURE — 1159F MED LIST DOCD IN RCRD: CPT | Mod: HCNC,S$GLB,, | Performed by: UROLOGY

## 2020-06-30 PROCEDURE — 99213 PR OFFICE/OUTPT VISIT, EST, LEVL III, 20-29 MIN: ICD-10-PCS | Mod: HCNC,S$GLB,, | Performed by: UROLOGY

## 2020-06-30 PROCEDURE — 3074F SYST BP LT 130 MM HG: CPT | Mod: HCNC,CPTII,S$GLB, | Performed by: UROLOGY

## 2020-06-30 PROCEDURE — 1126F AMNT PAIN NOTED NONE PRSNT: CPT | Mod: HCNC,S$GLB,, | Performed by: UROLOGY

## 2020-06-30 PROCEDURE — 99213 OFFICE O/P EST LOW 20 MIN: CPT | Mod: HCNC,S$GLB,, | Performed by: UROLOGY

## 2020-06-30 PROCEDURE — 99999 PR PBB SHADOW E&M-EST. PATIENT-LVL III: ICD-10-PCS | Mod: PBBFAC,HCNC,, | Performed by: UROLOGY

## 2020-06-30 PROCEDURE — 3079F PR MOST RECENT DIASTOLIC BLOOD PRESSURE 80-89 MM HG: ICD-10-PCS | Mod: HCNC,CPTII,S$GLB, | Performed by: UROLOGY

## 2020-06-30 PROCEDURE — 3008F PR BODY MASS INDEX (BMI) DOCUMENTED: ICD-10-PCS | Mod: HCNC,CPTII,S$GLB, | Performed by: UROLOGY

## 2020-06-30 PROCEDURE — 1101F PR PT FALLS ASSESS DOC 0-1 FALLS W/OUT INJ PAST YR: ICD-10-PCS | Mod: HCNC,CPTII,S$GLB, | Performed by: UROLOGY

## 2020-06-30 NOTE — PROGRESS NOTES
Subjective:       Patient ID: Manju Aranda is a 69 y.o. female.    Chief Complaint: micro hematuria (pt state annual check up history of blood in urie.and renal cyst .pt had last renal ultrasound on )    Manju Aranda is a 69 y.o. Female here for follow up of microhematuria.   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     7 RBC   5 RBC and 10 RBC   and  ua showed microhematuria. 8-10 RBC.  No gross hematuria.    She does have PCKD.     She has urinary frequency. Every 2 hours. No change since last year.   Nocturia 3 times. She does limit fluids 2 hours before bedtime. No sleep apnea. No lower extremity edema.  No constipation.   No gross hematuria. Cysto  normal  No recurrent UTI.   No incontinence. No urgency.     No gross hematuria.     H/o of diverticulitis. She is on a probiotic daily.  H/o of hysterectomy in 78.  , 2 vaginal, 2 c-sections.     CT  Genitourinary: Normal in size and location.  Redemonstration of multiple bilateral renal cysts in this patient with autosomal dominant polycystic kidney disease.  The largest cyst within the right kidney is stable in size measuring 11.7 cm, however has wall thickening that has overall increased since 2016.  There are 2 small lesions within the right kidney that appear isodense on today's exam and an additional cyst at the lower pole with a hyperdense peripheral focus, best seen on coronal.  Bladder demonstrates smooth contours without bladder wall thickening.    Reproductive organs: Status post hysterectomy and oophorectomy.    GI tract/Mesentery: Stomach is normal appearance.  Visualized loops of small and large bowel are normal in caliber without evidence for inflammation or obstruction.  Status post appendectomy.  There is colonic diverticulosis and evidence for diverticulitis.    Peritoneal Space: No abdominopelvic ascites or intraperitoneal free air.    Retroperitoneum: No significant adenopathy.    Abdominal wall:  Normal.    Vasculature: Abdominal aorta is normal in caliber with minimal calcific atherosclerosis.    Bones: Stable degenerative changes without acute fracture or bony destructive process.              Hematuria  Irritative symptoms include frequency and nocturia. Irritative symptoms do not include urgency. Pertinent negatives include no chills, dysuria or fever.       Past Surgical History:   Procedure Laterality Date    APPENDECTOMY      COLONOSCOPY N/A 10/23/2015    Procedure: COLONOSCOPY;  Surgeon: Brandon Ruelas MD;  [facility]  Service: Endoscopy;  Laterality: N/A;  Had divertiulitis in 5/29/2015 with a recommendation for colonoscopy in 8 weeks    Dr. Ruelas is her GI physician    COLONOSCOPY N/A 11/5/2018    Procedure: COLONOSCOPY;  Surgeon: Brandon Ruelas MD;  [facility]  Service: Endoscopy;  Laterality: N/A;  Dr. Ruelas did the last one multiple polyps, patient had recent diverticulitis should not schedule before Oct 10th    HYSTERECTOMY  1978 or 1979    OOPHORECTOMY         Past Medical History:   Diagnosis Date    Allergy     Anemia     Arthritis     Cholelithiases     Cyst of kidney, acquired     Diverticulitis     Elevated TSH     Family history of Graves' disease: daughter, maternal aunt, maternal uncle 9/13/2016    Glaucoma     Graves disease     Hx of colonic polyps     Hypertension 1981    Liver cyst     MGUS (monoclonal gammopathy of unknown significance)     Migraine headache     Mitral valve problem     leakage    Obesity     Paraproteinemia     Smoldering multiple myeloma 2013    Tricuspid valve disease     leakage       Social History     Socioeconomic History    Marital status: Single     Spouse name: Not on file    Number of children: Not on file    Years of education: Not on file    Highest education level: Not on file   Occupational History    Occupation: RETIRED   Social Needs    Financial resource strain:  Not on file    Food insecurity     Worry: Not on file     Inability: Not on file    Transportation needs     Medical: Not on file     Non-medical: Not on file   Tobacco Use    Smoking status: Former Smoker     Years: 3.00     Types: Cigarettes     Quit date: 1995     Years since quittin.5    Smokeless tobacco: Never Used   Substance and Sexual Activity    Alcohol use: No    Drug use: No    Sexual activity: Not Currently     Birth control/protection: Post-menopausal   Lifestyle    Physical activity     Days per week: Not on file     Minutes per session: Not on file    Stress: Not on file   Relationships    Social connections     Talks on phone: Not on file     Gets together: Not on file     Attends Mosque service: Not on file     Active member of club or organization: Not on file     Attends meetings of clubs or organizations: Not on file     Relationship status: Not on file   Other Topics Concern    Are you pregnant or think you may be? Not Asked    Breast-feeding Not Asked   Social History Narrative    Not on file       Family History   Problem Relation Age of Onset    Heart disease Mother         MI    Heart attack Mother     Heart disease Maternal Aunt         MI    Heart disease Maternal Aunt         MI    Cancer Paternal Grandmother         GYN cancer - unknown cancer    Colon cancer Maternal Uncle     Cancer Maternal Uncle         colon ca    Hypertension Father     Heart disease Sister         fast heart rate    Cataracts Sister     Glaucoma Sister     Graves' disease Daughter     No Known Problems Son     No Known Problems Sister     No Known Problems Daughter     No Known Problems Son     Melanoma Neg Hx     Breast cancer Neg Hx     Ovarian cancer Neg Hx     Colon polyps Neg Hx     Rectal cancer Neg Hx     Stomach cancer Neg Hx     Esophageal cancer Neg Hx     Ulcerative colitis Neg Hx     Crohn's disease Neg Hx     Amblyopia Neg Hx     Blindness Neg Hx      Macular degeneration Neg Hx     Strabismus Neg Hx     Retinal detachment Neg Hx        Current Outpatient Medications   Medication Sig Dispense Refill    ascorbic acid, vitamin C, (VITAMIN C) 500 MG tablet Take 500 mg by mouth once daily.      aspirin 81 MG Chew Take 81 mg by mouth once daily.      magnesium oxide (MAG-OX) 400 mg tablet Take 400 mg by mouth once daily.      multivit with min-folic acid 0.4 mg Tab Take 0.4 mg by mouth once daily.      ondansetron (ZOFRAN) 4 MG tablet Take 1-2 tablets (4-8 mg total) by mouth every 8 (eight) hours as needed for Nausea. (Patient taking differently: Take 4-8 mg by mouth every 8 (eight) hours as needed for Nausea. ) 20 tablet 3    quinapriL (ACCUPRIL) 20 MG tablet TAKE 1 TABLET BY MOUTH EVERY DAY 90 tablet 3    rizatriptan (MAXALT) 10 MG tablet TAKE 1 TABLET(10 MG) BY MOUTH EVERY 2 HOURS AS NEEDED FOR MIGRAINE. MAX 30 MG/ 24 AT BEDTIME 12 tablet 11    vitamin D (VITAMIN D3) 1000 units Tab Take 1,000 Units by mouth once daily.       No current facility-administered medications for this visit.        Review of patient's allergies indicates:  No Known Allergies    Review of Systems   Constitutional: Negative for chills, fever and unexpected weight change.   HENT: Negative for nosebleeds.    Eyes: Negative for visual disturbance.   Respiratory: Negative for chest tightness.    Cardiovascular: Negative for chest pain.   Gastrointestinal: Negative for constipation and diarrhea.   Genitourinary: Positive for frequency and nocturia. Negative for dysuria, hematuria and urgency.   Musculoskeletal: Negative for myalgias.   Skin: Negative for rash.   Neurological: Negative for seizures.   Hematological: Does not bruise/bleed easily.   Psychiatric/Behavioral: Negative for behavioral problems.       Objective:      Physical Exam   Vitals reviewed.  Constitutional: She is oriented to person, place, and time. She appears well-developed.   HENT:   Head: Normocephalic and  atraumatic.   Eyes: No scleral icterus.   Neck: Neck supple.   Cardiovascular: Normal rate.    Pulmonary/Chest: Effort normal. No respiratory distress.   Abdominal:   No cva ttp   Musculoskeletal: No tenderness.   Neurological: She is alert and oriented to person, place, and time.   Skin: Skin is warm and dry. No rash noted.     urine dip 50 blood  Assessment:       1. Benign hypertensive renal disease without renal failure    2. ADPKD (autosomal dominant polycystic kidney disease)    3. Microhematuria    4. Morbid obesity with BMI of 40.0-44.9, adult    5. History of hyperthyroidism    6. Essential hypertension        Plan:    Continued follow up of renal cysts with renal ultrasound.   Urinalysis today. Consider repeat cysto at 5 years (2022)

## 2020-07-07 ENCOUNTER — HOSPITAL ENCOUNTER (OUTPATIENT)
Dept: SLEEP MEDICINE | Facility: OTHER | Age: 70
Discharge: HOME | End: 2020-07-07
Payer: MEDICARE

## 2020-07-07 DIAGNOSIS — G47.33 OSA (OBSTRUCTIVE SLEEP APNEA): ICD-10-CM

## 2020-07-07 DIAGNOSIS — G47.30 SLEEP APNEA, UNSPECIFIED TYPE: ICD-10-CM

## 2020-07-07 PROCEDURE — 95810 PR POLYSOMNOGRAPHY, 4 OR MORE: ICD-10-PCS | Mod: 26,HCNC,, | Performed by: PSYCHIATRY & NEUROLOGY

## 2020-07-07 PROCEDURE — 95810 POLYSOM 6/> YRS 4/> PARAM: CPT | Mod: HCNC

## 2020-07-07 PROCEDURE — 95810 POLYSOM 6/> YRS 4/> PARAM: CPT | Mod: 26,HCNC,, | Performed by: PSYCHIATRY & NEUROLOGY

## 2020-07-08 NOTE — PROGRESS NOTES
Manju Aranda to [facility] on 07/07/2020 for an overnight baseline study. Pt educated on sterilization process, set up, and CPAP prior to the start of the study.      Moderate to loud snoring observed. frequent obstructive apneas observed. Pt did moan a few times while sleeping. Pt slept supine entire night, did not move very much.    EKG LEAD II appears with frequent PVC's and PAC's. Small run of tachycardia, snapshot provided.    Post study information given to pt in AM.

## 2020-07-13 NOTE — PROCEDURES
"See imported Detailed Sleep Study Reports with raw data in  "Chart Review" under the "Media tab".      (This Sleep Study was interpreted by a Board Certified Sleep Specialist who conducted an epoch-by-epoch review of the entire raw data recording.)     (The indication for this sleep study was reviewed and deemed appropriate by [facility] Practice Parameters or other reasons by a Board Certified Sleep Specialist.)    "

## 2020-07-14 ENCOUNTER — TELEPHONE (OUTPATIENT)
Dept: SLEEP MEDICINE | Facility: CLINIC | Age: 70
End: 2020-07-14

## 2020-07-14 DIAGNOSIS — G47.33 OBSTRUCTIVE SLEEP APNEA: Primary | ICD-10-CM

## 2020-07-20 ENCOUNTER — TELEPHONE (OUTPATIENT)
Dept: SLEEP MEDICINE | Facility: OTHER | Age: 70
End: 2020-07-20

## 2020-08-03 ENCOUNTER — TELEPHONE (OUTPATIENT)
Dept: HEMATOLOGY/ONCOLOGY | Facility: CLINIC | Age: 70
End: 2020-08-03

## 2020-08-03 NOTE — TELEPHONE ENCOUNTER
"----- Message from Darcie Noe sent at 8/3/2020  4:24 PM CDT -----  Scheduling Request    Patient Status: Establish  Scheduling Appt : [facility]   Time/Date Preference: Early morning in August   Cherelle Active User?: No   Relationship to Patient?: Self   Contact Preference?:[phone]  Treating Provider: Dr Alba   Do you feel you need to be seen today? No     Additional Notes:  "Thank you for all that you do for our patients'"           "

## 2020-08-21 ENCOUNTER — OFFICE VISIT (OUTPATIENT)
Dept: HEMATOLOGY/ONCOLOGY | Facility: CLINIC | Age: 70
End: 2020-08-21
Payer: MEDICARE

## 2020-08-21 ENCOUNTER — LAB VISIT (OUTPATIENT)
Dept: LAB | Facility: HOSPITAL | Age: 70
End: 2020-08-21
Payer: MEDICARE

## 2020-08-21 VITALS
RESPIRATION RATE: 18 BRPM | HEIGHT: 69 IN | HEART RATE: 94 BPM | DIASTOLIC BLOOD PRESSURE: 78 MMHG | TEMPERATURE: 99 F | WEIGHT: 270.5 LBS | BODY MASS INDEX: 40.07 KG/M2 | SYSTOLIC BLOOD PRESSURE: 136 MMHG

## 2020-08-21 DIAGNOSIS — D47.2 SMOLDERING MULTIPLE MYELOMA (SMM): Primary | ICD-10-CM

## 2020-08-21 DIAGNOSIS — D47.2 SMOLDERING MULTIPLE MYELOMA (SMM): ICD-10-CM

## 2020-08-21 LAB
ALBUMIN SERPL BCP-MCNC: 3 G/DL (ref 3.5–5.2)
ALP SERPL-CCNC: 93 U/L (ref 55–135)
ALT SERPL W/O P-5'-P-CCNC: 17 U/L (ref 10–44)
ANION GAP SERPL CALC-SCNC: 5 MMOL/L (ref 8–16)
AST SERPL-CCNC: 17 U/L (ref 10–40)
BASOPHILS # BLD AUTO: 0.02 K/UL (ref 0–0.2)
BASOPHILS NFR BLD: 0.6 % (ref 0–1.9)
BILIRUB SERPL-MCNC: 0.6 MG/DL (ref 0.1–1)
BUN SERPL-MCNC: 20 MG/DL (ref 8–23)
CALCIUM SERPL-MCNC: 10 MG/DL (ref 8.7–10.5)
CHLORIDE SERPL-SCNC: 108 MMOL/L (ref 95–110)
CO2 SERPL-SCNC: 28 MMOL/L (ref 23–29)
CREAT SERPL-MCNC: 0.8 MG/DL (ref 0.5–1.4)
DIFFERENTIAL METHOD: ABNORMAL
EOSINOPHIL # BLD AUTO: 0 K/UL (ref 0–0.5)
EOSINOPHIL NFR BLD: 0.8 % (ref 0–8)
ERYTHROCYTE [DISTWIDTH] IN BLOOD BY AUTOMATED COUNT: 10.6 % (ref 11.5–14.5)
EST. GFR  (AFRICAN AMERICAN): >60 ML/MIN/1.73 M^2
EST. GFR  (NON AFRICAN AMERICAN): >60 ML/MIN/1.73 M^2
GLUCOSE SERPL-MCNC: 95 MG/DL (ref 70–110)
HCT VFR BLD AUTO: 35.7 % (ref 37–48.5)
HGB BLD-MCNC: 10.9 G/DL (ref 12–16)
IGA SERPL-MCNC: 101 MG/DL (ref 40–350)
IGG SERPL-MCNC: 1924 MG/DL (ref 650–1600)
IGM SERPL-MCNC: 30 MG/DL (ref 50–300)
IMM GRANULOCYTES # BLD AUTO: 0.01 K/UL (ref 0–0.04)
IMM GRANULOCYTES NFR BLD AUTO: 0.3 % (ref 0–0.5)
LYMPHOCYTES # BLD AUTO: 1.2 K/UL (ref 1–4.8)
LYMPHOCYTES NFR BLD: 33.5 % (ref 18–48)
MCH RBC QN AUTO: 29.9 PG (ref 27–31)
MCHC RBC AUTO-ENTMCNC: 30.5 G/DL (ref 32–36)
MCV RBC AUTO: 98 FL (ref 82–98)
MONOCYTES # BLD AUTO: 0.5 K/UL (ref 0.3–1)
MONOCYTES NFR BLD: 13.2 % (ref 4–15)
NEUTROPHILS # BLD AUTO: 1.8 K/UL (ref 1.8–7.7)
NEUTROPHILS NFR BLD: 51.6 % (ref 38–73)
NRBC BLD-RTO: 0 /100 WBC
PLATELET # BLD AUTO: 249 K/UL (ref 150–350)
PMV BLD AUTO: 10.1 FL (ref 9.2–12.9)
POTASSIUM SERPL-SCNC: 4.5 MMOL/L (ref 3.5–5.1)
PROT SERPL-MCNC: 7.6 G/DL (ref 6–8.4)
RBC # BLD AUTO: 3.65 M/UL (ref 4–5.4)
SODIUM SERPL-SCNC: 141 MMOL/L (ref 136–145)
WBC # BLD AUTO: 3.55 K/UL (ref 3.9–12.7)

## 2020-08-21 PROCEDURE — 84165 PROTEIN E-PHORESIS SERUM: CPT | Mod: HCNC

## 2020-08-21 PROCEDURE — 99214 OFFICE O/P EST MOD 30 MIN: CPT | Mod: HCNC,S$GLB,, | Performed by: INTERNAL MEDICINE

## 2020-08-21 PROCEDURE — 86334 PATHOLOGIST INTERPRETATION IFE: ICD-10-PCS | Mod: 26,HCNC,, | Performed by: PATHOLOGY

## 2020-08-21 PROCEDURE — 99499 RISK ADDL DX/OHS AUDIT: ICD-10-PCS | Mod: HCNC,S$GLB,, | Performed by: INTERNAL MEDICINE

## 2020-08-21 PROCEDURE — 36415 COLL VENOUS BLD VENIPUNCTURE: CPT | Mod: HCNC

## 2020-08-21 PROCEDURE — 1126F PR PAIN SEVERITY QUANTIFIED, NO PAIN PRESENT: ICD-10-PCS | Mod: HCNC,S$GLB,, | Performed by: INTERNAL MEDICINE

## 2020-08-21 PROCEDURE — 3075F PR MOST RECENT SYSTOLIC BLOOD PRESS GE 130-139MM HG: ICD-10-PCS | Mod: HCNC,CPTII,S$GLB, | Performed by: INTERNAL MEDICINE

## 2020-08-21 PROCEDURE — 1126F AMNT PAIN NOTED NONE PRSNT: CPT | Mod: HCNC,S$GLB,, | Performed by: INTERNAL MEDICINE

## 2020-08-21 PROCEDURE — 80053 COMPREHEN METABOLIC PANEL: CPT | Mod: HCNC

## 2020-08-21 PROCEDURE — 1101F PR PT FALLS ASSESS DOC 0-1 FALLS W/OUT INJ PAST YR: ICD-10-PCS | Mod: HCNC,CPTII,S$GLB, | Performed by: INTERNAL MEDICINE

## 2020-08-21 PROCEDURE — 83520 IMMUNOASSAY QUANT NOS NONAB: CPT | Mod: HCNC

## 2020-08-21 PROCEDURE — 85025 COMPLETE CBC W/AUTO DIFF WBC: CPT | Mod: HCNC

## 2020-08-21 PROCEDURE — 82784 ASSAY IGA/IGD/IGG/IGM EACH: CPT | Mod: 59,HCNC

## 2020-08-21 PROCEDURE — 86334 IMMUNOFIX E-PHORESIS SERUM: CPT | Mod: 26,HCNC,, | Performed by: PATHOLOGY

## 2020-08-21 PROCEDURE — 99999 PR PBB SHADOW E&M-EST. PATIENT-LVL III: CPT | Mod: PBBFAC,HCNC,, | Performed by: INTERNAL MEDICINE

## 2020-08-21 PROCEDURE — 3078F PR MOST RECENT DIASTOLIC BLOOD PRESSURE < 80 MM HG: ICD-10-PCS | Mod: HCNC,CPTII,S$GLB, | Performed by: INTERNAL MEDICINE

## 2020-08-21 PROCEDURE — 99214 PR OFFICE/OUTPT VISIT, EST, LEVL IV, 30-39 MIN: ICD-10-PCS | Mod: HCNC,S$GLB,, | Performed by: INTERNAL MEDICINE

## 2020-08-21 PROCEDURE — 99499 UNLISTED E&M SERVICE: CPT | Mod: HCNC,S$GLB,, | Performed by: INTERNAL MEDICINE

## 2020-08-21 PROCEDURE — 1101F PT FALLS ASSESS-DOCD LE1/YR: CPT | Mod: HCNC,CPTII,S$GLB, | Performed by: INTERNAL MEDICINE

## 2020-08-21 PROCEDURE — 3008F PR BODY MASS INDEX (BMI) DOCUMENTED: ICD-10-PCS | Mod: HCNC,CPTII,S$GLB, | Performed by: INTERNAL MEDICINE

## 2020-08-21 PROCEDURE — 1159F PR MEDICATION LIST DOCUMENTED IN MEDICAL RECORD: ICD-10-PCS | Mod: HCNC,S$GLB,, | Performed by: INTERNAL MEDICINE

## 2020-08-21 PROCEDURE — 1159F MED LIST DOCD IN RCRD: CPT | Mod: HCNC,S$GLB,, | Performed by: INTERNAL MEDICINE

## 2020-08-21 PROCEDURE — 99999 PR PBB SHADOW E&M-EST. PATIENT-LVL III: ICD-10-PCS | Mod: PBBFAC,HCNC,, | Performed by: INTERNAL MEDICINE

## 2020-08-21 PROCEDURE — 84165 PATHOLOGIST INTERPRETATION SPE: ICD-10-PCS | Mod: 26,HCNC,, | Performed by: PATHOLOGY

## 2020-08-21 PROCEDURE — 3075F SYST BP GE 130 - 139MM HG: CPT | Mod: HCNC,CPTII,S$GLB, | Performed by: INTERNAL MEDICINE

## 2020-08-21 PROCEDURE — 86334 IMMUNOFIX E-PHORESIS SERUM: CPT | Mod: HCNC

## 2020-08-21 PROCEDURE — 3078F DIAST BP <80 MM HG: CPT | Mod: HCNC,CPTII,S$GLB, | Performed by: INTERNAL MEDICINE

## 2020-08-21 PROCEDURE — 3008F BODY MASS INDEX DOCD: CPT | Mod: HCNC,CPTII,S$GLB, | Performed by: INTERNAL MEDICINE

## 2020-08-21 PROCEDURE — 84165 PROTEIN E-PHORESIS SERUM: CPT | Mod: 26,HCNC,, | Performed by: PATHOLOGY

## 2020-08-21 NOTE — Clinical Note
-cbc, cmp, serum free light chains, quantitative immunoglobulins, serum electropheresis, serum immunofixation, met survey  and md appt in 6 months

## 2020-08-21 NOTE — PROGRESS NOTES
SECTION OF HEMATOLOGY AND BONE MARROW TRANSPLANT  Return Patient Visit   08/24/2020    CHIEF COMPLAINT:   Chief Complaint   Patient presents with    Smoldering multiple myeloma (SMM)       HISTORY OF PRESENT ILLNESS:   69 y.o. female'; Ms. Aranda is here for  IgG kappa smoldering myeloma. Follow up.  No changes In clinical status since our last appt.   Feels well.  No focal pain. Denies fever, chills, nightsweats, bleeding, brusing, lymphadenopathy, signs/symptoms of splenomegaly, focal pain, neuropathy, recurrent infection   PAST MEDICAL HISTORY:   Past Medical History:   Diagnosis Date    Allergy     Anemia     Arthritis     Cholelithiases     Cyst of kidney, acquired     Diverticulitis     Elevated TSH     Family history of Graves' disease: daughter, maternal aunt, maternal uncle 9/13/2016    Glaucoma     Graves disease     Hx of colonic polyps     Hypertension 1981    Liver cyst     MGUS (monoclonal gammopathy of unknown significance)     Migraine headache     Mitral valve problem     leakage    Obesity     Paraproteinemia     Sleep apnea     Smoldering multiple myeloma 2013    Tricuspid valve disease     leakage       PAST SURGICAL HISTORY:   Past Surgical History:   Procedure Laterality Date    APPENDECTOMY      COLONOSCOPY N/A 10/23/2015    Procedure: COLONOSCOPY;  Surgeon: Brandon Ruelas MD;  Location: [address]);  Service: Endoscopy;  Laterality: N/A;  Had divertiulitis in 5/29/2015 with a recommendation for colonoscopy in 8 weeks    Dr. Ruelas is her GI physician    COLONOSCOPY N/A 11/5/2018    Procedure: COLONOSCOPY;  Surgeon: Brandon Ruelas MD;  Location: [facility] ([address] [place] [address]);  Service: Endoscopy;  Laterality: N/A;  Dr. Ruelas did the last one multiple polyps, patient had recent diverticulitis should not schedule before Oct 10th    HYSTERECTOMY  1978 or 1979    OOPHORECTOMY         PAST SOCIAL HISTORY:   reports that she quit smoking about 25 years ago. Her  smoking use included cigarettes. She quit after 3.00 years of use. She has never used smokeless tobacco. She reports that she does not drink alcohol or use drugs.    FAMILY HISTORY:  Family History   Problem Relation Age of Onset    Heart disease Mother         MI    Heart attack Mother     Heart disease Maternal Aunt         MI    Heart disease Maternal Aunt         MI    Cancer Paternal Grandmother         GYN cancer - unknown cancer    Colon cancer Maternal Uncle     Cancer Maternal Uncle         colon ca    Hypertension Father     Heart disease Sister         fast heart rate    Cataracts Sister     Glaucoma Sister     Graves' disease Daughter     No Known Problems Son     No Known Problems Sister     No Known Problems Daughter     No Known Problems Son     Melanoma Neg Hx     Breast cancer Neg Hx     Ovarian cancer Neg Hx     Colon polyps Neg Hx     Rectal cancer Neg Hx     Stomach cancer Neg Hx     Esophageal cancer Neg Hx     Ulcerative colitis Neg Hx     Crohn's disease Neg Hx     Amblyopia Neg Hx     Blindness Neg Hx     Macular degeneration Neg Hx     Strabismus Neg Hx     Retinal detachment Neg Hx        CURRENT MEDICATIONS:   Current Outpatient Medications   Medication Sig    ascorbic acid, vitamin C, (VITAMIN C) 500 MG tablet Take 500 mg by mouth once daily.    aspirin 81 MG Chew Take 81 mg by mouth once daily.    magnesium oxide (MAG-OX) 400 mg tablet Take 400 mg by mouth once daily.    multivit with min-folic acid 0.4 mg Tab Take 0.4 mg by mouth once daily.    quinapriL (ACCUPRIL) 20 MG tablet TAKE 1 TABLET BY MOUTH EVERY DAY    vitamin D (VITAMIN D3) 1000 units Tab Take 1,000 Units by mouth once daily.    ondansetron (ZOFRAN) 4 MG tablet Take 1-2 tablets (4-8 mg total) by mouth every 8 (eight) hours as needed for Nausea. (Patient not taking: Reported on 8/21/2020)    rizatriptan (MAXALT) 10 MG tablet TAKE 1 TABLET(10 MG) BY MOUTH EVERY 2 HOURS AS NEEDED FOR  "MIGRAINE. MAX 30 MG/ 24 AT BEDTIME (Patient not taking: Reported on 8/21/2020)     No current facility-administered medications for this visit.      ALLERGIES:   Review of patient's allergies indicates:  No Known Allergies      ASSESSMENTS:   PAIN ASSESSMENT (Patient reports Pain):   Vitals:    08/21/20 0714   BP: 136/78   Pulse: 94   Resp: 18   Temp: 98.9 °F (37.2 °C)   TempSrc: Oral   Weight: 122.7 kg (270 lb 8 oz)   Height: 5' 9" (1.753 m)   PainSc: 0-No pain     REVIEW OF SYSTEMS:     General ROS: negative  Psychological ROS: negative  Ophthalmic ROS: negative  ENT ROS: negative  Allergy and Immunology ROS: negative  Hematological and Lymphatic ROS: negative  Endocrine ROS: negative  Respiratory ROS: no cough, shortness of breath, or wheezing  Gastrointestinal ROS: no abdominal pain, change in bowel habits, or black or bloody stools  Genito-Urinary ROS: no dysuria, trouble voiding, or hematuria  Musculoskeletal ROS: negative  Neurological ROS: no TIA or stroke symptoms  Dermatological ROS: negative  PHYSICAL EXAM:   Vitals:    08/21/20 0714   BP: 136/78   Pulse: 94   Resp: 18   Temp: 98.9 °F (37.2 °C)       General - well developed, well nourished, no apparent distress  HEENT - oropharynx clear  Chest and Lung - clear to auscultation bilaterally   Cardiovascular - RRR with no MGR, normal S1 and S2  Abdomen-  soft, nontender, no palpable hepatomegaly or splenomegaly  Lymph - no palpable lymphadenopathy  Heme - no bruising, petechiae, pallor  Skin - no rashes or lesions  Psych - appropriate mood and affect      ECOG Performance Status: (foot note - ECOG PS provided by Eastern Cooperative Oncology Group) 0 - Asymptomatic    Karnofsky Performance Score:  100%- Normal, No Complaints, No Evidence of Disease  DATA:   Lab Results   Component Value Date    WBC 3.55 (L) 08/21/2020    HGB 10.9 (L) 08/21/2020    HCT 35.7 (L) 08/21/2020    MCV 98 08/21/2020     08/21/2020     Gran # (ANC)   Date Value Ref Range Status "   08/21/2020 1.8 1.8 - 7.7 K/uL Final     Gran%   Date Value Ref Range Status   08/21/2020 51.6 38.0 - 73.0 % Final     Lymph #   Date Value Ref Range Status   08/21/2020 1.2 1.0 - 4.8 K/uL Final     Lymph%   Date Value Ref Range Status   08/21/2020 33.5 18.0 - 48.0 % Final     Kappa Free Light Chains   Date Value Ref Range Status   02/03/2020 15.85 (H) 0.33 - 1.94 mg/dL Final   08/20/2019 16.21 (H) 0.33 - 1.94 mg/dL Final   01/09/2019 15.73 (H) 0.33 - 1.94 mg/dL Final     Lambda Free Light Chains   Date Value Ref Range Status   02/03/2020 1.30 0.57 - 2.63 mg/dL Final   08/20/2019 1.28 0.57 - 2.63 mg/dL Final   01/09/2019 1.51 0.57 - 2.63 mg/dL Final     Kappa/Lambda FLC Ratio   Date Value Ref Range Status   02/03/2020 12.19 (H) 0.26 - 1.65 Final   08/20/2019 12.66 (H) 0.26 - 1.65 Final   01/09/2019 10.42 (H) 0.26 - 1.65 Final     Gamma grams/dl   Date Value Ref Range Status   02/03/2020 1.98 (H) 0.67 - 1.58 g/dL Final   08/20/2019 2.01 (H) 0.67 - 1.58 g/dL Final   01/09/2019 2.09 (H) 0.67 - 1.58 g/dL Final     IgG - Serum   Date Value Ref Range Status   08/21/2020 1924 (H) 650 - 1600 mg/dL Final     Comment:     IgG Cord Blood Reference Range: 650-1600 mg/dL.     IgA   Date Value Ref Range Status   08/21/2020 101 40 - 350 mg/dL Final     Comment:     IgA Cord Blood Reference Range: <5 mg/dL.     IgM   Date Value Ref Range Status   08/21/2020 30 (L) 50 - 300 mg/dL Final     Comment:     IgM Cord Blood Reference Range: <25 mg/dL.          ASSESSMENT AND PLAN:   Encounter Diagnosis   Name Primary?    Smoldering multiple myeloma (SMM) Yes      IgG kappa smoldering myeloma diagnosed 2010 under observation since that time; previous pt of Dr. Lora; transitioned care to me sept 2015.    Mild intermittent leukopenia/anemia are chronic and pre date myeloma diagnosis ; stable  Met survey feb 2020 with no disease; repeat feb 2021; discussed limited efficacy of this study but given we have a baseline will continue  annually  She has not overt CRAB today; await final biochemical studies drawn today, but suspect pt continues to smolder with no indication for therapy    educated on symptoms for which to seek attn in our clinic earlier     Follow Up:        -cbc, cmp, serum free light chains, quantitative immunoglobulins, serum electropheresis, serum immunofixation, met survey  and md appt in 6 months     Rasta Alba MD  Hematology/Oncology/Bone Marrow Transplant

## 2020-08-24 LAB
ALBUMIN SERPL ELPH-MCNC: 3.25 G/DL (ref 3.35–5.55)
ALPHA1 GLOB SERPL ELPH-MCNC: 0.45 G/DL (ref 0.17–0.41)
ALPHA2 GLOB SERPL ELPH-MCNC: 0.92 G/DL (ref 0.43–0.99)
B-GLOBULIN SERPL ELPH-MCNC: 0.76 G/DL (ref 0.5–1.1)
GAMMA GLOB SERPL ELPH-MCNC: 1.72 G/DL (ref 0.67–1.58)
INTERPRETATION SERPL IFE-IMP: NORMAL
KAPPA LC SER QL IA: 17.3 MG/DL (ref 0.33–1.94)
KAPPA LC/LAMBDA SER IA: 16.48 (ref 0.26–1.65)
LAMBDA LC SER QL IA: 1.05 MG/DL (ref 0.57–2.63)
PROT SERPL-MCNC: 7.1 G/DL (ref 6–8.4)

## 2020-08-25 LAB
PATHOLOGIST INTERPRETATION IFE: NORMAL
PATHOLOGIST INTERPRETATION SPE: NORMAL

## 2020-08-29 ENCOUNTER — HOSPITAL ENCOUNTER (EMERGENCY)
Facility: OTHER | Age: 70
Discharge: HOME | End: 2020-08-29
Attending: EMERGENCY MEDICINE
Payer: MEDICARE

## 2020-08-29 VITALS
TEMPERATURE: 98 F | DIASTOLIC BLOOD PRESSURE: 70 MMHG | RESPIRATION RATE: 21 BRPM | OXYGEN SATURATION: 100 % | SYSTOLIC BLOOD PRESSURE: 148 MMHG | HEART RATE: 88 BPM

## 2020-08-29 DIAGNOSIS — E05.90 HYPERTHYROIDISM: ICD-10-CM

## 2020-08-29 DIAGNOSIS — R00.2 PALPITATIONS: Primary | ICD-10-CM

## 2020-08-29 DIAGNOSIS — R79.89 ELEVATED SERUM FREE T4 LEVEL: ICD-10-CM

## 2020-08-29 LAB
ALBUMIN SERPL BCP-MCNC: 3.1 G/DL (ref 3.5–5.2)
ALP SERPL-CCNC: 73 U/L (ref 55–135)
ALT SERPL W/O P-5'-P-CCNC: 47 U/L (ref 10–44)
ANION GAP SERPL CALC-SCNC: 12 MMOL/L (ref 8–16)
AST SERPL-CCNC: 40 U/L (ref 10–40)
BASOPHILS # BLD AUTO: 0.01 K/UL (ref 0–0.2)
BASOPHILS NFR BLD: 0.3 % (ref 0–1.9)
BILIRUB SERPL-MCNC: 0.9 MG/DL (ref 0.1–1)
BUN SERPL-MCNC: 19 MG/DL (ref 8–23)
CALCIUM SERPL-MCNC: 10.1 MG/DL (ref 8.7–10.5)
CHLORIDE SERPL-SCNC: 107 MMOL/L (ref 95–110)
CO2 SERPL-SCNC: 23 MMOL/L (ref 23–29)
CREAT SERPL-MCNC: 0.8 MG/DL (ref 0.5–1.4)
DIFFERENTIAL METHOD: ABNORMAL
EOSINOPHIL # BLD AUTO: 0 K/UL (ref 0–0.5)
EOSINOPHIL NFR BLD: 0.8 % (ref 0–8)
ERYTHROCYTE [DISTWIDTH] IN BLOOD BY AUTOMATED COUNT: 11 % (ref 11.5–14.5)
EST. GFR  (AFRICAN AMERICAN): >60 ML/MIN/1.73 M^2
EST. GFR  (NON AFRICAN AMERICAN): >60 ML/MIN/1.73 M^2
GLUCOSE SERPL-MCNC: 97 MG/DL (ref 70–110)
HCT VFR BLD AUTO: 35.2 % (ref 37–48.5)
HGB BLD-MCNC: 10.8 G/DL (ref 12–16)
IMM GRANULOCYTES # BLD AUTO: 0.01 K/UL (ref 0–0.04)
IMM GRANULOCYTES NFR BLD AUTO: 0.3 % (ref 0–0.5)
LYMPHOCYTES # BLD AUTO: 1.1 K/UL (ref 1–4.8)
LYMPHOCYTES NFR BLD: 30.9 % (ref 18–48)
MCH RBC QN AUTO: 28.9 PG (ref 27–31)
MCHC RBC AUTO-ENTMCNC: 30.7 G/DL (ref 32–36)
MCV RBC AUTO: 94 FL (ref 82–98)
MONOCYTES # BLD AUTO: 0.6 K/UL (ref 0.3–1)
MONOCYTES NFR BLD: 18.1 % (ref 4–15)
NEUTROPHILS # BLD AUTO: 1.8 K/UL (ref 1.8–7.7)
NEUTROPHILS NFR BLD: 49.6 % (ref 38–73)
NRBC BLD-RTO: 0 /100 WBC
PLATELET # BLD AUTO: 240 K/UL (ref 150–350)
PMV BLD AUTO: 9.9 FL (ref 9.2–12.9)
POTASSIUM SERPL-SCNC: 3.8 MMOL/L (ref 3.5–5.1)
PROT SERPL-MCNC: 7.3 G/DL (ref 6–8.4)
RBC # BLD AUTO: 3.74 M/UL (ref 4–5.4)
SARS-COV-2 RDRP RESP QL NAA+PROBE: NEGATIVE
SODIUM SERPL-SCNC: 142 MMOL/L (ref 136–145)
T4 FREE SERPL-MCNC: 2.88 NG/DL (ref 0.71–1.51)
TSH SERPL DL<=0.005 MIU/L-ACNC: <0.01 UIU/ML (ref 0.4–4)
WBC # BLD AUTO: 3.53 K/UL (ref 3.9–12.7)

## 2020-08-29 PROCEDURE — 85025 COMPLETE CBC W/AUTO DIFF WBC: CPT | Mod: HCNC

## 2020-08-29 PROCEDURE — 84439 ASSAY OF FREE THYROXINE: CPT | Mod: HCNC

## 2020-08-29 PROCEDURE — 25000003 PHARM REV CODE 250: Mod: HCNC | Performed by: EMERGENCY MEDICINE

## 2020-08-29 PROCEDURE — 84443 ASSAY THYROID STIM HORMONE: CPT | Mod: HCNC

## 2020-08-29 PROCEDURE — U0002 COVID-19 LAB TEST NON-CDC: HCPCS | Mod: HCNC

## 2020-08-29 PROCEDURE — 93005 ELECTROCARDIOGRAM TRACING: CPT | Mod: HCNC

## 2020-08-29 PROCEDURE — 93010 ELECTROCARDIOGRAM REPORT: CPT | Mod: HCNC,,, | Performed by: INTERNAL MEDICINE

## 2020-08-29 PROCEDURE — 93010 EKG 12-LEAD: ICD-10-PCS | Mod: HCNC,,, | Performed by: INTERNAL MEDICINE

## 2020-08-29 PROCEDURE — 96361 HYDRATE IV INFUSION ADD-ON: CPT | Mod: HCNC

## 2020-08-29 PROCEDURE — 96360 HYDRATION IV INFUSION INIT: CPT | Mod: HCNC

## 2020-08-29 PROCEDURE — 80053 COMPREHEN METABOLIC PANEL: CPT | Mod: HCNC

## 2020-08-29 PROCEDURE — 99284 EMERGENCY DEPT VISIT MOD MDM: CPT | Mod: 25,HCNC

## 2020-08-29 RX ADMIN — SODIUM CHLORIDE 1000 ML: 0.9 INJECTION, SOLUTION INTRAVENOUS at 07:08

## 2020-08-29 NOTE — ED PROVIDER NOTES
Encounter Date: 8/29/2020    SCRIBE #1 NOTE: Casey VALLE am scribing for, and in the presence of, Dr. Edmond.       History     Chief Complaint   Patient presents with    Fatigue     x3 days .pt denies fever, chills, n/v/d, abdominal pain     Palpitations     x3 days.      Time seen by provider: 7:27 AM    This is a 69 y.o. female who presents with complaint of fatigue that began three days ago. She is also experiencing decreased appetite and palpitations. She had a telemedicine consult yesterday and was told to come to the ER for further evaluation. The patient denies fever, chills, loss of smell, loss of taste, cough, shortness of breath, nausea, vomiting, and diarrhea.    The history is provided by the patient.     Review of patient's allergies indicates:  No Known Allergies  Past Medical History:   Diagnosis Date    Allergy     Anemia     Arthritis     Cholelithiases     Cyst of kidney, acquired     Diverticulitis     Elevated TSH     Family history of Graves' disease: daughter, maternal aunt, maternal uncle 9/13/2016    Glaucoma     Graves disease     Hx of colonic polyps     Hypertension 1981    Liver cyst     MGUS (monoclonal gammopathy of unknown significance)     Migraine headache     Mitral valve problem     leakage    Obesity     Paraproteinemia     Sleep apnea     Smoldering multiple myeloma 2013    Tricuspid valve disease     leakage     Past Surgical History:   Procedure Laterality Date    APPENDECTOMY      COLONOSCOPY N/A 10/23/2015    Procedure: COLONOSCOPY;  Surgeon: Brandon Ruelas MD;  Location: [address]);  Service: Endoscopy;  Laterality: N/A;  Had divertiulitis in 5/29/2015 with a recommendation for colonoscopy in 8 weeks    Dr. Ruelas is her GI physician    COLONOSCOPY N/A 11/5/2018    Procedure: COLONOSCOPY;  Surgeon: Brandon Ruelas MD;  Location: [facility] ([address] [place] [address]);  Service: Endoscopy;  Laterality: N/A;  Dr. Ruelas did the last one  multiple polyps, patient had recent diverticulitis should not schedule before Oct 10th    HYSTERECTOMY   or     OOPHORECTOMY       Family History   Problem Relation Age of Onset    Heart disease Mother         MI    Heart attack Mother     Heart disease Maternal Aunt         MI    Heart disease Maternal Aunt         MI    Cancer Paternal Grandmother         GYN cancer - unknown cancer    Colon cancer Maternal Uncle     Cancer Maternal Uncle         colon ca    Hypertension Father     Heart disease Sister         fast heart rate    Cataracts Sister     Glaucoma Sister     Graves' disease Daughter     No Known Problems Son     No Known Problems Sister     No Known Problems Daughter     No Known Problems Son     Melanoma Neg Hx     Breast cancer Neg Hx     Ovarian cancer Neg Hx     Colon polyps Neg Hx     Rectal cancer Neg Hx     Stomach cancer Neg Hx     Esophageal cancer Neg Hx     Ulcerative colitis Neg Hx     Crohn's disease Neg Hx     Amblyopia Neg Hx     Blindness Neg Hx     Macular degeneration Neg Hx     Strabismus Neg Hx     Retinal detachment Neg Hx      Social History     Tobacco Use    Smoking status: Former Smoker     Years: 3.00     Types: Cigarettes     Quit date: 1995     Years since quittin.6    Smokeless tobacco: Never Used   Substance Use Topics    Alcohol use: No    Drug use: No     Review of Systems   Constitutional: Positive for appetite change (decreased) and fatigue. Negative for chills and fever.   HENT: Negative for congestion and sore throat.    Eyes: Negative for photophobia and redness.   Respiratory: Negative for cough and shortness of breath.    Cardiovascular: Positive for palpitations. Negative for chest pain.   Gastrointestinal: Negative for abdominal pain, nausea and vomiting.   Genitourinary: Negative for dysuria.   Musculoskeletal: Negative for back pain.   Skin: Negative for rash.   Neurological: Negative for weakness,  light-headedness and headaches.   Psychiatric/Behavioral: Negative for confusion.       Physical Exam     Initial Vitals   BP Pulse Resp Temp SpO2   08/29/20 0729 08/29/20 0729 08/29/20 0700 08/29/20 0700 08/29/20 0729   133/67 (!) 112 18 98.3 °F (36.8 °C) 99 %      MAP       --                Physical Exam    Nursing note and vitals reviewed.  Constitutional: She appears well-developed and well-nourished. She is not diaphoretic. No distress.   HENT:   Head: Normocephalic and atraumatic.   Mouth/Throat: Oropharynx is clear and moist and mucous membranes are normal.   Sticky mucous membranes.   Eyes: Conjunctivae and EOM are normal. Pupils are equal, round, and reactive to light. No scleral icterus.   Conjunctivae are pink, clear, and intact.    Neck: Normal range of motion. Neck supple.   Cardiovascular: Normal rate, regular rhythm, S1 normal, S2 normal and normal heart sounds. Exam reveals no gallop and no friction rub.    No murmur heard.  Pulmonary/Chest: Breath sounds normal. No respiratory distress. She has no wheezes. She has no rhonchi. She has no rales.   Lungs clear to auscultation bilaterally.    Abdominal: Soft. Bowel sounds are normal. There is no abdominal tenderness. There is no rebound and no guarding.   No audible bruits.    Musculoskeletal: Normal range of motion. No tenderness or edema.      Comments: No lower extremity edema.    Lymphadenopathy:     She has no cervical adenopathy.   Neurological: She is alert and oriented to person, place, and time.   Skin: Skin is warm and dry. Capillary refill takes less than 2 seconds. No rash noted. No pallor.   No skin tenting. No lesions.   Psychiatric: She has a normal mood and affect. Her behavior is normal. Judgment and thought content normal.         ED Course   Procedures  Labs Reviewed   CBC W/ AUTO DIFFERENTIAL - Abnormal; Notable for the following components:       Result Value    WBC 3.53 (*)     RBC 3.74 (*)     Hemoglobin 10.8 (*)     Hematocrit  35.2 (*)     Mean Corpuscular Hemoglobin Conc 30.7 (*)     RDW 11.0 (*)     Mono% 18.1 (*)     All other components within normal limits   COMPREHENSIVE METABOLIC PANEL - Abnormal; Notable for the following components:    Albumin 3.1 (*)     ALT 47 (*)     All other components within normal limits   TSH - Abnormal; Notable for the following components:    TSH <0.010 (*)     All other components within normal limits   T4, FREE - Abnormal; Notable for the following components:    Free T4 2.88 (*)     All other components within normal limits   SARS-COV-2 RNA AMPLIFICATION, QUAL     EKG Readings: (Independently Interpreted)   Initial Reading: No STEMI. Rhythm: Sinus Tachycardia. Heart Rate: 109.   No acute ST or T wave changes.       Imaging Results    None          Medical Decision Making:   History:   Old Medical Records: I decided to obtain old medical records.  Clinical Tests:   Lab Tests: Ordered and Reviewed  Medical Tests: Ordered and Reviewed            Scribe Attestation:   Scribe #1: I performed the above scribed service and the documentation accurately describes the services I performed. I attest to the accuracy of the note.    Attending Attestation:           Physician Attestation for Scribe:  Physician Attestation Statement for Scribe #1: I, Dr. Edmond, reviewed documentation, as scribed by Casey Collazo  in my presence, and it is both accurate and complete.         Attending ED Notes:   Emergent evaluation of 69-year-old female with complaint of generalized weakness, fatigue, palpitations and loss of appetite over the past 2-3 days.  Patient is afebrile, nontoxic appearing with stable vital signs except for tachycardia.  Patient has no elevation white blood cell count.  H&H 10.8 and 35.2.  No acute findings on CMP except for mild elevation in ALT.  TSH is negligible.  Free T4 is 2.88.  The patient is extensively counseled on her diagnosis and treatment including all laboratory and physical exam  findings.  The patient discharged in good condition and directed follow-up with her PCP and endocrinology in the next 24-48 hours.          ED Course as of Aug 30 0800   Sat Aug 29, 2020   0937 Paged Dr. Collado.    [MM]      ED Course User Index  [MM] Casey Collazo                Clinical Impression:     1. Palpitations    2. Hyperthyroidism    3. Elevated serum free T4 level                ED Disposition Condition    Discharge Good        ED Prescriptions     None        Follow-up Information     Follow up With Specialties Details Why Contact Info    [facility] ENDOCRINOLOGY Endocrinology In 2 days  [address] [facility] [address]  [phone]    Payton Garay MD Internal Medicine In 1 week  [address]  [place] [address]  [phone]                                       Jose Alcaraz MD  08/30/20 0800

## 2020-08-31 ENCOUNTER — PATIENT OUTREACH (OUTPATIENT)
Dept: ADMINISTRATIVE | Facility: HOSPITAL | Age: 70
End: 2020-08-31

## 2020-08-31 ENCOUNTER — TELEPHONE (OUTPATIENT)
Dept: ENDOCRINOLOGY | Facility: CLINIC | Age: 70
End: 2020-08-31

## 2020-08-31 DIAGNOSIS — E05.00 GRAVES DISEASE: Primary | ICD-10-CM

## 2020-08-31 RX ORDER — PROPRANOLOL HYDROCHLORIDE 80 MG/1
80 CAPSULE, EXTENDED RELEASE ORAL DAILY
Qty: 30 CAPSULE | Refills: 11 | Status: SHIPPED | OUTPATIENT
Start: 2020-08-31 | End: 2020-09-03

## 2020-08-31 NOTE — TELEPHONE ENCOUNTER
----- Message from Summer Rajput MD sent at 8/31/2020  1:35 PM CDT -----    Absolutely, thank you for reaching out.  I will ask my team to call her to see if she is available this Thursday for an in office or telemedicine visit.    Casandra, can you reach out and set up the visit?  Kaur can help override slot if necessary    ----- Message -----  From: Beckie Marsh MD  Sent: 8/31/2020  12:45 PM CDT  To: Summer Rajput MD    Pat is a patient with previous hyperthyroidism who has been off therapy for some time  She was in the ER twice now with symptoms and again has a high T4  She is regularly tachycardic, sweating and losing weight  Can you assist me in getting her some relief?    FYI- sorry to reach out this way but she has become a member of our family and is with us daily    Thanks  Beckie

## 2020-09-02 ENCOUNTER — PATIENT OUTREACH (OUTPATIENT)
Dept: ADMINISTRATIVE | Facility: OTHER | Age: 70
End: 2020-09-02

## 2020-09-03 ENCOUNTER — OFFICE VISIT (OUTPATIENT)
Dept: ENDOCRINOLOGY | Facility: CLINIC | Age: 70
End: 2020-09-03
Payer: MEDICARE

## 2020-09-03 VITALS
DIASTOLIC BLOOD PRESSURE: 74 MMHG | BODY MASS INDEX: 39.02 KG/M2 | RESPIRATION RATE: 16 BRPM | SYSTOLIC BLOOD PRESSURE: 110 MMHG | OXYGEN SATURATION: 98 % | WEIGHT: 263.44 LBS | HEART RATE: 84 BPM | HEIGHT: 69 IN

## 2020-09-03 DIAGNOSIS — E05.00 GRAVES' EYE DISEASE: ICD-10-CM

## 2020-09-03 DIAGNOSIS — E05.00 GRAVES DISEASE: Primary | ICD-10-CM

## 2020-09-03 DIAGNOSIS — N95.8 OTHER SPECIFIED MENOPAUSAL AND PERIMENOPAUSAL DISORDERS: ICD-10-CM

## 2020-09-03 PROBLEM — Z86.39 HISTORY OF HYPERTHYROIDISM: Status: RESOLVED | Noted: 2019-06-20 | Resolved: 2020-09-03

## 2020-09-03 PROCEDURE — 3008F BODY MASS INDEX DOCD: CPT | Mod: HCNC,CPTII,S$GLB, | Performed by: INTERNAL MEDICINE

## 2020-09-03 PROCEDURE — 3078F DIAST BP <80 MM HG: CPT | Mod: HCNC,CPTII,S$GLB, | Performed by: INTERNAL MEDICINE

## 2020-09-03 PROCEDURE — 99204 OFFICE O/P NEW MOD 45 MIN: CPT | Mod: HCNC,S$GLB,, | Performed by: INTERNAL MEDICINE

## 2020-09-03 PROCEDURE — 1101F PT FALLS ASSESS-DOCD LE1/YR: CPT | Mod: HCNC,CPTII,S$GLB, | Performed by: INTERNAL MEDICINE

## 2020-09-03 PROCEDURE — 99999 PR PBB SHADOW E&M-EST. PATIENT-LVL V: ICD-10-PCS | Mod: PBBFAC,HCNC,, | Performed by: INTERNAL MEDICINE

## 2020-09-03 PROCEDURE — 1159F MED LIST DOCD IN RCRD: CPT | Mod: HCNC,S$GLB,, | Performed by: INTERNAL MEDICINE

## 2020-09-03 PROCEDURE — 1126F PR PAIN SEVERITY QUANTIFIED, NO PAIN PRESENT: ICD-10-PCS | Mod: HCNC,S$GLB,, | Performed by: INTERNAL MEDICINE

## 2020-09-03 PROCEDURE — 3008F PR BODY MASS INDEX (BMI) DOCUMENTED: ICD-10-PCS | Mod: HCNC,CPTII,S$GLB, | Performed by: INTERNAL MEDICINE

## 2020-09-03 PROCEDURE — 3078F PR MOST RECENT DIASTOLIC BLOOD PRESSURE < 80 MM HG: ICD-10-PCS | Mod: HCNC,CPTII,S$GLB, | Performed by: INTERNAL MEDICINE

## 2020-09-03 PROCEDURE — 3074F PR MOST RECENT SYSTOLIC BLOOD PRESSURE < 130 MM HG: ICD-10-PCS | Mod: HCNC,CPTII,S$GLB, | Performed by: INTERNAL MEDICINE

## 2020-09-03 PROCEDURE — 3074F SYST BP LT 130 MM HG: CPT | Mod: HCNC,CPTII,S$GLB, | Performed by: INTERNAL MEDICINE

## 2020-09-03 PROCEDURE — 99204 PR OFFICE/OUTPT VISIT, NEW, LEVL IV, 45-59 MIN: ICD-10-PCS | Mod: HCNC,S$GLB,, | Performed by: INTERNAL MEDICINE

## 2020-09-03 PROCEDURE — 99499 UNLISTED E&M SERVICE: CPT | Mod: HCNC,S$GLB,, | Performed by: INTERNAL MEDICINE

## 2020-09-03 PROCEDURE — 99499 RISK ADDL DX/OHS AUDIT: ICD-10-PCS | Mod: HCNC,S$GLB,, | Performed by: INTERNAL MEDICINE

## 2020-09-03 PROCEDURE — 1101F PR PT FALLS ASSESS DOC 0-1 FALLS W/OUT INJ PAST YR: ICD-10-PCS | Mod: HCNC,CPTII,S$GLB, | Performed by: INTERNAL MEDICINE

## 2020-09-03 PROCEDURE — 1159F PR MEDICATION LIST DOCUMENTED IN MEDICAL RECORD: ICD-10-PCS | Mod: HCNC,S$GLB,, | Performed by: INTERNAL MEDICINE

## 2020-09-03 PROCEDURE — 99999 PR PBB SHADOW E&M-EST. PATIENT-LVL V: CPT | Mod: PBBFAC,HCNC,, | Performed by: INTERNAL MEDICINE

## 2020-09-03 PROCEDURE — 1126F AMNT PAIN NOTED NONE PRSNT: CPT | Mod: HCNC,S$GLB,, | Performed by: INTERNAL MEDICINE

## 2020-09-03 RX ORDER — PROPRANOLOL HYDROCHLORIDE 20 MG/1
20 TABLET ORAL 2 TIMES DAILY
Qty: 60 TABLET | Refills: 4 | Status: SHIPPED | OUTPATIENT
Start: 2020-09-03 | End: 2021-04-26 | Stop reason: SDUPTHER

## 2020-09-03 RX ORDER — METHIMAZOLE 10 MG/1
20 TABLET ORAL DAILY
Qty: 60 TABLET | Refills: 5 | Status: SHIPPED | OUTPATIENT
Start: 2020-09-03 | End: 2020-09-28

## 2020-09-03 NOTE — PROGRESS NOTES
"Subjective:      Patient ID: Manju Aranda is a 69 y.o. female.    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    She was started on methimazole at that time         Her last visit with endocrinology was in 2017    She stopped methimazole in 2018     Her daughter has Graves    She presented in late august 2020 with racing heart, sob, weight loss  And was found to be hyperthyroid again   She tried inderal [place] but got a migraine      Thyroid scan: none     Thyroid ultrasound:  2016   IMPRESSION:   No nodules. Study consistent with autoimmune thyroid disease as the etiology for the hyperthyroidism.    Thyroid ab done:      Ref. Range 9/20/2016 09:50   Thyrotropin Receptor Ab Latest Ref Range: 0.00 - 1.75 IU/L 7.33 (H)   Thyroperoxidase Antibodies Latest Ref Range: <6.0 IU/mL 31.8 (H)         Current hyperthyroid medication:  None     current symptoms:   +palpations  +weight loss  + Brittle nails  No skin changes  +tremor   +anxiety  +BURK       Last BMD: 2017  Nl     + red eyes - no pain or dryness      Review of Systems   Eyes: Negative for visual disturbance.   Cardiovascular: Negative for chest pain.   Gastrointestinal: Negative for abdominal pain.   Musculoskeletal: Negative for arthralgias.   Skin: Negative for wound.   Neurological: Negative for headaches.   Hematological: Does not bruise/bleed easily.   Psychiatric/Behavioral: Negative for sleep disturbance.       Objective:     /74   Pulse 84   Resp 16   Ht 5' 9" (1.753 m)   Wt 119.5 kg (263 lb 7.2 oz)   SpO2 98%   BMI 38.90 kg/m²   BP Readings from Last 3 Encounters:   09/03/20 110/74   08/29/20 (!) 148/70   08/21/20 136/78     Wt Readings from Last 1 Encounters:   09/03/20 0912 119.5 kg (263 lb 7.2 oz)     Body mass " index is 38.9 kg/m².      Physical Exam  Vitals signs reviewed.   Constitutional:       Appearance: She is well-developed.   HENT:      Right Ear: External ear normal.      Left Ear: External ear normal.      Nose: Nose normal.   Neck:      Thyroid: Thyromegaly present. No thyroid tenderness.      Trachea: No tracheal deviation.   Cardiovascular:      Rate and Rhythm: Normal rate.      Heart sounds: No murmur.   Pulmonary:      Effort: Pulmonary effort is normal.      Breath sounds: Normal breath sounds.   Abdominal:      Palpations: Abdomen is soft.      Tenderness: There is no abdominal tenderness.      Hernia: No hernia is present.   Skin:     Findings: No rash.      Comments:        Neurological:      Cranial Nerves: No cranial nerve deficit.      Motor: Tremor present.      Deep Tendon Reflexes: Reflexes normal.   Psychiatric:         Judgment: Judgment normal.                Lab Review:   No results found for: HGBA1C  Lab Results   Component Value Date    CHOL 148 03/03/2020    HDL 54 03/03/2020    LDLCALC 82.8 03/03/2020    TRIG 56 03/03/2020    CHOLHDL 36.5 03/03/2020     Lab Results   Component Value Date     08/29/2020    K 3.8 08/29/2020     08/29/2020    CO2 23 08/29/2020    GLU 97 08/29/2020    BUN 19 08/29/2020    CREATININE 0.8 08/29/2020    CALCIUM 10.1 08/29/2020    PROT 7.3 08/29/2020    ALBUMIN 3.1 (L) 08/29/2020    BILITOT 0.9 08/29/2020    ALKPHOS 73 08/29/2020    AST 40 08/29/2020    ALT 47 (H) 08/29/2020    ANIONGAP 12 08/29/2020    ESTGFRAFRICA >60 08/29/2020    EGFRNONAA >60 08/29/2020    TSH <0.010 (L) 08/29/2020     Vit D, 25-Hydroxy   Date Value Ref Range Status   07/25/2016 40 30 - 96 ng/mL Final     Comment:     Vitamin D deficiency.........<10 ng/mL                              Vitamin D insufficiency......10-29 ng/mL       Vitamin D sufficiency........> or equal to 30 ng/mL  Vitamin D toxicity............>100 ng/mL         Assessment and Plan     Graves disease  reviewed  treatment options of I131, ATD or surgery, surgery being least desirable.    Discussed risks of worsening GO with I131  Review the connection of GO with tob use    Discussed the chance of Graves remission (30%) after 2 years of ATD therapy - with a 50% chance of recurrence  Patient opted for ATD - start methimazole 20 mg qd     Call if fever, sore throat, rash, anorexia, nausea or vomiitng.  Reviewed side effects of ATDs are rare (agranulocytosis and liver failure) but can be very serious.     Labs in 2-3 weeks   rtc 3 months        Try propanolol for symptoms     Graves' eye disease  Address above  Refer to Dr Briceno           Check bmd

## 2020-09-03 NOTE — ASSESSMENT & PLAN NOTE
reviewed treatment options of I131, ATD or surgery, surgery being least desirable.    Discussed risks of worsening GO with I131  Review the connection of GO with tob use    Discussed the chance of Graves remission (30%) after 2 years of ATD therapy - with a 50% chance of recurrence  Patient opted for ATD - start methimazole 20 mg qd     Call if fever, sore throat, rash, anorexia, nausea or vomiitng.  Reviewed side effects of ATDs are rare (agranulocytosis and liver failure) but can be very serious.     Labs in 2-3 weeks   rtc 3 months        Try propanolol for symptoms

## 2020-09-10 ENCOUNTER — TELEPHONE (OUTPATIENT)
Dept: ENDOCRINOLOGY | Facility: CLINIC | Age: 70
End: 2020-09-10

## 2020-09-10 DIAGNOSIS — E05.00 GRAVES DISEASE: Primary | ICD-10-CM

## 2020-09-10 NOTE — TELEPHONE ENCOUNTER
Please reach out to her and set up labs ASAP.  We will make sure the labs are improving.  Please let her know it can take some time for her to feel better.

## 2020-09-10 NOTE — TELEPHONE ENCOUNTER
----- Message from Serjio Engel sent at 9/10/2020  8:39 AM CDT -----  Regarding: Prescription Inquiry   methIMAzole (TAPAZOLE) 10 MG Tab     propranoloL (INDERAL) 20 MG tablet    Pt called requesting a call back in regards to medication. Stated shes continuing to have the same syntoms      [phone] (home)

## 2020-09-10 NOTE — TELEPHONE ENCOUNTER
Spoke with pt. She states that she is still have some of the same symptoms that she discussed with you. Shortness of breath, no appetite, and fatigue. She would like to know if it's too early in treatment to expect any changes.

## 2020-09-14 ENCOUNTER — OFFICE VISIT (OUTPATIENT)
Dept: INTERNAL MEDICINE | Facility: CLINIC | Age: 70
End: 2020-09-14
Payer: MEDICARE

## 2020-09-14 ENCOUNTER — LAB VISIT (OUTPATIENT)
Dept: LAB | Facility: HOSPITAL | Age: 70
End: 2020-09-14
Attending: INTERNAL MEDICINE
Payer: MEDICARE

## 2020-09-14 ENCOUNTER — TELEPHONE (OUTPATIENT)
Dept: SLEEP MEDICINE | Facility: OTHER | Age: 70
End: 2020-09-14

## 2020-09-14 VITALS
HEIGHT: 69 IN | WEIGHT: 256.81 LBS | OXYGEN SATURATION: 97 % | HEART RATE: 84 BPM | BODY MASS INDEX: 38.04 KG/M2 | SYSTOLIC BLOOD PRESSURE: 148 MMHG | DIASTOLIC BLOOD PRESSURE: 80 MMHG

## 2020-09-14 DIAGNOSIS — E05.90 HYPERTHYROIDISM: Primary | ICD-10-CM

## 2020-09-14 DIAGNOSIS — E05.90 HYPERTHYROIDISM: ICD-10-CM

## 2020-09-14 LAB
T4 FREE SERPL-MCNC: 1.87 NG/DL (ref 0.71–1.51)
TSH SERPL DL<=0.005 MIU/L-ACNC: <0.01 UIU/ML (ref 0.4–4)

## 2020-09-14 PROCEDURE — 3079F PR MOST RECENT DIASTOLIC BLOOD PRESSURE 80-89 MM HG: ICD-10-PCS | Mod: HCNC,CPTII,S$GLB, | Performed by: INTERNAL MEDICINE

## 2020-09-14 PROCEDURE — 99999 PR PBB SHADOW E&M-EST. PATIENT-LVL IV: CPT | Mod: PBBFAC,HCNC,, | Performed by: INTERNAL MEDICINE

## 2020-09-14 PROCEDURE — 1101F PT FALLS ASSESS-DOCD LE1/YR: CPT | Mod: HCNC,CPTII,S$GLB, | Performed by: INTERNAL MEDICINE

## 2020-09-14 PROCEDURE — 3077F PR MOST RECENT SYSTOLIC BLOOD PRESSURE >= 140 MM HG: ICD-10-PCS | Mod: HCNC,CPTII,S$GLB, | Performed by: INTERNAL MEDICINE

## 2020-09-14 PROCEDURE — 3077F SYST BP >= 140 MM HG: CPT | Mod: HCNC,CPTII,S$GLB, | Performed by: INTERNAL MEDICINE

## 2020-09-14 PROCEDURE — 99214 OFFICE O/P EST MOD 30 MIN: CPT | Mod: HCNC,S$GLB,, | Performed by: INTERNAL MEDICINE

## 2020-09-14 PROCEDURE — 1101F PR PT FALLS ASSESS DOC 0-1 FALLS W/OUT INJ PAST YR: ICD-10-PCS | Mod: HCNC,CPTII,S$GLB, | Performed by: INTERNAL MEDICINE

## 2020-09-14 PROCEDURE — 3008F BODY MASS INDEX DOCD: CPT | Mod: HCNC,CPTII,S$GLB, | Performed by: INTERNAL MEDICINE

## 2020-09-14 PROCEDURE — 36415 COLL VENOUS BLD VENIPUNCTURE: CPT | Mod: HCNC

## 2020-09-14 PROCEDURE — 1159F MED LIST DOCD IN RCRD: CPT | Mod: HCNC,S$GLB,, | Performed by: INTERNAL MEDICINE

## 2020-09-14 PROCEDURE — 1126F AMNT PAIN NOTED NONE PRSNT: CPT | Mod: HCNC,S$GLB,, | Performed by: INTERNAL MEDICINE

## 2020-09-14 PROCEDURE — 93005 ELECTROCARDIOGRAM TRACING: CPT | Mod: HCNC,S$GLB,, | Performed by: INTERNAL MEDICINE

## 2020-09-14 PROCEDURE — 99214 PR OFFICE/OUTPT VISIT, EST, LEVL IV, 30-39 MIN: ICD-10-PCS | Mod: HCNC,S$GLB,, | Performed by: INTERNAL MEDICINE

## 2020-09-14 PROCEDURE — 84443 ASSAY THYROID STIM HORMONE: CPT | Mod: HCNC

## 2020-09-14 PROCEDURE — 1159F PR MEDICATION LIST DOCUMENTED IN MEDICAL RECORD: ICD-10-PCS | Mod: HCNC,S$GLB,, | Performed by: INTERNAL MEDICINE

## 2020-09-14 PROCEDURE — 3008F PR BODY MASS INDEX (BMI) DOCUMENTED: ICD-10-PCS | Mod: HCNC,CPTII,S$GLB, | Performed by: INTERNAL MEDICINE

## 2020-09-14 PROCEDURE — 93010 EKG 12-LEAD: ICD-10-PCS | Mod: HCNC,S$GLB,, | Performed by: INTERNAL MEDICINE

## 2020-09-14 PROCEDURE — 99999 PR PBB SHADOW E&M-EST. PATIENT-LVL IV: ICD-10-PCS | Mod: PBBFAC,HCNC,, | Performed by: INTERNAL MEDICINE

## 2020-09-14 PROCEDURE — 84439 ASSAY OF FREE THYROXINE: CPT | Mod: HCNC

## 2020-09-14 PROCEDURE — 3079F DIAST BP 80-89 MM HG: CPT | Mod: HCNC,CPTII,S$GLB, | Performed by: INTERNAL MEDICINE

## 2020-09-14 PROCEDURE — 93005 EKG 12-LEAD: ICD-10-PCS | Mod: HCNC,S$GLB,, | Performed by: INTERNAL MEDICINE

## 2020-09-14 PROCEDURE — 93010 ELECTROCARDIOGRAM REPORT: CPT | Mod: HCNC,S$GLB,, | Performed by: INTERNAL MEDICINE

## 2020-09-14 PROCEDURE — 1126F PR PAIN SEVERITY QUANTIFIED, NO PAIN PRESENT: ICD-10-PCS | Mod: HCNC,S$GLB,, | Performed by: INTERNAL MEDICINE

## 2020-09-14 NOTE — PROGRESS NOTES
For  Subjective:      Patient ID: Manju Aranda is a 69 y.o. female.    Chief Complaint: Follow-up and Shortness of Breath    HPI:  HPI   Patient states that she is miserable.  She has a history of Graves disease which has been stable since 2018 when she was taken off her methimazole.  Her last TSH prior to the diagnosis of the hyperthyroidism was in May and was normal.  Patient has been seen in endocrinology and started on methimazole as well as propranolol.  She states she gets very short of breath even with short distance walking.  She states on the 1st occasion she never felt this poorly and I told her that her T for then was 1.75 and this time it was 2.88 and will take a little time for it to improve.  Her heart rate is under acceptable control by vital signs but I have told her I will make sure that she does not have atrial fibrillation that could cause her to have worsening shortness of breath.  She does have a follow-up with her endocrinologist at the end of the month.  Patient Active Problem List   Diagnosis    Hypertension    Liver cyst    Paraproteinemia    Smoldering multiple myeloma (SMM)    Migraine headache    Mitral valve disorder    Tricuspid valve disease    ADPKD (autosomal dominant polycystic kidney disease)    Benign hypertensive renal disease without renal failure    Tachycardia    Aortic atherosclerosis    Microhematuria    Graves disease    Tortuous aorta    Morbid obesity with BMI of 40.0-44.9, adult    History of adenomatous polyp of colon    Family history of diabetes mellitus    Palpitations    Stage 3 chronic kidney disease    BECKY (obstructive sleep apnea)    Graves' eye disease     Past Medical History:   Diagnosis Date    Allergy     Anemia     Arthritis     Cholelithiases     Cyst of kidney, acquired     Diverticulitis     Elevated TSH     Family history of Graves' disease: daughter, maternal aunt, maternal uncle 9/13/2016    Glaucoma     Graves  disease     Hx of colonic polyps     Hypertension 1981    Liver cyst     MGUS (monoclonal gammopathy of unknown significance)     Migraine headache     Mitral valve problem     leakage    Obesity     Paraproteinemia     Sleep apnea     Smoldering multiple myeloma 2013    Tricuspid valve disease     leakage     Past Surgical History:   Procedure Laterality Date    APPENDECTOMY      COLONOSCOPY N/A 10/23/2015    Procedure: COLONOSCOPY;  Surgeon: Brandon Ruelas MD;  Location: [facility] ENDO (4TH FLR);  Service: Endoscopy;  Laterality: N/A;  Had divertiulitis in 5/29/2015 with a recommendation for colonoscopy in 8 weeks    Dr. Ruelas is her GI physician    COLONOSCOPY N/A 11/5/2018    Procedure: COLONOSCOPY;  Surgeon: Brandon Ruelas MD;  Location: [facility] ENDO (4TH FLR);  Service: Endoscopy;  Laterality: N/A;  Dr. Ruelas did the last one multiple polyps, patient had recent diverticulitis should not schedule before Oct 10th    HYSTERECTOMY  1978 or 1979    OOPHORECTOMY       Family History   Problem Relation Age of Onset    Heart disease Mother         MI    Heart attack Mother     Heart disease Maternal Aunt         MI    Heart disease Maternal Aunt         MI    Cancer Paternal Grandmother         GYN cancer - unknown cancer    Colon cancer Maternal Uncle     Cancer Maternal Uncle         colon ca    Hypertension Father     Heart disease Sister         fast heart rate    Cataracts Sister     Glaucoma Sister     Graves' disease Daughter     No Known Problems Son     No Known Problems Sister     No Known Problems Daughter     No Known Problems Son     Melanoma Neg Hx     Breast cancer Neg Hx     Ovarian cancer Neg Hx     Colon polyps Neg Hx     Rectal cancer Neg Hx     Stomach cancer Neg Hx     Esophageal cancer Neg Hx     Ulcerative colitis Neg Hx     Crohn's disease Neg Hx     Amblyopia Neg Hx     Blindness Neg Hx     Macular degeneration Neg Hx     Strabismus Neg Hx      "Retinal detachment Neg Hx      Review of Systems   Constitutional: Positive for fatigue. Negative for chills, fever and unexpected weight change.   HENT: Negative for trouble swallowing.    Respiratory: Positive for shortness of breath. Negative for cough and wheezing.    Cardiovascular: Negative for chest pain and palpitations.   Gastrointestinal: Negative for abdominal distention, abdominal pain, blood in stool and vomiting.   Musculoskeletal: Negative for back pain.     Objective:     Vitals:    09/14/20 0931   BP: (!) 148/80   Pulse: 84   SpO2: 97%   Weight: 116.5 kg (256 lb 13.4 oz)   Height: 5' 9" (1.753 m)   PainSc: 0-No pain     Body mass index is 37.93 kg/m².  Physical Exam  Constitutional:       General: She is not in acute distress.     Appearance: Normal appearance. She is well-developed.      Comments: Patient states that she just feels very fatigued   Neck:      Thyroid: No thyromegaly.      Vascular: No carotid bruit.   Cardiovascular:      Rate and Rhythm: Normal rate and regular rhythm.      Chest Wall: PMI is not displaced.      Heart sounds: Normal heart sounds.      Comments: Slight arrhythmia  Pulmonary:      Effort: Pulmonary effort is normal. No respiratory distress.      Breath sounds: Normal breath sounds.   Abdominal:      General: Bowel sounds are normal. There is no distension.      Palpations: Abdomen is soft.      Tenderness: There is no abdominal tenderness.   Neurological:      Mental Status: She is alert and oriented to person, place, and time.   Psychiatric:         Mood and Affect: Mood normal.         Thought Content: Thought content normal.         Judgment: Judgment normal.       Assessment:     1. Hyperthyroidism      Plan:   Manju was seen today for follow-up and shortness of breath.    Diagnoses and all orders for this visit:    Hyperthyroidism  -     TSH; Future  -     EKG 12-lead; Future  -     EKG 12-lead        Problem List Items Addressed This Visit     None    "   Visit Diagnoses     Hyperthyroidism    -  Primary    Relevant Orders    TSH    EKG 12-lead        Orders Placed This Encounter   Procedures    TSH     Standing Status:   Future     Number of Occurrences:   1     Standing Expiration Date:   11/13/2020    EKG 12-lead     Standing Status:   Future     Number of Occurrences:   1     Standing Expiration Date:   9/14/2021     Order Specific Question:   Diagnosis     Answer:   Hyperthyroidism [166415]     Follow up in about 1 month (around 10/14/2020) for Follow up.     Medication List          Accurate as of September 14, 2020  1:41 PM. If you have any questions, ask your nurse or doctor.            CONTINUE taking these medications    aspirin 81 MG Chew     magnesium oxide 400 mg (241.3 mg magnesium) tablet  Commonly known as: MAG-OX     methIMAzole 10 MG Tab  Commonly known as: TAPAZOLE  Take 2 tablets (20 mg total) by mouth once daily.     multivit with min-folic acid 0.4 mg Tab     ondansetron 4 MG tablet  Commonly known as: ZOFRAN  Take 1-2 tablets (4-8 mg total) by mouth every 8 (eight) hours as needed for Nausea.     propranoloL 20 MG tablet  Commonly known as: INDERAL  Take 1 tablet (20 mg total) by mouth 2 (two) times daily.     quinapriL 20 MG tablet  Commonly known as: ACCUPRIL  TAKE 1 TABLET BY MOUTH EVERY DAY     rizatriptan 10 MG tablet  Commonly known as: MAXALT  TAKE 1 TABLET(10 MG) BY MOUTH EVERY 2 HOURS AS NEEDED FOR MIGRAINE. MAX 30 MG/ 24 AT BEDTIME     VITAMIN C 500 MG tablet  Generic drug: ascorbic acid (vitamin C)     vitamin D 1000 units Tab  Commonly known as: VITAMIN D3

## 2020-09-15 ENCOUNTER — TELEPHONE (OUTPATIENT)
Dept: SLEEP MEDICINE | Facility: OTHER | Age: 70
End: 2020-09-15

## 2020-09-17 ENCOUNTER — HOSPITAL ENCOUNTER (OUTPATIENT)
Dept: RADIOLOGY | Facility: CLINIC | Age: 70
Discharge: HOME | End: 2020-09-17
Attending: INTERNAL MEDICINE
Payer: MEDICARE

## 2020-09-17 DIAGNOSIS — E05.00 GRAVES DISEASE: ICD-10-CM

## 2020-09-17 DIAGNOSIS — N95.8 OTHER SPECIFIED MENOPAUSAL AND PERIMENOPAUSAL DISORDERS: ICD-10-CM

## 2020-09-17 PROCEDURE — 77080 DEXA BONE DENSITY SPINE HIP: ICD-10-PCS | Mod: 26,HCNC,, | Performed by: INTERNAL MEDICINE

## 2020-09-17 PROCEDURE — 77080 DXA BONE DENSITY AXIAL: CPT | Mod: 26,HCNC,, | Performed by: INTERNAL MEDICINE

## 2020-09-17 PROCEDURE — 77080 DXA BONE DENSITY AXIAL: CPT | Mod: TC,HCNC

## 2020-09-23 RX ORDER — INFLUENZA A VIRUS A/MICHIGAN/45/2015 X-275 (H1N1) ANTIGEN (FORMALDEHYDE INACTIVATED), INFLUENZA A VIRUS A/SINGAPORE/INFIMH-16-0019/2016 IVR-186 (H3N2) ANTIGEN (FORMALDEHYDE INACTIVATED), INFLUENZA B VIRUS B/PHUKET/3073/2013 ANTIGEN (FORMALDEHYDE INACTIVATED), AND INFLUENZA B VIRUS B/MARYLAND/15/2016 BX-69A ANTIGEN (FORMALDEHYDE INACTIVATED) 60; 60; 60; 60 UG/.7ML; UG/.7ML; UG/.7ML; UG/.7ML
INJECTION, SUSPENSION INTRAMUSCULAR
COMMUNITY
Start: 2020-09-09 | End: 2021-05-20 | Stop reason: ALTCHOICE

## 2020-09-28 ENCOUNTER — OFFICE VISIT (OUTPATIENT)
Dept: ENDOCRINOLOGY | Facility: CLINIC | Age: 70
End: 2020-09-28
Payer: MEDICARE

## 2020-09-28 DIAGNOSIS — R06.09 DOE (DYSPNEA ON EXERTION): ICD-10-CM

## 2020-09-28 DIAGNOSIS — E05.00 GRAVES' EYE DISEASE: ICD-10-CM

## 2020-09-28 DIAGNOSIS — E05.00 GRAVES DISEASE: ICD-10-CM

## 2020-09-28 PROCEDURE — 1101F PT FALLS ASSESS-DOCD LE1/YR: CPT | Mod: HCNC,CPTII,95, | Performed by: INTERNAL MEDICINE

## 2020-09-28 PROCEDURE — 99499 UNLISTED E&M SERVICE: CPT | Mod: HCNC,95,, | Performed by: INTERNAL MEDICINE

## 2020-09-28 PROCEDURE — 1101F PR PT FALLS ASSESS DOC 0-1 FALLS W/OUT INJ PAST YR: ICD-10-PCS | Mod: HCNC,CPTII,95, | Performed by: INTERNAL MEDICINE

## 2020-09-28 PROCEDURE — 1159F MED LIST DOCD IN RCRD: CPT | Mod: HCNC,95,, | Performed by: INTERNAL MEDICINE

## 2020-09-28 PROCEDURE — 99499 RISK ADDL DX/OHS AUDIT: ICD-10-PCS | Mod: HCNC,95,, | Performed by: INTERNAL MEDICINE

## 2020-09-28 PROCEDURE — 99214 OFFICE O/P EST MOD 30 MIN: CPT | Mod: HCNC,95,, | Performed by: INTERNAL MEDICINE

## 2020-09-28 PROCEDURE — 99214 PR OFFICE/OUTPT VISIT, EST, LEVL IV, 30-39 MIN: ICD-10-PCS | Mod: HCNC,95,, | Performed by: INTERNAL MEDICINE

## 2020-09-28 PROCEDURE — 1159F PR MEDICATION LIST DOCUMENTED IN MEDICAL RECORD: ICD-10-PCS | Mod: HCNC,95,, | Performed by: INTERNAL MEDICINE

## 2020-09-28 RX ORDER — METHIMAZOLE 10 MG/1
30 TABLET ORAL DAILY
Qty: 90 TABLET | Refills: 5 | Status: SHIPPED | OUTPATIENT
Start: 2020-09-28 | End: 2020-12-07

## 2020-09-28 NOTE — PATIENT INSTRUCTIONS
Thank you for completing a virtual visit with me!     Per our conversation your medication changes are as follows:  Increase your methimazole to 3 pills a day (30 mg).  Hopefully the shortness of breath will get better with treating the hyperthyroidism... but I will also route this note to Dr. Garay so that she is aware.    We will plan labs in 3 weeks and visit in 3 months.     My staff will contact you to schedule the above.     Please let me know if you have any other questions.    Thank you,  Summer Rajput MD

## 2020-09-28 NOTE — ASSESSMENT & PLAN NOTE
This is her biggest concern.  We will address the hyperthyroidism and if it persists I urged her to follow up with her primary care for further evaluation

## 2020-09-28 NOTE — ASSESSMENT & PLAN NOTE
reviewed treatment options of I131, ATD or surgery, surgery being least desirable.    Discussed risks of worsening GO with I131  Review the connection of GO with tob use    Discussed the chance of Graves remission (30%) after 2 years of ATD therapy - with a 50% chance of recurrence  Patient opted for ATD - increase methimazole 30 mg qd   Continue propanolol     Call if fever, sore throat, rash, anorexia, nausea or vomiitng.  Reviewed side effects of ATDs are rare (agranulocytosis and liver failure) but can be very serious.     Labs in  3 weeks   rtc 3 months        Try propanolol for symptoms

## 2020-09-28 NOTE — PROGRESS NOTES
Subjective:      Patient ID: Manju Aranda is a 69 y.o. female.    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    She was on methimazole 2016 - 2018     restarted sept 2020   methimazole 20 mg qd   propanolol  20 bid     Her daughter has Graves       Thyroid scan: none     Thyroid ultrasound:  2016   IMPRESSION:   No nodules. Study consistent with autoimmune thyroid disease as the etiology for the hyperthyroidism.    Thyroid ab done:      Ref. Range 9/20/2016 09:50   Thyrotropin Receptor Ab Latest Ref Range: 0.00 - 1.75 IU/L 7.33 (H)   Thyroperoxidase Antibodies Latest Ref Range: <6.0 IU/mL 31.8 (H)         current symptoms:   +palpations  +fatigue   +weight loss  + Brittle nails  No skin changes  +tremor   +anxiety  +BURK     She is not feeling much better    Last BMD: 9/17/20     Impression:     1. Normal BMD of the lumbar spine and hip.Nl   Compared with previous DXA, BMD at the lumbar spine has increased by 5.7%, and the BMD at the total hip has decreased by -6.3%.    + red eyes - no pain or dryness - has eye visit scheduled      Review of Systems   Eyes: Negative for visual disturbance.   Cardiovascular: Negative for chest pain.   Gastrointestinal: Negative for abdominal pain.   Musculoskeletal: Negative for arthralgias.   Skin: Negative for wound.   Neurological: Negative for headaches.   Hematological: Does not bruise/bleed easily.   Psychiatric/Behavioral: Negative for sleep disturbance.       Objective:     There were no vitals taken for this visit.  BP Readings from Last 3 Encounters:   09/14/20 (!) 148/80    09/03/20 110/74   08/29/20 (!) 148/70     Wt Readings from Last 1 Encounters:   09/14/20 0931 116.5 kg (256 lb 13.4 oz)     There is no height or weight on file to calculate BMI.      Physical Exam  Psychiatric:         Attention and Perception: Attention normal.         Mood and Affect: Mood normal.         Speech: Speech normal.         Behavior: Behavior normal.         Thought Content: Thought content normal.         Judgment: Judgment normal.                Lab Review:   No results found for: HGBA1C  Lab Results   Component Value Date    CHOL 148 03/03/2020    HDL 54 03/03/2020    LDLCALC 82.8 03/03/2020    TRIG 56 03/03/2020    CHOLHDL 36.5 03/03/2020     Lab Results   Component Value Date     08/29/2020    K 3.8 08/29/2020     08/29/2020    CO2 23 08/29/2020    GLU 97 08/29/2020    BUN 19 08/29/2020    CREATININE 0.8 08/29/2020    CALCIUM 10.1 08/29/2020    PROT 7.3 08/29/2020    ALBUMIN 3.1 (L) 08/29/2020    BILITOT 0.9 08/29/2020    ALKPHOS 73 08/29/2020    AST 40 08/29/2020    ALT 47 (H) 08/29/2020    ANIONGAP 12 08/29/2020    ESTGFRAFRICA >60 08/29/2020    EGFRNONAA >60 08/29/2020    TSH <0.010 (L) 09/17/2020     Vit D, 25-Hydroxy   Date Value Ref Range Status   07/25/2016 40 30 - 96 ng/mL Final     Comment:     Vitamin D deficiency.........<10 ng/mL                              Vitamin D insufficiency......10-29 ng/mL       Vitamin D sufficiency........> or equal to 30 ng/mL  Vitamin D toxicity............>100 ng/mL         Assessment and Plan     Graves disease  reviewed treatment options of I131, ATD or surgery, surgery being least desirable.    Discussed risks of worsening GO with I131  Review the connection of GO with tob use    Discussed the chance of Graves remission (30%) after 2 years of ATD therapy - with a 50% chance of recurrence  Patient opted for ATD - increase methimazole 30 mg qd   Continue propanolol     Call if fever, sore throat, rash, anorexia, nausea or vomiitng.   Reviewed side effects of ATDs are rare (agranulocytosis and liver failure) but can be very serious.     Labs in  3 weeks   rtc 3 months        Try propanolol for symptoms     Graves' eye disease  Address above  Has appt with Dr Ema Lopez to try selenium         BURK (dyspnea on exertion)  This is her biggest concern.  We will address the hyperthyroidism and if it persists I urged her to follow up with her primary care for further evaluation     The patient location is: home  The chief complaint leading to consultation is: graves     Visit type: audiovisual    Face to Face time with patient: 30 minutes of total time spent on the encounter, which includes face to face time and non-face to face time preparing to see the patient (eg, review of tests), Obtaining and/or reviewing separately obtained history, Documenting clinical information in the electronic or other health record, Independently interpreting results (not separately reported) and communicating results to the patient/family/caregiver, or Care coordination (not separately reported).         Each patient to whom he or she provides medical services by telemedicine is:  (1) informed of the relationship between the physician and patient and the respective role of any other health care provider with respect to management of the patient; and (2) notified that he or she may decline to receive medical services by telemedicine and may withdraw from such care at any time.

## 2020-09-29 ENCOUNTER — PATIENT MESSAGE (OUTPATIENT)
Dept: OTHER | Facility: OTHER | Age: 70
End: 2020-09-29

## 2020-10-14 ENCOUNTER — LAB VISIT (OUTPATIENT)
Dept: LAB | Facility: HOSPITAL | Age: 70
End: 2020-10-14
Attending: INTERNAL MEDICINE
Payer: MEDICARE

## 2020-10-14 ENCOUNTER — OFFICE VISIT (OUTPATIENT)
Dept: INTERNAL MEDICINE | Facility: CLINIC | Age: 70
End: 2020-10-14
Payer: MEDICARE

## 2020-10-14 VITALS
DIASTOLIC BLOOD PRESSURE: 76 MMHG | SYSTOLIC BLOOD PRESSURE: 136 MMHG | BODY MASS INDEX: 38.82 KG/M2 | WEIGHT: 262.13 LBS | HEIGHT: 69 IN | HEART RATE: 73 BPM | OXYGEN SATURATION: 96 %

## 2020-10-14 DIAGNOSIS — I49.9 IRREGULAR HEART BEAT: ICD-10-CM

## 2020-10-14 DIAGNOSIS — E05.00 GRAVES DISEASE: ICD-10-CM

## 2020-10-14 DIAGNOSIS — R06.02 SOB (SHORTNESS OF BREATH): Primary | ICD-10-CM

## 2020-10-14 LAB
ALBUMIN SERPL BCP-MCNC: 3.4 G/DL (ref 3.5–5.2)
ALP SERPL-CCNC: 87 U/L (ref 55–135)
ALT SERPL W/O P-5'-P-CCNC: 20 U/L (ref 10–44)
ANION GAP SERPL CALC-SCNC: 7 MMOL/L (ref 8–16)
ANION GAP SERPL CALC-SCNC: 7 MMOL/L (ref 8–16)
AST SERPL-CCNC: 16 U/L (ref 10–40)
BILIRUB SERPL-MCNC: 0.4 MG/DL (ref 0.1–1)
BUN SERPL-MCNC: 19 MG/DL (ref 8–23)
BUN SERPL-MCNC: 19 MG/DL (ref 8–23)
CALCIUM SERPL-MCNC: 10.1 MG/DL (ref 8.7–10.5)
CALCIUM SERPL-MCNC: 10.1 MG/DL (ref 8.7–10.5)
CHLORIDE SERPL-SCNC: 104 MMOL/L (ref 95–110)
CHLORIDE SERPL-SCNC: 104 MMOL/L (ref 95–110)
CO2 SERPL-SCNC: 29 MMOL/L (ref 23–29)
CO2 SERPL-SCNC: 29 MMOL/L (ref 23–29)
CREAT SERPL-MCNC: 0.9 MG/DL (ref 0.5–1.4)
CREAT SERPL-MCNC: 0.9 MG/DL (ref 0.5–1.4)
EST. GFR  (AFRICAN AMERICAN): >60 ML/MIN/1.73 M^2
EST. GFR  (AFRICAN AMERICAN): >60 ML/MIN/1.73 M^2
EST. GFR  (NON AFRICAN AMERICAN): >60 ML/MIN/1.73 M^2
EST. GFR  (NON AFRICAN AMERICAN): >60 ML/MIN/1.73 M^2
GLUCOSE SERPL-MCNC: 76 MG/DL (ref 70–110)
GLUCOSE SERPL-MCNC: 76 MG/DL (ref 70–110)
POTASSIUM SERPL-SCNC: 4.1 MMOL/L (ref 3.5–5.1)
POTASSIUM SERPL-SCNC: 4.1 MMOL/L (ref 3.5–5.1)
PROT SERPL-MCNC: 7.7 G/DL (ref 6–8.4)
SODIUM SERPL-SCNC: 140 MMOL/L (ref 136–145)
SODIUM SERPL-SCNC: 140 MMOL/L (ref 136–145)
T4 FREE SERPL-MCNC: 0.78 NG/DL (ref 0.71–1.51)
TSH SERPL DL<=0.005 MIU/L-ACNC: <0.01 UIU/ML (ref 0.4–4)

## 2020-10-14 PROCEDURE — 3008F BODY MASS INDEX DOCD: CPT | Mod: HCNC,CPTII,S$GLB, | Performed by: INTERNAL MEDICINE

## 2020-10-14 PROCEDURE — 93010 EKG 12-LEAD: ICD-10-PCS | Mod: HCNC,S$GLB,, | Performed by: INTERNAL MEDICINE

## 2020-10-14 PROCEDURE — 1126F PR PAIN SEVERITY QUANTIFIED, NO PAIN PRESENT: ICD-10-PCS | Mod: HCNC,S$GLB,, | Performed by: INTERNAL MEDICINE

## 2020-10-14 PROCEDURE — 1101F PT FALLS ASSESS-DOCD LE1/YR: CPT | Mod: HCNC,CPTII,S$GLB, | Performed by: INTERNAL MEDICINE

## 2020-10-14 PROCEDURE — 1126F AMNT PAIN NOTED NONE PRSNT: CPT | Mod: HCNC,S$GLB,, | Performed by: INTERNAL MEDICINE

## 2020-10-14 PROCEDURE — 93010 ELECTROCARDIOGRAM REPORT: CPT | Mod: HCNC,S$GLB,, | Performed by: INTERNAL MEDICINE

## 2020-10-14 PROCEDURE — 80053 COMPREHEN METABOLIC PANEL: CPT | Mod: HCNC

## 2020-10-14 PROCEDURE — 1101F PR PT FALLS ASSESS DOC 0-1 FALLS W/OUT INJ PAST YR: ICD-10-PCS | Mod: HCNC,CPTII,S$GLB, | Performed by: INTERNAL MEDICINE

## 2020-10-14 PROCEDURE — 3075F PR MOST RECENT SYSTOLIC BLOOD PRESS GE 130-139MM HG: ICD-10-PCS | Mod: HCNC,CPTII,S$GLB, | Performed by: INTERNAL MEDICINE

## 2020-10-14 PROCEDURE — 84439 ASSAY OF FREE THYROXINE: CPT | Mod: HCNC

## 2020-10-14 PROCEDURE — 93005 EKG 12-LEAD: ICD-10-PCS | Mod: HCNC,S$GLB,, | Performed by: INTERNAL MEDICINE

## 2020-10-14 PROCEDURE — 99999 PR PBB SHADOW E&M-EST. PATIENT-LVL III: CPT | Mod: PBBFAC,HCNC,, | Performed by: INTERNAL MEDICINE

## 2020-10-14 PROCEDURE — 84443 ASSAY THYROID STIM HORMONE: CPT | Mod: HCNC

## 2020-10-14 PROCEDURE — 3078F PR MOST RECENT DIASTOLIC BLOOD PRESSURE < 80 MM HG: ICD-10-PCS | Mod: HCNC,CPTII,S$GLB, | Performed by: INTERNAL MEDICINE

## 2020-10-14 PROCEDURE — 99999 PR PBB SHADOW E&M-EST. PATIENT-LVL III: ICD-10-PCS | Mod: PBBFAC,HCNC,, | Performed by: INTERNAL MEDICINE

## 2020-10-14 PROCEDURE — 1159F MED LIST DOCD IN RCRD: CPT | Mod: HCNC,S$GLB,, | Performed by: INTERNAL MEDICINE

## 2020-10-14 PROCEDURE — 3078F DIAST BP <80 MM HG: CPT | Mod: HCNC,CPTII,S$GLB, | Performed by: INTERNAL MEDICINE

## 2020-10-14 PROCEDURE — 99214 PR OFFICE/OUTPT VISIT, EST, LEVL IV, 30-39 MIN: ICD-10-PCS | Mod: HCNC,S$GLB,, | Performed by: INTERNAL MEDICINE

## 2020-10-14 PROCEDURE — 3075F SYST BP GE 130 - 139MM HG: CPT | Mod: HCNC,CPTII,S$GLB, | Performed by: INTERNAL MEDICINE

## 2020-10-14 PROCEDURE — 3008F PR BODY MASS INDEX (BMI) DOCUMENTED: ICD-10-PCS | Mod: HCNC,CPTII,S$GLB, | Performed by: INTERNAL MEDICINE

## 2020-10-14 PROCEDURE — 1159F PR MEDICATION LIST DOCUMENTED IN MEDICAL RECORD: ICD-10-PCS | Mod: HCNC,S$GLB,, | Performed by: INTERNAL MEDICINE

## 2020-10-14 PROCEDURE — 99214 OFFICE O/P EST MOD 30 MIN: CPT | Mod: HCNC,S$GLB,, | Performed by: INTERNAL MEDICINE

## 2020-10-14 PROCEDURE — 93005 ELECTROCARDIOGRAM TRACING: CPT | Mod: HCNC,S$GLB,, | Performed by: INTERNAL MEDICINE

## 2020-10-14 PROCEDURE — 36415 COLL VENOUS BLD VENIPUNCTURE: CPT | Mod: HCNC

## 2020-10-14 NOTE — PROGRESS NOTES
Subjective:      Patient ID: Manju Aranda is a 69 y.o. female.    Chief Complaint: Follow-up    HPI:  HPI   Patient states that her SOB is improved but noticeable when walking or climbing stairs. She is hyperthyroid and her medication has been adjusted by her endocrinologist. Thyroid studies are improving  Patient Active Problem List   Diagnosis    Hypertension    Liver cyst    Paraproteinemia    Smoldering multiple myeloma (SMM)    Migraine headache    Mitral valve disorder    Tricuspid valve disease    ADPKD (autosomal dominant polycystic kidney disease)    Benign hypertensive renal disease without renal failure    Tachycardia    Aortic atherosclerosis    Microhematuria    Graves disease    Tortuous aorta    Morbid obesity with BMI of 40.0-44.9, adult    History of adenomatous polyp of colon    Family history of diabetes mellitus    Palpitations    Stage 3 chronic kidney disease    BECKY (obstructive sleep apnea)    Graves' eye disease    BURK (dyspnea on exertion)     Past Medical History:   Diagnosis Date    Allergy     Anemia     Arthritis     Cholelithiases     Cyst of kidney, acquired     Diverticulitis     Elevated TSH     Family history of Graves' disease: daughter, maternal aunt, maternal uncle 9/13/2016    Glaucoma     Graves disease     Hx of colonic polyps     Hypertension 1981    Liver cyst     MGUS (monoclonal gammopathy of unknown significance)     Migraine headache     Mitral valve problem     leakage    Obesity     Paraproteinemia     Sleep apnea     Smoldering multiple myeloma 2013    Tricuspid valve disease     leakage     Past Surgical History:   Procedure Laterality Date    APPENDECTOMY      COLONOSCOPY N/A 10/23/2015    Procedure: COLONOSCOPY;  Surgeon: Brandon Ruelas MD;  Location: [facility] ([address] [place] [address]);  Service: Endoscopy;  Laterality: N/A;  Had divertiulitis in 5/29/2015 with a recommendation for colonoscopy in 8 weeks    Dr. Ruelas is her  "GI physician    COLONOSCOPY N/A 11/5/2018    Procedure: COLONOSCOPY;  Surgeon: Brandon Ruelas MD;  Location: [facility] ([address] [place] [address]);  Service: Endoscopy;  Laterality: N/A;  Dr. Ruelas did the last one multiple polyps, patient had recent diverticulitis should not schedule before Oct 10th    HYSTERECTOMY  1978 or 1979    OOPHORECTOMY       Family History   Problem Relation Age of Onset    Heart disease Mother         MI    Heart attack Mother     Heart disease Maternal Aunt         MI    Heart disease Maternal Aunt         MI    Cancer Paternal Grandmother         GYN cancer - unknown cancer    Colon cancer Maternal Uncle     Cancer Maternal Uncle         colon ca    Hypertension Father     Heart disease Sister         fast heart rate    Cataracts Sister     Glaucoma Sister     Graves' disease Daughter     No Known Problems Son     No Known Problems Sister     No Known Problems Daughter     No Known Problems Son     Melanoma Neg Hx     Breast cancer Neg Hx     Ovarian cancer Neg Hx     Colon polyps Neg Hx     Rectal cancer Neg Hx     Stomach cancer Neg Hx     Esophageal cancer Neg Hx     Ulcerative colitis Neg Hx     Crohn's disease Neg Hx     Amblyopia Neg Hx     Blindness Neg Hx     Macular degeneration Neg Hx     Strabismus Neg Hx     Retinal detachment Neg Hx      Review of Systems   Constitutional: Negative for activity change, chills, fever and unexpected weight change.   Respiratory: Positive for cough. Negative for chest tightness, shortness of breath and wheezing.    Cardiovascular: Negative for chest pain, palpitations and leg swelling.     Objective:     Vitals:    10/14/20 1003   BP: 136/76   Pulse: 73   SpO2: 96%   Weight: 118.9 kg (262 lb 2 oz)   Height: 5' 9" (1.753 m)   PainSc: 0-No pain     Body mass index is 38.71 kg/m².  Physical Exam  Constitutional:       Appearance: Normal appearance. She is well-developed.   Neck:      Thyroid: No thyromegaly.      Vascular: No " JVD.   Cardiovascular:      Rate and Rhythm: Normal rate.      Heart sounds: Normal heart sounds.      Comments: Rhythm is irregular and EKG will be checked  Pulmonary:      Effort: Pulmonary effort is normal. No respiratory distress.      Breath sounds: Normal breath sounds.   Abdominal:      General: Abdomen is flat. Bowel sounds are normal.      Palpations: Abdomen is soft.   Neurological:      Mental Status: She is alert.       Assessment:     1. SOB (shortness of breath)    2. Irregular heart beat    3. Graves disease      Plan:   Manju was seen today for follow-up.    Diagnoses and all orders for this visit:    SOB (shortness of breath)  -     EKG 12-lead; Future    Irregular heart beat  -     EKG 12-lead; Future  -     TSH; Future  -     T4, Free; Future  -     Basic Metabolic Panel; Future    Graves disease  Comments:  Med adjusted by Robert on Propranolol  Orders:  -     TSH; Future  -     T4, Free; Future      Rule out Afib  On 96%  Problem List Items Addressed This Visit     Graves disease    Relevant Orders    TSH    T4, Free      Other Visit Diagnoses     SOB (shortness of breath)    -  Primary    Relevant Orders    EKG 12-lead    Irregular heart beat        Relevant Orders    EKG 12-lead    TSH    T4, Free    Basic Metabolic Panel        Orders Placed This Encounter   Procedures    TSH     Standing Status:   Future     Standing Expiration Date:   12/13/2020    T4, Free     Standing Status:   Future     Standing Expiration Date:   10/14/2021    Basic Metabolic Panel     Standing Status:   Future     Standing Expiration Date:   10/14/2021    EKG 12-lead     Standing Status:   Future     Standing Expiration Date:   10/14/2021     Order Specific Question:   Diagnosis     Answer:   Irregular heart rate [[id number]]     No follow-ups on file.     Medication List          Accurate as of October 14, 2020 10:49 AM. If you have any questions, ask your nurse or doctor.            CONTINUE taking these  medications    aspirin 81 MG Chew     FLUZONE HIGHDOSE QUAD 20-21  mcg/0.7 mL Syrg  Generic drug: flu vacc [id number](65yr up)-PF     magnesium oxide 400 mg (241.3 mg magnesium) tablet  Commonly known as: MAG-OX     methIMAzole 10 MG Tab  Commonly known as: TAPAZOLE  Take 3 tablets (30 mg total) by mouth once daily.     multivit with min-folic acid 0.4 mg Tab     ondansetron 4 MG tablet  Commonly known as: ZOFRAN  Take 1-2 tablets (4-8 mg total) by mouth every 8 (eight) hours as needed for Nausea.     propranoloL 20 MG tablet  Commonly known as: INDERAL  Take 1 tablet (20 mg total) by mouth 2 (two) times daily.     quinapriL 20 MG tablet  Commonly known as: ACCUPRIL  TAKE 1 TABLET BY MOUTH EVERY DAY     rizatriptan 10 MG tablet  Commonly known as: MAXALT  TAKE 1 TABLET(10 MG) BY MOUTH EVERY 2 HOURS AS NEEDED FOR MIGRAINE. MAX 30 MG/ 24 AT BEDTIME     VITAMIN C 500 MG tablet  Generic drug: ascorbic acid (vitamin C)     vitamin D 1000 units Tab  Commonly known as: VITAMIN D3

## 2020-10-15 ENCOUNTER — TELEPHONE (OUTPATIENT)
Dept: INTERNAL MEDICINE | Facility: CLINIC | Age: 70
End: 2020-10-15

## 2020-10-15 NOTE — TELEPHONE ENCOUNTER
Please tell her that she continues to improve.  She still is hyperthyroid.  Her electrolytes were fine and her EKG did not show any atrial fib

## 2020-10-19 ENCOUNTER — PATIENT MESSAGE (OUTPATIENT)
Dept: ENDOCRINOLOGY | Facility: CLINIC | Age: 70
End: 2020-10-19

## 2020-10-19 ENCOUNTER — LAB VISIT (OUTPATIENT)
Dept: URGENT CARE | Facility: CLINIC | Age: 70
End: 2020-10-19
Payer: MEDICARE

## 2020-10-19 VITALS — HEART RATE: 77 BPM | TEMPERATURE: 98 F | OXYGEN SATURATION: 99 %

## 2020-10-19 DIAGNOSIS — G47.33 OBSTRUCTIVE SLEEP APNEA: ICD-10-CM

## 2020-10-19 DIAGNOSIS — E05.00 GRAVES DISEASE: Primary | ICD-10-CM

## 2020-10-19 PROCEDURE — U0003 INFECTIOUS AGENT DETECTION BY NUCLEIC ACID (DNA OR RNA); SEVERE ACUTE RESPIRATORY SYNDROME CORONAVIRUS 2 (SARS-COV-2) (CORONAVIRUS DISEASE [COVID-19]), AMPLIFIED PROBE TECHNIQUE, MAKING USE OF HIGH THROUGHPUT TECHNOLOGIES AS DESCRIBED BY CMS-2020-01-R: HCPCS | Mod: HCNC

## 2020-10-20 LAB — SARS-COV-2 RNA RESP QL NAA+PROBE: NOT DETECTED

## 2020-10-22 ENCOUNTER — HOSPITAL ENCOUNTER (OUTPATIENT)
Dept: SLEEP MEDICINE | Facility: OTHER | Age: 70
Discharge: HOME | End: 2020-10-22
Attending: NURSE PRACTITIONER
Payer: MEDICARE

## 2020-10-22 DIAGNOSIS — G47.33 OBSTRUCTIVE SLEEP APNEA: ICD-10-CM

## 2020-10-22 PROCEDURE — 95811 POLYSOM 6/>YRS CPAP 4/> PARM: CPT | Mod: 26,HCNC,, | Performed by: INTERNAL MEDICINE

## 2020-10-22 PROCEDURE — 95811 PR POLYSOMNOGRAPHY W/CPAP: ICD-10-PCS | Mod: 26,HCNC,, | Performed by: INTERNAL MEDICINE

## 2020-10-22 PROCEDURE — 95811 POLYSOM 6/>YRS CPAP 4/> PARM: CPT | Mod: HCNC

## 2020-10-23 NOTE — PROGRESS NOTES
A Cpap titration was preformed on Manju Aranda on the night of 10/22/2020. The procedure was explained to the patient. All questions were asked and answered prior to the start of the study.     EKG: Appeared to have a NSR with PAC's and PVC's    Cpap mask: F&P Eson nasal mask medium    Cpap range: 5-12 cmH2O, Cflex 3, Humidity used    SDB events noted. No audible snoring was noted. The patient slept supine for the entire night. All sleep stages were noted. Disposable equipment was used where applicable. An end of the night instruction sheet was giving to the patient upon leaving the lab. The patient appeared to have tolerated cpap fairly well. The patient's response to cpap at the end of the night was that she didn't know how she felt about it.

## 2020-10-28 ENCOUNTER — PATIENT MESSAGE (OUTPATIENT)
Dept: ADMINISTRATIVE | Facility: HOSPITAL | Age: 70
End: 2020-10-28

## 2020-10-28 ENCOUNTER — PATIENT OUTREACH (OUTPATIENT)
Dept: ADMINISTRATIVE | Facility: HOSPITAL | Age: 70
End: 2020-10-28

## 2020-10-28 DIAGNOSIS — Z12.31 ENCOUNTER FOR SCREENING MAMMOGRAM FOR MALIGNANT NEOPLASM OF BREAST: Primary | ICD-10-CM

## 2020-11-02 NOTE — PROCEDURES
[facility]  Sleep Center  Tel: [phone]    Manju Aranda  1950  [id number]  BMI =38.7   Study date= 10.22.20    CPAP Titration  Hypopnea definition used AASM 1b (4% desat)    Sleep parameters:  Total recording time 460.5 minutes, total sleep time 349.5 and sleep efficiency 75.9%.     Sleep latency 27.5min and REM sleep latency 1h 31min.     Stage NREM1 5.4%, stage NREM2 45.6%, stage NREM 3 31.3% and REM 17.6% of total sleep time.     Wake after sleep onset 83.5min.    Respiratory parameters:  The patient has previously diagnosed Obstructive Sleep Apnea (AHI 89.5)    CPAP was initiated at 5 cm H20 and adjusted to 12 cm H20.  Consolidated sleep was first seen at 7 cm H20 in supine NREM. A pressure setting of 10 cm H20 was effective in supine REM.    Baseline oxygen saturation ranged from 96% to 98%.     Baseline hypoxemia was not present.     EMG:  No significant periodic limb movements (PLM) during sleep were present.    ECG:  Baseline ECG revealed normal sinus rhythm. No significant arrhythmias noted.     EEG:  Expanded seizure montage was not used.   No seizure activity was noted.    Impression:    Very severe obstructive sleep apnea (G47.33)       Recommendations:    -auto-CPAP with CPAP min = 10cwp and CPAP max = 15cwp is recommended.  -the patient is established with a sleep medicine provider.  -if the patient has difficulty tolerating CPAP, CPAP settings as low as 7cwp would still likely be of significant benefit.      Oscar Conklin MD    (This Sleep Study was interpreted by a Board Certified Sleep Specialist who conducted an epoch-by-epoch review of the entire raw data recording.)  (The indication for this sleep study was reviewed and deemed appropriate by AASM Practice Parameters or other reasons by a Board Certified Sleep Specialist.)          Tables

## 2020-11-03 ENCOUNTER — TELEPHONE (OUTPATIENT)
Dept: SLEEP MEDICINE | Facility: CLINIC | Age: 70
End: 2020-11-03

## 2020-11-03 DIAGNOSIS — G47.33 OBSTRUCTIVE SLEEP APNEA: Primary | ICD-10-CM

## 2020-11-03 NOTE — TELEPHONE ENCOUNTER
I left pt a vm to discuss msg from JACKELINE Vogel:   that effective pressure found on recent study and order for papmachine has been placed. After authorized she'll get a call from [employer] [facility] [employer] [facility] (if no word w/i next 7-10days please call them [phone])to setup appt with a resp therapist to get machine/mask selection/fit/costetc... See me back in clinic (please bring your machine) 4-5 wks (ensure after 31days of use)  AFTER setup to ensure you're doing well /adherent

## 2020-11-04 ENCOUNTER — OFFICE VISIT (OUTPATIENT)
Dept: OPHTHALMOLOGY | Facility: CLINIC | Age: 70
End: 2020-11-04
Payer: MEDICARE

## 2020-11-04 ENCOUNTER — CLINICAL SUPPORT (OUTPATIENT)
Dept: OPHTHALMOLOGY | Facility: CLINIC | Age: 70
End: 2020-11-04
Payer: MEDICARE

## 2020-11-04 DIAGNOSIS — E07.9 THYROID EYE DISEASE: Primary | ICD-10-CM

## 2020-11-04 DIAGNOSIS — H25.813 COMBINED FORMS OF AGE-RELATED CATARACT OF BOTH EYES: ICD-10-CM

## 2020-11-04 DIAGNOSIS — H57.89 THYROID EYE DISEASE: Primary | ICD-10-CM

## 2020-11-04 PROCEDURE — 99999 PR PBB SHADOW E&M-EST. PATIENT-LVL III: ICD-10-PCS | Mod: PBBFAC,HCNC,, | Performed by: OPHTHALMOLOGY

## 2020-11-04 PROCEDURE — 92004 COMPRE OPH EXAM NEW PT 1/>: CPT | Mod: HCNC,S$GLB,, | Performed by: OPHTHALMOLOGY

## 2020-11-04 PROCEDURE — 92083 EXTENDED VISUAL FIELD XM: CPT | Mod: HCNC,S$GLB,, | Performed by: OPHTHALMOLOGY

## 2020-11-04 PROCEDURE — 92083 HUMPHREY VISUAL FIELD - OU - BOTH EYES: ICD-10-PCS | Mod: HCNC,S$GLB,, | Performed by: OPHTHALMOLOGY

## 2020-11-04 PROCEDURE — 99999 PR PBB SHADOW E&M-EST. PATIENT-LVL III: CPT | Mod: PBBFAC,HCNC,, | Performed by: OPHTHALMOLOGY

## 2020-11-04 PROCEDURE — 92004 PR EYE EXAM, NEW PATIENT,COMPREHESV: ICD-10-PCS | Mod: HCNC,S$GLB,, | Performed by: OPHTHALMOLOGY

## 2020-11-04 NOTE — LETTER
[facility] 1st Fl  [address]  [facility] [address]  Phone: [phone]  Fax: [phone]   November 4, 2020    Summer Rajput MD  [address]  [facility] [address]    Patient: Manju Aranda   MR Number: [id number]   YOB: 1950   Date of Visit: 11/4/2020       Dear Dr. Rajput:    Thank you for referring Manju Aranda to me for evaluation. Here is my assessment and plan of care:    Assessment/Plan     For exam results, see Encounter Report.    Thyroid eye disease  -     Carlson Visual Field - OU - Extended - Both Eyes    Combined forms of age-related cataract of both eyes      Ms. Aranda has no evidence of active thyroid eye disease. She appears at no risk for corneal ulceration or optic nerve compression. She has early cataracts; surgery for the cataracts is not indicated yet. I will repeat her exam in six months to monitor for progression of eye findings related to thyroid eye disease.          Below you will find my full exam findings. If you have questions, please do not hesitate to call me. I look forward to following Ms. Manju Aranda along with you.    Sincerely,          Luis Boo MD       CC  No Recipients             Base Eye Exam     Visual Acuity (Snellen - Linear)       Right Left    Dist cc 20/30 20/25    Dist ph cc NI NI    Correction: Glasses          Tonometry (Applanation, 2:33 PM)       Right Left    Pressure 16 16          Pupils       Dark Light Shape React APD    Right 4 2 Round Brisk None    Left 4 2 Round Brisk None          Visual Fields      See HVF report           Extraocular Movement       Right Left     Full, Ortho Full, Ortho          Neuro/Psych     Oriented x3: Yes    Mood/Affect: Normal          Dilation     Both eyes: 1% Mydriacyl, 2.5% Phenylephrine @ 2:33 PM            Additional Tests     Color       Right Left    Ishihara 12/12 12/12            Slit Lamp and Fundus Exam     External Exam       Right Left     External Normal Normal    Palpebral fissure 9 mm 9 mm    MRD1 3.0 mm 3.0 mm    Superior scleral show 0.0 mm 0.0 mm    Inferior scleral show 0.0 mm 0.0 mm    Levator 17 mm 17 mm    Lagophthalmos 0 mm 0 mm    Lid crease 6 mm 5 mm          Exophthalmometry (Base: 110 mm)       Right Left     15 mm 15 mm          Slit Lamp Exam       Right Left    Lids/Lashes Normal Normal    Conjunctiva/Sclera White and quiet White and quiet    Cornea Clear Clear    Anterior Chamber Deep and quiet Deep and quiet    Iris Round and reactive Round and reactive    Lens 2+ Cortical cataract, 2+ Nuclear sclerosis 2+ Cortical cataract, 2+ Nuclear sclerosis    Vitreous Normal Normal          Fundus Exam       Right Left    Disc Normal Normal    C/D Ratio 0.3 0.3    Macula Normal Normal    Vessels Normal Normal    Periphery Normal Normal

## 2020-11-04 NOTE — PROGRESS NOTES
HPI     Referred by Dr.Brandy DAYAMI Rajput  Dx w/Graves' x 2 years ago.  Patient states OU vision seem stable.  No eye pain.  Review HVF    I have personally interviewed the patient, reviewed the history and   examined the patient and agree with the technician's exam.    Last edited by Luis Boo MD on 11/4/2020  2:12 PM. (History)            Assessment /Plan     For exam results, see Encounter Report.    Thyroid eye disease  -     Carlson Visual Field - OU - Extended - Both Eyes    Combined forms of age-related cataract of both eyes      Ms. Aranda has no evidence of active thyroid eye disease. She appears at no risk for corneal ulceration or optic nerve compression. She has early cataracts; surgery for the cataracts is not indicated yet. I will repeat her exam in six months to monitor for progression of eye findings related to thyroid eye disease.

## 2020-11-09 ENCOUNTER — LAB VISIT (OUTPATIENT)
Dept: LAB | Facility: HOSPITAL | Age: 70
End: 2020-11-09
Attending: INTERNAL MEDICINE
Payer: MEDICARE

## 2020-11-09 DIAGNOSIS — E05.00 GRAVES DISEASE: ICD-10-CM

## 2020-11-09 LAB
T4 FREE SERPL-MCNC: 0.79 NG/DL (ref 0.71–1.51)
TSH SERPL DL<=0.005 MIU/L-ACNC: 0.03 UIU/ML (ref 0.4–4)

## 2020-11-09 PROCEDURE — 36415 COLL VENOUS BLD VENIPUNCTURE: CPT | Mod: HCNC

## 2020-11-09 PROCEDURE — 84443 ASSAY THYROID STIM HORMONE: CPT | Mod: HCNC

## 2020-11-09 PROCEDURE — 84439 ASSAY OF FREE THYROXINE: CPT | Mod: HCNC

## 2020-11-11 ENCOUNTER — OFFICE VISIT (OUTPATIENT)
Dept: INTERNAL MEDICINE | Facility: CLINIC | Age: 70
End: 2020-11-11
Payer: MEDICARE

## 2020-11-11 ENCOUNTER — IMMUNIZATION (OUTPATIENT)
Dept: PHARMACY | Facility: CLINIC | Age: 70
End: 2020-11-11
Payer: MEDICARE

## 2020-11-11 VITALS
HEART RATE: 77 BPM | OXYGEN SATURATION: 98 % | BODY MASS INDEX: 38.69 KG/M2 | WEIGHT: 261.25 LBS | HEIGHT: 69 IN | DIASTOLIC BLOOD PRESSURE: 80 MMHG | SYSTOLIC BLOOD PRESSURE: 128 MMHG

## 2020-11-11 DIAGNOSIS — Z92.89 H/O BONE DENSITY STUDY: ICD-10-CM

## 2020-11-11 DIAGNOSIS — Z23 IMMUNIZATION DUE: ICD-10-CM

## 2020-11-11 DIAGNOSIS — E05.00 GRAVES DISEASE: Primary | ICD-10-CM

## 2020-11-11 PROCEDURE — 99999 PR PBB SHADOW E&M-EST. PATIENT-LVL IV: CPT | Mod: PBBFAC,HCNC,, | Performed by: INTERNAL MEDICINE

## 2020-11-11 PROCEDURE — 3079F PR MOST RECENT DIASTOLIC BLOOD PRESSURE 80-89 MM HG: ICD-10-PCS | Mod: HCNC,CPTII,S$GLB, | Performed by: INTERNAL MEDICINE

## 2020-11-11 PROCEDURE — 99999 PR PBB SHADOW E&M-EST. PATIENT-LVL IV: ICD-10-PCS | Mod: PBBFAC,HCNC,, | Performed by: INTERNAL MEDICINE

## 2020-11-11 PROCEDURE — 3074F SYST BP LT 130 MM HG: CPT | Mod: HCNC,CPTII,S$GLB, | Performed by: INTERNAL MEDICINE

## 2020-11-11 PROCEDURE — 1159F PR MEDICATION LIST DOCUMENTED IN MEDICAL RECORD: ICD-10-PCS | Mod: HCNC,S$GLB,, | Performed by: INTERNAL MEDICINE

## 2020-11-11 PROCEDURE — 3008F BODY MASS INDEX DOCD: CPT | Mod: HCNC,CPTII,S$GLB, | Performed by: INTERNAL MEDICINE

## 2020-11-11 PROCEDURE — 1101F PR PT FALLS ASSESS DOC 0-1 FALLS W/OUT INJ PAST YR: ICD-10-PCS | Mod: HCNC,CPTII,S$GLB, | Performed by: INTERNAL MEDICINE

## 2020-11-11 PROCEDURE — 99214 PR OFFICE/OUTPT VISIT, EST, LEVL IV, 30-39 MIN: ICD-10-PCS | Mod: HCNC,S$GLB,, | Performed by: INTERNAL MEDICINE

## 2020-11-11 PROCEDURE — 99214 OFFICE O/P EST MOD 30 MIN: CPT | Mod: HCNC,S$GLB,, | Performed by: INTERNAL MEDICINE

## 2020-11-11 PROCEDURE — 3074F PR MOST RECENT SYSTOLIC BLOOD PRESSURE < 130 MM HG: ICD-10-PCS | Mod: HCNC,CPTII,S$GLB, | Performed by: INTERNAL MEDICINE

## 2020-11-11 PROCEDURE — 1159F MED LIST DOCD IN RCRD: CPT | Mod: HCNC,S$GLB,, | Performed by: INTERNAL MEDICINE

## 2020-11-11 PROCEDURE — 3008F PR BODY MASS INDEX (BMI) DOCUMENTED: ICD-10-PCS | Mod: HCNC,CPTII,S$GLB, | Performed by: INTERNAL MEDICINE

## 2020-11-11 PROCEDURE — 1101F PT FALLS ASSESS-DOCD LE1/YR: CPT | Mod: HCNC,CPTII,S$GLB, | Performed by: INTERNAL MEDICINE

## 2020-11-11 PROCEDURE — 3079F DIAST BP 80-89 MM HG: CPT | Mod: HCNC,CPTII,S$GLB, | Performed by: INTERNAL MEDICINE

## 2020-11-11 PROCEDURE — 1126F AMNT PAIN NOTED NONE PRSNT: CPT | Mod: HCNC,S$GLB,, | Performed by: INTERNAL MEDICINE

## 2020-11-11 PROCEDURE — 1126F PR PAIN SEVERITY QUANTIFIED, NO PAIN PRESENT: ICD-10-PCS | Mod: HCNC,S$GLB,, | Performed by: INTERNAL MEDICINE

## 2020-11-11 NOTE — PROGRESS NOTES
Subjective:      Patient ID: Manju Aranda is a 70 y.o. female.    Chief Complaint: Follow-up    HPI:  HPI   Patient is here in follow-up of shortness of breath.  I have checked her several times while she is under treatment for the hyperthyroidism.  There was no evidence of atrial fibrillation.  Today she states that she is feeling much better.  She has no shortness of breath.  She has no palpitations and her thyroid TSH is now improving.  She will have a follow-up with her endocrinologist in December    .  She asked me to review the bone density which is normal no additional treatment is indicated.    We discussed her immunization and she is due for the 2nd injection if Shingrix which she may do today.  Patient Active Problem List   Diagnosis    Hypertension    Liver cyst    Paraproteinemia    Smoldering multiple myeloma (SMM)    Migraine headache    Mitral valve disorder    Tricuspid valve disease    ADPKD (autosomal dominant polycystic kidney disease)    Benign hypertensive renal disease without renal failure    Tachycardia    Aortic atherosclerosis    Microhematuria    Graves disease    Tortuous aorta    Morbid obesity with BMI of 40.0-44.9, adult    History of adenomatous polyp of colon    Family history of diabetes mellitus    Palpitations    Stage 3 chronic kidney disease    Obstructive sleep apnea    Graves' eye disease    BURK (dyspnea on exertion)     Past Medical History:   Diagnosis Date    Allergy     Anemia     Arthritis     Cholelithiases     Cyst of kidney, acquired     Diverticulitis     Elevated TSH     Family history of Graves' disease: daughter, maternal aunt, maternal uncle 9/13/2016    Glaucoma     Graves disease     Hx of colonic polyps     Hypertension 1981    Liver cyst     MGUS (monoclonal gammopathy of unknown significance)     Migraine headache     Mitral valve problem     leakage    Obesity     Paraproteinemia     Sleep apnea     Smoldering  multiple myeloma 2013    Tricuspid valve disease     leakage     Past Surgical History:   Procedure Laterality Date    APPENDECTOMY      COLONOSCOPY N/A 10/23/2015    Procedure: COLONOSCOPY;  Surgeon: Brandon Ruelas MD;  [facility]  Service: Endoscopy;  Laterality: N/A;  Had divertiulitis in 5/29/2015 with a recommendation for colonoscopy in 8 weeks    Dr. Ruelas is her GI physician    COLONOSCOPY N/A 11/5/2018    Procedure: COLONOSCOPY;  Surgeon: Brandon Ruelas MD;  [facility]  Service: Endoscopy;  Laterality: N/A;  Dr. Ruelas did the last one multiple polyps, patient had recent diverticulitis should not schedule before Oct 10th    HYSTERECTOMY  1978 or 1979    OOPHORECTOMY       Family History   Problem Relation Age of Onset    Heart disease Mother         MI    Heart attack Mother     Heart disease Maternal Aunt         MI    Heart disease Maternal Aunt         MI    Cancer Paternal Grandmother         GYN cancer - unknown cancer    Colon cancer Maternal Uncle     Cancer Maternal Uncle         colon ca    Hypertension Father     Heart disease Sister         fast heart rate    Cataracts Sister     Glaucoma Sister     Graves' disease Daughter     No Known Problems Son     No Known Problems Sister     No Known Problems Daughter     No Known Problems Son     Melanoma Neg Hx     Breast cancer Neg Hx     Ovarian cancer Neg Hx     Colon polyps Neg Hx     Rectal cancer Neg Hx     Stomach cancer Neg Hx     Esophageal cancer Neg Hx     Ulcerative colitis Neg Hx     Crohn's disease Neg Hx     Amblyopia Neg Hx     Blindness Neg Hx     Macular degeneration Neg Hx     Strabismus Neg Hx     Retinal detachment Neg Hx      Review of Systems   Constitutional: Negative for chills, fever and unexpected weight change.   HENT: Negative for trouble swallowing.    Respiratory: Negative for cough, shortness of breath and wheezing.    Cardiovascular:  "Negative for chest pain and palpitations.   Gastrointestinal: Negative for abdominal distention, abdominal pain, blood in stool and vomiting.   Musculoskeletal: Negative for back pain.     Objective:     Vitals:    11/11/20 0912   BP: 128/80   Pulse: 77   SpO2: 98%   Weight: 118.5 kg (261 lb 3.9 oz)   Height: 5' 9" (1.753 m)   PainSc: 0-No pain     Body mass index is 38.58 kg/m².  Physical Exam  Constitutional:       General: She is not in acute distress.     Appearance: She is well-developed.   Neck:      Thyroid: No thyromegaly.      Vascular: No carotid bruit.   Cardiovascular:      Rate and Rhythm: Normal rate and regular rhythm.      Chest Wall: PMI is not displaced.      Heart sounds: Normal heart sounds.   Pulmonary:      Effort: Pulmonary effort is normal. No respiratory distress.      Breath sounds: Normal breath sounds.   Abdominal:      General: Bowel sounds are normal. There is no distension.      Palpations: Abdomen is soft.      Tenderness: There is no abdominal tenderness.   Neurological:      Mental Status: She is alert and oriented to person, place, and time.       Assessment:   No diagnosis found.  Plan:   There are no diagnoses linked to this encounter.    Problem List Items Addressed This Visit     None        No orders of the defined types were placed in this encounter.    Follow up in about 3 months (around 2/11/2021) for Follow up.     Medication List          Accurate as of November 11, 2020 10:09 AM. If you have any questions, ask your nurse or doctor.            START taking these medications    SHINGRIX (PF) 50 mcg/0.5 mL injection  Generic drug: varicella-zoster [id number] (PF)  Inject 0.5 mLs into the muscle once. for 1 dose        CONTINUE taking these medications    aspirin 81 MG Chew     FLUZONE HIGHDOSE QUAD 20-21  mcg/0.7 mL Syrg  Generic drug: flu vacc [id number](65yr up)-PF     magnesium oxide 400 mg (241.3 mg magnesium) tablet  Commonly known as: MAG-OX     methIMAzole 10 MG " Tab  Commonly known as: TAPAZOLE  Take 3 tablets (30 mg total) by mouth once daily.     multivit with min-folic acid 0.4 mg Tab     ondansetron 4 MG tablet  Commonly known as: ZOFRAN  Take 1-2 tablets (4-8 mg total) by mouth every 8 (eight) hours as needed for Nausea.     propranoloL 20 MG tablet  Commonly known as: INDERAL  Take 1 tablet (20 mg total) by mouth 2 (two) times daily.     quinapriL 20 MG tablet  Commonly known as: ACCUPRIL  TAKE 1 TABLET BY MOUTH EVERY DAY     rizatriptan 10 MG tablet  Commonly known as: MAXALT  TAKE 1 TABLET(10 MG) BY MOUTH EVERY 2 HOURS AS NEEDED FOR MIGRAINE. MAX 30 MG/ 24 AT BEDTIME     VITAMIN C 500 MG tablet  Generic drug: ascorbic acid (vitamin C)     vitamin D 1000 units Tab  Commonly known as: VITAMIN D3           Where to Get Your Medications      These medications were sent to [facility]  [address] [place] [id number]    Hours: Mon-Fri, 8a-5:30p Phone: [phone]   · SHINGRIX (PF) 50 mcg/0.5 mL injection

## 2020-11-16 ENCOUNTER — TELEPHONE (OUTPATIENT)
Dept: INTERNAL MEDICINE | Facility: CLINIC | Age: 70
End: 2020-11-16

## 2020-11-16 NOTE — TELEPHONE ENCOUNTER
----- Message from Rosalba Douglass sent at 11/16/2020  1:11 PM CST -----  Contact: Self [phone]  Would like to receive medical advice.    Would they like a call back or a response via [facility]:  call back    Additional information:  Calling to r/s the [employer] mammo appt. The system did not allow me to r/s. Patient I requesting a call back regarding message.

## 2020-11-19 ENCOUNTER — HOSPITAL ENCOUNTER (OUTPATIENT)
Dept: RADIOLOGY | Facility: HOSPITAL | Age: 70
Discharge: HOME | End: 2020-11-19
Attending: INTERNAL MEDICINE
Payer: MEDICARE

## 2020-11-19 DIAGNOSIS — Z12.31 ENCOUNTER FOR SCREENING MAMMOGRAM FOR MALIGNANT NEOPLASM OF BREAST: ICD-10-CM

## 2020-11-19 PROCEDURE — 77063 BREAST TOMOSYNTHESIS BI: CPT | Mod: 26,HCNC,, | Performed by: RADIOLOGY

## 2020-11-19 PROCEDURE — 77067 MAMMO DIGITAL SCREENING BILAT WITH TOMO: ICD-10-PCS | Mod: 26,HCNC,, | Performed by: RADIOLOGY

## 2020-11-19 PROCEDURE — 77067 SCR MAMMO BI INCL CAD: CPT | Mod: TC,HCNC

## 2020-11-19 PROCEDURE — 77063 MAMMO DIGITAL SCREENING BILAT WITH TOMO: ICD-10-PCS | Mod: 26,HCNC,, | Performed by: RADIOLOGY

## 2020-11-19 PROCEDURE — 77067 SCR MAMMO BI INCL CAD: CPT | Mod: 26,HCNC,, | Performed by: RADIOLOGY

## 2020-11-30 ENCOUNTER — PATIENT MESSAGE (OUTPATIENT)
Dept: ENDOCRINOLOGY | Facility: CLINIC | Age: 70
End: 2020-11-30

## 2020-11-30 ENCOUNTER — PATIENT MESSAGE (OUTPATIENT)
Dept: INTERNAL MEDICINE | Facility: CLINIC | Age: 70
End: 2020-11-30

## 2020-11-30 DIAGNOSIS — E05.00 GRAVES DISEASE: Primary | ICD-10-CM

## 2020-12-02 ENCOUNTER — LAB VISIT (OUTPATIENT)
Dept: LAB | Facility: HOSPITAL | Age: 70
End: 2020-12-02
Attending: INTERNAL MEDICINE
Payer: MEDICARE

## 2020-12-02 DIAGNOSIS — E05.00 GRAVES DISEASE: ICD-10-CM

## 2020-12-02 LAB
T4 FREE SERPL-MCNC: 1.05 NG/DL (ref 0.71–1.51)
TSH SERPL DL<=0.005 MIU/L-ACNC: <0.01 UIU/ML (ref 0.4–4)

## 2020-12-02 PROCEDURE — 36415 COLL VENOUS BLD VENIPUNCTURE: CPT | Mod: HCNC

## 2020-12-02 PROCEDURE — 84439 ASSAY OF FREE THYROXINE: CPT | Mod: HCNC

## 2020-12-02 PROCEDURE — 84443 ASSAY THYROID STIM HORMONE: CPT | Mod: HCNC

## 2020-12-03 ENCOUNTER — OFFICE VISIT (OUTPATIENT)
Dept: INTERNAL MEDICINE | Facility: CLINIC | Age: 70
End: 2020-12-03
Payer: MEDICARE

## 2020-12-03 ENCOUNTER — HOSPITAL ENCOUNTER (OUTPATIENT)
Dept: RADIOLOGY | Facility: HOSPITAL | Age: 70
Discharge: HOME | End: 2020-12-03
Attending: INTERNAL MEDICINE
Payer: MEDICARE

## 2020-12-03 VITALS
BODY MASS INDEX: 37.78 KG/M2 | DIASTOLIC BLOOD PRESSURE: 82 MMHG | SYSTOLIC BLOOD PRESSURE: 128 MMHG | WEIGHT: 255.06 LBS | OXYGEN SATURATION: 97 % | HEART RATE: 92 BPM | HEIGHT: 69 IN

## 2020-12-03 DIAGNOSIS — R10.2 SUPRAPUBIC ABDOMINAL PAIN: ICD-10-CM

## 2020-12-03 DIAGNOSIS — E05.00 GRAVES DISEASE: ICD-10-CM

## 2020-12-03 DIAGNOSIS — R10.32 LLQ ABDOMINAL PAIN: Primary | ICD-10-CM

## 2020-12-03 DIAGNOSIS — D47.2 SMOLDERING MULTIPLE MYELOMA (SMM): ICD-10-CM

## 2020-12-03 DIAGNOSIS — R10.32 LLQ ABDOMINAL PAIN: ICD-10-CM

## 2020-12-03 DIAGNOSIS — R10.32 LEFT LOWER QUADRANT PAIN: ICD-10-CM

## 2020-12-03 LAB
BILIRUB SERPL-MCNC: NEGATIVE MG/DL
BLOOD URINE, POC: ABNORMAL
CLARITY, POC UA: ABNORMAL
COLOR, POC UA: ABNORMAL
GLUCOSE UR QL STRIP: NORMAL
KETONES UR QL STRIP: NEGATIVE
LEUKOCYTE ESTERASE URINE, POC: NEGATIVE
NITRITE, POC UA: NEGATIVE
PH, POC UA: 5
PROTEIN, POC: ABNORMAL
SPECIFIC GRAVITY, POC UA: ABNORMAL
UROBILINOGEN, POC UA: NORMAL

## 2020-12-03 PROCEDURE — 99999 PR PBB SHADOW E&M-EST. PATIENT-LVL IV: CPT | Mod: PBBFAC,HCNC,, | Performed by: INTERNAL MEDICINE

## 2020-12-03 PROCEDURE — 1125F AMNT PAIN NOTED PAIN PRSNT: CPT | Mod: HCNC,S$GLB,, | Performed by: INTERNAL MEDICINE

## 2020-12-03 PROCEDURE — 3074F SYST BP LT 130 MM HG: CPT | Mod: HCNC,CPTII,S$GLB, | Performed by: INTERNAL MEDICINE

## 2020-12-03 PROCEDURE — 3288F PR FALLS RISK ASSESSMENT DOCUMENTED: ICD-10-PCS | Mod: HCNC,CPTII,S$GLB, | Performed by: INTERNAL MEDICINE

## 2020-12-03 PROCEDURE — 1125F PR PAIN SEVERITY QUANTIFIED, PAIN PRESENT: ICD-10-PCS | Mod: HCNC,S$GLB,, | Performed by: INTERNAL MEDICINE

## 2020-12-03 PROCEDURE — 99999 PR PBB SHADOW E&M-EST. PATIENT-LVL IV: ICD-10-PCS | Mod: PBBFAC,HCNC,, | Performed by: INTERNAL MEDICINE

## 2020-12-03 PROCEDURE — 1101F PR PT FALLS ASSESS DOC 0-1 FALLS W/OUT INJ PAST YR: ICD-10-PCS | Mod: HCNC,CPTII,S$GLB, | Performed by: INTERNAL MEDICINE

## 2020-12-03 PROCEDURE — 1159F PR MEDICATION LIST DOCUMENTED IN MEDICAL RECORD: ICD-10-PCS | Mod: HCNC,S$GLB,, | Performed by: INTERNAL MEDICINE

## 2020-12-03 PROCEDURE — 3008F PR BODY MASS INDEX (BMI) DOCUMENTED: ICD-10-PCS | Mod: HCNC,CPTII,S$GLB, | Performed by: INTERNAL MEDICINE

## 2020-12-03 PROCEDURE — 1101F PT FALLS ASSESS-DOCD LE1/YR: CPT | Mod: HCNC,CPTII,S$GLB, | Performed by: INTERNAL MEDICINE

## 2020-12-03 PROCEDURE — 74177 CT ABD & PELVIS W/CONTRAST: CPT | Mod: 26,HCNC,, | Performed by: RADIOLOGY

## 2020-12-03 PROCEDURE — 3079F PR MOST RECENT DIASTOLIC BLOOD PRESSURE 80-89 MM HG: ICD-10-PCS | Mod: HCNC,CPTII,S$GLB, | Performed by: INTERNAL MEDICINE

## 2020-12-03 PROCEDURE — 3079F DIAST BP 80-89 MM HG: CPT | Mod: HCNC,CPTII,S$GLB, | Performed by: INTERNAL MEDICINE

## 2020-12-03 PROCEDURE — 99214 PR OFFICE/OUTPT VISIT, EST, LEVL IV, 30-39 MIN: ICD-10-PCS | Mod: HCNC,25,S$GLB, | Performed by: INTERNAL MEDICINE

## 2020-12-03 PROCEDURE — 99214 OFFICE O/P EST MOD 30 MIN: CPT | Mod: HCNC,25,S$GLB, | Performed by: INTERNAL MEDICINE

## 2020-12-03 PROCEDURE — 81002 URINALYSIS NONAUTO W/O SCOPE: CPT | Mod: HCNC,S$GLB,, | Performed by: INTERNAL MEDICINE

## 2020-12-03 PROCEDURE — 3074F PR MOST RECENT SYSTOLIC BLOOD PRESSURE < 130 MM HG: ICD-10-PCS | Mod: HCNC,CPTII,S$GLB, | Performed by: INTERNAL MEDICINE

## 2020-12-03 PROCEDURE — 3288F FALL RISK ASSESSMENT DOCD: CPT | Mod: HCNC,CPTII,S$GLB, | Performed by: INTERNAL MEDICINE

## 2020-12-03 PROCEDURE — 74177 CT ABD & PELVIS W/CONTRAST: CPT | Mod: TC,HCNC

## 2020-12-03 PROCEDURE — 81002 POCT URINE DIPSTICK WITHOUT MICROSCOPE: ICD-10-PCS | Mod: HCNC,S$GLB,, | Performed by: INTERNAL MEDICINE

## 2020-12-03 PROCEDURE — 3008F BODY MASS INDEX DOCD: CPT | Mod: HCNC,CPTII,S$GLB, | Performed by: INTERNAL MEDICINE

## 2020-12-03 PROCEDURE — 25500020 PHARM REV CODE 255: Mod: HCNC | Performed by: INTERNAL MEDICINE

## 2020-12-03 PROCEDURE — 1159F MED LIST DOCD IN RCRD: CPT | Mod: HCNC,S$GLB,, | Performed by: INTERNAL MEDICINE

## 2020-12-03 PROCEDURE — 74177 CT ABDOMEN PELVIS WITH CONTRAST: ICD-10-PCS | Mod: 26,HCNC,, | Performed by: RADIOLOGY

## 2020-12-03 RX ADMIN — IOHEXOL 100 ML: 350 INJECTION, SOLUTION INTRAVENOUS at 04:12

## 2020-12-03 RX ADMIN — IOHEXOL 15 ML: 350 INJECTION, SOLUTION INTRAVENOUS at 03:12

## 2020-12-03 RX ADMIN — IOHEXOL 15 ML: 350 INJECTION, SOLUTION INTRAVENOUS at 02:12

## 2020-12-03 NOTE — PROGRESS NOTES
Subjective:      Patient ID: Manju Aranda is a 70 y.o. female.    Chief Complaint: Follow-up    HPI:  Abdominal Pain  This is a new problem. Episode onset: started a week ago. The onset quality is gradual. The problem occurs constantly. The problem has been gradually worsening. The pain is located in the LLQ and suprapubic region. The pain is at a severity of 8/10. Quality: dull. The abdominal pain does not radiate. Associated symptoms include anorexia and flatus. Pertinent negatives include no belching, constipation, diarrhea, dysuria, fever, frequency, hematuria, nausea or vomiting. The pain is aggravated by certain positions. The pain is relieved by nothing. Treatments tried: Pepto. The treatment provided no relief.     Patient Active Problem List   Diagnosis    Hypertension    Liver cyst    Paraproteinemia    Smoldering multiple myeloma (SMM)    Migraine headache    Mitral valve disorder    Tricuspid valve disease    ADPKD (autosomal dominant polycystic kidney disease)    Benign hypertensive renal disease without renal failure    Tachycardia    Aortic atherosclerosis    Microhematuria    Graves disease    Tortuous aorta    Morbid obesity with BMI of 40.0-44.9, adult    History of adenomatous polyp of colon    Family history of diabetes mellitus    Palpitations    Stage 3 chronic kidney disease    Obstructive sleep apnea    Graves' eye disease    BURK (dyspnea on exertion)     Past Medical History:   Diagnosis Date    Allergy     Anemia     Arthritis     Cholelithiases     Cyst of kidney, acquired     Diverticulitis     Elevated TSH     Family history of Graves' disease: daughter, maternal aunt, maternal uncle 9/13/2016    Glaucoma     Graves disease     Hx of colonic polyps     Hypertension 1981    Liver cyst     MGUS (monoclonal gammopathy of unknown significance)     Migraine headache     Mitral valve problem     leakage    Obesity     Paraproteinemia     Sleep  apnea     Smoldering multiple myeloma 2013    Tricuspid valve disease     leakage     Past Surgical History:   Procedure Laterality Date    APPENDECTOMY      COLONOSCOPY N/A 10/23/2015    Procedure: COLONOSCOPY;  Surgeon: Brandon Ruelas MD;  Location: [facility] ([address] [place] [address]);  Service: Endoscopy;  Laterality: N/A;  Had divertiulitis in 5/29/2015 with a recommendation for colonoscopy in 8 weeks    Dr. Ruelas is her GI physician    COLONOSCOPY N/A 11/5/2018    Procedure: COLONOSCOPY;  Surgeon: Brandon Ruelas MD;  Location: [facility] ([address] [place] [address]);  Service: Endoscopy;  Laterality: N/A;  Dr. Ruelas did the last one multiple polyps, patient had recent diverticulitis should not schedule before Oct 10th    HYSTERECTOMY  1978 or 1979    OOPHORECTOMY       Family History   Problem Relation Age of Onset    Heart disease Mother         MI    Heart attack Mother     Heart disease Maternal Aunt         MI    Heart disease Maternal Aunt         MI    Cancer Paternal Grandmother         GYN cancer - unknown cancer    Colon cancer Maternal Uncle     Cancer Maternal Uncle         colon ca    Hypertension Father     Heart disease Sister         fast heart rate    Cataracts Sister     Glaucoma Sister     Graves' disease Daughter     No Known Problems Son     No Known Problems Sister     No Known Problems Daughter     No Known Problems Son     Melanoma Neg Hx     Breast cancer Neg Hx     Ovarian cancer Neg Hx     Colon polyps Neg Hx     Rectal cancer Neg Hx     Stomach cancer Neg Hx     Esophageal cancer Neg Hx     Ulcerative colitis Neg Hx     Crohn's disease Neg Hx     Amblyopia Neg Hx     Blindness Neg Hx     Macular degeneration Neg Hx     Strabismus Neg Hx     Retinal detachment Neg Hx      Review of Systems   Constitutional: Negative for fever.   Gastrointestinal: Positive for abdominal pain, anorexia and flatus. Negative for constipation, diarrhea, nausea and vomiting.   Genitourinary:  "Negative for dysuria, frequency and hematuria.   Otherwise negative  Objective:     Vitals:    12/03/20 0706   BP: 128/82   Pulse: 92   SpO2: 97%   Weight: 115.7 kg (255 lb 1.2 oz)   Height: 5' 9" (1.753 m)   PainSc:   8     Body mass index is 37.67 kg/m².  Physical Exam  Constitutional:       General: She is not in acute distress.     Appearance: She is well-developed.   Neck:      Thyroid: No thyromegaly.      Vascular: No carotid bruit.   Cardiovascular:      Rate and Rhythm: Normal rate and regular rhythm.      Chest Wall: PMI is not displaced.      Heart sounds: Normal heart sounds.   Pulmonary:      Effort: Pulmonary effort is normal. No respiratory distress.      Breath sounds: Normal breath sounds.   Abdominal:      General: There is no distension.      Palpations: Abdomen is soft.      Tenderness: There is abdominal tenderness.      Comments: LLQ pain  Suprapubic pain   Neurological:      Mental Status: She is alert and oriented to person, place, and time.       Assessment:     1. LLQ abdominal pain    2. Suprapubic abdominal pain    3. Left lower quadrant pain      Plan:   Manju was seen today for follow-up.    Diagnoses and all orders for this visit:    LLQ abdominal pain  -     CBC Auto Differential; Future  -     Comprehensive Metabolic Panel; Future    Suprapubic abdominal pain  -     CT Abdomen Pelvis With Contrast; Future  -     Urinalysis; Future  -     Urine culture; Future  -     POCT URINE DIPSTICK WITHOUT MICROSCOPE    Left lower quadrant pain  -     CT Abdomen Pelvis With Contrast; Future        Problem List Items Addressed This Visit     None      Visit Diagnoses     LLQ abdominal pain    -  Primary    Relevant Orders    CBC Auto Differential    Comprehensive Metabolic Panel    Suprapubic abdominal pain        Relevant Orders    CT Abdomen Pelvis With Contrast    Urinalysis    Urine culture    POCT URINE DIPSTICK WITHOUT MICROSCOPE    Left lower quadrant pain        Relevant Orders    CT " Abdomen Pelvis With Contrast        Orders Placed This Encounter   Procedures    Urine culture     Standing Status:   Future     Standing Expiration Date:   6/3/2021    CT Abdomen Pelvis With Contrast     Standing Status:   Future     Standing Expiration Date:   12/3/2021     Order Specific Question:   Is the patient allergic to iodine or contrast?     Answer:   No     Order Specific Question:   Is the patient on ANY Metformin medication such as Glucophage/Glucovance ?     Answer:   No     Order Specific Question:   Does the patient have any of the following risk factors?     Answer:   None    CBC Auto Differential     Standing Status:   Future     Standing Expiration Date:   2/1/2021    Comprehensive Metabolic Panel     Standing Status:   Future     Standing Expiration Date:   2/1/2021    Urinalysis     Standing Status:   Future     Standing Expiration Date:   2/1/2022    POCT URINE DIPSTICK WITHOUT MICROSCOPE     Follow up in about 1 week (around 12/10/2020).     Medication List          Accurate as of December 3, 2020  7:35 AM. If you have any questions, ask your nurse or doctor.            CONTINUE taking these medications    aspirin 81 MG Chew     FLUZONE HIGHDOSE QUAD 20-21  mcg/0.7 mL Syrg  Generic drug: flu vacc [id number](65yr up)-PF     magnesium oxide 400 mg (241.3 mg magnesium) tablet  Commonly known as: MAG-OX     methIMAzole 10 MG Tab  Commonly known as: TAPAZOLE  Take 3 tablets (30 mg total) by mouth once daily.     multivit with min-folic acid 0.4 mg Tab     ondansetron 4 MG tablet  Commonly known as: ZOFRAN  Take 1-2 tablets (4-8 mg total) by mouth every 8 (eight) hours as needed for Nausea.     propranoloL 20 MG tablet  Commonly known as: INDERAL  Take 1 tablet (20 mg total) by mouth 2 (two) times daily.     quinapriL 20 MG tablet  Commonly known as: ACCUPRIL  TAKE 1 TABLET BY MOUTH EVERY DAY     rizatriptan 10 MG tablet  Commonly known as: MAXALT  TAKE 1 TABLET(10 MG) BY MOUTH EVERY 2  HOURS AS NEEDED FOR MIGRAINE. MAX 30 MG/ 24 AT BEDTIME     VITAMIN C 500 MG tablet  Generic drug: ascorbic acid (vitamin C)     vitamin D 1000 units Tab  Commonly known as: VITAMIN D3

## 2020-12-04 ENCOUNTER — TELEPHONE (OUTPATIENT)
Dept: INTERNAL MEDICINE | Facility: CLINIC | Age: 70
End: 2020-12-04

## 2020-12-04 RX ORDER — AMOXICILLIN AND CLAVULANATE POTASSIUM 875; 125 MG/1; MG/1
1 TABLET, FILM COATED ORAL 2 TIMES DAILY
Qty: 14 TABLET | Refills: 0 | Status: SHIPPED | OUTPATIENT
Start: 2020-12-04 | End: 2021-03-25

## 2020-12-04 NOTE — TELEPHONE ENCOUNTER
Patient was told that there is no acute diverticulitis.  She does have significant diverticulosis in the left lower quadrant area where on exam she had the discomfort.  We are waiting on her urine culture but a urinalysis would not of suggested a urinary tract infection.  I have discussed with her a trial of Augmentin.  I will be seeing her in clinic within the next week.  
Never

## 2020-12-07 ENCOUNTER — PATIENT MESSAGE (OUTPATIENT)
Dept: ENDOCRINOLOGY | Facility: CLINIC | Age: 70
End: 2020-12-07

## 2020-12-07 DIAGNOSIS — E05.00 GRAVES DISEASE: Primary | ICD-10-CM

## 2020-12-07 RX ORDER — METHIMAZOLE 10 MG/1
TABLET ORAL
Qty: 120 TABLET | Refills: 11 | Status: SHIPPED | OUTPATIENT
Start: 2020-12-07 | End: 2020-12-30

## 2020-12-07 RX ORDER — METHIMAZOLE 5 MG/1
TABLET ORAL
Qty: 120 TABLET | Refills: 11 | Status: SHIPPED | OUTPATIENT
Start: 2020-12-07 | End: 2020-12-07

## 2020-12-10 ENCOUNTER — OFFICE VISIT (OUTPATIENT)
Dept: INTERNAL MEDICINE | Facility: CLINIC | Age: 70
End: 2020-12-10
Payer: MEDICARE

## 2020-12-10 VITALS
SYSTOLIC BLOOD PRESSURE: 116 MMHG | OXYGEN SATURATION: 96 % | HEART RATE: 79 BPM | BODY MASS INDEX: 36.83 KG/M2 | HEIGHT: 69 IN | WEIGHT: 248.69 LBS | DIASTOLIC BLOOD PRESSURE: 80 MMHG

## 2020-12-10 DIAGNOSIS — R10.32 LLQ PAIN: Primary | ICD-10-CM

## 2020-12-10 PROCEDURE — 1101F PR PT FALLS ASSESS DOC 0-1 FALLS W/OUT INJ PAST YR: ICD-10-PCS | Mod: HCNC,CPTII,S$GLB, | Performed by: INTERNAL MEDICINE

## 2020-12-10 PROCEDURE — 1159F PR MEDICATION LIST DOCUMENTED IN MEDICAL RECORD: ICD-10-PCS | Mod: HCNC,S$GLB,, | Performed by: INTERNAL MEDICINE

## 2020-12-10 PROCEDURE — 1101F PT FALLS ASSESS-DOCD LE1/YR: CPT | Mod: HCNC,CPTII,S$GLB, | Performed by: INTERNAL MEDICINE

## 2020-12-10 PROCEDURE — 3074F PR MOST RECENT SYSTOLIC BLOOD PRESSURE < 130 MM HG: ICD-10-PCS | Mod: HCNC,CPTII,S$GLB, | Performed by: INTERNAL MEDICINE

## 2020-12-10 PROCEDURE — 3079F PR MOST RECENT DIASTOLIC BLOOD PRESSURE 80-89 MM HG: ICD-10-PCS | Mod: HCNC,CPTII,S$GLB, | Performed by: INTERNAL MEDICINE

## 2020-12-10 PROCEDURE — 3288F FALL RISK ASSESSMENT DOCD: CPT | Mod: HCNC,CPTII,S$GLB, | Performed by: INTERNAL MEDICINE

## 2020-12-10 PROCEDURE — 3079F DIAST BP 80-89 MM HG: CPT | Mod: HCNC,CPTII,S$GLB, | Performed by: INTERNAL MEDICINE

## 2020-12-10 PROCEDURE — 3008F PR BODY MASS INDEX (BMI) DOCUMENTED: ICD-10-PCS | Mod: HCNC,CPTII,S$GLB, | Performed by: INTERNAL MEDICINE

## 2020-12-10 PROCEDURE — 3074F SYST BP LT 130 MM HG: CPT | Mod: HCNC,CPTII,S$GLB, | Performed by: INTERNAL MEDICINE

## 2020-12-10 PROCEDURE — 99213 PR OFFICE/OUTPT VISIT, EST, LEVL III, 20-29 MIN: ICD-10-PCS | Mod: HCNC,S$GLB,, | Performed by: INTERNAL MEDICINE

## 2020-12-10 PROCEDURE — 99999 PR PBB SHADOW E&M-EST. PATIENT-LVL III: ICD-10-PCS | Mod: PBBFAC,HCNC,, | Performed by: INTERNAL MEDICINE

## 2020-12-10 PROCEDURE — 99999 PR PBB SHADOW E&M-EST. PATIENT-LVL III: CPT | Mod: PBBFAC,HCNC,, | Performed by: INTERNAL MEDICINE

## 2020-12-10 PROCEDURE — 3288F PR FALLS RISK ASSESSMENT DOCUMENTED: ICD-10-PCS | Mod: HCNC,CPTII,S$GLB, | Performed by: INTERNAL MEDICINE

## 2020-12-10 PROCEDURE — 3008F BODY MASS INDEX DOCD: CPT | Mod: HCNC,CPTII,S$GLB, | Performed by: INTERNAL MEDICINE

## 2020-12-10 PROCEDURE — 99213 OFFICE O/P EST LOW 20 MIN: CPT | Mod: HCNC,S$GLB,, | Performed by: INTERNAL MEDICINE

## 2020-12-10 PROCEDURE — 1125F AMNT PAIN NOTED PAIN PRSNT: CPT | Mod: HCNC,S$GLB,, | Performed by: INTERNAL MEDICINE

## 2020-12-10 PROCEDURE — 1159F MED LIST DOCD IN RCRD: CPT | Mod: HCNC,S$GLB,, | Performed by: INTERNAL MEDICINE

## 2020-12-10 PROCEDURE — 1125F PR PAIN SEVERITY QUANTIFIED, PAIN PRESENT: ICD-10-PCS | Mod: HCNC,S$GLB,, | Performed by: INTERNAL MEDICINE

## 2020-12-11 ENCOUNTER — PATIENT MESSAGE (OUTPATIENT)
Dept: OTHER | Facility: OTHER | Age: 70
End: 2020-12-11

## 2020-12-13 NOTE — PROGRESS NOTES
Subjective:      Patient ID: Manju Aranda is a 70 y.o. female.    Chief Complaint: Follow-up    HPI:  HPI   Patient was seen initially for LLQ pain. CT was negative for diverticulitis. She was placed on Augmentin. She feels there is some improvement .  Patient Active Problem List   Diagnosis    Hypertension    Liver cyst    Paraproteinemia    Smoldering multiple myeloma (SMM)    Migraine headache    Mitral valve disorder    Tricuspid valve disease    ADPKD (autosomal dominant polycystic kidney disease)    Benign hypertensive renal disease without renal failure    Tachycardia    Aortic atherosclerosis    Microhematuria    Graves disease    Tortuous aorta    Morbid obesity with BMI of 40.0-44.9, adult    History of adenomatous polyp of colon    Family history of diabetes mellitus    Palpitations    Stage 3 chronic kidney disease    Obstructive sleep apnea    Graves' eye disease    BURK (dyspnea on exertion)     Past Medical History:   Diagnosis Date    Allergy     Anemia     Arthritis     Cholelithiases     Cyst of kidney, acquired     Diverticulitis     Elevated TSH     Family history of Graves' disease: daughter, maternal aunt, maternal uncle 9/13/2016    Glaucoma     Graves disease     Hx of colonic polyps     Hypertension 1981    Liver cyst     MGUS (monoclonal gammopathy of unknown significance)     Migraine headache     Mitral valve problem     leakage    Obesity     Paraproteinemia     Sleep apnea     Smoldering multiple myeloma 2013    Tricuspid valve disease     leakage     Past Surgical History:   Procedure Laterality Date    APPENDECTOMY      COLONOSCOPY N/A 10/23/2015    Procedure: COLONOSCOPY;  Surgeon: Brandon Ruelas MD;  Location: [facility] ([address] [place] [address]);  Service: Endoscopy;  Laterality: N/A;  Had divertiulitis in 5/29/2015 with a recommendation for colonoscopy in 8 weeks    Dr. Ruelas is her GI physician    COLONOSCOPY N/A 11/5/2018    Procedure:  "COLONOSCOPY;  Surgeon: Brandon Ruelas MD;  Location: [facility] ([address] [place] [address]);  Service: Endoscopy;  Laterality: N/A;  Dr. Ruelas did the last one multiple polyps, patient had recent diverticulitis should not schedule before Oct 10th    HYSTERECTOMY  1978 or 1979    OOPHORECTOMY       Family History   Problem Relation Age of Onset    Heart disease Mother         MI    Heart attack Mother     Heart disease Maternal Aunt         MI    Heart disease Maternal Aunt         MI    Cancer Paternal Grandmother         GYN cancer - unknown cancer    Colon cancer Maternal Uncle     Cancer Maternal Uncle         colon ca    Hypertension Father     Heart disease Sister         fast heart rate    Cataracts Sister     Glaucoma Sister     Graves' disease Daughter     No Known Problems Son     No Known Problems Sister     No Known Problems Daughter     No Known Problems Son     Melanoma Neg Hx     Breast cancer Neg Hx     Ovarian cancer Neg Hx     Colon polyps Neg Hx     Rectal cancer Neg Hx     Stomach cancer Neg Hx     Esophageal cancer Neg Hx     Ulcerative colitis Neg Hx     Crohn's disease Neg Hx     Amblyopia Neg Hx     Blindness Neg Hx     Macular degeneration Neg Hx     Strabismus Neg Hx     Retinal detachment Neg Hx      Review of Systems   Constitutional: Negative for chills, fatigue, fever and unexpected weight change.   HENT: Negative for trouble swallowing.    Respiratory: Negative for cough, shortness of breath and wheezing.    Cardiovascular: Negative for chest pain, palpitations and leg swelling.   Gastrointestinal: Negative for abdominal distention, abdominal pain, blood in stool and vomiting.        Improved LLQ pain     Objective:     Vitals:    12/10/20 1122   BP: 116/80   Pulse: 79   SpO2: 96%   Weight: 112.8 kg (248 lb 10.9 oz)   Height: 5' 9" (1.753 m)   PainSc:   5     Body mass index is 36.72 kg/m².  Physical Exam  Constitutional:       General: She is not in acute distress.   "   Appearance: Normal appearance. She is well-developed.   Neck:      Thyroid: No thyromegaly.      Vascular: No carotid bruit.   Cardiovascular:      Rate and Rhythm: Normal rate and regular rhythm.      Chest Wall: PMI is not displaced.      Heart sounds: Normal heart sounds.   Pulmonary:      Effort: Pulmonary effort is normal. No respiratory distress.      Breath sounds: Normal breath sounds.   Abdominal:      General: Bowel sounds are normal. There is no distension.      Palpations: Abdomen is soft.      Tenderness: There is no abdominal tenderness.      Comments: Mild LLQ pain   Neurological:      Mental Status: She is alert and oriented to person, place, and time.       Assessment:     1. LLQ pain      Plan:   Manju was seen today for follow-up.    Diagnoses and all orders for this visit:    LLQ pain    Improved, may stop antibiotics and follow up    Problem List Items Addressed This Visit     None      Visit Diagnoses     LLQ pain    -  Primary        No orders of the defined types were placed in this encounter.    No follow-ups on file.     Medication List          Accurate as of December 10, 2020 11:59 PM. If you have any questions, ask your nurse or doctor.            CONTINUE taking these medications    amoxicillin-clavulanate 875-125mg 875-125 mg per tablet  Commonly known as: AUGMENTIN  Take 1 tablet by mouth 2 (two) times daily.     aspirin 81 MG Chew     FLUZONE HIGHDOSE QUAD 20-21  mcg/0.7 mL Syrg  Generic drug: flu vacc [id number](65yr up)-PF     magnesium oxide 400 mg (241.3 mg magnesium) tablet  Commonly known as: MAG-OX     methIMAzole 10 MG Tab  Commonly known as: TAPAZOLE  Increase to 35 mg a day (3.5 pills a day)     multivit with min-folic acid 0.4 mg Tab     ondansetron 4 MG tablet  Commonly known as: ZOFRAN  Take 1-2 tablets (4-8 mg total) by mouth every 8 (eight) hours as needed for Nausea.     propranoloL 20 MG tablet  Commonly known as: INDERAL  Take 1 tablet (20 mg total) by  mouth 2 (two) times daily.     quinapriL 20 MG tablet  Commonly known as: ACCUPRIL  TAKE 1 TABLET BY MOUTH EVERY DAY     rizatriptan 10 MG tablet  Commonly known as: MAXALT  TAKE 1 TABLET(10 MG) BY MOUTH EVERY 2 HOURS AS NEEDED FOR MIGRAINE. MAX 30 MG/ 24 AT BEDTIME     VITAMIN C 500 MG tablet  Generic drug: ascorbic acid (vitamin C)     vitamin D 1000 units Tab  Commonly known as: VITAMIN D3

## 2020-12-17 ENCOUNTER — OFFICE VISIT (OUTPATIENT)
Dept: INTERNAL MEDICINE | Facility: CLINIC | Age: 70
End: 2020-12-17
Payer: MEDICARE

## 2020-12-17 VITALS
OXYGEN SATURATION: 95 % | HEART RATE: 69 BPM | DIASTOLIC BLOOD PRESSURE: 82 MMHG | HEIGHT: 69 IN | SYSTOLIC BLOOD PRESSURE: 128 MMHG | WEIGHT: 256.81 LBS | BODY MASS INDEX: 38.04 KG/M2

## 2020-12-17 DIAGNOSIS — R10.32 ABDOMINAL PAIN, LEFT LOWER QUADRANT: Primary | ICD-10-CM

## 2020-12-17 PROCEDURE — 99999 PR PBB SHADOW E&M-EST. PATIENT-LVL III: ICD-10-PCS | Mod: PBBFAC,HCNC,, | Performed by: INTERNAL MEDICINE

## 2020-12-17 PROCEDURE — 3074F SYST BP LT 130 MM HG: CPT | Mod: HCNC,CPTII,S$GLB, | Performed by: INTERNAL MEDICINE

## 2020-12-17 PROCEDURE — 3079F PR MOST RECENT DIASTOLIC BLOOD PRESSURE 80-89 MM HG: ICD-10-PCS | Mod: HCNC,CPTII,S$GLB, | Performed by: INTERNAL MEDICINE

## 2020-12-17 PROCEDURE — 1159F MED LIST DOCD IN RCRD: CPT | Mod: HCNC,S$GLB,, | Performed by: INTERNAL MEDICINE

## 2020-12-17 PROCEDURE — 3074F PR MOST RECENT SYSTOLIC BLOOD PRESSURE < 130 MM HG: ICD-10-PCS | Mod: HCNC,CPTII,S$GLB, | Performed by: INTERNAL MEDICINE

## 2020-12-17 PROCEDURE — 1126F AMNT PAIN NOTED NONE PRSNT: CPT | Mod: HCNC,S$GLB,, | Performed by: INTERNAL MEDICINE

## 2020-12-17 PROCEDURE — 1126F PR PAIN SEVERITY QUANTIFIED, NO PAIN PRESENT: ICD-10-PCS | Mod: HCNC,S$GLB,, | Performed by: INTERNAL MEDICINE

## 2020-12-17 PROCEDURE — 3288F PR FALLS RISK ASSESSMENT DOCUMENTED: ICD-10-PCS | Mod: HCNC,CPTII,S$GLB, | Performed by: INTERNAL MEDICINE

## 2020-12-17 PROCEDURE — 1101F PT FALLS ASSESS-DOCD LE1/YR: CPT | Mod: HCNC,CPTII,S$GLB, | Performed by: INTERNAL MEDICINE

## 2020-12-17 PROCEDURE — 1101F PR PT FALLS ASSESS DOC 0-1 FALLS W/OUT INJ PAST YR: ICD-10-PCS | Mod: HCNC,CPTII,S$GLB, | Performed by: INTERNAL MEDICINE

## 2020-12-17 PROCEDURE — 1159F PR MEDICATION LIST DOCUMENTED IN MEDICAL RECORD: ICD-10-PCS | Mod: HCNC,S$GLB,, | Performed by: INTERNAL MEDICINE

## 2020-12-17 PROCEDURE — 3008F BODY MASS INDEX DOCD: CPT | Mod: HCNC,CPTII,S$GLB, | Performed by: INTERNAL MEDICINE

## 2020-12-17 PROCEDURE — 3079F DIAST BP 80-89 MM HG: CPT | Mod: HCNC,CPTII,S$GLB, | Performed by: INTERNAL MEDICINE

## 2020-12-17 PROCEDURE — 99213 OFFICE O/P EST LOW 20 MIN: CPT | Mod: HCNC,S$GLB,, | Performed by: INTERNAL MEDICINE

## 2020-12-17 PROCEDURE — 99213 PR OFFICE/OUTPT VISIT, EST, LEVL III, 20-29 MIN: ICD-10-PCS | Mod: HCNC,S$GLB,, | Performed by: INTERNAL MEDICINE

## 2020-12-17 PROCEDURE — 3288F FALL RISK ASSESSMENT DOCD: CPT | Mod: HCNC,CPTII,S$GLB, | Performed by: INTERNAL MEDICINE

## 2020-12-17 PROCEDURE — 3008F PR BODY MASS INDEX (BMI) DOCUMENTED: ICD-10-PCS | Mod: HCNC,CPTII,S$GLB, | Performed by: INTERNAL MEDICINE

## 2020-12-17 PROCEDURE — 99999 PR PBB SHADOW E&M-EST. PATIENT-LVL III: CPT | Mod: PBBFAC,HCNC,, | Performed by: INTERNAL MEDICINE

## 2020-12-21 NOTE — PROGRESS NOTES
Subjective:      Patient ID: Manju Aranda is a 70 y.o. female.    Chief Complaint: Follow-up (1 Week )    HPI:  HPI   LLQ patient has now improved. CT was done with no focal area of abnormality. . I gave antibiotics for potential diverticulitis.She took 7 days then there was a question of side effect and the med was stopped.  Patient Active Problem List   Diagnosis    Hypertension    Liver cyst    Paraproteinemia    Smoldering multiple myeloma (SMM)    Migraine headache    Mitral valve disorder    Tricuspid valve disease    ADPKD (autosomal dominant polycystic kidney disease)    Benign hypertensive renal disease without renal failure    Tachycardia    Aortic atherosclerosis    Microhematuria    Graves disease    Tortuous aorta    Morbid obesity with BMI of 40.0-44.9, adult    History of adenomatous polyp of colon    Family history of diabetes mellitus    Palpitations    Stage 3 chronic kidney disease    Obstructive sleep apnea    Graves' eye disease    BURK (dyspnea on exertion)     Past Medical History:   Diagnosis Date    Allergy     Anemia     Arthritis     Cholelithiases     Cyst of kidney, acquired     Diverticulitis     Elevated TSH     Family history of Graves' disease: daughter, maternal aunt, maternal uncle 9/13/2016    Glaucoma     Graves disease     Hx of colonic polyps     Hypertension 1981    Liver cyst     MGUS (monoclonal gammopathy of unknown significance)     Migraine headache     Mitral valve problem     leakage    Obesity     Paraproteinemia     Sleep apnea     Smoldering multiple myeloma 2013    Tricuspid valve disease     leakage     Past Surgical History:   Procedure Laterality Date    APPENDECTOMY      COLONOSCOPY N/A 10/23/2015    Procedure: COLONOSCOPY;  Surgeon: Brandon Ruelas MD;  Location: [facility] ([address] [place] [address]);  Service: Endoscopy;  Laterality: N/A;  Had divertiulitis in 5/29/2015 with a recommendation for colonoscopy in 8 weeks      "Suraj is her GI physician    COLONOSCOPY N/A 11/5/2018    Procedure: COLONOSCOPY;  Surgeon: Brandon Ruelas MD;  Location: [facility] ([address] [place] [address]);  Service: Endoscopy;  Laterality: N/A;  Dr. Ruelas did the last one multiple polyps, patient had recent diverticulitis should not schedule before Oct 10th    HYSTERECTOMY  1978 or 1979    OOPHORECTOMY       Family History   Problem Relation Age of Onset    Heart disease Mother         MI    Heart attack Mother     Heart disease Maternal Aunt         MI    Heart disease Maternal Aunt         MI    Cancer Paternal Grandmother         GYN cancer - unknown cancer    Colon cancer Maternal Uncle     Cancer Maternal Uncle         colon ca    Hypertension Father     Heart disease Sister         fast heart rate    Cataracts Sister     Glaucoma Sister     Graves' disease Daughter     No Known Problems Son     No Known Problems Sister     No Known Problems Daughter     No Known Problems Son     Melanoma Neg Hx     Breast cancer Neg Hx     Ovarian cancer Neg Hx     Colon polyps Neg Hx     Rectal cancer Neg Hx     Stomach cancer Neg Hx     Esophageal cancer Neg Hx     Ulcerative colitis Neg Hx     Crohn's disease Neg Hx     Amblyopia Neg Hx     Blindness Neg Hx     Macular degeneration Neg Hx     Strabismus Neg Hx     Retinal detachment Neg Hx      Review of Systems   Constitutional: Negative for chills, fever and unexpected weight change.   HENT: Negative for trouble swallowing.    Respiratory: Negative for cough, shortness of breath and wheezing.    Cardiovascular: Negative for chest pain and palpitations.   Gastrointestinal: Negative for abdominal distention, abdominal pain, blood in stool and vomiting.   Musculoskeletal: Negative for back pain.     Objective:     Vitals:    12/17/20 1131   BP: 128/82   Pulse: 69   SpO2: 95%   Weight: 116.5 kg (256 lb 13.4 oz)   Height: 5' 9" (1.753 m)   PainSc: 0-No pain     Body mass index is 37.93 " kg/m².  Physical Exam  Constitutional:       General: She is not in acute distress.     Appearance: She is well-developed.   Neck:      Thyroid: No thyromegaly.      Vascular: No carotid bruit.   Cardiovascular:      Rate and Rhythm: Normal rate and regular rhythm.      Chest Wall: PMI is not displaced.      Heart sounds: Normal heart sounds.   Pulmonary:      Effort: Pulmonary effort is normal. No respiratory distress.      Breath sounds: Normal breath sounds.   Abdominal:      General: Bowel sounds are normal. There is no distension.      Palpations: Abdomen is soft.      Tenderness: There is no abdominal tenderness.   Neurological:      Mental Status: She is alert and oriented to person, place, and time.       Assessment:     1. Abdominal pain, left lower quadrant      Plan:   Manju was seen today for follow-up.    Diagnoses and all orders for this visit:    Abdominal pain, left lower quadrant  Comments:  Resolved, low level diverticulitis suspected        Problem List Items Addressed This Visit     None      Visit Diagnoses     Abdominal pain, left lower quadrant    -  Primary    Resolved, low level diverticulitis suspected        No orders of the defined types were placed in this encounter.    No follow-ups on file.     Medication List          Accurate as of December 17, 2020 11:59 PM. If you have any questions, ask your nurse or doctor.            CONTINUE taking these medications    amoxicillin-clavulanate 875-125mg 875-125 mg per tablet  Commonly known as: AUGMENTIN  Take 1 tablet by mouth 2 (two) times daily.     aspirin 81 MG Chew     FLUZONE HIGHDOSE QUAD 20-21  mcg/0.7 mL Syrg  Generic drug: flu vacc [id number](65yr up)-PF     magnesium oxide 400 mg (241.3 mg magnesium) tablet  Commonly known as: MAG-OX     methIMAzole 10 MG Tab  Commonly known as: TAPAZOLE  Increase to 35 mg a day (3.5 pills a day)     multivit with min-folic acid 0.4 mg Tab     ondansetron 4 MG tablet  Commonly known as:  ZOFRAN  Take 1-2 tablets (4-8 mg total) by mouth every 8 (eight) hours as needed for Nausea.     propranoloL 20 MG tablet  Commonly known as: INDERAL  Take 1 tablet (20 mg total) by mouth 2 (two) times daily.     quinapriL 20 MG tablet  Commonly known as: ACCUPRIL  TAKE 1 TABLET BY MOUTH EVERY DAY     rizatriptan 10 MG tablet  Commonly known as: MAXALT  TAKE 1 TABLET(10 MG) BY MOUTH EVERY 2 HOURS AS NEEDED FOR MIGRAINE. MAX 30 MG/ 24 AT BEDTIME     VITAMIN C 500 MG tablet  Generic drug: ascorbic acid (vitamin C)     vitamin D 1000 units Tab  Commonly known as: VITAMIN D3

## 2020-12-28 ENCOUNTER — PES CALL (OUTPATIENT)
Dept: ADMINISTRATIVE | Facility: CLINIC | Age: 70
End: 2020-12-28

## 2020-12-29 ENCOUNTER — TELEPHONE (OUTPATIENT)
Dept: ENDOCRINOLOGY | Facility: CLINIC | Age: 70
End: 2020-12-29

## 2020-12-30 ENCOUNTER — OFFICE VISIT (OUTPATIENT)
Dept: ENDOCRINOLOGY | Facility: CLINIC | Age: 70
End: 2020-12-30
Payer: MEDICARE

## 2020-12-30 DIAGNOSIS — E05.00 GRAVES' EYE DISEASE: ICD-10-CM

## 2020-12-30 DIAGNOSIS — E05.00 GRAVES DISEASE: ICD-10-CM

## 2020-12-30 DIAGNOSIS — R06.09 DOE (DYSPNEA ON EXERTION): ICD-10-CM

## 2020-12-30 PROCEDURE — 99499 UNLISTED E&M SERVICE: CPT | Mod: 95,,, | Performed by: INTERNAL MEDICINE

## 2020-12-30 PROCEDURE — 99214 PR OFFICE/OUTPT VISIT, EST, LEVL IV, 30-39 MIN: ICD-10-PCS | Mod: HCNC,95,, | Performed by: INTERNAL MEDICINE

## 2020-12-30 PROCEDURE — 99499 RISK ADDL DX/OHS AUDIT: ICD-10-PCS | Mod: 95,,, | Performed by: INTERNAL MEDICINE

## 2020-12-30 PROCEDURE — 1159F MED LIST DOCD IN RCRD: CPT | Mod: HCNC,95,, | Performed by: INTERNAL MEDICINE

## 2020-12-30 PROCEDURE — 99214 OFFICE O/P EST MOD 30 MIN: CPT | Mod: HCNC,95,, | Performed by: INTERNAL MEDICINE

## 2020-12-30 PROCEDURE — 1159F PR MEDICATION LIST DOCUMENTED IN MEDICAL RECORD: ICD-10-PCS | Mod: HCNC,95,, | Performed by: INTERNAL MEDICINE

## 2020-12-30 RX ORDER — METHIMAZOLE 10 MG/1
30 TABLET ORAL DAILY
Qty: 90 TABLET | Refills: 11 | Status: SHIPPED | OUTPATIENT
Start: 2020-12-30 | End: 2021-01-28

## 2020-12-30 NOTE — ASSESSMENT & PLAN NOTE
reviewed treatment options of I131, ATD or surgery, surgery being least desirable.    Discussed risks of worsening GO with I131  Review the connection of GO with tob use    Discussed the chance of Graves remission (30%) after 2 years of ATD therapy - with a 50% chance of recurrence  Patient opted for ATD - continue methimazole 30 mg qd - recent contrast may have impacted labs.  Okay to stop propanolol     Call if fever, sore throat, rash, anorexia, nausea or vomiitng.  Reviewed side effects of ATDs are rare (agranulocytosis and liver failure) but can be very serious.     Labs in 4 weeks   rtc 3 months

## 2020-12-30 NOTE — PROGRESS NOTES
Subjective:      Patient ID: Manju Aranda is a 70 y.o. female.    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    She was on methimazole 2016 - 2018     restarted sept 2020   methimazole 30 mg qd   propanolol  20 bid     Her daughter has Graves       Thyroid scan: none     Thyroid ultrasound:  2016   IMPRESSION:   No nodules. Study consistent with autoimmune thyroid disease as the etiology for the hyperthyroidism.    Thyroid ab done:      Ref. Range 9/20/2016 09:50   Thyrotropin Receptor Ab Latest Ref Range: 0.00 - 1.75 IU/L 7.33 (H)   Thyroperoxidase Antibodies Latest Ref Range: <6.0 IU/mL 31.8 (H)         current symptoms:   Less palpations  Less fatigue   weight loss has stabilized   + Brittle nails  No skin changes  No tremor   No anxiety  No BURK     She is feeling much better    Last BMD: 9/17/20     Impression:     1. Normal BMD of the lumbar spine and hip.Nl   Compared with previous DXA, BMD at the lumbar spine has increased by 5.7%, and the BMD at the total hip has decreased by -6.3%.    + red eyes - no pain or dryness - saw Dr Boo      Had a ct with contrast 12/3/20 - was found to have diverticulitis.  Is feeling much better    Review of Systems   Eyes: Negative for visual disturbance.   Cardiovascular: Negative for chest pain.   Gastrointestinal: Negative for abdominal pain.   Musculoskeletal: Negative for arthralgias.   Skin: Negative for wound.   Neurological: Negative for headaches.   Hematological: Does not bruise/bleed easily.   Psychiatric/Behavioral: Negative for sleep disturbance.       Objective:      There were no vitals taken for this visit.  BP Readings from Last 3 Encounters:   12/17/20 128/82   12/10/20 116/80   12/03/20 128/82     Wt Readings from Last 1 Encounters:   12/17/20 1131 116.5 kg (256 lb 13.4 oz)     There is no height or weight on file to calculate BMI.      Physical Exam  Psychiatric:         Attention and Perception: Attention normal.         Mood and Affect: Mood normal.         Speech: Speech normal.         Behavior: Behavior normal.         Thought Content: Thought content normal.         Judgment: Judgment normal.                Lab Review:   No results found for: HGBA1C  Lab Results   Component Value Date    CHOL 148 03/03/2020    HDL 54 03/03/2020    LDLCALC 82.8 03/03/2020    TRIG 56 03/03/2020    CHOLHDL 36.5 03/03/2020     Lab Results   Component Value Date     12/03/2020     12/03/2020    K 4.1 12/03/2020    K 4.1 12/03/2020     12/03/2020     12/03/2020    CO2 30 (H) 12/03/2020    CO2 30 (H) 12/03/2020    GLU 88 12/03/2020    GLU 88 12/03/2020    BUN 16 12/03/2020    BUN 16 12/03/2020    CREATININE 0.8 12/03/2020    CREATININE 0.8 12/03/2020    CREATININE 0.8 12/03/2020    CALCIUM 9.9 12/03/2020    CALCIUM 9.9 12/03/2020    PROT 7.8 12/03/2020    PROT 7.8 12/03/2020    ALBUMIN 3.4 (L) 12/03/2020    ALBUMIN 3.4 (L) 12/03/2020    BILITOT 0.8 12/03/2020    BILITOT 0.8 12/03/2020    ALKPHOS 109 12/03/2020    ALKPHOS 109 12/03/2020    AST 12 12/03/2020    AST 12 12/03/2020    ALT 11 12/03/2020    ALT 11 12/03/2020    ANIONGAP 6 (L) 12/03/2020    ANIONGAP 6 (L) 12/03/2020    ESTGFRAFRICA >60.0 12/03/2020    ESTGFRAFRICA >60.0 12/03/2020    ESTGFRAFRICA >60.0 12/03/2020    EGFRNONAA >60.0 12/03/2020    EGFRNONAA >60.0 12/03/2020    EGFRNONAA >60.0 12/03/2020    TSH <0.010 (L) 12/03/2020     Vit D, 25-Hydroxy   Date Value Ref Range Status   07/25/2016 40 30 - 96 ng/mL Final     Comment:     Vitamin D deficiency.........<10 ng/mL                               Vitamin D insufficiency......10-29 ng/mL       Vitamin D sufficiency........> or equal to 30 ng/mL  Vitamin D toxicity............>100 ng/mL         Assessment and Plan     Graves disease  reviewed treatment options of I131, ATD or surgery, surgery being least desirable.    Discussed risks of worsening GO with I131  Review the connection of GO with tob use    Discussed the chance of Graves remission (30%) after 2 years of ATD therapy - with a 50% chance of recurrence  Patient opted for ATD - continue methimazole 30 mg qd - recent contrast may have impacted labs.  Okay to stop propanolol     Call if fever, sore throat, rash, anorexia, nausea or vomiitng.  Reviewed side effects of ATDs are rare (agranulocytosis and liver failure) but can be very serious.     Labs in 4 weeks   rtc 3 months          Graves' eye disease  Address above  F/u with Dr Ema BURK (dyspnea on exertion)  Resolved      The patient location is: home  The chief complaint leading to consultation is: graves     Visit type: audiovisual    Face to Face time with patient: 30 minutes of total time spent on the encounter, which includes face to face time and non-face to face time preparing to see the patient (eg, review of tests), Obtaining and/or reviewing separately obtained history, Documenting clinical information in the electronic or other health record, Independently interpreting results (not separately reported) and communicating results to the patient/family/caregiver, or Care coordination (not separately reported).         Each patient to whom he or she provides medical services by telemedicine is:  (1) informed of the relationship between the physician and patient and the respective role of any other health care provider with respect to management of the patient; and (2) notified that he or she may decline to receive medical services by telemedicine and may withdraw from such care at any time.

## 2020-12-30 NOTE — PATIENT INSTRUCTIONS
Thank you for completing a virtual visit with me!     Per our conversation your medication changes are as follows:  Stop the propranolol and let me know if your hyperthyroid symptoms come back.  Continue the methimazole 30 mg a day.    We will plan to repeat labs in 4 weeks and have a telemedicine or an in-clinic visit in 3 months with labs prior to that appointment.    My staff will contact you to schedule the above.     Please let me know if you have any other questions.    Thank you,  Summer Rajput MD

## 2021-01-11 ENCOUNTER — LAB VISIT (OUTPATIENT)
Dept: PRIMARY CARE CLINIC | Facility: OTHER | Age: 71
End: 2021-01-11
Attending: INTERNAL MEDICINE
Payer: MEDICARE

## 2021-01-11 DIAGNOSIS — Z20.822 ENCOUNTER FOR LABORATORY TESTING FOR COVID-19 VIRUS: ICD-10-CM

## 2021-01-11 PROCEDURE — U0003 INFECTIOUS AGENT DETECTION BY NUCLEIC ACID (DNA OR RNA); SEVERE ACUTE RESPIRATORY SYNDROME CORONAVIRUS 2 (SARS-COV-2) (CORONAVIRUS DISEASE [COVID-19]), AMPLIFIED PROBE TECHNIQUE, MAKING USE OF HIGH THROUGHPUT TECHNOLOGIES AS DESCRIBED BY CMS-2020-01-R: HCPCS

## 2021-01-12 LAB — SARS-COV-2 RNA RESP QL NAA+PROBE: NOT DETECTED

## 2021-01-15 ENCOUNTER — TELEPHONE (OUTPATIENT)
Dept: SLEEP MEDICINE | Facility: CLINIC | Age: 71
End: 2021-01-15

## 2021-01-15 DIAGNOSIS — G43.009 MIGRAINE WITHOUT AURA AND WITHOUT STATUS MIGRAINOSUS, NOT INTRACTABLE: ICD-10-CM

## 2021-01-15 RX ORDER — RIZATRIPTAN BENZOATE 10 MG/1
TABLET ORAL
Qty: 12 TABLET | Refills: 11 | Status: SHIPPED | OUTPATIENT
Start: 2021-01-15 | End: 2023-03-08 | Stop reason: SDUPTHER

## 2021-01-26 ENCOUNTER — HOSPITAL ENCOUNTER (OUTPATIENT)
Dept: RADIOLOGY | Facility: HOSPITAL | Age: 71
Discharge: HOME | End: 2021-01-26
Attending: UROLOGY
Payer: MEDICARE

## 2021-01-26 DIAGNOSIS — Q61.2 ADPKD (AUTOSOMAL DOMINANT POLYCYSTIC KIDNEY DISEASE): ICD-10-CM

## 2021-01-26 DIAGNOSIS — R31.29 MICROHEMATURIA: ICD-10-CM

## 2021-01-26 PROCEDURE — 76770 US EXAM ABDO BACK WALL COMP: CPT | Mod: 26,,, | Performed by: RADIOLOGY

## 2021-01-26 PROCEDURE — 76770 US EXAM ABDO BACK WALL COMP: CPT | Mod: TC

## 2021-01-26 PROCEDURE — 76770 US RETROPERITONEAL COMPLETE: ICD-10-PCS | Mod: 26,,, | Performed by: RADIOLOGY

## 2021-01-27 ENCOUNTER — LAB VISIT (OUTPATIENT)
Dept: LAB | Facility: HOSPITAL | Age: 71
End: 2021-01-27
Attending: INTERNAL MEDICINE
Payer: MEDICARE

## 2021-01-27 DIAGNOSIS — E05.00 GRAVES DISEASE: ICD-10-CM

## 2021-01-27 LAB
T4 FREE SERPL-MCNC: 1.54 NG/DL (ref 0.71–1.51)
TSH SERPL DL<=0.005 MIU/L-ACNC: <0.01 UIU/ML (ref 0.4–4)

## 2021-01-27 PROCEDURE — 84439 ASSAY OF FREE THYROXINE: CPT

## 2021-01-27 PROCEDURE — 84443 ASSAY THYROID STIM HORMONE: CPT

## 2021-01-27 PROCEDURE — 36415 COLL VENOUS BLD VENIPUNCTURE: CPT

## 2021-01-28 ENCOUNTER — PATIENT OUTREACH (OUTPATIENT)
Dept: ADMINISTRATIVE | Facility: HOSPITAL | Age: 71
End: 2021-01-28

## 2021-01-28 ENCOUNTER — PATIENT MESSAGE (OUTPATIENT)
Dept: ENDOCRINOLOGY | Facility: CLINIC | Age: 71
End: 2021-01-28

## 2021-01-28 DIAGNOSIS — E05.00 GRAVES DISEASE: Primary | ICD-10-CM

## 2021-01-28 RX ORDER — METHIMAZOLE 10 MG/1
40 TABLET ORAL DAILY
Qty: 120 TABLET | Refills: 11 | Status: SHIPPED | OUTPATIENT
Start: 2021-01-28 | End: 2021-03-31

## 2021-02-01 ENCOUNTER — PATIENT MESSAGE (OUTPATIENT)
Dept: UROLOGY | Facility: CLINIC | Age: 71
End: 2021-02-01

## 2021-02-04 ENCOUNTER — CLINICAL SUPPORT (OUTPATIENT)
Dept: URGENT CARE | Facility: CLINIC | Age: 71
End: 2021-02-04
Payer: MEDICARE

## 2021-02-04 DIAGNOSIS — Z78.9 NO KNOWN HEALTH PROBLEMS: Primary | ICD-10-CM

## 2021-02-04 LAB
CTP QC/QA: YES
SARS-COV-2 RDRP RESP QL NAA+PROBE: NEGATIVE

## 2021-02-04 PROCEDURE — 99211 OFF/OP EST MAY X REQ PHY/QHP: CPT | Mod: S$GLB,,, | Performed by: FAMILY MEDICINE

## 2021-02-04 PROCEDURE — U0002 COVID-19 LAB TEST NON-CDC: HCPCS | Mod: QW,S$GLB,, | Performed by: FAMILY MEDICINE

## 2021-02-04 PROCEDURE — 99211 PR OFFICE/OUTPT VISIT, EST, LEVL I: ICD-10-PCS | Mod: S$GLB,,, | Performed by: FAMILY MEDICINE

## 2021-02-04 PROCEDURE — U0002: ICD-10-PCS | Mod: QW,S$GLB,, | Performed by: FAMILY MEDICINE

## 2021-02-11 ENCOUNTER — OFFICE VISIT (OUTPATIENT)
Dept: INTERNAL MEDICINE | Facility: CLINIC | Age: 71
End: 2021-02-11
Payer: MEDICARE

## 2021-02-11 ENCOUNTER — TELEPHONE (OUTPATIENT)
Dept: INTERNAL MEDICINE | Facility: CLINIC | Age: 71
End: 2021-02-11

## 2021-02-11 ENCOUNTER — TELEPHONE (OUTPATIENT)
Dept: ADMINISTRATIVE | Facility: CLINIC | Age: 71
End: 2021-02-11

## 2021-02-11 ENCOUNTER — PATIENT MESSAGE (OUTPATIENT)
Dept: ADMINISTRATIVE | Facility: OTHER | Age: 71
End: 2021-02-11

## 2021-02-11 ENCOUNTER — CLINICAL SUPPORT (OUTPATIENT)
Dept: URGENT CARE | Facility: CLINIC | Age: 71
End: 2021-02-11
Payer: MEDICARE

## 2021-02-11 VITALS
OXYGEN SATURATION: 96 % | WEIGHT: 246.94 LBS | HEIGHT: 69 IN | SYSTOLIC BLOOD PRESSURE: 120 MMHG | HEART RATE: 64 BPM | DIASTOLIC BLOOD PRESSURE: 72 MMHG | BODY MASS INDEX: 36.57 KG/M2

## 2021-02-11 DIAGNOSIS — I10 ESSENTIAL HYPERTENSION: Primary | ICD-10-CM

## 2021-02-11 DIAGNOSIS — I70.0 AORTIC ATHEROSCLEROSIS: ICD-10-CM

## 2021-02-11 DIAGNOSIS — U07.1 COVID-19 VIRUS DETECTED: Primary | ICD-10-CM

## 2021-02-11 DIAGNOSIS — Z01.419 ENCOUNTER FOR ANNUAL ROUTINE GYNECOLOGICAL EXAMINATION: ICD-10-CM

## 2021-02-11 DIAGNOSIS — D47.2 SMOLDERING MULTIPLE MYELOMA (SMM): Primary | ICD-10-CM

## 2021-02-11 DIAGNOSIS — E55.9 MILD VITAMIN D DEFICIENCY: ICD-10-CM

## 2021-02-11 DIAGNOSIS — Z01.00 ENCOUNTER FOR VISION SCREENING: ICD-10-CM

## 2021-02-11 DIAGNOSIS — D47.2 SMOLDERING MULTIPLE MYELOMA (SMM): ICD-10-CM

## 2021-02-11 DIAGNOSIS — Z78.9 NO KNOWN HEALTH PROBLEMS: Primary | ICD-10-CM

## 2021-02-11 PROBLEM — E66.01 MORBID OBESITY WITH BMI OF 40.0-44.9, ADULT: Status: RESOLVED | Noted: 2018-03-26 | Resolved: 2021-02-11

## 2021-02-11 LAB
CTP QC/QA: YES
SARS-COV-2 RDRP RESP QL NAA+PROBE: POSITIVE

## 2021-02-11 PROCEDURE — 1101F PT FALLS ASSESS-DOCD LE1/YR: CPT | Mod: CPTII,S$GLB,, | Performed by: INTERNAL MEDICINE

## 2021-02-11 PROCEDURE — 1101F PR PT FALLS ASSESS DOC 0-1 FALLS W/OUT INJ PAST YR: ICD-10-PCS | Mod: CPTII,S$GLB,, | Performed by: INTERNAL MEDICINE

## 2021-02-11 PROCEDURE — U0002: ICD-10-PCS | Mod: QW,S$GLB,, | Performed by: PHYSICIAN ASSISTANT

## 2021-02-11 PROCEDURE — 3078F PR MOST RECENT DIASTOLIC BLOOD PRESSURE < 80 MM HG: ICD-10-PCS | Mod: CPTII,S$GLB,, | Performed by: INTERNAL MEDICINE

## 2021-02-11 PROCEDURE — U0002 COVID-19 LAB TEST NON-CDC: HCPCS | Mod: QW,S$GLB,, | Performed by: PHYSICIAN ASSISTANT

## 2021-02-11 PROCEDURE — 3074F SYST BP LT 130 MM HG: CPT | Mod: CPTII,S$GLB,, | Performed by: INTERNAL MEDICINE

## 2021-02-11 PROCEDURE — 1159F PR MEDICATION LIST DOCUMENTED IN MEDICAL RECORD: ICD-10-PCS | Mod: S$GLB,,, | Performed by: INTERNAL MEDICINE

## 2021-02-11 PROCEDURE — 3074F PR MOST RECENT SYSTOLIC BLOOD PRESSURE < 130 MM HG: ICD-10-PCS | Mod: CPTII,S$GLB,, | Performed by: INTERNAL MEDICINE

## 2021-02-11 PROCEDURE — 99999 PR PBB SHADOW E&M-EST. PATIENT-LVL V: CPT | Mod: PBBFAC,,, | Performed by: INTERNAL MEDICINE

## 2021-02-11 PROCEDURE — 99499 RISK ADDL DX/OHS AUDIT: ICD-10-PCS | Mod: S$GLB,,, | Performed by: INTERNAL MEDICINE

## 2021-02-11 PROCEDURE — 3008F BODY MASS INDEX DOCD: CPT | Mod: CPTII,S$GLB,, | Performed by: INTERNAL MEDICINE

## 2021-02-11 PROCEDURE — 99999 PR PBB SHADOW E&M-EST. PATIENT-LVL V: ICD-10-PCS | Mod: PBBFAC,,, | Performed by: INTERNAL MEDICINE

## 2021-02-11 PROCEDURE — 99214 OFFICE O/P EST MOD 30 MIN: CPT | Mod: S$GLB,,, | Performed by: INTERNAL MEDICINE

## 2021-02-11 PROCEDURE — 3078F DIAST BP <80 MM HG: CPT | Mod: CPTII,S$GLB,, | Performed by: INTERNAL MEDICINE

## 2021-02-11 PROCEDURE — 99499 UNLISTED E&M SERVICE: CPT | Mod: S$GLB,,, | Performed by: INTERNAL MEDICINE

## 2021-02-11 PROCEDURE — 1126F PR PAIN SEVERITY QUANTIFIED, NO PAIN PRESENT: ICD-10-PCS | Mod: S$GLB,,, | Performed by: INTERNAL MEDICINE

## 2021-02-11 PROCEDURE — 3288F PR FALLS RISK ASSESSMENT DOCUMENTED: ICD-10-PCS | Mod: CPTII,S$GLB,, | Performed by: INTERNAL MEDICINE

## 2021-02-11 PROCEDURE — 3008F PR BODY MASS INDEX (BMI) DOCUMENTED: ICD-10-PCS | Mod: CPTII,S$GLB,, | Performed by: INTERNAL MEDICINE

## 2021-02-11 PROCEDURE — 99211 OFF/OP EST MAY X REQ PHY/QHP: CPT | Mod: S$GLB,,, | Performed by: PHYSICIAN ASSISTANT

## 2021-02-11 PROCEDURE — 99214 PR OFFICE/OUTPT VISIT, EST, LEVL IV, 30-39 MIN: ICD-10-PCS | Mod: S$GLB,,, | Performed by: INTERNAL MEDICINE

## 2021-02-11 PROCEDURE — 1159F MED LIST DOCD IN RCRD: CPT | Mod: S$GLB,,, | Performed by: INTERNAL MEDICINE

## 2021-02-11 PROCEDURE — 99211 PR OFFICE/OUTPT VISIT, EST, LEVL I: ICD-10-PCS | Mod: S$GLB,,, | Performed by: PHYSICIAN ASSISTANT

## 2021-02-11 PROCEDURE — 3288F FALL RISK ASSESSMENT DOCD: CPT | Mod: CPTII,S$GLB,, | Performed by: INTERNAL MEDICINE

## 2021-02-11 PROCEDURE — 1126F AMNT PAIN NOTED NONE PRSNT: CPT | Mod: S$GLB,,, | Performed by: INTERNAL MEDICINE

## 2021-02-12 ENCOUNTER — INFUSION (OUTPATIENT)
Dept: INFECTIOUS DISEASES | Facility: HOSPITAL | Age: 71
End: 2021-02-12
Attending: INTERNAL MEDICINE
Payer: MEDICARE

## 2021-02-12 ENCOUNTER — NURSE TRIAGE (OUTPATIENT)
Dept: ADMINISTRATIVE | Facility: CLINIC | Age: 71
End: 2021-02-12

## 2021-02-12 ENCOUNTER — PATIENT MESSAGE (OUTPATIENT)
Dept: ADMINISTRATIVE | Facility: OTHER | Age: 71
End: 2021-02-12

## 2021-02-12 VITALS
HEIGHT: 69 IN | DIASTOLIC BLOOD PRESSURE: 74 MMHG | SYSTOLIC BLOOD PRESSURE: 143 MMHG | HEART RATE: 58 BPM | RESPIRATION RATE: 20 BRPM | OXYGEN SATURATION: 96 % | TEMPERATURE: 99 F | WEIGHT: 246 LBS | BODY MASS INDEX: 36.43 KG/M2

## 2021-02-12 DIAGNOSIS — U07.1 COVID-19 VIRUS DETECTED: ICD-10-CM

## 2021-02-12 PROCEDURE — 63600175 PHARM REV CODE 636 W HCPCS

## 2021-02-12 PROCEDURE — 25000003 PHARM REV CODE 250

## 2021-02-12 PROCEDURE — M0239 BAMLANIVIMAB-XXXX INFUSION: HCPCS

## 2021-02-12 RX ORDER — EPINEPHRINE 0.1 MG/ML
0.3 INJECTION INTRAVENOUS
Status: DISCONTINUED | OUTPATIENT
Start: 2021-02-12 | End: 2021-08-23 | Stop reason: ALTCHOICE

## 2021-02-12 RX ORDER — ALBUTEROL SULFATE 90 UG/1
2 AEROSOL, METERED RESPIRATORY (INHALATION)
Status: DISCONTINUED | OUTPATIENT
Start: 2021-02-12 | End: 2021-08-23 | Stop reason: ALTCHOICE

## 2021-02-12 RX ORDER — DIPHENHYDRAMINE HYDROCHLORIDE 50 MG/ML
25 INJECTION INTRAMUSCULAR; INTRAVENOUS ONCE AS NEEDED
Status: DISCONTINUED | OUTPATIENT
Start: 2021-02-12 | End: 2021-08-23 | Stop reason: ALTCHOICE

## 2021-02-12 RX ORDER — ONDANSETRON 4 MG/1
4 TABLET, ORALLY DISINTEGRATING ORAL ONCE AS NEEDED
Status: DISCONTINUED | OUTPATIENT
Start: 2021-02-12 | End: 2021-08-23 | Stop reason: ALTCHOICE

## 2021-02-12 RX ORDER — SODIUM CHLORIDE 0.9 % (FLUSH) 0.9 %
10 SYRINGE (ML) INJECTION
Status: DISCONTINUED | OUTPATIENT
Start: 2021-02-12 | End: 2021-08-23 | Stop reason: ALTCHOICE

## 2021-02-12 RX ORDER — ACETAMINOPHEN 325 MG/1
650 TABLET ORAL ONCE AS NEEDED
Status: DISCONTINUED | OUTPATIENT
Start: 2021-02-12 | End: 2021-08-23 | Stop reason: ALTCHOICE

## 2021-02-12 RX ADMIN — SODIUM CHLORIDE 700 MG: 9 INJECTION, SOLUTION INTRAVENOUS at 08:02

## 2021-02-13 ENCOUNTER — PATIENT MESSAGE (OUTPATIENT)
Dept: ADMINISTRATIVE | Facility: OTHER | Age: 71
End: 2021-02-13

## 2021-02-13 ENCOUNTER — NURSE TRIAGE (OUTPATIENT)
Dept: ADMINISTRATIVE | Facility: CLINIC | Age: 71
End: 2021-02-13

## 2021-02-14 ENCOUNTER — PATIENT MESSAGE (OUTPATIENT)
Dept: ADMINISTRATIVE | Facility: OTHER | Age: 71
End: 2021-02-14

## 2021-02-15 ENCOUNTER — PATIENT MESSAGE (OUTPATIENT)
Dept: ADMINISTRATIVE | Facility: OTHER | Age: 71
End: 2021-02-15

## 2021-02-16 ENCOUNTER — PATIENT MESSAGE (OUTPATIENT)
Dept: ADMINISTRATIVE | Facility: OTHER | Age: 71
End: 2021-02-16

## 2021-02-17 ENCOUNTER — PATIENT MESSAGE (OUTPATIENT)
Dept: ADMINISTRATIVE | Facility: OTHER | Age: 71
End: 2021-02-17

## 2021-02-18 ENCOUNTER — PATIENT MESSAGE (OUTPATIENT)
Dept: ADMINISTRATIVE | Facility: OTHER | Age: 71
End: 2021-02-18

## 2021-02-19 ENCOUNTER — PATIENT MESSAGE (OUTPATIENT)
Dept: ADMINISTRATIVE | Facility: OTHER | Age: 71
End: 2021-02-19

## 2021-02-20 ENCOUNTER — PATIENT MESSAGE (OUTPATIENT)
Dept: ADMINISTRATIVE | Facility: OTHER | Age: 71
End: 2021-02-20

## 2021-02-21 ENCOUNTER — PATIENT MESSAGE (OUTPATIENT)
Dept: ADMINISTRATIVE | Facility: OTHER | Age: 71
End: 2021-02-21

## 2021-02-22 ENCOUNTER — PATIENT MESSAGE (OUTPATIENT)
Dept: ADMINISTRATIVE | Facility: OTHER | Age: 71
End: 2021-02-22

## 2021-02-22 ENCOUNTER — NURSE TRIAGE (OUTPATIENT)
Dept: ADMINISTRATIVE | Facility: CLINIC | Age: 71
End: 2021-02-22

## 2021-02-23 ENCOUNTER — PATIENT MESSAGE (OUTPATIENT)
Dept: ADMINISTRATIVE | Facility: OTHER | Age: 71
End: 2021-02-23

## 2021-02-24 ENCOUNTER — PATIENT MESSAGE (OUTPATIENT)
Dept: ADMINISTRATIVE | Facility: OTHER | Age: 71
End: 2021-02-24

## 2021-02-25 ENCOUNTER — PATIENT MESSAGE (OUTPATIENT)
Dept: ADMINISTRATIVE | Facility: OTHER | Age: 71
End: 2021-02-25

## 2021-02-26 ENCOUNTER — LAB VISIT (OUTPATIENT)
Dept: LAB | Facility: HOSPITAL | Age: 71
End: 2021-02-26
Attending: INTERNAL MEDICINE
Payer: MEDICARE

## 2021-02-26 DIAGNOSIS — E05.00 GRAVES DISEASE: ICD-10-CM

## 2021-02-26 DIAGNOSIS — I10 ESSENTIAL HYPERTENSION: ICD-10-CM

## 2021-02-26 DIAGNOSIS — E55.9 MILD VITAMIN D DEFICIENCY: ICD-10-CM

## 2021-02-26 PROCEDURE — 80061 LIPID PANEL: CPT

## 2021-02-26 PROCEDURE — 36415 COLL VENOUS BLD VENIPUNCTURE: CPT

## 2021-02-26 PROCEDURE — 82306 VITAMIN D 25 HYDROXY: CPT

## 2021-02-26 PROCEDURE — 84443 ASSAY THYROID STIM HORMONE: CPT

## 2021-02-26 PROCEDURE — 84439 ASSAY OF FREE THYROXINE: CPT

## 2021-02-27 LAB
25(OH)D3+25(OH)D2 SERPL-MCNC: 31 NG/ML (ref 30–96)
CHOLEST SERPL-MCNC: 143 MG/DL (ref 120–199)
CHOLEST/HDLC SERPL: 2.8 {RATIO} (ref 2–5)
HDLC SERPL-MCNC: 52 MG/DL (ref 40–75)
HDLC SERPL: 36.4 % (ref 20–50)
LDLC SERPL CALC-MCNC: 75.2 MG/DL (ref 63–159)
NONHDLC SERPL-MCNC: 91 MG/DL
T4 FREE SERPL-MCNC: 0.76 NG/DL (ref 0.71–1.51)
TRIGL SERPL-MCNC: 79 MG/DL (ref 30–150)
TSH SERPL DL<=0.005 MIU/L-ACNC: 0.01 UIU/ML (ref 0.4–4)

## 2021-03-03 ENCOUNTER — PATIENT MESSAGE (OUTPATIENT)
Dept: ENDOCRINOLOGY | Facility: CLINIC | Age: 71
End: 2021-03-03

## 2021-03-03 DIAGNOSIS — E05.00 GRAVES DISEASE: Primary | ICD-10-CM

## 2021-03-05 ENCOUNTER — LAB VISIT (OUTPATIENT)
Dept: LAB | Facility: HOSPITAL | Age: 71
End: 2021-03-05
Attending: INTERNAL MEDICINE
Payer: MEDICARE

## 2021-03-05 ENCOUNTER — OFFICE VISIT (OUTPATIENT)
Dept: HEMATOLOGY/ONCOLOGY | Facility: CLINIC | Age: 71
End: 2021-03-05
Payer: MEDICARE

## 2021-03-05 VITALS
RESPIRATION RATE: 20 BRPM | HEART RATE: 68 BPM | SYSTOLIC BLOOD PRESSURE: 124 MMHG | HEIGHT: 69 IN | OXYGEN SATURATION: 97 % | DIASTOLIC BLOOD PRESSURE: 75 MMHG | TEMPERATURE: 99 F | WEIGHT: 252 LBS | BODY MASS INDEX: 37.33 KG/M2

## 2021-03-05 DIAGNOSIS — D47.2 SMOLDERING MULTIPLE MYELOMA (SMM): Primary | ICD-10-CM

## 2021-03-05 DIAGNOSIS — D47.2 SMOLDERING MULTIPLE MYELOMA (SMM): ICD-10-CM

## 2021-03-05 LAB
ALBUMIN SERPL BCP-MCNC: 3.2 G/DL (ref 3.5–5.2)
ALP SERPL-CCNC: 138 U/L (ref 55–135)
ALT SERPL W/O P-5'-P-CCNC: 12 U/L (ref 10–44)
ANION GAP SERPL CALC-SCNC: 5 MMOL/L (ref 8–16)
AST SERPL-CCNC: 14 U/L (ref 10–40)
BASOPHILS # BLD AUTO: 0.01 K/UL (ref 0–0.2)
BASOPHILS NFR BLD: 0.3 % (ref 0–1.9)
BILIRUB SERPL-MCNC: 0.5 MG/DL (ref 0.1–1)
BUN SERPL-MCNC: 12 MG/DL (ref 8–23)
CALCIUM SERPL-MCNC: 9.9 MG/DL (ref 8.7–10.5)
CHLORIDE SERPL-SCNC: 106 MMOL/L (ref 95–110)
CO2 SERPL-SCNC: 30 MMOL/L (ref 23–29)
CREAT SERPL-MCNC: 0.9 MG/DL (ref 0.5–1.4)
DIFFERENTIAL METHOD: ABNORMAL
EOSINOPHIL # BLD AUTO: 0 K/UL (ref 0–0.5)
EOSINOPHIL NFR BLD: 0.7 % (ref 0–8)
ERYTHROCYTE [DISTWIDTH] IN BLOOD BY AUTOMATED COUNT: 11.9 % (ref 11.5–14.5)
EST. GFR  (AFRICAN AMERICAN): >60 ML/MIN/1.73 M^2
EST. GFR  (NON AFRICAN AMERICAN): >60 ML/MIN/1.73 M^2
GLUCOSE SERPL-MCNC: 77 MG/DL (ref 70–110)
HCT VFR BLD AUTO: 37.4 % (ref 37–48.5)
HGB BLD-MCNC: 11.3 G/DL (ref 12–16)
IGA SERPL-MCNC: 99 MG/DL (ref 40–350)
IGG SERPL-MCNC: 2057 MG/DL (ref 650–1600)
IGM SERPL-MCNC: 30 MG/DL (ref 50–300)
IMM GRANULOCYTES # BLD AUTO: 0.01 K/UL (ref 0–0.04)
IMM GRANULOCYTES NFR BLD AUTO: 0.3 % (ref 0–0.5)
LYMPHOCYTES # BLD AUTO: 1.4 K/UL (ref 1–4.8)
LYMPHOCYTES NFR BLD: 46.7 % (ref 18–48)
MCH RBC QN AUTO: 28.7 PG (ref 27–31)
MCHC RBC AUTO-ENTMCNC: 30.2 G/DL (ref 32–36)
MCV RBC AUTO: 95 FL (ref 82–98)
MONOCYTES # BLD AUTO: 0.3 K/UL (ref 0.3–1)
MONOCYTES NFR BLD: 9.2 % (ref 4–15)
NEUTROPHILS # BLD AUTO: 1.3 K/UL (ref 1.8–7.7)
NEUTROPHILS NFR BLD: 42.8 % (ref 38–73)
NRBC BLD-RTO: 0 /100 WBC
PLATELET # BLD AUTO: 258 K/UL (ref 150–350)
PMV BLD AUTO: 10.3 FL (ref 9.2–12.9)
POTASSIUM SERPL-SCNC: 4.3 MMOL/L (ref 3.5–5.1)
PROT SERPL-MCNC: 7.7 G/DL (ref 6–8.4)
RBC # BLD AUTO: 3.94 M/UL (ref 4–5.4)
SODIUM SERPL-SCNC: 141 MMOL/L (ref 136–145)
WBC # BLD AUTO: 3.04 K/UL (ref 3.9–12.7)

## 2021-03-05 PROCEDURE — 80053 COMPREHEN METABOLIC PANEL: CPT | Performed by: INTERNAL MEDICINE

## 2021-03-05 PROCEDURE — 3078F PR MOST RECENT DIASTOLIC BLOOD PRESSURE < 80 MM HG: ICD-10-PCS | Mod: CPTII,S$GLB,, | Performed by: INTERNAL MEDICINE

## 2021-03-05 PROCEDURE — 99999 PR PBB SHADOW E&M-EST. PATIENT-LVL IV: ICD-10-PCS | Mod: PBBFAC,,, | Performed by: INTERNAL MEDICINE

## 2021-03-05 PROCEDURE — 99214 OFFICE O/P EST MOD 30 MIN: CPT | Mod: S$GLB,,, | Performed by: INTERNAL MEDICINE

## 2021-03-05 PROCEDURE — 86334 PATHOLOGIST INTERPRETATION IFE: ICD-10-PCS | Mod: 26,,, | Performed by: PATHOLOGY

## 2021-03-05 PROCEDURE — 3074F SYST BP LT 130 MM HG: CPT | Mod: CPTII,S$GLB,, | Performed by: INTERNAL MEDICINE

## 2021-03-05 PROCEDURE — 3078F DIAST BP <80 MM HG: CPT | Mod: CPTII,S$GLB,, | Performed by: INTERNAL MEDICINE

## 2021-03-05 PROCEDURE — 1101F PR PT FALLS ASSESS DOC 0-1 FALLS W/OUT INJ PAST YR: ICD-10-PCS | Mod: CPTII,S$GLB,, | Performed by: INTERNAL MEDICINE

## 2021-03-05 PROCEDURE — 99499 RISK ADDL DX/OHS AUDIT: ICD-10-PCS | Mod: S$GLB,,, | Performed by: INTERNAL MEDICINE

## 2021-03-05 PROCEDURE — 3008F BODY MASS INDEX DOCD: CPT | Mod: CPTII,S$GLB,, | Performed by: INTERNAL MEDICINE

## 2021-03-05 PROCEDURE — 3288F PR FALLS RISK ASSESSMENT DOCUMENTED: ICD-10-PCS | Mod: CPTII,S$GLB,, | Performed by: INTERNAL MEDICINE

## 2021-03-05 PROCEDURE — 99214 PR OFFICE/OUTPT VISIT, EST, LEVL IV, 30-39 MIN: ICD-10-PCS | Mod: S$GLB,,, | Performed by: INTERNAL MEDICINE

## 2021-03-05 PROCEDURE — 1159F PR MEDICATION LIST DOCUMENTED IN MEDICAL RECORD: ICD-10-PCS | Mod: S$GLB,,, | Performed by: INTERNAL MEDICINE

## 2021-03-05 PROCEDURE — 86334 IMMUNOFIX E-PHORESIS SERUM: CPT | Mod: 26,,, | Performed by: PATHOLOGY

## 2021-03-05 PROCEDURE — 82784 ASSAY IGA/IGD/IGG/IGM EACH: CPT | Performed by: INTERNAL MEDICINE

## 2021-03-05 PROCEDURE — 36415 COLL VENOUS BLD VENIPUNCTURE: CPT | Performed by: INTERNAL MEDICINE

## 2021-03-05 PROCEDURE — 1126F PR PAIN SEVERITY QUANTIFIED, NO PAIN PRESENT: ICD-10-PCS | Mod: S$GLB,,, | Performed by: INTERNAL MEDICINE

## 2021-03-05 PROCEDURE — 1159F MED LIST DOCD IN RCRD: CPT | Mod: S$GLB,,, | Performed by: INTERNAL MEDICINE

## 2021-03-05 PROCEDURE — 1126F AMNT PAIN NOTED NONE PRSNT: CPT | Mod: S$GLB,,, | Performed by: INTERNAL MEDICINE

## 2021-03-05 PROCEDURE — 99999 PR PBB SHADOW E&M-EST. PATIENT-LVL IV: CPT | Mod: PBBFAC,,, | Performed by: INTERNAL MEDICINE

## 2021-03-05 PROCEDURE — 3008F PR BODY MASS INDEX (BMI) DOCUMENTED: ICD-10-PCS | Mod: CPTII,S$GLB,, | Performed by: INTERNAL MEDICINE

## 2021-03-05 PROCEDURE — 1101F PT FALLS ASSESS-DOCD LE1/YR: CPT | Mod: CPTII,S$GLB,, | Performed by: INTERNAL MEDICINE

## 2021-03-05 PROCEDURE — 99499 UNLISTED E&M SERVICE: CPT | Mod: S$GLB,,, | Performed by: INTERNAL MEDICINE

## 2021-03-05 PROCEDURE — 85025 COMPLETE CBC W/AUTO DIFF WBC: CPT | Performed by: INTERNAL MEDICINE

## 2021-03-05 PROCEDURE — 86334 IMMUNOFIX E-PHORESIS SERUM: CPT | Performed by: INTERNAL MEDICINE

## 2021-03-05 PROCEDURE — 3074F PR MOST RECENT SYSTOLIC BLOOD PRESSURE < 130 MM HG: ICD-10-PCS | Mod: CPTII,S$GLB,, | Performed by: INTERNAL MEDICINE

## 2021-03-05 PROCEDURE — 83520 IMMUNOASSAY QUANT NOS NONAB: CPT | Mod: 59 | Performed by: INTERNAL MEDICINE

## 2021-03-05 PROCEDURE — 84165 PROTEIN E-PHORESIS SERUM: CPT | Mod: 26,,, | Performed by: PATHOLOGY

## 2021-03-05 PROCEDURE — 84165 PROTEIN E-PHORESIS SERUM: CPT | Performed by: INTERNAL MEDICINE

## 2021-03-05 PROCEDURE — 84165 PATHOLOGIST INTERPRETATION SPE: ICD-10-PCS | Mod: 26,,, | Performed by: PATHOLOGY

## 2021-03-05 PROCEDURE — 3288F FALL RISK ASSESSMENT DOCD: CPT | Mod: CPTII,S$GLB,, | Performed by: INTERNAL MEDICINE

## 2021-03-08 LAB
ALBUMIN SERPL ELPH-MCNC: 3.7 G/DL (ref 3.35–5.55)
ALPHA1 GLOB SERPL ELPH-MCNC: 0.35 G/DL (ref 0.17–0.41)
ALPHA2 GLOB SERPL ELPH-MCNC: 0.74 G/DL (ref 0.43–0.99)
B-GLOBULIN SERPL ELPH-MCNC: 0.78 G/DL (ref 0.5–1.1)
GAMMA GLOB SERPL ELPH-MCNC: 1.94 G/DL (ref 0.67–1.58)
INTERPRETATION SERPL IFE-IMP: NORMAL
KAPPA LC SER QL IA: 14.75 MG/DL (ref 0.33–1.94)
KAPPA LC/LAMBDA SER IA: 9.39 (ref 0.26–1.65)
LAMBDA LC SER QL IA: 1.57 MG/DL (ref 0.57–2.63)
PATHOLOGIST INTERPRETATION IFE: NORMAL
PATHOLOGIST INTERPRETATION SPE: NORMAL
PROT SERPL-MCNC: 7.5 G/DL (ref 6–8.4)

## 2021-03-10 ENCOUNTER — OFFICE VISIT (OUTPATIENT)
Dept: SLEEP MEDICINE | Facility: CLINIC | Age: 71
End: 2021-03-10
Payer: MEDICARE

## 2021-03-10 DIAGNOSIS — G47.33 OBSTRUCTIVE SLEEP APNEA: Primary | ICD-10-CM

## 2021-03-10 DIAGNOSIS — G43.009 MIGRAINE WITHOUT AURA AND WITHOUT STATUS MIGRAINOSUS, NOT INTRACTABLE: ICD-10-CM

## 2021-03-10 PROCEDURE — 99214 OFFICE O/P EST MOD 30 MIN: CPT | Mod: 95,CR,, | Performed by: NURSE PRACTITIONER

## 2021-03-10 PROCEDURE — 1159F PR MEDICATION LIST DOCUMENTED IN MEDICAL RECORD: ICD-10-PCS | Mod: 95,,, | Performed by: NURSE PRACTITIONER

## 2021-03-10 PROCEDURE — 99499 RISK ADDL DX/OHS AUDIT: ICD-10-PCS | Mod: 95,,, | Performed by: NURSE PRACTITIONER

## 2021-03-10 PROCEDURE — 99499 UNLISTED E&M SERVICE: CPT | Mod: 95,,, | Performed by: NURSE PRACTITIONER

## 2021-03-10 PROCEDURE — 1159F MED LIST DOCD IN RCRD: CPT | Mod: 95,,, | Performed by: NURSE PRACTITIONER

## 2021-03-10 PROCEDURE — 99214 PR OFFICE/OUTPT VISIT, EST, LEVL IV, 30-39 MIN: ICD-10-PCS | Mod: 95,CR,, | Performed by: NURSE PRACTITIONER

## 2021-03-25 ENCOUNTER — OFFICE VISIT (OUTPATIENT)
Dept: HEPATOLOGY | Facility: CLINIC | Age: 71
End: 2021-03-25
Attending: INTERNAL MEDICINE
Payer: MEDICARE

## 2021-03-25 VITALS
DIASTOLIC BLOOD PRESSURE: 84 MMHG | OXYGEN SATURATION: 98 % | WEIGHT: 251.13 LBS | BODY MASS INDEX: 37.2 KG/M2 | RESPIRATION RATE: 18 BRPM | SYSTOLIC BLOOD PRESSURE: 160 MMHG | HEIGHT: 69 IN | HEART RATE: 71 BPM

## 2021-03-25 DIAGNOSIS — K76.89 LIVER CYST: Primary | ICD-10-CM

## 2021-03-25 PROCEDURE — 99213 PR OFFICE/OUTPT VISIT, EST, LEVL III, 20-29 MIN: ICD-10-PCS | Mod: S$GLB,,, | Performed by: INTERNAL MEDICINE

## 2021-03-25 PROCEDURE — 1126F AMNT PAIN NOTED NONE PRSNT: CPT | Mod: S$GLB,,, | Performed by: INTERNAL MEDICINE

## 2021-03-25 PROCEDURE — 3079F DIAST BP 80-89 MM HG: CPT | Mod: CPTII,S$GLB,, | Performed by: INTERNAL MEDICINE

## 2021-03-25 PROCEDURE — 3077F PR MOST RECENT SYSTOLIC BLOOD PRESSURE >= 140 MM HG: ICD-10-PCS | Mod: CPTII,S$GLB,, | Performed by: INTERNAL MEDICINE

## 2021-03-25 PROCEDURE — 1101F PT FALLS ASSESS-DOCD LE1/YR: CPT | Mod: CPTII,S$GLB,, | Performed by: INTERNAL MEDICINE

## 2021-03-25 PROCEDURE — 99999 PR PBB SHADOW E&M-EST. PATIENT-LVL IV: ICD-10-PCS | Mod: PBBFAC,,, | Performed by: INTERNAL MEDICINE

## 2021-03-25 PROCEDURE — 99213 OFFICE O/P EST LOW 20 MIN: CPT | Mod: S$GLB,,, | Performed by: INTERNAL MEDICINE

## 2021-03-25 PROCEDURE — 1126F PR PAIN SEVERITY QUANTIFIED, NO PAIN PRESENT: ICD-10-PCS | Mod: S$GLB,,, | Performed by: INTERNAL MEDICINE

## 2021-03-25 PROCEDURE — 1101F PR PT FALLS ASSESS DOC 0-1 FALLS W/OUT INJ PAST YR: ICD-10-PCS | Mod: CPTII,S$GLB,, | Performed by: INTERNAL MEDICINE

## 2021-03-25 PROCEDURE — 3077F SYST BP >= 140 MM HG: CPT | Mod: CPTII,S$GLB,, | Performed by: INTERNAL MEDICINE

## 2021-03-25 PROCEDURE — 99999 PR PBB SHADOW E&M-EST. PATIENT-LVL IV: CPT | Mod: PBBFAC,,, | Performed by: INTERNAL MEDICINE

## 2021-03-25 PROCEDURE — 3288F FALL RISK ASSESSMENT DOCD: CPT | Mod: CPTII,S$GLB,, | Performed by: INTERNAL MEDICINE

## 2021-03-25 PROCEDURE — 3288F PR FALLS RISK ASSESSMENT DOCUMENTED: ICD-10-PCS | Mod: CPTII,S$GLB,, | Performed by: INTERNAL MEDICINE

## 2021-03-25 PROCEDURE — 3008F BODY MASS INDEX DOCD: CPT | Mod: CPTII,S$GLB,, | Performed by: INTERNAL MEDICINE

## 2021-03-25 PROCEDURE — 1159F PR MEDICATION LIST DOCUMENTED IN MEDICAL RECORD: ICD-10-PCS | Mod: S$GLB,,, | Performed by: INTERNAL MEDICINE

## 2021-03-25 PROCEDURE — 1159F MED LIST DOCD IN RCRD: CPT | Mod: S$GLB,,, | Performed by: INTERNAL MEDICINE

## 2021-03-25 PROCEDURE — 3079F PR MOST RECENT DIASTOLIC BLOOD PRESSURE 80-89 MM HG: ICD-10-PCS | Mod: CPTII,S$GLB,, | Performed by: INTERNAL MEDICINE

## 2021-03-25 PROCEDURE — 3008F PR BODY MASS INDEX (BMI) DOCUMENTED: ICD-10-PCS | Mod: CPTII,S$GLB,, | Performed by: INTERNAL MEDICINE

## 2021-03-31 ENCOUNTER — PATIENT MESSAGE (OUTPATIENT)
Dept: ENDOCRINOLOGY | Facility: CLINIC | Age: 71
End: 2021-03-31

## 2021-03-31 ENCOUNTER — LAB VISIT (OUTPATIENT)
Dept: LAB | Facility: HOSPITAL | Age: 71
End: 2021-03-31
Attending: INTERNAL MEDICINE
Payer: MEDICARE

## 2021-03-31 DIAGNOSIS — E05.00 GRAVES DISEASE: Primary | ICD-10-CM

## 2021-03-31 DIAGNOSIS — E05.00 GRAVES DISEASE: ICD-10-CM

## 2021-03-31 LAB — TSH SERPL DL<=0.005 MIU/L-ACNC: 2.02 UIU/ML (ref 0.4–4)

## 2021-03-31 PROCEDURE — 36415 COLL VENOUS BLD VENIPUNCTURE: CPT | Performed by: INTERNAL MEDICINE

## 2021-03-31 PROCEDURE — 84443 ASSAY THYROID STIM HORMONE: CPT | Performed by: INTERNAL MEDICINE

## 2021-03-31 RX ORDER — METHIMAZOLE 10 MG/1
30 TABLET ORAL DAILY
Qty: 120 TABLET | Refills: 11
Start: 2021-03-31 | End: 2021-04-30

## 2021-04-01 ENCOUNTER — PES CALL (OUTPATIENT)
Dept: ADMINISTRATIVE | Facility: CLINIC | Age: 71
End: 2021-04-01

## 2021-04-06 ENCOUNTER — OFFICE VISIT (OUTPATIENT)
Dept: ENDOCRINOLOGY | Facility: CLINIC | Age: 71
End: 2021-04-06
Payer: MEDICARE

## 2021-04-06 DIAGNOSIS — E05.00 GRAVES DISEASE: Primary | ICD-10-CM

## 2021-04-06 DIAGNOSIS — E05.00 GRAVES' EYE DISEASE: ICD-10-CM

## 2021-04-06 PROBLEM — R06.09 DOE (DYSPNEA ON EXERTION): Status: RESOLVED | Noted: 2020-09-28 | Resolved: 2021-04-06

## 2021-04-06 PROCEDURE — 1159F PR MEDICATION LIST DOCUMENTED IN MEDICAL RECORD: ICD-10-PCS | Mod: 95,,, | Performed by: INTERNAL MEDICINE

## 2021-04-06 PROCEDURE — 1159F MED LIST DOCD IN RCRD: CPT | Mod: 95,,, | Performed by: INTERNAL MEDICINE

## 2021-04-06 PROCEDURE — 99214 OFFICE O/P EST MOD 30 MIN: CPT | Mod: 95,,, | Performed by: INTERNAL MEDICINE

## 2021-04-06 PROCEDURE — 99499 RISK ADDL DX/OHS AUDIT: ICD-10-PCS | Mod: 95,,, | Performed by: INTERNAL MEDICINE

## 2021-04-06 PROCEDURE — 99214 PR OFFICE/OUTPT VISIT, EST, LEVL IV, 30-39 MIN: ICD-10-PCS | Mod: 95,,, | Performed by: INTERNAL MEDICINE

## 2021-04-06 PROCEDURE — 99499 UNLISTED E&M SERVICE: CPT | Mod: 95,,, | Performed by: INTERNAL MEDICINE

## 2021-04-13 ENCOUNTER — OFFICE VISIT (OUTPATIENT)
Dept: UROLOGY | Facility: CLINIC | Age: 71
End: 2021-04-13
Payer: MEDICARE

## 2021-04-13 VITALS
SYSTOLIC BLOOD PRESSURE: 137 MMHG | WEIGHT: 254.44 LBS | BODY MASS INDEX: 37.68 KG/M2 | DIASTOLIC BLOOD PRESSURE: 76 MMHG | HEART RATE: 66 BPM | HEIGHT: 69 IN

## 2021-04-13 DIAGNOSIS — R31.29 MICROHEMATURIA: ICD-10-CM

## 2021-04-13 DIAGNOSIS — R30.0 DYSURIA: Primary | ICD-10-CM

## 2021-04-13 DIAGNOSIS — Q61.2 ADPKD (AUTOSOMAL DOMINANT POLYCYSTIC KIDNEY DISEASE): ICD-10-CM

## 2021-04-13 PROCEDURE — 3288F PR FALLS RISK ASSESSMENT DOCUMENTED: ICD-10-PCS | Mod: CPTII,S$GLB,, | Performed by: UROLOGY

## 2021-04-13 PROCEDURE — 99214 OFFICE O/P EST MOD 30 MIN: CPT | Mod: S$GLB,,, | Performed by: UROLOGY

## 2021-04-13 PROCEDURE — 1101F PT FALLS ASSESS-DOCD LE1/YR: CPT | Mod: CPTII,S$GLB,, | Performed by: UROLOGY

## 2021-04-13 PROCEDURE — 3288F FALL RISK ASSESSMENT DOCD: CPT | Mod: CPTII,S$GLB,, | Performed by: UROLOGY

## 2021-04-13 PROCEDURE — 3008F BODY MASS INDEX DOCD: CPT | Mod: CPTII,S$GLB,, | Performed by: UROLOGY

## 2021-04-13 PROCEDURE — 1159F PR MEDICATION LIST DOCUMENTED IN MEDICAL RECORD: ICD-10-PCS | Mod: S$GLB,,, | Performed by: UROLOGY

## 2021-04-13 PROCEDURE — 99999 PR PBB SHADOW E&M-EST. PATIENT-LVL IV: ICD-10-PCS | Mod: PBBFAC,,, | Performed by: UROLOGY

## 2021-04-13 PROCEDURE — 1126F PR PAIN SEVERITY QUANTIFIED, NO PAIN PRESENT: ICD-10-PCS | Mod: S$GLB,,, | Performed by: UROLOGY

## 2021-04-13 PROCEDURE — 87481 CANDIDA DNA AMP PROBE: CPT | Mod: 59 | Performed by: UROLOGY

## 2021-04-13 PROCEDURE — 3008F PR BODY MASS INDEX (BMI) DOCUMENTED: ICD-10-PCS | Mod: CPTII,S$GLB,, | Performed by: UROLOGY

## 2021-04-13 PROCEDURE — 99999 PR PBB SHADOW E&M-EST. PATIENT-LVL IV: CPT | Mod: PBBFAC,,, | Performed by: UROLOGY

## 2021-04-13 PROCEDURE — 1101F PR PT FALLS ASSESS DOC 0-1 FALLS W/OUT INJ PAST YR: ICD-10-PCS | Mod: CPTII,S$GLB,, | Performed by: UROLOGY

## 2021-04-13 PROCEDURE — 99214 PR OFFICE/OUTPT VISIT, EST, LEVL IV, 30-39 MIN: ICD-10-PCS | Mod: S$GLB,,, | Performed by: UROLOGY

## 2021-04-13 PROCEDURE — 87086 URINE CULTURE/COLONY COUNT: CPT | Performed by: UROLOGY

## 2021-04-13 PROCEDURE — 1126F AMNT PAIN NOTED NONE PRSNT: CPT | Mod: S$GLB,,, | Performed by: UROLOGY

## 2021-04-13 PROCEDURE — 1159F MED LIST DOCD IN RCRD: CPT | Mod: S$GLB,,, | Performed by: UROLOGY

## 2021-04-14 LAB
BACTERIA UR CULT: NO GROWTH
BACTERIAL VAGINOSIS DNA: NEGATIVE
CANDIDA GLABRATA DNA: NEGATIVE
CANDIDA KRUSEI DNA: NEGATIVE
CANDIDA RRNA VAG QL PROBE: NEGATIVE
T VAGINALIS RRNA GENITAL QL PROBE: NEGATIVE

## 2021-04-19 ENCOUNTER — PATIENT MESSAGE (OUTPATIENT)
Dept: UROLOGY | Facility: CLINIC | Age: 71
End: 2021-04-19

## 2021-04-22 ENCOUNTER — OFFICE VISIT (OUTPATIENT)
Dept: OPTOMETRY | Facility: CLINIC | Age: 71
End: 2021-04-22
Payer: COMMERCIAL

## 2021-04-22 DIAGNOSIS — H52.223 HYPEROPIA OF BOTH EYES WITH REGULAR ASTIGMATISM AND PRESBYOPIA: ICD-10-CM

## 2021-04-22 DIAGNOSIS — Z01.00 EYE EXAM, ROUTINE: Primary | ICD-10-CM

## 2021-04-22 DIAGNOSIS — H52.4 HYPEROPIA OF BOTH EYES WITH REGULAR ASTIGMATISM AND PRESBYOPIA: ICD-10-CM

## 2021-04-22 DIAGNOSIS — H52.03 HYPEROPIA OF BOTH EYES WITH REGULAR ASTIGMATISM AND PRESBYOPIA: ICD-10-CM

## 2021-04-22 PROCEDURE — 92014 PR EYE EXAM, EST PATIENT,COMPREHESV: ICD-10-PCS | Mod: S$GLB,,, | Performed by: OPTOMETRIST

## 2021-04-22 PROCEDURE — 92014 COMPRE OPH EXAM EST PT 1/>: CPT | Mod: S$GLB,,, | Performed by: OPTOMETRIST

## 2021-04-22 PROCEDURE — 92015 DETERMINE REFRACTIVE STATE: CPT | Mod: S$GLB,,, | Performed by: OPTOMETRIST

## 2021-04-22 PROCEDURE — 92015 PR REFRACTION: ICD-10-PCS | Mod: S$GLB,,, | Performed by: OPTOMETRIST

## 2021-04-22 PROCEDURE — 99999 PR PBB SHADOW E&M-EST. PATIENT-LVL III: CPT | Mod: PBBFAC,,, | Performed by: OPTOMETRIST

## 2021-04-22 PROCEDURE — 1126F AMNT PAIN NOTED NONE PRSNT: CPT | Mod: S$GLB,,, | Performed by: OPTOMETRIST

## 2021-04-22 PROCEDURE — 1126F PR PAIN SEVERITY QUANTIFIED, NO PAIN PRESENT: ICD-10-PCS | Mod: S$GLB,,, | Performed by: OPTOMETRIST

## 2021-04-22 PROCEDURE — 99999 PR PBB SHADOW E&M-EST. PATIENT-LVL III: ICD-10-PCS | Mod: PBBFAC,,, | Performed by: OPTOMETRIST

## 2021-04-27 ENCOUNTER — OFFICE VISIT (OUTPATIENT)
Dept: OBSTETRICS AND GYNECOLOGY | Facility: CLINIC | Age: 71
End: 2021-04-27
Payer: MEDICARE

## 2021-04-27 VITALS
DIASTOLIC BLOOD PRESSURE: 92 MMHG | BODY MASS INDEX: 37.62 KG/M2 | HEIGHT: 69 IN | WEIGHT: 254 LBS | SYSTOLIC BLOOD PRESSURE: 142 MMHG

## 2021-04-27 DIAGNOSIS — Z12.39 ENCOUNTER FOR SCREENING BREAST EXAMINATION: ICD-10-CM

## 2021-04-27 DIAGNOSIS — Z01.419 ENCOUNTER FOR GYNECOLOGICAL EXAMINATION: Primary | ICD-10-CM

## 2021-04-27 PROCEDURE — 99999 PR PBB SHADOW E&M-EST. PATIENT-LVL III: CPT | Mod: PBBFAC,,, | Performed by: STUDENT IN AN ORGANIZED HEALTH CARE EDUCATION/TRAINING PROGRAM

## 2021-04-27 PROCEDURE — 3008F PR BODY MASS INDEX (BMI) DOCUMENTED: ICD-10-PCS | Mod: CPTII,S$GLB,, | Performed by: STUDENT IN AN ORGANIZED HEALTH CARE EDUCATION/TRAINING PROGRAM

## 2021-04-27 PROCEDURE — 99999 PR PBB SHADOW E&M-EST. PATIENT-LVL III: ICD-10-PCS | Mod: PBBFAC,,, | Performed by: STUDENT IN AN ORGANIZED HEALTH CARE EDUCATION/TRAINING PROGRAM

## 2021-04-27 PROCEDURE — 1101F PT FALLS ASSESS-DOCD LE1/YR: CPT | Mod: CPTII,S$GLB,, | Performed by: STUDENT IN AN ORGANIZED HEALTH CARE EDUCATION/TRAINING PROGRAM

## 2021-04-27 PROCEDURE — G0101 CA SCREEN;PELVIC/BREAST EXAM: HCPCS | Mod: S$GLB,,, | Performed by: STUDENT IN AN ORGANIZED HEALTH CARE EDUCATION/TRAINING PROGRAM

## 2021-04-27 PROCEDURE — 1126F PR PAIN SEVERITY QUANTIFIED, NO PAIN PRESENT: ICD-10-PCS | Mod: S$GLB,,, | Performed by: STUDENT IN AN ORGANIZED HEALTH CARE EDUCATION/TRAINING PROGRAM

## 2021-04-27 PROCEDURE — 1101F PR PT FALLS ASSESS DOC 0-1 FALLS W/OUT INJ PAST YR: ICD-10-PCS | Mod: CPTII,S$GLB,, | Performed by: STUDENT IN AN ORGANIZED HEALTH CARE EDUCATION/TRAINING PROGRAM

## 2021-04-27 PROCEDURE — 3288F FALL RISK ASSESSMENT DOCD: CPT | Mod: CPTII,S$GLB,, | Performed by: STUDENT IN AN ORGANIZED HEALTH CARE EDUCATION/TRAINING PROGRAM

## 2021-04-27 PROCEDURE — G0101 PR CA SCREEN;PELVIC/BREAST EXAM: ICD-10-PCS | Mod: S$GLB,,, | Performed by: STUDENT IN AN ORGANIZED HEALTH CARE EDUCATION/TRAINING PROGRAM

## 2021-04-27 PROCEDURE — 3008F BODY MASS INDEX DOCD: CPT | Mod: CPTII,S$GLB,, | Performed by: STUDENT IN AN ORGANIZED HEALTH CARE EDUCATION/TRAINING PROGRAM

## 2021-04-27 PROCEDURE — 1126F AMNT PAIN NOTED NONE PRSNT: CPT | Mod: S$GLB,,, | Performed by: STUDENT IN AN ORGANIZED HEALTH CARE EDUCATION/TRAINING PROGRAM

## 2021-04-27 PROCEDURE — 3288F PR FALLS RISK ASSESSMENT DOCUMENTED: ICD-10-PCS | Mod: CPTII,S$GLB,, | Performed by: STUDENT IN AN ORGANIZED HEALTH CARE EDUCATION/TRAINING PROGRAM

## 2021-04-27 RX ORDER — PROPRANOLOL HYDROCHLORIDE 20 MG/1
20 TABLET ORAL 2 TIMES DAILY
Qty: 60 TABLET | Refills: 4 | Status: SHIPPED | OUTPATIENT
Start: 2021-04-27 | End: 2021-05-20

## 2021-04-29 ENCOUNTER — LAB VISIT (OUTPATIENT)
Dept: LAB | Facility: HOSPITAL | Age: 71
End: 2021-04-29
Attending: INTERNAL MEDICINE
Payer: MEDICARE

## 2021-04-29 DIAGNOSIS — E05.00 GRAVES DISEASE: ICD-10-CM

## 2021-04-29 LAB
T4 FREE SERPL-MCNC: 0.65 NG/DL (ref 0.71–1.51)
TSH SERPL DL<=0.005 MIU/L-ACNC: 4.16 UIU/ML (ref 0.4–4)

## 2021-04-29 PROCEDURE — 84439 ASSAY OF FREE THYROXINE: CPT | Performed by: INTERNAL MEDICINE

## 2021-04-29 PROCEDURE — 84443 ASSAY THYROID STIM HORMONE: CPT | Performed by: INTERNAL MEDICINE

## 2021-04-29 PROCEDURE — 36415 COLL VENOUS BLD VENIPUNCTURE: CPT | Performed by: INTERNAL MEDICINE

## 2021-04-30 ENCOUNTER — PATIENT MESSAGE (OUTPATIENT)
Dept: ENDOCRINOLOGY | Facility: CLINIC | Age: 71
End: 2021-04-30

## 2021-04-30 DIAGNOSIS — E05.00 GRAVES DISEASE: Primary | ICD-10-CM

## 2021-04-30 RX ORDER — METHIMAZOLE 10 MG/1
TABLET ORAL
Qty: 120 TABLET | Refills: 11
Start: 2021-04-30 | End: 2021-05-31

## 2021-05-07 ENCOUNTER — OFFICE VISIT (OUTPATIENT)
Dept: OPHTHALMOLOGY | Facility: CLINIC | Age: 71
End: 2021-05-07
Payer: MEDICARE

## 2021-05-07 DIAGNOSIS — E05.00 GRAVES DISEASE: Primary | ICD-10-CM

## 2021-05-07 PROCEDURE — 1101F PT FALLS ASSESS-DOCD LE1/YR: CPT | Mod: CPTII,S$GLB,, | Performed by: OPHTHALMOLOGY

## 2021-05-07 PROCEDURE — 99999 PR PBB SHADOW E&M-EST. PATIENT-LVL III: CPT | Mod: PBBFAC,,, | Performed by: OPHTHALMOLOGY

## 2021-05-07 PROCEDURE — 3288F PR FALLS RISK ASSESSMENT DOCUMENTED: ICD-10-PCS | Mod: CPTII,S$GLB,, | Performed by: OPHTHALMOLOGY

## 2021-05-07 PROCEDURE — 92012 PR EYE EXAM, EST PATIENT,INTERMED: ICD-10-PCS | Mod: S$GLB,,, | Performed by: OPHTHALMOLOGY

## 2021-05-07 PROCEDURE — 1101F PR PT FALLS ASSESS DOC 0-1 FALLS W/OUT INJ PAST YR: ICD-10-PCS | Mod: CPTII,S$GLB,, | Performed by: OPHTHALMOLOGY

## 2021-05-07 PROCEDURE — 3288F FALL RISK ASSESSMENT DOCD: CPT | Mod: CPTII,S$GLB,, | Performed by: OPHTHALMOLOGY

## 2021-05-07 PROCEDURE — 1126F PR PAIN SEVERITY QUANTIFIED, NO PAIN PRESENT: ICD-10-PCS | Mod: S$GLB,,, | Performed by: OPHTHALMOLOGY

## 2021-05-07 PROCEDURE — 92012 INTRM OPH EXAM EST PATIENT: CPT | Mod: S$GLB,,, | Performed by: OPHTHALMOLOGY

## 2021-05-07 PROCEDURE — 99999 PR PBB SHADOW E&M-EST. PATIENT-LVL III: ICD-10-PCS | Mod: PBBFAC,,, | Performed by: OPHTHALMOLOGY

## 2021-05-07 PROCEDURE — 1126F AMNT PAIN NOTED NONE PRSNT: CPT | Mod: S$GLB,,, | Performed by: OPHTHALMOLOGY

## 2021-05-14 ENCOUNTER — TELEPHONE (OUTPATIENT)
Dept: INTERNAL MEDICINE | Facility: CLINIC | Age: 71
End: 2021-05-14

## 2021-05-20 ENCOUNTER — OFFICE VISIT (OUTPATIENT)
Dept: INTERNAL MEDICINE | Facility: CLINIC | Age: 71
End: 2021-05-20
Payer: MEDICARE

## 2021-05-20 VITALS
BODY MASS INDEX: 37.55 KG/M2 | OXYGEN SATURATION: 97 % | DIASTOLIC BLOOD PRESSURE: 86 MMHG | HEIGHT: 69 IN | SYSTOLIC BLOOD PRESSURE: 120 MMHG | WEIGHT: 253.5 LBS | HEART RATE: 77 BPM

## 2021-05-20 DIAGNOSIS — I10 ESSENTIAL HYPERTENSION: ICD-10-CM

## 2021-05-20 DIAGNOSIS — R51.9 HEADACHE DISORDER: Primary | ICD-10-CM

## 2021-05-20 PROCEDURE — 1101F PR PT FALLS ASSESS DOC 0-1 FALLS W/OUT INJ PAST YR: ICD-10-PCS | Mod: CPTII,S$GLB,, | Performed by: INTERNAL MEDICINE

## 2021-05-20 PROCEDURE — 3008F PR BODY MASS INDEX (BMI) DOCUMENTED: ICD-10-PCS | Mod: CPTII,S$GLB,, | Performed by: INTERNAL MEDICINE

## 2021-05-20 PROCEDURE — 1125F AMNT PAIN NOTED PAIN PRSNT: CPT | Mod: S$GLB,,, | Performed by: INTERNAL MEDICINE

## 2021-05-20 PROCEDURE — 3008F BODY MASS INDEX DOCD: CPT | Mod: CPTII,S$GLB,, | Performed by: INTERNAL MEDICINE

## 2021-05-20 PROCEDURE — 99214 PR OFFICE/OUTPT VISIT, EST, LEVL IV, 30-39 MIN: ICD-10-PCS | Mod: S$GLB,,, | Performed by: INTERNAL MEDICINE

## 2021-05-20 PROCEDURE — 3079F PR MOST RECENT DIASTOLIC BLOOD PRESSURE 80-89 MM HG: ICD-10-PCS | Mod: CPTII,S$GLB,, | Performed by: INTERNAL MEDICINE

## 2021-05-20 PROCEDURE — 1125F PR PAIN SEVERITY QUANTIFIED, PAIN PRESENT: ICD-10-PCS | Mod: S$GLB,,, | Performed by: INTERNAL MEDICINE

## 2021-05-20 PROCEDURE — 99999 PR PBB SHADOW E&M-EST. PATIENT-LVL IV: CPT | Mod: PBBFAC,,, | Performed by: INTERNAL MEDICINE

## 2021-05-20 PROCEDURE — 99999 PR PBB SHADOW E&M-EST. PATIENT-LVL IV: ICD-10-PCS | Mod: PBBFAC,,, | Performed by: INTERNAL MEDICINE

## 2021-05-20 PROCEDURE — 3074F SYST BP LT 130 MM HG: CPT | Mod: CPTII,S$GLB,, | Performed by: INTERNAL MEDICINE

## 2021-05-20 PROCEDURE — 1101F PT FALLS ASSESS-DOCD LE1/YR: CPT | Mod: CPTII,S$GLB,, | Performed by: INTERNAL MEDICINE

## 2021-05-20 PROCEDURE — 99214 OFFICE O/P EST MOD 30 MIN: CPT | Mod: S$GLB,,, | Performed by: INTERNAL MEDICINE

## 2021-05-20 PROCEDURE — 3288F PR FALLS RISK ASSESSMENT DOCUMENTED: ICD-10-PCS | Mod: CPTII,S$GLB,, | Performed by: INTERNAL MEDICINE

## 2021-05-20 PROCEDURE — 1159F PR MEDICATION LIST DOCUMENTED IN MEDICAL RECORD: ICD-10-PCS | Mod: S$GLB,,, | Performed by: INTERNAL MEDICINE

## 2021-05-20 PROCEDURE — 1159F MED LIST DOCD IN RCRD: CPT | Mod: S$GLB,,, | Performed by: INTERNAL MEDICINE

## 2021-05-20 PROCEDURE — 3288F FALL RISK ASSESSMENT DOCD: CPT | Mod: CPTII,S$GLB,, | Performed by: INTERNAL MEDICINE

## 2021-05-20 PROCEDURE — 3079F DIAST BP 80-89 MM HG: CPT | Mod: CPTII,S$GLB,, | Performed by: INTERNAL MEDICINE

## 2021-05-20 PROCEDURE — 3074F PR MOST RECENT SYSTOLIC BLOOD PRESSURE < 130 MM HG: ICD-10-PCS | Mod: CPTII,S$GLB,, | Performed by: INTERNAL MEDICINE

## 2021-05-20 RX ORDER — IBUPROFEN 600 MG/1
600 TABLET ORAL EVERY 8 HOURS PRN
Qty: 6 TABLET | Refills: 0 | Status: SHIPPED | OUTPATIENT
Start: 2021-05-20 | End: 2021-08-23 | Stop reason: ALTCHOICE

## 2021-05-20 RX ORDER — QUINAPRIL 20 MG/1
30 TABLET ORAL DAILY
Qty: 135 TABLET | Refills: 3
Start: 2021-05-20 | End: 2021-08-07

## 2021-05-20 RX ORDER — TIZANIDINE 4 MG/1
4 TABLET ORAL NIGHTLY PRN
Qty: 7 TABLET | Refills: 0 | Status: SHIPPED | OUTPATIENT
Start: 2021-05-20 | End: 2021-05-30

## 2021-05-28 ENCOUNTER — LAB VISIT (OUTPATIENT)
Dept: LAB | Facility: HOSPITAL | Age: 71
End: 2021-05-28
Attending: INTERNAL MEDICINE
Payer: MEDICARE

## 2021-05-28 DIAGNOSIS — E05.00 GRAVES DISEASE: ICD-10-CM

## 2021-05-28 LAB
T4 FREE SERPL-MCNC: 1.1 NG/DL (ref 0.71–1.51)
TSH SERPL DL<=0.005 MIU/L-ACNC: 0.04 UIU/ML (ref 0.4–4)

## 2021-05-28 PROCEDURE — 36415 COLL VENOUS BLD VENIPUNCTURE: CPT | Performed by: INTERNAL MEDICINE

## 2021-05-28 PROCEDURE — 84439 ASSAY OF FREE THYROXINE: CPT | Performed by: INTERNAL MEDICINE

## 2021-05-28 PROCEDURE — 84443 ASSAY THYROID STIM HORMONE: CPT | Performed by: INTERNAL MEDICINE

## 2021-05-31 ENCOUNTER — PATIENT MESSAGE (OUTPATIENT)
Dept: ENDOCRINOLOGY | Facility: CLINIC | Age: 71
End: 2021-05-31

## 2021-05-31 DIAGNOSIS — E05.00 GRAVES DISEASE: Primary | ICD-10-CM

## 2021-05-31 RX ORDER — METHIMAZOLE 10 MG/1
20 TABLET ORAL DAILY
Qty: 120 TABLET | Refills: 11
Start: 2021-05-31 | End: 2021-11-10 | Stop reason: SDUPTHER

## 2021-06-21 ENCOUNTER — PES CALL (OUTPATIENT)
Dept: ADMINISTRATIVE | Facility: CLINIC | Age: 71
End: 2021-06-21

## 2021-07-15 ENCOUNTER — PATIENT MESSAGE (OUTPATIENT)
Dept: ENDOCRINOLOGY | Facility: CLINIC | Age: 71
End: 2021-07-15

## 2021-07-15 ENCOUNTER — PES CALL (OUTPATIENT)
Dept: ADMINISTRATIVE | Facility: CLINIC | Age: 71
End: 2021-07-15

## 2021-07-15 DIAGNOSIS — E05.00 GRAVES DISEASE: Primary | ICD-10-CM

## 2021-07-19 ENCOUNTER — TELEPHONE (OUTPATIENT)
Dept: ENDOCRINOLOGY | Facility: CLINIC | Age: 71
End: 2021-07-19

## 2021-07-20 ENCOUNTER — TELEPHONE (OUTPATIENT)
Dept: SLEEP MEDICINE | Facility: CLINIC | Age: 71
End: 2021-07-20

## 2021-07-23 ENCOUNTER — LAB VISIT (OUTPATIENT)
Dept: LAB | Facility: HOSPITAL | Age: 71
End: 2021-07-23
Attending: INTERNAL MEDICINE
Payer: MEDICARE

## 2021-07-23 DIAGNOSIS — E05.00 GRAVES DISEASE: ICD-10-CM

## 2021-07-23 LAB
T4 FREE SERPL-MCNC: 0.87 NG/DL (ref 0.71–1.51)
TSH SERPL DL<=0.005 MIU/L-ACNC: 0.28 UIU/ML (ref 0.4–4)

## 2021-07-23 PROCEDURE — 36415 COLL VENOUS BLD VENIPUNCTURE: CPT | Performed by: INTERNAL MEDICINE

## 2021-07-23 PROCEDURE — 84439 ASSAY OF FREE THYROXINE: CPT | Performed by: INTERNAL MEDICINE

## 2021-07-23 PROCEDURE — 84443 ASSAY THYROID STIM HORMONE: CPT | Performed by: INTERNAL MEDICINE

## 2021-08-03 ENCOUNTER — PATIENT MESSAGE (OUTPATIENT)
Dept: ENDOCRINOLOGY | Facility: CLINIC | Age: 71
End: 2021-08-03

## 2021-08-13 ENCOUNTER — TELEPHONE (OUTPATIENT)
Dept: INTERNAL MEDICINE | Facility: CLINIC | Age: 71
End: 2021-08-13

## 2021-08-16 ENCOUNTER — OFFICE VISIT (OUTPATIENT)
Dept: INTERNAL MEDICINE | Facility: CLINIC | Age: 71
End: 2021-08-16
Payer: MEDICARE

## 2021-08-16 VITALS
WEIGHT: 258.19 LBS | HEIGHT: 69 IN | HEART RATE: 82 BPM | OXYGEN SATURATION: 99 % | DIASTOLIC BLOOD PRESSURE: 70 MMHG | BODY MASS INDEX: 38.24 KG/M2 | SYSTOLIC BLOOD PRESSURE: 100 MMHG

## 2021-08-16 DIAGNOSIS — I10 ESSENTIAL HYPERTENSION: Primary | ICD-10-CM

## 2021-08-16 DIAGNOSIS — Z12.31 ENCOUNTER FOR SCREENING MAMMOGRAM FOR BREAST CANCER: ICD-10-CM

## 2021-08-16 DIAGNOSIS — E05.00 GRAVES DISEASE: ICD-10-CM

## 2021-08-16 DIAGNOSIS — E05.00 GRAVES' EYE DISEASE: ICD-10-CM

## 2021-08-16 DIAGNOSIS — G47.33 OBSTRUCTIVE SLEEP APNEA: ICD-10-CM

## 2021-08-16 DIAGNOSIS — D47.2 SMOLDERING MULTIPLE MYELOMA (SMM): ICD-10-CM

## 2021-08-16 PROBLEM — R00.2 PALPITATIONS: Status: RESOLVED | Noted: 2020-05-13 | Resolved: 2021-08-16

## 2021-08-16 PROCEDURE — 1101F PT FALLS ASSESS-DOCD LE1/YR: CPT | Mod: CPTII,S$GLB,, | Performed by: INTERNAL MEDICINE

## 2021-08-16 PROCEDURE — 1159F PR MEDICATION LIST DOCUMENTED IN MEDICAL RECORD: ICD-10-PCS | Mod: CPTII,S$GLB,, | Performed by: INTERNAL MEDICINE

## 2021-08-16 PROCEDURE — 3288F PR FALLS RISK ASSESSMENT DOCUMENTED: ICD-10-PCS | Mod: CPTII,S$GLB,, | Performed by: INTERNAL MEDICINE

## 2021-08-16 PROCEDURE — 99213 OFFICE O/P EST LOW 20 MIN: CPT | Mod: S$GLB,,, | Performed by: INTERNAL MEDICINE

## 2021-08-16 PROCEDURE — 3008F PR BODY MASS INDEX (BMI) DOCUMENTED: ICD-10-PCS | Mod: CPTII,S$GLB,, | Performed by: INTERNAL MEDICINE

## 2021-08-16 PROCEDURE — 3288F FALL RISK ASSESSMENT DOCD: CPT | Mod: CPTII,S$GLB,, | Performed by: INTERNAL MEDICINE

## 2021-08-16 PROCEDURE — 1126F PR PAIN SEVERITY QUANTIFIED, NO PAIN PRESENT: ICD-10-PCS | Mod: CPTII,S$GLB,, | Performed by: INTERNAL MEDICINE

## 2021-08-16 PROCEDURE — 3078F DIAST BP <80 MM HG: CPT | Mod: CPTII,S$GLB,, | Performed by: INTERNAL MEDICINE

## 2021-08-16 PROCEDURE — 99213 PR OFFICE/OUTPT VISIT, EST, LEVL III, 20-29 MIN: ICD-10-PCS | Mod: S$GLB,,, | Performed by: INTERNAL MEDICINE

## 2021-08-16 PROCEDURE — 3008F BODY MASS INDEX DOCD: CPT | Mod: CPTII,S$GLB,, | Performed by: INTERNAL MEDICINE

## 2021-08-16 PROCEDURE — 1159F MED LIST DOCD IN RCRD: CPT | Mod: CPTII,S$GLB,, | Performed by: INTERNAL MEDICINE

## 2021-08-16 PROCEDURE — 99999 PR PBB SHADOW E&M-EST. PATIENT-LVL IV: ICD-10-PCS | Mod: PBBFAC,,, | Performed by: INTERNAL MEDICINE

## 2021-08-16 PROCEDURE — 1126F AMNT PAIN NOTED NONE PRSNT: CPT | Mod: CPTII,S$GLB,, | Performed by: INTERNAL MEDICINE

## 2021-08-16 PROCEDURE — 1101F PR PT FALLS ASSESS DOC 0-1 FALLS W/OUT INJ PAST YR: ICD-10-PCS | Mod: CPTII,S$GLB,, | Performed by: INTERNAL MEDICINE

## 2021-08-16 PROCEDURE — 3078F PR MOST RECENT DIASTOLIC BLOOD PRESSURE < 80 MM HG: ICD-10-PCS | Mod: CPTII,S$GLB,, | Performed by: INTERNAL MEDICINE

## 2021-08-16 PROCEDURE — 3074F PR MOST RECENT SYSTOLIC BLOOD PRESSURE < 130 MM HG: ICD-10-PCS | Mod: CPTII,S$GLB,, | Performed by: INTERNAL MEDICINE

## 2021-08-16 PROCEDURE — 3074F SYST BP LT 130 MM HG: CPT | Mod: CPTII,S$GLB,, | Performed by: INTERNAL MEDICINE

## 2021-08-16 PROCEDURE — 99999 PR PBB SHADOW E&M-EST. PATIENT-LVL IV: CPT | Mod: PBBFAC,,, | Performed by: INTERNAL MEDICINE

## 2021-08-18 ENCOUNTER — TELEPHONE (OUTPATIENT)
Dept: HEMATOLOGY/ONCOLOGY | Facility: CLINIC | Age: 71
End: 2021-08-18

## 2021-08-20 ENCOUNTER — TELEPHONE (OUTPATIENT)
Dept: INTERNAL MEDICINE | Facility: CLINIC | Age: 71
End: 2021-08-20

## 2021-08-23 ENCOUNTER — OFFICE VISIT (OUTPATIENT)
Dept: INTERNAL MEDICINE | Facility: CLINIC | Age: 71
End: 2021-08-23
Payer: MEDICARE

## 2021-08-23 VITALS
SYSTOLIC BLOOD PRESSURE: 112 MMHG | OXYGEN SATURATION: 99 % | DIASTOLIC BLOOD PRESSURE: 80 MMHG | BODY MASS INDEX: 38.2 KG/M2 | HEIGHT: 69 IN | WEIGHT: 257.94 LBS | HEART RATE: 84 BPM

## 2021-08-23 DIAGNOSIS — R31.29 MICROHEMATURIA: ICD-10-CM

## 2021-08-23 DIAGNOSIS — K76.89 LIVER CYST: ICD-10-CM

## 2021-08-23 DIAGNOSIS — D47.2 SMOLDERING MULTIPLE MYELOMA (SMM): ICD-10-CM

## 2021-08-23 DIAGNOSIS — I12.9 BENIGN HYPERTENSIVE RENAL DISEASE WITHOUT RENAL FAILURE: ICD-10-CM

## 2021-08-23 DIAGNOSIS — I70.0 AORTIC ATHEROSCLEROSIS: ICD-10-CM

## 2021-08-23 DIAGNOSIS — E05.00 GRAVES' EYE DISEASE: ICD-10-CM

## 2021-08-23 DIAGNOSIS — Z86.010 HISTORY OF ADENOMATOUS POLYP OF COLON: ICD-10-CM

## 2021-08-23 DIAGNOSIS — E66.01 SEVERE OBESITY WITH BODY MASS INDEX (BMI) OF 35.0 TO 39.9 WITH COMORBIDITY: ICD-10-CM

## 2021-08-23 DIAGNOSIS — I10 ESSENTIAL HYPERTENSION: ICD-10-CM

## 2021-08-23 DIAGNOSIS — D89.2 PARAPROTEINEMIA: ICD-10-CM

## 2021-08-23 DIAGNOSIS — I05.9 MITRAL VALVE DISORDER: ICD-10-CM

## 2021-08-23 DIAGNOSIS — G47.33 OBSTRUCTIVE SLEEP APNEA: ICD-10-CM

## 2021-08-23 DIAGNOSIS — E05.00 GRAVES DISEASE: ICD-10-CM

## 2021-08-23 DIAGNOSIS — G43.109 MIGRAINE WITH AURA AND WITHOUT STATUS MIGRAINOSUS, NOT INTRACTABLE: ICD-10-CM

## 2021-08-23 DIAGNOSIS — I07.9 TRICUSPID VALVE DISEASE: ICD-10-CM

## 2021-08-23 DIAGNOSIS — Z00.00 ENCOUNTER FOR PREVENTIVE HEALTH EXAMINATION: Primary | ICD-10-CM

## 2021-08-23 DIAGNOSIS — Q61.2 ADPKD (AUTOSOMAL DOMINANT POLYCYSTIC KIDNEY DISEASE): ICD-10-CM

## 2021-08-23 PROBLEM — I77.1 TORTUOUS AORTA: Status: RESOLVED | Noted: 2018-02-08 | Resolved: 2021-08-23

## 2021-08-23 PROCEDURE — 3074F SYST BP LT 130 MM HG: CPT | Mod: CPTII,S$GLB,, | Performed by: NURSE PRACTITIONER

## 2021-08-23 PROCEDURE — G0439 PPPS, SUBSEQ VISIT: HCPCS | Mod: S$GLB,,, | Performed by: NURSE PRACTITIONER

## 2021-08-23 PROCEDURE — 1126F PR PAIN SEVERITY QUANTIFIED, NO PAIN PRESENT: ICD-10-PCS | Mod: CPTII,S$GLB,, | Performed by: NURSE PRACTITIONER

## 2021-08-23 PROCEDURE — 3079F PR MOST RECENT DIASTOLIC BLOOD PRESSURE 80-89 MM HG: ICD-10-PCS | Mod: CPTII,S$GLB,, | Performed by: NURSE PRACTITIONER

## 2021-08-23 PROCEDURE — 1101F PR PT FALLS ASSESS DOC 0-1 FALLS W/OUT INJ PAST YR: ICD-10-PCS | Mod: CPTII,S$GLB,, | Performed by: NURSE PRACTITIONER

## 2021-08-23 PROCEDURE — 1160F RVW MEDS BY RX/DR IN RCRD: CPT | Mod: CPTII,S$GLB,, | Performed by: NURSE PRACTITIONER

## 2021-08-23 PROCEDURE — 1159F MED LIST DOCD IN RCRD: CPT | Mod: CPTII,S$GLB,, | Performed by: NURSE PRACTITIONER

## 2021-08-23 PROCEDURE — 3008F BODY MASS INDEX DOCD: CPT | Mod: CPTII,S$GLB,, | Performed by: NURSE PRACTITIONER

## 2021-08-23 PROCEDURE — G0439 PR MEDICARE ANNUAL WELLNESS SUBSEQUENT VISIT: ICD-10-PCS | Mod: S$GLB,,, | Performed by: NURSE PRACTITIONER

## 2021-08-23 PROCEDURE — 3079F DIAST BP 80-89 MM HG: CPT | Mod: CPTII,S$GLB,, | Performed by: NURSE PRACTITIONER

## 2021-08-23 PROCEDURE — 99999 PR PBB SHADOW E&M-EST. PATIENT-LVL IV: ICD-10-PCS | Mod: PBBFAC,,, | Performed by: NURSE PRACTITIONER

## 2021-08-23 PROCEDURE — 99999 PR PBB SHADOW E&M-EST. PATIENT-LVL IV: CPT | Mod: PBBFAC,,, | Performed by: NURSE PRACTITIONER

## 2021-08-23 PROCEDURE — 1160F PR REVIEW ALL MEDS BY PRESCRIBER/CLIN PHARMACIST DOCUMENTED: ICD-10-PCS | Mod: CPTII,S$GLB,, | Performed by: NURSE PRACTITIONER

## 2021-08-23 PROCEDURE — 1126F AMNT PAIN NOTED NONE PRSNT: CPT | Mod: CPTII,S$GLB,, | Performed by: NURSE PRACTITIONER

## 2021-08-23 PROCEDURE — 3288F FALL RISK ASSESSMENT DOCD: CPT | Mod: CPTII,S$GLB,, | Performed by: NURSE PRACTITIONER

## 2021-08-23 PROCEDURE — 3288F PR FALLS RISK ASSESSMENT DOCUMENTED: ICD-10-PCS | Mod: CPTII,S$GLB,, | Performed by: NURSE PRACTITIONER

## 2021-08-23 PROCEDURE — 1159F PR MEDICATION LIST DOCUMENTED IN MEDICAL RECORD: ICD-10-PCS | Mod: CPTII,S$GLB,, | Performed by: NURSE PRACTITIONER

## 2021-08-23 PROCEDURE — 3008F PR BODY MASS INDEX (BMI) DOCUMENTED: ICD-10-PCS | Mod: CPTII,S$GLB,, | Performed by: NURSE PRACTITIONER

## 2021-08-23 PROCEDURE — 3074F PR MOST RECENT SYSTOLIC BLOOD PRESSURE < 130 MM HG: ICD-10-PCS | Mod: CPTII,S$GLB,, | Performed by: NURSE PRACTITIONER

## 2021-08-23 PROCEDURE — 1101F PT FALLS ASSESS-DOCD LE1/YR: CPT | Mod: CPTII,S$GLB,, | Performed by: NURSE PRACTITIONER

## 2021-09-14 ENCOUNTER — OFFICE VISIT (OUTPATIENT)
Dept: SLEEP MEDICINE | Facility: CLINIC | Age: 71
End: 2021-09-14
Payer: MEDICARE

## 2021-09-14 VITALS
HEART RATE: 88 BPM | BODY MASS INDEX: 37.42 KG/M2 | WEIGHT: 252.63 LBS | HEIGHT: 69 IN | SYSTOLIC BLOOD PRESSURE: 119 MMHG | DIASTOLIC BLOOD PRESSURE: 71 MMHG

## 2021-09-14 DIAGNOSIS — I10 ESSENTIAL HYPERTENSION: ICD-10-CM

## 2021-09-14 DIAGNOSIS — G47.33 OBSTRUCTIVE SLEEP APNEA: Primary | ICD-10-CM

## 2021-09-14 PROCEDURE — 4010F PR ACE/ARB THEARPY RXD/TAKEN: ICD-10-PCS | Mod: CPTII,S$GLB,, | Performed by: NURSE PRACTITIONER

## 2021-09-14 PROCEDURE — 3008F PR BODY MASS INDEX (BMI) DOCUMENTED: ICD-10-PCS | Mod: CPTII,S$GLB,, | Performed by: NURSE PRACTITIONER

## 2021-09-14 PROCEDURE — 3008F BODY MASS INDEX DOCD: CPT | Mod: CPTII,S$GLB,, | Performed by: NURSE PRACTITIONER

## 2021-09-14 PROCEDURE — 3074F SYST BP LT 130 MM HG: CPT | Mod: CPTII,S$GLB,, | Performed by: NURSE PRACTITIONER

## 2021-09-14 PROCEDURE — 3288F FALL RISK ASSESSMENT DOCD: CPT | Mod: CPTII,S$GLB,, | Performed by: NURSE PRACTITIONER

## 2021-09-14 PROCEDURE — 99999 PR PBB SHADOW E&M-EST. PATIENT-LVL III: ICD-10-PCS | Mod: PBBFAC,,, | Performed by: NURSE PRACTITIONER

## 2021-09-14 PROCEDURE — 4010F ACE/ARB THERAPY RXD/TAKEN: CPT | Mod: CPTII,S$GLB,, | Performed by: NURSE PRACTITIONER

## 2021-09-14 PROCEDURE — 1101F PR PT FALLS ASSESS DOC 0-1 FALLS W/OUT INJ PAST YR: ICD-10-PCS | Mod: CPTII,S$GLB,, | Performed by: NURSE PRACTITIONER

## 2021-09-14 PROCEDURE — 99214 PR OFFICE/OUTPT VISIT, EST, LEVL IV, 30-39 MIN: ICD-10-PCS | Mod: S$GLB,,, | Performed by: NURSE PRACTITIONER

## 2021-09-14 PROCEDURE — 1159F MED LIST DOCD IN RCRD: CPT | Mod: CPTII,S$GLB,, | Performed by: NURSE PRACTITIONER

## 2021-09-14 PROCEDURE — 1126F AMNT PAIN NOTED NONE PRSNT: CPT | Mod: CPTII,S$GLB,, | Performed by: NURSE PRACTITIONER

## 2021-09-14 PROCEDURE — 3074F PR MOST RECENT SYSTOLIC BLOOD PRESSURE < 130 MM HG: ICD-10-PCS | Mod: CPTII,S$GLB,, | Performed by: NURSE PRACTITIONER

## 2021-09-14 PROCEDURE — 3078F PR MOST RECENT DIASTOLIC BLOOD PRESSURE < 80 MM HG: ICD-10-PCS | Mod: CPTII,S$GLB,, | Performed by: NURSE PRACTITIONER

## 2021-09-14 PROCEDURE — 1101F PT FALLS ASSESS-DOCD LE1/YR: CPT | Mod: CPTII,S$GLB,, | Performed by: NURSE PRACTITIONER

## 2021-09-14 PROCEDURE — 3288F PR FALLS RISK ASSESSMENT DOCUMENTED: ICD-10-PCS | Mod: CPTII,S$GLB,, | Performed by: NURSE PRACTITIONER

## 2021-09-14 PROCEDURE — 99214 OFFICE O/P EST MOD 30 MIN: CPT | Mod: S$GLB,,, | Performed by: NURSE PRACTITIONER

## 2021-09-14 PROCEDURE — 3078F DIAST BP <80 MM HG: CPT | Mod: CPTII,S$GLB,, | Performed by: NURSE PRACTITIONER

## 2021-09-14 PROCEDURE — 1159F PR MEDICATION LIST DOCUMENTED IN MEDICAL RECORD: ICD-10-PCS | Mod: CPTII,S$GLB,, | Performed by: NURSE PRACTITIONER

## 2021-09-14 PROCEDURE — 1126F PR PAIN SEVERITY QUANTIFIED, NO PAIN PRESENT: ICD-10-PCS | Mod: CPTII,S$GLB,, | Performed by: NURSE PRACTITIONER

## 2021-09-14 PROCEDURE — 99999 PR PBB SHADOW E&M-EST. PATIENT-LVL III: CPT | Mod: PBBFAC,,, | Performed by: NURSE PRACTITIONER

## 2021-09-16 ENCOUNTER — TELEPHONE (OUTPATIENT)
Dept: INTERNAL MEDICINE | Facility: CLINIC | Age: 71
End: 2021-09-16

## 2021-09-21 ENCOUNTER — LAB VISIT (OUTPATIENT)
Dept: LAB | Facility: HOSPITAL | Age: 71
End: 2021-09-21
Attending: INTERNAL MEDICINE
Payer: MEDICARE

## 2021-09-21 DIAGNOSIS — D47.2 SMOLDERING MULTIPLE MYELOMA (SMM): ICD-10-CM

## 2021-09-21 LAB
ALBUMIN SERPL BCP-MCNC: 3.4 G/DL (ref 3.5–5.2)
ALP SERPL-CCNC: 123 U/L (ref 55–135)
ALT SERPL W/O P-5'-P-CCNC: 11 U/L (ref 10–44)
ANION GAP SERPL CALC-SCNC: 5 MMOL/L (ref 8–16)
AST SERPL-CCNC: 15 U/L (ref 10–40)
BASOPHILS # BLD AUTO: 0.01 K/UL (ref 0–0.2)
BASOPHILS NFR BLD: 0.3 % (ref 0–1.9)
BILIRUB SERPL-MCNC: 0.6 MG/DL (ref 0.1–1)
BUN SERPL-MCNC: 16 MG/DL (ref 8–23)
CALCIUM SERPL-MCNC: 9.9 MG/DL (ref 8.7–10.5)
CHLORIDE SERPL-SCNC: 104 MMOL/L (ref 95–110)
CO2 SERPL-SCNC: 29 MMOL/L (ref 23–29)
CREAT SERPL-MCNC: 0.8 MG/DL (ref 0.5–1.4)
DIFFERENTIAL METHOD: ABNORMAL
EOSINOPHIL # BLD AUTO: 0 K/UL (ref 0–0.5)
EOSINOPHIL NFR BLD: 0.3 % (ref 0–8)
ERYTHROCYTE [DISTWIDTH] IN BLOOD BY AUTOMATED COUNT: 10.9 % (ref 11.5–14.5)
EST. GFR  (AFRICAN AMERICAN): >60 ML/MIN/1.73 M^2
EST. GFR  (NON AFRICAN AMERICAN): >60 ML/MIN/1.73 M^2
GLUCOSE SERPL-MCNC: 81 MG/DL (ref 70–110)
HCT VFR BLD AUTO: 35.8 % (ref 37–48.5)
HGB BLD-MCNC: 11.8 G/DL (ref 12–16)
IGA SERPL-MCNC: 99 MG/DL (ref 40–350)
IGG SERPL-MCNC: 1784 MG/DL (ref 650–1600)
IGM SERPL-MCNC: 30 MG/DL (ref 50–300)
IMM GRANULOCYTES # BLD AUTO: 0.01 K/UL (ref 0–0.04)
IMM GRANULOCYTES NFR BLD AUTO: 0.3 % (ref 0–0.5)
LYMPHOCYTES # BLD AUTO: 1.6 K/UL (ref 1–4.8)
LYMPHOCYTES NFR BLD: 43.9 % (ref 18–48)
MCH RBC QN AUTO: 31 PG (ref 27–31)
MCHC RBC AUTO-ENTMCNC: 33 G/DL (ref 32–36)
MCV RBC AUTO: 94 FL (ref 82–98)
MONOCYTES # BLD AUTO: 0.4 K/UL (ref 0.3–1)
MONOCYTES NFR BLD: 10.6 % (ref 4–15)
NEUTROPHILS # BLD AUTO: 1.7 K/UL (ref 1.8–7.7)
NEUTROPHILS NFR BLD: 44.6 % (ref 38–73)
NRBC BLD-RTO: 0 /100 WBC
PLATELET # BLD AUTO: 249 K/UL (ref 150–450)
PMV BLD AUTO: 10 FL (ref 9.2–12.9)
POTASSIUM SERPL-SCNC: 4.1 MMOL/L (ref 3.5–5.1)
PROT SERPL-MCNC: 7.2 G/DL (ref 6–8.4)
RBC # BLD AUTO: 3.81 M/UL (ref 4–5.4)
SODIUM SERPL-SCNC: 138 MMOL/L (ref 136–145)
WBC # BLD AUTO: 3.69 K/UL (ref 3.9–12.7)

## 2021-09-21 PROCEDURE — 83520 IMMUNOASSAY QUANT NOS NONAB: CPT | Performed by: INTERNAL MEDICINE

## 2021-09-21 PROCEDURE — 86334 PATHOLOGIST INTERPRETATION IFE: ICD-10-PCS | Mod: 26,,, | Performed by: PATHOLOGY

## 2021-09-21 PROCEDURE — 82784 ASSAY IGA/IGD/IGG/IGM EACH: CPT | Performed by: INTERNAL MEDICINE

## 2021-09-21 PROCEDURE — 84165 PROTEIN E-PHORESIS SERUM: CPT | Performed by: INTERNAL MEDICINE

## 2021-09-21 PROCEDURE — 86334 IMMUNOFIX E-PHORESIS SERUM: CPT | Mod: 26,,, | Performed by: PATHOLOGY

## 2021-09-21 PROCEDURE — 85025 COMPLETE CBC W/AUTO DIFF WBC: CPT | Performed by: INTERNAL MEDICINE

## 2021-09-21 PROCEDURE — 36415 COLL VENOUS BLD VENIPUNCTURE: CPT | Performed by: INTERNAL MEDICINE

## 2021-09-21 PROCEDURE — 84165 PROTEIN E-PHORESIS SERUM: CPT | Mod: 26,,, | Performed by: PATHOLOGY

## 2021-09-21 PROCEDURE — 84165 PATHOLOGIST INTERPRETATION SPE: ICD-10-PCS | Mod: 26,,, | Performed by: PATHOLOGY

## 2021-09-21 PROCEDURE — 80053 COMPREHEN METABOLIC PANEL: CPT | Performed by: INTERNAL MEDICINE

## 2021-09-21 PROCEDURE — 86334 IMMUNOFIX E-PHORESIS SERUM: CPT | Performed by: INTERNAL MEDICINE

## 2021-09-22 LAB
ALBUMIN SERPL ELPH-MCNC: 3.43 G/DL (ref 3.35–5.55)
ALPHA1 GLOB SERPL ELPH-MCNC: 0.33 G/DL (ref 0.17–0.41)
ALPHA2 GLOB SERPL ELPH-MCNC: 0.68 G/DL (ref 0.43–0.99)
B-GLOBULIN SERPL ELPH-MCNC: 0.7 G/DL (ref 0.5–1.1)
GAMMA GLOB SERPL ELPH-MCNC: 1.75 G/DL (ref 0.67–1.58)
INTERPRETATION SERPL IFE-IMP: NORMAL
KAPPA LC SER QL IA: 13.52 MG/DL (ref 0.33–1.94)
KAPPA LC/LAMBDA SER IA: 10.48 (ref 0.26–1.65)
LAMBDA LC SER QL IA: 1.29 MG/DL (ref 0.57–2.63)
PATHOLOGIST INTERPRETATION IFE: NORMAL
PATHOLOGIST INTERPRETATION SPE: NORMAL
PROT SERPL-MCNC: 6.9 G/DL (ref 6–8.4)

## 2021-09-24 ENCOUNTER — OFFICE VISIT (OUTPATIENT)
Dept: HEMATOLOGY/ONCOLOGY | Facility: CLINIC | Age: 71
End: 2021-09-24
Payer: MEDICARE

## 2021-09-24 VITALS
SYSTOLIC BLOOD PRESSURE: 121 MMHG | WEIGHT: 256.06 LBS | TEMPERATURE: 98 F | RESPIRATION RATE: 16 BRPM | HEIGHT: 69 IN | BODY MASS INDEX: 37.93 KG/M2 | OXYGEN SATURATION: 97 % | DIASTOLIC BLOOD PRESSURE: 71 MMHG | HEART RATE: 77 BPM

## 2021-09-24 DIAGNOSIS — D47.2 SMOLDERING MULTIPLE MYELOMA (SMM): Primary | ICD-10-CM

## 2021-09-24 PROCEDURE — 3078F DIAST BP <80 MM HG: CPT | Mod: CPTII,S$GLB,, | Performed by: INTERNAL MEDICINE

## 2021-09-24 PROCEDURE — 1101F PR PT FALLS ASSESS DOC 0-1 FALLS W/OUT INJ PAST YR: ICD-10-PCS | Mod: CPTII,S$GLB,, | Performed by: INTERNAL MEDICINE

## 2021-09-24 PROCEDURE — 99999 PR PBB SHADOW E&M-EST. PATIENT-LVL III: CPT | Mod: PBBFAC,,, | Performed by: INTERNAL MEDICINE

## 2021-09-24 PROCEDURE — 3288F PR FALLS RISK ASSESSMENT DOCUMENTED: ICD-10-PCS | Mod: CPTII,S$GLB,, | Performed by: INTERNAL MEDICINE

## 2021-09-24 PROCEDURE — 3008F PR BODY MASS INDEX (BMI) DOCUMENTED: ICD-10-PCS | Mod: CPTII,S$GLB,, | Performed by: INTERNAL MEDICINE

## 2021-09-24 PROCEDURE — 1126F AMNT PAIN NOTED NONE PRSNT: CPT | Mod: CPTII,S$GLB,, | Performed by: INTERNAL MEDICINE

## 2021-09-24 PROCEDURE — 99214 OFFICE O/P EST MOD 30 MIN: CPT | Mod: S$GLB,,, | Performed by: INTERNAL MEDICINE

## 2021-09-24 PROCEDURE — 1101F PT FALLS ASSESS-DOCD LE1/YR: CPT | Mod: CPTII,S$GLB,, | Performed by: INTERNAL MEDICINE

## 2021-09-24 PROCEDURE — 3074F SYST BP LT 130 MM HG: CPT | Mod: CPTII,S$GLB,, | Performed by: INTERNAL MEDICINE

## 2021-09-24 PROCEDURE — 3074F PR MOST RECENT SYSTOLIC BLOOD PRESSURE < 130 MM HG: ICD-10-PCS | Mod: CPTII,S$GLB,, | Performed by: INTERNAL MEDICINE

## 2021-09-24 PROCEDURE — 99214 PR OFFICE/OUTPT VISIT, EST, LEVL IV, 30-39 MIN: ICD-10-PCS | Mod: S$GLB,,, | Performed by: INTERNAL MEDICINE

## 2021-09-24 PROCEDURE — 3078F PR MOST RECENT DIASTOLIC BLOOD PRESSURE < 80 MM HG: ICD-10-PCS | Mod: CPTII,S$GLB,, | Performed by: INTERNAL MEDICINE

## 2021-09-24 PROCEDURE — 3288F FALL RISK ASSESSMENT DOCD: CPT | Mod: CPTII,S$GLB,, | Performed by: INTERNAL MEDICINE

## 2021-09-24 PROCEDURE — 4010F PR ACE/ARB THEARPY RXD/TAKEN: ICD-10-PCS | Mod: CPTII,S$GLB,, | Performed by: INTERNAL MEDICINE

## 2021-09-24 PROCEDURE — 4010F ACE/ARB THERAPY RXD/TAKEN: CPT | Mod: CPTII,S$GLB,, | Performed by: INTERNAL MEDICINE

## 2021-09-24 PROCEDURE — 1126F PR PAIN SEVERITY QUANTIFIED, NO PAIN PRESENT: ICD-10-PCS | Mod: CPTII,S$GLB,, | Performed by: INTERNAL MEDICINE

## 2021-09-24 PROCEDURE — 99999 PR PBB SHADOW E&M-EST. PATIENT-LVL III: ICD-10-PCS | Mod: PBBFAC,,, | Performed by: INTERNAL MEDICINE

## 2021-09-24 PROCEDURE — 1159F MED LIST DOCD IN RCRD: CPT | Mod: CPTII,S$GLB,, | Performed by: INTERNAL MEDICINE

## 2021-09-24 PROCEDURE — 3008F BODY MASS INDEX DOCD: CPT | Mod: CPTII,S$GLB,, | Performed by: INTERNAL MEDICINE

## 2021-09-24 PROCEDURE — 1159F PR MEDICATION LIST DOCUMENTED IN MEDICAL RECORD: ICD-10-PCS | Mod: CPTII,S$GLB,, | Performed by: INTERNAL MEDICINE

## 2021-11-01 DIAGNOSIS — G47.33 OSA (OBSTRUCTIVE SLEEP APNEA): Primary | ICD-10-CM

## 2021-11-02 ENCOUNTER — TELEPHONE (OUTPATIENT)
Dept: SLEEP MEDICINE | Facility: CLINIC | Age: 71
End: 2021-11-02
Payer: MEDICARE

## 2021-11-10 RX ORDER — METHIMAZOLE 10 MG/1
20 TABLET ORAL DAILY
Qty: 120 TABLET | Refills: 1 | Status: SHIPPED | OUTPATIENT
Start: 2021-11-10 | End: 2022-02-01

## 2021-11-22 ENCOUNTER — HOSPITAL ENCOUNTER (OUTPATIENT)
Dept: RADIOLOGY | Facility: HOSPITAL | Age: 71
Discharge: HOME | End: 2021-11-22
Attending: INTERNAL MEDICINE
Payer: MEDICARE

## 2021-11-22 DIAGNOSIS — Z12.31 ENCOUNTER FOR SCREENING MAMMOGRAM FOR BREAST CANCER: ICD-10-CM

## 2021-11-22 PROCEDURE — 77067 SCR MAMMO BI INCL CAD: CPT | Mod: TC,HCNC

## 2021-11-22 PROCEDURE — 77067 MAMMO DIGITAL SCREENING BILAT WITH TOMO: ICD-10-PCS | Mod: 26,HCNC,, | Performed by: RADIOLOGY

## 2021-11-22 PROCEDURE — 77063 BREAST TOMOSYNTHESIS BI: CPT | Mod: 26,HCNC,, | Performed by: RADIOLOGY

## 2021-11-22 PROCEDURE — 77063 MAMMO DIGITAL SCREENING BILAT WITH TOMO: ICD-10-PCS | Mod: 26,HCNC,, | Performed by: RADIOLOGY

## 2021-11-22 PROCEDURE — 77067 SCR MAMMO BI INCL CAD: CPT | Mod: 26,HCNC,, | Performed by: RADIOLOGY

## 2021-11-29 ENCOUNTER — OFFICE VISIT (OUTPATIENT)
Dept: INTERNAL MEDICINE | Facility: CLINIC | Age: 71
End: 2021-11-29
Payer: MEDICARE

## 2021-11-29 VITALS
HEART RATE: 93 BPM | HEIGHT: 69 IN | OXYGEN SATURATION: 98 % | BODY MASS INDEX: 37.62 KG/M2 | SYSTOLIC BLOOD PRESSURE: 120 MMHG | DIASTOLIC BLOOD PRESSURE: 80 MMHG | WEIGHT: 254 LBS

## 2021-11-29 DIAGNOSIS — R05.9 COUGH: Primary | ICD-10-CM

## 2021-11-29 PROCEDURE — 99213 PR OFFICE/OUTPT VISIT, EST, LEVL III, 20-29 MIN: ICD-10-PCS | Mod: HCNC,S$GLB,, | Performed by: INTERNAL MEDICINE

## 2021-11-29 PROCEDURE — 4010F ACE/ARB THERAPY RXD/TAKEN: CPT | Mod: HCNC,CPTII,S$GLB, | Performed by: INTERNAL MEDICINE

## 2021-11-29 PROCEDURE — 99499 UNLISTED E&M SERVICE: CPT | Mod: S$GLB,,, | Performed by: INTERNAL MEDICINE

## 2021-11-29 PROCEDURE — 99213 OFFICE O/P EST LOW 20 MIN: CPT | Mod: HCNC,S$GLB,, | Performed by: INTERNAL MEDICINE

## 2021-11-29 PROCEDURE — 99999 PR PBB SHADOW E&M-EST. PATIENT-LVL III: ICD-10-PCS | Mod: PBBFAC,HCNC,, | Performed by: INTERNAL MEDICINE

## 2021-11-29 PROCEDURE — 99499 RISK ADDL DX/OHS AUDIT: ICD-10-PCS | Mod: S$GLB,,, | Performed by: INTERNAL MEDICINE

## 2021-11-29 PROCEDURE — 4010F PR ACE/ARB THEARPY RXD/TAKEN: ICD-10-PCS | Mod: HCNC,CPTII,S$GLB, | Performed by: INTERNAL MEDICINE

## 2021-11-29 PROCEDURE — 99999 PR PBB SHADOW E&M-EST. PATIENT-LVL III: CPT | Mod: PBBFAC,HCNC,, | Performed by: INTERNAL MEDICINE

## 2021-11-29 RX ORDER — DOXYCYCLINE HYCLATE 100 MG
100 TABLET ORAL EVERY 12 HOURS
Qty: 14 TABLET | Refills: 0 | Status: SHIPPED | OUTPATIENT
Start: 2021-11-29 | End: 2022-05-26 | Stop reason: ALTCHOICE

## 2022-01-29 DIAGNOSIS — E05.00 GRAVES DISEASE: Primary | ICD-10-CM

## 2022-02-01 RX ORDER — METHIMAZOLE 10 MG/1
TABLET ORAL
Qty: 120 TABLET | Refills: 1 | Status: SHIPPED | OUTPATIENT
Start: 2022-02-01 | End: 2022-02-14 | Stop reason: SDUPTHER

## 2022-02-14 RX ORDER — METHIMAZOLE 10 MG/1
TABLET ORAL
Qty: 120 TABLET | Refills: 1 | Status: SHIPPED | OUTPATIENT
Start: 2022-02-14 | End: 2022-03-09 | Stop reason: SDUPTHER

## 2022-02-15 ENCOUNTER — TELEPHONE (OUTPATIENT)
Dept: SLEEP MEDICINE | Facility: CLINIC | Age: 72
End: 2022-02-15
Payer: MEDICARE

## 2022-02-15 NOTE — TELEPHONE ENCOUNTER
----- Message from Michelle Shields sent at 2/15/2022 10:34 AM CST -----  Who Called: KEESHA DUARTE    Who Left Message for Patient: Lizbet Schladweiler    Does the patient know what this is regarding?: Yes    Best Call Back Number: [phone]    Additional Information:

## 2022-02-15 NOTE — TELEPHONE ENCOUNTER
Staff returned pt's call and got her scheudled for a vv for 5:20 on 2/*24/22. Pt requested a phone call. I confirmed and put in the notes.

## 2022-02-15 NOTE — TELEPHONE ENCOUNTER
----- Message from Susan Dickson sent at 2/15/2022  8:46 AM CST -----  Regarding: appointment  Name of Who is Calling: Manju           What is the request in detail: Patient is requesting a call back to be seen for migraines soon.           Can the clinic reply by [facility]: No           What Number to Call Back if not in [facility]: [phone]

## 2022-02-18 ENCOUNTER — TELEPHONE (OUTPATIENT)
Dept: INTERNAL MEDICINE | Facility: CLINIC | Age: 72
End: 2022-02-18
Payer: MEDICARE

## 2022-02-18 NOTE — TELEPHONE ENCOUNTER
----- Message from Morenita Lewis sent at 2/18/2022 10:17 AM CST -----  Contact: [phone]  Manju is requesting a callback; she was a pt of Dr. Garay and this doctor was one of the doctors that she could see to be her primary doctor.  She would like to be advised on how soon she can get in to see the doctor.    Callback number: [phone]

## 2022-02-21 ENCOUNTER — PATIENT MESSAGE (OUTPATIENT)
Dept: INTERNAL MEDICINE | Facility: CLINIC | Age: 72
End: 2022-02-21
Payer: MEDICARE

## 2022-02-22 ENCOUNTER — TELEPHONE (OUTPATIENT)
Dept: HEMATOLOGY/ONCOLOGY | Facility: CLINIC | Age: 72
End: 2022-02-22
Payer: MEDICARE

## 2022-02-22 ENCOUNTER — PATIENT MESSAGE (OUTPATIENT)
Dept: RESEARCH | Facility: HOSPITAL | Age: 72
End: 2022-02-22
Payer: MEDICARE

## 2022-02-22 DIAGNOSIS — D84.9 IMMUNOSUPPRESSED STATUS: ICD-10-CM

## 2022-02-22 NOTE — TELEPHONE ENCOUNTER
"----- Message from Germán Purvis sent at 2/22/2022  9:54 AM CST -----  Scheduling Request        Patient Status: Est      Scheduling Appt : F/u      Time/Date Preference: March        Contact Preference?: [phone]         Treating Provider: Anjali      Do you feel you need to be seen today? No      Additional Notes:  "Thank you for all that you do for our patients     "

## 2022-02-24 ENCOUNTER — OFFICE VISIT (OUTPATIENT)
Dept: SLEEP MEDICINE | Facility: CLINIC | Age: 72
End: 2022-02-24
Payer: MEDICARE

## 2022-02-24 DIAGNOSIS — R51.9 NONINTRACTABLE HEADACHE, UNSPECIFIED CHRONICITY PATTERN, UNSPECIFIED HEADACHE TYPE: Primary | ICD-10-CM

## 2022-02-24 DIAGNOSIS — C90.00 MULTIPLE MYELOMA, REMISSION STATUS UNSPECIFIED: ICD-10-CM

## 2022-02-24 DIAGNOSIS — E05.00 GRAVES DISEASE: ICD-10-CM

## 2022-02-24 DIAGNOSIS — G47.33 OSA (OBSTRUCTIVE SLEEP APNEA): ICD-10-CM

## 2022-02-24 PROCEDURE — 99214 PR OFFICE/OUTPT VISIT, EST, LEVL IV, 30-39 MIN: ICD-10-PCS | Mod: 95,CR,HCNC, | Performed by: NURSE PRACTITIONER

## 2022-02-24 PROCEDURE — 99214 OFFICE O/P EST MOD 30 MIN: CPT | Mod: 95,CR,HCNC, | Performed by: NURSE PRACTITIONER

## 2022-02-24 RX ORDER — METHYLPREDNISOLONE 4 MG/1
TABLET ORAL
Qty: 21 EACH | Refills: 0 | Status: SHIPPED | OUTPATIENT
Start: 2022-02-24 | End: 2022-03-17

## 2022-02-24 NOTE — PROGRESS NOTES
Established Patient - Audio Only Telehealth Visit     The patient location is: home  The chief complaint leading to consultation is: BECKY mgt/headache  Visit type: Virtual visit with audio only (telephone)  Total time spent with patient: 20    The reason for the audio only service rather than synchronous audio and video virtual visit was related to technical difficulties or patient preference/necessity.     Each patient to whom I provide medical services by telemedicine is:  (1) informed of the relationship between the physician and patient and the respective role of any other health care provider with respect to management of the patient; and (2) notified that they may decline to receive medical services by telemedicine and may withdraw from such care at any time. Patient verbally consented to receive this service via voice-only telephone call.This service was not originating from a related E/M service provided within the previous 7 days nor will  to an E/M service or procedure within the next 24 hours or my soonest available appointment.  Prevailing standard of care was able to be met in this audio-only visit.        Cc: headache/BECKY    Been having head pains x 4-5 wks occurring constantly all day. Tylenol partially helps. Goes from stabbing pain to dull pain to just frontal lobe pain (not like migraines). Back on apap machine ~ 6mos. Denies vision or speech or limb weakness with pain. Has had covid twice and had headaches but this is nothing like that so she took recent test this week and tested negative.    Maxalt continues to abort pain well. Biggest known trigger is weather change.  Cumen/herbs/pepper/basil/rosemary/highly seasoned food/seafood are other triggers. Migrainous HA remain same location/nature from 2015.  bp stable, denies fever or head injury. Seeing new pcp 5/2022 and hem-onc soon and endo soon    BECKY remote: 30davg 3:54h/n AHI 6.1, 90% tile 10.1cm (7-14cm range), YOLI 0.6    HISTORY aLL  "VB:  10/5/15:   She was last seen 3/31/15.  She continues on Magnesium 250mg 2 tabs bid for migraines prevention, but stopped Riboflavin 100mg bid and CoQ10 100mg bid due to worsening HA in past. No recurrent morning headache. No longer keeping HA diaries but reports since last seen HA remain infrequent, occuring <5/month. None on recent cruise! Spicy foods, stress, lack of sleep, and weather are known triggers. HA remain typically mild-moderate (only few severe HA in interim), lasting 2-4+ hrs. Longer duration when at work and unable to take Maxalt due to sleepiness. At work she will take Midrin 2 tabs at onset which remains very helpful within 30min, not having to repeat dose. She has a prodrome (tingling of the forehead) but no auras, before the gradual onset of non-radiating frontal (center or right) throbbing or pressure pains associated with photo/ phonophobia and nausea. She denies vomiting, focal neurological deficits or autonomic deficits. Other triggers remain gatigue, and stress.  Resting helps while movement can intensify pain. HIT-6 score - 54      10/17/16: Reports stable frequency of headaches, ~ 1-2 /month. Midrin remains effective. No severe episodes. No zofran use, but this does help nausea prn. Headache remains same nature/location. HIT=6 score= 53. Having knee pain. Interim dx hyperthyroidism. She has lost weight on new medication/dx. Continues to take Mag 500mg twice daily, no loose stools "helps my arthritis too".     10/25/17:  Since last seen denies interval medical change. Reports headache q 2 mos. Cumen/herbs/pepper/basil/rosemary/highly seasoned food/seafood are triggers. Abortive meds remain effective. HA remain same location/nature. Maxalt works well.   Trying to walk at [employer] track.     11/2018:  Since last seen denies interval medical change except colonoscopy yesterday. Reports headache infrequent except recently 5d due to weather change.  Cumen/herbs/pepper/basil/rosemary/highly " "seasoned food/seafood are triggers. Abortive meds remain effective/maxalt. Midrin too costly.  HA remain same location/nature  2# loss    9/2021 Stopped apap due to recall. Having "sinus" headaches ~ 2 mos, doesn't feel like typical migraines. On tylenol for pain and daily allergra. Waking with them. BP meds also adjusted around same time. Was using nose pillow mask.  Sleep a little more disrupted w/o machine. Has registered her machine.  Maxalt continues to abort pain well. Biggest known trigger is weather change.  Cumen/herbs/pepper/basil/rosemary/highly seasoned food/seafood are other triggers. Migrainous HA remain same location/nature from 2015. Still taking Mag  bp stable  PSG 7/2020 : Moderate to loud snoring was present. The overall AHI was 89.5 with an oxygen patrick of 78.0%. The supine AHI was 89.7 and the REM AHI was 53.3. (all supine)       IMPRESSION:   Migraine without aura, stable  New onset constant headache with hx multiple myleoma, graves disease. covid testing negative  BECKY, severe. She remains adherent with pap, benefiting from therapy. AHI<10  ADPKD    PLAN:   Preventative therapy: Continue Mag 250mg 2 tabs bid  Abortive therapy: Continue Maxalt 5-10 mg tab 1 tab PO q 2h, up to maximum of 30 mg/day. Do not delay treatment to avoid progression of a migraine.   Continue Zofran 4-8mg PO q8h prn for HA/nausea     Continue qhs apap 7-14cm, improve consistent mask on time and avoid mask leaks(falsely elevate ahi)  Medrol dose haven, check labs other etiological factors and MRI brain         "

## 2022-02-25 ENCOUNTER — LAB VISIT (OUTPATIENT)
Dept: LAB | Facility: OTHER | Age: 72
End: 2022-02-25
Attending: NURSE PRACTITIONER
Payer: MEDICARE

## 2022-02-25 DIAGNOSIS — R51.9 NONINTRACTABLE HEADACHE, UNSPECIFIED CHRONICITY PATTERN, UNSPECIFIED HEADACHE TYPE: ICD-10-CM

## 2022-02-25 DIAGNOSIS — E05.00 GRAVES DISEASE: ICD-10-CM

## 2022-02-25 LAB
BASOPHILS # BLD AUTO: 0.01 K/UL (ref 0–0.2)
BASOPHILS NFR BLD: 0.3 % (ref 0–1.9)
CREAT SERPL-MCNC: 0.8 MG/DL (ref 0.5–1.4)
CRP SERPL-MCNC: 1.4 MG/L (ref 0–8.2)
DIFFERENTIAL METHOD: ABNORMAL
EOSINOPHIL # BLD AUTO: 0 K/UL (ref 0–0.5)
EOSINOPHIL NFR BLD: 0.3 % (ref 0–8)
ERYTHROCYTE [DISTWIDTH] IN BLOOD BY AUTOMATED COUNT: 11.1 % (ref 11.5–14.5)
ERYTHROCYTE [SEDIMENTATION RATE] IN BLOOD: 19 MM/HR (ref 0–20)
EST. GFR  (AFRICAN AMERICAN): >60 ML/MIN/1.73 M^2
EST. GFR  (NON AFRICAN AMERICAN): >60 ML/MIN/1.73 M^2
HCT VFR BLD AUTO: 35.6 % (ref 37–48.5)
HGB BLD-MCNC: 11.3 G/DL (ref 12–16)
IMM GRANULOCYTES # BLD AUTO: 0.01 K/UL (ref 0–0.04)
IMM GRANULOCYTES NFR BLD AUTO: 0.3 % (ref 0–0.5)
LYMPHOCYTES # BLD AUTO: 1.3 K/UL (ref 1–4.8)
LYMPHOCYTES NFR BLD: 34.6 % (ref 18–48)
MCH RBC QN AUTO: 29.4 PG (ref 27–31)
MCHC RBC AUTO-ENTMCNC: 31.7 G/DL (ref 32–36)
MCV RBC AUTO: 93 FL (ref 82–98)
MONOCYTES # BLD AUTO: 0.4 K/UL (ref 0.3–1)
MONOCYTES NFR BLD: 10.1 % (ref 4–15)
NEUTROPHILS # BLD AUTO: 2 K/UL (ref 1.8–7.7)
NEUTROPHILS NFR BLD: 54.4 % (ref 38–73)
NRBC BLD-RTO: 0 /100 WBC
PLATELET # BLD AUTO: 246 K/UL (ref 150–450)
PMV BLD AUTO: 10.4 FL (ref 9.2–12.9)
RBC # BLD AUTO: 3.85 M/UL (ref 4–5.4)
T4 FREE SERPL-MCNC: 1.1 NG/DL (ref 0.71–1.51)
TSH SERPL DL<=0.005 MIU/L-ACNC: <0.01 UIU/ML (ref 0.4–4)
WBC # BLD AUTO: 3.67 K/UL (ref 3.9–12.7)

## 2022-02-25 PROCEDURE — 84443 ASSAY THYROID STIM HORMONE: CPT | Mod: HCNC | Performed by: NURSE PRACTITIONER

## 2022-02-25 PROCEDURE — 84439 ASSAY OF FREE THYROXINE: CPT | Mod: HCNC | Performed by: NURSE PRACTITIONER

## 2022-02-25 PROCEDURE — 86140 C-REACTIVE PROTEIN: CPT | Mod: HCNC | Performed by: NURSE PRACTITIONER

## 2022-02-25 PROCEDURE — 85025 COMPLETE CBC W/AUTO DIFF WBC: CPT | Mod: HCNC | Performed by: NURSE PRACTITIONER

## 2022-02-25 PROCEDURE — 36415 COLL VENOUS BLD VENIPUNCTURE: CPT | Mod: HCNC | Performed by: NURSE PRACTITIONER

## 2022-02-25 PROCEDURE — 82565 ASSAY OF CREATININE: CPT | Mod: HCNC | Performed by: NURSE PRACTITIONER

## 2022-02-25 PROCEDURE — 85651 RBC SED RATE NONAUTOMATED: CPT | Mod: HCNC | Performed by: NURSE PRACTITIONER

## 2022-03-06 DIAGNOSIS — K57.92 DIVERTICULITIS: Primary | ICD-10-CM

## 2022-03-06 RX ORDER — METRONIDAZOLE 500 MG/1
500 TABLET ORAL EVERY 8 HOURS
Qty: 21 TABLET | Refills: 1 | Status: SHIPPED | OUTPATIENT
Start: 2022-03-06 | End: 2022-03-13

## 2022-03-06 RX ORDER — CIPROFLOXACIN 500 MG/1
500 TABLET ORAL 2 TIMES DAILY
Qty: 14 TABLET | Refills: 1 | Status: SHIPPED | OUTPATIENT
Start: 2022-03-06 | End: 2022-03-13

## 2022-03-09 ENCOUNTER — OFFICE VISIT (OUTPATIENT)
Dept: ENDOCRINOLOGY | Facility: CLINIC | Age: 72
End: 2022-03-09
Payer: MEDICARE

## 2022-03-09 VITALS
HEIGHT: 69 IN | BODY MASS INDEX: 36.28 KG/M2 | DIASTOLIC BLOOD PRESSURE: 80 MMHG | SYSTOLIC BLOOD PRESSURE: 130 MMHG | WEIGHT: 244.94 LBS

## 2022-03-09 DIAGNOSIS — E05.00 GRAVES DISEASE: Primary | ICD-10-CM

## 2022-03-09 DIAGNOSIS — I10 PRIMARY HYPERTENSION: ICD-10-CM

## 2022-03-09 DIAGNOSIS — R41.9 UNSPECIFIED SYMPTOMS AND SIGNS INVOLVING COGNITIVE FUNCTIONS AND AWARENESS: ICD-10-CM

## 2022-03-09 DIAGNOSIS — E05.00 GRAVES' EYE DISEASE: ICD-10-CM

## 2022-03-09 PROBLEM — Z83.3 FAMILY HISTORY OF DIABETES MELLITUS: Status: RESOLVED | Noted: 2020-03-05 | Resolved: 2022-03-09

## 2022-03-09 PROCEDURE — 3079F DIAST BP 80-89 MM HG: CPT | Mod: CPTII,S$GLB,, | Performed by: INTERNAL MEDICINE

## 2022-03-09 PROCEDURE — 99214 PR OFFICE/OUTPT VISIT, EST, LEVL IV, 30-39 MIN: ICD-10-PCS | Mod: S$GLB,,, | Performed by: INTERNAL MEDICINE

## 2022-03-09 PROCEDURE — 1126F AMNT PAIN NOTED NONE PRSNT: CPT | Mod: CPTII,S$GLB,, | Performed by: INTERNAL MEDICINE

## 2022-03-09 PROCEDURE — 3079F PR MOST RECENT DIASTOLIC BLOOD PRESSURE 80-89 MM HG: ICD-10-PCS | Mod: CPTII,S$GLB,, | Performed by: INTERNAL MEDICINE

## 2022-03-09 PROCEDURE — 3008F BODY MASS INDEX DOCD: CPT | Mod: CPTII,S$GLB,, | Performed by: INTERNAL MEDICINE

## 2022-03-09 PROCEDURE — 3008F PR BODY MASS INDEX (BMI) DOCUMENTED: ICD-10-PCS | Mod: CPTII,S$GLB,, | Performed by: INTERNAL MEDICINE

## 2022-03-09 PROCEDURE — 1159F PR MEDICATION LIST DOCUMENTED IN MEDICAL RECORD: ICD-10-PCS | Mod: CPTII,S$GLB,, | Performed by: INTERNAL MEDICINE

## 2022-03-09 PROCEDURE — 99999 PR PBB SHADOW E&M-EST. PATIENT-LVL III: CPT | Mod: PBBFAC,,, | Performed by: INTERNAL MEDICINE

## 2022-03-09 PROCEDURE — 99999 PR PBB SHADOW E&M-EST. PATIENT-LVL III: ICD-10-PCS | Mod: PBBFAC,,, | Performed by: INTERNAL MEDICINE

## 2022-03-09 PROCEDURE — 1101F PT FALLS ASSESS-DOCD LE1/YR: CPT | Mod: CPTII,S$GLB,, | Performed by: INTERNAL MEDICINE

## 2022-03-09 PROCEDURE — 1159F MED LIST DOCD IN RCRD: CPT | Mod: CPTII,S$GLB,, | Performed by: INTERNAL MEDICINE

## 2022-03-09 PROCEDURE — 1160F PR REVIEW ALL MEDS BY PRESCRIBER/CLIN PHARMACIST DOCUMENTED: ICD-10-PCS | Mod: CPTII,S$GLB,, | Performed by: INTERNAL MEDICINE

## 2022-03-09 PROCEDURE — 3075F PR MOST RECENT SYSTOLIC BLOOD PRESS GE 130-139MM HG: ICD-10-PCS | Mod: CPTII,S$GLB,, | Performed by: INTERNAL MEDICINE

## 2022-03-09 PROCEDURE — 3075F SYST BP GE 130 - 139MM HG: CPT | Mod: CPTII,S$GLB,, | Performed by: INTERNAL MEDICINE

## 2022-03-09 PROCEDURE — 1101F PR PT FALLS ASSESS DOC 0-1 FALLS W/OUT INJ PAST YR: ICD-10-PCS | Mod: CPTII,S$GLB,, | Performed by: INTERNAL MEDICINE

## 2022-03-09 PROCEDURE — 99214 OFFICE O/P EST MOD 30 MIN: CPT | Mod: S$GLB,,, | Performed by: INTERNAL MEDICINE

## 2022-03-09 PROCEDURE — 1126F PR PAIN SEVERITY QUANTIFIED, NO PAIN PRESENT: ICD-10-PCS | Mod: CPTII,S$GLB,, | Performed by: INTERNAL MEDICINE

## 2022-03-09 PROCEDURE — 3288F FALL RISK ASSESSMENT DOCD: CPT | Mod: CPTII,S$GLB,, | Performed by: INTERNAL MEDICINE

## 2022-03-09 PROCEDURE — 3288F PR FALLS RISK ASSESSMENT DOCUMENTED: ICD-10-PCS | Mod: CPTII,S$GLB,, | Performed by: INTERNAL MEDICINE

## 2022-03-09 PROCEDURE — 1160F RVW MEDS BY RX/DR IN RCRD: CPT | Mod: CPTII,S$GLB,, | Performed by: INTERNAL MEDICINE

## 2022-03-09 RX ORDER — METHIMAZOLE 10 MG/1
30 TABLET ORAL DAILY
Qty: 90 TABLET | Refills: 3
Start: 2022-03-09 | End: 2022-05-05

## 2022-03-09 NOTE — PROGRESS NOTES
Patient, Manju Aranda (MRN #[id number]), presented with a recorded BMI of 36.17 kg/m^2 and a documented comorbidity(s):  - Hypertension  to which the severe obesity is a contributing factor. This is consistent with the definition of severe obesity (BMI 35.0-39.9) with comorbidity (ICD-10 E66.01, Z68.35). The patient's severe obesity was monitored, evaluated, addressed and/or treated. This addendum to the medical record is made on 03/09/2022.

## 2022-03-09 NOTE — ASSESSMENT & PLAN NOTE
reviewed treatment options of I131, ATD or surgery, surgery being least desirable.    Discussed risks of worsening GO with I131  Review the connection of GO with tob use    Discussed the chance of Graves remission (30%) after 2 years of ATD therapy - with a 50% chance of recurrence  Increase  methimazole 30 mg qd     Call if fever, sore throat, rash, anorexia, nausea or vomiitng.  Reviewed side effects of ATDs are rare (agranulocytosis and liver failure) but can be very serious.     Labs in 4 weeks   rtc 6 months

## 2022-03-09 NOTE — PROGRESS NOTES
"Subjective:      Patient ID: Manju Aranda is a 71 y.o. female.    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    She was on methimazole 2016 - 2018     She presented in late august 2020 with racing heart, sob, weight loss  And was found to be hyperthyroid again   She tried inderal LA but got a migraine     restarted sept 2020   Current dose   methimazole 20 mg qd       Her daughter has Graves       Thyroid scan: none     Thyroid ultrasound:  2016   IMPRESSION:   No nodules. Study consistent with autoimmune thyroid disease as the etiology for the hyperthyroidism.    Thyroid ab done:      Ref. Range 9/20/2016 09:50   Thyrotropin Receptor Ab Latest Ref Range: 0.00 - 1.75 IU/L 7.33 (H)   Thyroperoxidase Antibodies Latest Ref Range: <6.0 IU/mL 31.8 (H)         current symptoms:    No palpations  + fatigue   Does have antonio and is using CPAP   weight loss +      No skin changes  No tremor   No anxiety  No BURK        Last BMD: 9/17/20     Impression:     1. Normal BMD of the lumbar spine and hip.Nl   Compared with previous DXA, BMD at the lumbar spine has increased by 5.7%, and the BMD at the total hip has decreased by -6.3%.    + red eyes - no pain or dryness - saw Dr Boo        With regards to obesity, Body mass index is 36.17 kg/m².,     + polyuria, polydipsia but she drinks a lot of water to be healthy  No FH of DM      Review of Systems  As above       Objective:     /80   Ht 5' 9" (1.753 m)   Wt 111.1 kg (244 lb 14.9 oz)   BMI 36.17 kg/m²   BP Readings from Last 3 Encounters:   03/09/22 130/80   11/29/21 120/80   09/24/21 121/71     Wt Readings from Last 1 Encounters:   03/09/22 0823 111.1 kg (244 lb 14.9 oz)     Body mass index is 36.17 kg/m².      Physical " Exam  Vitals reviewed.   Constitutional:       Appearance: Normal appearance.   Cardiovascular:      Rate and Rhythm: Normal rate.      Pulses: Normal pulses.   Pulmonary:      Effort: Pulmonary effort is normal.   Abdominal:      Palpations: Abdomen is soft.                Lab Review:   No results found for: HGBA1C  Lab Results   Component Value Date    CHOL 143 02/26/2021    HDL 52 02/26/2021    LDLCALC 75.2 02/26/2021    TRIG 79 02/26/2021    CHOLHDL 36.4 02/26/2021     Lab Results   Component Value Date     09/21/2021    K 4.1 09/21/2021     09/21/2021    CO2 29 09/21/2021    GLU 81 09/21/2021    BUN 16 09/21/2021    CREATININE 0.8 02/25/2022    CALCIUM 9.9 09/21/2021    PROT 7.2 09/21/2021    ALBUMIN 3.4 (L) 09/21/2021    BILITOT 0.6 09/21/2021    ALKPHOS 123 09/21/2021    AST 15 09/21/2021    ALT 11 09/21/2021    ANIONGAP 5 (L) 09/21/2021    ESTGFRAFRICA >60 02/25/2022    EGFRNONAA >60 02/25/2022    TSH <0.010 (L) 02/25/2022     Vit D, 25-Hydroxy   Date Value Ref Range Status   02/26/2021 31 30 - 96 ng/mL Final     Comment:     Vitamin D deficiency.........<10 ng/mL                              Vitamin D insufficiency......10-29 ng/mL       Vitamin D sufficiency........> or equal to 30 ng/mL  Vitamin D toxicity............>100 ng/mL         Assessment and Plan     Graves disease  reviewed treatment options of I131, ATD or surgery, surgery being least desirable.    Discussed risks of worsening GO with I131  Review the connection of GO with tob use    Discussed the chance of Graves remission (30%) after 2 years of ATD therapy - with a 50% chance of recurrence  Increase  methimazole 30 mg qd     Call if fever, sore throat, rash, anorexia, nausea or vomiitng.  Reviewed side effects of ATDs are rare (agranulocytosis and liver failure) but can be very serious.     Labs in 4 weeks   rtc 6 months          Graves' eye disease  Address above  F/u with Dr Boo          BMI 36.0-36.9,adult   Stressed diet  and exercise     Periodic hba1c levels      Hypertension  Controlled      fatigue -address above

## 2022-03-10 ENCOUNTER — HOSPITAL ENCOUNTER (OUTPATIENT)
Dept: RADIOLOGY | Facility: OTHER | Age: 72
Discharge: HOME | End: 2022-03-10
Attending: NURSE PRACTITIONER
Payer: MEDICARE

## 2022-03-10 DIAGNOSIS — C90.00 MULTIPLE MYELOMA, REMISSION STATUS UNSPECIFIED: ICD-10-CM

## 2022-03-10 DIAGNOSIS — R51.9 NONINTRACTABLE HEADACHE, UNSPECIFIED CHRONICITY PATTERN, UNSPECIFIED HEADACHE TYPE: ICD-10-CM

## 2022-03-10 PROCEDURE — 70553 MRI BRAIN STEM W/O & W/DYE: CPT | Mod: TC

## 2022-03-10 PROCEDURE — 70553 MRI BRAIN STEM W/O & W/DYE: CPT | Mod: 26,,, | Performed by: RADIOLOGY

## 2022-03-10 PROCEDURE — A9585 GADOBUTROL INJECTION: HCPCS | Performed by: NURSE PRACTITIONER

## 2022-03-10 PROCEDURE — 25500020 PHARM REV CODE 255: Performed by: NURSE PRACTITIONER

## 2022-03-10 PROCEDURE — 70553 MRI BRAIN W WO CONTRAST: ICD-10-PCS | Mod: 26,,, | Performed by: RADIOLOGY

## 2022-03-10 RX ORDER — GADOBUTROL 604.72 MG/ML
10 INJECTION INTRAVENOUS
Status: COMPLETED | OUTPATIENT
Start: 2022-03-10 | End: 2022-03-10

## 2022-03-10 RX ADMIN — GADOBUTROL 10 ML: 604.72 INJECTION INTRAVENOUS at 11:03

## 2022-04-08 ENCOUNTER — LAB VISIT (OUTPATIENT)
Dept: LAB | Facility: HOSPITAL | Age: 72
End: 2022-04-08
Attending: INTERNAL MEDICINE
Payer: MEDICARE

## 2022-04-08 DIAGNOSIS — E05.00 GRAVES' EYE DISEASE: ICD-10-CM

## 2022-04-08 DIAGNOSIS — E05.00 GRAVES DISEASE: ICD-10-CM

## 2022-04-08 DIAGNOSIS — R41.9 UNSPECIFIED SYMPTOMS AND SIGNS INVOLVING COGNITIVE FUNCTIONS AND AWARENESS: ICD-10-CM

## 2022-04-08 LAB
ESTIMATED AVG GLUCOSE: 74 MG/DL (ref 68–131)
HBA1C MFR BLD: 4.2 % (ref 4–5.6)
T3 SERPL-MCNC: 81 NG/DL (ref 60–180)
T4 FREE SERPL-MCNC: 0.53 NG/DL (ref 0.71–1.51)
TSH SERPL DL<=0.005 MIU/L-ACNC: 0.34 UIU/ML (ref 0.4–4)
VIT B12 SERPL-MCNC: 540 PG/ML (ref 210–950)

## 2022-04-08 PROCEDURE — 84439 ASSAY OF FREE THYROXINE: CPT | Performed by: INTERNAL MEDICINE

## 2022-04-08 PROCEDURE — 84480 ASSAY TRIIODOTHYRONINE (T3): CPT | Performed by: INTERNAL MEDICINE

## 2022-04-08 PROCEDURE — 84443 ASSAY THYROID STIM HORMONE: CPT | Performed by: INTERNAL MEDICINE

## 2022-04-08 PROCEDURE — 82607 VITAMIN B-12: CPT | Performed by: INTERNAL MEDICINE

## 2022-04-08 PROCEDURE — 36415 COLL VENOUS BLD VENIPUNCTURE: CPT | Performed by: INTERNAL MEDICINE

## 2022-04-08 PROCEDURE — 83520 IMMUNOASSAY QUANT NOS NONAB: CPT | Mod: 59 | Performed by: INTERNAL MEDICINE

## 2022-04-08 PROCEDURE — 83036 HEMOGLOBIN GLYCOSYLATED A1C: CPT | Performed by: INTERNAL MEDICINE

## 2022-04-11 ENCOUNTER — OFFICE VISIT (OUTPATIENT)
Dept: HEMATOLOGY/ONCOLOGY | Facility: CLINIC | Age: 72
End: 2022-04-11
Payer: MEDICARE

## 2022-04-11 VITALS
BODY MASS INDEX: 37.86 KG/M2 | WEIGHT: 255.63 LBS | DIASTOLIC BLOOD PRESSURE: 68 MMHG | TEMPERATURE: 99 F | OXYGEN SATURATION: 99 % | HEART RATE: 73 BPM | RESPIRATION RATE: 16 BRPM | HEIGHT: 69 IN | SYSTOLIC BLOOD PRESSURE: 135 MMHG

## 2022-04-11 DIAGNOSIS — E03.9 HYPOTHYROIDISM, UNSPECIFIED TYPE: ICD-10-CM

## 2022-04-11 DIAGNOSIS — D47.2 SMOLDERING MYELOMA: Primary | ICD-10-CM

## 2022-04-11 LAB — TSH RECEP AB SER-ACNC: 2.02 IU/L (ref 0–1.75)

## 2022-04-11 PROCEDURE — 3075F PR MOST RECENT SYSTOLIC BLOOD PRESS GE 130-139MM HG: ICD-10-PCS | Mod: CPTII,S$GLB,, | Performed by: INTERNAL MEDICINE

## 2022-04-11 PROCEDURE — 3075F SYST BP GE 130 - 139MM HG: CPT | Mod: CPTII,S$GLB,, | Performed by: INTERNAL MEDICINE

## 2022-04-11 PROCEDURE — 3288F FALL RISK ASSESSMENT DOCD: CPT | Mod: CPTII,S$GLB,, | Performed by: INTERNAL MEDICINE

## 2022-04-11 PROCEDURE — 99999 PR PBB SHADOW E&M-EST. PATIENT-LVL III: CPT | Mod: PBBFAC,,, | Performed by: INTERNAL MEDICINE

## 2022-04-11 PROCEDURE — 99499 UNLISTED E&M SERVICE: CPT | Mod: S$GLB,,, | Performed by: INTERNAL MEDICINE

## 2022-04-11 PROCEDURE — 3078F DIAST BP <80 MM HG: CPT | Mod: CPTII,S$GLB,, | Performed by: INTERNAL MEDICINE

## 2022-04-11 PROCEDURE — 99499 RISK ADDL DX/OHS AUDIT: ICD-10-PCS | Mod: S$GLB,,, | Performed by: INTERNAL MEDICINE

## 2022-04-11 PROCEDURE — 1126F AMNT PAIN NOTED NONE PRSNT: CPT | Mod: CPTII,S$GLB,, | Performed by: INTERNAL MEDICINE

## 2022-04-11 PROCEDURE — 3008F BODY MASS INDEX DOCD: CPT | Mod: CPTII,S$GLB,, | Performed by: INTERNAL MEDICINE

## 2022-04-11 PROCEDURE — 99214 PR OFFICE/OUTPT VISIT, EST, LEVL IV, 30-39 MIN: ICD-10-PCS | Mod: S$GLB,,, | Performed by: INTERNAL MEDICINE

## 2022-04-11 PROCEDURE — 3008F PR BODY MASS INDEX (BMI) DOCUMENTED: ICD-10-PCS | Mod: CPTII,S$GLB,, | Performed by: INTERNAL MEDICINE

## 2022-04-11 PROCEDURE — 99999 PR PBB SHADOW E&M-EST. PATIENT-LVL III: ICD-10-PCS | Mod: PBBFAC,,, | Performed by: INTERNAL MEDICINE

## 2022-04-11 PROCEDURE — 99214 OFFICE O/P EST MOD 30 MIN: CPT | Mod: S$GLB,,, | Performed by: INTERNAL MEDICINE

## 2022-04-11 PROCEDURE — 3288F PR FALLS RISK ASSESSMENT DOCUMENTED: ICD-10-PCS | Mod: CPTII,S$GLB,, | Performed by: INTERNAL MEDICINE

## 2022-04-11 PROCEDURE — 1159F PR MEDICATION LIST DOCUMENTED IN MEDICAL RECORD: ICD-10-PCS | Mod: CPTII,S$GLB,, | Performed by: INTERNAL MEDICINE

## 2022-04-11 PROCEDURE — 3078F PR MOST RECENT DIASTOLIC BLOOD PRESSURE < 80 MM HG: ICD-10-PCS | Mod: CPTII,S$GLB,, | Performed by: INTERNAL MEDICINE

## 2022-04-11 PROCEDURE — 1126F PR PAIN SEVERITY QUANTIFIED, NO PAIN PRESENT: ICD-10-PCS | Mod: CPTII,S$GLB,, | Performed by: INTERNAL MEDICINE

## 2022-04-11 PROCEDURE — 1159F MED LIST DOCD IN RCRD: CPT | Mod: CPTII,S$GLB,, | Performed by: INTERNAL MEDICINE

## 2022-04-11 PROCEDURE — 1101F PT FALLS ASSESS-DOCD LE1/YR: CPT | Mod: CPTII,S$GLB,, | Performed by: INTERNAL MEDICINE

## 2022-04-11 PROCEDURE — 3044F HG A1C LEVEL LT 7.0%: CPT | Mod: CPTII,S$GLB,, | Performed by: INTERNAL MEDICINE

## 2022-04-11 PROCEDURE — 3044F PR MOST RECENT HEMOGLOBIN A1C LEVEL <7.0%: ICD-10-PCS | Mod: CPTII,S$GLB,, | Performed by: INTERNAL MEDICINE

## 2022-04-11 PROCEDURE — 1101F PR PT FALLS ASSESS DOC 0-1 FALLS W/OUT INJ PAST YR: ICD-10-PCS | Mod: CPTII,S$GLB,, | Performed by: INTERNAL MEDICINE

## 2022-04-11 NOTE — Clinical Note
-cbc, cmp, serum free light chains, quantitative immunoglobulins, serum electropheresis, serum immunofixation  in 6 months -MD appt 3-4 days  after labs in 6 months

## 2022-04-11 NOTE — PROGRESS NOTES
SECTION OF HEMATOLOGY AND BONE MARROW TRANSPLANT  Return Patient Visit   04/11/2022    CHIEF COMPLAINT:   No chief complaint on file.      HISTORY OF PRESENT ILLNESS:   71 y.o. female'; Ms. Aranda is here for  IgG kappa smoldering myeloma. Follow up.  Since last appt had fatigue and headaches with samuels revealing hypothyroidism as cause; recent increase in synthroid dose and following with endocrinology. Already feels better.  Otherwise feels well.  No focal pain. Denies fever, chills, nightsweats, bleeding, brusing, lymphadenopathy, signs/symptoms of splenomegaly, focal pain, neuropathy, recurrent infection .   Her 4/8/22  labs show no evidence of biochemical or clinical progression.     PAST MEDICAL HISTORY:   Past Medical History:   Diagnosis Date    Allergy     Anemia     Arthritis     Cholelithiases     Cyst of kidney, acquired     Diverticulitis     Elevated TSH     Family history of Graves' disease: daughter, maternal aunt, maternal uncle 9/13/2016    Glaucoma     Graves disease     Hx of colonic polyps     Hypertension 1981    Liver cyst     MGUS (monoclonal gammopathy of unknown significance)     Migraine headache     Mitral valve problem     leakage    Obesity     Paraproteinemia     Sleep apnea     + CPAP    Smoldering multiple myeloma 2013    Tricuspid valve disease     leakage       PAST SURGICAL HISTORY:   Past Surgical History:   Procedure Laterality Date    APPENDECTOMY      COLONOSCOPY N/A 10/23/2015    Procedure: COLONOSCOPY;  Surgeon: Brandon Ruelas MD;  Location: [facility] ([address] [place] [address]);  Service: Endoscopy;  Laterality: N/A;  Had divertiulitis in 5/29/2015 with a recommendation for colonoscopy in 8 weeks    Dr. Ruelas is her GI physician    COLONOSCOPY N/A 11/5/2018    Procedure: COLONOSCOPY;  Surgeon: Brandon Ruelas MD;  Location: [facility] ([address] [place] [address]);  Service: Endoscopy;  Laterality: N/A;  Dr. Ruelas did the last one multiple polyps, patient had recent diverticulitis  should not schedule before Oct 10th    HYSTERECTOMY  1978 or 1979    OOPHORECTOMY         PAST SOCIAL HISTORY:   reports that she quit smoking about 27 years ago. Her smoking use included cigarettes. She quit after 3.00 years of use. She has never used smokeless tobacco. She reports that she does not drink alcohol and does not use drugs.    FAMILY HISTORY:  Family History   Problem Relation Age of Onset    Heart disease Mother         MI    Heart attack Mother     Heart disease Maternal Aunt         MI    Heart disease Maternal Aunt         MI    Cancer Paternal Grandmother         GYN cancer - unknown cancer    Colon cancer Maternal Uncle     Cancer Maternal Uncle         colon ca    Hypertension Father     Heart disease Sister         fast heart rate    Cataracts Sister     Glaucoma Sister     Graves' disease Daughter     No Known Problems Son     No Known Problems Sister     No Known Problems Daughter     No Known Problems Son     Melanoma Neg Hx     Breast cancer Neg Hx     Ovarian cancer Neg Hx     Colon polyps Neg Hx     Rectal cancer Neg Hx     Stomach cancer Neg Hx     Esophageal cancer Neg Hx     Ulcerative colitis Neg Hx     Crohn's disease Neg Hx     Amblyopia Neg Hx     Blindness Neg Hx     Macular degeneration Neg Hx     Strabismus Neg Hx     Retinal detachment Neg Hx        CURRENT MEDICATIONS:   Current Outpatient Medications   Medication Sig    ascorbic acid, vitamin C, (VITAMIN C) 500 MG tablet Take 500 mg by mouth once daily.    aspirin 81 MG Chew Take 81 mg by mouth once daily.    doxycycline (VIBRA-TABS) 100 MG tablet Take 1 tablet (100 mg total) by mouth every 12 (twelve) hours. Take with food and water    magnesium oxide (MAG-OX) 400 mg tablet Take 400 mg by mouth once daily.    methIMAzole (TAPAZOLE) 10 MG Tab Take 3 tablets (30 mg total) by mouth once daily. TAKE 4 TABLETS(40 MG) BY MOUTH EVERY DAY    multivit with min-folic acid 0.4 mg Tab Take 0.4 mg by  "mouth once daily.    ondansetron (ZOFRAN) 4 MG tablet Take 1-2 tablets (4-8 mg total) by mouth every 8 (eight) hours as needed for Nausea.    quinapriL (ACCUPRIL) 20 MG tablet TAKE 1 TABLET(20 MG) BY MOUTH EVERY DAY    rizatriptan (MAXALT) 10 MG tablet TAKE 1 TABLET(10 MG) BY MOUTH EVERY 2 HOURS AS NEEDED FOR MIGRAINE. MAX 30 MG/ 24 AT BEDTIME    vitamin D (VITAMIN D3) 1000 units Tab Take 1,000 Units by mouth once daily.     No current facility-administered medications for this visit.     ALLERGIES:   Review of patient's allergies indicates:  No Known Allergies      ASSESSMENTS:   PAIN ASSESSMENT (Patient reports Pain):   Vitals:    04/11/22 0824   BP: 135/68   Pulse: 73   Resp: 16   Temp: 98.5 °F (36.9 °C)   SpO2: 99%   Weight: 115.9 kg (255 lb 10 oz)   Height: 5' 9" (1.753 m)   PainSc: 0-No pain     REVIEW OF SYSTEMS:     See HPI   PHYSICAL EXAM:   Vitals:    04/11/22 0824   BP: 135/68   Pulse: 73   Resp: 16   Temp: 98.5 °F (36.9 °C)       General - well developed, well nourished, no apparent distress  HEENT - oropharynx clear  Chest and Lung - clear to auscultation bilaterally   Cardiovascular - RRR with no MGR, normal S1 and S2  Abdomen-  soft, nontender, no palpable hepatomegaly or splenomegaly  Lymph - no palpable lymphadenopathy  Heme - no bruising, petechiae, pallor  Skin - no rashes or lesions  Psych - appropriate mood and affect      ECOG Performance Status: (foot note - ECOG PS provided by Eastern Co[employer]cology Group) 0 - Asymptomatic    Karnofsky Performance Score:  100%- Normal, No Complaints, No Evidence of Disease  DATA:   Lab Results   Component Value Date    WBC 3.10 (L) 04/08/2022    HGB 12.3 04/08/2022    HCT 40.8 04/08/2022    MCV 96 04/08/2022     04/08/2022     Gran # (ANC)   Date Value Ref Range Status   04/08/2022 1.4 (L) 1.8 - 7.7 K/uL Final     Gran %   Date Value Ref Range Status   04/08/2022 45.9 38.0 - 73.0 % Final     Lymph #   Date Value Ref Range Status   04/08/2022 " 1.3 1.0 - 4.8 K/uL Final     Lymph %   Date Value Ref Range Status   04/08/2022 43.2 18.0 - 48.0 % Final     Kappa Free Light Chains   Date Value Ref Range Status   04/08/2022 17.23 (H) 0.33 - 1.94 mg/dL Final   09/21/2021 13.52 (H) 0.33 - 1.94 mg/dL Final   03/05/2021 14.75 (H) 0.33 - 1.94 mg/dL Final     Lambda Free Light Chains   Date Value Ref Range Status   04/08/2022 1.64 0.57 - 2.63 mg/dL Final   09/21/2021 1.29 0.57 - 2.63 mg/dL Final   03/05/2021 1.57 0.57 - 2.63 mg/dL Final     Kappa/Lambda FLC Ratio   Date Value Ref Range Status   04/08/2022 10.51 (H) 0.26 - 1.65 Final     Comment:     Undetected antigen excess is a rare event but cannot   be excluded. If these free light chain results do not   agree with other clinical or laboratory findings or   if the sample is from a patient that has previously   demonstrated antigen excess, discuss with the testing   laboratory.   Results should always be interpreted in conjunction   with other laboratory tests and clinical evidence.     09/21/2021 10.48 (H) 0.26 - 1.65 Final     Comment:     Undetected antigen excess is a rare event but cannot   be excluded. If these free light chain results do not   agree with other clinical or laboratory findings or   if the sample is from a patient that has previously   demonstrated antigen excess, discuss with the testing   laboratory.   Results should always be interpreted in conjunction   with other laboratory tests and clinical evidence.     03/05/2021 9.39 (H) 0.26 - 1.65 Final     Comment:     Undetected antigen excess is a rare event but cannot   be excluded. If these free light chain results do not   agree with other clinical or laboratory findings or   if the sample is from a patient that has previously   demonstrated antigen excess, discuss with the testing   laboratory.   Results should always be interpreted in conjunction   with other laboratory tests and clinical evidence.       Gamma   Date Value Ref Range Status    09/21/2021 1.75 (H) 0.67 - 1.58 g/dL Final   03/05/2021 1.94 (H) 0.67 - 1.58 g/dL Final   08/21/2020 1.72 (H) 0.67 - 1.58 g/dL Final     IgG   Date Value Ref Range Status   04/08/2022 2106 (H) 650 - 1600 mg/dL Final     Comment:     IgG Cord Blood Reference Range: 650-1600 mg/dL.     IgA   Date Value Ref Range Status   04/08/2022 103 40 - 350 mg/dL Final     Comment:     IgA Cord Blood Reference Range: <5 mg/dL.     IgM   Date Value Ref Range Status   04/08/2022 27 (L) 50 - 300 mg/dL Final     Comment:     IgM Cord Blood Reference Range: <25 mg/dL.        ASSESSMENT AND PLAN:   Encounter Diagnoses   Name Primary?    Smoldering myeloma Yes    Hypothyroidism, unspecified type       IgG kappa smoldering myeloma diagnosed 2010 under observation since that time; previous pt of Dr. Lora; transitioned care to me sept 2015.    Mild intermittent leukopenia/anemia are chronic and pre date myeloma diagnosis ; stable  Biochemical studies/Labs from 4/7/22  with no evidence of crab or progression  Discussed now that >10 years from diagnosis with no progression to symptomatic myeloma her risk of progression now decreases significantly and her disease is more clinically like an MGUS,  but that we should still monitor her biochemcial studies and see her in clinic q 6 months  Plan to image based on symptoms only   educated on symptoms for which to seek attn in our clinic earlier   Mgmt of hypothyroidism per endocrinology    Follow Up:      -cbc, cmp, serum free light chains, quantitative immunoglobulins, serum electropheresis, serum immunofixation  in 6 months  -MD appt 3-4 days  after labs in 6 months   Rasta Alba MD  Hematology/Oncology/Bone Marrow Transplant

## 2022-04-11 NOTE — PROGRESS NOTES
SECTION OF HEMATOLOGY AND BONE MARROW TRANSPLANT  Return Patient Visit   04/11/2022    CHIEF COMPLAINT:   No chief complaint on file.      HISTORY OF PRESENT ILLNESS:   71 y.o. female'; Ms. Aranda is here for  IgG kappa smoldering myeloma. Follow up.  Fatigue since .   Feels well.  No focal pain. Denies fever, chills, nightsweats, bleeding, brusing, lymphadenopathy, signs/symptoms of splenomegaly, focal pain, neuropathy, recurrent infection .   Her 9/21/21 labs show no evidence of biochemical or clinical progression.     PAST MEDICAL HISTORY:   Past Medical History:   Diagnosis Date    Allergy     Anemia     Arthritis     Cholelithiases     Cyst of kidney, acquired     Diverticulitis     Elevated TSH     Family history of Graves' disease: daughter, maternal aunt, maternal uncle 9/13/2016    Glaucoma     Graves disease     Hx of colonic polyps     Hypertension 1981    Liver cyst     MGUS (monoclonal gammopathy of unknown significance)     Migraine headache     Mitral valve problem     leakage    Obesity     Paraproteinemia     Sleep apnea     + CPAP    Smoldering multiple myeloma 2013    Tricuspid valve disease     leakage       PAST SURGICAL HISTORY:   Past Surgical History:   Procedure Laterality Date    APPENDECTOMY      COLONOSCOPY N/A 10/23/2015    Procedure: COLONOSCOPY;  Surgeon: Brandon Ruelas MD;  Location: [address]);  Service: Endoscopy;  Laterality: N/A;  Had divertiulitis in 5/29/2015 with a recommendation for colonoscopy in 8 weeks    Dr. Ruelas is her GI physician    COLONOSCOPY N/A 11/5/2018    Procedure: COLONOSCOPY;  Surgeon: Brandon Ruelas MD;  Location: [facility] ([address] [place] [address]);  Service: Endoscopy;  Laterality: N/A;  Dr. Ruelas did the last one multiple polyps, patient had recent diverticulitis should not schedule before Oct 10th    HYSTERECTOMY  1978 or 1979    OOPHORECTOMY         PAST SOCIAL HISTORY:   reports that she quit smoking about 27 years ago. Her  smoking use included cigarettes. She quit after 3.00 years of use. She has never used smokeless tobacco. She reports that she does not drink alcohol and does not use drugs.    FAMILY HISTORY:  Family History   Problem Relation Age of Onset    Heart disease Mother         MI    Heart attack Mother     Heart disease Maternal Aunt         MI    Heart disease Maternal Aunt         MI    Cancer Paternal Grandmother         GYN cancer - unknown cancer    Colon cancer Maternal Uncle     Cancer Maternal Uncle         colon ca    Hypertension Father     Heart disease Sister         fast heart rate    Cataracts Sister     Glaucoma Sister     Graves' disease Daughter     No Known Problems Son     No Known Problems Sister     No Known Problems Daughter     No Known Problems Son     Melanoma Neg Hx     Breast cancer Neg Hx     Ovarian cancer Neg Hx     Colon polyps Neg Hx     Rectal cancer Neg Hx     Stomach cancer Neg Hx     Esophageal cancer Neg Hx     Ulcerative colitis Neg Hx     Crohn's disease Neg Hx     Amblyopia Neg Hx     Blindness Neg Hx     Macular degeneration Neg Hx     Strabismus Neg Hx     Retinal detachment Neg Hx        CURRENT MEDICATIONS:   Current Outpatient Medications   Medication Sig    ascorbic acid, vitamin C, (VITAMIN C) 500 MG tablet Take 500 mg by mouth once daily.    aspirin 81 MG Chew Take 81 mg by mouth once daily.    doxycycline (VIBRA-TABS) 100 MG tablet Take 1 tablet (100 mg total) by mouth every 12 (twelve) hours. Take with food and water    magnesium oxide (MAG-OX) 400 mg tablet Take 400 mg by mouth once daily.    methIMAzole (TAPAZOLE) 10 MG Tab Take 3 tablets (30 mg total) by mouth once daily. TAKE 4 TABLETS(40 MG) BY MOUTH EVERY DAY    multivit with min-folic acid 0.4 mg Tab Take 0.4 mg by mouth once daily.    ondansetron (ZOFRAN) 4 MG tablet Take 1-2 tablets (4-8 mg total) by mouth every 8 (eight) hours as needed for Nausea.    quinapriL (ACCUPRIL) 20  "MG tablet TAKE 1 TABLET(20 MG) BY MOUTH EVERY DAY    rizatriptan (MAXALT) 10 MG tablet TAKE 1 TABLET(10 MG) BY MOUTH EVERY 2 HOURS AS NEEDED FOR MIGRAINE. MAX 30 MG/ 24 AT BEDTIME    vitamin D (VITAMIN D3) 1000 units Tab Take 1,000 Units by mouth once daily.     No current facility-administered medications for this visit.     ALLERGIES:   Review of patient's allergies indicates:  No Known Allergies      ASSESSMENTS:   PAIN ASSESSMENT (Patient reports Pain):   Vitals:    04/11/22 0824   BP: 135/68   Pulse: 73   Resp: 16   Temp: 98.5 °F (36.9 °C)   SpO2: 99%   Weight: 115.9 kg (255 lb 10 oz)   Height: 5' 9" (1.753 m)   PainSc: 0-No pain     REVIEW OF SYSTEMS:     General ROS: negative  Psychological ROS: negative  Ophthalmic ROS: negative  ENT ROS: negative  Allergy and Immunology ROS: negative  Hematological and Lymphatic ROS: negative  Endocrine ROS: negative  Respiratory ROS: no cough, shortness of breath, or wheezing  Gastrointestinal ROS: no abdominal pain, change in bowel habits, or black or bloody stools  Genito-Urinary ROS: no dysuria, trouble voiding, or hematuria  Musculoskeletal ROS: negative  Neurological ROS: no TIA or stroke symptoms  Dermatological ROS: negative  PHYSICAL EXAM:   Vitals:    04/11/22 0824   BP: 135/68   Pulse: 73   Resp: 16   Temp: 98.5 °F (36.9 °C)       General - well developed, well nourished, no apparent distress  HEENT - oropharynx clear  Chest and Lung - clear to auscultation bilaterally   Cardiovascular - RRR with no MGR, normal S1 and S2  Abdomen-  soft, nontender, no palpable hepatomegaly or splenomegaly  Lymph - no palpable lymphadenopathy  Heme - no bruising, petechiae, pallor  Skin - no rashes or lesions  Psych - appropriate mood and affect      ECOG Performance Status: (foot note - ECOG PS provided by Eastern Cooperative Oncology Group) 0 - Asymptomatic    Karnofsky Performance Score:  100%- Normal, No Complaints, No Evidence of Disease  DATA:   Lab Results   Component " Value Date    WBC 3.10 (L) 04/08/2022    HGB 12.3 04/08/2022    HCT 40.8 04/08/2022    MCV 96 04/08/2022     04/08/2022     Gran # (ANC)   Date Value Ref Range Status   04/08/2022 1.4 (L) 1.8 - 7.7 K/uL Final     Gran %   Date Value Ref Range Status   04/08/2022 45.9 38.0 - 73.0 % Final     Lymph #   Date Value Ref Range Status   04/08/2022 1.3 1.0 - 4.8 K/uL Final     Lymph %   Date Value Ref Range Status   04/08/2022 43.2 18.0 - 48.0 % Final     Kappa Free Light Chains   Date Value Ref Range Status   04/08/2022 17.23 (H) 0.33 - 1.94 mg/dL Final   09/21/2021 13.52 (H) 0.33 - 1.94 mg/dL Final   03/05/2021 14.75 (H) 0.33 - 1.94 mg/dL Final     Lambda Free Light Chains   Date Value Ref Range Status   04/08/2022 1.64 0.57 - 2.63 mg/dL Final   09/21/2021 1.29 0.57 - 2.63 mg/dL Final   03/05/2021 1.57 0.57 - 2.63 mg/dL Final     Kappa/Lambda FLC Ratio   Date Value Ref Range Status   04/08/2022 10.51 (H) 0.26 - 1.65 Final     Comment:     Undetected antigen excess is a rare event but cannot   be excluded. If these free light chain results do not   agree with other clinical or laboratory findings or   if the sample is from a patient that has previously   demonstrated antigen excess, discuss with the testing   laboratory.   Results should always be interpreted in conjunction   with other laboratory tests and clinical evidence.     09/21/2021 10.48 (H) 0.26 - 1.65 Final     Comment:     Undetected antigen excess is a rare event but cannot   be excluded. If these free light chain results do not   agree with other clinical or laboratory findings or   if the sample is from a patient that has previously   demonstrated antigen excess, discuss with the testing   laboratory.   Results should always be interpreted in conjunction   with other laboratory tests and clinical evidence.     03/05/2021 9.39 (H) 0.26 - 1.65 Final     Comment:     Undetected antigen excess is a rare event but cannot   be excluded. If these free light  chain results do not   agree with other clinical or laboratory findings or   if the sample is from a patient that has previously   demonstrated antigen excess, discuss with the testing   laboratory.   Results should always be interpreted in conjunction   with other laboratory tests and clinical evidence.       Gamma   Date Value Ref Range Status   09/21/2021 1.75 (H) 0.67 - 1.58 g/dL Final   03/05/2021 1.94 (H) 0.67 - 1.58 g/dL Final   08/21/2020 1.72 (H) 0.67 - 1.58 g/dL Final     IgG   Date Value Ref Range Status   04/08/2022 2106 (H) 650 - 1600 mg/dL Final     Comment:     IgG Cord Blood Reference Range: 650-1600 mg/dL.     IgA   Date Value Ref Range Status   04/08/2022 103 40 - 350 mg/dL Final     Comment:     IgA Cord Blood Reference Range: <5 mg/dL.     IgM   Date Value Ref Range Status   04/08/2022 27 (L) 50 - 300 mg/dL Final     Comment:     IgM Cord Blood Reference Range: <25 mg/dL.      Pathologist Interpretation SPE  Pathologist Interpretation SPE  Collected: 09/21/21 1124   Result status: Final   Resulting lab: [facility]   Value: REVIEWED   Comment:   Electronically reviewed and signed by:   Sylvie Mckeon MD   Signed on 09/22/21 at 14:27   Normal total protein. Normal gamma globulins are decreased. There is   a paraprotein band in mid-gamma = 1.12 g/dL, previously 1.33 g/dL      Pathologist Interpretation DELROY  Pathologist Interpretation DELROY  Collected: 09/21/21 1124   Result status: Final   Resulting lab: [facility]   Value: REVIEWED   Comment:   Electronically reviewed and signed by:   Sylvie Mckeon MD   Signed on 09/22/21 at 14:27   An IgG kappa specific monoclonal protein is present.        ASSESSMENT AND PLAN:   No diagnosis found.   IgG kappa smoldering myeloma diagnosed 2010 under observation since that time; previous pt of Dr. Lora; transitioned care to me sept 2015.    Mild intermittent leukopenia/anemia are chronic and pre date myeloma diagnosis ;  stable  Biochemical studies from 9/21 with no evidence of crab or progression  Discussed now that >10 years from diagnosis with no progression to symptomatic myeloma her risk of progression now decreases significantly and her disease is more clinically like an MGUS,  but that we should still monitor her biochemcial studies and see her in clinic q 6 months  Plan to image based on symptoms only   educated on symptoms for which to seek attn in our clinic earlier     Follow Up:        -cbc, cmp, serum free light chains, quantitative immunoglobulins, serum electropheresis, serum immunofixation  in 6 months  -MD appt 3-4 days  after labs in 6 months   Rasta Alba MD  Hematology/Oncology/Bone Marrow Transplant

## 2022-04-22 ENCOUNTER — PATIENT MESSAGE (OUTPATIENT)
Dept: ENDOCRINOLOGY | Facility: CLINIC | Age: 72
End: 2022-04-22
Payer: MEDICARE

## 2022-05-05 ENCOUNTER — PATIENT MESSAGE (OUTPATIENT)
Dept: ENDOCRINOLOGY | Facility: CLINIC | Age: 72
End: 2022-05-05
Payer: MEDICARE

## 2022-05-05 DIAGNOSIS — E05.00 GRAVES DISEASE: Primary | ICD-10-CM

## 2022-05-05 RX ORDER — METHIMAZOLE 10 MG/1
20 TABLET ORAL DAILY
Qty: 90 TABLET | Refills: 3
Start: 2022-05-05 | End: 2022-06-21

## 2022-05-26 ENCOUNTER — OFFICE VISIT (OUTPATIENT)
Dept: INTERNAL MEDICINE | Facility: CLINIC | Age: 72
End: 2022-05-26
Payer: MEDICARE

## 2022-05-26 VITALS
WEIGHT: 257.63 LBS | BODY MASS INDEX: 38.16 KG/M2 | HEART RATE: 75 BPM | SYSTOLIC BLOOD PRESSURE: 112 MMHG | OXYGEN SATURATION: 99 % | HEIGHT: 69 IN | DIASTOLIC BLOOD PRESSURE: 74 MMHG

## 2022-05-26 DIAGNOSIS — D47.2 SMOLDERING MULTIPLE MYELOMA (SMM): ICD-10-CM

## 2022-05-26 DIAGNOSIS — I07.9 TRICUSPID VALVE DISEASE: ICD-10-CM

## 2022-05-26 DIAGNOSIS — G43.109 MIGRAINE WITH AURA AND WITHOUT STATUS MIGRAINOSUS, NOT INTRACTABLE: ICD-10-CM

## 2022-05-26 DIAGNOSIS — Q61.2 ADPKD (AUTOSOMAL DOMINANT POLYCYSTIC KIDNEY DISEASE): ICD-10-CM

## 2022-05-26 DIAGNOSIS — I05.9 MITRAL VALVE DISORDER: ICD-10-CM

## 2022-05-26 DIAGNOSIS — Z76.89 ENCOUNTER TO ESTABLISH CARE WITH NEW DOCTOR: Primary | ICD-10-CM

## 2022-05-26 DIAGNOSIS — E05.00 GRAVES DISEASE: ICD-10-CM

## 2022-05-26 DIAGNOSIS — E05.00 GRAVES' EYE DISEASE: ICD-10-CM

## 2022-05-26 DIAGNOSIS — I10 ESSENTIAL HYPERTENSION: ICD-10-CM

## 2022-05-26 DIAGNOSIS — K76.89 LIVER CYST: ICD-10-CM

## 2022-05-26 DIAGNOSIS — E66.01 SEVERE OBESITY WITH BODY MASS INDEX (BMI) OF 35.0 TO 39.9 WITH COMORBIDITY: ICD-10-CM

## 2022-05-26 DIAGNOSIS — G47.33 OBSTRUCTIVE SLEEP APNEA: ICD-10-CM

## 2022-05-26 PROCEDURE — 99999 PR PBB SHADOW E&M-EST. PATIENT-LVL III: CPT | Mod: PBBFAC,,, | Performed by: INTERNAL MEDICINE

## 2022-05-26 PROCEDURE — 3078F PR MOST RECENT DIASTOLIC BLOOD PRESSURE < 80 MM HG: ICD-10-PCS | Mod: CPTII,S$GLB,, | Performed by: INTERNAL MEDICINE

## 2022-05-26 PROCEDURE — 4010F ACE/ARB THERAPY RXD/TAKEN: CPT | Mod: CPTII,S$GLB,, | Performed by: INTERNAL MEDICINE

## 2022-05-26 PROCEDURE — 1101F PT FALLS ASSESS-DOCD LE1/YR: CPT | Mod: CPTII,S$GLB,, | Performed by: INTERNAL MEDICINE

## 2022-05-26 PROCEDURE — 3008F BODY MASS INDEX DOCD: CPT | Mod: CPTII,S$GLB,, | Performed by: INTERNAL MEDICINE

## 2022-05-26 PROCEDURE — 3074F SYST BP LT 130 MM HG: CPT | Mod: CPTII,S$GLB,, | Performed by: INTERNAL MEDICINE

## 2022-05-26 PROCEDURE — 1126F AMNT PAIN NOTED NONE PRSNT: CPT | Mod: CPTII,S$GLB,, | Performed by: INTERNAL MEDICINE

## 2022-05-26 PROCEDURE — 3044F PR MOST RECENT HEMOGLOBIN A1C LEVEL <7.0%: ICD-10-PCS | Mod: CPTII,S$GLB,, | Performed by: INTERNAL MEDICINE

## 2022-05-26 PROCEDURE — 3074F PR MOST RECENT SYSTOLIC BLOOD PRESSURE < 130 MM HG: ICD-10-PCS | Mod: CPTII,S$GLB,, | Performed by: INTERNAL MEDICINE

## 2022-05-26 PROCEDURE — 1126F PR PAIN SEVERITY QUANTIFIED, NO PAIN PRESENT: ICD-10-PCS | Mod: CPTII,S$GLB,, | Performed by: INTERNAL MEDICINE

## 2022-05-26 PROCEDURE — 1101F PR PT FALLS ASSESS DOC 0-1 FALLS W/OUT INJ PAST YR: ICD-10-PCS | Mod: CPTII,S$GLB,, | Performed by: INTERNAL MEDICINE

## 2022-05-26 PROCEDURE — 99214 OFFICE O/P EST MOD 30 MIN: CPT | Mod: S$GLB,,, | Performed by: INTERNAL MEDICINE

## 2022-05-26 PROCEDURE — 99214 PR OFFICE/OUTPT VISIT, EST, LEVL IV, 30-39 MIN: ICD-10-PCS | Mod: S$GLB,,, | Performed by: INTERNAL MEDICINE

## 2022-05-26 PROCEDURE — 3078F DIAST BP <80 MM HG: CPT | Mod: CPTII,S$GLB,, | Performed by: INTERNAL MEDICINE

## 2022-05-26 PROCEDURE — 1160F RVW MEDS BY RX/DR IN RCRD: CPT | Mod: CPTII,S$GLB,, | Performed by: INTERNAL MEDICINE

## 2022-05-26 PROCEDURE — 4010F PR ACE/ARB THEARPY RXD/TAKEN: ICD-10-PCS | Mod: CPTII,S$GLB,, | Performed by: INTERNAL MEDICINE

## 2022-05-26 PROCEDURE — 3044F HG A1C LEVEL LT 7.0%: CPT | Mod: CPTII,S$GLB,, | Performed by: INTERNAL MEDICINE

## 2022-05-26 PROCEDURE — 99999 PR PBB SHADOW E&M-EST. PATIENT-LVL III: ICD-10-PCS | Mod: PBBFAC,,, | Performed by: INTERNAL MEDICINE

## 2022-05-26 PROCEDURE — 3288F PR FALLS RISK ASSESSMENT DOCUMENTED: ICD-10-PCS | Mod: CPTII,S$GLB,, | Performed by: INTERNAL MEDICINE

## 2022-05-26 PROCEDURE — 3288F FALL RISK ASSESSMENT DOCD: CPT | Mod: CPTII,S$GLB,, | Performed by: INTERNAL MEDICINE

## 2022-05-26 PROCEDURE — 1159F PR MEDICATION LIST DOCUMENTED IN MEDICAL RECORD: ICD-10-PCS | Mod: CPTII,S$GLB,, | Performed by: INTERNAL MEDICINE

## 2022-05-26 PROCEDURE — 1159F MED LIST DOCD IN RCRD: CPT | Mod: CPTII,S$GLB,, | Performed by: INTERNAL MEDICINE

## 2022-05-26 PROCEDURE — 3008F PR BODY MASS INDEX (BMI) DOCUMENTED: ICD-10-PCS | Mod: CPTII,S$GLB,, | Performed by: INTERNAL MEDICINE

## 2022-05-26 PROCEDURE — 1160F PR REVIEW ALL MEDS BY PRESCRIBER/CLIN PHARMACIST DOCUMENTED: ICD-10-PCS | Mod: CPTII,S$GLB,, | Performed by: INTERNAL MEDICINE

## 2022-05-26 RX ORDER — GADOBUTROL 604.72 MG/ML
INJECTION INTRAVENOUS
COMMUNITY
Start: 2022-03-10 | End: 2023-01-10 | Stop reason: ALTCHOICE

## 2022-05-26 NOTE — PROGRESS NOTES
INTERNAL MEDICINE INITIAL VISIT NOTE      CHIEF COMPLAINT     Chief Complaint   Patient presents with    Establish Care       HPI     Manju Aranda is a 71 y.o. female with migraine, Grave's ophthalmopathy, tricuspid/mitral valve disease, microhematuria, HTN, ADPKD, smoldering MM,  Grave's disase, liver cyst, colon polyps, BECKY on CPAP, obesity, here today to establish care. Transferring care from Dr. Garay.     4/2022 Heme/Onc - Low risk of MM progression given stability of bloodwork, repeat labs every 6 mo.     Recent decrease in Methimazole by Dr. Rajput to 20mg daily.     Past Medical History:  Past Medical History:   Diagnosis Date    Allergy     Anemia     Arthritis     Cholelithiases     Cyst of kidney, acquired     Diverticulitis     Elevated TSH     Family history of Graves' disease: daughter, maternal aunt, maternal uncle 9/13/2016    Glaucoma     Graves disease     Hx of colonic polyps     Hypertension 1981    Liver cyst     MGUS (monoclonal gammopathy of unknown significance)     Migraine headache     Mitral valve problem     leakage    Obesity     Paraproteinemia     Sleep apnea     + CPAP    Smoldering multiple myeloma 2013    Tricuspid valve disease     leakage       Review of Systems:  Review of Systems   Constitutional: Negative for chills and fever.   HENT: Negative for congestion.    Respiratory: Negative for cough and shortness of breath.    Cardiovascular: Negative for chest pain.   Gastrointestinal: Negative for constipation, nausea and vomiting.   Genitourinary: Negative for hematuria and urgency.   Musculoskeletal: Negative for falls.   Skin: Negative for rash.   Neurological: Negative for dizziness and loss of consciousness.       Health Maintenance:   Immunizations:   Influenza - fall 2022  Tdap - 9/2018  Covid 19 - complete  HPV  Prevnar rec at 66 - 8/2016, Pneumovax 8/2017  Shingrix rec at 51 - complete    Cancer Screening:  PAP: s/p hys  Mammogram:  11/2021  "BIRAD 1  Colonoscopy:  11/2018 diverticulosis, 2 benign polyps; repeat 11/2023  DEXA:  9/2020 nl BMD, repeat 9/2024      PHYSICAL EXAM     /74 (BP Location: Left arm, Patient Position: Sitting, BP Method: Large (Manual))   Pulse 75   Ht 5' 9" (1.753 m)   Wt 116.8 kg (257 lb 9.7 oz)   SpO2 99%   BMI 38.04 kg/m²     GEN - A+OX4, NAD, obese  HEENT - PERRL, EOMI,   Neck - No thyromegaly or thyroid masses felt.  No cervical lymphadenopathy appreciated.  CV - RRR, no m/r/g  Chest - CTAB, no wheezing, crackles, or rhonchi  Abd - S/NT/ND/+BS.   Ext - 2+BDP. No C/C/E.  Skin - Normal color and texture, no rash, no skin lesions.    ASSESSMENT/PLAN     Manju Aranda is a 71 y.o. female with conditions as above.     Encounter to establish care with new doctor   Prior notes/labs reviewed    Essential hypertension  Controlled, continue current regimen    Mitral valve disorder  Stable, asymptomatic    Tricuspid valve disease  Stable, asymptomatic     ADPKD (autosomal dominant polycystic kidney disease)  Crt stable, monitor    Smoldering multiple myeloma (SMM)  Stable, follows with Heme-Onc    Graves disease  Follows with Endocrinology    Migraine with aura and without status migrainosus, not intractable  Stable    Obstructive sleep apnea  Stable    Graves' eye disease  Follows with Ophthalmology    Severe obesity with body mass index (BMI) of 35.0 to 39.9 with comorbidity    Liver cyst  12/2020 CT Abdomen Incidental finding; no follow-up recommended    HM as above    RTC in 6 mo, sooner if needed and depending on labs.    Ana Briceno MD  Department of Internal Medicine - [facility]  06/05/2022    "

## 2022-06-16 ENCOUNTER — LAB VISIT (OUTPATIENT)
Dept: LAB | Facility: HOSPITAL | Age: 72
End: 2022-06-16
Attending: INTERNAL MEDICINE
Payer: MEDICARE

## 2022-06-16 DIAGNOSIS — E05.00 GRAVES DISEASE: ICD-10-CM

## 2022-06-16 LAB — TSH SERPL DL<=0.005 MIU/L-ACNC: 3.29 UIU/ML (ref 0.4–4)

## 2022-06-16 PROCEDURE — 36415 COLL VENOUS BLD VENIPUNCTURE: CPT | Performed by: INTERNAL MEDICINE

## 2022-06-16 PROCEDURE — 84443 ASSAY THYROID STIM HORMONE: CPT | Performed by: INTERNAL MEDICINE

## 2022-06-21 ENCOUNTER — PATIENT MESSAGE (OUTPATIENT)
Dept: ENDOCRINOLOGY | Facility: CLINIC | Age: 72
End: 2022-06-21
Payer: MEDICARE

## 2022-06-21 DIAGNOSIS — E05.00 GRAVES DISEASE: Primary | ICD-10-CM

## 2022-06-21 RX ORDER — METHIMAZOLE 10 MG/1
10 TABLET ORAL DAILY
Qty: 90 TABLET | Refills: 3
Start: 2022-06-21 | End: 2022-08-03

## 2022-08-02 ENCOUNTER — LAB VISIT (OUTPATIENT)
Dept: LAB | Facility: OTHER | Age: 72
End: 2022-08-02
Attending: INTERNAL MEDICINE
Payer: MEDICARE

## 2022-08-02 DIAGNOSIS — E05.00 GRAVES DISEASE: ICD-10-CM

## 2022-08-02 LAB
ALBUMIN SERPL BCP-MCNC: 3.4 G/DL (ref 3.5–5.2)
ALP SERPL-CCNC: 121 U/L (ref 55–135)
ALT SERPL W/O P-5'-P-CCNC: 12 U/L (ref 10–44)
ANION GAP SERPL CALC-SCNC: 9 MMOL/L (ref 8–16)
AST SERPL-CCNC: 19 U/L (ref 10–40)
BILIRUB SERPL-MCNC: 0.7 MG/DL (ref 0.1–1)
BUN SERPL-MCNC: 12 MG/DL (ref 8–23)
CALCIUM SERPL-MCNC: 9.8 MG/DL (ref 8.7–10.5)
CHLORIDE SERPL-SCNC: 106 MMOL/L (ref 95–110)
CO2 SERPL-SCNC: 29 MMOL/L (ref 23–29)
CREAT SERPL-MCNC: 1 MG/DL (ref 0.5–1.4)
EST. GFR  (NO RACE VARIABLE): >60 ML/MIN/1.73 M^2
GLUCOSE SERPL-MCNC: 74 MG/DL (ref 70–110)
POTASSIUM SERPL-SCNC: 4.5 MMOL/L (ref 3.5–5.1)
PROT SERPL-MCNC: 7.5 G/DL (ref 6–8.4)
SODIUM SERPL-SCNC: 144 MMOL/L (ref 136–145)
TSH SERPL DL<=0.005 MIU/L-ACNC: 2.43 UIU/ML (ref 0.4–4)

## 2022-08-02 PROCEDURE — 84443 ASSAY THYROID STIM HORMONE: CPT | Performed by: INTERNAL MEDICINE

## 2022-08-02 PROCEDURE — 80053 COMPREHEN METABOLIC PANEL: CPT | Performed by: INTERNAL MEDICINE

## 2022-08-02 PROCEDURE — 83520 IMMUNOASSAY QUANT NOS NONAB: CPT | Performed by: INTERNAL MEDICINE

## 2022-08-02 PROCEDURE — 36415 COLL VENOUS BLD VENIPUNCTURE: CPT | Performed by: INTERNAL MEDICINE

## 2022-08-03 ENCOUNTER — PATIENT MESSAGE (OUTPATIENT)
Dept: ENDOCRINOLOGY | Facility: CLINIC | Age: 72
End: 2022-08-03
Payer: MEDICARE

## 2022-08-03 DIAGNOSIS — E05.00 GRAVES DISEASE: Primary | ICD-10-CM

## 2022-08-03 LAB — TSH RECEP AB SER-ACNC: 1.51 IU/L (ref 0–1.75)

## 2022-08-03 RX ORDER — METHIMAZOLE 5 MG/1
5 TABLET ORAL DAILY
Start: 2022-08-03 | End: 2023-04-10

## 2022-08-11 ENCOUNTER — PATIENT MESSAGE (OUTPATIENT)
Dept: ENDOCRINOLOGY | Facility: CLINIC | Age: 72
End: 2022-08-11
Payer: MEDICARE

## 2022-08-18 ENCOUNTER — OFFICE VISIT (OUTPATIENT)
Dept: OPTOMETRY | Facility: CLINIC | Age: 72
End: 2022-08-18
Payer: COMMERCIAL

## 2022-08-18 DIAGNOSIS — H25.13 NUCLEAR SCLEROSIS, BILATERAL: ICD-10-CM

## 2022-08-18 DIAGNOSIS — H40.013 OAG (OPEN ANGLE GLAUCOMA) SUSPECT, LOW RISK, BILATERAL: Primary | ICD-10-CM

## 2022-08-18 PROCEDURE — 92133 CPTRZD OPH DX IMG PST SGM ON: CPT | Mod: S$GLB,,, | Performed by: OPTOMETRIST

## 2022-08-18 PROCEDURE — 92014 PR EYE EXAM, EST PATIENT,COMPREHESV: ICD-10-PCS | Mod: S$GLB,,, | Performed by: OPTOMETRIST

## 2022-08-18 PROCEDURE — 99999 PR PBB SHADOW E&M-EST. PATIENT-LVL III: CPT | Mod: PBBFAC,,, | Performed by: OPTOMETRIST

## 2022-08-18 PROCEDURE — 99999 PR PBB SHADOW E&M-EST. PATIENT-LVL III: ICD-10-PCS | Mod: PBBFAC,,, | Performed by: OPTOMETRIST

## 2022-08-18 PROCEDURE — 92133 OCT, OPTIC NERVE - OU - BOTH EYES: ICD-10-PCS | Mod: S$GLB,,, | Performed by: OPTOMETRIST

## 2022-08-18 PROCEDURE — 92014 COMPRE OPH EXAM EST PT 1/>: CPT | Mod: S$GLB,,, | Performed by: OPTOMETRIST

## 2022-08-18 NOTE — PROGRESS NOTES
HPI     Annual eye exam. Graves Disease   DLS:04/22/21    GTTS:Zaditor    Patient is here for annual eye exam  Pt states she feels her RX is the same as previous exam  Pt denied flashes and floaters    Last edited by Nitin Kumar, OD on 8/18/2022  9:47 AM. (History)            Assessment /Plan     For exam results, see Encounter Report.    OAG (open angle glaucoma) suspect, low risk, bilateral  -     OCT, Optic Nerve - OU - Both Eyes  -Longstanding Asymetric enlarged nerves with prior diagnosis of GLC  -HVF full OU, small area OD but stable on OCT  -Monitor as low risk.. no Gtts at this time  -7 yrs off Gtts no advancement     Nuclear sclerosis, bilateral  -Educated patient on presence of cataracts at today's exam, monitor at annual dilated fundus exam. 5+ years surgical estimate.    Eyeglass Final Rx     Eyeglass Final Rx       Sphere Cylinder Axis Add    Right +2.75 +1.00 175 +2.50    Left +2.75 +1.25 005 +2.50    Type: PAL    Expiration Date: 8/18/2023                  RTC 1 yr

## 2022-09-07 ENCOUNTER — LAB VISIT (OUTPATIENT)
Dept: LAB | Facility: OTHER | Age: 72
End: 2022-09-07
Attending: INTERNAL MEDICINE
Payer: MEDICARE

## 2022-09-07 DIAGNOSIS — E05.00 GRAVES DISEASE: ICD-10-CM

## 2022-09-07 LAB — TSH SERPL DL<=0.005 MIU/L-ACNC: 1.22 UIU/ML (ref 0.4–4)

## 2022-09-07 PROCEDURE — 36415 COLL VENOUS BLD VENIPUNCTURE: CPT | Performed by: INTERNAL MEDICINE

## 2022-09-07 PROCEDURE — 84443 ASSAY THYROID STIM HORMONE: CPT | Performed by: INTERNAL MEDICINE

## 2022-09-20 DIAGNOSIS — J06.9 UPPER RESPIRATORY TRACT INFECTION, UNSPECIFIED TYPE: Primary | ICD-10-CM

## 2022-09-20 RX ORDER — AZITHROMYCIN 250 MG/1
TABLET, FILM COATED ORAL
Qty: 6 TABLET | Refills: 0 | Status: SHIPPED | OUTPATIENT
Start: 2022-09-20 | End: 2022-09-25

## 2022-09-22 DIAGNOSIS — I10 PRIMARY HYPERTENSION: Primary | ICD-10-CM

## 2022-09-22 NOTE — TELEPHONE ENCOUNTER
""Pt is calling she is having some issues getting her blood pressure medications filled she states due to the prescriptions being under dr Garay and she states she is now a pt of Dr Briceno and is needing someone to call her back asap please give return call" - Yris Villa from another telephone call    Pt having issues Rx filled   Pt states that Rx was started under Dr Garay  Now a patient of [facility]    Rx pended    Please advise     "

## 2022-09-22 NOTE — TELEPHONE ENCOUNTER
----- Message from Yris Villa sent at 9/22/2022 10:29 AM CDT -----  Contact: [phone]  Requesting an RX refill or new RX.  Is this a refill or new RX: refill  RX name and strength (copy/paste from chart):  quinapriL (ACCUPRIL) 20 MG tablet  Is this a 30 day or 90 day RX: 30  Pharmacy name and phone # (copy/paste from chart):  [id number] - [facility] [address]  [address] [facility] [address]  Phone: [phone] Fax: [phone]         The doctors have asked that we provide their patients with the following 2 reminders -- prescription refills can take up to 72 hours, and a friendly reminder that in the future you can use your [facility] account to request refills: yes

## 2022-09-22 NOTE — TELEPHONE ENCOUNTER
No new care gaps identified.  [facility] Embedded Care Gaps. Reference number: [id number]. 9/22/2022   10:49:42 AM [id number]

## 2022-09-23 RX ORDER — QUINAPRIL 20 MG/1
20 TABLET ORAL DAILY
Qty: 90 TABLET | Refills: 3 | Status: SHIPPED | OUTPATIENT
Start: 2022-09-23 | End: 2023-01-03 | Stop reason: SDUPTHER

## 2022-10-03 ENCOUNTER — LAB VISIT (OUTPATIENT)
Dept: LAB | Facility: HOSPITAL | Age: 72
End: 2022-10-03
Attending: INTERNAL MEDICINE
Payer: MEDICARE

## 2022-10-03 DIAGNOSIS — D47.2 SMOLDERING MULTIPLE MYELOMA (SMM): ICD-10-CM

## 2022-10-03 LAB
ALBUMIN SERPL BCP-MCNC: 3.5 G/DL (ref 3.5–5.2)
ALP SERPL-CCNC: 115 U/L (ref 55–135)
ALT SERPL W/O P-5'-P-CCNC: 12 U/L (ref 10–44)
ANION GAP SERPL CALC-SCNC: 5 MMOL/L (ref 8–16)
AST SERPL-CCNC: 17 U/L (ref 10–40)
BASOPHILS # BLD AUTO: 0.02 K/UL (ref 0–0.2)
BASOPHILS NFR BLD: 0.5 % (ref 0–1.9)
BILIRUB SERPL-MCNC: 0.4 MG/DL (ref 0.1–1)
BUN SERPL-MCNC: 18 MG/DL (ref 8–23)
CALCIUM SERPL-MCNC: 9.6 MG/DL (ref 8.7–10.5)
CHLORIDE SERPL-SCNC: 107 MMOL/L (ref 95–110)
CO2 SERPL-SCNC: 27 MMOL/L (ref 23–29)
CREAT SERPL-MCNC: 1 MG/DL (ref 0.5–1.4)
DIFFERENTIAL METHOD: ABNORMAL
EOSINOPHIL # BLD AUTO: 0 K/UL (ref 0–0.5)
EOSINOPHIL NFR BLD: 0.5 % (ref 0–8)
ERYTHROCYTE [DISTWIDTH] IN BLOOD BY AUTOMATED COUNT: 11.1 % (ref 11.5–14.5)
EST. GFR  (NO RACE VARIABLE): >60 ML/MIN/1.73 M^2
GLUCOSE SERPL-MCNC: 96 MG/DL (ref 70–110)
HCT VFR BLD AUTO: 36.2 % (ref 37–48.5)
HGB BLD-MCNC: 11.2 G/DL (ref 12–16)
IGA SERPL-MCNC: 101 MG/DL (ref 40–350)
IGG SERPL-MCNC: 1837 MG/DL (ref 650–1600)
IGM SERPL-MCNC: 28 MG/DL (ref 50–300)
IMM GRANULOCYTES # BLD AUTO: 0.01 K/UL (ref 0–0.04)
IMM GRANULOCYTES NFR BLD AUTO: 0.2 % (ref 0–0.5)
LYMPHOCYTES # BLD AUTO: 1.6 K/UL (ref 1–4.8)
LYMPHOCYTES NFR BLD: 38.2 % (ref 18–48)
MCH RBC QN AUTO: 30.6 PG (ref 27–31)
MCHC RBC AUTO-ENTMCNC: 30.9 G/DL (ref 32–36)
MCV RBC AUTO: 99 FL (ref 82–98)
MONOCYTES # BLD AUTO: 0.4 K/UL (ref 0.3–1)
MONOCYTES NFR BLD: 9.1 % (ref 4–15)
NEUTROPHILS # BLD AUTO: 2.1 K/UL (ref 1.8–7.7)
NEUTROPHILS NFR BLD: 51.5 % (ref 38–73)
NRBC BLD-RTO: 0 /100 WBC
PLATELET # BLD AUTO: 263 K/UL (ref 150–450)
PMV BLD AUTO: 10.5 FL (ref 9.2–12.9)
POTASSIUM SERPL-SCNC: 4.1 MMOL/L (ref 3.5–5.1)
PROT SERPL-MCNC: 7.2 G/DL (ref 6–8.4)
RBC # BLD AUTO: 3.66 M/UL (ref 4–5.4)
SODIUM SERPL-SCNC: 139 MMOL/L (ref 136–145)
WBC # BLD AUTO: 4.06 K/UL (ref 3.9–12.7)

## 2022-10-03 PROCEDURE — 82784 ASSAY IGA/IGD/IGG/IGM EACH: CPT | Performed by: INTERNAL MEDICINE

## 2022-10-03 PROCEDURE — 86334 PATHOLOGIST INTERPRETATION IFE: ICD-10-PCS | Mod: 26,,, | Performed by: PATHOLOGY

## 2022-10-03 PROCEDURE — 86334 IMMUNOFIX E-PHORESIS SERUM: CPT | Mod: 26,,, | Performed by: PATHOLOGY

## 2022-10-03 PROCEDURE — 83520 IMMUNOASSAY QUANT NOS NONAB: CPT | Mod: 59 | Performed by: INTERNAL MEDICINE

## 2022-10-03 PROCEDURE — 36415 COLL VENOUS BLD VENIPUNCTURE: CPT | Performed by: INTERNAL MEDICINE

## 2022-10-03 PROCEDURE — 80053 COMPREHEN METABOLIC PANEL: CPT | Performed by: INTERNAL MEDICINE

## 2022-10-03 PROCEDURE — 86334 IMMUNOFIX E-PHORESIS SERUM: CPT | Performed by: INTERNAL MEDICINE

## 2022-10-03 PROCEDURE — 85025 COMPLETE CBC W/AUTO DIFF WBC: CPT | Performed by: INTERNAL MEDICINE

## 2022-10-04 LAB
INTERPRETATION SERPL IFE-IMP: NORMAL
KAPPA LC SER QL IA: 13.24 MG/DL (ref 0.33–1.94)
KAPPA LC/LAMBDA SER IA: 9.13 (ref 0.26–1.65)
LAMBDA LC SER QL IA: 1.45 MG/DL (ref 0.57–2.63)

## 2022-10-05 LAB — PATHOLOGIST INTERPRETATION IFE: NORMAL

## 2022-10-10 ENCOUNTER — OFFICE VISIT (OUTPATIENT)
Dept: HEMATOLOGY/ONCOLOGY | Facility: CLINIC | Age: 72
End: 2022-10-10
Payer: MEDICARE

## 2022-10-10 VITALS
HEIGHT: 69 IN | SYSTOLIC BLOOD PRESSURE: 147 MMHG | OXYGEN SATURATION: 97 % | WEIGHT: 264.31 LBS | RESPIRATION RATE: 17 BRPM | HEART RATE: 65 BPM | DIASTOLIC BLOOD PRESSURE: 73 MMHG | BODY MASS INDEX: 39.15 KG/M2

## 2022-10-10 DIAGNOSIS — D47.2 SMOLDERING MULTIPLE MYELOMA (SMM): ICD-10-CM

## 2022-10-10 DIAGNOSIS — D47.2 SMOLDERING MYELOMA: Primary | ICD-10-CM

## 2022-10-10 PROCEDURE — 99999 PR PBB SHADOW E&M-EST. PATIENT-LVL II: ICD-10-PCS | Mod: PBBFAC,,, | Performed by: INTERNAL MEDICINE

## 2022-10-10 PROCEDURE — 3044F PR MOST RECENT HEMOGLOBIN A1C LEVEL <7.0%: ICD-10-PCS | Mod: CPTII,S$GLB,, | Performed by: INTERNAL MEDICINE

## 2022-10-10 PROCEDURE — 3044F HG A1C LEVEL LT 7.0%: CPT | Mod: CPTII,S$GLB,, | Performed by: INTERNAL MEDICINE

## 2022-10-10 PROCEDURE — 99214 PR OFFICE/OUTPT VISIT, EST, LEVL IV, 30-39 MIN: ICD-10-PCS | Mod: S$GLB,,, | Performed by: INTERNAL MEDICINE

## 2022-10-10 PROCEDURE — 3077F PR MOST RECENT SYSTOLIC BLOOD PRESSURE >= 140 MM HG: ICD-10-PCS | Mod: CPTII,S$GLB,, | Performed by: INTERNAL MEDICINE

## 2022-10-10 PROCEDURE — 4010F ACE/ARB THERAPY RXD/TAKEN: CPT | Mod: CPTII,S$GLB,, | Performed by: INTERNAL MEDICINE

## 2022-10-10 PROCEDURE — 99499 UNLISTED E&M SERVICE: CPT | Mod: HCNC,S$GLB,, | Performed by: INTERNAL MEDICINE

## 2022-10-10 PROCEDURE — 4010F PR ACE/ARB THEARPY RXD/TAKEN: ICD-10-PCS | Mod: CPTII,S$GLB,, | Performed by: INTERNAL MEDICINE

## 2022-10-10 PROCEDURE — 3078F DIAST BP <80 MM HG: CPT | Mod: CPTII,S$GLB,, | Performed by: INTERNAL MEDICINE

## 2022-10-10 PROCEDURE — 3008F BODY MASS INDEX DOCD: CPT | Mod: CPTII,S$GLB,, | Performed by: INTERNAL MEDICINE

## 2022-10-10 PROCEDURE — 99214 OFFICE O/P EST MOD 30 MIN: CPT | Mod: S$GLB,,, | Performed by: INTERNAL MEDICINE

## 2022-10-10 PROCEDURE — 3077F SYST BP >= 140 MM HG: CPT | Mod: CPTII,S$GLB,, | Performed by: INTERNAL MEDICINE

## 2022-10-10 PROCEDURE — 99999 PR PBB SHADOW E&M-EST. PATIENT-LVL II: CPT | Mod: PBBFAC,,, | Performed by: INTERNAL MEDICINE

## 2022-10-10 PROCEDURE — 3078F PR MOST RECENT DIASTOLIC BLOOD PRESSURE < 80 MM HG: ICD-10-PCS | Mod: CPTII,S$GLB,, | Performed by: INTERNAL MEDICINE

## 2022-10-10 PROCEDURE — 99499 RISK ADDL DX/OHS AUDIT: ICD-10-PCS | Mod: HCNC,S$GLB,, | Performed by: INTERNAL MEDICINE

## 2022-10-10 PROCEDURE — 3008F PR BODY MASS INDEX (BMI) DOCUMENTED: ICD-10-PCS | Mod: CPTII,S$GLB,, | Performed by: INTERNAL MEDICINE

## 2022-10-10 NOTE — PROGRESS NOTES
SECTION OF HEMATOLOGY AND BONE MARROW TRANSPLANT  Return Patient Visit   10/12/2022    CHIEF COMPLAINT:   No chief complaint on file.      HISTORY OF PRESENT ILLNESS:   71 y.o. female'; Ms. Aranda is here for  IgG kappa smoldering myeloma. Follow up.   Overall doing well since our last appt with no acute complaints.    No focal pain. Denies fever, chills, nightsweats, bleeding, brusing, lymphadenopathy, signs/symptoms of splenomegaly, focal pain, neuropathy, recurrent infection .   Her  10/3/22  labs show no evidence of biochemical or clinical progression.     PAST MEDICAL HISTORY:   Past Medical History:   Diagnosis Date    Allergy     Anemia     Arthritis     Cholelithiases     Cyst of kidney, acquired     Diverticulitis     Elevated TSH     Family history of Graves' disease: daughter, maternal aunt, maternal uncle 9/13/2016    Glaucoma     Graves disease     Hx of colonic polyps     Hypertension 1981    Liver cyst     MGUS (monoclonal gammopathy of unknown significance)     Migraine headache     Mitral valve problem     leakage    Obesity     Paraproteinemia     Sleep apnea     + CPAP    Smoldering multiple myeloma 2013    Tricuspid valve disease     leakage       PAST SURGICAL HISTORY:   Past Surgical History:   Procedure Laterality Date    APPENDECTOMY      COLONOSCOPY N/A 10/23/2015    Procedure: COLONOSCOPY;  Surgeon: Brandon Ruelas MD;  Location: [address]);  Service: Endoscopy;  Laterality: N/A;  Had divertiulitis in 5/29/2015 with a recommendation for colonoscopy in 8 weeks    Dr. Ruelas is her GI physician    COLONOSCOPY N/A 11/5/2018    Procedure: COLONOSCOPY;  Surgeon: Brandon Ruelas MD;  Location: [facility] ([address] [place] [address]);  Service: Endoscopy;  Laterality: N/A;  Dr. Ruelas did the last one multiple polyps, patient had recent diverticulitis should not schedule before Oct 10th    HYSTERECTOMY  1978 or 1979    OOPHORECTOMY         PAST SOCIAL HISTORY:   reports that  she quit smoking about 27 years ago. Her smoking use included cigarettes. She has never used smokeless tobacco. She reports that she does not drink alcohol and does not use drugs.    FAMILY HISTORY:  Family History   Problem Relation Age of Onset    Heart disease Mother         MI    Heart attack Mother     Heart disease Maternal Aunt         MI    Heart disease Maternal Aunt         MI    Cancer Paternal Grandmother         GYN cancer - unknown cancer    Colon cancer Maternal Uncle     Cancer Maternal Uncle         colon ca    Hypertension Father     Heart disease Sister         fast heart rate    Cataracts Sister     Glaucoma Sister     Graves' disease Daughter     No Known Problems Son     No Known Problems Sister     No Known Problems Daughter     No Known Problems Son     Melanoma Neg Hx     Breast cancer Neg Hx     Ovarian cancer Neg Hx     Colon polyps Neg Hx     Rectal cancer Neg Hx     Stomach cancer Neg Hx     Esophageal cancer Neg Hx     Ulcerative colitis Neg Hx     Crohn's disease Neg Hx     Amblyopia Neg Hx     Blindness Neg Hx     Macular degeneration Neg Hx     Strabismus Neg Hx     Retinal detachment Neg Hx        CURRENT MEDICATIONS:   Current Outpatient Medications   Medication Sig    ascorbic acid, vitamin C, (VITAMIN C) 500 MG tablet Take 500 mg by mouth once daily.    aspirin 81 MG Chew Take 81 mg by mouth once daily.    GADAVIST 10 mmol/10 mL (1 mmol/mL) Soln injection     magnesium oxide (MAG-OX) 400 mg tablet Take 400 mg by mouth once daily.    methIMAzole (TAPAZOLE) 5 MG Tab Take 1 tablet (5 mg total) by mouth once daily. TAKE 4 TABLETS(40 MG) BY MOUTH EVERY DAY    multivit with min-folic acid 0.4 mg Tab Take 0.4 mg by mouth once daily.    ondansetron (ZOFRAN) 4 MG tablet Take 1-2 tablets (4-8 mg total) by mouth every 8 (eight) hours as needed for Nausea.    psyllium (METAMUCIL) powder Take 1 packet by mouth Daily.    quinapriL (ACCUPRIL) 20 MG tablet  "Take 1 tablet (20 mg total) by mouth once daily.    rizatriptan (MAXALT) 10 MG tablet TAKE 1 TABLET(10 MG) BY MOUTH EVERY 2 HOURS AS NEEDED FOR MIGRAINE. MAX 30 MG/ 24 AT BEDTIME    vitamin D (VITAMIN D3) 1000 units Tab Take 1,000 Units by mouth once daily.     No current facility-administered medications for this visit.     ALLERGIES:   Review of patient's allergies indicates:  No Known Allergies      ASSESSMENTS:   PAIN ASSESSMENT (Patient reports Pain):   Vitals:    10/10/22 0932   BP: (!) 147/73   Pulse: 65   Resp: 17   SpO2: 97%   Weight: 119.9 kg (264 lb 5.3 oz)   Height: 5' 9" (1.753 m)     REVIEW OF SYSTEMS:     See HPI   PHYSICAL EXAM:   Vitals:    10/10/22 0932   BP: (!) 147/73   Pulse: 65   Resp: 17       General - well developed, well nourished, no apparent distress  HEENT - oropharynx clear  Chest and Lung - clear to auscultation bilaterally   Cardiovascular - RRR with no MGR, normal S1 and S2  Abdomen-  soft, nontender, no palpable hepatomegaly or splenomegaly  Lymph - no palpable lymphadenopathy  Heme - no bruising, petechiae, pallor  Skin - no rashes or lesions  Psych - appropriate mood and affect      ECOG Performance Status: (foot note - ECOG PS provided by Eastern Cooperative Oncology Group) 0 - Asymptomatic    Karnofsky Performance Score:  100%- Normal, No Complaints, No Evidence of Disease  DATA:   Lab Results   Component Value Date    WBC 4.06 10/03/2022    HGB 11.2 (L) 10/03/2022    HCT 36.2 (L) 10/03/2022    MCV 99 (H) 10/03/2022     10/03/2022     Gran # (ANC)   Date Value Ref Range Status   10/03/2022 2.1 1.8 - 7.7 K/uL Final     Gran %   Date Value Ref Range Status   10/03/2022 51.5 38.0 - 73.0 % Final     Lymph #   Date Value Ref Range Status   10/03/2022 1.6 1.0 - 4.8 K/uL Final     Lymph %   Date Value Ref Range Status   10/03/2022 38.2 18.0 - 48.0 % Final     Kappa Free Light Chains   Date Value Ref Range Status   10/03/2022 13.24 (H) 0.33 - 1.94 mg/dL Final   04/08/2022 17.23 " (H) 0.33 - 1.94 mg/dL Final   09/21/2021 13.52 (H) 0.33 - 1.94 mg/dL Final     Lambda Free Light Chains   Date Value Ref Range Status   10/03/2022 1.45 0.57 - 2.63 mg/dL Final   04/08/2022 1.64 0.57 - 2.63 mg/dL Final   09/21/2021 1.29 0.57 - 2.63 mg/dL Final     Kappa/Lambda FLC Ratio   Date Value Ref Range Status   10/03/2022 9.13 (H) 0.26 - 1.65 Final     Comment:     Undetected antigen excess is a rare event but cannot   be excluded. If these free light chain results do not   agree with other clinical or laboratory findings or   if the sample is from a patient that has previously   demonstrated antigen excess, discuss with the testing   laboratory.   Results should always be interpreted in conjunction   with other laboratory tests and clinical evidence.     04/08/2022 10.51 (H) 0.26 - 1.65 Final     Comment:     Undetected antigen excess is a rare event but cannot   be excluded. If these free light chain results do not   agree with other clinical or laboratory findings or   if the sample is from a patient that has previously   demonstrated antigen excess, discuss with the testing   laboratory.   Results should always be interpreted in conjunction   with other laboratory tests and clinical evidence.     09/21/2021 10.48 (H) 0.26 - 1.65 Final     Comment:     Undetected antigen excess is a rare event but cannot   be excluded. If these free light chain results do not   agree with other clinical or laboratory findings or   if the sample is from a patient that has previously   demonstrated antigen excess, discuss with the testing   laboratory.   Results should always be interpreted in conjunction   with other laboratory tests and clinical evidence.       Gamma   Date Value Ref Range Status   09/21/2021 1.75 (H) 0.67 - 1.58 g/dL Final   03/05/2021 1.94 (H) 0.67 - 1.58 g/dL Final   08/21/2020 1.72 (H) 0.67 - 1.58 g/dL Final     IgG   Date Value Ref Range Status   10/03/2022 1837 (H) 650 - 1600 mg/dL Final     Comment:      IgG Cord Blood Reference Range: 650-1600 mg/dL.     IgA   Date Value Ref Range Status   10/03/2022 101 40 - 350 mg/dL Final     Comment:     IgA Cord Blood Reference Range: <5 mg/dL.     IgM   Date Value Ref Range Status   10/03/2022 28 (L) 50 - 300 mg/dL Final     Comment:     IgM Cord Blood Reference Range: <25 mg/dL.      Spep was not linked   ASSESSMENT AND PLAN:   Encounter Diagnoses   Name Primary?    Smoldering myeloma Yes    Smoldering multiple myeloma (SMM)         IgG kappa smoldering myeloma diagnosed 2010 under observation since that time; previous pt of Dr. Lora; transitioned care to me sept 2015.    Mild intermittent leukopenia/anemia are chronic and pre date myeloma diagnosis ; stable  Biochemical studies/Labs from 10/3/22  with no evidence of crab or progression; spep not linked; draw with next labs  Discussed now that >10 years from diagnosis with no progression to symptomatic myeloma her risk of progression now decreases significantly and her disease is more clinically like an MGUS,  but that we should still monitor her biochemcial studies and see her in clinic q 6 months  Plan to image based on symptoms only   educated on symptoms for which to seek attn in our clinic earlier   Mgmt of hypothyroidism per endocrinology    Follow Up:     BMT Chart Routing      Follow up with physician 6 months. -cbc, cmp, serum free light chains, quantitative immunoglobulins, serum electropheresis, serum immunofixation  in 6 months; MD appt 3-4 days  after labs in 6 months   Follow up with [facility]    Provider visit type    Infusion scheduling note    Injection scheduling note    Labs    Imaging    Pharmacy appointment    Other referrals         -cbc, cmp, serum free light chains, quantitative immunoglobulins, serum electropheresis, serum immunofixation  in 6 months; MD appt 3-4 days  after labs in 6 months   Rasta Alba MD  Hematology/Oncology/Bone Marrow Transplant

## 2022-10-12 ENCOUNTER — TELEPHONE (OUTPATIENT)
Dept: ENDOCRINOLOGY | Facility: CLINIC | Age: 72
End: 2022-10-12
Payer: MEDICARE

## 2022-10-12 DIAGNOSIS — E05.00 GRAVES DISEASE: Primary | ICD-10-CM

## 2022-10-20 ENCOUNTER — PES CALL (OUTPATIENT)
Dept: ADMINISTRATIVE | Facility: CLINIC | Age: 72
End: 2022-10-20
Payer: MEDICARE

## 2022-12-02 ENCOUNTER — TELEPHONE (OUTPATIENT)
Dept: INTERNAL MEDICINE | Facility: CLINIC | Age: 72
End: 2022-12-02
Payer: MEDICARE

## 2022-12-02 NOTE — TELEPHONE ENCOUNTER
Called pt; pt answered    Pt reports a dull stomach pain for 2 weeks   Pt suffers with constipation ; pt has had bm w/o relief   Pain gets worse with food   Pt unable to alleviate pain with whatever she tries     Advised pt to call [facility] on Call [facility]       Please advise

## 2022-12-02 NOTE — TELEPHONE ENCOUNTER
----- Message from Dianna Chua sent at 12/2/2022  9:34 AM CST -----  Contact: Pt @[phone]  Caller is requesting an earlier appointment then we can schedule.  Caller is requesting a message be sent to the provider.  If this is for urgent care symptoms, did you offer other providers at this location, providers at other locations, or [facility]? (yes, no, n/a):    If this is for the patients physical, did you offer to schedule next available and put on wait list, or to see NP or PA for their physical?  (yes, no, n/a):        When is the next available appointment with their provider:  n/a    Reason for the appointment:   stomach pain     Patient preference of timeframe to be scheduled:  12.2.22    Would the patient like a call back, or a response through their [facility] portal?:   call back     Comments:   Please call back to advise on appt. Pt decline with another provider until she speaks with hers.

## 2022-12-05 ENCOUNTER — OFFICE VISIT (OUTPATIENT)
Dept: INTERNAL MEDICINE | Facility: CLINIC | Age: 72
End: 2022-12-05
Payer: MEDICARE

## 2022-12-05 ENCOUNTER — HOSPITAL ENCOUNTER (OUTPATIENT)
Dept: RADIOLOGY | Facility: OTHER | Age: 72
Discharge: HOME | End: 2022-12-05
Attending: INTERNAL MEDICINE
Payer: MEDICARE

## 2022-12-05 VITALS
SYSTOLIC BLOOD PRESSURE: 131 MMHG | OXYGEN SATURATION: 97 % | DIASTOLIC BLOOD PRESSURE: 71 MMHG | HEART RATE: 89 BPM | HEIGHT: 67 IN | BODY MASS INDEX: 41.03 KG/M2 | WEIGHT: 261.44 LBS

## 2022-12-05 DIAGNOSIS — R10.32 LLQ ABDOMINAL PAIN: Primary | ICD-10-CM

## 2022-12-05 DIAGNOSIS — R10.32 LEFT LOWER QUADRANT PAIN: ICD-10-CM

## 2022-12-05 LAB
BILIRUB UR QL STRIP: NEGATIVE
CLARITY UR REFRACT.AUTO: CLEAR
COLOR UR AUTO: YELLOW
GLUCOSE UR QL STRIP: NEGATIVE
HGB UR QL STRIP: ABNORMAL
KETONES UR QL STRIP: NEGATIVE
LEUKOCYTE ESTERASE UR QL STRIP: NEGATIVE
NITRITE UR QL STRIP: NEGATIVE
PH UR STRIP: 6 [PH] (ref 5–8)
PROT UR QL STRIP: NEGATIVE
SP GR UR STRIP: 1.03 (ref 1–1.03)
URN SPEC COLLECT METH UR: ABNORMAL

## 2022-12-05 PROCEDURE — 99999 PR PBB SHADOW E&M-EST. PATIENT-LVL III: CPT | Mod: PBBFAC,,, | Performed by: INTERNAL MEDICINE

## 2022-12-05 PROCEDURE — 3075F PR MOST RECENT SYSTOLIC BLOOD PRESS GE 130-139MM HG: ICD-10-PCS | Mod: CPTII,S$GLB,, | Performed by: INTERNAL MEDICINE

## 2022-12-05 PROCEDURE — 1125F PR PAIN SEVERITY QUANTIFIED, PAIN PRESENT: ICD-10-PCS | Mod: CPTII,S$GLB,, | Performed by: INTERNAL MEDICINE

## 2022-12-05 PROCEDURE — 1125F AMNT PAIN NOTED PAIN PRSNT: CPT | Mod: CPTII,S$GLB,, | Performed by: INTERNAL MEDICINE

## 2022-12-05 PROCEDURE — 3008F BODY MASS INDEX DOCD: CPT | Mod: CPTII,S$GLB,, | Performed by: INTERNAL MEDICINE

## 2022-12-05 PROCEDURE — 74176 CT ABD & PELVIS W/O CONTRAST: CPT | Mod: 26,,, | Performed by: RADIOLOGY

## 2022-12-05 PROCEDURE — 3288F FALL RISK ASSESSMENT DOCD: CPT | Mod: CPTII,S$GLB,, | Performed by: INTERNAL MEDICINE

## 2022-12-05 PROCEDURE — 3008F PR BODY MASS INDEX (BMI) DOCUMENTED: ICD-10-PCS | Mod: CPTII,S$GLB,, | Performed by: INTERNAL MEDICINE

## 2022-12-05 PROCEDURE — 99999 PR PBB SHADOW E&M-EST. PATIENT-LVL III: ICD-10-PCS | Mod: PBBFAC,,, | Performed by: INTERNAL MEDICINE

## 2022-12-05 PROCEDURE — 99214 OFFICE O/P EST MOD 30 MIN: CPT | Mod: S$GLB,,, | Performed by: INTERNAL MEDICINE

## 2022-12-05 PROCEDURE — 3075F SYST BP GE 130 - 139MM HG: CPT | Mod: CPTII,S$GLB,, | Performed by: INTERNAL MEDICINE

## 2022-12-05 PROCEDURE — 74176 CT ABDOMEN PELVIS WITHOUT CONTRAST: ICD-10-PCS | Mod: 26,,, | Performed by: RADIOLOGY

## 2022-12-05 PROCEDURE — 1101F PR PT FALLS ASSESS DOC 0-1 FALLS W/OUT INJ PAST YR: ICD-10-PCS | Mod: CPTII,S$GLB,, | Performed by: INTERNAL MEDICINE

## 2022-12-05 PROCEDURE — 25500020 PHARM REV CODE 255: Performed by: INTERNAL MEDICINE

## 2022-12-05 PROCEDURE — 74176 CT ABD & PELVIS W/O CONTRAST: CPT | Mod: TC

## 2022-12-05 PROCEDURE — 81003 URINALYSIS AUTO W/O SCOPE: CPT | Performed by: INTERNAL MEDICINE

## 2022-12-05 PROCEDURE — 1101F PT FALLS ASSESS-DOCD LE1/YR: CPT | Mod: CPTII,S$GLB,, | Performed by: INTERNAL MEDICINE

## 2022-12-05 PROCEDURE — 3078F DIAST BP <80 MM HG: CPT | Mod: CPTII,S$GLB,, | Performed by: INTERNAL MEDICINE

## 2022-12-05 PROCEDURE — 4010F PR ACE/ARB THEARPY RXD/TAKEN: ICD-10-PCS | Mod: CPTII,S$GLB,, | Performed by: INTERNAL MEDICINE

## 2022-12-05 PROCEDURE — 3044F HG A1C LEVEL LT 7.0%: CPT | Mod: CPTII,S$GLB,, | Performed by: INTERNAL MEDICINE

## 2022-12-05 PROCEDURE — 3288F PR FALLS RISK ASSESSMENT DOCUMENTED: ICD-10-PCS | Mod: CPTII,S$GLB,, | Performed by: INTERNAL MEDICINE

## 2022-12-05 PROCEDURE — 3078F PR MOST RECENT DIASTOLIC BLOOD PRESSURE < 80 MM HG: ICD-10-PCS | Mod: CPTII,S$GLB,, | Performed by: INTERNAL MEDICINE

## 2022-12-05 PROCEDURE — 4010F ACE/ARB THERAPY RXD/TAKEN: CPT | Mod: CPTII,S$GLB,, | Performed by: INTERNAL MEDICINE

## 2022-12-05 PROCEDURE — 3044F PR MOST RECENT HEMOGLOBIN A1C LEVEL <7.0%: ICD-10-PCS | Mod: CPTII,S$GLB,, | Performed by: INTERNAL MEDICINE

## 2022-12-05 PROCEDURE — 99214 PR OFFICE/OUTPT VISIT, EST, LEVL IV, 30-39 MIN: ICD-10-PCS | Mod: S$GLB,,, | Performed by: INTERNAL MEDICINE

## 2022-12-05 RX ORDER — AMOXICILLIN AND CLAVULANATE POTASSIUM 875; 125 MG/1; MG/1
1 TABLET, FILM COATED ORAL 2 TIMES DAILY
Qty: 20 TABLET | Refills: 0 | Status: SHIPPED | OUTPATIENT
Start: 2022-12-05 | End: 2022-12-15

## 2022-12-05 RX ADMIN — IOHEXOL 25 ML: 350 INJECTION, SOLUTION INTRAVENOUS at 12:12

## 2022-12-05 NOTE — PROGRESS NOTES
Subjective:       Patient ID: Manju Aranda is a 72 y.o. female.    Chief Complaint: Abdominal Pain    HPI    Mrs. Aranda is a 71 yo female with hx of MM who presents for abdominal pain     She states she began to noticed the pain two weeks ago and it has become progressively worse. Pain is a 5/10 at its worst and it comes and goes. Denies N/V/D. No fevers or chills. Denies dysuria or foul smelling urine.     She tried taking a laxative for the pain as she normally has BMs 3 x per week but it did not help. Is not associated with food.     She does have hx of diverticulitis and states pain is similar.       Review of Systems   Constitutional:  Negative for activity change, appetite change, chills and fever.   HENT:  Negative for ear pain, sinus pressure/congestion and sneezing.    Respiratory:  Negative for cough and shortness of breath.    Cardiovascular:  Negative for chest pain, palpitations and leg swelling.   Gastrointestinal:  Positive for abdominal pain and constipation. Negative for abdominal distention, diarrhea, nausea and vomiting.   Genitourinary:  Negative for dysuria and hematuria.   Musculoskeletal:  Negative for arthralgias, back pain and myalgias.   Neurological:  Negative for dizziness and headaches.   Psychiatric/Behavioral:  Negative for agitation. The patient is not nervous/anxious.          Past Medical History:   Diagnosis Date    Allergy     Anemia     Arthritis     Cholelithiases     Cyst of kidney, acquired     Diverticulitis     Elevated TSH     Family history of Graves' disease: daughter, maternal aunt, maternal uncle 9/13/2016    Glaucoma     Graves disease     Hx of colonic polyps     Hypertension 1981    Liver cyst     MGUS (monoclonal gammopathy of unknown significance)     Migraine headache     Mitral valve problem     leakage    Obesity     Paraproteinemia     Sleep apnea     + CPAP    Smoldering multiple myeloma 2013    Tricuspid valve disease     leakage     Past Surgical  History:   Procedure Laterality Date    APPENDECTOMY      COLONOSCOPY N/A 10/23/2015    Procedure: COLONOSCOPY;  Surgeon: Brandon Ruelas MD;  Location: [facility] ([address] [place] [address]);  Service: Endoscopy;  Laterality: N/A;  Had divertiulitis in 5/29/2015 with a recommendation for colonoscopy in 8 weeks    Dr. Ruelas is her GI physician    COLONOSCOPY N/A 11/5/2018    Procedure: COLONOSCOPY;  Surgeon: Brandon Ruelas MD;  Location: [facility] ([address] [place] [address]);  Service: Endoscopy;  Laterality: N/A;  Dr. Ruelas did the last one multiple polyps, patient had recent diverticulitis should not schedule before Oct 10th    HYSTERECTOMY  1978 or 1979    OOPHORECTOMY        Patient Active Problem List   Diagnosis    Hypertension    Liver cyst    Paraproteinemia    Smoldering multiple myeloma (SMM)    Migraine headache    Mitral valve disorder    Tricuspid valve disease    ADPKD (autosomal dominant polycystic kidney disease)    Benign hypertensive renal disease without renal failure    Aortic atherosclerosis    Microhematuria    Graves disease    Severe obesity with body mass index (BMI) of 35.0 to 39.9 with comorbidity    History of adenomatous polyp of colon    Obstructive sleep apnea    Graves' eye disease    BMI 36.0-36.9,adult        Objective:      Physical Exam  Constitutional:       Appearance: Normal appearance.   HENT:      Head: Normocephalic.      Right Ear: Tympanic membrane normal.      Left Ear: Tympanic membrane normal.      Nose: Nose normal.   Cardiovascular:      Rate and Rhythm: Normal rate and regular rhythm.      Pulses: Normal pulses.      Heart sounds: Normal heart sounds.   Pulmonary:      Effort: Pulmonary effort is normal.      Breath sounds: Normal breath sounds.   Abdominal:      General: Abdomen is flat. Bowel sounds are normal.      Palpations: Abdomen is soft.      Comments: Moderately tender to palpation in left lower quadrant    Musculoskeletal:         General: Normal range of motion.      Cervical  back: Normal range of motion and neck supple.   Skin:     General: Skin is warm and dry.   Neurological:      General: No focal deficit present.      Mental Status: She is alert and oriented to person, place, and time.   Psychiatric:         Mood and Affect: Mood normal.       Assessment:       Problem List Items Addressed This Visit    None  Visit Diagnoses       LLQ abdominal pain    -  Primary    Relevant Orders    CBC W/ AUTO DIFFERENTIAL    COMPREHENSIVE METABOLIC PANEL    Urinalysis, Reflex to Urine Culture Urine, Clean Catch    Left lower quadrant pain        Relevant Orders    CT Abdomen Pelvis  Without Contrast    CBC W/ AUTO DIFFERENTIAL    COMPREHENSIVE METABOLIC PANEL    Urinalysis, Reflex to Urine Culture Urine, Clean Catch            Plan:         Manju was seen today for abdominal pain.    Diagnoses and all orders for this visit:      Left lower quadrant pain  Possibly due to diverticulitis but will get CT abdomen wit rule out renal stone or abscess. She is pretty tender to palpation on exam.   Start Augmentin.   Check labs today                 Jamee Daly MD   Internal Medicine   Primary Care

## 2022-12-12 ENCOUNTER — TELEPHONE (OUTPATIENT)
Dept: ENDOCRINOLOGY | Facility: CLINIC | Age: 72
End: 2022-12-12
Payer: MEDICARE

## 2022-12-12 ENCOUNTER — LAB VISIT (OUTPATIENT)
Dept: LAB | Facility: OTHER | Age: 72
End: 2022-12-12
Attending: INTERNAL MEDICINE
Payer: MEDICARE

## 2022-12-12 ENCOUNTER — TELEPHONE (OUTPATIENT)
Dept: HEMATOLOGY/ONCOLOGY | Facility: CLINIC | Age: 72
End: 2022-12-12
Payer: MEDICARE

## 2022-12-12 DIAGNOSIS — E05.00 GRAVES DISEASE: ICD-10-CM

## 2022-12-12 DIAGNOSIS — E05.00 GRAVES DISEASE: Primary | ICD-10-CM

## 2022-12-12 DIAGNOSIS — Z12.31 ENCOUNTER FOR SCREENING MAMMOGRAM FOR MALIGNANT NEOPLASM OF BREAST: ICD-10-CM

## 2022-12-12 DIAGNOSIS — Z00.00 HEALTHCARE MAINTENANCE: Primary | ICD-10-CM

## 2022-12-12 LAB — TSH SERPL DL<=0.005 MIU/L-ACNC: 2.73 UIU/ML (ref 0.4–4)

## 2022-12-12 PROCEDURE — 84443 ASSAY THYROID STIM HORMONE: CPT | Performed by: INTERNAL MEDICINE

## 2022-12-12 PROCEDURE — 36415 COLL VENOUS BLD VENIPUNCTURE: CPT | Performed by: INTERNAL MEDICINE

## 2022-12-13 ENCOUNTER — HOSPITAL ENCOUNTER (OUTPATIENT)
Dept: RADIOLOGY | Facility: OTHER | Age: 72
Discharge: HOME | End: 2022-12-13
Attending: INTERNAL MEDICINE
Payer: MEDICARE

## 2022-12-13 DIAGNOSIS — Z00.00 HEALTHCARE MAINTENANCE: ICD-10-CM

## 2022-12-13 DIAGNOSIS — Z12.31 ENCOUNTER FOR SCREENING MAMMOGRAM FOR MALIGNANT NEOPLASM OF BREAST: ICD-10-CM

## 2022-12-13 PROCEDURE — 77063 MAMMO DIGITAL SCREENING BILAT WITH TOMO: ICD-10-PCS | Mod: 26,,, | Performed by: RADIOLOGY

## 2022-12-13 PROCEDURE — 77067 SCR MAMMO BI INCL CAD: CPT | Mod: 26,,, | Performed by: RADIOLOGY

## 2022-12-13 PROCEDURE — 77067 MAMMO DIGITAL SCREENING BILAT WITH TOMO: ICD-10-PCS | Mod: 26,,, | Performed by: RADIOLOGY

## 2022-12-13 PROCEDURE — 77063 BREAST TOMOSYNTHESIS BI: CPT | Mod: 26,,, | Performed by: RADIOLOGY

## 2022-12-13 PROCEDURE — 77063 BREAST TOMOSYNTHESIS BI: CPT | Mod: TC

## 2022-12-13 PROCEDURE — 77067 SCR MAMMO BI INCL CAD: CPT | Mod: TC

## 2022-12-15 ENCOUNTER — TELEPHONE (OUTPATIENT)
Dept: INTERNAL MEDICINE | Facility: CLINIC | Age: 72
End: 2022-12-15
Payer: MEDICARE

## 2022-12-15 DIAGNOSIS — R10.32 LLQ ABDOMINAL PAIN: Primary | ICD-10-CM

## 2022-12-15 NOTE — TELEPHONE ENCOUNTER
----- Message from aJmee Daly MD sent at 12/15/2022  9:10 AM CST -----  Can you help her schedule appt with colorectal surgery. Thanks!

## 2022-12-30 DIAGNOSIS — B37.9 YEAST INFECTION: Primary | ICD-10-CM

## 2022-12-30 RX ORDER — FLUCONAZOLE 100 MG/1
100 TABLET ORAL DAILY
Qty: 3 TABLET | Refills: 1 | Status: SHIPPED | OUTPATIENT
Start: 2022-12-30 | End: 2023-01-02

## 2023-01-03 DIAGNOSIS — I10 PRIMARY HYPERTENSION: ICD-10-CM

## 2023-01-03 RX ORDER — QUINAPRIL 20 MG/1
20 TABLET ORAL DAILY
Qty: 90 TABLET | Refills: 1 | Status: SHIPPED | OUTPATIENT
Start: 2023-01-03 | End: 2023-01-10 | Stop reason: ALTCHOICE

## 2023-01-03 NOTE — TELEPHONE ENCOUNTER
Refill Decision Note   Manju Aranda  is requesting a refill authorization.  Brief Assessment and Rationale for Refill:  Approve     Medication Therapy Plan:  LOV 05/26/22    Medication Reconciliation Completed: No   Comments:     No Care Gaps recommended.     Note composed:3:03 PM 01/03/2023

## 2023-01-03 NOTE — TELEPHONE ENCOUNTER
----- Message from Timothy Rodas sent at 1/3/2023  8:08 AM CST -----  Contact: Pt   Pt called in regards to medication quinapriL (ACCUPRIL) 20 MG tablet she states it has been recalled and she would like something prescribed please advise    [id number] - [place] [address]   Phone:  [phone]  Fax:  [phone]

## 2023-01-03 NOTE — TELEPHONE ENCOUNTER
Care Due:                  Date            Visit Type   Department     Provider  --------------------------------------------------------------------------------    Last Visit: None Found      None         None Found  Next Visit: None Scheduled  None         None Found                                                            Last  Test          Frequency    Reason                     Performed    Due Date  --------------------------------------------------------------------------------    Office Visit  12 months..  quinapriL................  Not Found    Overdue    [employer] Embedded Care Gaps. Reference number: [id number]. 1/03/2023   11:51:27 AM CST

## 2023-01-06 ENCOUNTER — OFFICE VISIT (OUTPATIENT)
Dept: URGENT CARE | Facility: CLINIC | Age: 73
End: 2023-01-06
Payer: MEDICARE

## 2023-01-06 VITALS
OXYGEN SATURATION: 98 % | RESPIRATION RATE: 18 BRPM | BODY MASS INDEX: 40.81 KG/M2 | HEART RATE: 76 BPM | DIASTOLIC BLOOD PRESSURE: 95 MMHG | HEIGHT: 67 IN | WEIGHT: 260 LBS | SYSTOLIC BLOOD PRESSURE: 160 MMHG | TEMPERATURE: 99 F

## 2023-01-06 DIAGNOSIS — G44.209 ACUTE NON INTRACTABLE TENSION-TYPE HEADACHE: ICD-10-CM

## 2023-01-06 DIAGNOSIS — I10 PRIMARY HYPERTENSION: Primary | ICD-10-CM

## 2023-01-06 PROCEDURE — 1160F RVW MEDS BY RX/DR IN RCRD: CPT | Mod: CPTII,S$GLB,, | Performed by: SURGERY

## 2023-01-06 PROCEDURE — 1125F PR PAIN SEVERITY QUANTIFIED, PAIN PRESENT: ICD-10-PCS | Mod: CPTII,S$GLB,, | Performed by: SURGERY

## 2023-01-06 PROCEDURE — 3077F SYST BP >= 140 MM HG: CPT | Mod: CPTII,S$GLB,, | Performed by: SURGERY

## 2023-01-06 PROCEDURE — 99499 UNLISTED E&M SERVICE: CPT | Mod: S$GLB,,, | Performed by: SURGERY

## 2023-01-06 PROCEDURE — 99499 RISK ADDL DX/OHS AUDIT: ICD-10-PCS | Mod: S$GLB,,, | Performed by: SURGERY

## 2023-01-06 PROCEDURE — 3080F DIAST BP >= 90 MM HG: CPT | Mod: CPTII,S$GLB,, | Performed by: SURGERY

## 2023-01-06 PROCEDURE — 3080F PR MOST RECENT DIASTOLIC BLOOD PRESSURE >= 90 MM HG: ICD-10-PCS | Mod: CPTII,S$GLB,, | Performed by: SURGERY

## 2023-01-06 PROCEDURE — 4010F ACE/ARB THERAPY RXD/TAKEN: CPT | Mod: CPTII,S$GLB,, | Performed by: SURGERY

## 2023-01-06 PROCEDURE — 3008F BODY MASS INDEX DOCD: CPT | Mod: CPTII,S$GLB,, | Performed by: SURGERY

## 2023-01-06 PROCEDURE — 99214 OFFICE O/P EST MOD 30 MIN: CPT | Mod: S$GLB,,, | Performed by: SURGERY

## 2023-01-06 PROCEDURE — 1125F AMNT PAIN NOTED PAIN PRSNT: CPT | Mod: CPTII,S$GLB,, | Performed by: SURGERY

## 2023-01-06 PROCEDURE — 4010F PR ACE/ARB THEARPY RXD/TAKEN: ICD-10-PCS | Mod: CPTII,S$GLB,, | Performed by: SURGERY

## 2023-01-06 PROCEDURE — 3008F PR BODY MASS INDEX (BMI) DOCUMENTED: ICD-10-PCS | Mod: CPTII,S$GLB,, | Performed by: SURGERY

## 2023-01-06 PROCEDURE — 1159F PR MEDICATION LIST DOCUMENTED IN MEDICAL RECORD: ICD-10-PCS | Mod: CPTII,S$GLB,, | Performed by: SURGERY

## 2023-01-06 PROCEDURE — 3077F PR MOST RECENT SYSTOLIC BLOOD PRESSURE >= 140 MM HG: ICD-10-PCS | Mod: CPTII,S$GLB,, | Performed by: SURGERY

## 2023-01-06 PROCEDURE — 1159F MED LIST DOCD IN RCRD: CPT | Mod: CPTII,S$GLB,, | Performed by: SURGERY

## 2023-01-06 PROCEDURE — 99214 PR OFFICE/OUTPT VISIT, EST, LEVL IV, 30-39 MIN: ICD-10-PCS | Mod: S$GLB,,, | Performed by: SURGERY

## 2023-01-06 PROCEDURE — 1160F PR REVIEW ALL MEDS BY PRESCRIBER/CLIN PHARMACIST DOCUMENTED: ICD-10-PCS | Mod: CPTII,S$GLB,, | Performed by: SURGERY

## 2023-01-06 RX ORDER — LISINOPRIL 20 MG/1
20 TABLET ORAL DAILY
Qty: 90 TABLET | Refills: 0 | Status: SHIPPED | OUTPATIENT
Start: 2023-01-06 | End: 2023-01-08 | Stop reason: SDUPTHER

## 2023-01-06 NOTE — PROGRESS NOTES
"Subjective:       Patient ID: Manju Aranda is a 72 y.o. female.    Vitals:  height is 5' 7" (1.702 m) and weight is 117.9 kg (260 lb). Her temperature is 98.8 °F (37.1 °C). Her blood pressure is 160/95 (abnormal) and her pulse is 76. Her respiration is 18 and oxygen saturation is 98%.     Chief Complaint: Headache    Patient has history of getting migraines, she states to not have been able to get her blood pressure medication due to it being on recall(4days). She is not out of blood pressure medication. Her primary care is not available and she is establishing a new one but was not able to get in quickly.     Headache   This is a new problem. The current episode started in the past 7 days. The problem has been gradually worsening. The quality of the pain is described as aching. The pain is at a severity of 5/10. She has tried nothing for the symptoms.     Neurological:  Positive for headaches.     Objective:      Physical Exam   Constitutional: She is oriented to person, place, and time. She appears well-developed. She is cooperative.  Non-toxic appearance. She does not appear ill. No distress.   HENT:   Head: Normocephalic and atraumatic.   Ears:   Right Ear: Hearing, tympanic membrane, external ear and ear canal normal.   Left Ear: Hearing, tympanic membrane, external ear and ear canal normal.   Nose: Nose normal. No mucosal edema, rhinorrhea or nasal deformity. No epistaxis. Right sinus exhibits no maxillary sinus tenderness and no frontal sinus tenderness. Left sinus exhibits no maxillary sinus tenderness and no frontal sinus tenderness.   Mouth/Throat: Uvula is midline, oropharynx is clear and moist and mucous membranes are normal. No trismus in the jaw. Normal dentition. No uvula swelling. No posterior oropharyngeal erythema.   Eyes: Conjunctivae and lids are normal. Right eye exhibits no discharge. Left eye exhibits no discharge. No scleral icterus.   Neck: Trachea normal and phonation normal. Neck " supple.   Cardiovascular: Normal rate, regular rhythm, normal heart sounds and normal pulses.   Pulmonary/Chest: Effort normal and breath sounds normal. No respiratory distress.   Abdominal: Normal appearance and bowel sounds are normal. She exhibits no distension and no mass. Soft. There is no abdominal tenderness.   Musculoskeletal: Normal range of motion.         General: No deformity. Normal range of motion.   Neurological: She is alert and oriented to person, place, and time. She exhibits normal muscle tone. Coordination normal.   Skin: Skin is warm, dry, intact, not diaphoretic and not pale.   Psychiatric: Her speech is normal and behavior is normal. Judgment and thought content normal.   Nursing note and vitals reviewed.      Assessment:       1. Primary hypertension    2. Acute non intractable tension-type headache          Plan:         Primary hypertension  -     lisinopriL (PRINIVIL,ZESTRIL) 20 MG tablet; Take 1 tablet (20 mg total) by mouth once daily.  Dispense: 90 tablet; Refill: 0    Acute non intractable tension-type headache    Switched from Accupril to lisinopril due to Accupril recall.  Patient is also in the middle of transitioning to a new healthcare provider and has an appointment in 2 weeks.  She checks her blood pressure at home daily in the morning.  She will keep a log of this.  She will take her medication today.  We explained that the new dosage may not be equivalent and the affect on her blood pressure may not be the same.  Parameters were given.  She is to call if she has any questions.  ER precautions given.

## 2023-01-08 ENCOUNTER — HOSPITAL ENCOUNTER (EMERGENCY)
Facility: OTHER | Age: 73
Discharge: HOME | End: 2023-01-08
Attending: EMERGENCY MEDICINE
Payer: MEDICARE

## 2023-01-08 VITALS
WEIGHT: 260 LBS | BODY MASS INDEX: 38.51 KG/M2 | OXYGEN SATURATION: 100 % | DIASTOLIC BLOOD PRESSURE: 74 MMHG | HEIGHT: 69 IN | SYSTOLIC BLOOD PRESSURE: 172 MMHG | HEART RATE: 77 BPM | TEMPERATURE: 98 F | RESPIRATION RATE: 20 BRPM

## 2023-01-08 DIAGNOSIS — I10 PRIMARY HYPERTENSION: ICD-10-CM

## 2023-01-08 DIAGNOSIS — I10 HYPERTENSION: ICD-10-CM

## 2023-01-08 LAB
ANION GAP SERPL CALC-SCNC: 5 MMOL/L (ref 8–16)
BASOPHILS # BLD AUTO: 0.01 K/UL (ref 0–0.2)
BASOPHILS NFR BLD: 0.3 % (ref 0–1.9)
BUN SERPL-MCNC: 13 MG/DL (ref 8–23)
CALCIUM SERPL-MCNC: 9.9 MG/DL (ref 8.7–10.5)
CHLORIDE SERPL-SCNC: 107 MMOL/L (ref 95–110)
CO2 SERPL-SCNC: 27 MMOL/L (ref 23–29)
CREAT SERPL-MCNC: 0.9 MG/DL (ref 0.5–1.4)
DIFFERENTIAL METHOD: ABNORMAL
EOSINOPHIL # BLD AUTO: 0 K/UL (ref 0–0.5)
EOSINOPHIL NFR BLD: 0 % (ref 0–8)
ERYTHROCYTE [DISTWIDTH] IN BLOOD BY AUTOMATED COUNT: 11.2 % (ref 11.5–14.5)
EST. GFR  (NO RACE VARIABLE): >60 ML/MIN/1.73 M^2
GLUCOSE SERPL-MCNC: 93 MG/DL (ref 70–110)
HCT VFR BLD AUTO: 38.6 % (ref 37–48.5)
HCV AB SERPL QL IA: NEGATIVE
HGB BLD-MCNC: 12.3 G/DL (ref 12–16)
HIV 1+2 AB+HIV1 P24 AG SERPL QL IA: NEGATIVE
IMM GRANULOCYTES # BLD AUTO: 0.01 K/UL (ref 0–0.04)
IMM GRANULOCYTES NFR BLD AUTO: 0.3 % (ref 0–0.5)
LYMPHOCYTES # BLD AUTO: 0.7 K/UL (ref 1–4.8)
LYMPHOCYTES NFR BLD: 20.3 % (ref 18–48)
MCH RBC QN AUTO: 30.8 PG (ref 27–31)
MCHC RBC AUTO-ENTMCNC: 31.9 G/DL (ref 32–36)
MCV RBC AUTO: 97 FL (ref 82–98)
MONOCYTES # BLD AUTO: 0.2 K/UL (ref 0.3–1)
MONOCYTES NFR BLD: 4.7 % (ref 4–15)
NEUTROPHILS # BLD AUTO: 2.7 K/UL (ref 1.8–7.7)
NEUTROPHILS NFR BLD: 74.4 % (ref 38–73)
NRBC BLD-RTO: 0 /100 WBC
PLATELET # BLD AUTO: 216 K/UL (ref 150–450)
PMV BLD AUTO: 10.4 FL (ref 9.2–12.9)
POTASSIUM SERPL-SCNC: 4 MMOL/L (ref 3.5–5.1)
RBC # BLD AUTO: 3.99 M/UL (ref 4–5.4)
SODIUM SERPL-SCNC: 139 MMOL/L (ref 136–145)
WBC # BLD AUTO: 3.59 K/UL (ref 3.9–12.7)

## 2023-01-08 PROCEDURE — 87389 HIV-1 AG W/HIV-1&-2 AB AG IA: CPT | Mod: HCNC

## 2023-01-08 PROCEDURE — 99284 EMERGENCY DEPT VISIT MOD MDM: CPT | Mod: 25,HCNC

## 2023-01-08 PROCEDURE — 93010 EKG 12-LEAD: ICD-10-PCS | Mod: HCNC,,, | Performed by: INTERNAL MEDICINE

## 2023-01-08 PROCEDURE — 85025 COMPLETE CBC W/AUTO DIFF WBC: CPT | Mod: HCNC

## 2023-01-08 PROCEDURE — 93005 ELECTROCARDIOGRAM TRACING: CPT | Mod: HCNC

## 2023-01-08 PROCEDURE — 93010 ELECTROCARDIOGRAM REPORT: CPT | Mod: HCNC,,, | Performed by: INTERNAL MEDICINE

## 2023-01-08 PROCEDURE — 80048 BASIC METABOLIC PNL TOTAL CA: CPT | Mod: HCNC

## 2023-01-08 PROCEDURE — 86803 HEPATITIS C AB TEST: CPT | Mod: HCNC

## 2023-01-08 PROCEDURE — 96375 TX/PRO/DX INJ NEW DRUG ADDON: CPT | Mod: HCNC

## 2023-01-08 PROCEDURE — 96374 THER/PROPH/DIAG INJ IV PUSH: CPT | Mod: HCNC

## 2023-01-08 PROCEDURE — 63600175 PHARM REV CODE 636 W HCPCS: Mod: HCNC

## 2023-01-08 RX ORDER — KETOROLAC TROMETHAMINE 30 MG/ML
10 INJECTION, SOLUTION INTRAMUSCULAR; INTRAVENOUS
Status: COMPLETED | OUTPATIENT
Start: 2023-01-08 | End: 2023-01-08

## 2023-01-08 RX ORDER — LISINOPRIL 20 MG/1
40 TABLET ORAL DAILY
Qty: 90 TABLET | Refills: 0 | Status: SHIPPED | OUTPATIENT
Start: 2023-01-08 | End: 2023-01-10

## 2023-01-08 RX ORDER — ONDANSETRON 2 MG/ML
4 INJECTION INTRAMUSCULAR; INTRAVENOUS
Status: COMPLETED | OUTPATIENT
Start: 2023-01-08 | End: 2023-01-08

## 2023-01-08 RX ADMIN — ONDANSETRON 4 MG: 2 INJECTION INTRAMUSCULAR; INTRAVENOUS at 10:01

## 2023-01-08 RX ADMIN — KETOROLAC TROMETHAMINE 10 MG: 30 INJECTION, SOLUTION INTRAMUSCULAR; INTRAVENOUS at 10:01

## 2023-01-08 NOTE — DISCHARGE INSTRUCTIONS
Start taking lisinopril 40 mg total daily. You can start by taking 20 mg in the morning and 20 mg at night. If this is not working sufficiently, you may start taking 40 mg (2 pills) total in the morning.     Please follow the attached instructions for decreased sodium diet.    Call the provided number for [facility] to schedule an appointment with a new primary care physician.

## 2023-01-08 NOTE — ED PROVIDER NOTES
Encounter Date: 1/8/2023       History     Chief Complaint   Patient presents with    Hypertension     Reports her BP medication was recalled so she is unable to get it filled. Last taken 7 days ago. Unable to get appt with doctor. Seen at , prescribed 20 mg lisinopril, states her bp is still elevated. Reading this am at home 167/107. Denies chest pain, reports sob. Reports L sided HA onset this am.      This is a 72-year-old female with hypertension and migraine who presents to the ED with complaints of elevated blood pressure readings.  Patient states that she has been on Accupril for blood pressure for 40 years without problems and with appropriate control of her pressure, however, states that about a week ago she was no longer able to get it filled.  She reports being told by her pharmacist that there was a mass recall on the drug.  Her primary care recently left [facility] and when she tried to establish care with another one, there was a long wait time and she has not yet been able associated new one.  Patient regularly takes her blood pressure at home with a home cuff and states that a couple of days ago her pressure was reading 160s over 100s, which is very high for her and caused her to seek care at urgent care.  Urgent care prescribed 20 mg lisinopril which she has been taking since her appointment last week.  Patient last took 40 mg total of lisinopril this morning approximately 2 hours prior to arrival.  She is states that she woke up this morning with a left-sided headache that is different than her normal migraines.  Reports associated nausea.  Denies fever, chills, vision changes, vomiting, shortness of breath, chest pain.    The history is provided by the patient.   Review of patient's allergies indicates:  No Known Allergies  Past Medical History:   Diagnosis Date    Allergy     Anemia     Arthritis     Cholelithiases     Cyst of kidney, acquired     Diverticulitis     Elevated TSH     Family history  of Graves' disease: daughter, maternal aunt, maternal uncle 9/13/2016    Glaucoma     Graves disease     Hx of colonic polyps     Hypertension 1981    Liver cyst     MGUS (monoclonal gammopathy of unknown significance)     Migraine headache     Mitral valve problem     leakage    Obesity     Paraproteinemia     Sleep apnea     + CPAP    Smoldering multiple myeloma 2013    Tricuspid valve disease     leakage     Past Surgical History:   Procedure Laterality Date    APPENDECTOMY      COLONOSCOPY N/A 10/23/2015    Procedure: COLONOSCOPY;  Surgeon: Brandon Ruelas MD;  Location: [facility] ENDO (4TH FLR);  Service: Endoscopy;  Laterality: N/A;  Had divertiulitis in 5/29/2015 with a recommendation for colonoscopy in 8 weeks    Dr. Ruelas is her GI physician    COLONOSCOPY N/A 11/5/2018    Procedure: COLONOSCOPY;  Surgeon: Brandon Ruelas MD;  Location: [facility] ENDO (4TH FLR);  Service: Endoscopy;  Laterality: N/A;  Dr. Ruelas did the last one multiple polyps, patient had recent diverticulitis should not schedule before Oct 10th    HYSTERECTOMY  1978 or 1979    OOPHORECTOMY       Family History   Problem Relation Age of Onset    Heart disease Mother         MI    Heart attack Mother     Heart disease Maternal Aunt         MI    Heart disease Maternal Aunt         MI    Cancer Paternal Grandmother         GYN cancer - unknown cancer    Colon cancer Maternal Uncle     Cancer Maternal Uncle         colon ca    Hypertension Father     Heart disease Sister         fast heart rate    Cataracts Sister     Glaucoma Sister     Graves' disease Daughter     No Known Problems Son     No Known Problems Sister     No Known Problems Daughter     No Known Problems Son     Melanoma Neg Hx     Breast cancer Neg Hx     Ovarian cancer Neg Hx     Colon polyps Neg Hx     Rectal cancer Neg Hx     Stomach cancer Neg Hx     Esophageal cancer Neg Hx     Ulcerative colitis Neg Hx     Crohn's disease Neg Hx     Amblyopia Neg Hx     Blindness Neg Hx      Macular degeneration Neg Hx     Strabismus Neg Hx     Retinal detachment Neg Hx      Social History     Tobacco Use    Smoking status: Former     Years: 3.00     Types: Cigarettes     Quit date: 1995     Years since quittin.0    Smokeless tobacco: Never   Substance Use Topics    Alcohol use: No    Drug use: No     Review of Systems   Constitutional:  Negative for chills and fever.   HENT:  Negative for congestion, rhinorrhea, sinus pressure and sore throat.    Eyes:  Negative for pain.   Respiratory:  Negative for cough and shortness of breath.    Cardiovascular:  Negative for chest pain.        Elevated blood pressure   Gastrointestinal:  Positive for nausea. Negative for abdominal pain, constipation, diarrhea and vomiting.   Genitourinary:  Negative for dysuria, flank pain and hematuria.   Musculoskeletal:  Negative for arthralgias and myalgias.   Skin: Negative.    Neurological:  Positive for headaches. Negative for weakness and numbness.   Hematological: Negative.    Psychiatric/Behavioral:  Negative for agitation and confusion.      Physical Exam     Initial Vitals [23 0926]   BP Pulse Resp Temp SpO2   (!) 171/83 91 18 98 °F (36.7 °C) 97 %      MAP       --         Physical Exam    Constitutional: She appears well-developed and well-nourished.  Non-toxic appearance. She does not appear ill. No distress.   HENT:   Head: Normocephalic and atraumatic.   Eyes: Conjunctivae are normal.   Neck: Neck supple.   Cardiovascular:  Normal rate, regular rhythm, normal heart sounds and intact distal pulses.     Exam reveals no gallop and no friction rub.       No murmur heard.  Pulmonary/Chest: Effort normal and breath sounds normal. No tachypnea. No respiratory distress. She has no wheezes. She has no rhonchi. She has no rales.   Musculoskeletal:      Cervical back: Neck supple.     Neurological: She is alert and oriented to person, place, and time. GCS eye subscore is 4. GCS verbal subscore is 5. GCS  motor subscore is 6.   Skin: Skin is warm, dry and intact.   Psychiatric: She has a normal mood and affect. Her speech is normal and behavior is normal.       ED Course   Procedures  Labs Reviewed   CBC W/ AUTO DIFFERENTIAL - Abnormal; Notable for the following components:       Result Value    WBC 3.59 (*)     RBC 3.99 (*)     MCHC 31.9 (*)     RDW 11.2 (*)     Lymph # 0.7 (*)     Mono # 0.2 (*)     Gran % 74.4 (*)     All other components within normal limits    Narrative:     Release to patient->Immediate   BASIC METABOLIC PANEL - Abnormal; Notable for the following components:    Anion Gap 5 (*)     All other components within normal limits    Narrative:     Release to patient->Immediate   HIV 1 / 2 ANTIBODY    Narrative:     Release to patient->Immediate   HEPATITIS C ANTIBODY    Narrative:     Release to patient->Immediate        ECG Results              EKG 12-lead (Preliminary result)  Result time 01/08/23 11:02:02      ED Interpretation by Clark Garcia MD (01/08/23 11:02:02, [facility] Emergency Dept, Emergency Medicine)    My EKG interpretation, sinus rhythm, 79 beats per minute, left axis deviation, no ST segment changes, T-wave flattening, poor R-wave progression, when compared to previous EKG 10/14/2020 relatively unchanged                                  Imaging Results    None          Medications   ondansetron injection 4 mg (4 mg Intravenous Given 1/8/23 1029)   ketorolac injection 9.999 mg (9.999 mg Intravenous Given 1/8/23 1030)     Medical Decision Making:   Initial Assessment:   Manju Aranda presents to the emergency department with high blood pressure.  In triage, patient does not have significantly elevated blood pressure, though it is elevated compared to her baseline.  Low suspicion for any sort of intracranial involvement.  Will obtain labs, EKG, treat blood pressure and reassess. Will give Toradol for her headache in addition to treating her blood pressure.  Differential  Diagnosis:   Differential Diagnosis includes, but is not limited to:  Hypertensive encephalopathy, CVA/TIA, intracranial hemorrhage, MI/ACS, aortic/carotid artery dissection, AAA, hypertensive nephropathy, medication non-compliance, anxiety, drug abuse/intoxication, essential hypertension   ED Management:  Patient remains afebrile nontoxic-appearing.  Upon reassessment, patient is resting comfortably in bed and reports significant improvement in her symptoms and no longer is complaining of a headache.  Her pressure continues to be slightly elevated compared to her baseline, but not concerning for emergent etiology.  Will increase patient's dose of lisinopril to 40 mg daily from 20 mg for better control.  Patient's primary care physician is no longer with [facility] and so I put in a referral for internal medicine for her to establish care and follow-up for further management of her hypertension.  After taking into careful account the patient's history, physical exam findings, as well as empirical and objective data obtained throughout ED workup, I feel no emergent medical condition has been identified. No further evaluation or admission was felt to be required, and the patient is stable for discharge from the ED. The patient was updated with test results, overall clinical impression, and recommended further plan of care, including discharge instructions as provided and outpatient follow-up for continued evaluation and management as needed. All questions were answered. The patient expressed understanding and agreed with current plan for discharge and follow-up plan of care. Strict ED return precautions were provided, including return/worsening of current symptoms, new symptoms, or any other concerns.                          Clinical Impression:   Final diagnoses:  [I10] Hypertension        ED Disposition Condition    Discharge Stable          ED Prescriptions       Medication Sig Dispense Start Date End Date Auth.  Provider    lisinopriL (PRINIVIL,ZESTRIL) 20 MG tablet Take 2 tablets (40 mg total) by mouth once daily. 90 tablet 1/8/2023 1/8/2024 Sasha Guido PA-C          Follow-up Information       Follow up With Specialties Details Why Contact Info Additional Information    [facility] - Internal Medicine Internal Medicine Call in 2 days  [address] [facility] [address] [phone] Internal Medicine - [facility], 8th Floor, [address] Please park in [place] and use [place] elevators    [facility] - Emergency Dept Emergency Medicine  If symptoms worsen [address] [facility] [address] [phone]              Sasha Guido PA-C  01/08/23 2040

## 2023-01-08 NOTE — ED TRIAGE NOTES
Pt. Reports to the ED today w/ complaint of throbbing left sided headache and a high BP of 160/88 when taken at home. Reports a recent change in BP meds. Denies chest pain, SOB, blurred or double vision.

## 2023-01-10 ENCOUNTER — OFFICE VISIT (OUTPATIENT)
Dept: INTERNAL MEDICINE | Facility: CLINIC | Age: 73
End: 2023-01-10
Payer: MEDICARE

## 2023-01-10 VITALS
DIASTOLIC BLOOD PRESSURE: 90 MMHG | HEART RATE: 78 BPM | OXYGEN SATURATION: 98 % | WEIGHT: 261.81 LBS | SYSTOLIC BLOOD PRESSURE: 144 MMHG | BODY MASS INDEX: 38.66 KG/M2

## 2023-01-10 DIAGNOSIS — D47.2 SMOLDERING MULTIPLE MYELOMA (SMM): ICD-10-CM

## 2023-01-10 DIAGNOSIS — Z00.00 HEALTH MAINTENANCE EXAMINATION: Primary | ICD-10-CM

## 2023-01-10 DIAGNOSIS — I70.0 AORTIC ATHEROSCLEROSIS: ICD-10-CM

## 2023-01-10 DIAGNOSIS — E55.9 VITAMIN D DEFICIENCY: ICD-10-CM

## 2023-01-10 DIAGNOSIS — Q61.3 POLYCYSTIC KIDNEY DISEASE: ICD-10-CM

## 2023-01-10 DIAGNOSIS — R73.09 ABNORMAL GLUCOSE: ICD-10-CM

## 2023-01-10 DIAGNOSIS — K57.90 DIVERTICULOSIS: ICD-10-CM

## 2023-01-10 DIAGNOSIS — Z91.89 FRAMINGHAM CARDIAC RISK 10-20% IN NEXT 10 YEARS: ICD-10-CM

## 2023-01-10 DIAGNOSIS — G47.33 OSA ON CPAP: ICD-10-CM

## 2023-01-10 DIAGNOSIS — I10 PRIMARY HYPERTENSION: ICD-10-CM

## 2023-01-10 DIAGNOSIS — E05.00 GRAVES DISEASE: ICD-10-CM

## 2023-01-10 DIAGNOSIS — I10 ESSENTIAL HYPERTENSION: ICD-10-CM

## 2023-01-10 DIAGNOSIS — G43.709 CHRONIC MIGRAINE WITHOUT AURA WITHOUT STATUS MIGRAINOSUS, NOT INTRACTABLE: ICD-10-CM

## 2023-01-10 DIAGNOSIS — K76.89 SIMPLE HEPATIC CYST: ICD-10-CM

## 2023-01-10 PROCEDURE — 1160F PR REVIEW ALL MEDS BY PRESCRIBER/CLIN PHARMACIST DOCUMENTED: ICD-10-PCS | Mod: HCNC,CPTII,S$GLB, | Performed by: STUDENT IN AN ORGANIZED HEALTH CARE EDUCATION/TRAINING PROGRAM

## 2023-01-10 PROCEDURE — 3288F FALL RISK ASSESSMENT DOCD: CPT | Mod: HCNC,CPTII,S$GLB, | Performed by: STUDENT IN AN ORGANIZED HEALTH CARE EDUCATION/TRAINING PROGRAM

## 2023-01-10 PROCEDURE — 99999 PR PBB SHADOW E&M-EST. PATIENT-LVL III: CPT | Mod: PBBFAC,HCNC,, | Performed by: STUDENT IN AN ORGANIZED HEALTH CARE EDUCATION/TRAINING PROGRAM

## 2023-01-10 PROCEDURE — 1159F PR MEDICATION LIST DOCUMENTED IN MEDICAL RECORD: ICD-10-PCS | Mod: HCNC,CPTII,S$GLB, | Performed by: STUDENT IN AN ORGANIZED HEALTH CARE EDUCATION/TRAINING PROGRAM

## 2023-01-10 PROCEDURE — 3077F PR MOST RECENT SYSTOLIC BLOOD PRESSURE >= 140 MM HG: ICD-10-PCS | Mod: HCNC,CPTII,S$GLB, | Performed by: STUDENT IN AN ORGANIZED HEALTH CARE EDUCATION/TRAINING PROGRAM

## 2023-01-10 PROCEDURE — 1126F PR PAIN SEVERITY QUANTIFIED, NO PAIN PRESENT: ICD-10-PCS | Mod: HCNC,CPTII,S$GLB, | Performed by: STUDENT IN AN ORGANIZED HEALTH CARE EDUCATION/TRAINING PROGRAM

## 2023-01-10 PROCEDURE — 1159F MED LIST DOCD IN RCRD: CPT | Mod: HCNC,CPTII,S$GLB, | Performed by: STUDENT IN AN ORGANIZED HEALTH CARE EDUCATION/TRAINING PROGRAM

## 2023-01-10 PROCEDURE — 99999 PR PBB SHADOW E&M-EST. PATIENT-LVL III: ICD-10-PCS | Mod: PBBFAC,HCNC,, | Performed by: STUDENT IN AN ORGANIZED HEALTH CARE EDUCATION/TRAINING PROGRAM

## 2023-01-10 PROCEDURE — 4010F ACE/ARB THERAPY RXD/TAKEN: CPT | Mod: HCNC,CPTII,S$GLB, | Performed by: STUDENT IN AN ORGANIZED HEALTH CARE EDUCATION/TRAINING PROGRAM

## 2023-01-10 PROCEDURE — 1101F PT FALLS ASSESS-DOCD LE1/YR: CPT | Mod: HCNC,CPTII,S$GLB, | Performed by: STUDENT IN AN ORGANIZED HEALTH CARE EDUCATION/TRAINING PROGRAM

## 2023-01-10 PROCEDURE — 3080F DIAST BP >= 90 MM HG: CPT | Mod: HCNC,CPTII,S$GLB, | Performed by: STUDENT IN AN ORGANIZED HEALTH CARE EDUCATION/TRAINING PROGRAM

## 2023-01-10 PROCEDURE — 99499 RISK ADDL DX/OHS AUDIT: ICD-10-PCS | Mod: HCNC,S$GLB,, | Performed by: STUDENT IN AN ORGANIZED HEALTH CARE EDUCATION/TRAINING PROGRAM

## 2023-01-10 PROCEDURE — 3077F SYST BP >= 140 MM HG: CPT | Mod: HCNC,CPTII,S$GLB, | Performed by: STUDENT IN AN ORGANIZED HEALTH CARE EDUCATION/TRAINING PROGRAM

## 2023-01-10 PROCEDURE — 1126F AMNT PAIN NOTED NONE PRSNT: CPT | Mod: HCNC,CPTII,S$GLB, | Performed by: STUDENT IN AN ORGANIZED HEALTH CARE EDUCATION/TRAINING PROGRAM

## 2023-01-10 PROCEDURE — 99215 OFFICE O/P EST HI 40 MIN: CPT | Mod: HCNC,S$GLB,, | Performed by: STUDENT IN AN ORGANIZED HEALTH CARE EDUCATION/TRAINING PROGRAM

## 2023-01-10 PROCEDURE — 99499 UNLISTED E&M SERVICE: CPT | Mod: HCNC,S$GLB,, | Performed by: STUDENT IN AN ORGANIZED HEALTH CARE EDUCATION/TRAINING PROGRAM

## 2023-01-10 PROCEDURE — 1160F RVW MEDS BY RX/DR IN RCRD: CPT | Mod: HCNC,CPTII,S$GLB, | Performed by: STUDENT IN AN ORGANIZED HEALTH CARE EDUCATION/TRAINING PROGRAM

## 2023-01-10 PROCEDURE — 3008F PR BODY MASS INDEX (BMI) DOCUMENTED: ICD-10-PCS | Mod: HCNC,CPTII,S$GLB, | Performed by: STUDENT IN AN ORGANIZED HEALTH CARE EDUCATION/TRAINING PROGRAM

## 2023-01-10 PROCEDURE — 3080F PR MOST RECENT DIASTOLIC BLOOD PRESSURE >= 90 MM HG: ICD-10-PCS | Mod: HCNC,CPTII,S$GLB, | Performed by: STUDENT IN AN ORGANIZED HEALTH CARE EDUCATION/TRAINING PROGRAM

## 2023-01-10 PROCEDURE — 99215 PR OFFICE/OUTPT VISIT, EST, LEVL V, 40-54 MIN: ICD-10-PCS | Mod: HCNC,S$GLB,, | Performed by: STUDENT IN AN ORGANIZED HEALTH CARE EDUCATION/TRAINING PROGRAM

## 2023-01-10 PROCEDURE — 4010F PR ACE/ARB THEARPY RXD/TAKEN: ICD-10-PCS | Mod: HCNC,CPTII,S$GLB, | Performed by: STUDENT IN AN ORGANIZED HEALTH CARE EDUCATION/TRAINING PROGRAM

## 2023-01-10 PROCEDURE — 3288F PR FALLS RISK ASSESSMENT DOCUMENTED: ICD-10-PCS | Mod: HCNC,CPTII,S$GLB, | Performed by: STUDENT IN AN ORGANIZED HEALTH CARE EDUCATION/TRAINING PROGRAM

## 2023-01-10 PROCEDURE — 1101F PR PT FALLS ASSESS DOC 0-1 FALLS W/OUT INJ PAST YR: ICD-10-PCS | Mod: HCNC,CPTII,S$GLB, | Performed by: STUDENT IN AN ORGANIZED HEALTH CARE EDUCATION/TRAINING PROGRAM

## 2023-01-10 PROCEDURE — 3008F BODY MASS INDEX DOCD: CPT | Mod: HCNC,CPTII,S$GLB, | Performed by: STUDENT IN AN ORGANIZED HEALTH CARE EDUCATION/TRAINING PROGRAM

## 2023-01-10 RX ORDER — LISINOPRIL 40 MG/1
40 TABLET ORAL DAILY
Qty: 90 TABLET | Refills: 3 | Status: SHIPPED | OUTPATIENT
Start: 2023-01-10 | End: 2023-01-26 | Stop reason: ALTCHOICE

## 2023-01-10 NOTE — PROGRESS NOTES
"Subjective:       Patient ID: Manju Aranda is a 72 y.o. female.    Chief Complaint: Health maintenance examination [Z00.00]    Patient is new to me, here to establish care.    HTN, PCKD, Graves disease, smoldering multiple myeloma, migraines, diverticulosis, BECKY on CPAP, hepatic cysts, vitamin D deficiency    Health maintenance -   Colonoscopy performed NOV2018. Told to repeat in 5 years.  Family history of colorectal cancer.   Mammogram BI-RADS 1 in DEC2022.  Denies history of prior abnormal mammogram.  Family history of breast cancers.  Denies family history of ovarian cancers.  Hysterectomy due menorrhagia.  Denies history of prior abnormal pap smears prior to hysterectomy.  Had DEXA scan in SEP2020. Showed normal bone mineral density.  "There is a 2.6% risk of a major osteoporotic fracture and a 0.1% risk of hip fracture in the next 10 years."  Denies family history of osteoporosis.  Significant family history of cardiac disease.  UTD on influenza, Tdap, COVID19 primary/bivalent, shingles, PCV13, and PPSV23 vaccinations.  Started smoking at in early 20's, at most 1 pack every 8 weeks. Quit smoking in late 20's.  Denies currently alcohol or drug use.  Completed Hep C screening.  Due for lipid screening.  Lab Results       Component                Value               Date                       LDLCALC                  75.2                02/26/2021            The 10-year ASCVD risk score (Elise DK, et al., 2019) is: 15.6%  Due for diabetes screening.  Lab Results       Component                Value               Date                       HGBA1C                   4.2                 04/08/2022            Walks laps most days of the week around [place]    HTN -   Had to change from quinapril due to recall at the end of DEC2022  Went to UC and prescribed lisinopril 20 mg daily.  Went to ED and was increased 20 mg BID.  Has been having headaches with elevated BP's   Patient endorses taking medication as " "directed.  Denies side effects or concerns while taking medication.  Patient routinely checking BP at home.  Denies vision changes, CP, palpitations, or other concerning symptoms.  Due for micro albumin creatinine ratio.   Lab Results       Component                Value               Date                       MICALBCREAT              7.7                 03/26/2018            BP Readings from Last 3 Encounters:  01/08/23 : (!) 172/74  01/06/23 : (!) 160/95  12/05/22 : 131/71    Graves disease -   Taking methimazole as directed  Following with Dr. Rajput for endocrinology  Follow up scheduled in APR2023  UTD on TSH, next labs in MAR2023  Lab Results       Component                Value               Date                       TSH                      2.732               12/12/2022                 TSH                      1.219               09/07/2022                 TSH                      2.430               08/02/2022              Vitamin D deficiency -  Currently taking vitamin D supplementation daily    BECKY  -   Using CPAP nightly as directed with good effect   Following with JACKELINE Chambers     Smoldering multiple myeloma -   Following routinely with Dr. Alba for heme/onc  Endorses diagnosed 10 years ago    Atherosclerosis -   Taking ASA for preventative  Underwent cardiac stress testing in MAY2020  Echo with EF 55%, mild aortic valve sclerosis at that time  Stress portion negative for ischemia     Taking Maxalt for migraine abortive treatment with good effect  Requiring medication 1-2 times monthly  Following with [facility] Chambers for migraines   Tries to avoid triggers, weather changes and certain types of food     Diverticulosis -   Had CT in DEC2022 due to LLQ abdominal pain   Abd CT with "Narrowing and thickening of the mid transverse colon is unchanged from 12/03/2020 suggesting that this may be a postinflammatory nonobstructed narrowing.  A focal contraction could also look like this."  Scheduled appt with " "Dr. Lewis for CRS FEB2023    Polycystic kidney disease -  CT abd DEC2020 with " Numerous renal cysts, Bosniak class 1 and 2; prior reports give history of polycystic kidney disease.  Dominant cyst at right upper pole is Bosniak class 2 and measures slightly smaller in size compared to older exams, now 8.9 cm."  Following with Dr. Bui for urology, last appt in 2021  Previously followed with nephrology, last appt in 2019  Noted with microhematuria on prior exams     Hepatic cysts -  Followed with Dr. De La Cruz, hepatology, for hepatic cysts noted on prior imaging  Last appointment with hepatology in MAR2021  Recommended q2 year abdominal u/s and follow up with hepatology PRN       Review of Systems   Constitutional:  Negative for appetite change, chills, fatigue, fever and unexpected weight change.   Respiratory:  Negative for cough and shortness of breath.    Cardiovascular:  Negative for chest pain, palpitations and leg swelling.   Gastrointestinal:  Negative for abdominal pain, constipation, diarrhea, nausea and vomiting.   Skin:  Negative for rash.   Neurological:  Positive for headaches. Negative for dizziness, syncope and weakness.       Current Outpatient Medications   Medication Instructions    ascorbic acid (vitamin C) (VITAMIN C) 500 mg, Oral, Daily    aspirin 81 mg, Oral, Daily    lisinopriL (PRINIVIL,ZESTRIL) 40 mg, Oral, Daily    magnesium oxide (MAG-OX) 400 mg, Oral, Daily    methIMAzole (TAPAZOLE) 5 mg, Oral, Daily, TAKE 4 TABLETS(40 MG) BY MOUTH EVERY DAY    multivit with min-folic acid 0.4 mg Tab 0.4 mg, Oral, Daily    ondansetron (ZOFRAN) 4-8 mg, Oral, Every 8 hours PRN    psyllium (METAMUCIL) powder 1 packet, Oral, Daily    rizatriptan (MAXALT) 10 MG tablet TAKE 1 TABLET(10 MG) BY MOUTH EVERY 2 HOURS AS NEEDED FOR MIGRAINE. MAX 30 MG/ 24 AT BEDTIME    vitamin D (VITAMIN D3) 1,000 Units, Oral, Daily     Objective:      Vitals:    01/10/23 1452   BP: (!) 144/90   Pulse: 78   SpO2: 98%   Weight: " 118.8 kg (261 lb 12.7 oz)   PainSc: 0-No pain     Body mass index is 38.66 kg/m².    Physical Exam  Vitals reviewed.   Constitutional:       General: She is not in acute distress.     Appearance: Normal appearance. She is not ill-appearing or diaphoretic.   HENT:      Head: Normocephalic and atraumatic.      Right Ear: Tympanic membrane, ear canal and external ear normal. There is no impacted cerumen.      Left Ear: Tympanic membrane, ear canal and external ear normal. There is no impacted cerumen.      Nose: Nose normal. No rhinorrhea.      Mouth/Throat:      Mouth: Mucous membranes are moist.      Pharynx: Oropharynx is clear. No oropharyngeal exudate or posterior oropharyngeal erythema.   Eyes:      General: No scleral icterus.        Right eye: No discharge.         Left eye: No discharge.      Conjunctiva/sclera: Conjunctivae normal.   Neck:      Thyroid: No thyromegaly or thyroid tenderness.      Trachea: Trachea normal.   Cardiovascular:      Rate and Rhythm: Normal rate and regular rhythm.      Heart sounds: Normal heart sounds. No murmur heard.    No friction rub. No gallop.   Pulmonary:      Effort: Pulmonary effort is normal. No respiratory distress.      Breath sounds: Normal breath sounds. No stridor. No wheezing, rhonchi or rales.   Abdominal:      General: There is no distension.      Palpations: Abdomen is soft.      Tenderness: There is no abdominal tenderness. There is no guarding or rebound.   Musculoskeletal:         General: No swelling or deformity.      Cervical back: Neck supple.   Lymphadenopathy:      Head:      Right side of head: No submandibular or posterior auricular adenopathy.      Left side of head: No submandibular or posterior auricular adenopathy.      Cervical: No cervical adenopathy.      Right cervical: No superficial, deep or posterior cervical adenopathy.     Left cervical: No superficial, deep or posterior cervical adenopathy.      Upper Body:      Right upper body: No  supraclavicular adenopathy.      Left upper body: No supraclavicular adenopathy.   Skin:     General: Skin is warm and dry.   Neurological:      General: No focal deficit present.      Mental Status: She is alert. Mental status is at baseline.      Gait: Gait normal.   Psychiatric:         Mood and Affect: Mood normal.         Behavior: Behavior normal.       Assessment:       1. Health maintenance examination    2. Essential hypertension    3. Graves disease    4. Vitamin D deficiency    5. BECKY on CPAP    6. Smoldering multiple myeloma (SMM)    7. Aortic atherosclerosis    8. [place] cardiac risk 10-20% in next 10 years    9. Chronic migraine without aura without status migrainosus, not intractable    10. Diverticulosis    11. Polycystic kidney disease    12. Simple hepatic cyst    13. Abnormal glucose        Plan:       Essential hypertension  Start lisinopril 40 mg once daily  Check BP at home 3-4 times weekly, keep log for review.  Follow up BP's in 2-3 weeks  Discussed low sodium diet, provided handout  RTC in 3 months for follow up  -     lisinopriL (PRINIVIL,ZESTRIL) 40 MG tablet; Take 1 tablet (40 mg total) by mouth once daily.  -     Hypertension Digital Medicine (HDMP) Enrollment Order  -     Hypertension Digital Medicine ([facility]): Assign Onboarding Questionnaires  -     CBC Auto Differential; Future  -     Comprehensive Metabolic Panel; Future  -     Microalbumin/Creatinine Ratio, Urine; Future    Graves disease  Continue medication, evaluation, and management per endocrinology.    Vitamin D deficiency  -     Vitamin D; Future    BECKY on CPAP  Continue management per NP Reyes.    Smoldering multiple myeloma (SMM)  Continue medication, evaluation, and management per heme/onc.    Aortic atherosclerosis  [place] cardiac risk 10-20% in next 10 years  Continue healthful dietary and lifestyle choices     Chronic migraine without aura without status migrainosus, not intractable  Continue medication,  evaluation, and management per NP Reyes.    Diverticulosis  Continue medication, evaluation, and management per CRS.    Polycystic kidney disease  Recommend re-establishing for follow up with urology and nephrology    Simple hepatic cyst  Continue q2 year abdominal u/s per hepatology recommendations  Most recently imaged DEC2022    Health maintenance examination  -     CBC Auto Differential; Future  -     Comprehensive Metabolic Panel; Future  -     Hemoglobin A1C; Future  -     Vitamin D; Future  -     Microalbumin/Creatinine Ratio, Urine; Future    Abnormal glucose  -     Hemoglobin A1C; Future      70 minutes were spent in chart review, documentation and review of results, and evaluation, treatment, and counseling of patient on the same day of service.    Jazzy Charles MD  1/10/2023

## 2023-01-24 ENCOUNTER — TELEPHONE (OUTPATIENT)
Dept: INTERNAL MEDICINE | Facility: CLINIC | Age: 73
End: 2023-01-24
Payer: MEDICARE

## 2023-01-24 DIAGNOSIS — I10 ESSENTIAL HYPERTENSION: Primary | ICD-10-CM

## 2023-01-24 DIAGNOSIS — G43.709 CHRONIC MIGRAINE WITHOUT AURA WITHOUT STATUS MIGRAINOSUS, NOT INTRACTABLE: ICD-10-CM

## 2023-01-24 NOTE — TELEPHONE ENCOUNTER
----- Message from Jackie Hicks sent at 1/24/2023  8:50 AM CST -----  Regarding: Patient call back  Who called:KEESHA DUARTE [[id number]]    What is the request in detail: Patient is requesting a call back.  She states she would like to speak with the staff regarding a medication for her bp. She states it has been two weeks and it is still not working. She states her pressure is very high 134/89. She would like to further discuss.   Please advise.    Can the clinic reply by [facility]? No    Best call back number: [phone]    Additional Information: N/A

## 2023-01-26 RX ORDER — OLMESARTAN MEDOXOMIL 40 MG/1
40 TABLET ORAL DAILY
Qty: 90 TABLET | Refills: 3 | Status: SHIPPED | OUTPATIENT
Start: 2023-01-26 | End: 2023-04-10

## 2023-01-26 NOTE — TELEPHONE ENCOUNTER
Can we get a full list of her recent blood pressures with the dates to see the overall trend? I have also placed a referral to digital HTN medicine program. Thanks!    If persistently high on the lisinopril we can try olmesartan for another option.

## 2023-01-26 NOTE — TELEPHONE ENCOUNTER
Please let patient know to start taking olmesartan once daily, it's a relative of the quinapril and lisinopril, but stronger than the lisinopril. I have sent this medication her pharmacy, once she picks up medication she can start taking the olmesartan 40 mg once daily and stop taking the lisinopril.     Have also placed  referral for neurology to be evaluated for headaches as well.     Can we also move her follow up appt to a closer time? Thank you!

## 2023-02-02 NOTE — PROGRESS NOTES
CRS Office Visit History and Physical    Referring MD:   Jamee Daly Md  [id number] [place] [address]    SUBJECTIVE:     Chief Complaint: Abnormal CT scan    History of Present Illness:  The patient is new patient to this practice.   Course is as follows:  Patient is a 72 y.o. female presents for follow-up of abnormal CT abdomen.   This was performed for LLQ pain, showed a focal area of thickening in transverse colon.  She denies any blood in stool, weight loss, or upper abdominal pain.  Has chronic constipation, but regular with Metamucil BID.  No family history of colon cancer.  Prior surgery: COLLEEN/BSO for heavy bleeding.    Last Colonoscopy: 2018, polyps, rec'd 5yr interval    Review of patient's allergies indicates:  No Known Allergies    Past Medical History:   Diagnosis Date    Allergy     Anemia     Arthritis     Cholelithiases     Cyst of kidney, acquired     Diverticulitis     Elevated TSH     Family history of Graves' disease: daughter, maternal aunt, maternal uncle 9/13/2016    Glaucoma     Graves disease     Hx of colonic polyps     Hypertension 1981    Liver cyst     MGUS (monoclonal gammopathy of unknown significance)     Migraine headache     Mitral valve problem     leakage    Obesity     Paraproteinemia     Sleep apnea     + CPAP    Smoldering multiple myeloma 2013    Tricuspid valve disease     leakage     Past Surgical History:   Procedure Laterality Date    APPENDECTOMY      COLONOSCOPY N/A 10/23/2015    Procedure: COLONOSCOPY;  Surgeon: Brandon Ruelas MD;  Location: [address]);  Service: Endoscopy;  Laterality: N/A;  Had divertiulitis in 5/29/2015 with a recommendation for colonoscopy in 8 weeks    Dr. Ruelas is her GI physician    COLONOSCOPY N/A 11/05/2018    Procedure: COLONOSCOPY;  Surgeon: Brandon Ruelas MD;  Location: [facility] ([address] [place] [address]);  Service: Endoscopy;  Laterality: N/A;  Dr. Ruelas did the last one multiple polyps,  "patient had recent diverticulitis should not schedule before Oct 10th    HYSTERECTOMY   or     menorrhagia     Family History   Problem Relation Age of Onset    Heart disease Mother         MI    Heart attack Mother     Hypertension Father     Irregular heart beat Sister         fast heart rate    Cataracts Sister     Glaucoma Sister     No Known Problems Sister     Cancer Paternal Grandmother         GYN cancer - unknown cancer    Graves' disease Daughter     No Known Problems Daughter     No Known Problems Son     No Known Problems Son     Heart disease Maternal Aunt         MI    Heart disease Maternal Aunt         MI    Colon cancer Maternal Uncle     Cancer Maternal Uncle         colon ca    Melanoma Neg Hx     Breast cancer Neg Hx     Ovarian cancer Neg Hx     Colon polyps Neg Hx     Rectal cancer Neg Hx     Stomach cancer Neg Hx     Esophageal cancer Neg Hx     Ulcerative colitis Neg Hx     Crohn's disease Neg Hx     Amblyopia Neg Hx     Blindness Neg Hx     Macular degeneration Neg Hx     Strabismus Neg Hx     Retinal detachment Neg Hx      Social History     Tobacco Use    Smoking status: Former     Years: 3.00     Types: Cigarettes     Quit date: 1995     Years since quittin.1     Passive exposure: Never    Smokeless tobacco: Never   Substance Use Topics    Alcohol use: No    Drug use: No        Review of Systems:  Review of Systems   All other systems reviewed and are negative.    OBJECTIVE:     Vital Signs (Most Recent)  /70 (BP Location: Left arm, Patient Position: Sitting, BP Method: Large (Automatic))   Pulse 93   Ht 5' 9" (1.753 m)   Wt 119.5 kg (263 lb 6.4 oz)   BMI 38.90 kg/m²     Physical Exam:  General: Black or  female in no distress   Neuro: Alert and oriented x 4.  Moves all extremities.     HEENT: No icterus.  Trachea midline  Respiratory: Respirations are even and unlabored  Cardiac: Regular rate  Abdomen: Soft, " non-tender, non-distended  Extremities: Warm dry and intact  Skin: No rashes    Imaging:   CT abd/pel (12/5/2022)  Diverticulosis without diverticulitis, perforation, abscess or acute process.  Bilateral renal cysts.  Small tiny liver cysts.  Narrowing and thickening of the mid transverse colon is unchanged from 12/03/2020 suggesting that this may be a postinflammatory nonobstructed narrowing.  A focal contraction could also look like this.    CT abd/pel (12/3/2020)  1. No diverticulitis or other acute abnormality identified in the abdomen or pelvis.  2. Numerous renal cysts, Bosniak class 1 and 2; prior reports give history of polycystic kidney disease.  Dominant cyst at right upper pole is Bosniak class 2 and measures slightly smaller in size compared to older exams, now 8.9 cm.  3. Small hepatic cysts.  4. Status post hysterectomy, colonic diverticulosis, and other findings as above.    ** I have reviewed these images **      ASSESSMENT/PLAN:     73yo F w/ abnormal segment of colon on recent CT scan, due for colonoscopy.  Referral placed and met with schedulers today.      Lu Lewis MD  Staff Surgeon  Colon & Rectal Surgery

## 2023-02-03 ENCOUNTER — OFFICE VISIT (OUTPATIENT)
Dept: SURGERY | Facility: CLINIC | Age: 73
End: 2023-02-03
Attending: COLON & RECTAL SURGERY
Payer: MEDICARE

## 2023-02-03 VITALS
BODY MASS INDEX: 39.01 KG/M2 | WEIGHT: 263.38 LBS | DIASTOLIC BLOOD PRESSURE: 70 MMHG | SYSTOLIC BLOOD PRESSURE: 104 MMHG | HEIGHT: 69 IN | HEART RATE: 93 BPM

## 2023-02-03 DIAGNOSIS — R10.32 LLQ ABDOMINAL PAIN: ICD-10-CM

## 2023-02-03 DIAGNOSIS — R93.5 ABNORMAL CT OF THE ABDOMEN: Primary | ICD-10-CM

## 2023-02-03 PROCEDURE — 3074F PR MOST RECENT SYSTOLIC BLOOD PRESSURE < 130 MM HG: ICD-10-PCS | Mod: HCNC,CPTII,S$GLB, | Performed by: COLON & RECTAL SURGERY

## 2023-02-03 PROCEDURE — 1101F PT FALLS ASSESS-DOCD LE1/YR: CPT | Mod: HCNC,CPTII,S$GLB, | Performed by: COLON & RECTAL SURGERY

## 2023-02-03 PROCEDURE — 1159F PR MEDICATION LIST DOCUMENTED IN MEDICAL RECORD: ICD-10-PCS | Mod: HCNC,CPTII,S$GLB, | Performed by: COLON & RECTAL SURGERY

## 2023-02-03 PROCEDURE — 1160F PR REVIEW ALL MEDS BY PRESCRIBER/CLIN PHARMACIST DOCUMENTED: ICD-10-PCS | Mod: HCNC,CPTII,S$GLB, | Performed by: COLON & RECTAL SURGERY

## 2023-02-03 PROCEDURE — 4010F ACE/ARB THERAPY RXD/TAKEN: CPT | Mod: HCNC,CPTII,S$GLB, | Performed by: COLON & RECTAL SURGERY

## 2023-02-03 PROCEDURE — 3078F DIAST BP <80 MM HG: CPT | Mod: HCNC,CPTII,S$GLB, | Performed by: COLON & RECTAL SURGERY

## 2023-02-03 PROCEDURE — 1101F PR PT FALLS ASSESS DOC 0-1 FALLS W/OUT INJ PAST YR: ICD-10-PCS | Mod: HCNC,CPTII,S$GLB, | Performed by: COLON & RECTAL SURGERY

## 2023-02-03 PROCEDURE — 3008F PR BODY MASS INDEX (BMI) DOCUMENTED: ICD-10-PCS | Mod: HCNC,CPTII,S$GLB, | Performed by: COLON & RECTAL SURGERY

## 2023-02-03 PROCEDURE — 4010F PR ACE/ARB THEARPY RXD/TAKEN: ICD-10-PCS | Mod: HCNC,CPTII,S$GLB, | Performed by: COLON & RECTAL SURGERY

## 2023-02-03 PROCEDURE — 99204 OFFICE O/P NEW MOD 45 MIN: CPT | Mod: HCNC,S$GLB,, | Performed by: COLON & RECTAL SURGERY

## 2023-02-03 PROCEDURE — 3074F SYST BP LT 130 MM HG: CPT | Mod: HCNC,CPTII,S$GLB, | Performed by: COLON & RECTAL SURGERY

## 2023-02-03 PROCEDURE — 1126F AMNT PAIN NOTED NONE PRSNT: CPT | Mod: HCNC,CPTII,S$GLB, | Performed by: COLON & RECTAL SURGERY

## 2023-02-03 PROCEDURE — 3078F PR MOST RECENT DIASTOLIC BLOOD PRESSURE < 80 MM HG: ICD-10-PCS | Mod: HCNC,CPTII,S$GLB, | Performed by: COLON & RECTAL SURGERY

## 2023-02-03 PROCEDURE — 1160F RVW MEDS BY RX/DR IN RCRD: CPT | Mod: HCNC,CPTII,S$GLB, | Performed by: COLON & RECTAL SURGERY

## 2023-02-03 PROCEDURE — 3288F PR FALLS RISK ASSESSMENT DOCUMENTED: ICD-10-PCS | Mod: HCNC,CPTII,S$GLB, | Performed by: COLON & RECTAL SURGERY

## 2023-02-03 PROCEDURE — 99999 PR PBB SHADOW E&M-EST. PATIENT-LVL IV: ICD-10-PCS | Mod: PBBFAC,HCNC,, | Performed by: COLON & RECTAL SURGERY

## 2023-02-03 PROCEDURE — 1126F PR PAIN SEVERITY QUANTIFIED, NO PAIN PRESENT: ICD-10-PCS | Mod: HCNC,CPTII,S$GLB, | Performed by: COLON & RECTAL SURGERY

## 2023-02-03 PROCEDURE — 99204 PR OFFICE/OUTPT VISIT, NEW, LEVL IV, 45-59 MIN: ICD-10-PCS | Mod: HCNC,S$GLB,, | Performed by: COLON & RECTAL SURGERY

## 2023-02-03 PROCEDURE — 3288F FALL RISK ASSESSMENT DOCD: CPT | Mod: HCNC,CPTII,S$GLB, | Performed by: COLON & RECTAL SURGERY

## 2023-02-03 PROCEDURE — 1159F MED LIST DOCD IN RCRD: CPT | Mod: HCNC,CPTII,S$GLB, | Performed by: COLON & RECTAL SURGERY

## 2023-02-03 PROCEDURE — 3008F BODY MASS INDEX DOCD: CPT | Mod: HCNC,CPTII,S$GLB, | Performed by: COLON & RECTAL SURGERY

## 2023-02-03 PROCEDURE — 99999 PR PBB SHADOW E&M-EST. PATIENT-LVL IV: CPT | Mod: PBBFAC,HCNC,, | Performed by: COLON & RECTAL SURGERY

## 2023-02-07 DIAGNOSIS — Z00.00 ENCOUNTER FOR MEDICARE ANNUAL WELLNESS EXAM: ICD-10-CM

## 2023-02-09 DIAGNOSIS — Z00.00 ENCOUNTER FOR MEDICARE ANNUAL WELLNESS EXAM: ICD-10-CM

## 2023-02-14 ENCOUNTER — PATIENT OUTREACH (OUTPATIENT)
Dept: ADMINISTRATIVE | Facility: HOSPITAL | Age: 73
End: 2023-02-14
Payer: MEDICARE

## 2023-02-14 ENCOUNTER — PATIENT MESSAGE (OUTPATIENT)
Dept: ADMINISTRATIVE | Facility: HOSPITAL | Age: 73
End: 2023-02-14
Payer: MEDICARE

## 2023-02-20 ENCOUNTER — PES CALL (OUTPATIENT)
Dept: ADMINISTRATIVE | Facility: CLINIC | Age: 73
End: 2023-02-20
Payer: MEDICARE

## 2023-03-02 DIAGNOSIS — D47.2 SMOLDERING MULTIPLE MYELOMA (SMM): Primary | ICD-10-CM

## 2023-03-05 DIAGNOSIS — J06.9 UPPER RESPIRATORY TRACT INFECTION, UNSPECIFIED TYPE: Primary | ICD-10-CM

## 2023-03-05 RX ORDER — AZITHROMYCIN 250 MG/1
TABLET, FILM COATED ORAL
Qty: 6 TABLET | Refills: 0 | Status: SHIPPED | OUTPATIENT
Start: 2023-03-05 | End: 2023-04-10

## 2023-03-08 ENCOUNTER — OFFICE VISIT (OUTPATIENT)
Dept: SLEEP MEDICINE | Facility: CLINIC | Age: 73
End: 2023-03-08
Payer: MEDICARE

## 2023-03-08 VITALS
BODY MASS INDEX: 38.53 KG/M2 | HEIGHT: 69 IN | WEIGHT: 260.13 LBS | DIASTOLIC BLOOD PRESSURE: 72 MMHG | HEART RATE: 81 BPM | SYSTOLIC BLOOD PRESSURE: 110 MMHG

## 2023-03-08 DIAGNOSIS — G43.109 MIGRAINE WITH AURA AND WITHOUT STATUS MIGRAINOSUS, NOT INTRACTABLE: ICD-10-CM

## 2023-03-08 DIAGNOSIS — G47.33 OSA ON CPAP: Primary | ICD-10-CM

## 2023-03-08 DIAGNOSIS — G43.009 MIGRAINE WITHOUT AURA AND WITHOUT STATUS MIGRAINOSUS, NOT INTRACTABLE: ICD-10-CM

## 2023-03-08 PROCEDURE — 99999 PR PBB SHADOW E&M-EST. PATIENT-LVL III: CPT | Mod: PBBFAC,HCNC,, | Performed by: NURSE PRACTITIONER

## 2023-03-08 PROCEDURE — 99214 OFFICE O/P EST MOD 30 MIN: CPT | Mod: HCNC,S$GLB,, | Performed by: NURSE PRACTITIONER

## 2023-03-08 PROCEDURE — 1101F PT FALLS ASSESS-DOCD LE1/YR: CPT | Mod: HCNC,CPTII,S$GLB, | Performed by: NURSE PRACTITIONER

## 2023-03-08 PROCEDURE — 3074F SYST BP LT 130 MM HG: CPT | Mod: HCNC,CPTII,S$GLB, | Performed by: NURSE PRACTITIONER

## 2023-03-08 PROCEDURE — 3008F PR BODY MASS INDEX (BMI) DOCUMENTED: ICD-10-PCS | Mod: HCNC,CPTII,S$GLB, | Performed by: NURSE PRACTITIONER

## 2023-03-08 PROCEDURE — 4010F PR ACE/ARB THEARPY RXD/TAKEN: ICD-10-PCS | Mod: HCNC,CPTII,S$GLB, | Performed by: NURSE PRACTITIONER

## 2023-03-08 PROCEDURE — 3288F PR FALLS RISK ASSESSMENT DOCUMENTED: ICD-10-PCS | Mod: HCNC,CPTII,S$GLB, | Performed by: NURSE PRACTITIONER

## 2023-03-08 PROCEDURE — 1101F PR PT FALLS ASSESS DOC 0-1 FALLS W/OUT INJ PAST YR: ICD-10-PCS | Mod: HCNC,CPTII,S$GLB, | Performed by: NURSE PRACTITIONER

## 2023-03-08 PROCEDURE — 3074F PR MOST RECENT SYSTOLIC BLOOD PRESSURE < 130 MM HG: ICD-10-PCS | Mod: HCNC,CPTII,S$GLB, | Performed by: NURSE PRACTITIONER

## 2023-03-08 PROCEDURE — 99214 PR OFFICE/OUTPT VISIT, EST, LEVL IV, 30-39 MIN: ICD-10-PCS | Mod: HCNC,S$GLB,, | Performed by: NURSE PRACTITIONER

## 2023-03-08 PROCEDURE — 3008F BODY MASS INDEX DOCD: CPT | Mod: HCNC,CPTII,S$GLB, | Performed by: NURSE PRACTITIONER

## 2023-03-08 PROCEDURE — 3078F DIAST BP <80 MM HG: CPT | Mod: HCNC,CPTII,S$GLB, | Performed by: NURSE PRACTITIONER

## 2023-03-08 PROCEDURE — 3078F PR MOST RECENT DIASTOLIC BLOOD PRESSURE < 80 MM HG: ICD-10-PCS | Mod: HCNC,CPTII,S$GLB, | Performed by: NURSE PRACTITIONER

## 2023-03-08 PROCEDURE — 99999 PR PBB SHADOW E&M-EST. PATIENT-LVL III: ICD-10-PCS | Mod: PBBFAC,HCNC,, | Performed by: NURSE PRACTITIONER

## 2023-03-08 PROCEDURE — 4010F ACE/ARB THERAPY RXD/TAKEN: CPT | Mod: HCNC,CPTII,S$GLB, | Performed by: NURSE PRACTITIONER

## 2023-03-08 PROCEDURE — 3288F FALL RISK ASSESSMENT DOCD: CPT | Mod: HCNC,CPTII,S$GLB, | Performed by: NURSE PRACTITIONER

## 2023-03-08 PROCEDURE — 1159F MED LIST DOCD IN RCRD: CPT | Mod: HCNC,CPTII,S$GLB, | Performed by: NURSE PRACTITIONER

## 2023-03-08 PROCEDURE — 1159F PR MEDICATION LIST DOCUMENTED IN MEDICAL RECORD: ICD-10-PCS | Mod: HCNC,CPTII,S$GLB, | Performed by: NURSE PRACTITIONER

## 2023-03-08 RX ORDER — RIZATRIPTAN BENZOATE 10 MG/1
TABLET ORAL
Qty: 12 TABLET | Refills: 11 | Status: SHIPPED | OUTPATIENT
Start: 2023-03-08 | End: 2024-03-21

## 2023-03-08 NOTE — PROGRESS NOTES
"  Cc: headache/BECKY    Headaches stable.Maxalt is an effective abortive.Had covid and now lingering cough,completed antibiotic already.   Not able to keep cpap mask on consistently >4hr. Using nose pillows w/o chin strap. Tried intolerable.   Remote 11/1/22-1/1/23 avg 3:54/n AHI 6.3, 90% tile 11cm    HISTORY aLL VB:  10/5/15:   She was last seen 3/31/15.  She continues on Magnesium 250mg 2 tabs bid for migraines prevention, but stopped Riboflavin 100mg bid and CoQ10 100mg bid due to worsening HA in past. No recurrent morning headache. No longer keeping HA diaries but reports since last seen HA remain infrequent, occuring <5/month. None on recent cruise! Spicy foods, stress, lack of sleep, and weather are known triggers. HA remain typically mild-moderate (only few severe HA in interim), lasting 2-4+ hrs. Longer duration when at work and unable to take Maxalt due to sleepiness. At work she will take Midrin 2 tabs at onset which remains very helpful within 30min, not having to repeat dose. She has a prodrome (tingling of the forehead) but no auras, before the gradual onset of non-radiating frontal (center or right) throbbing or pressure pains associated with photo/ phonophobia and nausea. She denies vomiting, focal neurological deficits or autonomic deficits. Other triggers remain gatigue, and stress.  Resting helps while movement can intensify pain. HIT-6 score - 54      10/17/16: Reports stable frequency of headaches, ~ 1-2 /month. Midrin remains effective. No severe episodes. No zofran use, but this does help nausea prn. Headache remains same nature/location. HIT=6 score= 53. Having knee pain. Interim dx hyperthyroidism. She has lost weight on new medication/dx. Continues to take Mag 500mg twice daily, no loose stools "helps my arthritis too".     10/25/17:  Since last seen denies interval medical change. Reports headache q 2 mos. Cumen/herbs/pepper/basil/rosemary/highly seasoned food/seafood are triggers. Abortive " "meds remain effective. HA remain same location/nature. Maxalt works well.   Trying to walk at [employer] track.     11/2018:  Since last seen denies interval medical change except colonoscopy yesterday. Reports headache infrequent except recently 5d due to weather change.  Cumen/herbs/pepper/basil/rosemary/highly seasoned food/seafood are triggers. Abortive meds remain effective/maxalt. Midrin too costly.  HA remain same location/nature  2# loss    9/2021 Stopped apap due to recall. Having "sinus" headaches ~ 2 mos, doesn't feel like typical migraines. On tylenol for pain and daily allergra. Waking with them. BP meds also adjusted around same time. Was using nose pillow mask.  Sleep a little more disrupted w/o machine. Has registered her machine.  Maxalt continues to abort pain well. Biggest known trigger is weather change.  Cumen/herbs/pepper/basil/rosemary/highly seasoned food/seafood are other triggers. Migrainous HA remain same location/nature from 2015. Still taking Mag  bp stable  PSG 7/2020 : Moderate to loud snoring was present. The overall AHI was 89.5 with an oxygen patrick of 78.0%. The supine AHI was 89.7 and the REM AHI was 53.3. (all supine)       IMPRESSION:   Migraine without aura, stable  BECKY, severe. She remains adherent with pap, mask removal problematic,  AHI<10  ADPKD    PLAN:    Continue Mag 250mg 2 tabs bid  Continue Maxalt 5-10 mg tab 1 tab PO q 2h, up to maximum of 30 mg/day. Do not delay treatment to avoid progression of a migraine.   Continue Zofran 4-8mg PO q8h prn for HA/nausea     Continue qhs apap 7-14cm, OBTAIN cpap titration study  "

## 2023-03-10 ENCOUNTER — TELEPHONE (OUTPATIENT)
Dept: SLEEP MEDICINE | Facility: OTHER | Age: 73
End: 2023-03-10
Payer: MEDICARE

## 2023-03-13 ENCOUNTER — LAB VISIT (OUTPATIENT)
Dept: LAB | Facility: OTHER | Age: 73
End: 2023-03-13
Attending: INTERNAL MEDICINE
Payer: MEDICARE

## 2023-03-13 DIAGNOSIS — Z00.00 HEALTH MAINTENANCE EXAMINATION: ICD-10-CM

## 2023-03-13 DIAGNOSIS — D53.9 NUTRITIONAL ANEMIA: Primary | ICD-10-CM

## 2023-03-13 DIAGNOSIS — I10 ESSENTIAL HYPERTENSION: ICD-10-CM

## 2023-03-13 DIAGNOSIS — R73.09 ABNORMAL GLUCOSE: ICD-10-CM

## 2023-03-13 DIAGNOSIS — E05.00 GRAVES DISEASE: ICD-10-CM

## 2023-03-13 DIAGNOSIS — E55.9 VITAMIN D DEFICIENCY: ICD-10-CM

## 2023-03-13 LAB
25(OH)D3+25(OH)D2 SERPL-MCNC: 15 NG/ML (ref 30–96)
ALBUMIN SERPL BCP-MCNC: 3.7 G/DL (ref 3.5–5.2)
ALBUMIN SERPL BCP-MCNC: 3.7 G/DL (ref 3.5–5.2)
ALBUMIN/CREAT UR: 10.2 UG/MG (ref 0–30)
ALP SERPL-CCNC: 106 U/L (ref 55–135)
ALP SERPL-CCNC: 106 U/L (ref 55–135)
ALT SERPL W/O P-5'-P-CCNC: 11 U/L (ref 10–44)
ALT SERPL W/O P-5'-P-CCNC: 11 U/L (ref 10–44)
ANION GAP SERPL CALC-SCNC: 5 MMOL/L (ref 8–16)
ANION GAP SERPL CALC-SCNC: 5 MMOL/L (ref 8–16)
AST SERPL-CCNC: 16 U/L (ref 10–40)
AST SERPL-CCNC: 16 U/L (ref 10–40)
BASOPHILS # BLD AUTO: 0.01 K/UL (ref 0–0.2)
BASOPHILS NFR BLD: 0.3 % (ref 0–1.9)
BILIRUB SERPL-MCNC: 0.7 MG/DL (ref 0.1–1)
BILIRUB SERPL-MCNC: 0.7 MG/DL (ref 0.1–1)
BUN SERPL-MCNC: 19 MG/DL (ref 8–23)
BUN SERPL-MCNC: 19 MG/DL (ref 8–23)
CALCIUM SERPL-MCNC: 9.5 MG/DL (ref 8.7–10.5)
CALCIUM SERPL-MCNC: 9.5 MG/DL (ref 8.7–10.5)
CHLORIDE SERPL-SCNC: 107 MMOL/L (ref 95–110)
CHLORIDE SERPL-SCNC: 107 MMOL/L (ref 95–110)
CO2 SERPL-SCNC: 29 MMOL/L (ref 23–29)
CO2 SERPL-SCNC: 29 MMOL/L (ref 23–29)
CREAT SERPL-MCNC: 1 MG/DL (ref 0.5–1.4)
CREAT SERPL-MCNC: 1 MG/DL (ref 0.5–1.4)
CREAT UR-MCNC: 137.7 MG/DL (ref 15–325)
DIFFERENTIAL METHOD: ABNORMAL
EOSINOPHIL # BLD AUTO: 0 K/UL (ref 0–0.5)
EOSINOPHIL NFR BLD: 0.6 % (ref 0–8)
ERYTHROCYTE [DISTWIDTH] IN BLOOD BY AUTOMATED COUNT: 11.2 % (ref 11.5–14.5)
EST. GFR  (NO RACE VARIABLE): >60 ML/MIN/1.73 M^2
EST. GFR  (NO RACE VARIABLE): >60 ML/MIN/1.73 M^2
ESTIMATED AVG GLUCOSE: 74 MG/DL (ref 68–131)
GLUCOSE SERPL-MCNC: 82 MG/DL (ref 70–110)
GLUCOSE SERPL-MCNC: 82 MG/DL (ref 70–110)
HBA1C MFR BLD: 4.2 % (ref 4–5.6)
HCT VFR BLD AUTO: 37.5 % (ref 37–48.5)
HGB BLD-MCNC: 11.5 G/DL (ref 12–16)
IMM GRANULOCYTES # BLD AUTO: 0.02 K/UL (ref 0–0.04)
IMM GRANULOCYTES NFR BLD AUTO: 0.6 % (ref 0–0.5)
LYMPHOCYTES # BLD AUTO: 1.2 K/UL (ref 1–4.8)
LYMPHOCYTES NFR BLD: 37.4 % (ref 18–48)
MCH RBC QN AUTO: 30.4 PG (ref 27–31)
MCHC RBC AUTO-ENTMCNC: 30.7 G/DL (ref 32–36)
MCV RBC AUTO: 99 FL (ref 82–98)
MICROALBUMIN UR DL<=1MG/L-MCNC: 14 UG/ML
MONOCYTES # BLD AUTO: 0.4 K/UL (ref 0.3–1)
MONOCYTES NFR BLD: 10.9 % (ref 4–15)
NEUTROPHILS # BLD AUTO: 1.7 K/UL (ref 1.8–7.7)
NEUTROPHILS NFR BLD: 50.2 % (ref 38–73)
NRBC BLD-RTO: 0 /100 WBC
PLATELET # BLD AUTO: 228 K/UL (ref 150–450)
PMV BLD AUTO: 9.9 FL (ref 9.2–12.9)
POTASSIUM SERPL-SCNC: 4 MMOL/L (ref 3.5–5.1)
POTASSIUM SERPL-SCNC: 4 MMOL/L (ref 3.5–5.1)
PROT SERPL-MCNC: 7.6 G/DL (ref 6–8.4)
PROT SERPL-MCNC: 7.6 G/DL (ref 6–8.4)
RBC # BLD AUTO: 3.78 M/UL (ref 4–5.4)
SODIUM SERPL-SCNC: 141 MMOL/L (ref 136–145)
SODIUM SERPL-SCNC: 141 MMOL/L (ref 136–145)
TSH SERPL DL<=0.005 MIU/L-ACNC: 1.61 UIU/ML (ref 0.4–4)
WBC # BLD AUTO: 3.29 K/UL (ref 3.9–12.7)

## 2023-03-13 PROCEDURE — 82306 VITAMIN D 25 HYDROXY: CPT | Mod: HCNC | Performed by: STUDENT IN AN ORGANIZED HEALTH CARE EDUCATION/TRAINING PROGRAM

## 2023-03-13 PROCEDURE — 83036 HEMOGLOBIN GLYCOSYLATED A1C: CPT | Mod: HCNC | Performed by: STUDENT IN AN ORGANIZED HEALTH CARE EDUCATION/TRAINING PROGRAM

## 2023-03-13 PROCEDURE — 80053 COMPREHEN METABOLIC PANEL: CPT | Mod: HCNC | Performed by: INTERNAL MEDICINE

## 2023-03-13 PROCEDURE — 82570 ASSAY OF URINE CREATININE: CPT | Mod: HCNC | Performed by: STUDENT IN AN ORGANIZED HEALTH CARE EDUCATION/TRAINING PROGRAM

## 2023-03-13 PROCEDURE — 84443 ASSAY THYROID STIM HORMONE: CPT | Mod: HCNC | Performed by: INTERNAL MEDICINE

## 2023-03-13 PROCEDURE — 85025 COMPLETE CBC W/AUTO DIFF WBC: CPT | Mod: HCNC | Performed by: STUDENT IN AN ORGANIZED HEALTH CARE EDUCATION/TRAINING PROGRAM

## 2023-03-13 PROCEDURE — 36415 COLL VENOUS BLD VENIPUNCTURE: CPT | Mod: HCNC | Performed by: STUDENT IN AN ORGANIZED HEALTH CARE EDUCATION/TRAINING PROGRAM

## 2023-03-16 ENCOUNTER — HOSPITAL ENCOUNTER (OUTPATIENT)
Dept: SLEEP MEDICINE | Facility: OTHER | Age: 73
Discharge: HOME | End: 2023-03-16
Payer: MEDICARE

## 2023-03-16 ENCOUNTER — TELEPHONE (OUTPATIENT)
Dept: SLEEP MEDICINE | Facility: OTHER | Age: 73
End: 2023-03-16
Payer: MEDICARE

## 2023-03-16 DIAGNOSIS — G47.33 OSA ON CPAP: ICD-10-CM

## 2023-03-16 PROCEDURE — 95811 POLYSOM 6/>YRS CPAP 4/> PARM: CPT | Mod: HCNC

## 2023-03-17 ENCOUNTER — TELEPHONE (OUTPATIENT)
Dept: HEMATOLOGY/ONCOLOGY | Facility: CLINIC | Age: 73
End: 2023-03-17
Payer: MEDICARE

## 2023-03-17 NOTE — PROGRESS NOTES
Manju Aranda to [facility] on 3/16/2023 for an overnight CPAP titration.     Pt wore her own Resmed nasal pillow mask, no chin strap.    Post study given to pt in AM.

## 2023-03-28 NOTE — PROCEDURES
"[facility]    Titration Interpretation Report    Patient Name:  KEESHA DUARTE  MRN#:  [id number]  :  1950  Study Date:  3/16/2023  Referring Provider:  FINN Chambers NP    The patient is a 72 year old Female who is 5' 9" and weighs 260.0 lbs.  Her BMI equals 38.5.  A full night PAP titration was performed.    Polysomnogram Data  A full night polysomnogram recorded the standard physiologic parameters including EEG, EOG, EMG, EKG, nasal and oral airflow.  Respiratory parameters of chest and abdominal movements were recorded with Peizo-Crystal motion transducers.  Oxygen saturation was recorded by pulse oximetry.    Titration Summary  The patient was titrated at pressures ranging from 5* cm/H20 with supplemental oxygen at - up to 13* cm/H20 with supplemental oxygen at -.  The last pressure used in the study was 13* cm/H20 with supplemental oxygen at -.    Sleep Architecture  The total recording time of the polysomnogram was 517.0 minutes.  The total sleep time was 392.0 minutes.  The patient spent 19.6% of total sleep time in Stage N1, 64.3% in Stage N2, 2.2% in Stages N3, and 13.9% in REM.  Sleep latency was 11.3 minutes.  REM latency was 242.5 minutes.  Sleep Efficiency was 75.8%.  Total wake time was 125.5 minutes for a total wake percentage of 22.4%.  Wake after Sleep Onset was 113.5 minutes.    Respiratory Summary  The polysomnogram revealed a presence of 5 obstructive, 14 central, and - mixed apneas resulting in Total Apnea index of 2.9 events per hour.  There were 108 hypopneas resulting in Total Hypopnea index of 16.5 events per hour.  The combined Apnea/Hypopnea index was 19.4 events per hour.  There were a total of 14 RERA events resulting in a Respiratory Disturbance Index (RDI) of 21.6 events per hour.     Mean oxygen saturation was 93.8%.  The lowest oxygen saturation during sleep was 84.0%.  Time spent ?88% oxygen saturation was 5.0 minutes (1.0%).    End Tidal CO2 during sleep " "ranged from - to - mmHg. End Tidal CO2 was greater than 50 mmHg for - minutes and greater than 55 mmHg for - minutes.  Transcutaneous CO2 during sleep ranged from - to - mmHg. Transcutaneous CO2 was greater than 50 mmHg for - minutes and greater than 55 mmHg for - minutes.    Limb Movement Activity  There were - limb movements recorded.      Cardiac: single lead EKG revealed normal sinus rhythm with occasional PVCs    CPAP titration:    Mask used in the study: Resmed nasal pillow  CPAP = 13 cwp was largely effective in supine REM and non-REM    LIMITATIONS: little sleep was seen in the lateral position    Oxygenation:  At therapeutic levels of PAP therapy, there was no baseline hypoxemia.    Impression:  -obstructive sleep apnea     Recommendations:    -auto-CPAP with CPAP min = 12 cwp  and CPAP max =  15 cwp is recommended  -it is possible that lower pressures would suffice in the lateral position.  -the patient has follow up with Sleep Medicine        Oscar Conklin MD    (This Sleep Study was interpreted by a Board Certified Sleep Specialist who conducted an epoch-by-epoch review of the entire raw data recording.)  (The indication for this sleep study was reviewed and deemed appropriate by AASM Practice Parameters or other reasons by a Board Certified Sleep Specialist.)      [facility]    Titration Report    Patient Name: KEESHA DUARTE Study Date: 3/16/2023   YOB: 1950 MRN #: [id number]   Age: 72 year GENE #: [id number]   Sex: Female Referring Provider: FINN Chambers NP   Height: 5' 9" Recording Tech: Shane Zaria RPSGT   Weight: 260.0 lbs Scoring Tech: Prashant Kwong RRT RPSGT   BMI: 38.5 Interpreting Physician: -   ESS: - Neck Circumference: -     Study Overview    Lights Off: 09:23:51 PM  Count Index   Lights On: 06:00:48 AM Awakenings: 71 10.9   Time in Bed: 517.0 min. Arousals: 151 23.1   Total Sleep Time: 392.0 min. Apneas & Hypopneas: 127 19.4    Sleep Efficiency: 75.8% Limb " Movements: - -   Sleep Latency: 11.3 min. Snores: - -   Wake After Sleep Onset: 113.5 min. Desaturations: 121 18.5    REM Latency from Sleep Onset: 242.5 min. Minimum SpO2 TST: 84.0%      Sleep Architecture   % of Time in Bed  Stages Time (mins) % Sleep Time   Wake 125.5    Stage N1 77.0 19.6%   Stage N2 252.0 64.3%   Stage N3 8.5 2.2%   REM 54.5 13.9%         Arousal Summary     NREM REM Sleep Index   Respiratory Arousals 34 - 34 5.2   PLM Arousals - - - -   Isolated Limb Movement Arousals - - - -   Spontaneous Arousals 106 11 117 17.9   Total 140 11 151 23.1       Limb Movement Summary     Count Index   Isolated Limb Movements - -   Periodic Limb Movements (PLMs) - -   Total Limb Movements - -       Respiratory Summary     By Sleep Stage By Body Position Total    NREM REM Supine Non-Supine    Time (min) 337.5 54.5 368.5 23.5 392.0           Obstructive Apnea 5 - 4 1 5   Mixed Apnea - - - - -   Central Apnea 14 - 14 - 14   Total Apneas 19 - 18 1 19   Total Apnea Index 3.4 - 2.9 2.6 2.9           Total Hypopnea 108 - 102 6 108   Total Hypopnea Index 19.2 - 16.6 15.3 16.5           Apnea & Hypopnea 127 - 120 7 127   Apnea & Hypopnea Index 22.6 - 19.5 17.9 19.4           RERAs 14 - 12 2 14   RERA Index 2.5 - 2.0 5.1 2.1           RDI 25.1 - 21.5 23.0 21.6     Scoring Criteria: Hypopneas scored at 4% desaturation criteria.    Respiratory Event Durations     Apnea Hypopnea    NREM REM NREM REM   Average (seconds) 13.9 - 17.3 -   Maximum (seconds) 17.5 - 24.8 -       Oxygen Saturation Summary     Wake NREM REM TST Total   Average SpO2 94.2% 93.6% 94.2% 93.7% 93.8%   Minimum SpO2 80.0% 84.0% 91.0% 84.0% 80.0%   Maximum SpO2 99.0% 99.0% 98.0% 99.0% 99.0%     Oxygen Saturation Distribution    Range (%) Time in range (min) Time in range (%)    90.0 - 100.0 481.4 95.4%   80.0 - 90.0 22.2 4.4%   70.0 - 80.0 0.1 0.0%   60.0 - 70.0 - -   50.0 - 60.0 - -   0.0 - 50.0 - -   Time Spent ?88% SpO2    Range (%) Time in range (min)  Time in range (%)   0.0 - 88.0 5.0 1.0%          Count Index   Desaturations 121 18.5      Cardiac Summary     Wake NREM REM Sleep Total   Average Pulse Rate (BPM) 66.8 64.7 63.8 64.6 65.1   Minimum Pulse Rate (BPM) 44.0 42.0 51.0 42.0 42.0   Maximum Pulse Rate (BPM) 84.0 82.0 76.0 82.0 84.0     Pulse Rate Distribution    Range (bpm) Time in range (min) Time in range (%)   0.0 - 40.0 - -   40.0 - 60.0 106.2 21.0%   60.0 - 80.0 398.3 78.9%   80.0 - 100.0 0.5 0.1%   100.0 - 120.0 - -   120.0 - 140.0 - -   140.0 - 200.0 - -     EtCO2 Summary    Stage Min (mmHg) Average (mmHg) Max (mmHg)   Wake - - -   NREM(1+2+3) - - -   REM - - -     Range (mmHg) Time in range (min) Time in range (%)   - - -   - - -   - - -   - - -   - - -     TcCO2 Summary    Stage Min (mmHg) Average (mmHg) Max (mmHg)   Wake - - -   NREM(1+2+3) - - -   REM - - -     Range (mmHg) Time in range (min) Time in range (%)   20.0 - 40.0 - -   40.0 - 50.0 - -   50.0 - 55.0 - -   55.0 - 100.0 - -   Excluded data <20.0 & >65.0 517.5 100.0%     Comments    -    Titration Summary    PAP Device PAP Level O2 Level Time (min) TST (min) NREM (min) REM (min) Wake (min) Sleep Eff% OA# CA# MA# Hyp# AHI RERA RDI Min SpO2 SpO2 ?88% (min) Ar. Index   CPAP 5 - 38.5 15.0 15.0 0.0 23.5 39.0% - - - 1 4.0 1 8.0 90.0  0.0 24.0   CPAP 6 - 50.0 20.0 20.0 0.0 30.0 40.0% 1 - - 13 42.0 1 45.0 88.0  0.1 48.0   CPAP 7 - 13.0 9.5 9.5 0.0 3.5 73.1% 3 - - 3 37.9 - 37.9 84.0  0.8 63.2   CPAP 9 - 41.0 34.5 34.5 0.0 6.5 84.1% - - - 14 24.3 - 24.3 89.0  0.0 24.3   CPAP 10 - 103.0 88.5 88.5 0.0 14.5 85.9% 1 1 - 50 35.3 - 35.3 89.0  0.0 29.8   CPAP 12 - 160.0 122.0 97.0 25.0 38.0 76.3% - 2 - 24 12.8 - 12.8 87.0  0.9 15.7   CPAP 13 - 112.0 102.5 73.0 29.5 9.5 91.5% - 11 - 3 8.2 12 15.2 87.0  1.4 17.0

## 2023-03-30 ENCOUNTER — PATIENT MESSAGE (OUTPATIENT)
Dept: SLEEP MEDICINE | Facility: CLINIC | Age: 73
End: 2023-03-30
Payer: MEDICARE

## 2023-04-10 ENCOUNTER — OFFICE VISIT (OUTPATIENT)
Dept: ENDOCRINOLOGY | Facility: CLINIC | Age: 73
End: 2023-04-10
Payer: MEDICARE

## 2023-04-10 ENCOUNTER — OFFICE VISIT (OUTPATIENT)
Dept: INTERNAL MEDICINE | Facility: CLINIC | Age: 73
End: 2023-04-10
Payer: MEDICARE

## 2023-04-10 ENCOUNTER — TELEPHONE (OUTPATIENT)
Dept: ENDOCRINOLOGY | Facility: CLINIC | Age: 73
End: 2023-04-10
Payer: MEDICARE

## 2023-04-10 VITALS
HEART RATE: 68 BPM | WEIGHT: 268.31 LBS | SYSTOLIC BLOOD PRESSURE: 122 MMHG | BODY MASS INDEX: 39.62 KG/M2 | OXYGEN SATURATION: 96 % | DIASTOLIC BLOOD PRESSURE: 84 MMHG

## 2023-04-10 VITALS
SYSTOLIC BLOOD PRESSURE: 122 MMHG | WEIGHT: 268.44 LBS | BODY MASS INDEX: 39.76 KG/M2 | HEIGHT: 69 IN | DIASTOLIC BLOOD PRESSURE: 84 MMHG | OXYGEN SATURATION: 96 % | HEART RATE: 68 BPM

## 2023-04-10 DIAGNOSIS — I70.0 AORTIC ATHEROSCLEROSIS: ICD-10-CM

## 2023-04-10 DIAGNOSIS — E66.01 SEVERE OBESITY WITH BODY MASS INDEX (BMI) OF 35.0 TO 39.9 WITH COMORBIDITY: ICD-10-CM

## 2023-04-10 DIAGNOSIS — G47.33 OSA ON CPAP: ICD-10-CM

## 2023-04-10 DIAGNOSIS — Q61.2 ADPKD (AUTOSOMAL DOMINANT POLYCYSTIC KIDNEY DISEASE): ICD-10-CM

## 2023-04-10 DIAGNOSIS — Z00.00 ENCOUNTER FOR PREVENTIVE HEALTH EXAMINATION: Primary | ICD-10-CM

## 2023-04-10 DIAGNOSIS — I12.9 BENIGN HYPERTENSIVE RENAL DISEASE WITHOUT RENAL FAILURE: ICD-10-CM

## 2023-04-10 DIAGNOSIS — E55.9 VITAMIN D DEFICIENCY: ICD-10-CM

## 2023-04-10 DIAGNOSIS — I10 ESSENTIAL HYPERTENSION: ICD-10-CM

## 2023-04-10 DIAGNOSIS — D47.2 SMOLDERING MULTIPLE MYELOMA (SMM): ICD-10-CM

## 2023-04-10 DIAGNOSIS — E05.00 GRAVES' EYE DISEASE: ICD-10-CM

## 2023-04-10 DIAGNOSIS — Z00.00 ENCOUNTER FOR MEDICARE ANNUAL WELLNESS EXAM: ICD-10-CM

## 2023-04-10 DIAGNOSIS — E05.00 GRAVES DISEASE: ICD-10-CM

## 2023-04-10 DIAGNOSIS — I05.9 MITRAL VALVE DISORDER: ICD-10-CM

## 2023-04-10 DIAGNOSIS — E05.00 GRAVES DISEASE: Primary | ICD-10-CM

## 2023-04-10 DIAGNOSIS — I07.9 TRICUSPID VALVE DISEASE: ICD-10-CM

## 2023-04-10 DIAGNOSIS — Q61.3 POLYCYSTIC KIDNEY DISEASE: ICD-10-CM

## 2023-04-10 DIAGNOSIS — D89.2 PARAPROTEINEMIA: ICD-10-CM

## 2023-04-10 PROCEDURE — 3008F BODY MASS INDEX DOCD: CPT | Mod: HCNC,CPTII,S$GLB, | Performed by: INTERNAL MEDICINE

## 2023-04-10 PROCEDURE — 1170F FXNL STATUS ASSESSED: CPT | Mod: HCNC,CPTII,S$GLB, | Performed by: NURSE PRACTITIONER

## 2023-04-10 PROCEDURE — 4010F PR ACE/ARB THEARPY RXD/TAKEN: ICD-10-PCS | Mod: HCNC,CPTII,S$GLB, | Performed by: NURSE PRACTITIONER

## 2023-04-10 PROCEDURE — 3008F BODY MASS INDEX DOCD: CPT | Mod: HCNC,CPTII,S$GLB, | Performed by: NURSE PRACTITIONER

## 2023-04-10 PROCEDURE — 1170F PR FUNCTIONAL STATUS ASSESSED: ICD-10-PCS | Mod: HCNC,CPTII,S$GLB, | Performed by: NURSE PRACTITIONER

## 2023-04-10 PROCEDURE — 1126F PR PAIN SEVERITY QUANTIFIED, NO PAIN PRESENT: ICD-10-PCS | Mod: HCNC,CPTII,S$GLB, | Performed by: NURSE PRACTITIONER

## 2023-04-10 PROCEDURE — 3008F PR BODY MASS INDEX (BMI) DOCUMENTED: ICD-10-PCS | Mod: HCNC,CPTII,S$GLB, | Performed by: NURSE PRACTITIONER

## 2023-04-10 PROCEDURE — 3074F PR MOST RECENT SYSTOLIC BLOOD PRESSURE < 130 MM HG: ICD-10-PCS | Mod: HCNC,CPTII,S$GLB, | Performed by: INTERNAL MEDICINE

## 2023-04-10 PROCEDURE — 3061F PR NEG MICROALBUMINURIA RESULT DOCUMENTED/REVIEW: ICD-10-PCS | Mod: HCNC,CPTII,S$GLB, | Performed by: NURSE PRACTITIONER

## 2023-04-10 PROCEDURE — 3044F HG A1C LEVEL LT 7.0%: CPT | Mod: HCNC,CPTII,S$GLB, | Performed by: INTERNAL MEDICINE

## 2023-04-10 PROCEDURE — 3074F SYST BP LT 130 MM HG: CPT | Mod: HCNC,CPTII,S$GLB, | Performed by: NURSE PRACTITIONER

## 2023-04-10 PROCEDURE — 3044F HG A1C LEVEL LT 7.0%: CPT | Mod: HCNC,CPTII,S$GLB, | Performed by: NURSE PRACTITIONER

## 2023-04-10 PROCEDURE — 1101F PT FALLS ASSESS-DOCD LE1/YR: CPT | Mod: HCNC,CPTII,S$GLB, | Performed by: NURSE PRACTITIONER

## 2023-04-10 PROCEDURE — 4010F ACE/ARB THERAPY RXD/TAKEN: CPT | Mod: HCNC,CPTII,S$GLB, | Performed by: INTERNAL MEDICINE

## 2023-04-10 PROCEDURE — 4010F PR ACE/ARB THEARPY RXD/TAKEN: ICD-10-PCS | Mod: HCNC,CPTII,S$GLB, | Performed by: INTERNAL MEDICINE

## 2023-04-10 PROCEDURE — 3079F DIAST BP 80-89 MM HG: CPT | Mod: HCNC,CPTII,S$GLB, | Performed by: INTERNAL MEDICINE

## 2023-04-10 PROCEDURE — 1101F PR PT FALLS ASSESS DOC 0-1 FALLS W/OUT INJ PAST YR: ICD-10-PCS | Mod: HCNC,CPTII,S$GLB, | Performed by: NURSE PRACTITIONER

## 2023-04-10 PROCEDURE — 4010F ACE/ARB THERAPY RXD/TAKEN: CPT | Mod: HCNC,CPTII,S$GLB, | Performed by: NURSE PRACTITIONER

## 2023-04-10 PROCEDURE — 3044F PR MOST RECENT HEMOGLOBIN A1C LEVEL <7.0%: ICD-10-PCS | Mod: HCNC,CPTII,S$GLB, | Performed by: NURSE PRACTITIONER

## 2023-04-10 PROCEDURE — 3061F NEG MICROALBUMINURIA REV: CPT | Mod: HCNC,CPTII,S$GLB, | Performed by: NURSE PRACTITIONER

## 2023-04-10 PROCEDURE — 3074F SYST BP LT 130 MM HG: CPT | Mod: HCNC,CPTII,S$GLB, | Performed by: INTERNAL MEDICINE

## 2023-04-10 PROCEDURE — 99214 OFFICE O/P EST MOD 30 MIN: CPT | Mod: HCNC,S$GLB,, | Performed by: INTERNAL MEDICINE

## 2023-04-10 PROCEDURE — 3066F PR DOCUMENTATION OF TREATMENT FOR NEPHROPATHY: ICD-10-PCS | Mod: HCNC,CPTII,S$GLB, | Performed by: NURSE PRACTITIONER

## 2023-04-10 PROCEDURE — 1159F PR MEDICATION LIST DOCUMENTED IN MEDICAL RECORD: ICD-10-PCS | Mod: HCNC,CPTII,S$GLB, | Performed by: INTERNAL MEDICINE

## 2023-04-10 PROCEDURE — 1160F RVW MEDS BY RX/DR IN RCRD: CPT | Mod: HCNC,CPTII,S$GLB, | Performed by: INTERNAL MEDICINE

## 2023-04-10 PROCEDURE — 99499 UNLISTED E&M SERVICE: CPT | Mod: HCNC,S$GLB,, | Performed by: NURSE PRACTITIONER

## 2023-04-10 PROCEDURE — G0439 PR MEDICARE ANNUAL WELLNESS SUBSEQUENT VISIT: ICD-10-PCS | Mod: HCNC,S$GLB,, | Performed by: NURSE PRACTITIONER

## 2023-04-10 PROCEDURE — 3288F FALL RISK ASSESSMENT DOCD: CPT | Mod: HCNC,CPTII,S$GLB, | Performed by: NURSE PRACTITIONER

## 2023-04-10 PROCEDURE — 1126F PR PAIN SEVERITY QUANTIFIED, NO PAIN PRESENT: ICD-10-PCS | Mod: HCNC,CPTII,S$GLB, | Performed by: INTERNAL MEDICINE

## 2023-04-10 PROCEDURE — 99999 PR PBB SHADOW E&M-EST. PATIENT-LVL III: CPT | Mod: PBBFAC,HCNC,, | Performed by: NURSE PRACTITIONER

## 2023-04-10 PROCEDURE — 99999 PR PBB SHADOW E&M-EST. PATIENT-LVL III: ICD-10-PCS | Mod: PBBFAC,HCNC,, | Performed by: NURSE PRACTITIONER

## 2023-04-10 PROCEDURE — 3079F PR MOST RECENT DIASTOLIC BLOOD PRESSURE 80-89 MM HG: ICD-10-PCS | Mod: HCNC,CPTII,S$GLB, | Performed by: INTERNAL MEDICINE

## 2023-04-10 PROCEDURE — 1101F PT FALLS ASSESS-DOCD LE1/YR: CPT | Mod: HCNC,CPTII,S$GLB, | Performed by: INTERNAL MEDICINE

## 2023-04-10 PROCEDURE — 99499 RISK ADDL DX/OHS AUDIT: ICD-10-PCS | Mod: HCNC,S$GLB,, | Performed by: NURSE PRACTITIONER

## 2023-04-10 PROCEDURE — 3061F NEG MICROALBUMINURIA REV: CPT | Mod: HCNC,CPTII,S$GLB, | Performed by: INTERNAL MEDICINE

## 2023-04-10 PROCEDURE — 3008F PR BODY MASS INDEX (BMI) DOCUMENTED: ICD-10-PCS | Mod: HCNC,CPTII,S$GLB, | Performed by: INTERNAL MEDICINE

## 2023-04-10 PROCEDURE — 3079F PR MOST RECENT DIASTOLIC BLOOD PRESSURE 80-89 MM HG: ICD-10-PCS | Mod: HCNC,CPTII,S$GLB, | Performed by: NURSE PRACTITIONER

## 2023-04-10 PROCEDURE — 1126F AMNT PAIN NOTED NONE PRSNT: CPT | Mod: HCNC,CPTII,S$GLB, | Performed by: INTERNAL MEDICINE

## 2023-04-10 PROCEDURE — 99999 PR PBB SHADOW E&M-EST. PATIENT-LVL III: ICD-10-PCS | Mod: PBBFAC,HCNC,, | Performed by: INTERNAL MEDICINE

## 2023-04-10 PROCEDURE — 3066F NEPHROPATHY DOC TX: CPT | Mod: HCNC,CPTII,S$GLB, | Performed by: NURSE PRACTITIONER

## 2023-04-10 PROCEDURE — 3288F FALL RISK ASSESSMENT DOCD: CPT | Mod: HCNC,CPTII,S$GLB, | Performed by: INTERNAL MEDICINE

## 2023-04-10 PROCEDURE — 3288F PR FALLS RISK ASSESSMENT DOCUMENTED: ICD-10-PCS | Mod: HCNC,CPTII,S$GLB, | Performed by: INTERNAL MEDICINE

## 2023-04-10 PROCEDURE — 99499 RISK ADDL DX/OHS AUDIT: ICD-10-PCS | Mod: HCNC,S$GLB,, | Performed by: INTERNAL MEDICINE

## 2023-04-10 PROCEDURE — 3066F PR DOCUMENTATION OF TREATMENT FOR NEPHROPATHY: ICD-10-PCS | Mod: HCNC,CPTII,S$GLB, | Performed by: INTERNAL MEDICINE

## 2023-04-10 PROCEDURE — 1126F AMNT PAIN NOTED NONE PRSNT: CPT | Mod: HCNC,CPTII,S$GLB, | Performed by: NURSE PRACTITIONER

## 2023-04-10 PROCEDURE — 99999 PR PBB SHADOW E&M-EST. PATIENT-LVL III: CPT | Mod: PBBFAC,HCNC,, | Performed by: INTERNAL MEDICINE

## 2023-04-10 PROCEDURE — 3044F PR MOST RECENT HEMOGLOBIN A1C LEVEL <7.0%: ICD-10-PCS | Mod: HCNC,CPTII,S$GLB, | Performed by: INTERNAL MEDICINE

## 2023-04-10 PROCEDURE — 1159F MED LIST DOCD IN RCRD: CPT | Mod: HCNC,CPTII,S$GLB, | Performed by: INTERNAL MEDICINE

## 2023-04-10 PROCEDURE — 1160F PR REVIEW ALL MEDS BY PRESCRIBER/CLIN PHARMACIST DOCUMENTED: ICD-10-PCS | Mod: HCNC,CPTII,S$GLB, | Performed by: INTERNAL MEDICINE

## 2023-04-10 PROCEDURE — 3066F NEPHROPATHY DOC TX: CPT | Mod: HCNC,CPTII,S$GLB, | Performed by: INTERNAL MEDICINE

## 2023-04-10 PROCEDURE — 1101F PR PT FALLS ASSESS DOC 0-1 FALLS W/OUT INJ PAST YR: ICD-10-PCS | Mod: HCNC,CPTII,S$GLB, | Performed by: INTERNAL MEDICINE

## 2023-04-10 PROCEDURE — 99214 PR OFFICE/OUTPT VISIT, EST, LEVL IV, 30-39 MIN: ICD-10-PCS | Mod: HCNC,S$GLB,, | Performed by: INTERNAL MEDICINE

## 2023-04-10 PROCEDURE — 3288F PR FALLS RISK ASSESSMENT DOCUMENTED: ICD-10-PCS | Mod: HCNC,CPTII,S$GLB, | Performed by: NURSE PRACTITIONER

## 2023-04-10 PROCEDURE — G0439 PPPS, SUBSEQ VISIT: HCPCS | Mod: HCNC,S$GLB,, | Performed by: NURSE PRACTITIONER

## 2023-04-10 PROCEDURE — 3074F PR MOST RECENT SYSTOLIC BLOOD PRESSURE < 130 MM HG: ICD-10-PCS | Mod: HCNC,CPTII,S$GLB, | Performed by: NURSE PRACTITIONER

## 2023-04-10 PROCEDURE — 99499 UNLISTED E&M SERVICE: CPT | Mod: HCNC,S$GLB,, | Performed by: INTERNAL MEDICINE

## 2023-04-10 PROCEDURE — 3061F PR NEG MICROALBUMINURIA RESULT DOCUMENTED/REVIEW: ICD-10-PCS | Mod: HCNC,CPTII,S$GLB, | Performed by: INTERNAL MEDICINE

## 2023-04-10 PROCEDURE — 3079F DIAST BP 80-89 MM HG: CPT | Mod: HCNC,CPTII,S$GLB, | Performed by: NURSE PRACTITIONER

## 2023-04-10 RX ORDER — METHIMAZOLE 5 MG/1
TABLET ORAL
Qty: 90 TABLET | Refills: 1 | Status: SHIPPED | OUTPATIENT
Start: 2023-04-10 | End: 2023-05-11

## 2023-04-10 RX ORDER — ACETAMINOPHEN 500 MG
2000 TABLET ORAL DAILY
Start: 2023-04-10

## 2023-04-10 NOTE — TELEPHONE ENCOUNTER
Spoke with the patient to see if she can come earlier than her 11:30 AM appointment, per Dr. Rajput's request. pt stated yes she can.

## 2023-04-10 NOTE — PATIENT INSTRUCTIONS
Counseling and Referral of Other Preventative  (Italic type indicates deductible and co-insurance are waived)    Patient Name: Manju Aranda  Today's Date: 4/10/2023    Health Maintenance       Date Due Completion Date    High Dose Statin Never done ---    Influenza Vaccine (1) 09/01/2022 10/6/2021    Colorectal Cancer Screening 11/05/2023 11/5/2018    Mammogram 12/13/2023 12/13/2022    DEXA Scan 09/17/2024 9/17/2020    Lipid Panel 02/26/2026 2/26/2021    TETANUS VACCINE 09/26/2028 9/26/2018    Override on 8/8/2017: Done ([facility])        No orders of the defined types were placed in this encounter.    The following information is provided to all patients.  This information is to help you find resources for any of the problems found today that may be affecting your health:                Living healthy guide: www.[facility].[place].[facility]      Understanding Diabetes: www.diabetes.org      Eating healthy: www.cdc.gov/healthyweight      [facility] home safety checklist: www.cdc.gov/steadi/patient.html      Agency on Aging: www.goea.[place].[facility]      Alcoholics anonymous (AA): www.aa.org      Physical Activity: www.saji.nih.gov/ok3qszf      Tobacco use: www.quitwithusla.org

## 2023-04-10 NOTE — PROGRESS NOTES
Manju Aranda presented for a  Medicare AWV and comprehensive Health Risk Assessment today. The following components were reviewed and updated:    Medical history  Family History  Social history  Allergies and Current Medications  Health Risk Assessment  Health Maintenance  Care Team         ** See Completed Assessments for Annual Wellness Visit within the encounter summary.**         The following assessments were completed:  Living Situation  CAGE  Depression Screening  Timed Get Up and Go  Whisper Test  Cognitive Function Screening      Nutrition Screening  ADL Screening  PAQ Screening  OPIOID Screening: Patient does not have a prescription for narcotics. Patient does not use substance         Vitals:    04/10/23 1039   BP: 122/84   Pulse: 68   SpO2: 96%   Weight: 121.7 kg (268 lb 4.8 oz)     Body mass index is 39.62 kg/m².  Physical Exam  Vitals and nursing note reviewed.   Constitutional:       Appearance: She is well-developed. She is obese.   HENT:      Head: Normocephalic.   Cardiovascular:      Rate and Rhythm: Normal rate and regular rhythm.      Heart sounds: Murmur heard.   Pulmonary:      Effort: Pulmonary effort is normal.      Breath sounds: Normal breath sounds.   Abdominal:      General: Bowel sounds are normal.      Palpations: Abdomen is soft.   Musculoskeletal:         General: Normal range of motion.   Skin:     General: Skin is warm and dry.   Neurological:      Mental Status: She is alert and oriented to person, place, and time.      Motor: No abnormal muscle tone.   Psychiatric:         Mood and Affect: Mood normal.             Diagnoses and health risks identified today and associated recommendations/orders:    1. Encounter for preventive health examination  Here for Health Risk Assessment/Annual Wellness Visit.  Health maintenance reviewed and updated. Follow up in one year.   Report that she received influenza vaccine in fall 2022.    2. Essential hypertension  Chronic, B/P at goal  without scheduled medication. Followed by PCP.     3. Aortic atherosclerosis  Chronic, stable. Noted CTA Chest 9/07/18. Followed by PCP.    4. Mitral valve disorder  Chronic, stable. Followed by PCP.    5. Tricuspid valve disease  Chronic, stable. Followed by PCP.    6. Benign hypertensive renal disease without renal failure  Chronic, stable on current medications. Followed by PCP.    7. ADPKD (autosomal dominant polycystic kidney disease)  Chronic, stable on current medications. Followed by PCP.    8. Polycystic kidney disease  Chronic, stable on current medications. Followed by PCP.    9. Paraproteinemia  Chronic, stable. Followed by Oncology, PCP.    10. Smoldering multiple myeloma (SMM)  Chronic, stable. Followed by Oncology, PCP.    11. Severe obesity with body mass index (BMI) of 35.0 to 39.9 with comorbidity  Chronic, stable. Report \s she is walking for 30 minutes 3-4 days weekly. Followed by PCP.    12. Graves disease  Chronic, stable on current medication. Followed by PCP, Endocrinology.     13. Vitamin D deficiency  Chronic, stable on current medication. Followed by PCP, Endocrinology.     14. BECKY on CPAP  Chronic, stable with CPAP. Followed by Sleep Medicine, PCP.    15. Encounter for Medicare annual wellness exam  - Ambulatory Referral/Consult to Enhanced Annual Wellness Visit (eAWV)      Provided Manju with a 5-10 year written screening schedule and personal prevention plan. Recommendations were developed using the USPSTF age appropriate recommendations. Education, counseling, and referrals were provided as needed. After Visit Summary printed and given to patient which includes a list of additional screenings\tests needed.    Follow up in 1 week (on 4/17/2023).with PCP    Yanet Thomas NP

## 2023-04-10 NOTE — PROGRESS NOTES
"Subjective:      Patient ID: Manju Aranda is a 72 y.o. female.    Chief Complaint:   Graves  hyperthyroidism    History of Present Illness    With regards to her Graves  hyperthyroidism:  She was found to be hyperthyroid in September 2016 when she presented with to the emergency room which shortness of breath.    She was on methimazole 2016 - 2018     She presented in late august 2020 with racing heart, sob, weight loss  And was found to be hyperthyroid again   She tried inderal LA but got a migraine     restarted sept 2020   Current dose   methimazole 5 mg qd       Her daughter has Graves       Thyroid scan: none     Thyroid ultrasound:  2016   IMPRESSION:   No nodules. Study consistent with autoimmune thyroid disease as the etiology for the hyperthyroidism.    Thyroid ab done:      Ref. Range 9/20/2016 09:50   Thyrotropin Receptor Ab Latest Ref Range: 0.00 - 1.75 IU/L 7.33 (H)   Thyroperoxidase Antibodies Latest Ref Range: <6.0 IU/mL 31.8 (H)         current symptoms:    No palpations   BECKY and is using CPAP         No skin changes  No tremor   No anxiety  No BURK        Last BMD: 9/17/20     Impression:     1. Normal BMD of the lumbar spine and hip.Nl   Compared with previous DXA, BMD at the lumbar spine has increased by 5.7%, and the BMD at the total hip has decreased by -6.3%.     no pain or dryness - saw Dr Boo in the past        With regards to obesity, Body mass index is 39.64 kg/m².,     + polyuria, polydipsia but she drinks a lot of water to be healthy  No FH of DM      She is taking otc vit D     Review of Systems  As above       Objective:     /84 (BP Location: Left arm, Patient Position: Sitting, BP Method: Medium (Manual))   Pulse 68   Ht 5' 9" (1.753 m)   Wt 121.7 kg (268 lb 6.6 oz)   SpO2 96%   BMI 39.64 kg/m²   BP Readings from Last 3 Encounters:   04/10/23 122/84   04/10/23 122/84   03/08/23 110/72     Wt Readings from Last 1 Encounters:   04/10/23 1039 121.7 kg (268 lb 4.8 oz) "     Body mass index is 39.64 kg/m².      Physical Exam  Vitals reviewed.   Constitutional:       Appearance: Normal appearance.   Cardiovascular:      Rate and Rhythm: Normal rate.      Pulses: Normal pulses.   Pulmonary:      Effort: Pulmonary effort is normal.   Abdominal:      Palpations: Abdomen is soft.              Lab Review:   Lab Results   Component Value Date    HGBA1C 4.2 03/13/2023     Lab Results   Component Value Date    CHOL 143 02/26/2021    HDL 52 02/26/2021    LDLCALC 75.2 02/26/2021    TRIG 79 02/26/2021    CHOLHDL 36.4 02/26/2021     Lab Results   Component Value Date     03/13/2023     03/13/2023    K 4.0 03/13/2023    K 4.0 03/13/2023     03/13/2023     03/13/2023    CO2 29 03/13/2023    CO2 29 03/13/2023    GLU 82 03/13/2023    GLU 82 03/13/2023    BUN 19 03/13/2023    BUN 19 03/13/2023    CREATININE 1.0 03/13/2023    CREATININE 1.0 03/13/2023    CALCIUM 9.5 03/13/2023    CALCIUM 9.5 03/13/2023    PROT 7.6 03/13/2023    PROT 7.6 03/13/2023    ALBUMIN 3.7 03/13/2023    ALBUMIN 3.7 03/13/2023    BILITOT 0.7 03/13/2023    BILITOT 0.7 03/13/2023    ALKPHOS 106 03/13/2023    ALKPHOS 106 03/13/2023    AST 16 03/13/2023    AST 16 03/13/2023    ALT 11 03/13/2023    ALT 11 03/13/2023    ANIONGAP 5 (L) 03/13/2023    ANIONGAP 5 (L) 03/13/2023    ESTGFRAFRICA >60.0 04/08/2022    EGFRNONAA >60.0 04/08/2022    TSH 1.614 03/13/2023     Vit D, 25-Hydroxy   Date Value Ref Range Status   03/13/2023 15 (L) 30 - 96 ng/mL Final     Comment:     Vitamin D deficiency.........<10 ng/mL                              Vitamin D insufficiency......10-29 ng/mL       Vitamin D sufficiency........> or equal to 30 ng/mL  Vitamin D toxicity............>100 ng/mL         Assessment and Plan     Severe obesity with body mass index (BMI) of 35.0 to 39.9 with comorbidity  Reviewed  risk of t2dm  She restarted  walking in the am     Graves disease  reviewed treatment options of I131, ATD or surgery, surgery  being least desirable.    Discussed risks of worsening GO with I131  Review the connection of GO with tob use    Discussed the chance of Graves remission (30%) after 2 years of ATD therapy - with a 50% chance of recurrence        Call if fever, sore throat, rash, anorexia, nausea or vomiitng.  Reviewed side effects of ATDs are rare (agranulocytosis and liver failure) but can be very serious.     Decrease from 5 to 2.5 mg daily   Labs in 4 weeks   rtc 12  months          Graves' eye disease  Address above  F/u with Dr Boo          Vitamin D deficiency   over the counter vitamin D 2000 iu a day

## 2023-04-14 ENCOUNTER — TELEPHONE (OUTPATIENT)
Dept: HEPATOLOGY | Facility: CLINIC | Age: 73
End: 2023-04-14
Payer: MEDICARE

## 2023-04-14 NOTE — TELEPHONE ENCOUNTER
Returned call to patient.  She states that she just seen her calendar and make sure if she needs to be seen this year.  But, she states that she don't have any symptoms to be worried.  Also Dr. De La Cruz's schedule is fully booked until the end of July.  She is going to call us back, if necessary.

## 2023-04-14 NOTE — TELEPHONE ENCOUNTER
----- Message from Gerri Brown RN sent at 4/14/2023  8:26 AM CDT -----  Regarding: RE: Schedule F/U    ----- Message -----  From: Kati Whitfield MA  Sent: 4/14/2023   7:21 AM CDT  To: Gerri Brown RN  Subject: FW: Schedule F/U                                 Hey! Ms. Talley, please triage if she needs to be seen as new patient or EP. And also, please put new order for any test she needed.  Thank you.  ----- Message -----  From: Nati Peña MA  Sent: 4/13/2023   4:23 PM CDT  To: Kati Whitfield MA  Subject: FW: Schedule F/U                                   ----- Message -----  From: Jose A Liang  Sent: 4/13/2023   1:30 PM CDT  To: [facility] Staff  Subject: Schedule F/U                                     Patient called in to schedule an appt with provider but has not been seen since March 2021. Inquiring to confirm if she can still be seen. If so, inquiring if prior testing will be needed. Requested a call back to assist further.                    Contact: [phone]

## 2023-04-17 ENCOUNTER — OFFICE VISIT (OUTPATIENT)
Dept: INTERNAL MEDICINE | Facility: CLINIC | Age: 73
End: 2023-04-17
Payer: MEDICARE

## 2023-04-17 ENCOUNTER — HOSPITAL ENCOUNTER (OUTPATIENT)
Dept: CARDIOLOGY | Facility: OTHER | Age: 73
Discharge: HOME | End: 2023-04-17
Attending: INTERNAL MEDICINE
Payer: MEDICARE

## 2023-04-17 VITALS
OXYGEN SATURATION: 98 % | BODY MASS INDEX: 39.28 KG/M2 | DIASTOLIC BLOOD PRESSURE: 55 MMHG | SYSTOLIC BLOOD PRESSURE: 109 MMHG | HEART RATE: 74 BPM | WEIGHT: 266 LBS

## 2023-04-17 DIAGNOSIS — I10 PRIMARY HYPERTENSION: ICD-10-CM

## 2023-04-17 DIAGNOSIS — R06.09 DYSPNEA ON EXERTION: ICD-10-CM

## 2023-04-17 DIAGNOSIS — G47.00 INSOMNIA, UNSPECIFIED TYPE: ICD-10-CM

## 2023-04-17 DIAGNOSIS — I35.8 MILD AORTIC VALVE SCLEROSIS: ICD-10-CM

## 2023-04-17 DIAGNOSIS — I70.0 AORTIC ATHEROSCLEROSIS: ICD-10-CM

## 2023-04-17 DIAGNOSIS — E05.00 GRAVES DISEASE: ICD-10-CM

## 2023-04-17 DIAGNOSIS — K76.89 SIMPLE HEPATIC CYST: ICD-10-CM

## 2023-04-17 DIAGNOSIS — G47.33 OSA ON CPAP: ICD-10-CM

## 2023-04-17 DIAGNOSIS — Z12.12 SCREENING FOR COLORECTAL CANCER: Primary | ICD-10-CM

## 2023-04-17 DIAGNOSIS — Z12.11 SCREENING FOR COLORECTAL CANCER: Primary | ICD-10-CM

## 2023-04-17 DIAGNOSIS — E55.9 VITAMIN D DEFICIENCY: ICD-10-CM

## 2023-04-17 DIAGNOSIS — Q61.3 POLYCYSTIC KIDNEY DISEASE: ICD-10-CM

## 2023-04-17 PROCEDURE — 99499 RISK ADDL DX/OHS AUDIT: ICD-10-PCS | Mod: HCNC,S$GLB,, | Performed by: STUDENT IN AN ORGANIZED HEALTH CARE EDUCATION/TRAINING PROGRAM

## 2023-04-17 PROCEDURE — 3078F DIAST BP <80 MM HG: CPT | Mod: HCNC,CPTII,S$GLB, | Performed by: STUDENT IN AN ORGANIZED HEALTH CARE EDUCATION/TRAINING PROGRAM

## 2023-04-17 PROCEDURE — 3044F PR MOST RECENT HEMOGLOBIN A1C LEVEL <7.0%: ICD-10-PCS | Mod: HCNC,CPTII,S$GLB, | Performed by: STUDENT IN AN ORGANIZED HEALTH CARE EDUCATION/TRAINING PROGRAM

## 2023-04-17 PROCEDURE — 3061F NEG MICROALBUMINURIA REV: CPT | Mod: HCNC,CPTII,S$GLB, | Performed by: STUDENT IN AN ORGANIZED HEALTH CARE EDUCATION/TRAINING PROGRAM

## 2023-04-17 PROCEDURE — 3008F BODY MASS INDEX DOCD: CPT | Mod: HCNC,CPTII,S$GLB, | Performed by: STUDENT IN AN ORGANIZED HEALTH CARE EDUCATION/TRAINING PROGRAM

## 2023-04-17 PROCEDURE — 3066F NEPHROPATHY DOC TX: CPT | Mod: HCNC,CPTII,S$GLB, | Performed by: STUDENT IN AN ORGANIZED HEALTH CARE EDUCATION/TRAINING PROGRAM

## 2023-04-17 PROCEDURE — 1126F AMNT PAIN NOTED NONE PRSNT: CPT | Mod: HCNC,CPTII,S$GLB, | Performed by: STUDENT IN AN ORGANIZED HEALTH CARE EDUCATION/TRAINING PROGRAM

## 2023-04-17 PROCEDURE — 3078F PR MOST RECENT DIASTOLIC BLOOD PRESSURE < 80 MM HG: ICD-10-PCS | Mod: HCNC,CPTII,S$GLB, | Performed by: STUDENT IN AN ORGANIZED HEALTH CARE EDUCATION/TRAINING PROGRAM

## 2023-04-17 PROCEDURE — 3061F PR NEG MICROALBUMINURIA RESULT DOCUMENTED/REVIEW: ICD-10-PCS | Mod: HCNC,CPTII,S$GLB, | Performed by: STUDENT IN AN ORGANIZED HEALTH CARE EDUCATION/TRAINING PROGRAM

## 2023-04-17 PROCEDURE — 4010F ACE/ARB THERAPY RXD/TAKEN: CPT | Mod: HCNC,CPTII,S$GLB, | Performed by: STUDENT IN AN ORGANIZED HEALTH CARE EDUCATION/TRAINING PROGRAM

## 2023-04-17 PROCEDURE — 3008F PR BODY MASS INDEX (BMI) DOCUMENTED: ICD-10-PCS | Mod: HCNC,CPTII,S$GLB, | Performed by: STUDENT IN AN ORGANIZED HEALTH CARE EDUCATION/TRAINING PROGRAM

## 2023-04-17 PROCEDURE — 99999 PR PBB SHADOW E&M-EST. PATIENT-LVL III: ICD-10-PCS | Mod: PBBFAC,HCNC,, | Performed by: STUDENT IN AN ORGANIZED HEALTH CARE EDUCATION/TRAINING PROGRAM

## 2023-04-17 PROCEDURE — 1101F PR PT FALLS ASSESS DOC 0-1 FALLS W/OUT INJ PAST YR: ICD-10-PCS | Mod: HCNC,CPTII,S$GLB, | Performed by: STUDENT IN AN ORGANIZED HEALTH CARE EDUCATION/TRAINING PROGRAM

## 2023-04-17 PROCEDURE — 3044F HG A1C LEVEL LT 7.0%: CPT | Mod: HCNC,CPTII,S$GLB, | Performed by: STUDENT IN AN ORGANIZED HEALTH CARE EDUCATION/TRAINING PROGRAM

## 2023-04-17 PROCEDURE — 1101F PT FALLS ASSESS-DOCD LE1/YR: CPT | Mod: HCNC,CPTII,S$GLB, | Performed by: STUDENT IN AN ORGANIZED HEALTH CARE EDUCATION/TRAINING PROGRAM

## 2023-04-17 PROCEDURE — 3074F PR MOST RECENT SYSTOLIC BLOOD PRESSURE < 130 MM HG: ICD-10-PCS | Mod: HCNC,CPTII,S$GLB, | Performed by: STUDENT IN AN ORGANIZED HEALTH CARE EDUCATION/TRAINING PROGRAM

## 2023-04-17 PROCEDURE — 99215 OFFICE O/P EST HI 40 MIN: CPT | Mod: HCNC,S$GLB,, | Performed by: STUDENT IN AN ORGANIZED HEALTH CARE EDUCATION/TRAINING PROGRAM

## 2023-04-17 PROCEDURE — 3288F PR FALLS RISK ASSESSMENT DOCUMENTED: ICD-10-PCS | Mod: HCNC,CPTII,S$GLB, | Performed by: STUDENT IN AN ORGANIZED HEALTH CARE EDUCATION/TRAINING PROGRAM

## 2023-04-17 PROCEDURE — 99499 UNLISTED E&M SERVICE: CPT | Mod: HCNC,S$GLB,, | Performed by: STUDENT IN AN ORGANIZED HEALTH CARE EDUCATION/TRAINING PROGRAM

## 2023-04-17 PROCEDURE — 93005 ELECTROCARDIOGRAM TRACING: CPT | Mod: HCNC

## 2023-04-17 PROCEDURE — 3288F FALL RISK ASSESSMENT DOCD: CPT | Mod: HCNC,CPTII,S$GLB, | Performed by: STUDENT IN AN ORGANIZED HEALTH CARE EDUCATION/TRAINING PROGRAM

## 2023-04-17 PROCEDURE — 99999 PR PBB SHADOW E&M-EST. PATIENT-LVL III: CPT | Mod: PBBFAC,HCNC,, | Performed by: STUDENT IN AN ORGANIZED HEALTH CARE EDUCATION/TRAINING PROGRAM

## 2023-04-17 PROCEDURE — 93010 ELECTROCARDIOGRAM REPORT: CPT | Mod: HCNC,,, | Performed by: INTERNAL MEDICINE

## 2023-04-17 PROCEDURE — 3066F PR DOCUMENTATION OF TREATMENT FOR NEPHROPATHY: ICD-10-PCS | Mod: HCNC,CPTII,S$GLB, | Performed by: STUDENT IN AN ORGANIZED HEALTH CARE EDUCATION/TRAINING PROGRAM

## 2023-04-17 PROCEDURE — 1159F MED LIST DOCD IN RCRD: CPT | Mod: HCNC,CPTII,S$GLB, | Performed by: STUDENT IN AN ORGANIZED HEALTH CARE EDUCATION/TRAINING PROGRAM

## 2023-04-17 PROCEDURE — 99215 PR OFFICE/OUTPT VISIT, EST, LEVL V, 40-54 MIN: ICD-10-PCS | Mod: HCNC,S$GLB,, | Performed by: STUDENT IN AN ORGANIZED HEALTH CARE EDUCATION/TRAINING PROGRAM

## 2023-04-17 PROCEDURE — 1126F PR PAIN SEVERITY QUANTIFIED, NO PAIN PRESENT: ICD-10-PCS | Mod: HCNC,CPTII,S$GLB, | Performed by: STUDENT IN AN ORGANIZED HEALTH CARE EDUCATION/TRAINING PROGRAM

## 2023-04-17 PROCEDURE — 4010F PR ACE/ARB THEARPY RXD/TAKEN: ICD-10-PCS | Mod: HCNC,CPTII,S$GLB, | Performed by: STUDENT IN AN ORGANIZED HEALTH CARE EDUCATION/TRAINING PROGRAM

## 2023-04-17 PROCEDURE — 3074F SYST BP LT 130 MM HG: CPT | Mod: HCNC,CPTII,S$GLB, | Performed by: STUDENT IN AN ORGANIZED HEALTH CARE EDUCATION/TRAINING PROGRAM

## 2023-04-17 PROCEDURE — 1159F PR MEDICATION LIST DOCUMENTED IN MEDICAL RECORD: ICD-10-PCS | Mod: HCNC,CPTII,S$GLB, | Performed by: STUDENT IN AN ORGANIZED HEALTH CARE EDUCATION/TRAINING PROGRAM

## 2023-04-17 PROCEDURE — 93010 EKG 12-LEAD: ICD-10-PCS | Mod: HCNC,,, | Performed by: INTERNAL MEDICINE

## 2023-04-17 RX ORDER — TRAZODONE HYDROCHLORIDE 50 MG/1
50 TABLET ORAL NIGHTLY PRN
Qty: 30 TABLET | Refills: 2 | Status: SHIPPED | OUTPATIENT
Start: 2023-04-17

## 2023-04-17 NOTE — PROGRESS NOTES
Subjective:       Patient ID: Manju Aranda is a 72 y.o. female.    Chief Complaint: Screening for colorectal cancer [Z12.11, Z12.12]    Patient is established with me, here today for the following:    HTN, PCKD, Graves disease, smoldering multiple myeloma, migraines, diverticulosis, BECKY on CPAP, hepatic cysts, vitamin D deficiency     CRC screening -   Colonoscopy performed NOV2018. Told to repeat in 5 years.  Will be scheduling colonoscopy with Dr. Lewis  Family history of colorectal cancer.     Dyspnea on exertion -   Atherosclerosis -   Mild aortic valve sclerosis -   Endorses feels dyspnea with exertion, denies at rest  Will experience palpitations at the same time  Improves with rest  Has been worsening over the past few weeks   Taking ASA for primary prevention  Underwent cardiac stress testing in MAY2020  Had previously followed with Dr. Broderick for cardiology  Echo with EF 55%, mild aortic valve sclerosis at that time  Stress portion negative for ischemia   Was previously on rosuvastatin, but discontinued due to side effects  Due for lipid screening.  Lab Results       Component                Value               Date                       LDLCALC                  75.2                02/26/2021            The 10-year ASCVD risk score (Elsie DK, et al., 2019) is: 6.9%    UTD on diabetes screening.  Lab Results       Component                Value               Date                       HGBA1C                   4.2                 03/13/2023            Not currently exercising routinely.  Plans to start exercise regimen.     HTN -   Taking lisinopril 40 mg since JAN2023  BP's improved with dose increase.   Patient endorses taking medication as directed.  Denies side effects or concerns while taking medication.  Denies headaches, vision changes, CP, palpitations, or other concerning symptoms.  Lab Results       Component                Value               Date                       MICALBCREAT               10.2                03/13/2023            BP Readings from Last 3 Encounters:  04/10/23 : 122/84  04/10/23 : 122/84  03/08/23 : 110/72     Graves disease -   Taking methimazole as directed, decreased from 5 to 2.5 mg daily   Plans to recheck labs beginning of MAY2023  Following with Dr. Rajput for endocrinology  Lab Results       Component                Value               Date                       TSH                      2.732               12/12/2022                 TSH                      1.219               09/07/2022                 TSH                      2.430               08/02/2022               Vitamin D deficiency -  Increased vitamin D3 to 2000 IU daily after last check  Lab Results       Component                Value               Date                       [id number]             15 (L)              03/13/2023                 [id number]             31                  02/26/2021                 [id number]             40                  07/25/2016               BECKY  -   Followed with JACKELINE Chambers for CPAP titration  States CPAP working better for her now  But for the past month has been having difficulty sleeping   Denies any identified triggers prior to change in sleep  Goes to bed around 10 pm without difficulty  Wakes around 3 am without identified cause  Takes about 1-2 hours before can sleep again  Wakes normally at 7:30 am   Takes magnesium BID   Tried melatonin 3-6 mg nightly without improvement  Tried doxylamine with palpitations     Polycystic kidney disease -  Has follow up appt with Dr. Bui scheduled MAY2023  Previously followed with nephrology, last appt in 2019  Noted with microhematuria on prior exams      Hepatic cysts -  Called to schedule with Dr. De La Cruz, waiting on appt time   Last appointment with hepatology in MAR2021  Recommended q2 year abdominal u/s and follow up with hepatology PRN    Review of Systems   Constitutional:  Negative for fatigue.   Respiratory:   Positive for shortness of breath. Negative for cough.    Cardiovascular:  Positive for palpitations. Negative for chest pain and leg swelling.   Psychiatric/Behavioral:  Positive for sleep disturbance.        Current Outpatient Medications   Medication Instructions    ascorbic acid (vitamin C) (VITAMIN C) 500 mg, Oral, Daily    aspirin 81 mg, Oral, Daily    cholecalciferol (vitamin D3) (VITAMIN D3) 2,000 Units, Oral, Daily    magnesium oxide (MAG-OX) 400 mg, Oral, Daily    methIMAzole (TAPAZOLE) 5 MG Tab 1/2 pill a day    multivit with min-folic acid 0.4 mg Tab 0.4 mg, Oral, Daily    psyllium (METAMUCIL) powder 1 packet, Oral, Daily    rizatriptan (MAXALT) 10 MG tablet TAKE 1 TABLET(10 MG) BY MOUTH EVERY 2 HOURS AS NEEDED FOR MIGRAINE. MAX 30 MG/ 24 AT BEDTIME    traZODone (DESYREL) 50 mg, Oral, Nightly PRN     Objective:      Vitals:    04/17/23 0836   BP: (!) 109/55   Pulse: 74   SpO2: 98%   Weight: 120.7 kg (265 lb 15.8 oz)   PainSc: 0-No pain     Body mass index is 39.28 kg/m².    Physical Exam  Vitals reviewed.   Constitutional:       General: She is not in acute distress.     Appearance: She is not ill-appearing or diaphoretic.   Cardiovascular:      Rate and Rhythm: Normal rate and regular rhythm.      Heart sounds: Normal heart sounds. No murmur heard.    No friction rub. No gallop.   Pulmonary:      Effort: Pulmonary effort is normal. No respiratory distress.      Breath sounds: Normal breath sounds. No stridor. No wheezing, rhonchi or rales.   Skin:     General: Skin is warm and dry.      Comments: Color WNL   Neurological:      Mental Status: She is alert. Mental status is at baseline.   Psychiatric:         Mood and Affect: Mood normal.         Behavior: Behavior normal.       Assessment:       1. Screening for colorectal cancer    2. Dyspnea on exertion    3. Aortic atherosclerosis    4. Mild aortic valve sclerosis    5. Primary hypertension    6. Graves disease    7. Vitamin D deficiency    8. BECKY on  CPAP    9. Insomnia, unspecified type    10. Polycystic kidney disease    11. Simple hepatic cyst        Plan:       Screening for colorectal cancer  Continue evaluation and management per CRS.    Dyspnea on exertion  Aortic atherosclerosis  Mild aortic valve sclerosis  Recheck lipids  Echo and EKG  Referral to cardiology  -     EKG 12-lead; Future  -     Echo; Future  -     Ambulatory referral/consult to Cardiology; Future  -     Lipid Panel; Future    Primary hypertension  Continue current medications.  RTC in 6 months for follow up.    Graves disease  Continue medication, evaluation, and management per endocrinologist.    Vitamin D deficiency  Continue supplementation.  RTC in 6 months for follow up.    BECKY on CPAP  Continue evaluation and management per sleep medicine.    Insomnia, unspecified type  Start trazodone PRN  Given integrative sleep medicine handout  -     traZODone (DESYREL) 50 MG tablet; Take 1 tablet (50 mg total) by mouth nightly as needed for Insomnia.    Polycystic kidney disease  Continue medication, evaluation, and management per urologist.    Simple hepatic cyst  Continue evaluation and management per hepatologist.      46 minutes were spent in chart review, documentation and review of results, and evaluation, treatment, and counseling of patient on the same day of service.    Jazzy Charles MD  4/17/2023

## 2023-04-28 ENCOUNTER — LAB VISIT (OUTPATIENT)
Dept: LAB | Facility: OTHER | Age: 73
End: 2023-04-28
Attending: STUDENT IN AN ORGANIZED HEALTH CARE EDUCATION/TRAINING PROGRAM
Payer: MEDICARE

## 2023-04-28 ENCOUNTER — OFFICE VISIT (OUTPATIENT)
Dept: CARDIOLOGY | Facility: CLINIC | Age: 73
End: 2023-04-28
Payer: MEDICARE

## 2023-04-28 VITALS
DIASTOLIC BLOOD PRESSURE: 70 MMHG | WEIGHT: 266.5 LBS | HEART RATE: 78 BPM | BODY MASS INDEX: 39.36 KG/M2 | OXYGEN SATURATION: 99 % | SYSTOLIC BLOOD PRESSURE: 112 MMHG

## 2023-04-28 DIAGNOSIS — I10 PRIMARY HYPERTENSION: ICD-10-CM

## 2023-04-28 DIAGNOSIS — D47.2 SMOLDERING MULTIPLE MYELOMA (SMM): ICD-10-CM

## 2023-04-28 DIAGNOSIS — I35.8 MILD AORTIC VALVE SCLEROSIS: ICD-10-CM

## 2023-04-28 DIAGNOSIS — D53.9 NUTRITIONAL ANEMIA: ICD-10-CM

## 2023-04-28 DIAGNOSIS — R06.09 EXERTIONAL DYSPNEA: ICD-10-CM

## 2023-04-28 DIAGNOSIS — R07.2 PRECORDIAL PAIN: Primary | ICD-10-CM

## 2023-04-28 DIAGNOSIS — I70.0 AORTIC ATHEROSCLEROSIS: ICD-10-CM

## 2023-04-28 LAB
ALBUMIN SERPL BCP-MCNC: 3.5 G/DL (ref 3.5–5.2)
ALP SERPL-CCNC: 99 U/L (ref 55–135)
ALT SERPL W/O P-5'-P-CCNC: 15 U/L (ref 10–44)
ANION GAP SERPL CALC-SCNC: 8 MMOL/L (ref 8–16)
AST SERPL-CCNC: 19 U/L (ref 10–40)
BASOPHILS # BLD AUTO: 0.01 K/UL (ref 0–0.2)
BASOPHILS # BLD AUTO: 0.01 K/UL (ref 0–0.2)
BASOPHILS NFR BLD: 0.2 % (ref 0–1.9)
BASOPHILS NFR BLD: 0.2 % (ref 0–1.9)
BILIRUB SERPL-MCNC: 0.6 MG/DL (ref 0.1–1)
BUN SERPL-MCNC: 17 MG/DL (ref 8–23)
CALCIUM SERPL-MCNC: 9.3 MG/DL (ref 8.7–10.5)
CHLORIDE SERPL-SCNC: 109 MMOL/L (ref 95–110)
CHOLEST SERPL-MCNC: 135 MG/DL (ref 120–199)
CHOLEST/HDLC SERPL: 2.8 {RATIO} (ref 2–5)
CO2 SERPL-SCNC: 26 MMOL/L (ref 23–29)
CREAT SERPL-MCNC: 1 MG/DL (ref 0.5–1.4)
DIFFERENTIAL METHOD: ABNORMAL
DIFFERENTIAL METHOD: ABNORMAL
EOSINOPHIL # BLD AUTO: 0 K/UL (ref 0–0.5)
EOSINOPHIL # BLD AUTO: 0 K/UL (ref 0–0.5)
EOSINOPHIL NFR BLD: 0.2 % (ref 0–8)
EOSINOPHIL NFR BLD: 0.2 % (ref 0–8)
ERYTHROCYTE [DISTWIDTH] IN BLOOD BY AUTOMATED COUNT: 11 % (ref 11.5–14.5)
ERYTHROCYTE [DISTWIDTH] IN BLOOD BY AUTOMATED COUNT: 11 % (ref 11.5–14.5)
EST. GFR  (NO RACE VARIABLE): >60 ML/MIN/1.73 M^2
FERRITIN SERPL-MCNC: 177 NG/ML (ref 20–300)
FOLATE SERPL-MCNC: 12.9 NG/ML (ref 4–24)
GLUCOSE SERPL-MCNC: 82 MG/DL (ref 70–110)
HCT VFR BLD AUTO: 36.7 % (ref 37–48.5)
HCT VFR BLD AUTO: 36.7 % (ref 37–48.5)
HCYS SERPL-SCNC: 6.4 UMOL/L (ref 4–15.5)
HDLC SERPL-MCNC: 49 MG/DL (ref 40–75)
HDLC SERPL: 36.3 % (ref 20–50)
HGB BLD-MCNC: 11.6 G/DL (ref 12–16)
HGB BLD-MCNC: 11.6 G/DL (ref 12–16)
IGA SERPL-MCNC: 99 MG/DL (ref 40–350)
IGG SERPL-MCNC: 1788 MG/DL (ref 650–1600)
IGM SERPL-MCNC: 31 MG/DL (ref 50–300)
IMM GRANULOCYTES # BLD AUTO: 0.02 K/UL (ref 0–0.04)
IMM GRANULOCYTES # BLD AUTO: 0.02 K/UL (ref 0–0.04)
IMM GRANULOCYTES NFR BLD AUTO: 0.5 % (ref 0–0.5)
IMM GRANULOCYTES NFR BLD AUTO: 0.5 % (ref 0–0.5)
IRON SERPL-MCNC: 58 UG/DL (ref 30–160)
LDLC SERPL CALC-MCNC: 64 MG/DL (ref 63–159)
LYMPHOCYTES # BLD AUTO: 1.3 K/UL (ref 1–4.8)
LYMPHOCYTES # BLD AUTO: 1.3 K/UL (ref 1–4.8)
LYMPHOCYTES NFR BLD: 31.7 % (ref 18–48)
LYMPHOCYTES NFR BLD: 31.7 % (ref 18–48)
MCH RBC QN AUTO: 31.4 PG (ref 27–31)
MCH RBC QN AUTO: 31.4 PG (ref 27–31)
MCHC RBC AUTO-ENTMCNC: 31.6 G/DL (ref 32–36)
MCHC RBC AUTO-ENTMCNC: 31.6 G/DL (ref 32–36)
MCV RBC AUTO: 99 FL (ref 82–98)
MCV RBC AUTO: 99 FL (ref 82–98)
MONOCYTES # BLD AUTO: 0.4 K/UL (ref 0.3–1)
MONOCYTES # BLD AUTO: 0.4 K/UL (ref 0.3–1)
MONOCYTES NFR BLD: 9.6 % (ref 4–15)
MONOCYTES NFR BLD: 9.6 % (ref 4–15)
NEUTROPHILS # BLD AUTO: 2.4 K/UL (ref 1.8–7.7)
NEUTROPHILS # BLD AUTO: 2.4 K/UL (ref 1.8–7.7)
NEUTROPHILS NFR BLD: 57.8 % (ref 38–73)
NEUTROPHILS NFR BLD: 57.8 % (ref 38–73)
NONHDLC SERPL-MCNC: 86 MG/DL
NRBC BLD-RTO: 0 /100 WBC
NRBC BLD-RTO: 0 /100 WBC
PATH REV BLD -IMP: NORMAL
PLATELET # BLD AUTO: 211 K/UL (ref 150–450)
PLATELET # BLD AUTO: 211 K/UL (ref 150–450)
PMV BLD AUTO: 9.7 FL (ref 9.2–12.9)
PMV BLD AUTO: 9.7 FL (ref 9.2–12.9)
POTASSIUM SERPL-SCNC: 4 MMOL/L (ref 3.5–5.1)
PROT SERPL-MCNC: 7.4 G/DL (ref 6–8.4)
RBC # BLD AUTO: 3.7 M/UL (ref 4–5.4)
RBC # BLD AUTO: 3.7 M/UL (ref 4–5.4)
RETICS/RBC NFR AUTO: 2.2 % (ref 0.5–2.5)
SATURATED IRON: 17 % (ref 20–50)
SODIUM SERPL-SCNC: 143 MMOL/L (ref 136–145)
TOTAL IRON BINDING CAPACITY: 332 UG/DL (ref 250–450)
TRANSFERRIN SERPL-MCNC: 224 MG/DL (ref 200–375)
TRIGL SERPL-MCNC: 110 MG/DL (ref 30–150)
VIT B12 SERPL-MCNC: 400 PG/ML (ref 210–950)
WBC # BLD AUTO: 4.16 K/UL (ref 3.9–12.7)
WBC # BLD AUTO: 4.16 K/UL (ref 3.9–12.7)

## 2023-04-28 PROCEDURE — 1159F PR MEDICATION LIST DOCUMENTED IN MEDICAL RECORD: ICD-10-PCS | Mod: HCNC,CPTII,S$GLB, | Performed by: INTERNAL MEDICINE

## 2023-04-28 PROCEDURE — 3078F PR MOST RECENT DIASTOLIC BLOOD PRESSURE < 80 MM HG: ICD-10-PCS | Mod: HCNC,CPTII,S$GLB, | Performed by: INTERNAL MEDICINE

## 2023-04-28 PROCEDURE — 84165 PROTEIN E-PHORESIS SERUM: CPT | Mod: 26,HCNC,, | Performed by: PATHOLOGY

## 2023-04-28 PROCEDURE — 3044F PR MOST RECENT HEMOGLOBIN A1C LEVEL <7.0%: ICD-10-PCS | Mod: HCNC,CPTII,S$GLB, | Performed by: INTERNAL MEDICINE

## 2023-04-28 PROCEDURE — 84466 ASSAY OF TRANSFERRIN: CPT | Mod: HCNC | Performed by: STUDENT IN AN ORGANIZED HEALTH CARE EDUCATION/TRAINING PROGRAM

## 2023-04-28 PROCEDURE — 3288F FALL RISK ASSESSMENT DOCD: CPT | Mod: HCNC,CPTII,S$GLB, | Performed by: INTERNAL MEDICINE

## 2023-04-28 PROCEDURE — 4010F PR ACE/ARB THEARPY RXD/TAKEN: ICD-10-PCS | Mod: HCNC,CPTII,S$GLB, | Performed by: INTERNAL MEDICINE

## 2023-04-28 PROCEDURE — 82746 ASSAY OF FOLIC ACID SERUM: CPT | Mod: HCNC | Performed by: STUDENT IN AN ORGANIZED HEALTH CARE EDUCATION/TRAINING PROGRAM

## 2023-04-28 PROCEDURE — 36415 COLL VENOUS BLD VENIPUNCTURE: CPT | Mod: HCNC | Performed by: NURSE PRACTITIONER

## 2023-04-28 PROCEDURE — 99215 PR OFFICE/OUTPT VISIT, EST, LEVL V, 40-54 MIN: ICD-10-PCS | Mod: HCNC,S$GLB,, | Performed by: INTERNAL MEDICINE

## 2023-04-28 PROCEDURE — 99999 PR PBB SHADOW E&M-EST. PATIENT-LVL IV: CPT | Mod: PBBFAC,HCNC,, | Performed by: INTERNAL MEDICINE

## 2023-04-28 PROCEDURE — 1159F MED LIST DOCD IN RCRD: CPT | Mod: HCNC,CPTII,S$GLB, | Performed by: INTERNAL MEDICINE

## 2023-04-28 PROCEDURE — 99215 OFFICE O/P EST HI 40 MIN: CPT | Mod: HCNC,S$GLB,, | Performed by: INTERNAL MEDICINE

## 2023-04-28 PROCEDURE — 3074F PR MOST RECENT SYSTOLIC BLOOD PRESSURE < 130 MM HG: ICD-10-PCS | Mod: HCNC,CPTII,S$GLB, | Performed by: INTERNAL MEDICINE

## 2023-04-28 PROCEDURE — 85060 BLOOD SMEAR INTERPRETATION: CPT | Mod: HCNC,,, | Performed by: PATHOLOGY

## 2023-04-28 PROCEDURE — 3066F PR DOCUMENTATION OF TREATMENT FOR NEPHROPATHY: ICD-10-PCS | Mod: HCNC,CPTII,S$GLB, | Performed by: INTERNAL MEDICINE

## 2023-04-28 PROCEDURE — 1101F PT FALLS ASSESS-DOCD LE1/YR: CPT | Mod: HCNC,CPTII,S$GLB, | Performed by: INTERNAL MEDICINE

## 2023-04-28 PROCEDURE — 84165 PATHOLOGIST INTERPRETATION SPE: ICD-10-PCS | Mod: 26,HCNC,, | Performed by: PATHOLOGY

## 2023-04-28 PROCEDURE — 85060 PATHOLOGIST REVIEW: ICD-10-PCS | Mod: HCNC,,, | Performed by: PATHOLOGY

## 2023-04-28 PROCEDURE — 85025 COMPLETE CBC W/AUTO DIFF WBC: CPT | Mod: HCNC | Performed by: NURSE PRACTITIONER

## 2023-04-28 PROCEDURE — 82607 VITAMIN B-12: CPT | Mod: HCNC | Performed by: STUDENT IN AN ORGANIZED HEALTH CARE EDUCATION/TRAINING PROGRAM

## 2023-04-28 PROCEDURE — 3008F BODY MASS INDEX DOCD: CPT | Mod: HCNC,CPTII,S$GLB, | Performed by: INTERNAL MEDICINE

## 2023-04-28 PROCEDURE — 84165 PROTEIN E-PHORESIS SERUM: CPT | Mod: HCNC | Performed by: NURSE PRACTITIONER

## 2023-04-28 PROCEDURE — 83090 ASSAY OF HOMOCYSTEINE: CPT | Mod: HCNC | Performed by: STUDENT IN AN ORGANIZED HEALTH CARE EDUCATION/TRAINING PROGRAM

## 2023-04-28 PROCEDURE — 1160F RVW MEDS BY RX/DR IN RCRD: CPT | Mod: HCNC,CPTII,S$GLB, | Performed by: INTERNAL MEDICINE

## 2023-04-28 PROCEDURE — 1160F PR REVIEW ALL MEDS BY PRESCRIBER/CLIN PHARMACIST DOCUMENTED: ICD-10-PCS | Mod: HCNC,CPTII,S$GLB, | Performed by: INTERNAL MEDICINE

## 2023-04-28 PROCEDURE — 3008F PR BODY MASS INDEX (BMI) DOCUMENTED: ICD-10-PCS | Mod: HCNC,CPTII,S$GLB, | Performed by: INTERNAL MEDICINE

## 2023-04-28 PROCEDURE — 3061F PR NEG MICROALBUMINURIA RESULT DOCUMENTED/REVIEW: ICD-10-PCS | Mod: HCNC,CPTII,S$GLB, | Performed by: INTERNAL MEDICINE

## 2023-04-28 PROCEDURE — 1126F AMNT PAIN NOTED NONE PRSNT: CPT | Mod: HCNC,CPTII,S$GLB, | Performed by: INTERNAL MEDICINE

## 2023-04-28 PROCEDURE — 3074F SYST BP LT 130 MM HG: CPT | Mod: HCNC,CPTII,S$GLB, | Performed by: INTERNAL MEDICINE

## 2023-04-28 PROCEDURE — 3061F NEG MICROALBUMINURIA REV: CPT | Mod: HCNC,CPTII,S$GLB, | Performed by: INTERNAL MEDICINE

## 2023-04-28 PROCEDURE — 80053 COMPREHEN METABOLIC PANEL: CPT | Mod: HCNC | Performed by: NURSE PRACTITIONER

## 2023-04-28 PROCEDURE — 86334 IMMUNOFIX E-PHORESIS SERUM: CPT | Mod: HCNC | Performed by: NURSE PRACTITIONER

## 2023-04-28 PROCEDURE — 3288F PR FALLS RISK ASSESSMENT DOCUMENTED: ICD-10-PCS | Mod: HCNC,CPTII,S$GLB, | Performed by: INTERNAL MEDICINE

## 2023-04-28 PROCEDURE — 99999 PR PBB SHADOW E&M-EST. PATIENT-LVL IV: ICD-10-PCS | Mod: PBBFAC,HCNC,, | Performed by: INTERNAL MEDICINE

## 2023-04-28 PROCEDURE — 86334 PATHOLOGIST INTERPRETATION IFE: ICD-10-PCS | Mod: 26,HCNC,, | Performed by: PATHOLOGY

## 2023-04-28 PROCEDURE — 82784 ASSAY IGA/IGD/IGG/IGM EACH: CPT | Mod: 59,HCNC | Performed by: NURSE PRACTITIONER

## 2023-04-28 PROCEDURE — 85045 AUTOMATED RETICULOCYTE COUNT: CPT | Mod: HCNC | Performed by: STUDENT IN AN ORGANIZED HEALTH CARE EDUCATION/TRAINING PROGRAM

## 2023-04-28 PROCEDURE — 86334 IMMUNOFIX E-PHORESIS SERUM: CPT | Mod: 26,HCNC,, | Performed by: PATHOLOGY

## 2023-04-28 PROCEDURE — 4010F ACE/ARB THERAPY RXD/TAKEN: CPT | Mod: HCNC,CPTII,S$GLB, | Performed by: INTERNAL MEDICINE

## 2023-04-28 PROCEDURE — 83921 ORGANIC ACID SINGLE QUANT: CPT | Mod: HCNC | Performed by: STUDENT IN AN ORGANIZED HEALTH CARE EDUCATION/TRAINING PROGRAM

## 2023-04-28 PROCEDURE — 1126F PR PAIN SEVERITY QUANTIFIED, NO PAIN PRESENT: ICD-10-PCS | Mod: HCNC,CPTII,S$GLB, | Performed by: INTERNAL MEDICINE

## 2023-04-28 PROCEDURE — 3044F HG A1C LEVEL LT 7.0%: CPT | Mod: HCNC,CPTII,S$GLB, | Performed by: INTERNAL MEDICINE

## 2023-04-28 PROCEDURE — 83521 IG LIGHT CHAINS FREE EACH: CPT | Mod: HCNC | Performed by: NURSE PRACTITIONER

## 2023-04-28 PROCEDURE — 3078F DIAST BP <80 MM HG: CPT | Mod: HCNC,CPTII,S$GLB, | Performed by: INTERNAL MEDICINE

## 2023-04-28 PROCEDURE — 1101F PR PT FALLS ASSESS DOC 0-1 FALLS W/OUT INJ PAST YR: ICD-10-PCS | Mod: HCNC,CPTII,S$GLB, | Performed by: INTERNAL MEDICINE

## 2023-04-28 PROCEDURE — 82728 ASSAY OF FERRITIN: CPT | Mod: HCNC | Performed by: STUDENT IN AN ORGANIZED HEALTH CARE EDUCATION/TRAINING PROGRAM

## 2023-04-28 PROCEDURE — 80061 LIPID PANEL: CPT | Mod: HCNC | Performed by: STUDENT IN AN ORGANIZED HEALTH CARE EDUCATION/TRAINING PROGRAM

## 2023-04-28 PROCEDURE — 3066F NEPHROPATHY DOC TX: CPT | Mod: HCNC,CPTII,S$GLB, | Performed by: INTERNAL MEDICINE

## 2023-04-28 RX ORDER — OLMESARTAN MEDOXOMIL 20 MG/1
20 TABLET ORAL DAILY
COMMUNITY

## 2023-04-28 NOTE — PROGRESS NOTES
[facility]    Chief Complaint  Chief Complaint   Patient presents with    Chest Pain    Shortness of Breath       HPI:    Patient presents with concerns about chest pain and dyspnea.  These symptoms are independent of each other.  She describes a stabbing chest pain which is not related to activities.  Last a few minutes when it occurs.  May not occur every day.  But can occur multiple times per day.  Has been occurring for the past couple of weeks.  No radiation.  No associated nausea, vomiting, or diaphoresis.    Separately she describes exertional dyspnea for about the same 2 weeks.  Notes that when she walks up her stairs at home.  But does not occur every time she walks up her stairs.  No associated chest discomfort, nausea, vomiting, or diaphoresis.      Also reports palpitations.  Last just seconds.  No specific aggravating or alleviating factors have been noted.  Worked up for palpitations in the past with a negative event monitor.    Medications  Current Outpatient Medications   Medication Sig Dispense Refill    ascorbic acid, vitamin C, (VITAMIN C) 500 MG tablet Take 500 mg by mouth once daily.      aspirin 81 MG Chew Take 81 mg by mouth once daily.      cholecalciferol, vitamin D3, (VITAMIN D3) 50 mcg (2,000 unit) Cap capsule Take 1 capsule (2,000 Units total) by mouth once daily.      magnesium oxide (MAG-OX) 400 mg tablet Take 400 mg by mouth once daily.      methIMAzole (TAPAZOLE) 5 MG Tab 1/2 pill a day 90 tablet 1    multivit with min-folic acid 0.4 mg Tab Take 0.4 mg by mouth once daily.      olmesartan (BENICAR) 20 MG tablet Take 20 mg by mouth once daily.      psyllium (METAMUCIL) powder Take 1 packet by mouth Daily.      rizatriptan (MAXALT) 10 MG tablet TAKE 1 TABLET(10 MG) BY MOUTH EVERY 2 HOURS AS NEEDED FOR MIGRAINE. MAX 30 MG/ 24 AT BEDTIME 12 tablet 11    traZODone (DESYREL) 50 MG tablet Take 1 tablet (50 mg total) by mouth nightly as needed for Insomnia. 30 tablet 2     No  current facility-administered medications for this visit.        History  Past Medical History:   Diagnosis Date    Allergy     Anemia     Arthritis     Cholelithiases     Cyst of kidney, acquired     Diverticulitis     Elevated TSH     Family history of Graves' disease: daughter, maternal aunt, maternal uncle 2016    Glaucoma     Graves disease     Hx of colonic polyps     Hypertension     Liver cyst     MGUS (monoclonal gammopathy of unknown significance)     Migraine headache     Mitral valve problem     leakage    Obesity     Paraproteinemia     Sleep apnea     + CPAP    Smoldering multiple myeloma     Tricuspid valve disease     leakage     Past Surgical History:   Procedure Laterality Date    APPENDECTOMY      COLONOSCOPY N/A 10/23/2015    Procedure: COLONOSCOPY;  Surgeon: Brandon Ruelas MD;  [facility]  Service: Endoscopy;  Laterality: N/A;  Had divertiulitis in 2015 with a recommendation for colonoscopy in 8 weeks    Dr. Ruelas is her GI physician    COLONOSCOPY N/A 2018    Procedure: COLONOSCOPY;  Surgeon: Brandon Ruelas MD;  Location[facility]  Service: Endoscopy;  Laterality: N/A;  Dr. Ruelas did the last one multiple polyps, patient had recent diverticulitis should not schedule before Oct 10th    HYSTERECTOMY   or     menorrhagia     Social History     Socioeconomic History    Marital status: Single   Occupational History    Occupation: RETIRED   Tobacco Use    Smoking status: Former     Years: 3.00     Types: Cigarettes     Quit date: 1995     Years since quittin.3     Passive exposure: Never    Smokeless tobacco: Never   Substance and Sexual Activity    Alcohol use: No    Drug use: No    Sexual activity: Not Currently     Partners: Male     Birth control/protection: Post-menopausal   Social History Narrative    Lives with sister.         [place] [facility]      Social Determinants of Health      Financial Resource Strain: Low Risk     Difficulty of Paying Living Expenses: Not hard at all   Food Insecurity: No Food Insecurity    Worried About Running Out of Food in the Last Year: Never true    Ran Out of Food in the Last Year: Never true   Transportation Needs: No Transportation Needs    Lack of Transportation (Medical): No    Lack of Transportation (Non-Medical): No   Physical Activity: Insufficiently Active    Days of Exercise per Week: 3 days    Minutes of Exercise per Session: 30 min   Stress: Stress Concern Present    Feeling of Stress : To some extent   Housing Stability: Unknown    Unable to Pay for Housing in the Last Year: No    Unstable Housing in the Last Year: No     Family History   Problem Relation Age of Onset    Heart disease Mother         MI    Heart attack Mother     Hypertension Father     Irregular heart beat Sister         fast heart rate    Cataracts Sister     Glaucoma Sister     No Known Problems Sister     Cancer Paternal Grandmother         GYN cancer - unknown cancer    Graves' disease Daughter     No Known Problems Daughter     No Known Problems Son     No Known Problems Son     Heart disease Maternal Aunt         MI    Heart disease Maternal Aunt         MI    Colon cancer Maternal Uncle     Cancer Maternal Uncle         colon ca    Melanoma Neg Hx     Breast cancer Neg Hx     Ovarian cancer Neg Hx     Colon polyps Neg Hx     Rectal cancer Neg Hx     Stomach cancer Neg Hx     Esophageal cancer Neg Hx     Ulcerative colitis Neg Hx     Crohn's disease Neg Hx     Amblyopia Neg Hx     Blindness Neg Hx     Macular degeneration Neg Hx     Strabismus Neg Hx     Retinal detachment Neg Hx         Allergies  Review of patient's allergies indicates:  No Known Allergies    Review of Systems   Review of Systems   Constitutional: Negative for malaise/fatigue, weight gain and weight loss.   Eyes:  Negative for visual disturbance.   Cardiovascular:  Positive for chest pain, dyspnea on  exertion and palpitations. Negative for claudication, cyanosis, irregular heartbeat, leg swelling, near-syncope, orthopnea, paroxysmal nocturnal dyspnea and syncope.   Respiratory:  Negative for cough, hemoptysis, shortness of breath, sleep disturbances due to breathing and wheezing.    Hematologic/Lymphatic: Negative for bleeding problem. Does not bruise/bleed easily.   Skin:  Negative for poor wound healing.   Musculoskeletal:  Negative for muscle cramps and myalgias.   Gastrointestinal:  Negative for abdominal pain, anorexia, diarrhea, heartburn, hematemesis, hematochezia, melena, nausea and vomiting.   Genitourinary:  Negative for hematuria and nocturia.   Neurological:  Negative for excessive daytime sleepiness, dizziness, focal weakness, light-headedness and weakness.     Physical Exam  Vitals:    04/28/23 1506   BP: 112/70   Pulse: 78     Wt Readings from Last 1 Encounters:   04/28/23 120.9 kg (266 lb 8 oz)     Physical Exam  Vitals and nursing note reviewed.   Constitutional:       General: She is not in acute distress.     Appearance: She is not toxic-appearing or diaphoretic.   HENT:      Head: Normocephalic and atraumatic.      Mouth/Throat:      Lips: Pink.      Mouth: Mucous membranes are moist.   Eyes:      General: No scleral icterus.     Conjunctiva/sclera: Conjunctivae normal.   Neck:      Thyroid: No thyromegaly.      Vascular: No carotid bruit, hepatojugular reflux or JVD.      Trachea: Trachea normal.   Cardiovascular:      Rate and Rhythm: Normal rate and regular rhythm. No extrasystoles are present.     Chest Wall: PMI is not displaced.      Pulses:           Carotid pulses are 2+ on the right side and 2+ on the left side.       Radial pulses are 2+ on the right side and 2+ on the left side.        Dorsalis pedis pulses are 2+ on the right side and 2+ on the left side.        Posterior tibial pulses are 2+ on the right side and 2+ on the left side.      Heart sounds: S1 normal and S2 normal. No  murmur heard.    No friction rub. No S3 or S4 sounds.   Pulmonary:      Effort: Pulmonary effort is normal. No accessory muscle usage or respiratory distress.      Breath sounds: Normal breath sounds and air entry. No decreased breath sounds, wheezing, rhonchi or rales.   Abdominal:      General: Bowel sounds are normal. There is no distension or abdominal bruit.      Palpations: Abdomen is soft. There is no hepatomegaly, splenomegaly or pulsatile mass.      Tenderness: There is no abdominal tenderness.   Musculoskeletal:         General: No tenderness or deformity.      Right lower leg: No edema.      Left lower leg: No edema.   Skin:     General: Skin is warm and dry.      Capillary Refill: Capillary refill takes less than 2 seconds.      Coloration: Skin is not cyanotic or pale.      Nails: There is no clubbing.   Neurological:      General: No focal deficit present.      Mental Status: She is alert and oriented to person, place, and time.   Psychiatric:         Attention and Perception: Attention normal.         Mood and Affect: Mood normal.         Speech: Speech normal.         Behavior: Behavior normal. Behavior is cooperative.       Labs  Lab Visit on 04/28/2023   Component Date Value Ref Range Status    WBC 04/28/2023 4.16  3.90 - 12.70 K/uL Final    RBC 04/28/2023 3.70 (L)  4.00 - 5.40 M/uL Final    Hemoglobin 04/28/2023 11.6 (L)  12.0 - 16.0 g/dL Final    Hematocrit 04/28/2023 36.7 (L)  37.0 - 48.5 % Final    MCV 04/28/2023 99 (H)  82 - 98 fL Final    MCH 04/28/2023 31.4 (H)  27.0 - 31.0 pg Final    MCHC 04/28/2023 31.6 (L)  32.0 - 36.0 g/dL Final    RDW 04/28/2023 11.0 (L)  11.5 - 14.5 % Final    Platelets 04/28/2023 211  150 - 450 K/uL Final    MPV 04/28/2023 9.7  9.2 - 12.9 fL Final    Immature Granulocytes 04/28/2023 0.5  0.0 - 0.5 % Final    Gran # (ANC) 04/28/2023 2.4  1.8 - 7.7 K/uL Final    Immature Grans (Abs) 04/28/2023 0.02  0.00 - 0.04 K/uL Final    Comment: Mild elevation in immature  granulocytes is non specific and   can be seen in a variety of conditions including stress response,   acute inflammation, trauma and pregnancy. Correlation with other   laboratory and clinical findings is essential.      Lymph # 04/28/2023 1.3  1.0 - 4.8 K/uL Final    Mono # 04/28/2023 0.4  0.3 - 1.0 K/uL Final    Eos # 04/28/2023 0.0  0.0 - 0.5 K/uL Final    Baso # 04/28/2023 0.01  0.00 - 0.20 K/uL Final    nRBC 04/28/2023 0  0 /100 WBC Final    Gran % 04/28/2023 57.8  38.0 - 73.0 % Final    Lymph % 04/28/2023 31.7  18.0 - 48.0 % Final    Mono % 04/28/2023 9.6  4.0 - 15.0 % Final    Eosinophil % 04/28/2023 0.2  0.0 - 8.0 % Final    Basophil % 04/28/2023 0.2  0.0 - 1.9 % Final    Differential Method 04/28/2023 Automated   Final    Sodium 04/28/2023 143  136 - 145 mmol/L Final    Potassium 04/28/2023 4.0  3.5 - 5.1 mmol/L Final    Chloride 04/28/2023 109  95 - 110 mmol/L Final    CO2 04/28/2023 26  23 - 29 mmol/L Final    Glucose 04/28/2023 82  70 - 110 mg/dL Final    BUN 04/28/2023 17  8 - 23 mg/dL Final    Creatinine 04/28/2023 1.0  0.5 - 1.4 mg/dL Final    Calcium 04/28/2023 9.3  8.7 - 10.5 mg/dL Final    Total Protein 04/28/2023 7.4  6.0 - 8.4 g/dL Final    Albumin 04/28/2023 3.5  3.5 - 5.2 g/dL Final    Total Bilirubin 04/28/2023 0.6  0.1 - 1.0 mg/dL Final    Comment: For infants and newborns, interpretation of results should be based  on gestational age, weight and in agreement with clinical  observations.    Premature Infant recommended reference ranges:  Up to 24 hours.............<8.0 mg/dL  Up to 48 hours............<12.0 mg/dL  3-5 days..................<15.0 mg/dL  6-29 days.................<15.0 mg/dL      Alkaline Phosphatase 04/28/2023 99  55 - 135 U/L Final    AST 04/28/2023 19  10 - 40 U/L Final    ALT 04/28/2023 15  10 - 44 U/L Final    Anion Gap 04/28/2023 8  8 - 16 mmol/L Final    eGFR 04/28/2023 >60  >60 mL/min/1.73 m^2 Final    Pathologist Review 04/28/2023 Review required   Final    Retic  04/28/2023 2.2  0.5 - 2.5 % Final    WBC 04/28/2023 4.16  3.90 - 12.70 K/uL Final    RBC 04/28/2023 3.70 (L)  4.00 - 5.40 M/uL Final    Hemoglobin 04/28/2023 11.6 (L)  12.0 - 16.0 g/dL Final    Hematocrit 04/28/2023 36.7 (L)  37.0 - 48.5 % Final    MCV 04/28/2023 99 (H)  82 - 98 fL Final    MCH 04/28/2023 31.4 (H)  27.0 - 31.0 pg Final    MCHC 04/28/2023 31.6 (L)  32.0 - 36.0 g/dL Final    RDW 04/28/2023 11.0 (L)  11.5 - 14.5 % Final    Platelets 04/28/2023 211  150 - 450 K/uL Final    MPV 04/28/2023 9.7  9.2 - 12.9 fL Final    Immature Granulocytes 04/28/2023 0.5  0.0 - 0.5 % Final    Gran # (ANC) 04/28/2023 2.4  1.8 - 7.7 K/uL Final    Immature Grans (Abs) 04/28/2023 0.02  0.00 - 0.04 K/uL Final    Comment: Mild elevation in immature granulocytes is non specific and   can be seen in a variety of conditions including stress response,   acute inflammation, trauma and pregnancy. Correlation with other   laboratory and clinical findings is essential.      Lymph # 04/28/2023 1.3  1.0 - 4.8 K/uL Final    Mono # 04/28/2023 0.4  0.3 - 1.0 K/uL Final    Eos # 04/28/2023 0.0  0.0 - 0.5 K/uL Final    Baso # 04/28/2023 0.01  0.00 - 0.20 K/uL Final    nRBC 04/28/2023 0  0 /100 WBC Final    Gran % 04/28/2023 57.8  38.0 - 73.0 % Final    Lymph % 04/28/2023 31.7  18.0 - 48.0 % Final    Mono % 04/28/2023 9.6  4.0 - 15.0 % Final    Eosinophil % 04/28/2023 0.2  0.0 - 8.0 % Final    Basophil % 04/28/2023 0.2  0.0 - 1.9 % Final    Differential Method 04/28/2023 Automated   Final    Pathologist Review Peripheral Smear 04/28/2023 REVIEWED   Final    Comment:   Electronically reviewed and signed by:  Markel Reddy MD  Signed on 04/28/23 at 15:19  Review of the peripheral smear reveals a macrocytic anemia without   significant anisopoikilocytosis. Overall leukocyte morphology is   unremarkable. Platelets are within normal limits.      Pathologist Review Peripheral Smear 04/28/2023 REVIEWED   Final    Comment:   Electronically reviewed  and signed by:  Markel Reddy MD  Signed on 04/28/23 at 15:19  Review of the peripheral smear reveals a macrocytic anemia without   significant anisopoikilocytosis. Overall leukocyte morphology is   unremarkable. Platelets are within normal limits.     Lab Visit on 03/13/2023   Component Date Value Ref Range Status    Sodium 03/13/2023 141  136 - 145 mmol/L Final    Potassium 03/13/2023 4.0  3.5 - 5.1 mmol/L Final    Chloride 03/13/2023 107  95 - 110 mmol/L Final    CO2 03/13/2023 29  23 - 29 mmol/L Final    Glucose 03/13/2023 82  70 - 110 mg/dL Final    BUN 03/13/2023 19  8 - 23 mg/dL Final    Creatinine 03/13/2023 1.0  0.5 - 1.4 mg/dL Final    Calcium 03/13/2023 9.5  8.7 - 10.5 mg/dL Final    Total Protein 03/13/2023 7.6  6.0 - 8.4 g/dL Final    Albumin 03/13/2023 3.7  3.5 - 5.2 g/dL Final    Total Bilirubin 03/13/2023 0.7  0.1 - 1.0 mg/dL Final    Comment: For infants and newborns, interpretation of results should be based  on gestational age, weight and in agreement with clinical  observations.    Premature Infant recommended reference ranges:  Up to 24 hours.............<8.0 mg/dL  Up to 48 hours............<12.0 mg/dL  3-5 days..................<15.0 mg/dL  6-29 days.................<15.0 mg/dL      Alkaline Phosphatase 03/13/2023 106  55 - 135 U/L Final    AST 03/13/2023 16  10 - 40 U/L Final    ALT 03/13/2023 11  10 - 44 U/L Final    Anion Gap 03/13/2023 5 (L)  8 - 16 mmol/L Final    eGFR 03/13/2023 >60  >60 mL/min/1.73 m^2 Final    TSH 03/13/2023 1.614  0.400 - 4.000 uIU/mL Final    WBC 03/13/2023 3.29 (L)  3.90 - 12.70 K/uL Final    RBC 03/13/2023 3.78 (L)  4.00 - 5.40 M/uL Final    Hemoglobin 03/13/2023 11.5 (L)  12.0 - 16.0 g/dL Final    Hematocrit 03/13/2023 37.5  37.0 - 48.5 % Final    MCV 03/13/2023 99 (H)  82 - 98 fL Final    MCH 03/13/2023 30.4  27.0 - 31.0 pg Final    MCHC 03/13/2023 30.7 (L)  32.0 - 36.0 g/dL Final    RDW 03/13/2023 11.2 (L)  11.5 - 14.5 % Final    Platelets 03/13/2023 228   150 - 450 K/uL Final    MPV 03/13/2023 9.9  9.2 - 12.9 fL Final    Immature Granulocytes 03/13/2023 0.6 (H)  0.0 - 0.5 % Final    Gran # (ANC) 03/13/2023 1.7 (L)  1.8 - 7.7 K/uL Final    Immature Grans (Abs) 03/13/2023 0.02  0.00 - 0.04 K/uL Final    Comment: Mild elevation in immature granulocytes is non specific and   can be seen in a variety of conditions including stress response,   acute inflammation, trauma and pregnancy. Correlation with other   laboratory and clinical findings is essential.      Lymph # 03/13/2023 1.2  1.0 - 4.8 K/uL Final    Mono # 03/13/2023 0.4  0.3 - 1.0 K/uL Final    Eos # 03/13/2023 0.0  0.0 - 0.5 K/uL Final    Baso # 03/13/2023 0.01  0.00 - 0.20 K/uL Final    nRBC 03/13/2023 0  0 /100 WBC Final    Gran % 03/13/2023 50.2  38.0 - 73.0 % Final    Lymph % 03/13/2023 37.4  18.0 - 48.0 % Final    Mono % 03/13/2023 10.9  4.0 - 15.0 % Final    Eosinophil % 03/13/2023 0.6  0.0 - 8.0 % Final    Basophil % 03/13/2023 0.3  0.0 - 1.9 % Final    Differential Method 03/13/2023 Automated   Final    Sodium 03/13/2023 141  136 - 145 mmol/L Final    Potassium 03/13/2023 4.0  3.5 - 5.1 mmol/L Final    Chloride 03/13/2023 107  95 - 110 mmol/L Final    CO2 03/13/2023 29  23 - 29 mmol/L Final    Glucose 03/13/2023 82  70 - 110 mg/dL Final    BUN 03/13/2023 19  8 - 23 mg/dL Final    Creatinine 03/13/2023 1.0  0.5 - 1.4 mg/dL Final    Calcium 03/13/2023 9.5  8.7 - 10.5 mg/dL Final    Total Protein 03/13/2023 7.6  6.0 - 8.4 g/dL Final    Albumin 03/13/2023 3.7  3.5 - 5.2 g/dL Final    Total Bilirubin 03/13/2023 0.7  0.1 - 1.0 mg/dL Final    Comment: For infants and newborns, interpretation of results should be based  on gestational age, weight and in agreement with clinical  observations.    Premature Infant recommended reference ranges:  Up to 24 hours.............<8.0 mg/dL  Up to 48 hours............<12.0 mg/dL  3-5 days..................<15.0 mg/dL  6-29 days.................<15.0 mg/dL      Alkaline  Phosphatase 03/13/2023 106  55 - 135 U/L Final    AST 03/13/2023 16  10 - 40 U/L Final    ALT 03/13/2023 11  10 - 44 U/L Final    Anion Gap 03/13/2023 5 (L)  8 - 16 mmol/L Final    eGFR 03/13/2023 >60  >60 mL/min/1.73 m^2 Final    Hemoglobin A1C 03/13/2023 4.2  4.0 - 5.6 % Final    Comment: ADA Screening Guidelines:  5.7-6.4%  Consistent with prediabetes  >or=6.5%  Consistent with diabetes    High levels of fetal hemoglobin interfere with the HbA1C  assay. Heterozygous hemoglobin variants (HbS, HgC, etc)do  not significantly interfere with this assay.   However, presence of multiple variants may affect accuracy.      Estimated Avg Glucose 03/13/2023 74  68 - 131 mg/dL Final    Vit D, 25-Hydroxy 03/13/2023 15 (L)  30 - 96 ng/mL Final    Comment: Vitamin D deficiency.........<10 ng/mL                              Vitamin D insufficiency......10-29 ng/mL       Vitamin D sufficiency........> or equal to 30 ng/mL  Vitamin D toxicity............>100 ng/mL      Microalbumin, Urine 03/13/2023 14.0  ug/mL Final    Creatinine, Urine 03/13/2023 137.7  15.0 - 325.0 mg/dL Final    Microalb/Creat Ratio 03/13/2023 10.2  0.0 - 30.0 ug/mg Final   Admission on 01/08/2023, Discharged on 01/08/2023   Component Date Value Ref Range Status    WBC 01/08/2023 3.59 (L)  3.90 - 12.70 K/uL Final    RBC 01/08/2023 3.99 (L)  4.00 - 5.40 M/uL Final    Hemoglobin 01/08/2023 12.3  12.0 - 16.0 g/dL Final    Hematocrit 01/08/2023 38.6  37.0 - 48.5 % Final    MCV 01/08/2023 97  82 - 98 fL Final    MCH 01/08/2023 30.8  27.0 - 31.0 pg Final    MCHC 01/08/2023 31.9 (L)  32.0 - 36.0 g/dL Final    RDW 01/08/2023 11.2 (L)  11.5 - 14.5 % Final    Platelets 01/08/2023 216  150 - 450 K/uL Final    MPV 01/08/2023 10.4  9.2 - 12.9 fL Final    Immature Granulocytes 01/08/2023 0.3  0.0 - 0.5 % Final    Gran # (ANC) 01/08/2023 2.7  1.8 - 7.7 K/uL Final    Immature Grans (Abs) 01/08/2023 0.01  0.00 - 0.04 K/uL Final    Comment: Mild elevation in immature  granulocytes is non specific and   can be seen in a variety of conditions including stress response,   acute inflammation, trauma and pregnancy. Correlation with other   laboratory and clinical findings is essential.      Lymph # 01/08/2023 0.7 (L)  1.0 - 4.8 K/uL Final    Mono # 01/08/2023 0.2 (L)  0.3 - 1.0 K/uL Final    Eos # 01/08/2023 0.0  0.0 - 0.5 K/uL Final    Baso # 01/08/2023 0.01  0.00 - 0.20 K/uL Final    nRBC 01/08/2023 0  0 /100 WBC Final    Gran % 01/08/2023 74.4 (H)  38.0 - 73.0 % Final    Lymph % 01/08/2023 20.3  18.0 - 48.0 % Final    Mono % 01/08/2023 4.7  4.0 - 15.0 % Final    Eosinophil % 01/08/2023 0.0  0.0 - 8.0 % Final    Basophil % 01/08/2023 0.3  0.0 - 1.9 % Final    Differential Method 01/08/2023 Automated   Final    Sodium 01/08/2023 139  136 - 145 mmol/L Final    Potassium 01/08/2023 4.0  3.5 - 5.1 mmol/L Final    Chloride 01/08/2023 107  95 - 110 mmol/L Final    CO2 01/08/2023 27  23 - 29 mmol/L Final    Glucose 01/08/2023 93  70 - 110 mg/dL Final    BUN 01/08/2023 13  8 - 23 mg/dL Final    Creatinine 01/08/2023 0.9  0.5 - 1.4 mg/dL Final    Calcium 01/08/2023 9.9  8.7 - 10.5 mg/dL Final    Anion Gap 01/08/2023 5 (L)  8 - 16 mmol/L Final    eGFR 01/08/2023 >60  >60 mL/min/1.73 m^2 Final    HIV 1/2 Ag/Ab 01/08/2023 Negative  Negative Final    Hepatitis C Ab 01/08/2023 Negative  Negative Final   Lab Visit on 12/12/2022   Component Date Value Ref Range Status    TSH 12/12/2022 2.732  0.400 - 4.000 uIU/mL Final   Lab Visit on 12/05/2022   Component Date Value Ref Range Status    WBC 12/05/2022 4.15  3.90 - 12.70 K/uL Final    RBC 12/05/2022 4.04  4.00 - 5.40 M/uL Final    Hemoglobin 12/05/2022 12.5  12.0 - 16.0 g/dL Final    Hematocrit 12/05/2022 40.8  37.0 - 48.5 % Final    MCV 12/05/2022 101 (H)  82 - 98 fL Final    MCH 12/05/2022 30.9  27.0 - 31.0 pg Final    MCHC 12/05/2022 30.6 (L)  32.0 - 36.0 g/dL Final    RDW 12/05/2022 11.3 (L)  11.5 - 14.5 % Final    Platelets 12/05/2022 252  150  - 450 K/uL Final    MPV 12/05/2022 10.7  9.2 - 12.9 fL Final    Immature Granulocytes 12/05/2022 0.2  0.0 - 0.5 % Final    Gran # (ANC) 12/05/2022 2.4  1.8 - 7.7 K/uL Final    Immature Grans (Abs) 12/05/2022 0.01  0.00 - 0.04 K/uL Final    Comment: Mild elevation in immature granulocytes is non specific and   can be seen in a variety of conditions including stress response,   acute inflammation, trauma and pregnancy. Correlation with other   laboratory and clinical findings is essential.      Lymph # 12/05/2022 1.5  1.0 - 4.8 K/uL Final    Mono # 12/05/2022 0.3  0.3 - 1.0 K/uL Final    Eos # 12/05/2022 0.0  0.0 - 0.5 K/uL Final    Baso # 12/05/2022 0.01  0.00 - 0.20 K/uL Final    nRBC 12/05/2022 0  0 /100 WBC Final    Gran % 12/05/2022 57.3  38.0 - 73.0 % Final    Lymph % 12/05/2022 34.9  18.0 - 48.0 % Final    Mono % 12/05/2022 7.2  4.0 - 15.0 % Final    Eosinophil % 12/05/2022 0.2  0.0 - 8.0 % Final    Basophil % 12/05/2022 0.2  0.0 - 1.9 % Final    Differential Method 12/05/2022 Automated   Final    Sodium 12/05/2022 142  136 - 145 mmol/L Final    Potassium 12/05/2022 4.4  3.5 - 5.1 mmol/L Final    Chloride 12/05/2022 105  95 - 110 mmol/L Final    CO2 12/05/2022 28  23 - 29 mmol/L Final    Glucose 12/05/2022 77  70 - 110 mg/dL Final    BUN 12/05/2022 16  8 - 23 mg/dL Final    Creatinine 12/05/2022 0.9  0.5 - 1.4 mg/dL Final    Calcium 12/05/2022 9.8  8.7 - 10.5 mg/dL Final    Total Protein 12/05/2022 7.7  6.0 - 8.4 g/dL Final    Albumin 12/05/2022 3.7  3.5 - 5.2 g/dL Final    Total Bilirubin 12/05/2022 0.6  0.1 - 1.0 mg/dL Final    Comment: For infants and newborns, interpretation of results should be based  on gestational age, weight and in agreement with clinical  observations.    Premature Infant recommended reference ranges:  Up to 24 hours.............<8.0 mg/dL  Up to 48 hours............<12.0 mg/dL  3-5 days..................<15.0 mg/dL  6-29 days.................<15.0 mg/dL      Alkaline Phosphatase  12/05/2022 128  55 - 135 U/L Final    AST 12/05/2022 16  10 - 40 U/L Final    ALT 12/05/2022 13  10 - 44 U/L Final    Anion Gap 12/05/2022 9  8 - 16 mmol/L Final    eGFR 12/05/2022 >60.0  >60 mL/min/1.73 m^2 Final   Office Visit on 12/05/2022   Component Date Value Ref Range Status    Specimen UA 12/05/2022 Urine, Clean Catch   Final    Color, UA 12/05/2022 Yellow  Yellow, Straw, [place] Final    Appearance, UA 12/05/2022 Clear  Clear Final    pH, UA 12/05/2022 6.0  5.0 - 8.0 Final    Specific Gravity, UA 12/05/2022 1.030  1.005 - 1.030 Final    Protein, UA 12/05/2022 Negative  Negative Final    Comment: Recommend a 24 hour urine protein or a urine   protein/creatinine ratio if globulin induced proteinuria is  clinically suspected.      Glucose, UA 12/05/2022 Negative  Negative Final    Ketones, UA 12/05/2022 Negative  Negative Final    Bilirubin (UA) 12/05/2022 Negative  Negative Final    Occult Blood UA 12/05/2022 Trace (A)  Negative Final    Nitrite, UA 12/05/2022 Negative  Negative Final    Leukocytes, UA 12/05/2022 Negative  Negative Final       Imaging  No results found.    Assessment  1. Precordial pain  Unlikely to be cardiac  - Stress Echo Which stress agent will be used? Treadmill Exercise; Color Flow Doppler? No; Future    2. Exertional dyspnea  Evaluate for an anginal equivalent  - Stress Echo Which stress agent will be used? Treadmill Exercise; Color Flow Doppler? No; Future    3. Primary hypertension  Controlled    4. Mild aortic valve sclerosis  Exam without evidence of significant valvular heart disease  - Ambulatory referral/consult to Cardiology      Plan and Discussion    Workup as above with further planning based on those results.    The 10-year ASCVD risk score (Elsie DK, et al., 2019) is: 7.3%    Values used to calculate the score:      Age: 72 years      Sex: Female      Is Non- : Yes      Diabetic: No      Tobacco smoker: No      Systolic Blood Pressure: 112 mmHg       Is BP treated: Yes      HDL Cholesterol: 52 mg/dL      Total Cholesterol: 143 mg/dL     Follow Up  Follow up for test results.      Julio Beverly MD

## 2023-05-01 LAB
ALBUMIN SERPL ELPH-MCNC: 3.74 G/DL (ref 3.35–5.55)
ALPHA1 GLOB SERPL ELPH-MCNC: 0.28 G/DL (ref 0.17–0.41)
ALPHA2 GLOB SERPL ELPH-MCNC: 0.64 G/DL (ref 0.43–0.99)
B-GLOBULIN SERPL ELPH-MCNC: 0.71 G/DL (ref 0.5–1.1)
GAMMA GLOB SERPL ELPH-MCNC: 1.63 G/DL (ref 0.67–1.58)
INTERPRETATION SERPL IFE-IMP: NORMAL
KAPPA LC SER QL IA: 12.85 MG/DL (ref 0.33–1.94)
KAPPA LC/LAMBDA SER IA: 9.24 (ref 0.26–1.65)
LAMBDA LC SER QL IA: 1.39 MG/DL (ref 0.57–2.63)
PROT SERPL-MCNC: 7 G/DL (ref 6–8.4)

## 2023-05-02 LAB
METHYLMALONATE SERPL-SCNC: 0.22 UMOL/L
PATHOLOGIST INTERPRETATION IFE: NORMAL
PATHOLOGIST INTERPRETATION SPE: NORMAL

## 2023-05-05 ENCOUNTER — OFFICE VISIT (OUTPATIENT)
Dept: HEMATOLOGY/ONCOLOGY | Facility: CLINIC | Age: 73
End: 2023-05-05
Payer: MEDICARE

## 2023-05-05 VITALS
SYSTOLIC BLOOD PRESSURE: 108 MMHG | WEIGHT: 262.81 LBS | BODY MASS INDEX: 38.93 KG/M2 | DIASTOLIC BLOOD PRESSURE: 56 MMHG | OXYGEN SATURATION: 95 % | HEIGHT: 69 IN | TEMPERATURE: 98 F | RESPIRATION RATE: 17 BRPM | HEART RATE: 71 BPM

## 2023-05-05 DIAGNOSIS — D47.2 SMOLDERING MULTIPLE MYELOMA (SMM): Primary | ICD-10-CM

## 2023-05-05 PROCEDURE — 3061F NEG MICROALBUMINURIA REV: CPT | Mod: CPTII,S$GLB,, | Performed by: INTERNAL MEDICINE

## 2023-05-05 PROCEDURE — 3074F PR MOST RECENT SYSTOLIC BLOOD PRESSURE < 130 MM HG: ICD-10-PCS | Mod: CPTII,S$GLB,, | Performed by: INTERNAL MEDICINE

## 2023-05-05 PROCEDURE — 99499 RISK ADDL DX/OHS AUDIT: ICD-10-PCS | Mod: HCNC,S$GLB,, | Performed by: INTERNAL MEDICINE

## 2023-05-05 PROCEDURE — 4010F PR ACE/ARB THEARPY RXD/TAKEN: ICD-10-PCS | Mod: CPTII,S$GLB,, | Performed by: INTERNAL MEDICINE

## 2023-05-05 PROCEDURE — 3078F PR MOST RECENT DIASTOLIC BLOOD PRESSURE < 80 MM HG: ICD-10-PCS | Mod: CPTII,S$GLB,, | Performed by: INTERNAL MEDICINE

## 2023-05-05 PROCEDURE — 99214 OFFICE O/P EST MOD 30 MIN: CPT | Mod: S$GLB,,, | Performed by: INTERNAL MEDICINE

## 2023-05-05 PROCEDURE — 3066F NEPHROPATHY DOC TX: CPT | Mod: CPTII,S$GLB,, | Performed by: INTERNAL MEDICINE

## 2023-05-05 PROCEDURE — 3066F PR DOCUMENTATION OF TREATMENT FOR NEPHROPATHY: ICD-10-PCS | Mod: CPTII,S$GLB,, | Performed by: INTERNAL MEDICINE

## 2023-05-05 PROCEDURE — 1126F AMNT PAIN NOTED NONE PRSNT: CPT | Mod: CPTII,S$GLB,, | Performed by: INTERNAL MEDICINE

## 2023-05-05 PROCEDURE — 3061F PR NEG MICROALBUMINURIA RESULT DOCUMENTED/REVIEW: ICD-10-PCS | Mod: CPTII,S$GLB,, | Performed by: INTERNAL MEDICINE

## 2023-05-05 PROCEDURE — 99999 PR PBB SHADOW E&M-EST. PATIENT-LVL III: CPT | Mod: PBBFAC,,, | Performed by: INTERNAL MEDICINE

## 2023-05-05 PROCEDURE — 1126F PR PAIN SEVERITY QUANTIFIED, NO PAIN PRESENT: ICD-10-PCS | Mod: CPTII,S$GLB,, | Performed by: INTERNAL MEDICINE

## 2023-05-05 PROCEDURE — 3008F PR BODY MASS INDEX (BMI) DOCUMENTED: ICD-10-PCS | Mod: CPTII,S$GLB,, | Performed by: INTERNAL MEDICINE

## 2023-05-05 PROCEDURE — 99999 PR PBB SHADOW E&M-EST. PATIENT-LVL III: ICD-10-PCS | Mod: PBBFAC,,, | Performed by: INTERNAL MEDICINE

## 2023-05-05 PROCEDURE — 3044F PR MOST RECENT HEMOGLOBIN A1C LEVEL <7.0%: ICD-10-PCS | Mod: CPTII,S$GLB,, | Performed by: INTERNAL MEDICINE

## 2023-05-05 PROCEDURE — 3074F SYST BP LT 130 MM HG: CPT | Mod: CPTII,S$GLB,, | Performed by: INTERNAL MEDICINE

## 2023-05-05 PROCEDURE — 3044F HG A1C LEVEL LT 7.0%: CPT | Mod: CPTII,S$GLB,, | Performed by: INTERNAL MEDICINE

## 2023-05-05 PROCEDURE — 99214 PR OFFICE/OUTPT VISIT, EST, LEVL IV, 30-39 MIN: ICD-10-PCS | Mod: S$GLB,,, | Performed by: INTERNAL MEDICINE

## 2023-05-05 PROCEDURE — 3008F BODY MASS INDEX DOCD: CPT | Mod: CPTII,S$GLB,, | Performed by: INTERNAL MEDICINE

## 2023-05-05 PROCEDURE — 3078F DIAST BP <80 MM HG: CPT | Mod: CPTII,S$GLB,, | Performed by: INTERNAL MEDICINE

## 2023-05-05 PROCEDURE — 99499 UNLISTED E&M SERVICE: CPT | Mod: HCNC,S$GLB,, | Performed by: INTERNAL MEDICINE

## 2023-05-05 PROCEDURE — 4010F ACE/ARB THERAPY RXD/TAKEN: CPT | Mod: CPTII,S$GLB,, | Performed by: INTERNAL MEDICINE

## 2023-05-05 NOTE — PROGRESS NOTES
SECTION OF HEMATOLOGY AND BONE MARROW TRANSPLANT  Return Patient Visit   05/08/2023    CHIEF COMPLAINT:   No chief complaint on file.        HISTORY OF PRESENT ILLNESS:   72 y.o. female'; Ms. Aranda is here for  IgG kappa smoldering myeloma. Follow up.   Overall doing well since our last appt with no acute complaints.    No focal pain. Denies fever, chills, nightsweats, bleeding, brusing, lymphadenopathy, signs/symptoms of splenomegaly, focal pain, neuropathy, recurrent infection .   Her  4/28/23  labs show no evidence of biochemical or clinical progression.     PAST MEDICAL HISTORY:   Past Medical History:   Diagnosis Date    Allergy     Anemia     Arthritis     Cholelithiases     Cyst of kidney, acquired     Diverticulitis     Elevated TSH     Family history of Graves' disease: daughter, maternal aunt, maternal uncle 9/13/2016    Glaucoma     Graves disease     Hx of colonic polyps     Hypertension 1981    Liver cyst     MGUS (monoclonal gammopathy of unknown significance)     Migraine headache     Mitral valve problem     leakage    Obesity     Paraproteinemia     Sleep apnea     + CPAP    Smoldering multiple myeloma 2013    Tricuspid valve disease     leakage       PAST SURGICAL HISTORY:   Past Surgical History:   Procedure Laterality Date    APPENDECTOMY      COLONOSCOPY N/A 10/23/2015    Procedure: COLONOSCOPY;  Surgeon: Brandon Ruelas MD;  Location: [address]);  Service: Endoscopy;  Laterality: N/A;  Had divertiulitis in 5/29/2015 with a recommendation for colonoscopy in 8 weeks    Dr. Ruelas is her GI physician    COLONOSCOPY N/A 11/05/2018    Procedure: COLONOSCOPY;  Surgeon: Brandon Ruelas MD;  Location: [facility] ([address] [place] [address]);  Service: Endoscopy;  Laterality: N/A;  Dr. Ruelas did the last one multiple polyps, patient had recent diverticulitis should not schedule before Oct 10th    HYSTERECTOMY  1978 or 1979    menorrhagia       PAST SOCIAL HISTORY:   reports that  she quit smoking about 28 years ago. Her smoking use included cigarettes. She has never been exposed to tobacco smoke. She has never used smokeless tobacco. She reports that she does not drink alcohol and does not use drugs.    FAMILY HISTORY:  Family History   Problem Relation Age of Onset    Heart disease Mother         MI    Heart attack Mother     Hypertension Father     Irregular heart beat Sister         fast heart rate    Cataracts Sister     Glaucoma Sister     No Known Problems Sister     Cancer Paternal Grandmother         GYN cancer - unknown cancer    Graves' disease Daughter     No Known Problems Daughter     No Known Problems Son     No Known Problems Son     Heart disease Maternal Aunt         MI    Heart disease Maternal Aunt         MI    Colon cancer Maternal Uncle     Cancer Maternal Uncle         colon ca    Melanoma Neg Hx     Breast cancer Neg Hx     Ovarian cancer Neg Hx     Colon polyps Neg Hx     Rectal cancer Neg Hx     Stomach cancer Neg Hx     Esophageal cancer Neg Hx     Ulcerative colitis Neg Hx     Crohn's disease Neg Hx     Amblyopia Neg Hx     Blindness Neg Hx     Macular degeneration Neg Hx     Strabismus Neg Hx     Retinal detachment Neg Hx        CURRENT MEDICATIONS:   Current Outpatient Medications   Medication Sig    ascorbic acid, vitamin C, (VITAMIN C) 500 MG tablet Take 500 mg by mouth once daily.    aspirin 81 MG Chew Take 81 mg by mouth once daily.    cholecalciferol, vitamin D3, (VITAMIN D3) 50 mcg (2,000 unit) Cap capsule Take 1 capsule (2,000 Units total) by mouth once daily.    magnesium oxide (MAG-OX) 400 mg tablet Take 400 mg by mouth once daily.    methIMAzole (TAPAZOLE) 5 MG Tab 1/2 pill a day    multivit with min-folic acid 0.4 mg Tab Take 0.4 mg by mouth once daily.    olmesartan (BENICAR) 20 MG tablet Take 20 mg by mouth once daily.    psyllium (METAMUCIL) powder Take 1 packet by mouth Daily.    rizatriptan (MAXALT) 10 MG  "tablet TAKE 1 TABLET(10 MG) BY MOUTH EVERY 2 HOURS AS NEEDED FOR MIGRAINE. MAX 30 MG/ 24 AT BEDTIME    traZODone (DESYREL) 50 MG tablet Take 1 tablet (50 mg total) by mouth nightly as needed for Insomnia.     No current facility-administered medications for this visit.     ALLERGIES:   Review of patient's allergies indicates:  No Known Allergies      ASSESSMENTS:   PAIN ASSESSMENT (Patient reports Pain):   Vitals:    05/05/23 1354   BP: (!) 108/56   Pulse: 71   Resp: 17   Temp: 98.4 °F (36.9 °C)   SpO2: 95%   Weight: 119.2 kg (262 lb 12.6 oz)   Height: 5' 9" (1.753 m)   PainSc: 0-No pain     REVIEW OF SYSTEMS:     See HPI   PHYSICAL EXAM:   Vitals:    05/05/23 1354   BP: (!) 108/56   Pulse: 71   Resp: 17   Temp: 98.4 °F (36.9 °C)       General - well developed, well nourished, no apparent distress  HEENT - oropharynx clear  Chest and Lung - clear to auscultation bilaterally   Cardiovascular - RRR with no MGR, normal S1 and S2  Abdomen-  soft, nontender, no palpable hepatomegaly or splenomegaly  Lymph - no palpable lymphadenopathy  Heme - no bruising, petechiae, pallor  Skin - no rashes or lesions  Psych - appropriate mood and affect      ECOG Performance Status: (foot note - ECOG PS provided by Eastern Cooperative Oncology Group) 0 - Asymptomatic    Karnofsky Performance Score:  100%- Normal, No Complaints, No Evidence of Disease  DATA:   Lab Results   Component Value Date    WBC 4.16 04/28/2023    WBC 4.16 04/28/2023    HGB 11.6 (L) 04/28/2023    HGB 11.6 (L) 04/28/2023    HCT 36.7 (L) 04/28/2023    HCT 36.7 (L) 04/28/2023    MCV 99 (H) 04/28/2023    MCV 99 (H) 04/28/2023     04/28/2023     04/28/2023     Gran # (ANC)   Date Value Ref Range Status   04/28/2023 2.4 1.8 - 7.7 K/uL Final   04/28/2023 2.4 1.8 - 7.7 K/uL Final     Gran %   Date Value Ref Range Status   04/28/2023 57.8 38.0 - 73.0 % Final   04/28/2023 57.8 38.0 - 73.0 % Final     Lymph #   Date Value Ref Range Status   04/28/2023 1.3 1.0 - " 4.8 K/uL Final   04/28/2023 1.3 1.0 - 4.8 K/uL Final     Lymph %   Date Value Ref Range Status   04/28/2023 31.7 18.0 - 48.0 % Final   04/28/2023 31.7 18.0 - 48.0 % Final     Kappa Free Light Chains   Date Value Ref Range Status   04/28/2023 12.85 (H) 0.33 - 1.94 mg/dL Final   10/03/2022 13.24 (H) 0.33 - 1.94 mg/dL Final   04/08/2022 17.23 (H) 0.33 - 1.94 mg/dL Final     Lambda Free Light Chains   Date Value Ref Range Status   04/28/2023 1.39 0.57 - 2.63 mg/dL Final   10/03/2022 1.45 0.57 - 2.63 mg/dL Final   04/08/2022 1.64 0.57 - 2.63 mg/dL Final     Kappa/Lambda FLC Ratio   Date Value Ref Range Status   04/28/2023 9.24 (H) 0.26 - 1.65 Final     Comment:     Undetected antigen excess is a rare event but cannot   be excluded. If these free light chain results do not   agree with other clinical or laboratory findings or   if the sample is from a patient that has previously   demonstrated antigen excess, discuss with the testing   laboratory.   Results should always be interpreted in conjunction   with other laboratory tests and clinical evidence.     10/03/2022 9.13 (H) 0.26 - 1.65 Final     Comment:     Undetected antigen excess is a rare event but cannot   be excluded. If these free light chain results do not   agree with other clinical or laboratory findings or   if the sample is from a patient that has previously   demonstrated antigen excess, discuss with the testing   laboratory.   Results should always be interpreted in conjunction   with other laboratory tests and clinical evidence.     04/08/2022 10.51 (H) 0.26 - 1.65 Final     Comment:     Undetected antigen excess is a rare event but cannot   be excluded. If these free light chain results do not   agree with other clinical or laboratory findings or   if the sample is from a patient that has previously   demonstrated antigen excess, discuss with the testing   laboratory.   Results should always be interpreted in conjunction   with other laboratory tests  and clinical evidence.       Gamma   Date Value Ref Range Status   04/28/2023 1.63 (H) 0.67 - 1.58 g/dL Final   09/21/2021 1.75 (H) 0.67 - 1.58 g/dL Final   03/05/2021 1.94 (H) 0.67 - 1.58 g/dL Final     IgG   Date Value Ref Range Status   04/28/2023 1788 (H) 650 - 1600 mg/dL Final     Comment:     IgG Cord Blood Reference Range: 650-1600 mg/dL.     IgA   Date Value Ref Range Status   04/28/2023 99 40 - 350 mg/dL Final     Comment:     IgA Cord Blood Reference Range: <5 mg/dL.     IgM   Date Value Ref Range Status   04/28/2023 31 (L) 50 - 300 mg/dL Final     Comment:     IgM Cord Blood Reference Range: <25 mg/dL.      Spep was not linked   ASSESSMENT AND PLAN:   Encounter Diagnosis   Name Primary?    Smoldering multiple myeloma (SMM) Yes          IgG kappa smoldering myeloma diagnosed 2010 under observation since that time; previous pt of Dr. Lora; transitioned care to me sept 2015.    Mild intermittent leukopenia/anemia are chronic and pre date myeloma diagnosis ; stable  Biochemical studies/Labs from 4/28/23  with no evidence of crab or progression   Discussed now that >10 years from diagnosis with no progression to symptomatic myeloma her risk of progression now decreases significantly and her disease is more clinically like an MGUS,  but that we should still monitor her biochemcial studies q 6 months and see her for MD appt annually   Plan to image based on symptoms only   educated on symptoms for which to seek attn in our clinic earlier   Mgmt of hypothyroidism per endocrinology    Follow Up:     BMT Chart Routing      Follow up with physician 1 year. -cbc, cmp, serum free light chains, quantitative immunoglobulins, serum electropheresis, serum immunofixation  in 6 months and 1 year ; MD appt 3-4 days after labs In 1 year   Follow up with [facility]    Provider visit type    Infusion scheduling note    Injection scheduling note    Labs    Imaging    Pharmacy appointment    Other referrals           -cbc, cmp, serum  free light chains, quantitative immunoglobulins, serum electropheresis, serum immunofixation  in 6 months and 1 year ; MD appt 3-4 days after labs In 1 year  Rasta Alba MD  Hematology/Oncology/Bone Marrow Transplant

## 2023-05-09 ENCOUNTER — LAB VISIT (OUTPATIENT)
Dept: LAB | Facility: HOSPITAL | Age: 73
End: 2023-05-09
Attending: INTERNAL MEDICINE
Payer: MEDICARE

## 2023-05-09 ENCOUNTER — OFFICE VISIT (OUTPATIENT)
Dept: UROLOGY | Facility: CLINIC | Age: 73
End: 2023-05-09
Payer: MEDICARE

## 2023-05-09 VITALS
BODY MASS INDEX: 38.5 KG/M2 | HEART RATE: 73 BPM | DIASTOLIC BLOOD PRESSURE: 54 MMHG | HEIGHT: 69 IN | WEIGHT: 259.94 LBS | SYSTOLIC BLOOD PRESSURE: 95 MMHG

## 2023-05-09 DIAGNOSIS — R31.29 MICROHEMATURIA: Primary | ICD-10-CM

## 2023-05-09 DIAGNOSIS — D47.2 SMOLDERING MULTIPLE MYELOMA (SMM): ICD-10-CM

## 2023-05-09 DIAGNOSIS — E05.00 GRAVES DISEASE: ICD-10-CM

## 2023-05-09 DIAGNOSIS — Q61.2 ADPKD (AUTOSOMAL DOMINANT POLYCYSTIC KIDNEY DISEASE): ICD-10-CM

## 2023-05-09 LAB
BACTERIA #/AREA URNS AUTO: ABNORMAL /HPF
HYALINE CASTS UR QL AUTO: 16 /LPF
MICROSCOPIC COMMENT: ABNORMAL
RBC #/AREA URNS AUTO: 4 /HPF (ref 0–4)
SQUAMOUS #/AREA URNS AUTO: 1 /HPF
TSH SERPL DL<=0.005 MIU/L-ACNC: 1.2 UIU/ML (ref 0.4–4)
WBC #/AREA URNS AUTO: 2 /HPF (ref 0–5)

## 2023-05-09 PROCEDURE — 1160F PR REVIEW ALL MEDS BY PRESCRIBER/CLIN PHARMACIST DOCUMENTED: ICD-10-PCS | Mod: CPTII,S$GLB,, | Performed by: UROLOGY

## 2023-05-09 PROCEDURE — 36415 COLL VENOUS BLD VENIPUNCTURE: CPT | Performed by: INTERNAL MEDICINE

## 2023-05-09 PROCEDURE — 3066F NEPHROPATHY DOC TX: CPT | Mod: CPTII,S$GLB,, | Performed by: UROLOGY

## 2023-05-09 PROCEDURE — 3288F PR FALLS RISK ASSESSMENT DOCUMENTED: ICD-10-PCS | Mod: CPTII,S$GLB,, | Performed by: UROLOGY

## 2023-05-09 PROCEDURE — 1159F PR MEDICATION LIST DOCUMENTED IN MEDICAL RECORD: ICD-10-PCS | Mod: CPTII,S$GLB,, | Performed by: UROLOGY

## 2023-05-09 PROCEDURE — 3008F PR BODY MASS INDEX (BMI) DOCUMENTED: ICD-10-PCS | Mod: CPTII,S$GLB,, | Performed by: UROLOGY

## 2023-05-09 PROCEDURE — 84443 ASSAY THYROID STIM HORMONE: CPT | Performed by: INTERNAL MEDICINE

## 2023-05-09 PROCEDURE — 3008F BODY MASS INDEX DOCD: CPT | Mod: CPTII,S$GLB,, | Performed by: UROLOGY

## 2023-05-09 PROCEDURE — 1160F RVW MEDS BY RX/DR IN RCRD: CPT | Mod: CPTII,S$GLB,, | Performed by: UROLOGY

## 2023-05-09 PROCEDURE — 4010F ACE/ARB THERAPY RXD/TAKEN: CPT | Mod: CPTII,S$GLB,, | Performed by: UROLOGY

## 2023-05-09 PROCEDURE — 81001 URINALYSIS AUTO W/SCOPE: CPT | Performed by: UROLOGY

## 2023-05-09 PROCEDURE — 3074F SYST BP LT 130 MM HG: CPT | Mod: CPTII,S$GLB,, | Performed by: UROLOGY

## 2023-05-09 PROCEDURE — 3074F PR MOST RECENT SYSTOLIC BLOOD PRESSURE < 130 MM HG: ICD-10-PCS | Mod: CPTII,S$GLB,, | Performed by: UROLOGY

## 2023-05-09 PROCEDURE — 3061F PR NEG MICROALBUMINURIA RESULT DOCUMENTED/REVIEW: ICD-10-PCS | Mod: CPTII,S$GLB,, | Performed by: UROLOGY

## 2023-05-09 PROCEDURE — 99213 PR OFFICE/OUTPT VISIT, EST, LEVL III, 20-29 MIN: ICD-10-PCS | Mod: S$GLB,,, | Performed by: UROLOGY

## 2023-05-09 PROCEDURE — 1101F PR PT FALLS ASSESS DOC 0-1 FALLS W/OUT INJ PAST YR: ICD-10-PCS | Mod: CPTII,S$GLB,, | Performed by: UROLOGY

## 2023-05-09 PROCEDURE — 99213 OFFICE O/P EST LOW 20 MIN: CPT | Mod: S$GLB,,, | Performed by: UROLOGY

## 2023-05-09 PROCEDURE — 3288F FALL RISK ASSESSMENT DOCD: CPT | Mod: CPTII,S$GLB,, | Performed by: UROLOGY

## 2023-05-09 PROCEDURE — 3066F PR DOCUMENTATION OF TREATMENT FOR NEPHROPATHY: ICD-10-PCS | Mod: CPTII,S$GLB,, | Performed by: UROLOGY

## 2023-05-09 PROCEDURE — 3044F PR MOST RECENT HEMOGLOBIN A1C LEVEL <7.0%: ICD-10-PCS | Mod: CPTII,S$GLB,, | Performed by: UROLOGY

## 2023-05-09 PROCEDURE — 1101F PT FALLS ASSESS-DOCD LE1/YR: CPT | Mod: CPTII,S$GLB,, | Performed by: UROLOGY

## 2023-05-09 PROCEDURE — 1126F PR PAIN SEVERITY QUANTIFIED, NO PAIN PRESENT: ICD-10-PCS | Mod: CPTII,S$GLB,, | Performed by: UROLOGY

## 2023-05-09 PROCEDURE — 83520 IMMUNOASSAY QUANT NOS NONAB: CPT | Performed by: INTERNAL MEDICINE

## 2023-05-09 PROCEDURE — 1159F MED LIST DOCD IN RCRD: CPT | Mod: CPTII,S$GLB,, | Performed by: UROLOGY

## 2023-05-09 PROCEDURE — 99999 PR PBB SHADOW E&M-EST. PATIENT-LVL III: ICD-10-PCS | Mod: PBBFAC,,, | Performed by: UROLOGY

## 2023-05-09 PROCEDURE — 3078F DIAST BP <80 MM HG: CPT | Mod: CPTII,S$GLB,, | Performed by: UROLOGY

## 2023-05-09 PROCEDURE — 3044F HG A1C LEVEL LT 7.0%: CPT | Mod: CPTII,S$GLB,, | Performed by: UROLOGY

## 2023-05-09 PROCEDURE — 3078F PR MOST RECENT DIASTOLIC BLOOD PRESSURE < 80 MM HG: ICD-10-PCS | Mod: CPTII,S$GLB,, | Performed by: UROLOGY

## 2023-05-09 PROCEDURE — 3061F NEG MICROALBUMINURIA REV: CPT | Mod: CPTII,S$GLB,, | Performed by: UROLOGY

## 2023-05-09 PROCEDURE — 99999 PR PBB SHADOW E&M-EST. PATIENT-LVL III: CPT | Mod: PBBFAC,,, | Performed by: UROLOGY

## 2023-05-09 PROCEDURE — 1126F AMNT PAIN NOTED NONE PRSNT: CPT | Mod: CPTII,S$GLB,, | Performed by: UROLOGY

## 2023-05-09 PROCEDURE — 4010F PR ACE/ARB THEARPY RXD/TAKEN: ICD-10-PCS | Mod: CPTII,S$GLB,, | Performed by: UROLOGY

## 2023-05-09 NOTE — PROGRESS NOTES
"[facility] [address]   Clinic Note    SUBJECTIVE:     Chief Complaint: microscopic hematuria    History of Present Illness:  Manju Aranda is a 72 y.o. female who presents to clinic for microscopic hematuria. She is established to our clinic.     Had negative microscopic hematuria workup in 2017. Currently no urinary symptoms.    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      2 RBC   7 RBC   5 RBC and 10 RBC   and  ua showed microhematuria. 8-10 RBC.  No gross hematuria.     She does have PCKD.      In past  She has urinary frequency. Every 2 hours.   Nocturia 3 times. She does limit fluids 2 hours before bedtime. No sleep apnea. No lower extremity edema.  No constipation.   No gross hematuria. Cysto  normal  No recurrent UTI.   No incontinence. No urgency.      No gross hematuria.      H/o of diverticulitis. She is on a probiotic daily.  H/o of hysterectomy in .  , 2 vaginal, 2 c-sections.     2020 CT personally reviewed.   1. No diverticulitis or other acute abnormality identified in the abdomen or pelvis.  2. Numerous renal cysts, Bosniak class 1 and 2; prior reports give history of polycystic kidney disease.  Dominant cyst at right upper pole is Bosniak class 2 and measures slightly smaller in size compared to older exams, now 8.9 cm.  3. Small hepatic cysts.  4. Status post hysterectomy, colonic diverticulosis, and other findings as above.          OBJECTIVE:     Estimated body mass index is 38.38 kg/m² as calculated from the following:    Height as of this encounter: 5' 9" (1.753 m).    Weight as of this encounter: 117.9 kg (259 lb 14.8 oz).    Vital Signs (Most Recent)  Pulse: 73 (23 1035)  BP: (!) 95/54 (23 1035)    Physical Exam  Constitutional:       Appearance: Normal appearance.   HENT:      Head: Normocephalic and atraumatic.   Cardiovascular:      Rate and Rhythm: Normal rate.   Pulmonary:      Effort: Pulmonary effort is normal. No respiratory " distress.   Musculoskeletal:         General: Normal range of motion.   Skin:     General: Skin is warm and dry.   Neurological:      General: No focal deficit present.      Mental Status: She is alert and oriented to person, place, and time.   Psychiatric:         Mood and Affect: Mood normal.         Behavior: Behavior normal.         Thought Content: Thought content normal.         Judgment: Judgment normal.     Lab Results   Component Value Date    BUN 17 04/28/2023    CREATININE 1.0 04/28/2023    WBC 4.16 04/28/2023    WBC 4.16 04/28/2023    HGB 11.6 (L) 04/28/2023    HGB 11.6 (L) 04/28/2023    HCT 36.7 (L) 04/28/2023    HCT 36.7 (L) 04/28/2023     04/28/2023     04/28/2023    AST 19 04/28/2023    ALT 15 04/28/2023    ALKPHOS 99 04/28/2023    ALBUMIN 3.5 04/28/2023    HGBA1C 4.2 03/13/2023      Urine dip showed no blood, leukocyte esterase, and nitrite. Trace protein.     CT AP 12/2022 - large right renal cyst with peripheral calcification, other bilateral simple cysts, all stable; no hydronephrosis or stones in collecting system, no renal masses - stable since 2010  ASSESSMENT     1. Microhematuria    2. ADPKD (autosomal dominant polycystic kidney disease)    3. Smoldering multiple myeloma (SMM)      PLAN:   1. Microhematuria  -     Urinalysis Microscopic    2. ADPKD (autosomal dominant polycystic kidney disease)    3. Smoldering multiple myeloma (SMM)     Will repeat micro UA; if negative, given prior negative MH workup, ok to return PRN. - but patient would like to come yearly.     MD Dr. Hao Mendez saw and evaluated patient and agrees with treatmenet, evaluation and plan.

## 2023-05-10 LAB — TSH RECEP AB SER-ACNC: <1.1 IU/L (ref 0–1.75)

## 2023-05-11 ENCOUNTER — PATIENT MESSAGE (OUTPATIENT)
Dept: ENDOCRINOLOGY | Facility: CLINIC | Age: 73
End: 2023-05-11
Payer: MEDICARE

## 2023-05-11 DIAGNOSIS — E05.00 GRAVES DISEASE: Primary | ICD-10-CM

## 2023-05-22 ENCOUNTER — TELEPHONE (OUTPATIENT)
Dept: ENDOSCOPY | Facility: HOSPITAL | Age: 73
End: 2023-05-22

## 2023-05-22 ENCOUNTER — CLINICAL SUPPORT (OUTPATIENT)
Dept: ENDOSCOPY | Facility: HOSPITAL | Age: 73
End: 2023-05-22
Attending: COLON & RECTAL SURGERY
Payer: MEDICARE

## 2023-05-22 VITALS — WEIGHT: 259 LBS | HEIGHT: 69 IN | BODY MASS INDEX: 38.36 KG/M2

## 2023-05-22 DIAGNOSIS — Z12.11 SPECIAL SCREENING FOR MALIGNANT NEOPLASMS, COLON: Primary | ICD-10-CM

## 2023-05-22 DIAGNOSIS — R93.5 ABNORMAL CT OF THE ABDOMEN: ICD-10-CM

## 2023-05-22 DIAGNOSIS — R31.29 MICROHEMATURIA: Primary | ICD-10-CM

## 2023-05-22 NOTE — TELEPHONE ENCOUNTER
May 22, 2023            1:20 PM  Shira Elizondo RN routed this conversation to Me    Julio Beverly MD  to [facility] Staff         1:14 PM   No concerns with proceeding            11:29 AM  You routed this conversation to Julio Bveerly MD    Me         11:29 AM  Note  Good morning,     Patient has a referral for an Urgent colonoscopy for abnormal CT of abdomen.   Is patient cleared from a cardiac standpoint to proceed with procedure?  Please advise.     Thanks,     Nathaniel

## 2023-05-22 NOTE — PLAN OF CARE
Contacted patient to schedule colonoscopy. Patient states she is out of town and will be back on 6/4/23. Patient has some outstanding cardiac testing that has been ordered that she will be completing in early June. Cardiac clearance requested from Dr. Beverly. Will call patient back once clearance received.

## 2023-05-22 NOTE — TELEPHONE ENCOUNTER
Good morning,    Patient has a referral for an Urgent colonoscopy for abnormal CT of abdomen.   Is patient cleared from a cardiac standpoint to proceed with procedure?  Please advise.    Thanks,    Nathaniel

## 2023-05-23 ENCOUNTER — TELEPHONE (OUTPATIENT)
Dept: UROLOGY | Facility: CLINIC | Age: 73
End: 2023-05-23
Payer: MEDICARE

## 2023-05-23 NOTE — TELEPHONE ENCOUNTER
Spoke to the patient. Cysto has been schedule.    Jung    ----- Message from Jovanna Bui MD sent at 5/22/2023 12:49 PM CDT -----  Still has micro hematuria. Been a while for a cysto. Let's repeat cysto.

## 2023-06-05 ENCOUNTER — HOSPITAL ENCOUNTER (OUTPATIENT)
Dept: CARDIOLOGY | Facility: OTHER | Age: 73
Discharge: HOME | End: 2023-06-05
Attending: INTERNAL MEDICINE
Payer: MEDICARE

## 2023-06-05 VITALS
WEIGHT: 259 LBS | DIASTOLIC BLOOD PRESSURE: 52 MMHG | HEART RATE: 73 BPM | HEIGHT: 69 IN | SYSTOLIC BLOOD PRESSURE: 134 MMHG | BODY MASS INDEX: 38.36 KG/M2

## 2023-06-05 DIAGNOSIS — R06.09 EXERTIONAL DYSPNEA: ICD-10-CM

## 2023-06-05 DIAGNOSIS — R07.2 PRECORDIAL PAIN: ICD-10-CM

## 2023-06-05 LAB
BSA FOR ECHO PROCEDURE: 2.39 M2
CV ECHO LV RWT: 0.31 CM
CV STRESS BASE HR: 73 BPM
DIASTOLIC BLOOD PRESSURE: 52 MMHG
ECHO LV POSTERIOR WALL: 0.84 CM (ref 0.6–1.1)
EJECTION FRACTION: 52 %
FRACTIONAL SHORTENING: 27 % (ref 28–44)
INTERVENTRICULAR SEPTUM: 0.83 CM (ref 0.6–1.1)
LEFT INTERNAL DIMENSION IN SYSTOLE: 3.97 CM (ref 2.1–4)
LEFT VENTRICLE DIASTOLIC VOLUME INDEX: 61.98 ML/M2
LEFT VENTRICLE DIASTOLIC VOLUME: 143.18 ML
LEFT VENTRICLE MASS INDEX: 72 G/M2
LEFT VENTRICLE SYSTOLIC VOLUME INDEX: 29.8 ML/M2
LEFT VENTRICLE SYSTOLIC VOLUME: 68.87 ML
LEFT VENTRICULAR INTERNAL DIMENSION IN DIASTOLE: 5.43 CM (ref 3.5–6)
LEFT VENTRICULAR MASS: 165.18 G
OHS CV CPX 85 PERCENT MAX PREDICTED HEART RATE MALE: 122
OHS CV CPX ESTIMATED METS: 7
OHS CV CPX MAX PREDICTED HEART RATE: 144
OHS CV CPX PATIENT IS FEMALE: 1
OHS CV CPX PATIENT IS MALE: 0
OHS CV CPX PEAK DIASTOLIC BLOOD PRESSURE: 140 MMHG
OHS CV CPX PEAK HEAR RATE: 139 BPM
OHS CV CPX PEAK RATE PRESSURE PRODUCT: NORMAL
OHS CV CPX PEAK SYSTOLIC BLOOD PRESSURE: 248 MMHG
OHS CV CPX PERCENT MAX PREDICTED HEART RATE ACHIEVED: 97
OHS CV CPX RATE PRESSURE PRODUCT PRESENTING: 9782
STRESS ANGINA INDEX: 0
STRESS DUKE TREADMILL SCORE: 5
STRESS ECHO POST EXERCISE DUR MIN: 5 MINUTES
STRESS ECHO POST EXERCISE DUR SEC: 1 SECONDS
STRESS ST DEPRESSION: 0 MM
STRESS ST ELEVATION: 0 MM
SYSTOLIC BLOOD PRESSURE: 134 MMHG

## 2023-06-05 PROCEDURE — 93351 STRESS ECHO (CUPID ONLY): ICD-10-PCS | Mod: 26,,, | Performed by: INTERNAL MEDICINE

## 2023-06-05 PROCEDURE — 93351 STRESS TTE COMPLETE: CPT

## 2023-06-05 PROCEDURE — 93351 STRESS TTE COMPLETE: CPT | Mod: 26,,, | Performed by: INTERNAL MEDICINE

## 2023-06-06 ENCOUNTER — HOSPITAL ENCOUNTER (OUTPATIENT)
Dept: CARDIOLOGY | Facility: HOSPITAL | Age: 73
Discharge: HOME | End: 2023-06-06
Attending: STUDENT IN AN ORGANIZED HEALTH CARE EDUCATION/TRAINING PROGRAM
Payer: MEDICARE

## 2023-06-06 VITALS
SYSTOLIC BLOOD PRESSURE: 130 MMHG | DIASTOLIC BLOOD PRESSURE: 70 MMHG | HEIGHT: 69 IN | BODY MASS INDEX: 38.36 KG/M2 | WEIGHT: 259 LBS | HEART RATE: 70 BPM

## 2023-06-06 DIAGNOSIS — I35.8 MILD AORTIC VALVE SCLEROSIS: ICD-10-CM

## 2023-06-06 LAB
ASCENDING AORTA: 3.69 CM
AV INDEX (PROSTH): 0.59
AV MEAN GRADIENT: 4 MMHG
AV PEAK GRADIENT: 8 MMHG
AV VALVE AREA: 2.24 CM2
AV VELOCITY RATIO: 0.54
BSA FOR ECHO PROCEDURE: 2.39 M2
CV ECHO LV RWT: 0.28 CM
DOP CALC AO PEAK VEL: 1.45 M/S
DOP CALC AO VTI: 27.07 CM
DOP CALC LVOT AREA: 3.8 CM2
DOP CALC LVOT DIAMETER: 2.2 CM
DOP CALC LVOT PEAK VEL: 0.79 M/S
DOP CALC LVOT STROKE VOLUME: 60.6 CM3
DOP CALCLVOT PEAK VEL VTI: 15.95 CM
E WAVE DECELERATION TIME: 154.83 MSEC
E/A RATIO: 0.58
E/E' RATIO: 6.38 M/S
ECHO LV POSTERIOR WALL: 0.76 CM (ref 0.6–1.1)
EJECTION FRACTION: 55 %
FRACTIONAL SHORTENING: 26 % (ref 28–44)
INTERVENTRICULAR SEPTUM: 0.63 CM (ref 0.6–1.1)
LA MAJOR: 5.41 CM
LA MINOR: 5.2 CM
LA WIDTH: 4.3 CM
LEFT ATRIUM SIZE: 4.2 CM
LEFT ATRIUM VOLUME INDEX MOD: 19.7 ML/M2
LEFT ATRIUM VOLUME INDEX: 35.2 ML/M2
LEFT ATRIUM VOLUME MOD: 45.62 CM3
LEFT ATRIUM VOLUME: 81.41 CM3
LEFT INTERNAL DIMENSION IN SYSTOLE: 4.07 CM (ref 2.1–4)
LEFT VENTRICLE DIASTOLIC VOLUME INDEX: 63.97 ML/M2
LEFT VENTRICLE DIASTOLIC VOLUME: 147.76 ML
LEFT VENTRICLE MASS INDEX: 58 G/M2
LEFT VENTRICLE SYSTOLIC VOLUME INDEX: 31.7 ML/M2
LEFT VENTRICLE SYSTOLIC VOLUME: 73.12 ML
LEFT VENTRICULAR INTERNAL DIMENSION IN DIASTOLE: 5.51 CM (ref 3.5–6)
LEFT VENTRICULAR MASS: 134.74 G
LV LATERAL E/E' RATIO: 5.1 M/S
LV SEPTAL E/E' RATIO: 8.5 M/S
MV PEAK A VEL: 0.88 M/S
MV PEAK E VEL: 0.51 M/S
MV STENOSIS PRESSURE HALF TIME: 44.9 MS
MV VALVE AREA P 1/2 METHOD: 4.9 CM2
PISA TR MAX VEL: 2.79 M/S
RA MAJOR: 4.98 CM
RA PRESSURE: 3 MMHG
RA WIDTH: 3.53 CM
RIGHT VENTRICULAR END-DIASTOLIC DIMENSION: 3.49 CM
SINUS: 2.83 CM
STJ: 2.93 CM
TDI LATERAL: 0.1 M/S
TDI SEPTAL: 0.06 M/S
TDI: 0.08 M/S
TR MAX PG: 31 MMHG
TRICUSPID ANNULAR PLANE SYSTOLIC EXCURSION: 1.75 CM
TV REST PULMONARY ARTERY PRESSURE: 34 MMHG

## 2023-06-06 PROCEDURE — 93306 TTE W/DOPPLER COMPLETE: CPT

## 2023-06-06 PROCEDURE — 93306 TTE W/DOPPLER COMPLETE: CPT | Mod: 26,,, | Performed by: INTERNAL MEDICINE

## 2023-06-06 PROCEDURE — 93306 ECHO (CUPID ONLY): ICD-10-PCS | Mod: 26,,, | Performed by: INTERNAL MEDICINE

## 2023-06-07 ENCOUNTER — PROCEDURE VISIT (OUTPATIENT)
Dept: UROLOGY | Facility: CLINIC | Age: 73
End: 2023-06-07
Payer: MEDICARE

## 2023-06-07 VITALS
HEIGHT: 69 IN | DIASTOLIC BLOOD PRESSURE: 80 MMHG | HEART RATE: 65 BPM | BODY MASS INDEX: 39.06 KG/M2 | SYSTOLIC BLOOD PRESSURE: 164 MMHG | WEIGHT: 263.69 LBS

## 2023-06-07 DIAGNOSIS — R31.29 MICROHEMATURIA: ICD-10-CM

## 2023-06-07 PROCEDURE — 52000 CYSTOSCOPY: ICD-10-PCS | Mod: S$GLB,,, | Performed by: UROLOGY

## 2023-06-07 PROCEDURE — 52000 CYSTOURETHROSCOPY: CPT | Mod: S$GLB,,, | Performed by: UROLOGY

## 2023-06-07 RX ORDER — LIDOCAINE HYDROCHLORIDE 20 MG/ML
JELLY TOPICAL ONCE
Status: COMPLETED | OUTPATIENT
Start: 2023-06-07 | End: 2023-06-07

## 2023-06-07 RX ORDER — LIDOCAINE HYDROCHLORIDE 20 MG/ML
JELLY TOPICAL
Status: SHIPPED | OUTPATIENT
Start: 2023-06-07

## 2023-06-07 RX ADMIN — LIDOCAINE HYDROCHLORIDE: 20 JELLY TOPICAL at 09:06

## 2023-06-07 NOTE — PROCEDURES
Cystoscopy    Date/Time: 6/7/2023 9:30 AM  Performed by: Jovanna Bui MD  Authorized by: Jovanna Bui MD     Consent Done?:  Yes (Written)  Timeout: prior to procedure the correct patient, procedure, and site was verified    Prep: patient was prepped and draped in usual sterile fashion    Local anesthesia used?: Yes    Anesthesia:  Intraurethral instillation  Local anesthetic:  Topical anesthetic  Indications: hematuria    Position:  Dorsal lithotomy  Anesthesia:  Intraurethral instillation  Preparation: Patient was prepped and draped in usual sterile fashion    Scope type:  Flexible cystoscope  External exam normal: Yes    Urethra normal: Yes    Bladder neck normal: Yes    Bladder normal: Yes     patient tolerated the procedure well with no immediate complications  Comments:      F/u 1year

## 2023-06-07 NOTE — PATIENT INSTRUCTIONS
What to Expect After a Cystoscopy  For the next 24-48 hours, you may feel a mild burning when you urinate. This burning is normal and expected. Drink plenty of water to dilute the urine to help relieve the burning sensation. You may also see a small amount of blood in your urine after the procedure.    Unless you are already taking antibiotics, you may be given an antibiotic after the test to prevent infection.    Signs and Symptoms to Report  Call the [facility] at [phone] if you develop any of the following:  Fever of 101 degrees or higher  Chills or persistent bleeding  Inability to urinate

## 2023-06-10 DIAGNOSIS — I71.9 AORTIC ANEURYSM, UNSPECIFIED PORTION OF AORTA, UNSPECIFIED WHETHER RUPTURED: Primary | ICD-10-CM

## 2023-06-12 ENCOUNTER — OFFICE VISIT (OUTPATIENT)
Dept: CARDIOLOGY | Facility: CLINIC | Age: 73
End: 2023-06-12
Payer: MEDICARE

## 2023-06-12 ENCOUNTER — TELEPHONE (OUTPATIENT)
Dept: INTERNAL MEDICINE | Facility: CLINIC | Age: 73
End: 2023-06-12
Payer: MEDICARE

## 2023-06-12 VITALS
SYSTOLIC BLOOD PRESSURE: 110 MMHG | WEIGHT: 263 LBS | HEART RATE: 68 BPM | BODY MASS INDEX: 38.84 KG/M2 | OXYGEN SATURATION: 97 % | DIASTOLIC BLOOD PRESSURE: 72 MMHG

## 2023-06-12 DIAGNOSIS — R06.09 EXERTIONAL DYSPNEA: ICD-10-CM

## 2023-06-12 DIAGNOSIS — I10 PRIMARY HYPERTENSION: ICD-10-CM

## 2023-06-12 DIAGNOSIS — R07.2 PRECORDIAL PAIN: Primary | ICD-10-CM

## 2023-06-12 PROCEDURE — 99999 PR PBB SHADOW E&M-EST. PATIENT-LVL III: CPT | Mod: PBBFAC,,, | Performed by: INTERNAL MEDICINE

## 2023-06-12 PROCEDURE — 99999 PR PBB SHADOW E&M-EST. PATIENT-LVL III: ICD-10-PCS | Mod: PBBFAC,,, | Performed by: INTERNAL MEDICINE

## 2023-06-12 PROCEDURE — 3078F DIAST BP <80 MM HG: CPT | Mod: CPTII,S$GLB,, | Performed by: INTERNAL MEDICINE

## 2023-06-12 PROCEDURE — 1160F RVW MEDS BY RX/DR IN RCRD: CPT | Mod: CPTII,S$GLB,, | Performed by: INTERNAL MEDICINE

## 2023-06-12 PROCEDURE — 1159F MED LIST DOCD IN RCRD: CPT | Mod: CPTII,S$GLB,, | Performed by: INTERNAL MEDICINE

## 2023-06-12 PROCEDURE — 3044F PR MOST RECENT HEMOGLOBIN A1C LEVEL <7.0%: ICD-10-PCS | Mod: CPTII,S$GLB,, | Performed by: INTERNAL MEDICINE

## 2023-06-12 PROCEDURE — 3074F SYST BP LT 130 MM HG: CPT | Mod: CPTII,S$GLB,, | Performed by: INTERNAL MEDICINE

## 2023-06-12 PROCEDURE — 3061F NEG MICROALBUMINURIA REV: CPT | Mod: CPTII,S$GLB,, | Performed by: INTERNAL MEDICINE

## 2023-06-12 PROCEDURE — 4010F PR ACE/ARB THEARPY RXD/TAKEN: ICD-10-PCS | Mod: CPTII,S$GLB,, | Performed by: INTERNAL MEDICINE

## 2023-06-12 PROCEDURE — 1159F PR MEDICATION LIST DOCUMENTED IN MEDICAL RECORD: ICD-10-PCS | Mod: CPTII,S$GLB,, | Performed by: INTERNAL MEDICINE

## 2023-06-12 PROCEDURE — 99213 OFFICE O/P EST LOW 20 MIN: CPT | Mod: S$GLB,,, | Performed by: INTERNAL MEDICINE

## 2023-06-12 PROCEDURE — 3044F HG A1C LEVEL LT 7.0%: CPT | Mod: CPTII,S$GLB,, | Performed by: INTERNAL MEDICINE

## 2023-06-12 PROCEDURE — 4010F ACE/ARB THERAPY RXD/TAKEN: CPT | Mod: CPTII,S$GLB,, | Performed by: INTERNAL MEDICINE

## 2023-06-12 PROCEDURE — 1160F PR REVIEW ALL MEDS BY PRESCRIBER/CLIN PHARMACIST DOCUMENTED: ICD-10-PCS | Mod: CPTII,S$GLB,, | Performed by: INTERNAL MEDICINE

## 2023-06-12 PROCEDURE — 3066F PR DOCUMENTATION OF TREATMENT FOR NEPHROPATHY: ICD-10-PCS | Mod: CPTII,S$GLB,, | Performed by: INTERNAL MEDICINE

## 2023-06-12 PROCEDURE — 3008F PR BODY MASS INDEX (BMI) DOCUMENTED: ICD-10-PCS | Mod: CPTII,S$GLB,, | Performed by: INTERNAL MEDICINE

## 2023-06-12 PROCEDURE — 3078F PR MOST RECENT DIASTOLIC BLOOD PRESSURE < 80 MM HG: ICD-10-PCS | Mod: CPTII,S$GLB,, | Performed by: INTERNAL MEDICINE

## 2023-06-12 PROCEDURE — 3008F BODY MASS INDEX DOCD: CPT | Mod: CPTII,S$GLB,, | Performed by: INTERNAL MEDICINE

## 2023-06-12 PROCEDURE — 1126F PR PAIN SEVERITY QUANTIFIED, NO PAIN PRESENT: ICD-10-PCS | Mod: CPTII,S$GLB,, | Performed by: INTERNAL MEDICINE

## 2023-06-12 PROCEDURE — 3066F NEPHROPATHY DOC TX: CPT | Mod: CPTII,S$GLB,, | Performed by: INTERNAL MEDICINE

## 2023-06-12 PROCEDURE — 1126F AMNT PAIN NOTED NONE PRSNT: CPT | Mod: CPTII,S$GLB,, | Performed by: INTERNAL MEDICINE

## 2023-06-12 PROCEDURE — 3061F PR NEG MICROALBUMINURIA RESULT DOCUMENTED/REVIEW: ICD-10-PCS | Mod: CPTII,S$GLB,, | Performed by: INTERNAL MEDICINE

## 2023-06-12 PROCEDURE — 3074F PR MOST RECENT SYSTOLIC BLOOD PRESSURE < 130 MM HG: ICD-10-PCS | Mod: CPTII,S$GLB,, | Performed by: INTERNAL MEDICINE

## 2023-06-12 PROCEDURE — 99213 PR OFFICE/OUTPT VISIT, EST, LEVL III, 20-29 MIN: ICD-10-PCS | Mod: S$GLB,,, | Performed by: INTERNAL MEDICINE

## 2023-06-12 NOTE — PROGRESS NOTES
[facility]    Chief Complaint  Chief Complaint   Patient presents with    Follow-up       HPI:    Doing well.  Chest pain and dyspnea have resolved.  Has started exercising regularly.    Medications  Current Outpatient Medications   Medication Sig Dispense Refill    ascorbic acid, vitamin C, (VITAMIN C) 500 MG tablet Take 500 mg by mouth once daily.      aspirin 81 MG Chew Take 81 mg by mouth once daily.      cholecalciferol, vitamin D3, (VITAMIN D3) 50 mcg (2,000 unit) Cap capsule Take 1 capsule (2,000 Units total) by mouth once daily.      magnesium oxide (MAG-OX) 400 mg tablet Take 400 mg by mouth once daily.      multivit with min-folic acid 0.4 mg Tab Take 0.4 mg by mouth once daily.      olmesartan (BENICAR) 20 MG tablet Take 20 mg by mouth once daily.      psyllium (METAMUCIL) powder Take 1 packet by mouth Daily.      rizatriptan (MAXALT) 10 MG tablet TAKE 1 TABLET(10 MG) BY MOUTH EVERY 2 HOURS AS NEEDED FOR MIGRAINE. MAX 30 MG/ 24 AT BEDTIME 12 tablet 11    traZODone (DESYREL) 50 MG tablet Take 1 tablet (50 mg total) by mouth nightly as needed for Insomnia. 30 tablet 2     Current Facility-Administered Medications   Medication Dose Route Frequency Provider Last Rate Last Admin    LIDOcaine HCl 2% urojet   Urethral 1 time in Clinic/HOD Jovanna Bui MD            History  Past Medical History:   Diagnosis Date    Allergy     Anemia     Arthritis     Cholelithiases     Cyst of kidney, acquired     Diverticulitis     Elevated TSH     Family history of Graves' disease: daughter, maternal aunt, maternal uncle 9/13/2016    Glaucoma     Graves disease     Hx of colonic polyps     Hypertension 1981    Liver cyst     MGUS (monoclonal gammopathy of unknown significance)     Migraine headache     Mitral valve problem     leakage    Obesity     Paraproteinemia     Sleep apnea     + CPAP    Smoldering multiple myeloma 2013    Tricuspid valve disease     leakage     Past Surgical History:    Procedure Laterality Date    APPENDECTOMY      COLONOSCOPY N/A 10/23/2015    Procedure: COLONOSCOPY;  Surgeon: Brandon Ruelas MD;  Location: [facility] ([address] [place] [address]);  Service: Endoscopy;  Laterality: N/A;  Had divertiulitis in 2015 with a recommendation for colonoscopy in 8 weeks    Dr. Ruelas is her GI physician    COLONOSCOPY N/A 2018    Procedure: COLONOSCOPY;  Surgeon: Brandon Ruelas MD;  Location: [facility] ([address] [place] [address]);  Service: Endoscopy;  Laterality: N/A;  Dr. Ruelas did the last one multiple polyps, patient had recent diverticulitis should not schedule before Oct 10th    CYSTOSCOPY      HYSTERECTOMY   or     menorrhagia     Social History     Socioeconomic History    Marital status: Single   Occupational History    Occupation: RETIRED   Tobacco Use    Smoking status: Former     Years: 3.00     Types: Cigarettes     Quit date: 1995     Years since quittin.4     Passive exposure: Never    Smokeless tobacco: Never   Substance and Sexual Activity    Alcohol use: No    Drug use: No    Sexual activity: Not Currently     Partners: Male     Birth control/protection: Post-menopausal   Social History Narrative    Lives with sister.         Walks most AM in [facility] [address].      Social Determinants of Health     Financial Resource Strain: Low Risk     Difficulty of Paying Living Expenses: Not hard at all   Food Insecurity: No Food Insecurity    Worried About Running Out of Food in the Last Year: Never true    Ran Out of Food in the Last Year: Never true   Transportation Needs: No Transportation Needs    Lack of Transportation (Medical): No    Lack of Transportation (Non-Medical): No   Physical Activity: Insufficiently Active    Days of Exercise per Week: 3 days    Minutes of Exercise per Session: 30 min   Stress: Stress Concern Present    Feeling of Stress : To some extent   Housing Stability: Unknown    Unable to Pay for Housing in the Last Year: No    Unstable Housing in the  Last Year: No     Family History   Problem Relation Age of Onset    Heart disease Mother         MI    Heart attack Mother     Hypertension Father     Irregular heart beat Sister         fast heart rate    Cataracts Sister     Glaucoma Sister     No Known Problems Sister     Cancer Paternal Grandmother         GYN cancer - unknown cancer    Graves' disease Daughter     No Known Problems Daughter     No Known Problems Son     No Known Problems Son     Heart disease Maternal Aunt         MI    Heart disease Maternal Aunt         MI    Colon cancer Maternal Uncle     Cancer Maternal Uncle         colon ca    Melanoma Neg Hx     Breast cancer Neg Hx     Ovarian cancer Neg Hx     Colon polyps Neg Hx     Rectal cancer Neg Hx     Stomach cancer Neg Hx     Esophageal cancer Neg Hx     Ulcerative colitis Neg Hx     Crohn's disease Neg Hx     Amblyopia Neg Hx     Blindness Neg Hx     Macular degeneration Neg Hx     Strabismus Neg Hx     Retinal detachment Neg Hx         Allergies  Review of patient's allergies indicates:  No Known Allergies    Review of Systems   Review of Systems   Constitutional: Negative for malaise/fatigue, weight gain and weight loss.   Eyes:  Negative for visual disturbance.   Cardiovascular:  Negative for chest pain, claudication, cyanosis, dyspnea on exertion, irregular heartbeat, leg swelling, near-syncope, orthopnea, palpitations, paroxysmal nocturnal dyspnea and syncope.   Respiratory:  Negative for cough, hemoptysis, shortness of breath, sleep disturbances due to breathing and wheezing.    Hematologic/Lymphatic: Negative for bleeding problem. Does not bruise/bleed easily.   Skin:  Negative for poor wound healing.   Musculoskeletal:  Negative for muscle cramps and myalgias.   Gastrointestinal:  Negative for abdominal pain, anorexia, diarrhea, heartburn, hematemesis, hematochezia, melena, nausea and vomiting.   Genitourinary:  Negative for hematuria and nocturia.   Neurological:  Negative for  excessive daytime sleepiness, dizziness, focal weakness, light-headedness and weakness.     Physical Exam  Vitals:    06/12/23 1122   BP: 110/72   Pulse: 68     Wt Readings from Last 1 Encounters:   06/12/23 119.3 kg (263 lb)     Physical Exam  Vitals and nursing note reviewed.   Constitutional:       General: She is not in acute distress.     Appearance: She is not toxic-appearing or diaphoretic.   HENT:      Head: Normocephalic and atraumatic.      Mouth/Throat:      Lips: Pink.      Mouth: Mucous membranes are moist.   Eyes:      General: No scleral icterus.     Conjunctiva/sclera: Conjunctivae normal.   Neck:      Thyroid: No thyromegaly.      Vascular: No carotid bruit, hepatojugular reflux or JVD.      Trachea: Trachea normal.   Cardiovascular:      Rate and Rhythm: Normal rate and regular rhythm. No extrasystoles are present.     Chest Wall: PMI is not displaced.      Pulses:           Carotid pulses are 2+ on the right side and 2+ on the left side.       Radial pulses are 2+ on the right side and 2+ on the left side.        Dorsalis pedis pulses are 2+ on the right side and 2+ on the left side.        Posterior tibial pulses are 2+ on the right side and 2+ on the left side.      Heart sounds: S1 normal and S2 normal. No murmur heard.    No friction rub. No S3 or S4 sounds.   Pulmonary:      Effort: Pulmonary effort is normal. No accessory muscle usage or respiratory distress.      Breath sounds: Normal breath sounds and air entry. No decreased breath sounds, wheezing, rhonchi or rales.   Abdominal:      General: Bowel sounds are normal. There is no distension or abdominal bruit.      Palpations: Abdomen is soft. There is no hepatomegaly, splenomegaly or pulsatile mass.      Tenderness: There is no abdominal tenderness.   Musculoskeletal:         General: No tenderness or deformity.      Right lower leg: No edema.      Left lower leg: No edema.   Skin:     General: Skin is warm and dry.      Capillary Refill:  Capillary refill takes less than 2 seconds.      Coloration: Skin is not cyanotic or pale.      Nails: There is no clubbing.   Neurological:      General: No focal deficit present.      Mental Status: She is alert and oriented to person, place, and time.   Psychiatric:         Attention and Perception: Attention normal.         Mood and Affect: Mood normal.         Speech: Speech normal.         Behavior: Behavior normal. Behavior is cooperative.       Labs  Hospital Outpatient Visit on 06/06/2023   Component Date Value Ref Range Status    Ascending aorta 06/06/2023 3.69  cm Final    STJ 06/06/2023 2.93  cm Final    AV mean gradient 06/06/2023 4  mmHg Final    Ao peak nathaniel 06/06/2023 1.45  m/s Final    Ao VTI 06/06/2023 27.07  cm Final    IVS 06/06/2023 0.63  0.6 - 1.1 cm Final    LA size 06/06/2023 4.20  cm Final    Left Atrium Major Axis 06/06/2023 5.41  cm Final    Left Atrium Minor Axis 06/06/2023 5.20  cm Final    LVIDd 06/06/2023 5.51  3.5 - 6.0 cm Final    LVIDs 06/06/2023 4.07 (A)  2.1 - 4.0 cm Final    LVOT diameter 06/06/2023 2.20  cm Final    LVOT peak VTI 06/06/2023 15.95  cm Final    Posterior Wall 06/06/2023 0.76  0.6 - 1.1 cm Final    MV Peak A Nathaniel 06/06/2023 0.88  m/s Final    E wave deceleration time 06/06/2023 154.83  msec Final    MV Peak E Nathaniel 06/06/2023 0.51  m/s Final    RA Major Axis 06/06/2023 4.98  cm Final    RA Width 06/06/2023 3.53  cm Final    RVDD 06/06/2023 3.49  cm Final    Sinus 06/06/2023 2.83  cm Final    TAPSE 06/06/2023 1.75  cm Final    TR Max Nathaniel 06/06/2023 2.79  m/s Final    LA WIDTH 06/06/2023 4.30  cm Final    MV stenosis pressure 1/2 time 06/06/2023 44.90  ms Final    LV Diastolic Volume 06/06/2023 147.76  mL Final    LV Systolic Volume 06/06/2023 73.12  mL Final    LVOT peak nathaniel 06/06/2023 0.79  m/s Final    TDI LATERAL 06/06/2023 0.10  m/s Final    TDI SEPTAL 06/06/2023 0.06  m/s Final    LA volume (mod) 06/06/2023 45.62  cm3 Final    LV LATERAL E/E' RATIO 06/06/2023 5.10   m/s Final    LV SEPTAL E/E' RATIO 06/06/2023 8.50  m/s Final    FS 06/06/2023 26  % Final    LA volume 06/06/2023 81.41  cm3 Final    LV mass 06/06/2023 134.74  g Final    Left Ventricle Relative Wall Thick* 06/06/2023 0.28  cm Final    AV valve area 06/06/2023 2.24  cm2 Final    AV Velocity Ratio 06/06/2023 0.54   Final    AV index (prosthetic) 06/06/2023 0.59   Final    MV valve area p 1/2 method 06/06/2023 4.90  cm2 Final    E/A ratio 06/06/2023 0.58   Final    Mean e' 06/06/2023 0.08  m/s Final    LVOT area 06/06/2023 3.8  cm2 Final    LVOT stroke volume 06/06/2023 60.60  cm3 Final    AV peak gradient 06/06/2023 8  mmHg Final    E/E' ratio 06/06/2023 6.38  m/s Final    Triscuspid Valve Regurgitation Pea* 06/06/2023 31  mmHg Final    BSA 06/06/2023 2.39  m2 Final    LV Systolic Volume Index 06/06/2023 31.7  mL/m2 Final    LV Diastolic Volume Index 06/06/2023 63.97  mL/m2 Final    LA Volume Index 06/06/2023 35.2  mL/m2 Final    LV Mass Index 06/06/2023 58  g/m2 Final    LA Volume Index (Mod) 06/06/2023 19.7  mL/m2 Final    Right Atrial Pressure (from IVC) 06/06/2023 3  mmHg Final    EF 06/06/2023 55  % Final    TV rest pulmonary artery pressure 06/06/2023 34  mmHg Final   Hospital Outpatient Visit on 06/05/2023   Component Date Value Ref Range Status    BSA 06/05/2023 2.39  m2 Final    Systolic blood pressure 06/05/2023 134  mmHg Final    Diastolic blood pressure 06/05/2023 52  mmHg Final    HR at rest 06/05/2023 73  bpm Final    RPP 06/05/2023 9,782   Final    Max Predicted HR 06/05/2023 144   Final    85% Max Predicted HR 06/05/2023 122   Final    [facility] CV CPX PATIENT IS MALE 06/05/2023 0   Final    [facility] CV CPX PATIENT IS FEMALE 06/05/2023 1   Final    LVIDd 06/05/2023 5.43  3.5 - 6.0 cm Final    IVS 06/05/2023 0.83  0.6 - 1.1 cm Final    Posterior Wall 06/05/2023 0.84  0.6 - 1.1 cm Final    LVIDs 06/05/2023 3.97  2.1 - 4.0 cm Final    FS 06/05/2023 27  28 - 44 % Final    LV mass 06/05/2023 165.18  g Final    Left  Ventricle Relative Wall Thick* 06/05/2023 0.31  cm Final    LV Systolic Volume 06/05/2023 68.87  mL Final    LV Systolic Volume Index 06/05/2023 29.8  mL/m2 Final    LV Diastolic Volume 06/05/2023 143.18  mL Final    LV Diastolic Volume Index 06/05/2023 61.98  mL/m2 Final    LV Mass Index 06/05/2023 72  g/m2 Final    Peak HR 06/05/2023 139  bpm Final    Peak Systolic BP 06/05/2023 248  mmHg Final    Peak Diatolic BP 06/05/2023 140  mmHg Final    Peak RPP 06/05/2023 34,472   Final    Estimated METs 06/05/2023 7   Final    % Max HR Achieved 06/05/2023 97   Final    EF 06/05/2023 52  % Final    Exercise duration (sec) 06/05/2023 1  seconds Final    Exercise duration (min) 06/05/2023 5  minutes Final    Angina Index 06/05/2023 0   Final    ST Elevation (mm) 06/05/2023 0.0  mm Final    ST Depression (mm) 06/05/2023 0.0  mm Final    Maurer Treadmill Score 06/05/2023 5   Final   Lab Visit on 05/09/2023   Component Date Value Ref Range Status    TSH 05/09/2023 1.202  0.400 - 4.000 uIU/mL Final    Thyrotropin Receptor Ab 05/09/2023 <1.10  0.00 - 1.75 IU/L Final    Comment: -------------------ADDITIONAL INFORMATION-------------------  At a decision limit of 1.75 IU/L, this assay   has 97% sensitivity and 99% specificity for   detection of Graves' disease. In healthy   individuals and in patients with thyroid disease   without diagnosis of Graves' disease, the upper   limit of anti-TSHR values are 1.22 IU/L and   1.58 IU/L, respectively (97.5th percentiles).    Test Performed by:  [facility]  [address] [place] [address]  : Kumar Franco M.D. Ph.D.; CLIA# [id number]     Office Visit on 05/09/2023   Component Date Value Ref Range Status    RBC, UA 05/09/2023 4  0 - 4 /hpf Final    WBC, UA 05/09/2023 2  0 - 5 /hpf Final    Bacteria 05/09/2023 Occasional  None-Occ /hpf Final    Squam Epithel, UA 05/09/2023 1  /hpf Final    Hyaline Casts, UA 05/09/2023 16 (A)   0-1/lpf /lpf Final    Microscopic Comment 05/09/2023 SEE COMMENT   Final    Comment: Other formed elements not mentioned in the report are not   present in the microscopic examination.      Lab Visit on 04/28/2023   Component Date Value Ref Range Status    WBC 04/28/2023 4.16  3.90 - 12.70 K/uL Final    RBC 04/28/2023 3.70 (L)  4.00 - 5.40 M/uL Final    Hemoglobin 04/28/2023 11.6 (L)  12.0 - 16.0 g/dL Final    Hematocrit 04/28/2023 36.7 (L)  37.0 - 48.5 % Final    MCV 04/28/2023 99 (H)  82 - 98 fL Final    MCH 04/28/2023 31.4 (H)  27.0 - 31.0 pg Final    MCHC 04/28/2023 31.6 (L)  32.0 - 36.0 g/dL Final    RDW 04/28/2023 11.0 (L)  11.5 - 14.5 % Final    Platelets 04/28/2023 211  150 - 450 K/uL Final    MPV 04/28/2023 9.7  9.2 - 12.9 fL Final    Immature Granulocytes 04/28/2023 0.5  0.0 - 0.5 % Final    Gran # (ANC) 04/28/2023 2.4  1.8 - 7.7 K/uL Final    Immature Grans (Abs) 04/28/2023 0.02  0.00 - 0.04 K/uL Final    Comment: Mild elevation in immature granulocytes is non specific and   can be seen in a variety of conditions including stress response,   acute inflammation, trauma and pregnancy. Correlation with other   laboratory and clinical findings is essential.      Lymph # 04/28/2023 1.3  1.0 - 4.8 K/uL Final    Mono # 04/28/2023 0.4  0.3 - 1.0 K/uL Final    Eos # 04/28/2023 0.0  0.0 - 0.5 K/uL Final    Baso # 04/28/2023 0.01  0.00 - 0.20 K/uL Final    nRBC 04/28/2023 0  0 /100 WBC Final    Gran % 04/28/2023 57.8  38.0 - 73.0 % Final    Lymph % 04/28/2023 31.7  18.0 - 48.0 % Final    Mono % 04/28/2023 9.6  4.0 - 15.0 % Final    Eosinophil % 04/28/2023 0.2  0.0 - 8.0 % Final    Basophil % 04/28/2023 0.2  0.0 - 1.9 % Final    Differential Method 04/28/2023 Automated   Final    Sodium 04/28/2023 143  136 - 145 mmol/L Final    Potassium 04/28/2023 4.0  3.5 - 5.1 mmol/L Final    Chloride 04/28/2023 109  95 - 110 mmol/L Final    CO2 04/28/2023 26  23 - 29 mmol/L Final    Glucose 04/28/2023 82  70 - 110 mg/dL Final     BUN 04/28/2023 17  8 - 23 mg/dL Final    Creatinine 04/28/2023 1.0  0.5 - 1.4 mg/dL Final    Calcium 04/28/2023 9.3  8.7 - 10.5 mg/dL Final    Total Protein 04/28/2023 7.4  6.0 - 8.4 g/dL Final    Albumin 04/28/2023 3.5  3.5 - 5.2 g/dL Final    Total Bilirubin 04/28/2023 0.6  0.1 - 1.0 mg/dL Final    Comment: For infants and newborns, interpretation of results should be based  on gestational age, weight and in agreement with clinical  observations.    Premature Infant recommended reference ranges:  Up to 24 hours.............<8.0 mg/dL  Up to 48 hours............<12.0 mg/dL  3-5 days..................<15.0 mg/dL  6-29 days.................<15.0 mg/dL      Alkaline Phosphatase 04/28/2023 99  55 - 135 U/L Final    AST 04/28/2023 19  10 - 40 U/L Final    ALT 04/28/2023 15  10 - 44 U/L Final    Anion Gap 04/28/2023 8  8 - 16 mmol/L Final    eGFR 04/28/2023 >60  >60 mL/min/1.73 m^2 Final    Protein, Serum 04/28/2023 7.0  6.0 - 8.4 g/dL Final    Comment: Serum protein electrophoresis and immunofixation results should be   interpreted in clinical context in that some therapeutic agents can   result   in false positive results (example, daratumumab). Correlation with   the   patient s therapeutic regimen is required.      Albumin 04/28/2023 3.74  3.35 - 5.55 g/dL Final    Alpha-1 04/28/2023 0.28  0.17 - 0.41 g/dL Final    Alpha-2 04/28/2023 0.64  0.43 - 0.99 g/dL Final    Beta 04/28/2023 0.71  0.50 - 1.10 g/dL Final    Gamma 04/28/2023 1.63 (H)  0.67 - 1.58 g/dL Final    IgG 04/28/2023 1788 (H)  650 - 1600 mg/dL Final    IgG Cord Blood Reference Range: 650-1600 mg/dL.    IgA 04/28/2023 99  40 - 350 mg/dL Final    IgA Cord Blood Reference Range: <5 mg/dL.    IgM 04/28/2023 31 (L)  50 - 300 mg/dL Final    IgM Cord Blood Reference Range: <25 mg/dL.    Kappa Free Light Chains 04/28/2023 12.85 (H)  0.33 - 1.94 mg/dL Final    Lambda Free Light Chains 04/28/2023 1.39  0.57 - 2.63 mg/dL Final    Kappa/Lambda FLC Ratio 04/28/2023  9.24 (H)  0.26 - 1.65 Final    Comment: Undetected antigen excess is a rare event but cannot   be excluded. If these free light chain results do not   agree with other clinical or laboratory findings or   if the sample is from a patient that has previously   demonstrated antigen excess, discuss with the testing   laboratory.   Results should always be interpreted in conjunction   with other laboratory tests and clinical evidence.      Immunofix Interp. 04/28/2023 SEE COMMENT   Final    Comment: Serum protein electrophoresis and immunofixation results should be   interpreted in clinical context in that some therapeutic agents can   result   in false positive results (example, daratumumab). Correlation with   the   patient s therapeutic regimen is required.  See pathologist's interpretation.      Pathologist Review 04/28/2023 Review required   Final    Retic 04/28/2023 2.2  0.5 - 2.5 % Final    Iron 04/28/2023 58  30 - 160 ug/dL Final    Transferrin 04/28/2023 224  200 - 375 mg/dL Final    TIBC 04/28/2023 332  250 - 450 ug/dL Final    Saturated Iron 04/28/2023 17 (L)  20 - 50 % Final    Ferritin 04/28/2023 177  20.0 - 300.0 ng/mL Final    WBC 04/28/2023 4.16  3.90 - 12.70 K/uL Final    RBC 04/28/2023 3.70 (L)  4.00 - 5.40 M/uL Final    Hemoglobin 04/28/2023 11.6 (L)  12.0 - 16.0 g/dL Final    Hematocrit 04/28/2023 36.7 (L)  37.0 - 48.5 % Final    MCV 04/28/2023 99 (H)  82 - 98 fL Final    MCH 04/28/2023 31.4 (H)  27.0 - 31.0 pg Final    MCHC 04/28/2023 31.6 (L)  32.0 - 36.0 g/dL Final    RDW 04/28/2023 11.0 (L)  11.5 - 14.5 % Final    Platelets 04/28/2023 211  150 - 450 K/uL Final    MPV 04/28/2023 9.7  9.2 - 12.9 fL Final    Immature Granulocytes 04/28/2023 0.5  0.0 - 0.5 % Final    Gran # (ANC) 04/28/2023 2.4  1.8 - 7.7 K/uL Final    Immature Grans (Abs) 04/28/2023 0.02  0.00 - 0.04 K/uL Final    Comment: Mild elevation in immature granulocytes is non specific and   can be seen in a variety of conditions including  stress response,   acute inflammation, trauma and pregnancy. Correlation with other   laboratory and clinical findings is essential.      Lymph # 04/28/2023 1.3  1.0 - 4.8 K/uL Final    Mono # 04/28/2023 0.4  0.3 - 1.0 K/uL Final    Eos # 04/28/2023 0.0  0.0 - 0.5 K/uL Final    Baso # 04/28/2023 0.01  0.00 - 0.20 K/uL Final    nRBC 04/28/2023 0  0 /100 WBC Final    Gran % 04/28/2023 57.8  38.0 - 73.0 % Final    Lymph % 04/28/2023 31.7  18.0 - 48.0 % Final    Mono % 04/28/2023 9.6  4.0 - 15.0 % Final    Eosinophil % 04/28/2023 0.2  0.0 - 8.0 % Final    Basophil % 04/28/2023 0.2  0.0 - 1.9 % Final    Differential Method 04/28/2023 Automated   Final    Vitamin B-12 04/28/2023 400  210 - 950 pg/mL Final    Folate 04/28/2023 12.9  4.0 - 24.0 ng/mL Final    Methlymalonic Acid 04/28/2023 0.22  <0.40 umol/L Final    Comment: If applicable, any drug confirmation testing reported  here was developed and the performance characteristics  determined by [facility]. This   confirmation testing has not been cleared or approved  by the FDA. The laboratory is regulated under CLIA as  qualified to perform high-complexity testing. This test  is used for patient testing purposes. It should not be  regarded as investigational or for research.    Test performed at [facility],  [address] [place]  [address]     [phone]  Shayla Ness MD, PhD - Medical Director      Homocysteine 04/28/2023 6.4  4.0 - 15.5 umol/L Final    Cholesterol 04/28/2023 135  120 - 199 mg/dL Final    Comment: The National Cholesterol Education Program (NCEP) has set the  following guidelines (reference ranges) for Cholesterol:  Optimal.....................<200 mg/dL  Borderline High.............200-239 mg/dL  High........................> or = 240 mg/dL      Triglycerides 04/28/2023 110  30 - 150 mg/dL Final    Comment: The National Cholesterol Education Program (NCEP) has set the  following guidelines (reference values)  for triglycerides:  Normal......................<150 mg/dL  Borderline High.............150-199 mg/dL  High........................200-499 mg/dL      HDL 04/28/2023 49  40 - 75 mg/dL Final    Comment: The National Cholesterol Education Program (NCEP) has set the  following guidelines (reference values) for HDL Cholesterol:  Low...............<40 mg/dL  Optimal...........>60 mg/dL      LDL Cholesterol 04/28/2023 64.0  63.0 - 159.0 mg/dL Final    Comment: The National Cholesterol Education Program (NCEP) has set the  following guidelines (reference values) for LDL Cholesterol:  Optimal.......................<130 mg/dL  Borderline High...............130-159 mg/dL  High..........................160-189 mg/dL  Very High.....................>190 mg/dL      HDL/Cholesterol Ratio 04/28/2023 36.3  20.0 - 50.0 % Final    Total Cholesterol/HDL Ratio 04/28/2023 2.8  2.0 - 5.0 Final    Non-HDL Cholesterol 04/28/2023 86  mg/dL Final    Comment: Risk category and Non-HDL cholesterol goals:  Coronary heart disease (CHD)or equivalent (10-year risk of CHD >20%):  Non-HDL cholesterol goal     <130 mg/dL  Two or more CHD risk factors and 10-year risk of CHD <= 20%:  Non-HDL cholesterol goal     <160 mg/dL  0 to 1 CHD risk factor:  Non-HDL cholesterol goal     <190 mg/dL      Pathologist Review Peripheral Smear 04/28/2023 REVIEWED   Final    Comment:   Electronically reviewed and signed by:  Markel Reddy MD  Signed on 04/28/23 at 15:19  Review of the peripheral smear reveals a macrocytic anemia without   significant anisopoikilocytosis. Overall leukocyte morphology is   unremarkable. Platelets are within normal limits.      Pathologist Review Peripheral Smear 04/28/2023 REVIEWED   Final    Comment:   Electronically reviewed and signed by:  Markel Reddy MD  Signed on 04/28/23 at 15:19  Review of the peripheral smear reveals a macrocytic anemia without   significant anisopoikilocytosis. Overall leukocyte morphology is    unremarkable. Platelets are within normal limits.      Pathologist Interpretation DELROY 04/28/2023 REVIEWED   Final    Comment:   Electronically reviewed and signed by:  Maria Elena Bergeron M.D.  Signed on 05/02/23 at 10:26  An IgG kappa specific monoclonal protein is present.       Pathologist Interpretation SPE 04/28/2023 REVIEWED   Final    Comment:   Electronically reviewed and signed by:  Maria Elena Bergeron M.D.  Signed on 05/02/23 at 10:26  Normal total protein.   Normal gamma globulins are decreased.   There is a paraprotein band in mid-gamma = 1.13 g/dL, previously 1.12   g/dL      Lab Visit on 03/13/2023   Component Date Value Ref Range Status    Sodium 03/13/2023 141  136 - 145 mmol/L Final    Potassium 03/13/2023 4.0  3.5 - 5.1 mmol/L Final    Chloride 03/13/2023 107  95 - 110 mmol/L Final    CO2 03/13/2023 29  23 - 29 mmol/L Final    Glucose 03/13/2023 82  70 - 110 mg/dL Final    BUN 03/13/2023 19  8 - 23 mg/dL Final    Creatinine 03/13/2023 1.0  0.5 - 1.4 mg/dL Final    Calcium 03/13/2023 9.5  8.7 - 10.5 mg/dL Final    Total Protein 03/13/2023 7.6  6.0 - 8.4 g/dL Final    Albumin 03/13/2023 3.7  3.5 - 5.2 g/dL Final    Total Bilirubin 03/13/2023 0.7  0.1 - 1.0 mg/dL Final    Comment: For infants and newborns, interpretation of results should be based  on gestational age, weight and in agreement with clinical  observations.    Premature Infant recommended reference ranges:  Up to 24 hours.............<8.0 mg/dL  Up to 48 hours............<12.0 mg/dL  3-5 days..................<15.0 mg/dL  6-29 days.................<15.0 mg/dL      Alkaline Phosphatase 03/13/2023 106  55 - 135 U/L Final    AST 03/13/2023 16  10 - 40 U/L Final    ALT 03/13/2023 11  10 - 44 U/L Final    Anion Gap 03/13/2023 5 (L)  8 - 16 mmol/L Final    eGFR 03/13/2023 >60  >60 mL/min/1.73 m^2 Final    TSH 03/13/2023 1.614  0.400 - 4.000 uIU/mL Final    WBC 03/13/2023 3.29 (L)  3.90 - 12.70 K/uL Final    RBC 03/13/2023 3.78 (L)  4.00 - 5.40  M/uL Final    Hemoglobin 03/13/2023 11.5 (L)  12.0 - 16.0 g/dL Final    Hematocrit 03/13/2023 37.5  37.0 - 48.5 % Final    MCV 03/13/2023 99 (H)  82 - 98 fL Final    MCH 03/13/2023 30.4  27.0 - 31.0 pg Final    MCHC 03/13/2023 30.7 (L)  32.0 - 36.0 g/dL Final    RDW 03/13/2023 11.2 (L)  11.5 - 14.5 % Final    Platelets 03/13/2023 228  150 - 450 K/uL Final    MPV 03/13/2023 9.9  9.2 - 12.9 fL Final    Immature Granulocytes 03/13/2023 0.6 (H)  0.0 - 0.5 % Final    Gran # (ANC) 03/13/2023 1.7 (L)  1.8 - 7.7 K/uL Final    Immature Grans (Abs) 03/13/2023 0.02  0.00 - 0.04 K/uL Final    Comment: Mild elevation in immature granulocytes is non specific and   can be seen in a variety of conditions including stress response,   acute inflammation, trauma and pregnancy. Correlation with other   laboratory and clinical findings is essential.      Lymph # 03/13/2023 1.2  1.0 - 4.8 K/uL Final    Mono # 03/13/2023 0.4  0.3 - 1.0 K/uL Final    Eos # 03/13/2023 0.0  0.0 - 0.5 K/uL Final    Baso # 03/13/2023 0.01  0.00 - 0.20 K/uL Final    nRBC 03/13/2023 0  0 /100 WBC Final    Gran % 03/13/2023 50.2  38.0 - 73.0 % Final    Lymph % 03/13/2023 37.4  18.0 - 48.0 % Final    Mono % 03/13/2023 10.9  4.0 - 15.0 % Final    Eosinophil % 03/13/2023 0.6  0.0 - 8.0 % Final    Basophil % 03/13/2023 0.3  0.0 - 1.9 % Final    Differential Method 03/13/2023 Automated   Final    Sodium 03/13/2023 141  136 - 145 mmol/L Final    Potassium 03/13/2023 4.0  3.5 - 5.1 mmol/L Final    Chloride 03/13/2023 107  95 - 110 mmol/L Final    CO2 03/13/2023 29  23 - 29 mmol/L Final    Glucose 03/13/2023 82  70 - 110 mg/dL Final    BUN 03/13/2023 19  8 - 23 mg/dL Final    Creatinine 03/13/2023 1.0  0.5 - 1.4 mg/dL Final    Calcium 03/13/2023 9.5  8.7 - 10.5 mg/dL Final    Total Protein 03/13/2023 7.6  6.0 - 8.4 g/dL Final    Albumin 03/13/2023 3.7  3.5 - 5.2 g/dL Final    Total Bilirubin 03/13/2023 0.7  0.1 - 1.0 mg/dL Final    Comment: For infants and newborns,  interpretation of results should be based  on gestational age, weight and in agreement with clinical  observations.    Premature Infant recommended reference ranges:  Up to 24 hours.............<8.0 mg/dL  Up to 48 hours............<12.0 mg/dL  3-5 days..................<15.0 mg/dL  6-29 days.................<15.0 mg/dL      Alkaline Phosphatase 03/13/2023 106  55 - 135 U/L Final    AST 03/13/2023 16  10 - 40 U/L Final    ALT 03/13/2023 11  10 - 44 U/L Final    Anion Gap 03/13/2023 5 (L)  8 - 16 mmol/L Final    eGFR 03/13/2023 >60  >60 mL/min/1.73 m^2 Final    Hemoglobin A1C 03/13/2023 4.2  4.0 - 5.6 % Final    Comment: ADA Screening Guidelines:  5.7-6.4%  Consistent with prediabetes  >or=6.5%  Consistent with diabetes    High levels of fetal hemoglobin interfere with the HbA1C  assay. Heterozygous hemoglobin variants (HbS, HgC, etc)do  not significantly interfere with this assay.   However, presence of multiple variants may affect accuracy.      Estimated Avg Glucose 03/13/2023 74  68 - 131 mg/dL Final    Vit D, 25-Hydroxy 03/13/2023 15 (L)  30 - 96 ng/mL Final    Comment: Vitamin D deficiency.........<10 ng/mL                              Vitamin D insufficiency......10-29 ng/mL       Vitamin D sufficiency........> or equal to 30 ng/mL  Vitamin D toxicity............>100 ng/mL      Microalbumin, Urine 03/13/2023 14.0  ug/mL Final    Creatinine, Urine 03/13/2023 137.7  15.0 - 325.0 mg/dL Final    Microalb/Creat Ratio 03/13/2023 10.2  0.0 - 30.0 ug/mg Final   Admission on 01/08/2023, Discharged on 01/08/2023   Component Date Value Ref Range Status    WBC 01/08/2023 3.59 (L)  3.90 - 12.70 K/uL Final    RBC 01/08/2023 3.99 (L)  4.00 - 5.40 M/uL Final    Hemoglobin 01/08/2023 12.3  12.0 - 16.0 g/dL Final    Hematocrit 01/08/2023 38.6  37.0 - 48.5 % Final    MCV 01/08/2023 97  82 - 98 fL Final    MCH 01/08/2023 30.8  27.0 - 31.0 pg Final    MCHC 01/08/2023 31.9 (L)  32.0 - 36.0 g/dL Final    RDW 01/08/2023 11.2 (L)   11.5 - 14.5 % Final    Platelets 01/08/2023 216  150 - 450 K/uL Final    MPV 01/08/2023 10.4  9.2 - 12.9 fL Final    Immature Granulocytes 01/08/2023 0.3  0.0 - 0.5 % Final    Gran # (ANC) 01/08/2023 2.7  1.8 - 7.7 K/uL Final    Immature Grans (Abs) 01/08/2023 0.01  0.00 - 0.04 K/uL Final    Comment: Mild elevation in immature granulocytes is non specific and   can be seen in a variety of conditions including stress response,   acute inflammation, trauma and pregnancy. Correlation with other   laboratory and clinical findings is essential.      Lymph # 01/08/2023 0.7 (L)  1.0 - 4.8 K/uL Final    Mono # 01/08/2023 0.2 (L)  0.3 - 1.0 K/uL Final    Eos # 01/08/2023 0.0  0.0 - 0.5 K/uL Final    Baso # 01/08/2023 0.01  0.00 - 0.20 K/uL Final    nRBC 01/08/2023 0  0 /100 WBC Final    Gran % 01/08/2023 74.4 (H)  38.0 - 73.0 % Final    Lymph % 01/08/2023 20.3  18.0 - 48.0 % Final    Mono % 01/08/2023 4.7  4.0 - 15.0 % Final    Eosinophil % 01/08/2023 0.0  0.0 - 8.0 % Final    Basophil % 01/08/2023 0.3  0.0 - 1.9 % Final    Differential Method 01/08/2023 Automated   Final    Sodium 01/08/2023 139  136 - 145 mmol/L Final    Potassium 01/08/2023 4.0  3.5 - 5.1 mmol/L Final    Chloride 01/08/2023 107  95 - 110 mmol/L Final    CO2 01/08/2023 27  23 - 29 mmol/L Final    Glucose 01/08/2023 93  70 - 110 mg/dL Final    BUN 01/08/2023 13  8 - 23 mg/dL Final    Creatinine 01/08/2023 0.9  0.5 - 1.4 mg/dL Final    Calcium 01/08/2023 9.9  8.7 - 10.5 mg/dL Final    Anion Gap 01/08/2023 5 (L)  8 - 16 mmol/L Final    eGFR 01/08/2023 >60  >60 mL/min/1.73 m^2 Final    HIV 1/2 Ag/Ab 01/08/2023 Negative  Negative Final    Hepatitis C Ab 01/08/2023 Negative  Negative Final       Imaging  Echo    Result Date: 6/6/2023  · The left ventricle is normal in size with normal systolic function. · The estimated ejection fraction is 55%. · Indeterminate left ventricular diastolic function. · Normal right ventricular size with normal right ventricular  systolic function. · Mild left atrial enlargement. · Mild tricuspid regurgitation. · The estimated PA systolic pressure is 34 mmHg. · Normal central venous pressure (3 mmHg). · Trivial circumferential pericardial effusion. · The ascending aorta is mildly dilated.      Stress Echo Which stress agent will be used? Treadmill Exercise; Color Flow Doppler? No    Result Date: 6/5/2023  · There were no arrhythmias during stress. · Normal systolic function. · The estimated ejection fraction is 52%. · The stress echo portion of this study is negative for myocardial ischemia. · The patient's exercise capacity was average. · The test was stopped because the patient experienced fatigue and shortness of breath. · The ECG portion of this study is negative for myocardial ischemia.        Assessment  1. Precordial pain  Resolved    2. Exertional dyspnea  Resolved    3. Primary hypertension  Controlled      Plan and Discussion    Discussed stress test results showing no evidence of disease.  Discussed risk factor modification for prevention of future cardiac events.    The 10-year ASCVD risk score (Elsie DK, et al., 2019) is: 6.6%    Values used to calculate the score:      Age: 72 years      Sex: Female      Is Non- : Yes      Diabetic: No      Tobacco smoker: No      Systolic Blood Pressure: 110 mmHg      Is BP treated: Yes      HDL Cholesterol: 49 mg/dL      Total Cholesterol: 135 mg/dL     Follow Up  Follow up if symptoms worsen or fail to improve.      Julio Beverly MD

## 2023-06-12 NOTE — TELEPHONE ENCOUNTER
----- Message from Jazzy Charles MD sent at 6/10/2023  4:12 PM CDT -----  Please assist with scheduling CTA, thank you!

## 2023-06-14 ENCOUNTER — TELEPHONE (OUTPATIENT)
Dept: INTERNAL MEDICINE | Facility: CLINIC | Age: 73
End: 2023-06-14
Payer: MEDICARE

## 2023-06-14 NOTE — TELEPHONE ENCOUNTER
Please ensure patient read the following message. Thank you!    Good afternoon Ms. Aranda ,     Your heart looks ok on the echocardiogram. However, this is a slight enlargement of the aorta. I would like  you to obtain a CT scan with contrast of your chest to evaluate the aorta further. Someone should contact you to assist with scheduling this test, let me know if you have any trouble scheduling.     I look forward to seeing you again, hope you have a great weekend!      Sincerely,  Dr. Charles

## 2023-06-16 ENCOUNTER — HOSPITAL ENCOUNTER (OUTPATIENT)
Dept: RADIOLOGY | Facility: HOSPITAL | Age: 73
Discharge: HOME | End: 2023-06-16
Attending: STUDENT IN AN ORGANIZED HEALTH CARE EDUCATION/TRAINING PROGRAM
Payer: MEDICARE

## 2023-06-16 DIAGNOSIS — I71.9 AORTIC ANEURYSM, UNSPECIFIED PORTION OF AORTA, UNSPECIFIED WHETHER RUPTURED: ICD-10-CM

## 2023-06-16 LAB
CREAT SERPL-MCNC: 1.2 MG/DL (ref 0.5–1.4)
SAMPLE: NORMAL

## 2023-06-16 PROCEDURE — 71275 CTA CHEST AORTA NON CORONARY: ICD-10-PCS | Mod: 26,,, | Performed by: STUDENT IN AN ORGANIZED HEALTH CARE EDUCATION/TRAINING PROGRAM

## 2023-06-16 PROCEDURE — 25500020 PHARM REV CODE 255: Performed by: STUDENT IN AN ORGANIZED HEALTH CARE EDUCATION/TRAINING PROGRAM

## 2023-06-16 PROCEDURE — 71275 CT ANGIOGRAPHY CHEST: CPT | Mod: 26,,, | Performed by: STUDENT IN AN ORGANIZED HEALTH CARE EDUCATION/TRAINING PROGRAM

## 2023-06-16 PROCEDURE — 71275 CT ANGIOGRAPHY CHEST: CPT | Mod: TC

## 2023-06-16 RX ADMIN — IOHEXOL 100 ML: 350 INJECTION, SOLUTION INTRAVENOUS at 08:06

## 2023-06-19 ENCOUNTER — ANESTHESIA (OUTPATIENT)
Dept: ENDOSCOPY | Facility: HOSPITAL | Age: 73
End: 2023-06-19
Payer: MEDICARE

## 2023-06-19 ENCOUNTER — ANESTHESIA EVENT (OUTPATIENT)
Dept: ENDOSCOPY | Facility: HOSPITAL | Age: 73
End: 2023-06-19
Payer: MEDICARE

## 2023-06-19 ENCOUNTER — HOSPITAL ENCOUNTER (OUTPATIENT)
Facility: HOSPITAL | Age: 73
Discharge: HOME | End: 2023-06-19
Attending: COLON & RECTAL SURGERY | Admitting: COLON & RECTAL SURGERY
Payer: MEDICARE

## 2023-06-19 VITALS
OXYGEN SATURATION: 95 % | HEART RATE: 51 BPM | BODY MASS INDEX: 38.8 KG/M2 | HEIGHT: 69 IN | DIASTOLIC BLOOD PRESSURE: 69 MMHG | WEIGHT: 262 LBS | SYSTOLIC BLOOD PRESSURE: 136 MMHG | TEMPERATURE: 98 F | RESPIRATION RATE: 20 BRPM

## 2023-06-19 DIAGNOSIS — Z86.010 HISTORY OF COLON POLYPS: ICD-10-CM

## 2023-06-19 DIAGNOSIS — Z86.010 HISTORY OF ADENOMATOUS POLYP OF COLON: Primary | ICD-10-CM

## 2023-06-19 PROCEDURE — E9220 PRA ENDO ANESTHESIA: ICD-10-PCS | Mod: PT,HCNC,, | Performed by: NURSE ANESTHETIST, CERTIFIED REGISTERED

## 2023-06-19 PROCEDURE — 88305 TISSUE EXAM BY PATHOLOGIST: ICD-10-PCS | Mod: 26,HCNC,, | Performed by: PATHOLOGY

## 2023-06-19 PROCEDURE — 45385 COLONOSCOPY W/LESION REMOVAL: CPT | Mod: PT,HCNC | Performed by: COLON & RECTAL SURGERY

## 2023-06-19 PROCEDURE — E9220 PRA ENDO ANESTHESIA: HCPCS | Mod: PT,HCNC,, | Performed by: NURSE ANESTHETIST, CERTIFIED REGISTERED

## 2023-06-19 PROCEDURE — 45385 PR COLONOSCOPY,REMV LESN,SNARE: ICD-10-PCS | Mod: PT,HCNC,, | Performed by: COLON & RECTAL SURGERY

## 2023-06-19 PROCEDURE — 37000008 HC ANESTHESIA 1ST 15 MINUTES: Performed by: COLON & RECTAL SURGERY

## 2023-06-19 PROCEDURE — 25000003 PHARM REV CODE 250: Mod: HCNC | Performed by: NURSE ANESTHETIST, CERTIFIED REGISTERED

## 2023-06-19 PROCEDURE — 45385 COLONOSCOPY W/LESION REMOVAL: CPT | Mod: PT,HCNC,, | Performed by: COLON & RECTAL SURGERY

## 2023-06-19 PROCEDURE — 88305 TISSUE EXAM BY PATHOLOGIST: CPT | Mod: 59 | Performed by: PATHOLOGY

## 2023-06-19 PROCEDURE — 37000009 HC ANESTHESIA EA ADD 15 MINS: Performed by: COLON & RECTAL SURGERY

## 2023-06-19 PROCEDURE — 88305 TISSUE EXAM BY PATHOLOGIST: CPT | Mod: 26,HCNC,, | Performed by: PATHOLOGY

## 2023-06-19 PROCEDURE — 63600175 PHARM REV CODE 636 W HCPCS: Mod: HCNC | Performed by: NURSE ANESTHETIST, CERTIFIED REGISTERED

## 2023-06-19 RX ORDER — PROPOFOL 10 MG/ML
VIAL (ML) INTRAVENOUS
Status: DISCONTINUED | OUTPATIENT
Start: 2023-06-19 | End: 2023-06-19

## 2023-06-19 RX ORDER — SODIUM CHLORIDE 9 MG/ML
INJECTION, SOLUTION INTRAVENOUS CONTINUOUS
Status: DISCONTINUED | OUTPATIENT
Start: 2023-06-19 | End: 2023-06-19 | Stop reason: HOSPADM

## 2023-06-19 RX ORDER — LIDOCAINE HYDROCHLORIDE 10 MG/ML
INJECTION, SOLUTION INTRAVENOUS
Status: DISCONTINUED | OUTPATIENT
Start: 2023-06-19 | End: 2023-06-19

## 2023-06-19 RX ADMIN — PROPOFOL 150 MCG/KG/MIN: 10 INJECTION, EMULSION INTRAVENOUS at 10:06

## 2023-06-19 RX ADMIN — PROPOFOL 70 MG: 10 INJECTION, EMULSION INTRAVENOUS at 10:06

## 2023-06-19 RX ADMIN — LIDOCAINE HYDROCHLORIDE 50 MG: 10 INJECTION, SOLUTION INTRAVENOUS at 10:06

## 2023-06-19 RX ADMIN — SODIUM CHLORIDE: 0.9 INJECTION, SOLUTION INTRAVENOUS at 10:06

## 2023-06-19 NOTE — ANESTHESIA PREPROCEDURE EVALUATION
06/19/2023  Manju Aranda is a 72 y.o., female.      Pre-op Assessment    I have reviewed the Patient Summary Reports.     I have reviewed the Nursing Notes. I have reviewed the NPO Status.   I have reviewed the Medications.     Review of Systems  Anesthesia Hx:  No previous Anesthesia    Cardiovascular:   Exercise tolerance: good Hypertension, well controlled    Pulmonary:   Sleep Apnea, CPAP    Education provided regarding risk of obstructive sleep apnea     Hepatic/GI:  Hepatic/GI Normal    Neurological:   Neuromuscular Disease, Headaches    Endocrine:  Endocrine Normal    Dermatological:  Skin Normal    Psych:  Psychiatric Normal           Physical Exam  General: Well nourished    Airway:  Mallampati: II   Mouth Opening: Normal  TM Distance: Normal  Tongue: Normal  Neck ROM: Normal ROM    Dental:  Intact        Anesthesia Plan  Type of Anesthesia, risks & benefits discussed:    Anesthesia Type: Gen Natural Airway  Intra-op Monitoring Plan: Standard ASA Monitors  Induction:  IV  Informed Consent: Patient consented to blood products? No  ASA Score: 3  Day of Surgery Review of History & Physical: H&P Update referred to the surgeon/provider.I have interviewed and examined the patient. I have reviewed the patient's H&P dated: There are no significant changes.     Ready For Surgery From Anesthesia Perspective.     .

## 2023-06-19 NOTE — ANESTHESIA POSTPROCEDURE EVALUATION
Anesthesia Post Evaluation    Patient: Manju Aranda    Procedure(s) Performed: Procedure(s) (LRB):  COLONOSCOPY (N/A)    Final Anesthesia Type: general      Patient location during evaluation: PACU  Patient participation: Yes- Able to Participate  Level of consciousness: awake and alert  Post-procedure vital signs: reviewed and stable  Pain management: adequate  Airway patency: patent    PONV status at discharge: No PONV  Anesthetic complications: no      Cardiovascular status: blood pressure returned to baseline  Respiratory status: unassisted  Hydration status: euvolemic  Follow-up not needed.          Vitals Value Taken Time   /69 06/19/23 1142   Temp 36.6 °C (97.9 °F) 06/19/23 1115   Pulse 51 06/19/23 1142   Resp 20 06/19/23 1142   SpO2 95 % 06/19/23 1142         No case tracking events are documented in the log.      Pain/Kayleigh Score: Kayleigh Score: 10 (6/19/2023 11:37 AM)

## 2023-06-19 NOTE — PROVATION PATIENT INSTRUCTIONS
Discharge Summary/Instructions after an Endoscopic Procedure  Patient Name: Manju Aranda  Patient MRN: [id number]  Patient YOB: 1950  Monday, June 19, 2023  Lu Lewis MD  Dear patient,  As a result of recent federal legislation (The Federal Cures Act), you may   receive lab or pathology results from your procedure in your [facility]   account before your physician is able to contact you. Your physician or   their representative will relay the results to you with their   recommendations at their soonest availability.  Thank you,  RESTRICTIONS:  During your procedure today, you received medications for sedation.  These   medications may affect your judgment, balance and coordination.  Therefore,   for 24 hours, you have the following restrictions:   - DO NOT drive a car, operate machinery, make legal/financial decisions,   sign important papers or drink alcohol.    ACTIVITY:  Today: no heavy lifting, straining or running due to procedural   sedation/anesthesia.  The following day: return to full activity including work.  DIET:  Eat and drink normally unless instructed otherwise.     TREATMENT FOR COMMON SIDE EFFECTS:  - Mild abdominal pain, nausea, belching, bloating or excessive gas:  rest,   eat lightly and use a heating pad.  - Sore Throat: treat with throat lozenges and/or gargle with warm salt   water.  - Because air was used during the procedure, expelling large amounts of air   from your rectum or belching is normal.  - If a bowel prep was taken, you may not have a bowel movement for 1-3 days.    This is normal.  SYMPTOMS TO WATCH FOR AND REPORT TO YOUR PHYSICIAN:  1. Abdominal pain or bloating, other than gas cramps.  2. Chest pain.  3. Back pain.  4. Signs of infection such as: chills or fever occurring within 24 hours   after the procedure.  5. Rectal bleeding, which would show as bright red, maroon, or black stools.   (A tablespoon of blood from the rectum is not serious, especially  if   hemorrhoids are present.)  6. Vomiting.  7. Weakness or dizziness.  GO DIRECTLY TO THE NEAREST EMERGENCY ROOM IF YOU HAVE ANY OF THE FOLLOWING:      Difficulty breathing              Chills and/or fever over 101 F   Persistent vomiting and/or vomiting blood   Severe abdominal pain   Severe chest pain   Black, tarry stools   Bleeding- more than one tablespoon   Any other symptom or condition that you feel may need urgent attention  Your doctor recommends these additional instructions:  If any biopsies were taken, your doctors clinic will contact you in 1 to 2   weeks with any results.  - Discharge patient to home.   - Resume previous diet.   - Continue present medications.   - Await pathology results.   - Repeat colonoscopy date to be determined after pending pathology results   are reviewed for surveillance.   - Written discharge instructions were provided to the patient.   - The signs and symptoms of potential delayed complications were discussed   with the patient.   - Patient has a contact number available for emergencies.   - Return to normal activities tomorrow.  For questions, problems or results please call your physician - Lu Lewis MD at Work:  [phone].  [facility] [place], EMERGENCY ROOM PHONE NUMBER: [phone]  IF A COMPLICATION OR EMERGENCY SITUATION ARISES AND YOU ARE UNABLE TO REACH   YOUR PHYSICIAN - GO DIRECTLY TO THE EMERGENCY ROOM.  Lu Lewis MD  6/19/2023 11:14:46 AM  This report has been verified and signed electronically.  Dear patient,  As a result of recent federal legislation (The Federal Cures Act), you may   receive lab or pathology results from your procedure in your [facility]   account before your physician is able to contact you. Your physician or   their representative will relay the results to you with their   recommendations at their soonest availability.  Thank you,  [employer]

## 2023-06-19 NOTE — H&P
COLONOSCOPY HISTORY AND PHYSICAL EXAM    Procedure : Colonoscopy      INDICATIONS: personal history of colon polyps    Family Hx of CRC: None    Last Colonoscopy:  2018       Past Medical History:   Diagnosis Date    Allergy     Anemia     Arthritis     Cholelithiases     Cyst of kidney, acquired     Diverticulitis     Elevated TSH     Family history of Graves' disease: daughter, maternal aunt, maternal uncle 9/13/2016    Glaucoma     Graves disease     Hx of colonic polyps     Hypertension 1981    Liver cyst     MGUS (monoclonal gammopathy of unknown significance)     Migraine headache     Mitral valve problem     leakage    Obesity     Paraproteinemia     Sleep apnea     + CPAP    Smoldering multiple myeloma 2013    Tricuspid valve disease     leakage     Sedation Problems: NO  Family History   Problem Relation Age of Onset    Heart disease Mother         MI    Heart attack Mother     Hypertension Father     Irregular heart beat Sister         fast heart rate    Cataracts Sister     Glaucoma Sister     No Known Problems Sister     Cancer Paternal Grandmother         GYN cancer - unknown cancer    Graves' disease Daughter     No Known Problems Daughter     No Known Problems Son     No Known Problems Son     Heart disease Maternal Aunt         MI    Heart disease Maternal Aunt         MI    Colon cancer Maternal Uncle     Cancer Maternal Uncle         colon ca    Melanoma Neg Hx     Breast cancer Neg Hx     Ovarian cancer Neg Hx     Colon polyps Neg Hx     Rectal cancer Neg Hx     Stomach cancer Neg Hx     Esophageal cancer Neg Hx     Ulcerative colitis Neg Hx     Crohn's disease Neg Hx     Amblyopia Neg Hx     Blindness Neg Hx     Macular degeneration Neg Hx     Strabismus Neg Hx     Retinal detachment Neg Hx      Fam Hx of Sedation Problems: NO  Social History     Socioeconomic History    Marital status: Single   Occupational History    Occupation: RETIRED   Tobacco Use    Smoking status: Former     Years: 3.00     " Types: Cigarettes     Quit date: 1995     Years since quittin.4     Passive exposure: Never    Smokeless tobacco: Never   Substance and Sexual Activity    Alcohol use: No    Drug use: No    Sexual activity: Not Currently     Partners: Male     Birth control/protection: Post-menopausal   Social History Narrative    Lives with sister.         [facility] [place] [facility] [email or web address]      Social Determinants of Health     Financial Resource Strain: Low Risk     Difficulty of Paying Living Expenses: Not hard at all   Food Insecurity: No Food Insecurity    Worried About Running Out of Food in the Last Year: Never true    Ran Out of Food in the Last Year: Never true   Transportation Needs: No Transportation Needs    Lack of Transportation (Medical): No    Lack of Transportation (Non-Medical): No   Physical Activity: Insufficiently Active    Days of Exercise per Week: 3 days    Minutes of Exercise per Session: 30 min   Stress: Stress Concern Present    Feeling of Stress : To some extent   Housing Stability: Unknown    Unable to Pay for Housing in the Last Year: No    Unstable Housing in the Last Year: No       Review of Systems - Negative except   Respiratory ROS: no dyspnea  Cardiovascular ROS: no exertional chest pain  Gastrointestinal ROS: NO abdominal discomfort,  NO rectal bleeding  Musculoskeletal ROS: no muscular pain  Neurological ROS: no recent stroke    Physical Exam:  /85 (BP Location: Left arm, Patient Position: Lying)   Pulse 73   Temp 98.2 °F (36.8 °C) (Temporal)   Resp 16   Ht 5' 9" (1.753 m)   Wt 118.8 kg (262 lb)   SpO2 99%   BMI 38.69 kg/m²   General: no distress  Head: normocephalic  Mallampati Score   Neck: supple, symmetrical, trachea midline  Lungs:  clear to auscultation bilaterally and normal respiratory effort  Heart: regular rate and rhythm and no murmur  Abdomen: soft, non-tender non-distented; bowel sounds normal; no masses,  no organomegaly  Extremities: no cyanosis or " edema, or clubbing    ASA:  II    PLAN  COLONOSCOPY.    SedationPlan :MAC    The details of the procedure, the possible need for biopsy or polypectomy and the potential risks including bleeding, perforation, missed polyps were discussed in detail.

## 2023-06-19 NOTE — TRANSFER OF CARE
"Anesthesia Transfer of Care Note    Patient: Manju Aranda    Procedure(s) Performed: Procedure(s) (LRB):  COLONOSCOPY (N/A)    Patient location: GI    Anesthesia Type: general    Transport from OR: Transported from OR on room air with adequate spontaneous ventilation    Post pain: adequate analgesia    Post assessment: no apparent anesthetic complications    Post vital signs: stable    Level of consciousness: sedated    Nausea/Vomiting: no nausea/vomiting    Complications: none    Transfer of care protocol was followed      Last vitals:   Visit Vitals  BP (!) 101/55   Pulse 60   Temp 36.6 °C (97.9 °F)   Resp 14   Ht 5' 9" (1.753 m)   Wt 118.8 kg (262 lb)   SpO2 99%   BMI 38.69 kg/m²     "

## 2023-06-20 ENCOUNTER — NURSE TRIAGE (OUTPATIENT)
Dept: ADMINISTRATIVE | Facility: CLINIC | Age: 73
End: 2023-06-20
Payer: MEDICARE

## 2023-06-21 DIAGNOSIS — I71.21 ANEURYSM OF ASCENDING AORTA WITHOUT RUPTURE: Primary | ICD-10-CM

## 2023-06-21 NOTE — TELEPHONE ENCOUNTER
Patient had a colonoscopy yesterday. Reports scant rectal bleeding (1 streak of blood) that occurred within 24 hours of having the procedure. Denies other symptoms. Advised per protocol to continue to monitor symptoms at home.  Advised the patient to call back with any further questions or if symptoms worsen.    Reason for Disposition   Rectal bleeding (slight; streaks or less than 1 teaspoon or 5 ml) after colonoscopy, questions about    Additional Information   Negative: Shock suspected (e.g., cold/pale/clammy skin, too weak to stand, low BP, rapid pulse)   Negative: Difficult to awaken or acting confused (e.g., disoriented, slurred speech)   Negative: Passed out (i.e., lost consciousness, collapsed and was not responding)   Negative: Sounds like a life-threatening emergency to the triager   Negative: SEVERE abdomen pain (e.g., excruciating)   Negative: SEVERE rectal bleeding (large blood clots; on and off, or constant bleeding)   Negative: SEVERE dizziness (e.g., unable to stand, requires support to walk, feels like passing out now)   Negative: SEVERE vomiting (e.g., 6 or more times/day)   Negative: [1] MODERATE rectal bleeding (small blood clots, passing blood without stool, or toilet water turns red) AND [2] more than once   Negative: [1] MILD-MODERATE abdomen pain AND [2] constant AND [3] present > 2 hours   Negative: [1] Drinking very little AND [2] dehydration suspected (e.g., no urine > 12 hours, very dry mouth, very lightheaded)   Negative: Patient sounds very sick or weak to the triager   Negative: Fever > 100.4 F (38.0 C)   Negative: [1] Abdominal bloating, cramping, nausea, or vomiting while drinking bowel prep AND [2] CANNOT finish bowel prep AND [3] has tried recommended Care Advice   Negative: [1] Caller has URGENT question or concern AND [2] triager unable to answer question   Negative: Any rectal bleeding occurring > 24 hours after colonoscopy   Negative: [1] MILD-MODERATE abdomen pain (e.g., does  not interfere with normal activities) AND [2] pain comes and goes (cramps) [3] lasting > 48 hours   Negative: [1] MILD to MODERATE vomiting (e.g., 1 to 5 times a day) AND [2] lasting > 24 hours   Negative: [1] Caller has NON-URGENT question AND [2] triager unable to answer question   Negative: Abdominal cramping and bloating after colonoscopy, questions about    Protocols used: Colonoscopy Symptoms and [id number]

## 2023-06-22 ENCOUNTER — TELEPHONE (OUTPATIENT)
Dept: INTERNAL MEDICINE | Facility: CLINIC | Age: 73
End: 2023-06-22
Payer: MEDICARE

## 2023-06-22 ENCOUNTER — TELEPHONE (OUTPATIENT)
Dept: CARDIOTHORACIC SURGERY | Facility: CLINIC | Age: 73
End: 2023-06-22
Payer: MEDICARE

## 2023-06-22 LAB
FINAL PATHOLOGIC DIAGNOSIS: NORMAL
GROSS: NORMAL
Lab: NORMAL

## 2023-06-22 NOTE — TELEPHONE ENCOUNTER
Called and scheduled pt for TAA consult with Dr. Demarco on 6/26.  Pt provided with appt time and location, which she verbalized understanding to.    ----- Message from Charlene Restrepo RN sent at 6/22/2023  1:13 PM CDT -----  Arminnayeli Barth,    I think this is for you guys. Looks like she has an aortic aneurysm.    Thanks,  Charlene     ----- Message -----  From: Doe Walsh  Sent: 6/22/2023  11:04 AM CDT  To: [facility] Thoracic Surgery Clinical Support    Name of Who is Calling: KEESHA DUARTE [[id number]]        What is the request in detail: Pt called in regards to getting scheduled. Please advise      Can the clinic reply by [facility]: no        What Number to Call Back if not in [employer]:.Telephone Information:  [employer]          [phone]

## 2023-06-22 NOTE — TELEPHONE ENCOUNTER
----- Message from Jazzy Charles MD sent at 6/21/2023 10:58 PM CDT -----  Please assist with scheduling CT surgery appt, thank you!

## 2023-06-26 ENCOUNTER — OFFICE VISIT (OUTPATIENT)
Dept: CARDIOTHORACIC SURGERY | Facility: CLINIC | Age: 73
End: 2023-06-26
Payer: MEDICARE

## 2023-06-26 VITALS
HEART RATE: 70 BPM | OXYGEN SATURATION: 99 % | RESPIRATION RATE: 18 BRPM | HEIGHT: 69 IN | SYSTOLIC BLOOD PRESSURE: 138 MMHG | BODY MASS INDEX: 39.51 KG/M2 | WEIGHT: 266.75 LBS | DIASTOLIC BLOOD PRESSURE: 73 MMHG

## 2023-06-26 DIAGNOSIS — I71.21 ANEURYSM OF ASCENDING AORTA WITHOUT RUPTURE: ICD-10-CM

## 2023-06-26 PROCEDURE — 3066F NEPHROPATHY DOC TX: CPT | Mod: HCNC,CPTII,S$GLB, | Performed by: THORACIC SURGERY (CARDIOTHORACIC VASCULAR SURGERY)

## 2023-06-26 PROCEDURE — 1126F AMNT PAIN NOTED NONE PRSNT: CPT | Mod: HCNC,CPTII,S$GLB, | Performed by: THORACIC SURGERY (CARDIOTHORACIC VASCULAR SURGERY)

## 2023-06-26 PROCEDURE — 1159F MED LIST DOCD IN RCRD: CPT | Mod: HCNC,CPTII,S$GLB, | Performed by: THORACIC SURGERY (CARDIOTHORACIC VASCULAR SURGERY)

## 2023-06-26 PROCEDURE — 3008F PR BODY MASS INDEX (BMI) DOCUMENTED: ICD-10-PCS | Mod: HCNC,CPTII,S$GLB, | Performed by: THORACIC SURGERY (CARDIOTHORACIC VASCULAR SURGERY)

## 2023-06-26 PROCEDURE — 3078F PR MOST RECENT DIASTOLIC BLOOD PRESSURE < 80 MM HG: ICD-10-PCS | Mod: HCNC,CPTII,S$GLB, | Performed by: THORACIC SURGERY (CARDIOTHORACIC VASCULAR SURGERY)

## 2023-06-26 PROCEDURE — 1160F PR REVIEW ALL MEDS BY PRESCRIBER/CLIN PHARMACIST DOCUMENTED: ICD-10-PCS | Mod: HCNC,CPTII,S$GLB, | Performed by: THORACIC SURGERY (CARDIOTHORACIC VASCULAR SURGERY)

## 2023-06-26 PROCEDURE — 3044F HG A1C LEVEL LT 7.0%: CPT | Mod: HCNC,CPTII,S$GLB, | Performed by: THORACIC SURGERY (CARDIOTHORACIC VASCULAR SURGERY)

## 2023-06-26 PROCEDURE — 3061F NEG MICROALBUMINURIA REV: CPT | Mod: HCNC,CPTII,S$GLB, | Performed by: THORACIC SURGERY (CARDIOTHORACIC VASCULAR SURGERY)

## 2023-06-26 PROCEDURE — 1160F RVW MEDS BY RX/DR IN RCRD: CPT | Mod: HCNC,CPTII,S$GLB, | Performed by: THORACIC SURGERY (CARDIOTHORACIC VASCULAR SURGERY)

## 2023-06-26 PROCEDURE — 3066F PR DOCUMENTATION OF TREATMENT FOR NEPHROPATHY: ICD-10-PCS | Mod: HCNC,CPTII,S$GLB, | Performed by: THORACIC SURGERY (CARDIOTHORACIC VASCULAR SURGERY)

## 2023-06-26 PROCEDURE — 1126F PR PAIN SEVERITY QUANTIFIED, NO PAIN PRESENT: ICD-10-PCS | Mod: HCNC,CPTII,S$GLB, | Performed by: THORACIC SURGERY (CARDIOTHORACIC VASCULAR SURGERY)

## 2023-06-26 PROCEDURE — 4010F ACE/ARB THERAPY RXD/TAKEN: CPT | Mod: HCNC,CPTII,S$GLB, | Performed by: THORACIC SURGERY (CARDIOTHORACIC VASCULAR SURGERY)

## 2023-06-26 PROCEDURE — 99999 PR PBB SHADOW E&M-EST. PATIENT-LVL III: CPT | Mod: PBBFAC,HCNC,, | Performed by: THORACIC SURGERY (CARDIOTHORACIC VASCULAR SURGERY)

## 2023-06-26 PROCEDURE — 4010F PR ACE/ARB THEARPY RXD/TAKEN: ICD-10-PCS | Mod: HCNC,CPTII,S$GLB, | Performed by: THORACIC SURGERY (CARDIOTHORACIC VASCULAR SURGERY)

## 2023-06-26 PROCEDURE — 3061F PR NEG MICROALBUMINURIA RESULT DOCUMENTED/REVIEW: ICD-10-PCS | Mod: HCNC,CPTII,S$GLB, | Performed by: THORACIC SURGERY (CARDIOTHORACIC VASCULAR SURGERY)

## 2023-06-26 PROCEDURE — 3008F BODY MASS INDEX DOCD: CPT | Mod: HCNC,CPTII,S$GLB, | Performed by: THORACIC SURGERY (CARDIOTHORACIC VASCULAR SURGERY)

## 2023-06-26 PROCEDURE — 3078F DIAST BP <80 MM HG: CPT | Mod: HCNC,CPTII,S$GLB, | Performed by: THORACIC SURGERY (CARDIOTHORACIC VASCULAR SURGERY)

## 2023-06-26 PROCEDURE — 1101F PR PT FALLS ASSESS DOC 0-1 FALLS W/OUT INJ PAST YR: ICD-10-PCS | Mod: HCNC,CPTII,S$GLB, | Performed by: THORACIC SURGERY (CARDIOTHORACIC VASCULAR SURGERY)

## 2023-06-26 PROCEDURE — 99205 PR OFFICE/OUTPT VISIT, NEW, LEVL V, 60-74 MIN: ICD-10-PCS | Mod: HCNC,S$GLB,, | Performed by: THORACIC SURGERY (CARDIOTHORACIC VASCULAR SURGERY)

## 2023-06-26 PROCEDURE — 3075F PR MOST RECENT SYSTOLIC BLOOD PRESS GE 130-139MM HG: ICD-10-PCS | Mod: HCNC,CPTII,S$GLB, | Performed by: THORACIC SURGERY (CARDIOTHORACIC VASCULAR SURGERY)

## 2023-06-26 PROCEDURE — 1159F PR MEDICATION LIST DOCUMENTED IN MEDICAL RECORD: ICD-10-PCS | Mod: HCNC,CPTII,S$GLB, | Performed by: THORACIC SURGERY (CARDIOTHORACIC VASCULAR SURGERY)

## 2023-06-26 PROCEDURE — 3288F FALL RISK ASSESSMENT DOCD: CPT | Mod: HCNC,CPTII,S$GLB, | Performed by: THORACIC SURGERY (CARDIOTHORACIC VASCULAR SURGERY)

## 2023-06-26 PROCEDURE — 3288F PR FALLS RISK ASSESSMENT DOCUMENTED: ICD-10-PCS | Mod: HCNC,CPTII,S$GLB, | Performed by: THORACIC SURGERY (CARDIOTHORACIC VASCULAR SURGERY)

## 2023-06-26 PROCEDURE — 1101F PT FALLS ASSESS-DOCD LE1/YR: CPT | Mod: HCNC,CPTII,S$GLB, | Performed by: THORACIC SURGERY (CARDIOTHORACIC VASCULAR SURGERY)

## 2023-06-26 PROCEDURE — 3075F SYST BP GE 130 - 139MM HG: CPT | Mod: HCNC,CPTII,S$GLB, | Performed by: THORACIC SURGERY (CARDIOTHORACIC VASCULAR SURGERY)

## 2023-06-26 PROCEDURE — 99205 OFFICE O/P NEW HI 60 MIN: CPT | Mod: HCNC,S$GLB,, | Performed by: THORACIC SURGERY (CARDIOTHORACIC VASCULAR SURGERY)

## 2023-06-26 PROCEDURE — 3044F PR MOST RECENT HEMOGLOBIN A1C LEVEL <7.0%: ICD-10-PCS | Mod: HCNC,CPTII,S$GLB, | Performed by: THORACIC SURGERY (CARDIOTHORACIC VASCULAR SURGERY)

## 2023-06-26 PROCEDURE — 99999 PR PBB SHADOW E&M-EST. PATIENT-LVL III: ICD-10-PCS | Mod: PBBFAC,HCNC,, | Performed by: THORACIC SURGERY (CARDIOTHORACIC VASCULAR SURGERY)

## 2023-06-26 NOTE — PROGRESS NOTES
Subjective:      Patient ID: Manju Aranda is a 72 y.o. female.    Chief Complaint: No chief complaint on file.      HPI:  Manju Aranda is a 72 y.o. female who presents as an initial consult for TAA.  She has a medical history significant for HTN, PCKD, Graves disease, smoldering multiple myeloma, migraines, diverticulosis, BECKY on CPAP, hepatic cysts, vitamin D deficiency.  She reports that she had chest pain and dyspnea on exertion in the past which prompted a cardiac evaluation.  During this evaluation she underwent an ECHO which demonstrated mild dilatation to the ascending aorta.  She underwent a CT Chest which demonstrated an aortic aneurysm measuring 4.0 cm.  She currently denies chest pain or dyspnea on exertion.  She is exercising.     Family and social history reviewed    Review of patient's allergies indicates:  No Known Allergies  Past Medical History:   Diagnosis Date    Allergy     Anemia     Arthritis     Cholelithiases     Cyst of kidney, acquired     Diverticulitis     Elevated TSH     Family history of Graves' disease: daughter, maternal aunt, maternal uncle 9/13/2016    Glaucoma     Graves disease     Hx of colonic polyps     Hypertension 1981    Liver cyst     MGUS (monoclonal gammopathy of unknown significance)     Migraine headache     Mitral valve problem     leakage    Obesity     Paraproteinemia     Sleep apnea     + CPAP    Smoldering multiple myeloma 2013    Tricuspid valve disease     leakage     Past Surgical History:   Procedure Laterality Date    APPENDECTOMY      COLONOSCOPY N/A 10/23/2015    Procedure: COLONOSCOPY;  Surgeon: Brandon Ruelas MD;  Location: [address]);  Service: Endoscopy;  Laterality: N/A;  Had divertiulitis in 5/29/2015 with a recommendation for colonoscopy in 8 weeks    Dr. Ruelas is her GI physician    COLONOSCOPY N/A 11/05/2018    Procedure: COLONOSCOPY;  Surgeon: Brandon Ruelas MD;  Location: [facility] ([address] [place] [address]);  Service: Endoscopy;   Laterality: N/A;  Dr. Ruelas did the last one multiple polyps, patient had recent diverticulitis should not schedule before Oct 10th    COLONOSCOPY N/A 2023    Procedure: COLONOSCOPY;  Surgeon: Lu Lewis MD;  Location: [facility] ([address] [place] [address]);  Service: Endoscopy;  Laterality: N/A;  pt offered earlier dates but stated she is out town and will be returning the evening of   cardiac clearance recieved-see tele encounter 23 referred by Dr. Lewis/PEG/instr. mailed and to portal-   pre-call no answer; MB    CYSTOSCOPY      HYSTERECTOMY   or     menorrhagia     Family History       Problem Relation (Age of Onset)    Cancer Paternal Grandmother, Maternal Uncle    Cataracts Sister    Colon cancer Maternal Uncle    Glaucoma Sister    Graves' disease Daughter    Heart attack Mother    Heart disease Mother, Maternal Aunt, Maternal Aunt    Hypertension Father    Irregular heart beat Sister    No Known Problems Sister, Daughter, Son, Son          Social History     Socioeconomic History    Marital status: Single   Occupational History    Occupation: RETIRED   Tobacco Use    Smoking status: Former     Years: 3.00     Types: Cigarettes     Quit date: 1995     Years since quittin.5     Passive exposure: Never    Smokeless tobacco: Never   Substance and Sexual Activity    Alcohol use: No    Drug use: No    Sexual activity: Not Currently     Partners: Male     Birth control/protection: Post-menopausal   Social History Narrative    Lives with sister.         Walks most AM in [place] [address].[facility].      Social Determinants of Health     Financial Resource Strain: Low Risk     Difficulty of Paying Living Expenses: Not hard at all   Food Insecurity: No Food Insecurity    Worried About Running Out of Food in the Last Year: Never true    Ran Out of Food in the Last Year: Never true   Transportation Needs: No Transportation Needs    Lack of Transportation (Medical): No    Lack of  Transportation (Non-Medical): No   Physical Activity: Insufficiently Active    Days of Exercise per Week: 3 days    Minutes of Exercise per Session: 30 min   Stress: Stress Concern Present    Feeling of Stress : To some extent   Housing Stability: Unknown    Unable to Pay for Housing in the Last Year: No    Unstable Housing in the Last Year: No       Current Outpatient Medications:     ascorbic acid, vitamin C, (VITAMIN C) 500 MG tablet, Take 500 mg by mouth once daily., Disp: , Rfl:     aspirin 81 MG Chew, Take 81 mg by mouth once daily., Disp: , Rfl:     cholecalciferol, vitamin D3, (VITAMIN D3) 50 mcg (2,000 unit) Cap capsule, Take 1 capsule (2,000 Units total) by mouth once daily., Disp: , Rfl:     magnesium oxide (MAG-OX) 400 mg tablet, Take 400 mg by mouth once daily., Disp: , Rfl:     multivit with min-folic acid 0.4 mg Tab, Take 0.4 mg by mouth once daily., Disp: , Rfl:     olmesartan (BENICAR) 20 MG tablet, Take 20 mg by mouth once daily., Disp: , Rfl:     psyllium (METAMUCIL) powder, Take 1 packet by mouth Daily., Disp: , Rfl:     rizatriptan (MAXALT) 10 MG tablet, TAKE 1 TABLET(10 MG) BY MOUTH EVERY 2 HOURS AS NEEDED FOR MIGRAINE. MAX 30 MG/ 24 AT BEDTIME, Disp: 12 tablet, Rfl: 11    traZODone (DESYREL) 50 MG tablet, Take 1 tablet (50 mg total) by mouth nightly as needed for Insomnia., Disp: 30 tablet, Rfl: 2    Current Facility-Administered Medications:     LIDOcaine HCl 2% urojet, , Urethral, 1 time in Clinic/HOD, Jovanna Bui MD  Current medications Reviewed    Review of Systems   Constitutional:  Negative for activity change, appetite change, fatigue and fever.   HENT:  Negative for nosebleeds.    Respiratory:  Negative for cough and shortness of breath.    Cardiovascular:  Negative for chest pain, palpitations and leg swelling.   Gastrointestinal:  Negative for abdominal distention, abdominal pain and nausea.   Genitourinary:  Negative for frequency.   Musculoskeletal:  Negative for  arthralgias and myalgias.   Skin:  Negative for rash.   Neurological:  Negative for dizziness and numbness.   Hematological:  Does not bruise/bleed easily.   Objective:   Physical Exam  HENT:      Head: Normocephalic and atraumatic.   Eyes:      Extraocular Movements: Extraocular movements intact.   Cardiovascular:      Rate and Rhythm: Normal rate and regular rhythm.   Pulmonary:      Effort: Pulmonary effort is normal.   Abdominal:      General: Abdomen is flat.      Palpations: Abdomen is soft.   Musculoskeletal:         General: Normal range of motion.      Cervical back: Normal range of motion.   Skin:     General: Skin is warm and dry.      Capillary Refill: Capillary refill takes less than 2 seconds.   Neurological:      General: No focal deficit present.       Diagnostic Results: Reviewed   CT Chest:  Impression:  Mild fusiform dilation of the ascending aorta reaching maximum 4.0 cm.  No evidence of dissection.  Cardiomegaly, stable.  Multiple solid pulmonary micro nodules measuring up to 4 mm.  For multiple solid nodules all <6 mm, Fleischner Society 2017 guidelines recommend no routine follow up for a low risk patient, or follow up with non-contrast chest CT at 12 months after discovery in a high risk patient.  Additional stable findings as above.    ECHO:  The left ventricle is normal in size with normal systolic function.  The estimated ejection fraction is 55%.  Indeterminate left ventricular diastolic function.  Normal right ventricular size with normal right ventricular systolic function.  Mild left atrial enlargement.  Mild tricuspid regurgitation.  The estimated PA systolic pressure is 34 mmHg.  Normal central venous pressure (3 mmHg).  Trivial circumferential pericardial effusion.  The ascending aorta is mildly dilated.  Assessment:   TAA  Plan:   Stable aneurysm. Follow up in 6 months with a repeat CT chest and ECHO.

## 2023-08-17 DIAGNOSIS — J06.9 UPPER RESPIRATORY TRACT INFECTION, UNSPECIFIED TYPE: Primary | ICD-10-CM

## 2023-08-17 RX ORDER — AZITHROMYCIN 250 MG/1
250 TABLET, FILM COATED ORAL DAILY
Qty: 5 TABLET | Refills: 0 | Status: SHIPPED | OUTPATIENT
Start: 2023-08-17 | End: 2023-08-22

## 2023-08-22 ENCOUNTER — PATIENT MESSAGE (OUTPATIENT)
Dept: OPTOMETRY | Facility: CLINIC | Age: 73
End: 2023-08-22
Payer: MEDICARE

## 2023-10-17 ENCOUNTER — OFFICE VISIT (OUTPATIENT)
Dept: INTERNAL MEDICINE | Facility: CLINIC | Age: 73
End: 2023-10-17
Payer: MEDICARE

## 2023-10-17 VITALS
DIASTOLIC BLOOD PRESSURE: 70 MMHG | SYSTOLIC BLOOD PRESSURE: 138 MMHG | BODY MASS INDEX: 40.04 KG/M2 | HEART RATE: 65 BPM | OXYGEN SATURATION: 99 % | WEIGHT: 271.19 LBS

## 2023-10-17 DIAGNOSIS — I10 PRIMARY HYPERTENSION: ICD-10-CM

## 2023-10-17 DIAGNOSIS — Z91.09 ENVIRONMENTAL ALLERGIES: ICD-10-CM

## 2023-10-17 DIAGNOSIS — Z12.31 SCREENING MAMMOGRAM FOR BREAST CANCER: ICD-10-CM

## 2023-10-17 DIAGNOSIS — E55.9 VITAMIN D DEFICIENCY: ICD-10-CM

## 2023-10-17 DIAGNOSIS — I71.21 ANEURYSM OF ASCENDING AORTA WITHOUT RUPTURE: ICD-10-CM

## 2023-10-17 DIAGNOSIS — I70.0 AORTIC ATHEROSCLEROSIS: Primary | ICD-10-CM

## 2023-10-17 DIAGNOSIS — E05.00 GRAVES DISEASE: ICD-10-CM

## 2023-10-17 PROCEDURE — 3078F DIAST BP <80 MM HG: CPT | Mod: HCNC,CPTII,S$GLB, | Performed by: STUDENT IN AN ORGANIZED HEALTH CARE EDUCATION/TRAINING PROGRAM

## 2023-10-17 PROCEDURE — 3061F NEG MICROALBUMINURIA REV: CPT | Mod: HCNC,CPTII,S$GLB, | Performed by: STUDENT IN AN ORGANIZED HEALTH CARE EDUCATION/TRAINING PROGRAM

## 2023-10-17 PROCEDURE — 3008F PR BODY MASS INDEX (BMI) DOCUMENTED: ICD-10-PCS | Mod: HCNC,CPTII,S$GLB, | Performed by: STUDENT IN AN ORGANIZED HEALTH CARE EDUCATION/TRAINING PROGRAM

## 2023-10-17 PROCEDURE — 99214 OFFICE O/P EST MOD 30 MIN: CPT | Mod: HCNC,S$GLB,, | Performed by: STUDENT IN AN ORGANIZED HEALTH CARE EDUCATION/TRAINING PROGRAM

## 2023-10-17 PROCEDURE — 3288F PR FALLS RISK ASSESSMENT DOCUMENTED: ICD-10-PCS | Mod: HCNC,CPTII,S$GLB, | Performed by: STUDENT IN AN ORGANIZED HEALTH CARE EDUCATION/TRAINING PROGRAM

## 2023-10-17 PROCEDURE — 3075F SYST BP GE 130 - 139MM HG: CPT | Mod: HCNC,CPTII,S$GLB, | Performed by: STUDENT IN AN ORGANIZED HEALTH CARE EDUCATION/TRAINING PROGRAM

## 2023-10-17 PROCEDURE — 1126F PR PAIN SEVERITY QUANTIFIED, NO PAIN PRESENT: ICD-10-PCS | Mod: HCNC,CPTII,S$GLB, | Performed by: STUDENT IN AN ORGANIZED HEALTH CARE EDUCATION/TRAINING PROGRAM

## 2023-10-17 PROCEDURE — 1101F PR PT FALLS ASSESS DOC 0-1 FALLS W/OUT INJ PAST YR: ICD-10-PCS | Mod: HCNC,CPTII,S$GLB, | Performed by: STUDENT IN AN ORGANIZED HEALTH CARE EDUCATION/TRAINING PROGRAM

## 2023-10-17 PROCEDURE — 99999 PR PBB SHADOW E&M-EST. PATIENT-LVL IV: ICD-10-PCS | Mod: PBBFAC,HCNC,, | Performed by: STUDENT IN AN ORGANIZED HEALTH CARE EDUCATION/TRAINING PROGRAM

## 2023-10-17 PROCEDURE — 3044F PR MOST RECENT HEMOGLOBIN A1C LEVEL <7.0%: ICD-10-PCS | Mod: HCNC,CPTII,S$GLB, | Performed by: STUDENT IN AN ORGANIZED HEALTH CARE EDUCATION/TRAINING PROGRAM

## 2023-10-17 PROCEDURE — 1101F PT FALLS ASSESS-DOCD LE1/YR: CPT | Mod: HCNC,CPTII,S$GLB, | Performed by: STUDENT IN AN ORGANIZED HEALTH CARE EDUCATION/TRAINING PROGRAM

## 2023-10-17 PROCEDURE — 3288F FALL RISK ASSESSMENT DOCD: CPT | Mod: HCNC,CPTII,S$GLB, | Performed by: STUDENT IN AN ORGANIZED HEALTH CARE EDUCATION/TRAINING PROGRAM

## 2023-10-17 PROCEDURE — 3044F HG A1C LEVEL LT 7.0%: CPT | Mod: HCNC,CPTII,S$GLB, | Performed by: STUDENT IN AN ORGANIZED HEALTH CARE EDUCATION/TRAINING PROGRAM

## 2023-10-17 PROCEDURE — 99214 PR OFFICE/OUTPT VISIT, EST, LEVL IV, 30-39 MIN: ICD-10-PCS | Mod: HCNC,S$GLB,, | Performed by: STUDENT IN AN ORGANIZED HEALTH CARE EDUCATION/TRAINING PROGRAM

## 2023-10-17 PROCEDURE — 1159F PR MEDICATION LIST DOCUMENTED IN MEDICAL RECORD: ICD-10-PCS | Mod: HCNC,CPTII,S$GLB, | Performed by: STUDENT IN AN ORGANIZED HEALTH CARE EDUCATION/TRAINING PROGRAM

## 2023-10-17 PROCEDURE — 3066F PR DOCUMENTATION OF TREATMENT FOR NEPHROPATHY: ICD-10-PCS | Mod: HCNC,CPTII,S$GLB, | Performed by: STUDENT IN AN ORGANIZED HEALTH CARE EDUCATION/TRAINING PROGRAM

## 2023-10-17 PROCEDURE — 1126F AMNT PAIN NOTED NONE PRSNT: CPT | Mod: HCNC,CPTII,S$GLB, | Performed by: STUDENT IN AN ORGANIZED HEALTH CARE EDUCATION/TRAINING PROGRAM

## 2023-10-17 PROCEDURE — 3066F NEPHROPATHY DOC TX: CPT | Mod: HCNC,CPTII,S$GLB, | Performed by: STUDENT IN AN ORGANIZED HEALTH CARE EDUCATION/TRAINING PROGRAM

## 2023-10-17 PROCEDURE — 3075F PR MOST RECENT SYSTOLIC BLOOD PRESS GE 130-139MM HG: ICD-10-PCS | Mod: HCNC,CPTII,S$GLB, | Performed by: STUDENT IN AN ORGANIZED HEALTH CARE EDUCATION/TRAINING PROGRAM

## 2023-10-17 PROCEDURE — 3078F PR MOST RECENT DIASTOLIC BLOOD PRESSURE < 80 MM HG: ICD-10-PCS | Mod: HCNC,CPTII,S$GLB, | Performed by: STUDENT IN AN ORGANIZED HEALTH CARE EDUCATION/TRAINING PROGRAM

## 2023-10-17 PROCEDURE — 99999 PR PBB SHADOW E&M-EST. PATIENT-LVL IV: CPT | Mod: PBBFAC,HCNC,, | Performed by: STUDENT IN AN ORGANIZED HEALTH CARE EDUCATION/TRAINING PROGRAM

## 2023-10-17 PROCEDURE — 4010F PR ACE/ARB THEARPY RXD/TAKEN: ICD-10-PCS | Mod: HCNC,CPTII,S$GLB, | Performed by: STUDENT IN AN ORGANIZED HEALTH CARE EDUCATION/TRAINING PROGRAM

## 2023-10-17 PROCEDURE — 3008F BODY MASS INDEX DOCD: CPT | Mod: HCNC,CPTII,S$GLB, | Performed by: STUDENT IN AN ORGANIZED HEALTH CARE EDUCATION/TRAINING PROGRAM

## 2023-10-17 PROCEDURE — 1159F MED LIST DOCD IN RCRD: CPT | Mod: HCNC,CPTII,S$GLB, | Performed by: STUDENT IN AN ORGANIZED HEALTH CARE EDUCATION/TRAINING PROGRAM

## 2023-10-17 PROCEDURE — 3061F PR NEG MICROALBUMINURIA RESULT DOCUMENTED/REVIEW: ICD-10-PCS | Mod: HCNC,CPTII,S$GLB, | Performed by: STUDENT IN AN ORGANIZED HEALTH CARE EDUCATION/TRAINING PROGRAM

## 2023-10-17 PROCEDURE — 4010F ACE/ARB THERAPY RXD/TAKEN: CPT | Mod: HCNC,CPTII,S$GLB, | Performed by: STUDENT IN AN ORGANIZED HEALTH CARE EDUCATION/TRAINING PROGRAM

## 2023-10-17 RX ORDER — AZELASTINE 1 MG/ML
2 SPRAY, METERED NASAL 2 TIMES DAILY
Qty: 30 ML | Refills: 2 | Status: SHIPPED | OUTPATIENT
Start: 2023-10-17 | End: 2024-10-16

## 2023-10-17 NOTE — Clinical Note
Group Topic: BH Process Group     Date: 10/17/2023  Start Time: 11:00 AM  End Time: 12:00 PM  Facilitators: Macarena Bryant APSW    Focus: Recovery  Number in attendance: 7      Method: Group  Attendance: Present  Participation: Active  Patient Response: Attentive  Mood: Anxious  Affect: Type: Anxious   Range: Blunted/flat   Congruency: Congruent   Stability: Stable  Behavior/Socialization: Appropriate to group  Thought Process: Abstract  Task Performance: Follows directions  Patient Evaluation: Independent - full participation      Patient is planning to try to make dinner with her mother again tonight. Patient wants to also come to [facility] group tomorrow safely. She is going to re-attempt to watch a a [employer] movie for self-care. She is going to practice the grounding technique for her coping skill.    cbc, cmp, serum free light chains, quantitative immunoglobulins, serum electropheresis, serum immunofixation, mestatatic survey and MD appt first half January 2019

## 2023-10-17 NOTE — PATIENT INSTRUCTIONS
For chest congestion try Mucinex 1,200 mg twice daily (must be well hydrated for the medication to work).   Sore throat can also be treated with warm tea with honey and salt water gargling (8 oz warm water with 1/4 tsp salt).   Try nasal saline rinses using only sterile or distilled water 1-2 times daily.   Ensure adequate hydration.   Sent azelastine for sinus congestion to pharmacy.

## 2023-10-17 NOTE — PROGRESS NOTES
"Subjective:       Patient ID: Manju Aranda is a 72 y.o. female.    Chief Complaint: Aortic atherosclerosis [I70.0]    Patient is established with me, here today for the following:     HTN, PCKD, Graves disease, smoldering multiple myeloma, migraines, diverticulosis, BECKY on CPAP, ascending aortic aneurysm, hepatic cysts, vitamin D deficiency    Dyspnea on exertion -   Atherosclerosis -   Mild aortic valve sclerosis -   Symptoms have resolved   Followed with Dr. Beverly for cardiology  Echo JUNE2023 with EF 55%  Stress echo JUNE2023 without evidence of ischemia   Taking ASA for primary prevention  Was previously on rosuvastatin, but discontinued due to side effects     HTN -   Taking olmesartan daily.   Patient endorses taking medication as directed.  Denies side effects or concerns while taking medication.  Lab Results       Component                Value               Date                       MICALBCREAT              10.2                03/13/2023            BP Readings from Last 5 Encounters:  10/17/23 : 138/70  06/26/23 : 138/73  06/19/23 : 136/69  06/12/23 : 110/72  06/07/23 : (!) 164/80     Graves disease -   Finished methimazole    Following with Dr. Rajput for endocrinology  Lab Results       Component                Value               Date                       TSH                      1.202               05/09/2023                 TSH                      1.614               03/13/2023                 TSH                      2.732               12/12/2022              Aortic aneurysm -  CTA JUNE2023 with "Mild fusiform dilation of the ascending aorta reaching maximum 4.0 cm"  Followed with Dr. Demarco for CT surgery  Recommended repeat echo and CTA with follow up in 6 months    Vitamin D deficiency -  Taking vitamin D3 2000 IU daily   Lab Results       Component                Value               Date                       [id number]             15 (L)              03/13/2023                 " [id number]             31                  02/26/2021                 [id number]             40                  07/25/2016                           Review of Systems   Constitutional:  Negative for activity change, fatigue and fever.   Respiratory:  Negative for cough and shortness of breath.    Cardiovascular:  Negative for chest pain and palpitations.   Gastrointestinal:  Negative for abdominal pain, constipation, diarrhea, nausea and vomiting.   Neurological:  Negative for syncope and headaches.         Current Outpatient Medications   Medication Instructions    ascorbic acid (vitamin C) (VITAMIN C) 500 mg, Oral, Daily    aspirin 81 mg, Oral, Daily    azelastine (ASTELIN) 274 mcg, Nasal, 2 times daily    cholecalciferol (vitamin D3) (VITAMIN D3) 2,000 Units, Oral, Daily    magnesium oxide (MAG-OX) 400 mg, Oral, Daily    multivit with min-folic acid 0.4 mg Tab 0.4 mg, Oral, Daily    olmesartan (BENICAR) 20 mg, Oral, Daily    psyllium (METAMUCIL) powder 1 packet, Oral, Daily    rizatriptan (MAXALT) 10 MG tablet TAKE 1 TABLET(10 MG) BY MOUTH EVERY 2 HOURS AS NEEDED FOR MIGRAINE. MAX 30 MG/ 24 AT BEDTIME    RSVPreF3 antigen-AS01E, PF, (AREXVY, PF,) 120 mcg/0.5 mL SusR vaccine 0.5 mLs, Intramuscular, Once    traZODone (DESYREL) 50 mg, Oral, Nightly PRN     Objective:      Vitals:    10/17/23 0831   BP: 138/70   Pulse: 65   SpO2: 99%   Weight: 123 kg (271 lb 2.7 oz)   PainSc: 0-No pain     Body mass index is 40.04 kg/m².    Physical Exam  Vitals reviewed.   Constitutional:       General: She is not in acute distress.     Appearance: Normal appearance. She is not ill-appearing or diaphoretic.   HENT:      Head: Normocephalic and atraumatic.      Right Ear: Tympanic membrane, ear canal and external ear normal. There is no impacted cerumen.      Left Ear: Tympanic membrane, ear canal and external ear normal. There is no impacted cerumen.      Nose: Congestion present. No rhinorrhea.      Right Nostril: No foreign  body, epistaxis, septal hematoma or occlusion.      Left Nostril: No foreign body, epistaxis, septal hematoma or occlusion.      Right Turbinates: Swollen. Not pale.      Left Turbinates: Swollen. Not pale.      Mouth/Throat:      Mouth: Mucous membranes are moist.      Pharynx: Oropharynx is clear. Posterior oropharyngeal erythema (moderate, posterior oropharynx) present. No pharyngeal swelling, oropharyngeal exudate or uvula swelling.   Eyes:      General: No scleral icterus.        Right eye: No discharge.         Left eye: No discharge.      Conjunctiva/sclera: Conjunctivae normal.   Neck:      Thyroid: No thyromegaly or thyroid tenderness.      Trachea: Trachea normal.   Cardiovascular:      Rate and Rhythm: Normal rate and regular rhythm.      Heart sounds: Normal heart sounds. No murmur heard.     No friction rub. No gallop.   Pulmonary:      Effort: Pulmonary effort is normal. No respiratory distress.      Breath sounds: Normal breath sounds. No stridor. No wheezing, rhonchi or rales.   Musculoskeletal:      Cervical back: Neck supple.   Lymphadenopathy:      Head:      Right side of head: No submandibular or posterior auricular adenopathy.      Left side of head: No submandibular or posterior auricular adenopathy.      Cervical: No cervical adenopathy.      Right cervical: No superficial, deep or posterior cervical adenopathy.     Left cervical: No superficial, deep or posterior cervical adenopathy.      Upper Body:      Right upper body: No supraclavicular adenopathy.      Left upper body: No supraclavicular adenopathy.   Skin:     General: Skin is warm and dry.      Comments: Color WNL   Neurological:      Mental Status: She is alert. Mental status is at baseline.   Psychiatric:         Mood and Affect: Mood normal.         Behavior: Behavior normal.         Assessment:       1. Aortic atherosclerosis    2. Primary hypertension    3. Graves disease    4. Aneurysm of ascending aorta without rupture    5.  Vitamin D deficiency    6. Screening mammogram for breast cancer    7. Environmental allergies        Plan:       Aortic atherosclerosis  Continue cardiovascular risk reduction    Primary hypertension  Continue current medications.  RTC in 6 months for follow up.    Graves disease  Continue medication, evaluation, and management per endocrinology.    Aneurysm of ascending aorta without rupture  Continue evaluation and management per CT surgery.    Vitamin D deficiency  Continue supplementation.  RTC in 6 months for follow up.    Screening mammogram for breast cancer  -     Mammo Digital Screening Bilat w/ Ag; Future    Environmental allergies  -     azelastine (ASTELIN) 137 mcg (0.1 %) nasal spray; 2 sprays (274 mcg total) by Nasal route 2 (two) times daily.      Jazzy Charles MD  10/17/2023

## 2023-10-26 ENCOUNTER — OFFICE VISIT (OUTPATIENT)
Dept: OPTOMETRY | Facility: CLINIC | Age: 73
End: 2023-10-26
Payer: COMMERCIAL

## 2023-10-26 DIAGNOSIS — H52.203 HYPEROPIA OF BOTH EYES WITH ASTIGMATISM AND PRESBYOPIA: ICD-10-CM

## 2023-10-26 DIAGNOSIS — H52.4 HYPEROPIA OF BOTH EYES WITH ASTIGMATISM AND PRESBYOPIA: ICD-10-CM

## 2023-10-26 DIAGNOSIS — Z01.00 EYE EXAM, ROUTINE: Primary | ICD-10-CM

## 2023-10-26 DIAGNOSIS — H52.03 HYPEROPIA OF BOTH EYES WITH ASTIGMATISM AND PRESBYOPIA: ICD-10-CM

## 2023-10-26 PROCEDURE — 92014 COMPRE OPH EXAM EST PT 1/>: CPT | Mod: HCNC,S$GLB,, | Performed by: OPTOMETRIST

## 2023-10-26 PROCEDURE — 99999 PR PBB SHADOW E&M-EST. PATIENT-LVL III: ICD-10-PCS | Mod: PBBFAC,HCNC,, | Performed by: OPTOMETRIST

## 2023-10-26 PROCEDURE — 99999 PR PBB SHADOW E&M-EST. PATIENT-LVL III: CPT | Mod: PBBFAC,HCNC,, | Performed by: OPTOMETRIST

## 2023-10-26 PROCEDURE — 92014 PR EYE EXAM, EST PATIENT,COMPREHESV: ICD-10-PCS | Mod: HCNC,S$GLB,, | Performed by: OPTOMETRIST

## 2023-10-26 PROCEDURE — 92015 DETERMINE REFRACTIVE STATE: CPT | Mod: HCNC,S$GLB,, | Performed by: OPTOMETRIST

## 2023-10-26 PROCEDURE — 92015 PR REFRACTION: ICD-10-PCS | Mod: HCNC,S$GLB,, | Performed by: OPTOMETRIST

## 2023-10-26 RX ORDER — METHIMAZOLE 5 MG/1
TABLET ORAL
COMMUNITY
Start: 2023-07-15

## 2023-10-26 RX ORDER — OLMESARTAN MEDOXOMIL 40 MG/1
40 TABLET ORAL
COMMUNITY
Start: 2023-09-13

## 2023-10-26 RX ORDER — QUINAPRIL 20 MG/1
TABLET ORAL
COMMUNITY
Start: 2023-09-13

## 2023-10-26 NOTE — PROGRESS NOTES
HPI    Last eye exam was 8/18/22 with Dr. Kumar.  [facility]  GLC suspect  Patient states didn't update glasses after last exam. Vision seems the   same.  Patient denies diplopia, headaches, flashes/floaters, and pain.    Last edited by Nitin Kumar, OD on 10/26/2023  9:35 AM.            Assessment /Plan     For exam results, see Encounter Report.    Eye exam, routine  -[facility] Exam  -GLC suspect with Physio enlarged CDs  -Mature Cataracts pt would prefer waiting at least 1 more year    Hyperopia of both eyes with astigmatism and presbyopia  Eyeglass Final Rx       Eyeglass Final Rx         Sphere Cylinder Axis Dist VA Add    Right +3.25 +0.75 170 20/40 +2.50    Left +3.25 +1.00 180 20/50+2 +2.50      Type: PAL    Expiration Date: 10/26/2024                      RTC 1 yr

## 2023-12-17 DIAGNOSIS — R30.0 DYSURIA: Primary | ICD-10-CM

## 2023-12-17 RX ORDER — CIPROFLOXACIN 500 MG/1
500 TABLET ORAL 2 TIMES DAILY
Qty: 10 TABLET | Refills: 0 | Status: SHIPPED | OUTPATIENT
Start: 2023-12-17 | End: 2023-12-22

## 2023-12-26 ENCOUNTER — HOSPITAL ENCOUNTER (OUTPATIENT)
Dept: RADIOLOGY | Facility: OTHER | Age: 73
Discharge: HOME | End: 2023-12-26
Attending: STUDENT IN AN ORGANIZED HEALTH CARE EDUCATION/TRAINING PROGRAM
Payer: MEDICARE

## 2023-12-26 DIAGNOSIS — Z12.31 SCREENING MAMMOGRAM FOR BREAST CANCER: ICD-10-CM

## 2023-12-26 PROCEDURE — 77063 MAMMO DIGITAL SCREENING BILAT WITH TOMO: ICD-10-PCS | Mod: 26,HCNC,, | Performed by: RADIOLOGY

## 2023-12-26 PROCEDURE — 77067 MAMMO DIGITAL SCREENING BILAT WITH TOMO: ICD-10-PCS | Mod: 26,HCNC,, | Performed by: RADIOLOGY

## 2023-12-26 PROCEDURE — 77063 BREAST TOMOSYNTHESIS BI: CPT | Mod: 26,HCNC,, | Performed by: RADIOLOGY

## 2023-12-26 PROCEDURE — 77067 SCR MAMMO BI INCL CAD: CPT | Mod: TC,HCNC

## 2023-12-26 PROCEDURE — 77067 SCR MAMMO BI INCL CAD: CPT | Mod: 26,HCNC,, | Performed by: RADIOLOGY

## 2024-01-05 ENCOUNTER — OFFICE VISIT (OUTPATIENT)
Dept: OBSTETRICS AND GYNECOLOGY | Facility: CLINIC | Age: 74
End: 2024-01-05
Payer: MEDICARE

## 2024-01-05 VITALS
DIASTOLIC BLOOD PRESSURE: 90 MMHG | WEIGHT: 275.56 LBS | BODY MASS INDEX: 40.81 KG/M2 | SYSTOLIC BLOOD PRESSURE: 146 MMHG | HEIGHT: 69 IN

## 2024-01-05 DIAGNOSIS — N89.8 VAGINAL ITCHING: ICD-10-CM

## 2024-01-05 DIAGNOSIS — R35.0 URINARY FREQUENCY: Primary | ICD-10-CM

## 2024-01-05 LAB
BILIRUB SERPL-MCNC: ABNORMAL MG/DL
BLOOD URINE, POC: ABNORMAL
CLARITY, POC UA: ABNORMAL
COLOR, POC UA: ABNORMAL
GLUCOSE UR QL STRIP: NORMAL
KETONES UR QL STRIP: ABNORMAL
LEUKOCYTE ESTERASE URINE, POC: ABNORMAL
NITRITE, POC UA: ABNORMAL
PH, POC UA: 7
PROTEIN, POC: ABNORMAL
SPECIFIC GRAVITY, POC UA: 1.01
UROBILINOGEN, POC UA: NORMAL

## 2024-01-05 PROCEDURE — 3080F DIAST BP >= 90 MM HG: CPT | Mod: HCNC,CPTII,S$GLB, | Performed by: STUDENT IN AN ORGANIZED HEALTH CARE EDUCATION/TRAINING PROGRAM

## 2024-01-05 PROCEDURE — 99213 OFFICE O/P EST LOW 20 MIN: CPT | Mod: HCNC,S$GLB,, | Performed by: STUDENT IN AN ORGANIZED HEALTH CARE EDUCATION/TRAINING PROGRAM

## 2024-01-05 PROCEDURE — 3008F BODY MASS INDEX DOCD: CPT | Mod: HCNC,CPTII,S$GLB, | Performed by: STUDENT IN AN ORGANIZED HEALTH CARE EDUCATION/TRAINING PROGRAM

## 2024-01-05 PROCEDURE — 87086 URINE CULTURE/COLONY COUNT: CPT | Mod: HCNC | Performed by: STUDENT IN AN ORGANIZED HEALTH CARE EDUCATION/TRAINING PROGRAM

## 2024-01-05 PROCEDURE — 99999 PR PBB SHADOW E&M-EST. PATIENT-LVL II: CPT | Mod: PBBFAC,HCNC,, | Performed by: STUDENT IN AN ORGANIZED HEALTH CARE EDUCATION/TRAINING PROGRAM

## 2024-01-05 PROCEDURE — 1159F MED LIST DOCD IN RCRD: CPT | Mod: HCNC,CPTII,S$GLB, | Performed by: STUDENT IN AN ORGANIZED HEALTH CARE EDUCATION/TRAINING PROGRAM

## 2024-01-05 PROCEDURE — 81514 NFCT DS BV&VAGINITIS DNA ALG: CPT | Mod: HCNC | Performed by: STUDENT IN AN ORGANIZED HEALTH CARE EDUCATION/TRAINING PROGRAM

## 2024-01-05 PROCEDURE — 1126F AMNT PAIN NOTED NONE PRSNT: CPT | Mod: HCNC,CPTII,S$GLB, | Performed by: STUDENT IN AN ORGANIZED HEALTH CARE EDUCATION/TRAINING PROGRAM

## 2024-01-05 PROCEDURE — 1160F RVW MEDS BY RX/DR IN RCRD: CPT | Mod: HCNC,CPTII,S$GLB, | Performed by: STUDENT IN AN ORGANIZED HEALTH CARE EDUCATION/TRAINING PROGRAM

## 2024-01-05 PROCEDURE — 81002 URINALYSIS NONAUTO W/O SCOPE: CPT | Mod: HCNC,S$GLB,, | Performed by: STUDENT IN AN ORGANIZED HEALTH CARE EDUCATION/TRAINING PROGRAM

## 2024-01-05 PROCEDURE — 3077F SYST BP >= 140 MM HG: CPT | Mod: HCNC,CPTII,S$GLB, | Performed by: STUDENT IN AN ORGANIZED HEALTH CARE EDUCATION/TRAINING PROGRAM

## 2024-01-05 PROCEDURE — 1101F PT FALLS ASSESS-DOCD LE1/YR: CPT | Mod: HCNC,CPTII,S$GLB, | Performed by: STUDENT IN AN ORGANIZED HEALTH CARE EDUCATION/TRAINING PROGRAM

## 2024-01-05 PROCEDURE — 3288F FALL RISK ASSESSMENT DOCD: CPT | Mod: HCNC,CPTII,S$GLB, | Performed by: STUDENT IN AN ORGANIZED HEALTH CARE EDUCATION/TRAINING PROGRAM

## 2024-01-05 RX ORDER — FLUCONAZOLE 150 MG/1
150 TABLET ORAL DAILY
Qty: 1 TABLET | Refills: 1 | Status: SHIPPED | OUTPATIENT
Start: 2024-01-05 | End: 2024-01-06

## 2024-01-05 NOTE — PROGRESS NOTES
HPI:  Manju Aranda is a 73 y.o.  here with urinary symptoms and now vaginal itching/pain.   - sx for about 2 weeks  - urinary symptoms seemed to have improved after empiric cipro from her hematologist who she happened to see while symptomatic  - sx seemed to have returned but also with itching and irritation  - does report hx yeast infections after abx  - she is very uncomfortable    ROS:  GENERAL: Feeling well overall. Denies fever or chills.   SKIN: Denies rash or lesions.   HEAD: Denies head injury or headache.   NODES: Denies enlarged lymph nodes.   CHEST: Denies chest pain or shortness of breath.   CARDIOVASCULAR: Denies palpitations or left sided chest pain.   ABDOMEN: No abdominal pain, constipation, diarrhea, nausea, vomiting or rectal bleeding.   URINARY: No dysuria, hematuria, or burning on urination.  REPRODUCTIVE: See HPI.   BREASTS: Denies pain, lumps, or nipple discharge.   HEMATOLOGIC: No easy bruisability or excessive bleeding.   MUSCULOSKELETAL: Denies joint pain or swelling.   NEUROLOGIC: Denies syncope or weakness.   PSYCHIATRIC: Denies depression, anxiety or mood swings.    PE:   APPEARANCE: Well nourished, well developed female in no acute distress.  ABDOMEN: Soft. No tenderness or masses. No distention. No hernias palpated. No CVA tenderness.  VULVA: No lesions. Normal external female genitalia.  URETHRAL MEATUS: Normal size and location, no lesions, no prolapse.  URETHRA: No masses, tenderness, or prolapse.  VAGINA: Moist. No lesions or lacerations noted. No abnormal discharge present. No odor present.   CERVIX: No lesions or discharge. No cervical motion tenderness.   UTERUS: not examined  ADNEXA: not examined  ANUS PERINEUM: Normal.      Diagnosis:  1. Urinary frequency    2. Vaginal itching        Plan:     Empiric candida treatment based on recent abx and patient history  Affirm, culture sent    RTC if sx persist, recur, or worsen

## 2024-01-06 LAB — BACTERIA UR CULT: NO GROWTH

## 2024-01-08 LAB
BACTERIAL VAGINOSIS DNA: NEGATIVE
CANDIDA GLABRATA DNA: NEGATIVE
CANDIDA KRUSEI DNA: NEGATIVE
CANDIDA RRNA VAG QL PROBE: NEGATIVE
T VAGINALIS RRNA GENITAL QL PROBE: NEGATIVE

## 2024-02-07 ENCOUNTER — PATIENT OUTREACH (OUTPATIENT)
Dept: ADMINISTRATIVE | Facility: HOSPITAL | Age: 74
End: 2024-02-07
Payer: MEDICARE

## 2024-02-07 NOTE — PROGRESS NOTES
Population Health Chart Review & Patient Outreach Details    Outreach Performed: NO did not contact     Additional [facility] Notes:           Updates Requested / Reviewed:      Updated Care Coordination Note, Care Everywhere, Care Team Updated, Removed  or Duplicate Orders, and Immunizations Reconciliation Completed or Queried: [place]         Health Maintenance Topics Overdue:    Health Maintenance Due   Topic Date Due    High Dose Statin  Never done         Health Maintenance Topic(s) Outreach Outcomes & Actions Taken:    Medication Adherence / Statins - Outreach Outcomes & Actions Taken  : Sent Provider Message to Review to Evaluate Pt for Statin, Add Exclusion Dx Codes, Document Exclusion in Problem List, Change Statin Intensity Level to Moderate or High Intensity if Applicable

## 2024-02-19 DIAGNOSIS — J06.9 UPPER RESPIRATORY TRACT INFECTION, UNSPECIFIED TYPE: Primary | ICD-10-CM

## 2024-02-19 RX ORDER — PROMETHAZINE HYDROCHLORIDE AND CODEINE PHOSPHATE 6.25; 1 MG/5ML; MG/5ML
5 SOLUTION ORAL EVERY 8 HOURS PRN
Qty: 240 ML | Refills: 0 | Status: SHIPPED | OUTPATIENT
Start: 2024-02-19 | End: 2024-02-29

## 2024-02-19 RX ORDER — METRONIDAZOLE 500 MG/1
500 TABLET ORAL ONCE
Qty: 1 TABLET | Refills: 0 | Status: SHIPPED | OUTPATIENT
Start: 2024-02-19 | End: 2024-02-19

## 2024-02-19 RX ORDER — AZITHROMYCIN 250 MG/1
TABLET, FILM COATED ORAL
Qty: 6 TABLET | Refills: 0 | Status: SHIPPED | OUTPATIENT
Start: 2024-02-19 | End: 2024-02-24

## 2024-02-20 ENCOUNTER — TELEPHONE (OUTPATIENT)
Dept: ADMINISTRATIVE | Facility: CLINIC | Age: 74
End: 2024-02-20
Payer: MEDICARE

## 2024-02-27 ENCOUNTER — OFFICE VISIT (OUTPATIENT)
Dept: INTERNAL MEDICINE | Facility: CLINIC | Age: 74
End: 2024-02-27
Payer: MEDICARE

## 2024-02-27 ENCOUNTER — LAB VISIT (OUTPATIENT)
Dept: LAB | Facility: OTHER | Age: 74
End: 2024-02-27
Attending: STUDENT IN AN ORGANIZED HEALTH CARE EDUCATION/TRAINING PROGRAM
Payer: MEDICARE

## 2024-02-27 VITALS
DIASTOLIC BLOOD PRESSURE: 90 MMHG | BODY MASS INDEX: 39.21 KG/M2 | OXYGEN SATURATION: 98 % | HEIGHT: 69 IN | SYSTOLIC BLOOD PRESSURE: 146 MMHG | HEART RATE: 82 BPM | WEIGHT: 264.75 LBS

## 2024-02-27 DIAGNOSIS — J06.9 URI WITH COUGH AND CONGESTION: ICD-10-CM

## 2024-02-27 DIAGNOSIS — R53.83 FATIGUE, UNSPECIFIED TYPE: Primary | ICD-10-CM

## 2024-02-27 DIAGNOSIS — R53.83 FATIGUE, UNSPECIFIED TYPE: ICD-10-CM

## 2024-02-27 DIAGNOSIS — G93.31 POST VIRAL SYNDROME: ICD-10-CM

## 2024-02-27 LAB
ALBUMIN SERPL BCP-MCNC: 3.5 G/DL (ref 3.5–5.2)
ALP SERPL-CCNC: 86 U/L (ref 55–135)
ALT SERPL W/O P-5'-P-CCNC: 15 U/L (ref 10–44)
ANION GAP SERPL CALC-SCNC: 5 MMOL/L (ref 8–16)
AST SERPL-CCNC: 18 U/L (ref 10–40)
BASOPHILS # BLD AUTO: 0.02 K/UL (ref 0–0.2)
BASOPHILS NFR BLD: 0.4 % (ref 0–1.9)
BILIRUB SERPL-MCNC: 0.5 MG/DL (ref 0.1–1)
BUN SERPL-MCNC: 12 MG/DL (ref 8–23)
CALCIUM SERPL-MCNC: 9.7 MG/DL (ref 8.7–10.5)
CHLORIDE SERPL-SCNC: 106 MMOL/L (ref 95–110)
CO2 SERPL-SCNC: 27 MMOL/L (ref 23–29)
CREAT SERPL-MCNC: 1 MG/DL (ref 0.5–1.4)
CTP QC/QA: YES
DIFFERENTIAL METHOD BLD: ABNORMAL
EOSINOPHIL # BLD AUTO: 0 K/UL (ref 0–0.5)
EOSINOPHIL NFR BLD: 0.4 % (ref 0–8)
ERYTHROCYTE [DISTWIDTH] IN BLOOD BY AUTOMATED COUNT: 11.7 % (ref 11.5–14.5)
EST. GFR  (NO RACE VARIABLE): 60 ML/MIN/1.73 M^2
GLUCOSE SERPL-MCNC: 89 MG/DL (ref 70–110)
HCT VFR BLD AUTO: 36.6 % (ref 37–48.5)
HGB BLD-MCNC: 11.3 G/DL (ref 12–16)
IMM GRANULOCYTES # BLD AUTO: 0.02 K/UL (ref 0–0.04)
IMM GRANULOCYTES NFR BLD AUTO: 0.4 % (ref 0–0.5)
LYMPHOCYTES # BLD AUTO: 1.5 K/UL (ref 1–4.8)
LYMPHOCYTES NFR BLD: 32.3 % (ref 18–48)
MCH RBC QN AUTO: 30.1 PG (ref 27–31)
MCHC RBC AUTO-ENTMCNC: 30.9 G/DL (ref 32–36)
MCV RBC AUTO: 98 FL (ref 82–98)
MONOCYTES # BLD AUTO: 0.4 K/UL (ref 0.3–1)
MONOCYTES NFR BLD: 9.2 % (ref 4–15)
NEUTROPHILS # BLD AUTO: 2.7 K/UL (ref 1.8–7.7)
NEUTROPHILS NFR BLD: 57.3 % (ref 38–73)
NRBC BLD-RTO: 0 /100 WBC
PLATELET # BLD AUTO: 248 K/UL (ref 150–450)
PMV BLD AUTO: 9.8 FL (ref 9.2–12.9)
POTASSIUM SERPL-SCNC: 4 MMOL/L (ref 3.5–5.1)
PROT SERPL-MCNC: 7.4 G/DL (ref 6–8.4)
RBC # BLD AUTO: 3.75 M/UL (ref 4–5.4)
SARS-COV-2 AG RESP QL IA.RAPID: NEGATIVE
SODIUM SERPL-SCNC: 138 MMOL/L (ref 136–145)
TSH SERPL DL<=0.005 MIU/L-ACNC: 1.15 UIU/ML (ref 0.4–4)
WBC # BLD AUTO: 4.67 K/UL (ref 3.9–12.7)

## 2024-02-27 PROCEDURE — 3008F BODY MASS INDEX DOCD: CPT | Mod: HCNC,CPTII,S$GLB,

## 2024-02-27 PROCEDURE — 1101F PT FALLS ASSESS-DOCD LE1/YR: CPT | Mod: HCNC,CPTII,S$GLB,

## 2024-02-27 PROCEDURE — 99999 PR PBB SHADOW E&M-EST. PATIENT-LVL III: CPT | Mod: PBBFAC,HCNC,,

## 2024-02-27 PROCEDURE — 1159F MED LIST DOCD IN RCRD: CPT | Mod: HCNC,CPTII,S$GLB,

## 2024-02-27 PROCEDURE — 87811 SARS-COV-2 COVID19 W/OPTIC: CPT | Mod: QW,HCNC,S$GLB,

## 2024-02-27 PROCEDURE — 36415 COLL VENOUS BLD VENIPUNCTURE: CPT | Mod: HCNC

## 2024-02-27 PROCEDURE — 84443 ASSAY THYROID STIM HORMONE: CPT | Mod: HCNC

## 2024-02-27 PROCEDURE — 3288F FALL RISK ASSESSMENT DOCD: CPT | Mod: HCNC,CPTII,S$GLB,

## 2024-02-27 PROCEDURE — 85025 COMPLETE CBC W/AUTO DIFF WBC: CPT | Mod: HCNC

## 2024-02-27 PROCEDURE — 3077F SYST BP >= 140 MM HG: CPT | Mod: HCNC,CPTII,S$GLB,

## 2024-02-27 PROCEDURE — 80053 COMPREHEN METABOLIC PANEL: CPT | Mod: HCNC

## 2024-02-27 PROCEDURE — 99213 OFFICE O/P EST LOW 20 MIN: CPT | Mod: HCNC,S$GLB,,

## 2024-02-27 PROCEDURE — 3080F DIAST BP >= 90 MM HG: CPT | Mod: HCNC,CPTII,S$GLB,

## 2024-02-27 PROCEDURE — 1125F AMNT PAIN NOTED PAIN PRSNT: CPT | Mod: HCNC,CPTII,S$GLB,

## 2024-02-27 NOTE — PROGRESS NOTES
CHIEF COMPLAINT     Chief Complaint   Patient presents with    URI       HPI     Manju Aranda is a 73 y.o. female who presents for cough and congestion today.    PCP is Jazzy Charles MD, patient is new to me. Symptoms began 2.5 weeks ago. Is unable to take OTC meds except coricidin. Reports fatigue, cough, congestion, decreased appetite. Has not checked temp but is having aches and chills. History of Graves Disease, was taken off of meds 6 months ago because her numbers were good. Will recheck TSH.    Past Medical History:  Past Medical History:   Diagnosis Date    Allergy     Anemia     Arthritis     Cholelithiases     Cyst of kidney, acquired     Diverticulitis     Elevated TSH     Family history of Graves' disease: daughter, maternal aunt, maternal uncle 09/13/2016    Glaucoma suspect     Graves disease     Hx of colonic polyps     Hypertension 1981    Liver cyst     MGUS (monoclonal gammopathy of unknown significance)     Migraine headache     Mitral valve problem     leakage    Obesity     Paraproteinemia     Sleep apnea     + CPAP    Smoldering multiple myeloma 2013    Tricuspid valve disease     leakage       Home Medications:  Prior to Admission medications    Medication Sig Start Date End Date Taking? Authorizing Provider   ascorbic acid, vitamin C, (VITAMIN C) 500 MG tablet Take 500 mg by mouth once daily.   Yes Provider, Historical   aspirin 81 MG Chew Take 81 mg by mouth once daily.   Yes Provider, Historical   azelastine (ASTELIN) 137 mcg (0.1 %) nasal spray 2 sprays (274 mcg total) by Nasal route 2 (two) times daily. 10/17/23 10/16/24 Yes Jazzy Charles MD   cholecalciferol, vitamin D3, (VITAMIN D3) 50 mcg (2,000 unit) Cap capsule Take 1 capsule (2,000 Units total) by mouth once daily. 4/10/23  Yes Summer Rajput MD   magnesium oxide (MAG-OX) 400 mg tablet Take 400 mg by mouth once daily. 11/17/16  Yes Provider, Historical   methIMAzole (TAPAZOLE) 5 MG Tab Take by mouth. 7/15/23   "Yes Provider, Historical   multivit with min-folic acid 0.4 mg Tab Take 0.4 mg by mouth once daily. 12/15/16  Yes Provider, Historical   olmesartan (BENICAR) 20 MG tablet Take 20 mg by mouth once daily.   Yes Provider, Historical   olmesartan (BENICAR) 40 MG tablet Take 40 mg by mouth. 9/13/23  Yes Provider, Historical   promethazine-codeine 6.25-10 mg/5 ml (PHENERGAN WITH CODEINE) 6.25-10 mg/5 mL syrup Take 5 mLs by mouth every 8 (eight) hours as needed for Cough. 2/19/24 2/29/24 Yes Beckie Marsh MD   psyllium (METAMUCIL) powder Take 1 packet by mouth Daily.   Yes Provider, Historical   quinapriL (ACCUPRIL) 20 MG tablet Take by mouth. 9/13/23  Yes Provider, Historical   rizatriptan (MAXALT) 10 MG tablet TAKE 1 TABLET(10 MG) BY MOUTH EVERY 2 HOURS AS NEEDED FOR MIGRAINE. MAX 30 MG/ 24 AT BEDTIME 3/8/23  Yes Lela Chambers, NP   traZODone (DESYREL) 50 MG tablet Take 1 tablet (50 mg total) by mouth nightly as needed for Insomnia. 4/17/23  Yes Jazzy Charles MD       Review of Systems:  Review of Systems   Constitutional:  Positive for appetite change, chills and fatigue. Negative for fever.   HENT:  Positive for congestion.    Respiratory:  Positive for cough.    Cardiovascular: Negative.    Gastrointestinal:  Negative for diarrhea, nausea and vomiting.       Health Maintainence:   Immunizations:  Health Maintenance         Date Due Completion Date    High Dose Statin Never done ---    DEXA Scan 09/17/2024 9/17/2020    Mammogram 12/26/2024 12/26/2023    Colorectal Cancer Screening 06/19/2026 6/19/2023    Lipid Panel 04/28/2028 4/28/2023    TETANUS VACCINE 09/26/2028 9/26/2018    Override on 8/8/2017: Done (Martha)             PHYSICAL EXAM     BP (!) 146/90   Pulse 82   Ht 5' 9" (1.753 m)   Wt 120.1 kg (264 lb 12.4 oz)   SpO2 98%   BMI 39.10 kg/m²     Physical Exam  Vitals reviewed.   Constitutional:       Appearance: She is well-developed.   HENT:      Head: Normocephalic and atraumatic.   Eyes:     "  Conjunctiva/sclera: Conjunctivae normal.   Cardiovascular:      Rate and Rhythm: Normal rate.   Pulmonary:      Effort: Pulmonary effort is normal. No respiratory distress.      Breath sounds: No wheezing, rhonchi or rales.   Skin:     General: Skin is warm and dry.      Findings: No rash.   Neurological:      Mental Status: She is alert and oriented to person, place, and time.      Coordination: Coordination normal.   Psychiatric:         Behavior: Behavior normal.         LABS     Lab Results   Component Value Date    HGBA1C 4.2 03/13/2023     CMP  Sodium   Date Value Ref Range Status   04/28/2023 143 136 - 145 mmol/L Final     Potassium   Date Value Ref Range Status   04/28/2023 4.0 3.5 - 5.1 mmol/L Final     Chloride   Date Value Ref Range Status   04/28/2023 109 95 - 110 mmol/L Final     CO2   Date Value Ref Range Status   04/28/2023 26 23 - 29 mmol/L Final     Glucose   Date Value Ref Range Status   04/28/2023 82 70 - 110 mg/dL Final     BUN   Date Value Ref Range Status   04/28/2023 17 8 - 23 mg/dL Final     Creatinine   Date Value Ref Range Status   04/28/2023 1.0 0.5 - 1.4 mg/dL Final     Calcium   Date Value Ref Range Status   04/28/2023 9.3 8.7 - 10.5 mg/dL Final     Total Protein   Date Value Ref Range Status   04/28/2023 7.4 6.0 - 8.4 g/dL Final     Albumin   Date Value Ref Range Status   04/28/2023 3.5 3.5 - 5.2 g/dL Final     Total Bilirubin   Date Value Ref Range Status   04/28/2023 0.6 0.1 - 1.0 mg/dL Final     Comment:     For infants and newborns, interpretation of results should be based  on gestational age, weight and in agreement with clinical  observations.    Premature Infant recommended reference ranges:  Up to 24 hours.............<8.0 mg/dL  Up to 48 hours............<12.0 mg/dL  3-5 days..................<15.0 mg/dL  6-29 days.................<15.0 mg/dL       Alkaline Phosphatase   Date Value Ref Range Status   04/28/2023 99 55 - 135 U/L Final     AST   Date Value Ref Range Status    04/28/2023 19 10 - 40 U/L Final     ALT   Date Value Ref Range Status   04/28/2023 15 10 - 44 U/L Final     Anion Gap   Date Value Ref Range Status   04/28/2023 8 8 - 16 mmol/L Final     eGFR if    Date Value Ref Range Status   04/08/2022 >60.0 >60 mL/min/1.73 m^2 Final     eGFR if non    Date Value Ref Range Status   04/08/2022 >60.0 >60 mL/min/1.73 m^2 Final     Comment:     Calculation used to obtain the estimated glomerular filtration  rate (eGFR) is the CKD-EPI equation.        Lab Results   Component Value Date    WBC 4.16 04/28/2023    WBC 4.16 04/28/2023    HGB 11.6 (L) 04/28/2023    HGB 11.6 (L) 04/28/2023    HCT 36.7 (L) 04/28/2023    HCT 36.7 (L) 04/28/2023    MCV 99 (H) 04/28/2023    MCV 99 (H) 04/28/2023     04/28/2023     04/28/2023     Lab Results   Component Value Date    CHOL 135 04/28/2023    CHOL 143 02/26/2021    CHOL 148 03/03/2020     Lab Results   Component Value Date    HDL 49 04/28/2023    HDL 52 02/26/2021    HDL 54 03/03/2020     Lab Results   Component Value Date    LDLCALC 64.0 04/28/2023    LDLCALC 75.2 02/26/2021    LDLCALC 82.8 03/03/2020     Lab Results   Component Value Date    TRIG 110 04/28/2023    TRIG 79 02/26/2021    TRIG 56 03/03/2020     Lab Results   Component Value Date    CHOLHDL 36.3 04/28/2023    CHOLHDL 36.4 02/26/2021    CHOLHDL 36.5 03/03/2020     Lab Results   Component Value Date    TSH 1.202 05/09/2023    [id number] 81 04/08/2022    S0AUXKT 5.4 08/11/2010       ASSESSMENT/PLAN   1. Fatigue, unspecified type  -     CBC Auto Differential; Future; Expected date: 02/27/2024  -     Comprehensive Metabolic Panel; Future; Expected date: 02/27/2024  -     TSH; Future; Expected date: 02/27/2024    2. Post viral syndrome    3. URI with cough and congestion  -     SARS Coronavirus 2 Antigen, POCT Manual Read  -     POCT Influenza A/B           Timothy Burger NP   Department of Internal Medicine [facility]  1:01 PM

## 2024-02-28 ENCOUNTER — TELEPHONE (OUTPATIENT)
Dept: INTERNAL MEDICINE | Facility: CLINIC | Age: 74
End: 2024-02-28
Payer: MEDICARE

## 2024-02-28 NOTE — TELEPHONE ENCOUNTER
----- Message from Timothy Burger NP sent at 2/28/2024  7:17 AM CST -----  Please contact the patient and let them know that their results were fine. Thank you.

## 2024-02-28 NOTE — PROGRESS NOTES
Please let her know that her labs look good. Very slight anemia but not enough to explain her fatigue. Thyroid function is normal. Thanks

## 2024-02-28 NOTE — TELEPHONE ENCOUNTER
----- Message from Timothy Burger NP sent at 2/28/2024  7:18 AM CST -----  Please let her know that her labs look good. Very slight anemia but not enough to explain her fatigue. Thyroid function is normal. Thanks

## 2024-02-28 NOTE — TELEPHONE ENCOUNTER
Called Ms. Aranda. The listed message from Mr. Burger given to her. Informed this is in addition to the previous message

## 2024-03-01 ENCOUNTER — TELEPHONE (OUTPATIENT)
Dept: ENDOCRINOLOGY | Facility: CLINIC | Age: 74
End: 2024-03-01
Payer: MEDICARE

## 2024-03-01 NOTE — TELEPHONE ENCOUNTER
"Returned phone call per pt request. Pt has seen PCP the other day. Pt recently had the flu, states "the doctor said I should feel better in 7-10 days and I'm still not doing better. All my blood work came back fine, but she didn't do a T4 level." Pt offered first available appointment. Pt accepted.  "

## 2024-03-04 ENCOUNTER — PATIENT MESSAGE (OUTPATIENT)
Dept: ADMINISTRATIVE | Facility: HOSPITAL | Age: 74
End: 2024-03-04
Payer: MEDICARE

## 2024-03-21 DIAGNOSIS — G43.009 MIGRAINE WITHOUT AURA AND WITHOUT STATUS MIGRAINOSUS, NOT INTRACTABLE: ICD-10-CM

## 2024-03-21 RX ORDER — RIZATRIPTAN BENZOATE 10 MG/1
TABLET ORAL
Qty: 12 TABLET | Refills: 11 | Status: SHIPPED | OUTPATIENT
Start: 2024-03-21

## 2024-04-17 ENCOUNTER — PATIENT MESSAGE (OUTPATIENT)
Dept: FAMILY MEDICINE | Facility: CLINIC | Age: 74
End: 2024-04-17
Payer: MEDICARE

## 2024-04-17 ENCOUNTER — OFFICE VISIT (OUTPATIENT)
Dept: ENDOCRINOLOGY | Facility: CLINIC | Age: 74
End: 2024-04-17
Payer: MEDICARE

## 2024-04-17 ENCOUNTER — TELEPHONE (OUTPATIENT)
Dept: ORTHOPEDICS | Facility: CLINIC | Age: 74
End: 2024-04-17
Payer: MEDICARE

## 2024-04-17 ENCOUNTER — HOSPITAL ENCOUNTER (OUTPATIENT)
Dept: RADIOLOGY | Facility: OTHER | Age: 74
Discharge: HOME | End: 2024-04-17
Attending: STUDENT IN AN ORGANIZED HEALTH CARE EDUCATION/TRAINING PROGRAM
Payer: MEDICARE

## 2024-04-17 ENCOUNTER — OFFICE VISIT (OUTPATIENT)
Dept: INTERNAL MEDICINE | Facility: CLINIC | Age: 74
End: 2024-04-17
Payer: MEDICARE

## 2024-04-17 VITALS
HEART RATE: 79 BPM | OXYGEN SATURATION: 99 % | DIASTOLIC BLOOD PRESSURE: 80 MMHG | SYSTOLIC BLOOD PRESSURE: 122 MMHG | BODY MASS INDEX: 39.26 KG/M2 | WEIGHT: 265.88 LBS

## 2024-04-17 VITALS
DIASTOLIC BLOOD PRESSURE: 80 MMHG | HEIGHT: 69 IN | WEIGHT: 265.88 LBS | BODY MASS INDEX: 39.38 KG/M2 | SYSTOLIC BLOOD PRESSURE: 118 MMHG

## 2024-04-17 DIAGNOSIS — M25.562 CHRONIC PAIN OF BOTH KNEES: ICD-10-CM

## 2024-04-17 DIAGNOSIS — Z00.00 HEALTH MAINTENANCE EXAMINATION: Primary | ICD-10-CM

## 2024-04-17 DIAGNOSIS — G47.00 INSOMNIA, UNSPECIFIED TYPE: ICD-10-CM

## 2024-04-17 DIAGNOSIS — G43.709 CHRONIC MIGRAINE WITHOUT AURA WITHOUT STATUS MIGRAINOSUS, NOT INTRACTABLE: ICD-10-CM

## 2024-04-17 DIAGNOSIS — E66.01 SEVERE OBESITY WITH BODY MASS INDEX (BMI) OF 35.0 TO 39.9 WITH COMORBIDITY: ICD-10-CM

## 2024-04-17 DIAGNOSIS — Z91.89 FRAMINGHAM CARDIAC RISK 10-20% IN NEXT 10 YEARS: ICD-10-CM

## 2024-04-17 DIAGNOSIS — I71.9 AORTIC ANEURYSM, UNSPECIFIED PORTION OF AORTA, UNSPECIFIED WHETHER RUPTURED: ICD-10-CM

## 2024-04-17 DIAGNOSIS — E05.00 GRAVES DISEASE: Primary | ICD-10-CM

## 2024-04-17 DIAGNOSIS — D47.2 SMOLDERING MULTIPLE MYELOMA (SMM): ICD-10-CM

## 2024-04-17 DIAGNOSIS — E53.8 VITAMIN B12 DEFICIENCY: ICD-10-CM

## 2024-04-17 DIAGNOSIS — E05.00 GRAVES DISEASE: ICD-10-CM

## 2024-04-17 DIAGNOSIS — G89.29 CHRONIC PAIN OF BOTH KNEES: ICD-10-CM

## 2024-04-17 DIAGNOSIS — G47.33 OSA ON CPAP: ICD-10-CM

## 2024-04-17 DIAGNOSIS — M25.561 CHRONIC PAIN OF BOTH KNEES: ICD-10-CM

## 2024-04-17 DIAGNOSIS — E05.00 GRAVES' EYE DISEASE: ICD-10-CM

## 2024-04-17 DIAGNOSIS — R73.09 OTHER ABNORMAL GLUCOSE: ICD-10-CM

## 2024-04-17 DIAGNOSIS — D64.9 ANEMIA, UNSPECIFIED TYPE: ICD-10-CM

## 2024-04-17 DIAGNOSIS — I70.0 AORTIC ATHEROSCLEROSIS: ICD-10-CM

## 2024-04-17 DIAGNOSIS — K76.89 SIMPLE HEPATIC CYST: ICD-10-CM

## 2024-04-17 DIAGNOSIS — I10 PRIMARY HYPERTENSION: ICD-10-CM

## 2024-04-17 DIAGNOSIS — I49.9 IRREGULAR HEART RHYTHM: ICD-10-CM

## 2024-04-17 DIAGNOSIS — Q61.2 ADPKD (AUTOSOMAL DOMINANT POLYCYSTIC KIDNEY DISEASE): ICD-10-CM

## 2024-04-17 DIAGNOSIS — E55.9 VITAMIN D DEFICIENCY: ICD-10-CM

## 2024-04-17 DIAGNOSIS — Z13.6 SCREENING FOR CARDIOVASCULAR CONDITION: ICD-10-CM

## 2024-04-17 PROCEDURE — 1126F AMNT PAIN NOTED NONE PRSNT: CPT | Mod: CPTII,S$GLB,, | Performed by: STUDENT IN AN ORGANIZED HEALTH CARE EDUCATION/TRAINING PROGRAM

## 2024-04-17 PROCEDURE — 1159F MED LIST DOCD IN RCRD: CPT | Mod: CPTII,S$GLB,, | Performed by: STUDENT IN AN ORGANIZED HEALTH CARE EDUCATION/TRAINING PROGRAM

## 2024-04-17 PROCEDURE — 1159F MED LIST DOCD IN RCRD: CPT | Mod: CPTII,S$GLB,, | Performed by: INTERNAL MEDICINE

## 2024-04-17 PROCEDURE — 3008F BODY MASS INDEX DOCD: CPT | Mod: CPTII,S$GLB,, | Performed by: INTERNAL MEDICINE

## 2024-04-17 PROCEDURE — 93005 ELECTROCARDIOGRAM TRACING: CPT | Mod: S$GLB,,, | Performed by: STUDENT IN AN ORGANIZED HEALTH CARE EDUCATION/TRAINING PROGRAM

## 2024-04-17 PROCEDURE — 3288F FALL RISK ASSESSMENT DOCD: CPT | Mod: CPTII,S$GLB,, | Performed by: STUDENT IN AN ORGANIZED HEALTH CARE EDUCATION/TRAINING PROGRAM

## 2024-04-17 PROCEDURE — 1101F PT FALLS ASSESS-DOCD LE1/YR: CPT | Mod: CPTII,S$GLB,, | Performed by: INTERNAL MEDICINE

## 2024-04-17 PROCEDURE — 99215 OFFICE O/P EST HI 40 MIN: CPT | Mod: S$GLB,,, | Performed by: STUDENT IN AN ORGANIZED HEALTH CARE EDUCATION/TRAINING PROGRAM

## 2024-04-17 PROCEDURE — 4010F ACE/ARB THERAPY RXD/TAKEN: CPT | Mod: CPTII,S$GLB,, | Performed by: STUDENT IN AN ORGANIZED HEALTH CARE EDUCATION/TRAINING PROGRAM

## 2024-04-17 PROCEDURE — 99214 OFFICE O/P EST MOD 30 MIN: CPT | Mod: S$GLB,,, | Performed by: INTERNAL MEDICINE

## 2024-04-17 PROCEDURE — 99999 PR PBB SHADOW E&M-EST. PATIENT-LVL III: CPT | Mod: PBBFAC,HCNC,, | Performed by: INTERNAL MEDICINE

## 2024-04-17 PROCEDURE — 3008F BODY MASS INDEX DOCD: CPT | Mod: CPTII,S$GLB,, | Performed by: STUDENT IN AN ORGANIZED HEALTH CARE EDUCATION/TRAINING PROGRAM

## 2024-04-17 PROCEDURE — 3074F SYST BP LT 130 MM HG: CPT | Mod: CPTII,S$GLB,, | Performed by: INTERNAL MEDICINE

## 2024-04-17 PROCEDURE — 1126F AMNT PAIN NOTED NONE PRSNT: CPT | Mod: CPTII,S$GLB,, | Performed by: INTERNAL MEDICINE

## 2024-04-17 PROCEDURE — 1160F RVW MEDS BY RX/DR IN RCRD: CPT | Mod: CPTII,S$GLB,, | Performed by: INTERNAL MEDICINE

## 2024-04-17 PROCEDURE — 3079F DIAST BP 80-89 MM HG: CPT | Mod: CPTII,S$GLB,, | Performed by: STUDENT IN AN ORGANIZED HEALTH CARE EDUCATION/TRAINING PROGRAM

## 2024-04-17 PROCEDURE — 3079F DIAST BP 80-89 MM HG: CPT | Mod: CPTII,S$GLB,, | Performed by: INTERNAL MEDICINE

## 2024-04-17 PROCEDURE — 93010 ELECTROCARDIOGRAM REPORT: CPT | Mod: S$GLB,,, | Performed by: INTERNAL MEDICINE

## 2024-04-17 PROCEDURE — 1101F PT FALLS ASSESS-DOCD LE1/YR: CPT | Mod: CPTII,S$GLB,, | Performed by: STUDENT IN AN ORGANIZED HEALTH CARE EDUCATION/TRAINING PROGRAM

## 2024-04-17 PROCEDURE — 3288F FALL RISK ASSESSMENT DOCD: CPT | Mod: CPTII,S$GLB,, | Performed by: INTERNAL MEDICINE

## 2024-04-17 PROCEDURE — 99999 PR PBB SHADOW E&M-EST. PATIENT-LVL V: CPT | Mod: PBBFAC,HCNC,, | Performed by: STUDENT IN AN ORGANIZED HEALTH CARE EDUCATION/TRAINING PROGRAM

## 2024-04-17 PROCEDURE — 73562 X-RAY EXAM OF KNEE 3: CPT | Mod: TC,50,HCNC,FY

## 2024-04-17 PROCEDURE — 3074F SYST BP LT 130 MM HG: CPT | Mod: CPTII,S$GLB,, | Performed by: STUDENT IN AN ORGANIZED HEALTH CARE EDUCATION/TRAINING PROGRAM

## 2024-04-17 PROCEDURE — 73562 X-RAY EXAM OF KNEE 3: CPT | Mod: 26,50,HCNC, | Performed by: RADIOLOGY

## 2024-04-17 RX ORDER — OLMESARTAN MEDOXOMIL 40 MG/1
40 TABLET ORAL DAILY
Qty: 90 TABLET | Refills: 3 | Status: SHIPPED | OUTPATIENT
Start: 2024-04-17

## 2024-04-17 RX ORDER — TRAZODONE HYDROCHLORIDE 100 MG/1
100 TABLET ORAL NIGHTLY PRN
Qty: 90 TABLET | Refills: 1 | Status: SHIPPED | OUTPATIENT
Start: 2024-04-17

## 2024-04-17 NOTE — PROGRESS NOTES
Subjective:       Patient ID: Manju Aranda is a 73 y.o. female.    Chief Complaint: Health maintenance examination [Z00.00]    Patient is established with me, here today for the following:     HTN, PCKD, Graves disease, smoldering multiple myeloma, migraines, diverticulosis, BECKY on CPAP, ascending aortic aneurysm, hepatic cysts, vitamin D deficiency    Health maintenance -   Colonoscopy performed JUNE2023 with Dr. Lewis. Told to repeat in 3 years.  Family history of colorectal cancer.   Mammogram BI-RADS 1 in DEC2023.  Denies history of prior abnormal mammogram.  Family history of breast cancers.  Denies family history of ovarian cancers.  Hysterectomy due menorrhagia.  Denies history of prior abnormal pap smears prior to hysterectomy.  Had DEXA scan in SEP2020.   Showed normal bone mineral density.  Denies family history of osteoporosis.  Significant family history of cardiac disease.  UTD on influenza, RSV, Tdap, COVID, PCV13, PPSV23 vaccinations.  Started smoking at in early 20's, at most 1 pack every 8 weeks. Stopped smoking in her late 20's.  Denies current alcohol use.   Denies drug use.  Completed hepatitis C screening.  Due for diabetes screening.  Lab Results       Component                Value               Date                       HGBA1C                   4.2                 03/13/2023              Taking trazodone for insomnia with incomplete effect  Will sleep for 3 hours after taking, but then wake and not be able to get back to sleep    BECKY  -   Using CPAP nightly with good effect    Taking rizatriptan PRN for migraines with good effect  Taking magnesium nightly   Will have migraine when weather changes or with certain foods  Occurs once every few months    Endorses bilateral knee pain for the past months   Will walk 3 times weekly and pain begins mid walk  No difficulty going up or down stairs  Last knee x-ray JUNE2017 showed arthritis bilaterally  Tried Voltaren gel without  improvement    Multiple Myeloma -   Anemia -  Following with Dr. Alba routinely for hematology.  Due for follow up next month  Lab Results       Component                Value               Date                       HGB                      11.3 (L)            02/27/2024                 HCT                      36.6 (L)            02/27/2024                 MCV                      98                  02/27/2024                 RDW                      11.7                02/27/2024            Lab Results       Component                Value               Date                       UIBC                     211                 04/20/2012                 IRON                     58                  04/28/2023                 TRANSFERRIN              224                 04/28/2023                 TIBC                     332                 04/28/2023                 FESATURATED              17 (L)              04/28/2023             Lab Results       Component                Value               Date                       FERRITIN                 177                 04/28/2023            Lab Results       Component                Value               Date                       [id number]               400                 04/28/2023            Lab Results       Component                Value               Date                       FOLATE                   12.9                04/28/2023               Polycystic kidney disease -  Followed with Dr. Bui for urology in MAY2023 for microscopic hematuria  Underwent cystoscopy and completed workup previously in 2017  Advised follow up PRN  Previously followed with nephrology, last appt in 2019  Lab Results       Component                Value               Date                       CREATININE               1.0                 02/27/2024                 CREATININE               1.0                 04/28/2023                 CREATININE               1.0                  03/13/2023                 CREATININE               1.0                 03/13/2023            Lab Results       Component                Value               Date                       NA                       138                 02/27/2024                 K                        4.0                 02/27/2024                 CO2                      27                  02/27/2024            Prot/Creat Ratio, Urine       Date                     Value               Ref Range           Status                03/26/2018               0.08                0.00 - 0.20         Final            ----------     Hepatic cysts -  Following with Dr. De La Cruz every 2 years  Last appointment in MAR2021    Atherosclerosis -   Mild aortic valve sclerosis -  Elevated ASCVD risk score -    Previously followed with Dr. Beverly for cardiology  Advised ok to follow up PRN  Echo JUNE2023 with EF 55%  Stress echo JUNE2023 without evidence of ischemia   Taking ASA as directed  Was previously on rosuvastatin, but discontinued due to side effects  Lab Results       Component                Value               Date                       CHOL                     135                 04/28/2023            Lab Results       Component                Value               Date                       TRIG                     110                 04/28/2023            Lab Results       Component                Value               Date                       LDLCALC                  64.0                04/28/2023            Lab Results       Component                Value               Date                       HDL                      49                  04/28/2023            The 10-year ASCVD risk score (Elsie DK, et al., 2019) is: 11.4%     HTN -   Taking olmesartan daily.    Patient endorses taking medication as directed.  Denies side effects or concerns while taking medication.  Due for micro albumin creatinine ratio.   Lab Results       Component     "            Value               Date                       MICALBCREAT              10.2                03/13/2023            BP Readings from Last 5 Encounters:  02/27/24 : (!) 146/90  01/05/24 : (!) 146/90  10/17/23 : 138/70  06/26/23 : 138/73  06/19/23 : 136/69     Graves disease -   Not currently requiring methimazole    Following with Dr. Rajput for endocrinology, saw her today  Plans to  follow up in 2 years  UTD on TSH.  Lab Results       Component                Value               Date                       TSH                      1.146               02/27/2024                 TSH                      1.202               05/09/2023                 TSH                      1.614               03/13/2023                   Aortic aneurysm -  CTA JUNE2023 with "Mild fusiform dilation of the ascending aorta reaching maximum 4.0 cm"  Followed with Dr. Demarco for CT surgery  Recommended repeat echo and CTA with follow up in 6 months, overdue     Vitamin D deficiency -  Currently taking vitamin D3 2000 IU daily supplementation.  Lab Results       Component                Value               Date                       [id number]             15 (L)              03/13/2023                 [id number]             31                  02/26/2021                 [id number]             40                  07/25/2016                                  Review of Systems   Constitutional:  Negative for appetite change, chills, fatigue, fever and unexpected weight change.   Respiratory:  Negative for cough and shortness of breath.    Cardiovascular:  Negative for chest pain, palpitations and leg swelling.   Gastrointestinal:  Negative for abdominal pain, constipation, diarrhea, nausea and vomiting.   Musculoskeletal:  Positive for arthralgias.   Skin:  Negative for rash.   Neurological:  Negative for dizziness, syncope, weakness and headaches.         Current Outpatient Medications   Medication Instructions    ascorbic " acid (vitamin C) (VITAMIN C) 500 mg, Oral, Daily    aspirin 81 mg, Oral, Daily    azelastine (ASTELIN) 274 mcg, Nasal, 2 times daily    cholecalciferol (vitamin D3) (VITAMIN D3) 2,000 Units, Oral, Daily    magnesium oxide (MAG-OX) 400 mg, Oral, Daily    multivit with min-folic acid 0.4 mg Tab 0.4 mg, Oral, Daily    olmesartan (BENICAR) 40 mg, Oral, Daily    psyllium (METAMUCIL) powder 1 packet, Oral, Daily    rizatriptan (MAXALT) 10 MG tablet TAKE 1 TABLET(10 MG) BY MOUTH EVERY 2 HOURS AS NEEDED FOR MIGRAINE. MAX 30 MG/ 24 AT BEDTIME    traZODone (DESYREL) 100 mg, Oral, Nightly PRN     Objective:      Vitals:    04/17/24 0925   BP: 122/80   Pulse: 79   SpO2: 99%   Weight: 120.6 kg (265 lb 14 oz)   PainSc: 0-No pain     Body mass index is 39.26 kg/m².    Physical Exam  Vitals reviewed.   Constitutional:       General: She is not in acute distress.     Appearance: Normal appearance. She is not ill-appearing or diaphoretic.   HENT:      Head: Normocephalic and atraumatic.      Right Ear: Tympanic membrane, ear canal and external ear normal. There is no impacted cerumen.      Left Ear: Tympanic membrane, ear canal and external ear normal. There is no impacted cerumen.      Nose: Nose normal. No rhinorrhea.      Mouth/Throat:      Mouth: Mucous membranes are moist.      Pharynx: Oropharynx is clear. No oropharyngeal exudate or posterior oropharyngeal erythema.   Eyes:      General: No scleral icterus.        Right eye: No discharge.         Left eye: No discharge.      Conjunctiva/sclera: Conjunctivae normal.   Neck:      Thyroid: No thyromegaly or thyroid tenderness.      Trachea: Trachea normal.   Cardiovascular:      Rate and Rhythm: Normal rate. Rhythm irregularly irregular.      Heart sounds: Normal heart sounds. No murmur heard.     No friction rub. No gallop.   Pulmonary:      Effort: Pulmonary effort is normal. No respiratory distress.      Breath sounds: Normal breath sounds. No stridor. No wheezing, rhonchi or  rales.   Abdominal:      General: There is no distension.      Palpations: Abdomen is soft.      Tenderness: There is no abdominal tenderness. There is no guarding or rebound.   Musculoskeletal:         General: No swelling or deformity.      Cervical back: Neck supple.   Lymphadenopathy:      Head:      Right side of head: No submandibular or posterior auricular adenopathy.      Left side of head: No submandibular or posterior auricular adenopathy.      Cervical: No cervical adenopathy.      Right cervical: No superficial, deep or posterior cervical adenopathy.     Left cervical: No superficial, deep or posterior cervical adenopathy.      Upper Body:      Right upper body: No supraclavicular adenopathy.      Left upper body: No supraclavicular adenopathy.   Skin:     General: Skin is warm and dry.   Neurological:      General: No focal deficit present.      Mental Status: She is alert. Mental status is at baseline.      Gait: Gait normal.   Psychiatric:         Mood and Affect: Mood normal.         Behavior: Behavior normal.         Assessment:       1. Health maintenance examination    2. Insomnia, unspecified type    3. BECKY on CPAP    4. Chronic migraine without aura without status migrainosus, not intractable    5. Chronic pain of both knees    6. Smoldering multiple myeloma (SMM)    7. Vitamin B12 deficiency    8. Anemia, unspecified type    9. ADPKD (autosomal dominant polycystic kidney disease)    10. Simple hepatic cyst    11. [place] cardiac risk 10-20% in next 10 years    12. Aortic atherosclerosis    13. Screening for cardiovascular condition    14. Primary hypertension    15. Graves disease    16. Aortic aneurysm, unspecified portion of aorta, unspecified whether ruptured    17. Vitamin D deficiency    18. Irregular heart rhythm    19. Other abnormal glucose        Plan:       Insomnia, unspecified type  Increase trazodone to 100 mg nightly  RTC in 6 months for follow up, sooner PRN  -     traZODone  (DESYREL) 100 MG tablet; Take 1 tablet (100 mg total) by mouth nightly as needed for Insomnia.    BECKY on CPAP  Continue CPAP nightly  RTC in 6 months for follow up.    Chronic migraine without aura without status migrainosus, not intractable  Continue current medications.  RTC in 6 months for follow up.    Chronic pain of both knees  -     X-Ray Knee 3 View Bilateral; Future  -     Ambulatory referral/consult to Orthopedics; Future    Smoldering multiple myeloma (SMM)  Anemia, unspecified type  Vitamin B12 deficiency  Continue evaluation and management per hematologist.  -     Vitamin B12; Future  -     Ambulatory referral/consult to Hematology / Oncology; Future    ADPKD (autosomal dominant polycystic kidney disease)  Follow up with nephrologist  -     Ambulatory referral/consult to Nephrology; Future  -     Urinalysis; Future  -     Protein/Creatinine Ratio, Urine; Future    Simple hepatic cyst  Continue evaluation and management per hepatologist.  -     Ambulatory referral/consult to Hepatology; Future    [place] cardiac risk 10-20% in next 10 years  Aortic atherosclerosis  Screening for cardiovascular condition  -     Lipid Panel; Future    Primary hypertension  Continue current medications.  RTC in 6 months for follow up.  -     olmesartan (BENICAR) 40 MG tablet; Take 1 tablet (40 mg total) by mouth once daily.  -     Comprehensive Metabolic Panel; Future  -     CBC Auto Differential; Future  -     Microalbumin/Creatinine Ratio, Urine; Future    Graves disease  Continue evaluation and management per endocrinologist.    Aortic aneurysm, unspecified portion of aorta, unspecified whether ruptured  Obtain 6 month follow up CTA and echo as directed by Dr. Demarco  Follow up with Dr. Demarco after imaging  -     CTA Chest Aorta Non Coronary; Future  -     Echo; Future  -     Ambulatory referral/consult to Cardiothoracic Surgery; Future    Vitamin D deficiency  Continue supplementation.  RTC in 6 months for follow  up.  -     Vitamin D; Future    Irregular heart rhythm  EKG sinus rhythm with PAC's  -     IN OFFICE EKG 12-LEAD (to [facility])    Health maintenance examination  Reviewed and discussed age appropriate screenings and immunizations.  -     Comprehensive Metabolic Panel; Future  -     Lipid Panel; Future  -     Hemoglobin A1C; Future  -     CBC Auto Differential; Future  -     Vitamin D; Future  -     Vitamin B12; Future  -     Microalbumin/Creatinine Ratio, Urine; Future  -     Urinalysis; Future  -     Protein/Creatinine Ratio, Urine; Future    Other abnormal glucose  -     Hemoglobin A1C; Future      51 minutes were spent in chart review, documentation and review of results, and evaluation, treatment, and counseling of patient on the same day of service.    Jazzy Charles MD  4/17/2024

## 2024-04-17 NOTE — PROGRESS NOTES
"Subjective:      Patient ID: Manju Aranda is a 73 y.o. female.    Chief Complaint:   Graves  hyperthyroidism    History of Present Illness    With regards to her Graves  hyperthyroidism:  She was found to be hyperthyroid in September 2016 when she presented with to the emergency room which shortness of breath.    She was on methimazole 2016 - 2018     She presented in late august 2020 with racing heart, sob, weight loss  And was found to be hyperthyroid again   She tried inderal LA but got a migraine     restarted sept 2020 - stopped 5/2023         Her daughter has Graves       Thyroid scan: none     Thyroid ultrasound:  2016   IMPRESSION:   No nodules. Study consistent with autoimmune thyroid disease as the etiology for the hyperthyroidism.    Thyroid ab done:      Ref. Range 9/20/2016 09:50   Thyrotropin Receptor Ab Latest Ref Range: 0.00 - 1.75 IU/L 7.33 (H)   Thyroperoxidase Antibodies Latest Ref Range: <6.0 IU/mL 31.8 (H)         current symptoms:    No palpations   BECKY and is using CPAP          Fatigue after URI          Last BMD: 9/17/20     Impression:     1. Normal BMD of the lumbar spine and hip.Nl   Compared with previous DXA, BMD at the lumbar spine has increased by 5.7%, and the BMD at the total hip has decreased by -6.3%.     no pain or dryness - saw Dr Boo in the past        With regards to obesity, Body mass index is 39.26 kg/m².,     + polyuria, polydipsia but she drinks a lot of water to be healthy  No FH of DM      Trying to diet and lose weight   She is taking otc vit D     Review of Systems  As above       Objective:     /80   Ht 5' 9" (1.753 m)   Wt 120.6 kg (265 lb 14 oz)   BMI 39.26 kg/m²   BP Readings from Last 3 Encounters:   04/17/24 118/80   02/27/24 (!) 146/90   01/05/24 (!) 146/90     Wt Readings from Last 1 Encounters:   04/17/24 0835 120.6 kg (265 lb 14 oz)     Body mass index is 39.26 kg/m².      Physical Exam  Vitals reviewed.   Constitutional:       Appearance: " Normal appearance.   Cardiovascular:      Rate and Rhythm: Normal rate.      Pulses: Normal pulses.   Pulmonary:      Effort: Pulmonary effort is normal.   Abdominal:      Palpations: Abdomen is soft.                Lab Review:   Lab Results   Component Value Date    HGBA1C 4.2 03/13/2023     Lab Results   Component Value Date    CHOL 135 04/28/2023    HDL 49 04/28/2023    LDLCALC 64.0 04/28/2023    TRIG 110 04/28/2023    CHOLHDL 36.3 04/28/2023     Lab Results   Component Value Date     02/27/2024    K 4.0 02/27/2024     02/27/2024    CO2 27 02/27/2024    GLU 89 02/27/2024    BUN 12 02/27/2024    CREATININE 1.0 02/27/2024    CALCIUM 9.7 02/27/2024    PROT 7.4 02/27/2024    ALBUMIN 3.5 02/27/2024    BILITOT 0.5 02/27/2024    ALKPHOS 86 02/27/2024    AST 18 02/27/2024    ALT 15 02/27/2024    ANIONGAP 5 (L) 02/27/2024    ESTGFRAFRICA >60.0 04/08/2022    EGFRNONAA >60.0 04/08/2022    TSH 1.146 02/27/2024     Vit D, 25-Hydroxy   Date Value Ref Range Status   03/13/2023 15 (L) 30 - 96 ng/mL Final     Comment:     Vitamin D deficiency.........<10 ng/mL                              Vitamin D insufficiency......10-29 ng/mL       Vitamin D sufficiency........> or equal to 30 ng/mL  Vitamin D toxicity............>100 ng/mL         Assessment and Plan     Graves disease  In remission.  Check yearly     Reviewed that she should reach out to me if she has any symptoms of hyperthyroidism and we can recheck labs anytime.    Graves' eye disease     F/u with eye           Vitamin D deficiency   over the counter vitamin D 2000 iu a day        Primary hypertension  Controlled    Severe obesity with body mass index (BMI) of 35.0 to 39.9 with comorbidity  Reviewed  risk of t2dm  Periodic A1c and lipids    BECKY on CPAP  She is adherent        Tsh yearly   Rtc 2 years

## 2024-04-17 NOTE — ASSESSMENT & PLAN NOTE
In remission.  Check yearly     Reviewed that she should reach out to me if she has any symptoms of hyperthyroidism and we can recheck labs anytime.

## 2024-04-19 DIAGNOSIS — M25.562 ACUTE PAIN OF BOTH KNEES: Primary | ICD-10-CM

## 2024-04-19 DIAGNOSIS — M25.561 ACUTE PAIN OF BOTH KNEES: Primary | ICD-10-CM

## 2024-04-19 LAB
OHS QRS DURATION: 92 MS
OHS QTC CALCULATION: 416 MS

## 2024-04-24 ENCOUNTER — TELEPHONE (OUTPATIENT)
Dept: CARDIOTHORACIC SURGERY | Facility: CLINIC | Age: 74
End: 2024-04-24
Payer: MEDICARE

## 2024-04-25 NOTE — PROGRESS NOTES
Subjective:     HPI:   Manju Aranda is a 73 y.o. female who presents eval B knee L>R ref buy Dr Charles    History of Present Illness  The patient presents for evaluation of bilateral knee pain.    The patient has been experiencing bilateral knee pain for over a year, with the left knee being more affected than the right. The pain has progressively worsened over the past few months, preventing her from engaging in physical activities such as walking thrice weekly. Despite her efforts to maintain mobility, the pain persists. She denies any history of myocardial infarction or stent placement, diabetes, thromboembolic events, or surgical interventions on her knees. The pain is primarily localized to the anterior aspect of her knees.    Supplemental Information  She has polycystic kidney disease. She uses a CPAP machine for sleep apnea. She is being monitored for MGUS.        Past surgical history: None.     Hx DVT: None.     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 The patient stated that their sister  would be able to help take care of them if they were to have surgery.      The patient was referred to see Dr. Torre from Dr. Charles, her PCP.      ROS:  The updated medical history is in the chart and has been reviewed. A review of systems is updated and there is no reported vision changes, ear/nose/mouth/throat complaints,  chest pain, shortness of breath, abdominal pain, urological complaints,  fevers or chills, psychiatric complaints. Musculoskeletal and neurologcial symptoms are as documented. All other systems are negative.      Objective:   Exam:  There were no vitals filed for this visit.  Body mass index is 39.01 kg/m².    Physical examination included assessment of the patient's general appearance with particular attention to development, nutrition, body habitus, attention to grooming, and any evidence of distress.  Constitutional: The patient is a well-developed, well-nourished patient in no acute distress.   Cardiovascular: Vascular examination included warmth and capillary refill as well inspection for edema and assessment of pedal pulses. Pulses are palpable and regular.  Musculoskeletal: Gait was assessed as to whether it was steady, non-antalgic, and/or required the use of an assist device. The patient was also asked to walk independently and get onto the examination table.  Skin: The skin was examined for any obvious rashes or lesions in the extremity.  Neurologic: Sensation is intact to light touch in the extremity. The patient has good coordination without hyperreflexia and is alert and oriented to person, place and time and has normal mood and affect.     All of the above were examined and found to be within normal limits except for the following pertinent clinical findings:    Physical Exam  There is no significant limp or antalgic gait. No groin pain with straight leg raise. No pain with active or passive range of motion of her hip joint. She is distally neurovascularly intact with good strength, sensation, and pulses. The left knee has 0 to 120, right knee has 5 to 120 range of motion. Both knees show overall valgus alignment of 6 or 7 degrees. She is more tender on the medial and lateral joint lines on the right knee, minimal on the left. She has mild to moderate effusions. Knees are stable to anterior, posterior varus and valgus stresses. Slight flexion contracture on the right. No  extensor lags.          Imaging:    Results  Laboratory Studies  White blood cell count was 3.5.    Imaging  X-rays of both knees show bone on bone arthritis and some wear and tear arthritis behind the kneecaps.    KNEE R ARTHRITIS    Indication:  Right knee pain  Exam Ordered: Radiographs of the right knee include a standing anteroposterior view, a standing posterioanterior view, a lateral view in full flexion, and a sunrise view  Details of Examination: Exam shows evidence of joint space narrowing, osteophyte formation, and subchondral sclerosis, all consistent with degenerative arthritis of the knee.  No other significant findings are noted.  Impression:  Degenerative Arthritis, Right Knee and KNEE L ARTHRITIS     Indication:  Left knee pain  Exam Ordered: Radiographs of the left knee include a standing anteroposterior view, a standing posterioanterior view, a lateral view in full flexion, and a sunrise view  Details of Examination: Exam shows evidence of joint space narrowing, osteophyte formation, and subchondral sclerosis, all consistent with degenerative arthritis of the knee.  No other significant findings are noted.  Impression:  Degenerative Arthritis, Left Knee    Klg4 B knee valgus OA severe bone on bone      Assessment:       ICD-10-CM ICD-9-CM   1. Primary osteoarthritis of both knees  M17.0 715.16        PCKD 1.1/53  BECKY + CPAP  MGUS/smoldering MM, WBC 3.5  BMI 39    Plan:     Assessment & Plan  1. Bone-on-bone arthritis in both knees.  The patient has been advised to take Tylenol Arthritis 500 mg, 2 to 3 times daily, and to apply Voltaren gel to the affected area.    The above findings were discussed with patient length. We discussed the risks of conservative versus surgical management knee arthritis. Conservative management consisting of anti-inflammatory medications, glucosamine/chondroitin sulfate, weight loss, physical therapy, activity modification, as well as injections (lubricant versus  corticosteroid) was discussed at length. At this point considering the patient's level of activity, pain, and radiographic findings I recommend continued conservative management of the knee arthritis and TKA when ready    The patient was given a handout with treatment strategies for hip and knee joint care prior to surgery from [employer], the [employer].   This included information regarding medications, injections, weight loss, exercise, braces, physical therapy, and alternative therapies.   I explained the potentially adverse gastric, cardiac, and renal effects of NSAIDs and explained that if the patient wishes to take it for longer that the patient should discuss this with their primary care physician to determine if it is safe to do so.    X arthritis Tylenol   PCKD Aleve   x Voltaren Gel   x [employer] non-op arthritis info    Bursitis info   x Total Joint Info   x HEP: [employer] [employer] home exercise conditioning program    x PT   x CSI: intra-articular steroid injection    CSI: greater trochanter bursitis    HA: hyaluronic acid injection   X Comp sleeves Brace:     Referral:      TKA when ready  Not ready for surgery at this time, 3 family weddings in the next 6 months, first in 2 weeks  Optimize non-op options    F/u 3 months: rpt CSI, discuss surgery timing    In between call for HA injections PRN        Orders Placed This Encounter   Procedures    Large Joint Aspiration/Injection: bilateral knee     This order was created via procedure documentation    Ambulatory referral/consult to Physical/Occupational Therapy     Standing Status:   Future     Standing Expiration Date:   5/26/2025     Referral Priority:   Routine     Referral Type:   Physical Medicine     Referral Reason:   Specialty Services Required     Number of Visits Requested:   1       This note was generated with the assistance of ambient listening technology. Verbal consent was obtained by the patient and accompanying  visitor(s) for the recording of patient appointment to facilitate this note. I attest to having reviewed and edited the generated note for accuracy, though some syntax or spelling errors may persist. Please contact the author of this note for any clarification.            Past Medical History:   Diagnosis Date    Allergy     Anemia     Arthritis     Cholelithiases     Cyst of kidney, acquired     Diverticulitis     Elevated TSH     Family history of Graves' disease: daughter, maternal aunt, maternal uncle 09/13/2016    Glaucoma suspect     Graves disease     Hx of colonic polyps     Hypertension 1981    Liver cyst     MGUS (monoclonal gammopathy of unknown significance)     Migraine headache     Mitral valve problem     leakage    Obesity     Paraproteinemia     Sleep apnea     + CPAP    Smoldering multiple myeloma 2013    Tricuspid valve disease     leakage       Past Surgical History:   Procedure Laterality Date    APPENDECTOMY      COLONOSCOPY N/A 10/23/2015    Procedure: COLONOSCOPY;  Surgeon: Brandon Ruelas MD;  Location: [facility] (4TH FLR);  Service: Endoscopy;  Laterality: N/A;  Had divertiulitis in 5/29/2015 with a recommendation for colonoscopy in 8 weeks    Dr. Ruelas is her GI physician    COLONOSCOPY N/A 11/05/2018    Procedure: COLONOSCOPY;  Surgeon: Brandon Ruelas MD;  Location: [facility] (4TH FLR);  Service: Endoscopy;  Laterality: N/A;  Dr. Ruelas did the last one multiple polyps, patient had recent diverticulitis should not schedule before Oct 10th    COLONOSCOPY N/A 6/19/2023    Procedure: COLONOSCOPY;  Surgeon: Lu Lewis MD;  [facility]  Service: Endoscopy;  Laterality: N/A;  pt offered earlier dates but stated she is out town and will be returning the evening of 6/4  cardiac clearance recieved-see tele encounter 5/22/23 5/22 referred by Dr. Lewis/PEG/instr. mailed and to portal-st  6/13 pre-call no answer; MB    CYSTOSCOPY      HYSTERECTOMY  1978 or 1979     menorrhagia       Family History   Problem Relation Name Age of Onset    Heart disease Mother          MI    Heart attack Mother      Hypertension Father      Irregular heart beat Sister          fast heart rate    Cataracts Sister      Glaucoma Sister      No Known Problems Sister      Graves' disease Daughter Sanita     No Known Problems Daughter Edith     Heart disease Maternal Aunt          MI    Heart disease Maternal Aunt          MI    Colon cancer Maternal Uncle      Cancer Maternal Uncle          colon ca    Breast cancer Paternal Grandmother      Cancer Paternal Grandmother          GYN cancer - unknown cancer    No Known Problems Son Jus     No Known Problems Son Denoid     Melanoma Neg Hx      Ovarian cancer Neg Hx      Colon polyps Neg Hx      Rectal cancer Neg Hx      Stomach cancer Neg Hx      Esophageal cancer Neg Hx      Ulcerative colitis Neg Hx      Crohn's disease Neg Hx      Amblyopia Neg Hx      Blindness Neg Hx      Macular degeneration Neg Hx      Strabismus Neg Hx      Retinal detachment Neg Hx         Social History     Socioeconomic History    Marital status: Single   Occupational History    Occupation: RETIRED   Tobacco Use    Smoking status: Former     Current packs/day: 0.00     Types: Cigarettes     Start date: 1992     Quit date: 1995     Years since quittin.3     Passive exposure: Never    Smokeless tobacco: Never   Substance and Sexual Activity    Alcohol use: No    Drug use: No    Sexual activity: Not Currently     Partners: Male     Birth control/protection: Post-menopausal   Social History Narrative    Lives with sister.         Wal[place] most [place] in [place] 1[place]5miles.      Social Determinants of Health     Financial Resource Strain: Low Risk  (4/10/2023)    Overall Financial Resource Strain (CARDIA)     Difficulty of Paying Living Expenses: Not hard at all   Food Insecurity: No Food Insecurity (4/10/2023)    Hunger Vital Sign     Worried About Running Out of  Food in the Last Year: Never true     Ran Out of Food in the Last Year: Never true   Transportation Needs: No Transportation Needs (4/10/2023)    PRAPARE - Transportation     Lack of Transportation (Medical): No     Lack of Transportation (Non-Medical): No   Physical Activity: Insufficiently Active (4/10/2023)    Exercise Vital Sign     Days of Exercise per Week: 3 days     Minutes of Exercise per Session: 30 min   Stress: Stress Concern Present (4/10/2023)    [employer] [place] [employer] - Occupational Stress Questionnaire     Feeling of Stress : To some extent   Housing Stability: Unknown (4/10/2023)    Housing Stability Vital Sign     Unable to Pay for Housing in the Last Year: No     Unstable Housing in the Last Year: No

## 2024-04-26 ENCOUNTER — OFFICE VISIT (OUTPATIENT)
Dept: ORTHOPEDICS | Facility: CLINIC | Age: 74
End: 2024-04-26
Payer: MEDICARE

## 2024-04-26 ENCOUNTER — HOSPITAL ENCOUNTER (OUTPATIENT)
Dept: RADIOLOGY | Facility: HOSPITAL | Age: 74
Discharge: HOME | End: 2024-04-26
Attending: ORTHOPAEDIC SURGERY
Payer: MEDICARE

## 2024-04-26 VITALS — WEIGHT: 263.69 LBS | HEIGHT: 69 IN | BODY MASS INDEX: 39.06 KG/M2

## 2024-04-26 DIAGNOSIS — M25.561 ACUTE PAIN OF BOTH KNEES: ICD-10-CM

## 2024-04-26 DIAGNOSIS — M25.562 ACUTE PAIN OF BOTH KNEES: ICD-10-CM

## 2024-04-26 DIAGNOSIS — M17.0 PRIMARY OSTEOARTHRITIS OF BOTH KNEES: Primary | ICD-10-CM

## 2024-04-26 PROCEDURE — 1125F AMNT PAIN NOTED PAIN PRSNT: CPT | Mod: HCNC,CPTII,S$GLB, | Performed by: ORTHOPAEDIC SURGERY

## 2024-04-26 PROCEDURE — 3044F HG A1C LEVEL LT 7.0%: CPT | Mod: HCNC,CPTII,S$GLB, | Performed by: ORTHOPAEDIC SURGERY

## 2024-04-26 PROCEDURE — 73560 X-RAY EXAM OF KNEE 1 OR 2: CPT | Mod: 26,50,HCNC, | Performed by: RADIOLOGY

## 2024-04-26 PROCEDURE — 3288F FALL RISK ASSESSMENT DOCD: CPT | Mod: HCNC,CPTII,S$GLB, | Performed by: ORTHOPAEDIC SURGERY

## 2024-04-26 PROCEDURE — 1159F MED LIST DOCD IN RCRD: CPT | Mod: HCNC,CPTII,S$GLB, | Performed by: ORTHOPAEDIC SURGERY

## 2024-04-26 PROCEDURE — 3008F BODY MASS INDEX DOCD: CPT | Mod: HCNC,CPTII,S$GLB, | Performed by: ORTHOPAEDIC SURGERY

## 2024-04-26 PROCEDURE — 99204 OFFICE O/P NEW MOD 45 MIN: CPT | Mod: 25,HCNC,S$GLB, | Performed by: ORTHOPAEDIC SURGERY

## 2024-04-26 PROCEDURE — 20610 DRAIN/INJ JOINT/BURSA W/O US: CPT | Mod: 50,HCNC,S$GLB, | Performed by: ORTHOPAEDIC SURGERY

## 2024-04-26 PROCEDURE — 4010F ACE/ARB THERAPY RXD/TAKEN: CPT | Mod: HCNC,CPTII,S$GLB, | Performed by: ORTHOPAEDIC SURGERY

## 2024-04-26 PROCEDURE — 3061F NEG MICROALBUMINURIA REV: CPT | Mod: HCNC,CPTII,S$GLB, | Performed by: ORTHOPAEDIC SURGERY

## 2024-04-26 PROCEDURE — 73560 X-RAY EXAM OF KNEE 1 OR 2: CPT | Mod: TC,50,HCNC

## 2024-04-26 PROCEDURE — 99999 PR PBB SHADOW E&M-EST. PATIENT-LVL III: CPT | Mod: PBBFAC,HCNC,, | Performed by: ORTHOPAEDIC SURGERY

## 2024-04-26 PROCEDURE — 3066F NEPHROPATHY DOC TX: CPT | Mod: HCNC,CPTII,S$GLB, | Performed by: ORTHOPAEDIC SURGERY

## 2024-04-26 PROCEDURE — 1101F PT FALLS ASSESS-DOCD LE1/YR: CPT | Mod: HCNC,CPTII,S$GLB, | Performed by: ORTHOPAEDIC SURGERY

## 2024-04-26 RX ORDER — TRIAMCINOLONE ACETONIDE 40 MG/ML
40 INJECTION, SUSPENSION INTRA-ARTICULAR; INTRAMUSCULAR
Status: DISCONTINUED | OUTPATIENT
Start: 2024-04-26 | End: 2024-04-26 | Stop reason: HOSPADM

## 2024-04-26 RX ADMIN — TRIAMCINOLONE ACETONIDE 40 MG: 40 INJECTION, SUSPENSION INTRA-ARTICULAR; INTRAMUSCULAR at 01:04

## 2024-04-26 NOTE — PROGRESS NOTES
Injection Information     Triamcinolone Acetonide Injectable Suspension (40mg/mL)  Lot Number: [id number]  Expiration Date: 11/30/2025    The injection information was the same for both injections

## 2024-04-26 NOTE — PROCEDURES
Large Joint Aspiration/Injection: bilateral knee    Date/Time: 4/26/2024 1:30 PM    Performed by: Mack Torre III, MD  Authorized by: Mack Torre III, MD    Consent Done?:  Yes (Verbal)  Indications:  Pain  Timeout: prior to procedure the correct patient, procedure, and site was verified    Prep: patient was prepped and draped in usual sterile fashion      Local anesthesia used?: Yes    Local anesthetic:  Lidocaine 1% without epinephrine  Anesthetic total (ml):  8 (4mL each knee)      Details:  Needle Size:  21 G  Location:  Knee  Laterality:  Bilateral  Site:  Bilateral knee  Medications (Right):  40 mg triamcinolone acetonide 40 mg/mL  Medications (Left):  40 mg triamcinolone acetonide 40 mg/mL  Patient tolerance:  Patient tolerated the procedure well with no immediate complications

## 2024-05-01 ENCOUNTER — HOSPITAL ENCOUNTER (OUTPATIENT)
Dept: RADIOLOGY | Facility: HOSPITAL | Age: 74
Discharge: HOME | End: 2024-05-01
Attending: STUDENT IN AN ORGANIZED HEALTH CARE EDUCATION/TRAINING PROGRAM
Payer: MEDICARE

## 2024-05-01 DIAGNOSIS — I71.9 AORTIC ANEURYSM, UNSPECIFIED PORTION OF AORTA, UNSPECIFIED WHETHER RUPTURED: ICD-10-CM

## 2024-05-01 PROCEDURE — 71275 CT ANGIOGRAPHY CHEST: CPT | Mod: 26,HCNC,, | Performed by: RADIOLOGY

## 2024-05-01 PROCEDURE — 71275 CT ANGIOGRAPHY CHEST: CPT | Mod: TC,HCNC

## 2024-05-01 PROCEDURE — 25500020 PHARM REV CODE 255: Mod: HCNC | Performed by: STUDENT IN AN ORGANIZED HEALTH CARE EDUCATION/TRAINING PROGRAM

## 2024-05-01 RX ADMIN — IOHEXOL 100 ML: 350 INJECTION, SOLUTION INTRAVENOUS at 08:05

## 2024-05-02 NOTE — PROGRESS NOTES
[facility]  Physical Therapy Initial Evaluation    Name: Manju Aranda  Clinic Number: [id number]    Therapy Diagnosis:   Encounter Diagnoses   Name Primary?    Primary osteoarthritis of both knees     Decreased range of motion of both knees Yes    Impaired mobility and activities of daily living      Physician: Mack Torre III, *    Physician Orders: PT Eval and Treat   Medical Diagnosis from Referral: Primary osteoarthritis of both knees  Evaluation Date: 5/3/2024  Authorization Period Expiration: 4/26/2024 - 4/26/2025   Plan of Care Expiration: 7/25/2025  Visit # / Visits authorized: 1/ 1  FOTO 1st follow up:  FOTO 2nd follow up:    Time In: 12:00 pm  Time Out: 12:  pm  Total Billable Time: *** minutes    Precautions: Standard    Subjective   Date of onset: ***  History of current condition - Manju reports: ***       Past Medical History:   Diagnosis Date    Allergy     Anemia     Arthritis     Cholelithiases     Cyst of kidney, acquired     Diverticulitis     Elevated TSH     Family history of Graves' disease: daughter, maternal aunt, maternal uncle 09/13/2016    Glaucoma suspect     Graves disease     Hx of colonic polyps     Hypertension 1981    Liver cyst     MGUS (monoclonal gammopathy of unknown significance)     Migraine headache     Mitral valve problem     leakage    Obesity     Paraproteinemia     Sleep apnea     + CPAP    Smoldering multiple myeloma 2013    Tricuspid valve disease     leakage     Manju Aranda  has a past surgical history that includes Appendectomy; Hysterectomy (1978 or 1979); Colonoscopy (N/A, 10/23/2015); Colonoscopy (N/A, 11/05/2018); Cystoscopy; and Colonoscopy (N/A, 6/19/2023).    Manju has a current medication list which includes the following prescription(s): ascorbic acid (vitamin c), aspirin, azelastine, cholecalciferol (vitamin d3), magnesium oxide, multivit with min-folic acid, olmesartan, psyllium, rizatriptan, and trazodone,  "and the following Facility-Administered Medications: lidocaine hcl 2%.    Review of patient's allergies indicates:  No Known Allergies     Imaging, X-Ray:     Narrative & Impression  EXAMINATION:  XR KNEE 1 OR 2 VIEW BILATERAL    CLINICAL HISTORY:  Pain in right knee     TECHNIQUE:  AP standing views of the knees     COMPARISON:  Non 04/17/2024 e     FINDINGS:  DJD with significant narrowing of the lateral tibiofemoral joint spaces.  No fracture or dislocation.  No bone destruction identified     Impression:  See above    Prior Therapy: ***  Social History: 73 year-old single female, lives with her sister  Occupation: Retired  Prior Level of Function: ***  Current Level of Function: ***    Pain:  Current {0-10:15384::"0"}/10, worst {0-10:33682::"0"}/10, best {0-10:46564::"0"}/10   Location: bilateral knee   Description: {Pain Description:61599}  Aggravating Factors: {Causes; Pain:40899}  Easing Factors: {Pain (activities that relieve):44111}    Pts goals: ***    Objective     ***    Intake Outcome Measure for FOTO *** Survey    Therapist reviewed FOTO scores for Manju Aranda on 5/3/2024.   FOTO documents entered into [employer] - see Media section.    Intake Score: 60%  Predicted Score: 45%         TREATMENT   Treatment Time In: ***  Treatment Time Out: ***  Total Treatment time separate from Evaluation: *** minutes    Manju participated in dynamic functional therapeutic activities to improve functional performance for ***  minutes, including:  ***     Manju received the following manual therapy techniques: Joint mobilizations were applied to the: *** for *** minutes, including:  ***    Home Exercises and Patient Education Provided    Education provided re: prognosis, activity modification, goals for therapy, role of therapy for care, exercises/HEP    Written Home Exercises Provided: yes.  Exercises were reviewed and Manju was able to demonstrate them prior to the end of the session.   Pt received a " written copy of exercises to perform at home. Manju demonstrated good  understanding of the education provided.     See EMR under patient instructions for exercises given.   Assessment   Manju is a 73 y.o. female referred to outpatient Physical Therapy with a medical diagnosis of ***. Pt presents with ***    Pt prognosis is {REHAB PROGNOSIS OHS:64324}.   Pt will benefit from skilled outpatient Physical Therapy to address the deficits stated above and in the chart below, provide pt/family education, and to maximize pt's level of independence.     Plan of care discussed with patient: Yes  Patient's spiritual, cultural and educational needs considered and patient is agreeable to the plan of care and goals as stated below:     Anticipated Barriers for therapy: ***    Medical Necessity is demonstrated by the following  History  Co-morbidities and personal factors that may impact the plan of care [] LOW: no personal factors / co-morbidities  [x] MODERATE: 1-2 personal factors / co-morbidities  [] HIGH: 3+ personal factors / co-morbidities    Moderate / High Support Documentation:   Allergy    Anemia    Arthritis    Cholelithiases    Cyst of kidney, acquired    Diverticulitis    Elevated TSH    Family history of Graves' disease: daughter, maternal aunt, maternal uncle 09/13/2016   Glaucoma suspect    Graves disease    Hx of colonic polyps    Hypertension 1981   Liver cyst    MGUS (monoclonal gammopathy of unknown significance)    Migraine headache    Mitral valve problem    leakage   Obesity    Paraproteinemia    Sleep apnea    + CPAP   Smoldering multiple myeloma 2013   Tricuspid valve disease    leakage        Examination  Body Structures and Functions, activity limitations and participation restrictions that may impact the plan of care [] LOW: addressing 1-2 elements  [x] MODERATE: 3+ elements  [] HIGH: 4+ elements (please support below)    Moderate / High Support Documentation: ***     Clinical Presentation []  "LOW: stable  [x] MODERATE: Evolving  [] HIGH: Unstable     Decision Making/ Complexity Score: moderate       Goals:  ***    Plan   Plan of care Certification: 5/3/2024 to ***.    Outpatient Physical Therapy {NUMBERS 1-3:73633} times weekly for {0-12:81600::"0"} weeks to include the following interventions: {TX PLAN:66059}, Blood Flow Restriction Training, Trigger Point Dry Needling as needed.      Kevin Fernandez PT, DPT  Board Certified Orthopaedic Clinical Specialist         "

## 2024-05-03 ENCOUNTER — CLINICAL SUPPORT (OUTPATIENT)
Dept: REHABILITATION | Facility: OTHER | Age: 74
End: 2024-05-03
Attending: ORTHOPAEDIC SURGERY
Payer: MEDICARE

## 2024-05-03 DIAGNOSIS — Z74.09 IMPAIRED MOBILITY AND ACTIVITIES OF DAILY LIVING: ICD-10-CM

## 2024-05-03 DIAGNOSIS — M25.662 DECREASED RANGE OF MOTION OF BOTH KNEES: Primary | ICD-10-CM

## 2024-05-03 DIAGNOSIS — M17.0 PRIMARY OSTEOARTHRITIS OF BOTH KNEES: ICD-10-CM

## 2024-05-03 DIAGNOSIS — M25.661 DECREASED RANGE OF MOTION OF BOTH KNEES: Primary | ICD-10-CM

## 2024-05-03 DIAGNOSIS — Z78.9 IMPAIRED MOBILITY AND ACTIVITIES OF DAILY LIVING: ICD-10-CM

## 2024-05-03 PROCEDURE — 97162 PT EVAL MOD COMPLEX 30 MIN: CPT | Mod: HCNC,PN

## 2024-05-03 PROCEDURE — 97530 THERAPEUTIC ACTIVITIES: CPT | Mod: HCNC,PN

## 2024-05-03 PROCEDURE — 97140 MANUAL THERAPY 1/> REGIONS: CPT | Mod: HCNC,PN

## 2024-05-03 NOTE — PLAN OF CARE
[facility]  Physical Therapy Initial Evaluation    Name: Manju Aranda  Clinic Number: [id number]    Therapy Diagnosis:   Encounter Diagnoses   Name Primary?    Primary osteoarthritis of both knees     Decreased range of motion of both knees Yes    Impaired mobility and activities of daily living      Physician: Mack Torre III, *    Physician Orders: PT Eval and Treat   Medical Diagnosis from Referral: Primary osteoarthritis of both knees  Evaluation Date: 5/3/2024  Authorization Period Expiration: 4/26/2024 - 4/26/2025   Plan of Care Expiration: 7/25/2025  Visit # / Visits authorized: 1/ 1  FOTO 1st follow up:  FOTO 2nd follow up:    Time In: 12:00 pm  Time Out: 12:50  pm  Total Billable Time: 50 minutes    Precautions: Standard    Subjective   Date of onset: 3 years+  History of current condition - Manju reports that she has had pain in both of her knees for the past several years (she did not specify exact time). For the past 2 years, she was able to walk around [place] [place] (1.9 miles) 3 times around, 3 days a week, however, in the past 6 months, she has been having more pain than usual and has been hearing more clicking and popping her knees than usual. She reports that standing from a chair and standing for extended periods of time are the most painful. She received a steroid injection in both knees on 4/26/2024. She reports that she takes extra-strength tylenol for the pain which helps a little. The patient has 9-12 steps into the home which she ascends/descends at least once per day. She has not had physical therapy before.        Past Medical History:   Diagnosis Date    Allergy     Anemia     Arthritis     Cholelithiases     Cyst of kidney, acquired     Diverticulitis     Elevated TSH     Family history of Graves' disease: daughter, maternal aunt, maternal uncle 09/13/2016    Glaucoma suspect     Graves disease     Hx of colonic polyps     Hypertension 1981    Liver  cyst     MGUS (monoclonal gammopathy of unknown significance)     Migraine headache     Mitral valve problem     leakage    Obesity     Paraproteinemia     Sleep apnea     + CPAP    Smoldering multiple myeloma 2013    Tricuspid valve disease     leakage     Manju Aranda  has a past surgical history that includes Appendectomy; Hysterectomy (1978 or 1979); Colonoscopy (N/A, 10/23/2015); Colonoscopy (N/A, 11/05/2018); Cystoscopy; and Colonoscopy (N/A, 6/19/2023).    Manju has a current medication list which includes the following prescription(s): ascorbic acid (vitamin c), aspirin, azelastine, cholecalciferol (vitamin d3), magnesium oxide, multivit with min-folic acid, olmesartan, psyllium, rizatriptan, and trazodone, and the following Facility-Administered Medications: lidocaine hcl 2%.    Review of patient's allergies indicates:  No Known Allergies     Imaging, X-Ray:     Narrative & Impression  EXAMINATION:  XR KNEE 1 OR 2 VIEW BILATERAL    CLINICAL HISTORY:  Pain in right knee     TECHNIQUE:  AP standing views of the knees     COMPARISON:  Non 04/17/2024 e     FINDINGS:  DJD with significant narrowing of the lateral tibiofemoral joint spaces.  No fracture or dislocation.  No bone destruction identified     Impression:  See above    Prior Therapy: No  Social History: 73 year-old single female, lives with her sister  Occupation: Retired  Prior Level of Function: Independent ambulator, was able to walk approx 6 miles 3 days per week  Current Level of Function: Independent ambulator, able to walk 4 blocks    Pain:  Current 3/10, worst 5/10, best 1/10   Location: bilateral knee   Description: Aching, Dull, and Deep  Aggravating Factors: Standing, Walking, and Getting out of bed/chair  Easing Factors: pain medication and rest    Pts goals: Patient would like to avoid surgery for as long as possible. She would like to be able to walk with friends in [facility] again and have less pain.     Objective      Posture: Patient demonstrates bilateral knee valgum, trendelenburg on the right    Functional tests:   Bilateral squat: patient hinges at hips and demonstrates less range of motion in the knees    Range of Motion (Active):   Knee Right  Left    Flexion 124 121   Extension -6 -4       Lower Extremity Strength  Right LE  Left LE    Quadriceps: 4/5 Quadriceps: 4/5   Hamstrings: 4/5 Hamstrings: 4-/5   Hip extension:  4-/5 Hip extension: 4-/5   Hip Abduction: 4-/5 Hip Abduction: 4-/5       Joint Mobility:   Medial Patellar Joint Mobility: R 1/6    L  1/6  Lateral Patellar Joint Mobility: R 1/6    L  2/6  Extension Joint Mobility: R 1/6    L  1/6  Flexion Joint Mobility: R 1/6    L  1/6        Intake Outcome Measure for FOTO Survey    Therapist reviewed FOTO scores for Manju Aranda on 5/3/2024.   FOTO documents entered into [employer] - see Media section.    Intake Score: 60%  Predicted Score: 44%         TREATMENT   Treatment Time In: 12:30  Treatment Time Out: 12:50  Total Treatment time separate from Evaluation: 20 minutes    Manju participated in dynamic functional therapeutic activities to improve functional performance for 10  minutes, including:  Heel Prop 3 mins on each side  Heel Slides 2 x 10 on both sides  Bridges 2 x 10  LAQ 3 x 10     Manju received the following manual therapy techniques: Joint mobilizations were applied to both knees for 10 minutes, including:  Medial Patellar Glide Grade III  Lateral Patellar Glide Grade III  Extension Joint Mob Grade III  Flexion Joint Mob Grade III    Home Exercises and Patient Education Provided    Education provided re: prognosis, activity modification, goals for therapy, role of therapy for care, exercises/HEP    Written Home Exercises Provided: yes.  Exercises were reviewed and Manju was able to demonstrate them prior to the end of the session.   Pt received a written copy of exercises to perform at home. Manju demonstrated good  understanding of the  education provided.     See EMR under patient instructions for exercises given.   Assessment   Manju is a 73 y.o. female referred to outpatient Physical Therapy with a medical diagnosis of primary osteoarthritis of both knees. Pt presents with force production deficits, range of motion deficits, and joint mobility limitations resulting in impaired gait, inability to perform activities of daily living, and inability to participate in enjoyable activities.     Pt prognosis is Good.   Pt will benefit from skilled outpatient Physical Therapy to address the deficits stated above and in the chart below, provide pt/family education, and to maximize pt's level of independence.     Plan of care discussed with patient: Yes  Patient's spiritual, cultural and educational needs considered and patient is agreeable to the plan of care and goals as stated below:     Anticipated Barriers for therapy: None    Medical Necessity is demonstrated by the following  History  Co-morbidities and personal factors that may impact the plan of care [] LOW: no personal factors / co-morbidities  [x] MODERATE: 1-2 personal factors / co-morbidities  [] HIGH: 3+ personal factors / co-morbidities    Moderate / High Support Documentation:   Allergy    Anemia    Arthritis    Cholelithiases    Cyst of kidney, acquired    Diverticulitis    Elevated TSH    Family history of Graves' disease: daughter, maternal aunt, maternal uncle 09/13/2016   Glaucoma suspect    Graves disease    Hx of colonic polyps    Hypertension 1981   Liver cyst    MGUS (monoclonal gammopathy of unknown significance)    Migraine headache    Mitral valve problem    leakage   Obesity    Paraproteinemia    Sleep apnea    + CPAP   Smoldering multiple myeloma 2013   Tricuspid valve disease    leakage        Examination  Body Structures and Functions, activity limitations and participation restrictions that may impact the plan of care [] LOW: addressing 1-2 elements  [x] MODERATE: 3+  elements  [] HIGH: 4+ elements (please support below)    Moderate / High Support Documentation:   Range of Motion deficits  Force Production deficits  Joint Mobility Deficits     Clinical Presentation [] LOW: stable  [x] MODERATE: Evolving  [] HIGH: Unstable     Decision Making/ Complexity Score: moderate       Goals:  Short Term Goals (4 weeks):  1. Patient will have improved AROM of the  Bilateral  knees to 0 degrees of extension to demonstrate a more optimal gait pattern.  2. Patient will have decreased complaints of pain to 1/10.  3. Patient will be independent and compliant with issued HEP.  4. Patient will be able to complete 5 times sit to stand test within 2 seconds of age related norms.    Long Term Goals (12 weeks):   Patient will have improved AROM of the  bilateral  knees to +3 degrees of extension to demonstrate a more optimal gait pattern.  2. Patient will have improved strength of the  bilateral  hip extensors to 4+/5 to be able to rise from a chair more efficiently.  3. Patient will be able to resume prior functional level with no deficits.   4. Patient will have overall improvement in condition to have increased score on KOOS to 62% to show clinically important difference in patient perceived functional ability.  5. Pt will improve FOTO limitation score to less than or equal to 44% for improved self perception of functional performance with daily activities.      Plan   Plan of care Certification: 5/3/2024 to 5/19/2024.    Outpatient Physical Therapy 2 times weekly for 12 weeks to include the following interventions: Gait Training, Manual Therapy, Neuromuscular Re-ed, Patient Education, Therapeutic Activities, and Therapeutic Exercise, Blood Flow Restriction Training, Trigger Point Dry Needling as needed.      Kevin Fernandez PT, DPT  Board Certified Orthopaedic Clinical Specialist

## 2024-05-06 ENCOUNTER — CLINICAL SUPPORT (OUTPATIENT)
Dept: REHABILITATION | Facility: OTHER | Age: 74
End: 2024-05-06
Payer: MEDICARE

## 2024-05-06 DIAGNOSIS — M25.662 DECREASED RANGE OF MOTION OF BOTH KNEES: ICD-10-CM

## 2024-05-06 DIAGNOSIS — M25.661 DECREASED RANGE OF MOTION OF BOTH KNEES: ICD-10-CM

## 2024-05-06 DIAGNOSIS — Z74.09 IMPAIRED MOBILITY AND ACTIVITIES OF DAILY LIVING: ICD-10-CM

## 2024-05-06 DIAGNOSIS — M17.0 PRIMARY OSTEOARTHRITIS OF BOTH KNEES: Primary | ICD-10-CM

## 2024-05-06 DIAGNOSIS — Z78.9 IMPAIRED MOBILITY AND ACTIVITIES OF DAILY LIVING: ICD-10-CM

## 2024-05-06 PROCEDURE — 97140 MANUAL THERAPY 1/> REGIONS: CPT | Mod: HCNC,PN

## 2024-05-06 PROCEDURE — 97530 THERAPEUTIC ACTIVITIES: CPT | Mod: HCNC,PN

## 2024-05-06 PROCEDURE — 97112 NEUROMUSCULAR REEDUCATION: CPT | Mod: HCNC,PN

## 2024-05-06 NOTE — PROGRESS NOTES
"[facility] OUTPATIENT THERAPY AND WELLNESS   Physical Therapy Treatment Note      Name: Manju Aranda  Clinic Number: [id number]    Therapy Diagnosis:   Encounter Diagnoses   Name Primary?    Primary osteoarthritis of both knees Yes    Decreased range of motion of both knees     Impaired mobility and activities of daily living      Physician: Mack Torre III, *    Visit Date: 5/6/2024    Physician Orders: PT Eval and Treat   Medical Diagnosis from Referral: Primary osteoarthritis of both knees  Evaluation Date: 5/3/2024  Authorization Period Expiration: 4/26/2024 - 4/26/2025   Plan of Care Expiration: 7/25/2025  Visit # / Visits authorized: 1/20  FOTO 1st follow up:  FOTO 2nd follow up:    PTA Visit #: 0/5     Precautions: standard    Time In: 10:52 am  Time Out: 11:45 am  Total Appointment Time: 53 minutes    Subjective     Patient reports: No pain or soreness after initial evaluation.  She was compliant with home exercise program.  Response to previous treatment: 1st treatment after initial evaluation.  Functional change: too early    Pain: 2/10  Location: bilateral knee      Objective    Asterisk Signs:   Range of Motion (Active):   Knee Right  Left    Flexion 124 121   Extension -6 -4         Lower Extremity Strength  Right LE   Left LE     Quadriceps: 4/5 Quadriceps: 4/5   Hamstrings: 4/5 Hamstrings: 4-/5   Hip extension:  4-/5 Hip extension: 4-/5   Hip Abduction: 4-/5 Hip Abduction: 4-/5         Joint Mobility:   Medial Patellar Joint Mobility: R 1/6    L  1/6  Lateral Patellar Joint Mobility: R 1/6    L  2/6  Extension Joint Mobility: R 1/6    L  1/6  Flexion Joint Mobility: R 1/6    L  1/6    5/6/2024  Left Knee Extension +2  Right Knee Extension -4    Left Knee Flexion 116   Right Knee Flexion 125    5/6/2024  Five Times Sit to Stand Test:  How long the patient takes to completed 5 sit to stand form chair with arms folded across chest: 17 sec  "Inability to perform test in less than 13.6 seconds " "indicates increased disability and Morbidity." (Sneha 2000).  Normative values for community dwelling elderly:   60-70y/o: 11.4 seconds  70-80y/o: 12.6 seconds  80-90y/o: 14.8 seconds    Objective Measures updated at progress report unless specified.     Treatment     Manju received the treatments listed below:      manual therapy techniques: Joint mobilizations were applied to the: bilateral knees for 10 minutes, including:  Medial Patellar Glide Grade III  Lateral Patellar Glide Grade III  Extension Joint Mob Grade III    neuromuscular re-education activities to improve: Balance, Coordination, Kinesthetic, Sense, Proprioception, and Posture for 20 minutes. The following activities were included:  Heel Prop 3 mins on each side  Heel Slides 2 x 10 on both sides  Bridges 3 x 10  LAQ 2 x 10 with 2# on each side, 2 x 10 with 4# on each side    therapeutic activities to improve functional performance for 23 minutes, including:  Standing Knee Extension with orange band 2 x 10 on each side  Standing Hip abduction 3 x 10  Standing Hip Extension 3 x 10  Standing heel raises 3 x 10  Sit to stand 3 x 10 from elevated surface  Step Ups 2 x 20    Not today:  Flexion Joint Mob Grade III    Patient Education and Home Exercises       Education provided:   - Patient was provided education on for continued compliance with HEP for continued functional mobility and strength gains for return to prior level of function.      Written Home Exercises Provided: Patient instructed to cont prior HEP. Exercises were reviewed and Manju was able to demonstrate them prior to the end of the session.  Manju demonstrated good  understanding of the education provided. See Electronic Medical Record under Patient Instructions for exercises provided during therapy sessions    Assessment   Patient presents to the clinic with low symptom irritability pre-session and low irritability post-session. Manual therapy was performed to bilateral knees " to improve joint play and allow for optimal movement during corrective exercises.   Patient demonstrates fatigue when performing sit to stand from elevated surface exercise.   Patient demonstrated improvements in long arc quad exercise within session; she was able to progress from 2lbs to 4 lbs.     Patient will continue to benefit from skilled outpatient physical therapy to address the deficits listed in the problem list box on initial evaluation, provide patient/family education and to maximize patient's level of independence in the home and community environment.     Manju Is progressing well towards her goals.   Patient prognosis is Good.     Patient's spiritual, cultural and educational needs considered and pt agreeable to plan of care and goals.     Anticipated barriers to physical therapy: none    Goals:  Short Term Goals (4 weeks):  1. Patient will have improved AROM of the  Bilateral  knees to 0 degrees of extension to demonstrate a more optimal gait pattern.  2. Patient will have decreased complaints of pain to 1/10.  3. Patient will be independent and compliant with issued HEP.  4. Patient will be able to complete 5 times sit to stand test within 2 seconds of age related norms.     Long Term Goals (12 weeks):   Patient will have improved AROM of the  bilateral  knees to +3 degrees of extension to demonstrate a more optimal gait pattern.  2. Patient will have improved strength of the  bilateral  hip extensors to 4+/5 to be able to rise from a chair more efficiently.  3. Patient will be able to resume prior functional level with no deficits.   4. Patient will have overall improvement in condition to have increased score on KOOS to 62% to show clinically important difference in patient perceived functional ability.  5. Pt will improve FOTO limitation score to less than or equal to 44% for improved self perception of functional performance with daily activities.    Plan     Plan of care Certification:  5/3/2024 to 5/19/2024.     Outpatient Physical Therapy 2 times weekly for 12 weeks to include the following interventions: Gait Training, Manual Therapy, Neuromuscular Re-ed, Patient Education, Therapeutic Activities, and Therapeutic Exercise, Blood Flow Restriction Training, Trigger Point Dry Needling as needed.        Kevin Fernandez PT, DPT  Board Certified Orthopaedic Clinical Specialist

## 2024-05-07 DIAGNOSIS — D47.2 SMOLDERING MULTIPLE MYELOMA (SMM): Primary | ICD-10-CM

## 2024-05-10 ENCOUNTER — CLINICAL SUPPORT (OUTPATIENT)
Dept: REHABILITATION | Facility: OTHER | Age: 74
End: 2024-05-10
Payer: MEDICARE

## 2024-05-10 DIAGNOSIS — Z74.09 IMPAIRED MOBILITY AND ACTIVITIES OF DAILY LIVING: ICD-10-CM

## 2024-05-10 DIAGNOSIS — M25.662 DECREASED RANGE OF MOTION OF BOTH KNEES: ICD-10-CM

## 2024-05-10 DIAGNOSIS — M17.0 PRIMARY OSTEOARTHRITIS OF BOTH KNEES: Primary | ICD-10-CM

## 2024-05-10 DIAGNOSIS — M25.661 DECREASED RANGE OF MOTION OF BOTH KNEES: ICD-10-CM

## 2024-05-10 DIAGNOSIS — Z78.9 IMPAIRED MOBILITY AND ACTIVITIES OF DAILY LIVING: ICD-10-CM

## 2024-05-10 PROCEDURE — 97530 THERAPEUTIC ACTIVITIES: CPT | Mod: HCNC,PN

## 2024-05-10 PROCEDURE — 97112 NEUROMUSCULAR REEDUCATION: CPT | Mod: HCNC,PN

## 2024-05-10 PROCEDURE — 97140 MANUAL THERAPY 1/> REGIONS: CPT | Mod: HCNC,PN

## 2024-05-13 ENCOUNTER — PATIENT MESSAGE (OUTPATIENT)
Dept: HEMATOLOGY/ONCOLOGY | Facility: CLINIC | Age: 74
End: 2024-05-13
Payer: MEDICARE

## 2024-05-13 ENCOUNTER — CLINICAL SUPPORT (OUTPATIENT)
Dept: REHABILITATION | Facility: OTHER | Age: 74
End: 2024-05-13
Payer: MEDICARE

## 2024-05-13 ENCOUNTER — TELEPHONE (OUTPATIENT)
Dept: HEMATOLOGY/ONCOLOGY | Facility: CLINIC | Age: 74
End: 2024-05-13
Payer: MEDICARE

## 2024-05-13 ENCOUNTER — TELEPHONE (OUTPATIENT)
Dept: CARDIOTHORACIC SURGERY | Facility: CLINIC | Age: 74
End: 2024-05-13
Payer: MEDICARE

## 2024-05-13 DIAGNOSIS — Z78.9 IMPAIRED MOBILITY AND ACTIVITIES OF DAILY LIVING: ICD-10-CM

## 2024-05-13 DIAGNOSIS — M25.662 DECREASED RANGE OF MOTION OF BOTH KNEES: ICD-10-CM

## 2024-05-13 DIAGNOSIS — M17.0 PRIMARY OSTEOARTHRITIS OF BOTH KNEES: Primary | ICD-10-CM

## 2024-05-13 DIAGNOSIS — Z74.09 IMPAIRED MOBILITY AND ACTIVITIES OF DAILY LIVING: ICD-10-CM

## 2024-05-13 DIAGNOSIS — M25.661 DECREASED RANGE OF MOTION OF BOTH KNEES: ICD-10-CM

## 2024-05-13 PROCEDURE — 97112 NEUROMUSCULAR REEDUCATION: CPT | Mod: HCNC,PN

## 2024-05-13 PROCEDURE — 97530 THERAPEUTIC ACTIVITIES: CPT | Mod: HCNC,PN

## 2024-05-13 NOTE — TELEPHONE ENCOUNTER
Called pt and left brief VM to give a call back to schedule follow up with Dr. Demarco .  Portal message sent over to pt to respond back.    Direct contact no was provided to call if pt prefer to call back.

## 2024-05-13 NOTE — PROGRESS NOTES
[facility]   Physical Therapy Treatment Note      Name: Manju Aranda  Clinic Number: [id number]    Therapy Diagnosis:   Encounter Diagnoses   Name Primary?    Primary osteoarthritis of both knees Yes    Decreased range of motion of both knees     Impaired mobility and activities of daily living          Physician: Mack Torre III, *    Visit Date: 5/13/2024    Physician Orders: PT Eval and Treat   Medical Diagnosis from Referral: Primary osteoarthritis of both knees  Evaluation Date: 5/3/2024  Authorization Period Expiration: 4/26/2024 - 4/26/2025   Plan of Care Expiration: 7/25/2025  Visit # / Visits authorized: 2/20  FOTO 1st follow up:  FOTO 2nd follow up:    PTA Visit #: 0/5     Precautions: standard    Time In: 9:54 am  Time Out: 10:44 am  Total Appointment Time: 50 minutes    Subjective     Patient reports: Patient reports some soreness after last visit which when away after several hours. Patient reports taking Tylenol to relieve soreness.  She was compliant with home exercise program.  Response to previous treatment: Some soreness reported  Functional change: too early    Pain: 2/10  Location: bilateral knee      Objective    Asterisk Signs:   Range of Motion (Active):   Knee Right  Left    Flexion 124 121   Extension -6 -4         Lower Extremity Strength  Right LE   Left LE     Quadriceps: 4/5 Quadriceps: 4/5   Hamstrings: 4/5 Hamstrings: 4-/5   Hip extension:  4-/5 Hip extension: 4-/5   Hip Abduction: 4-/5 Hip Abduction: 4-/5         Joint Mobility:   Medial Patellar Joint Mobility: R 1/6    L  1/6  Lateral Patellar Joint Mobility: R 1/6    L  2/6  Extension Joint Mobility: R 1/6    L  1/6  Flexion Joint Mobility: R 1/6    L  1/6    5/6/2024  Left Knee Extension +2  Right Knee Extension -4    Left Knee Flexion 116   Right Knee Flexion 125    5/6/2024  Five Times Sit to Stand Test:  How long the patient takes to completed 5 sit to stand form chair with arms folded  "across chest: 17 sec  "Inability to perform test in less than 13.6 seconds indicates increased disability and Morbidity." (Sneha 2000).  Normative values for community dwelling elderly:   60-68y/o: 11.4 seconds  70-80y/o: 12.6 seconds  80-90y/o: 14.8 seconds    Objective Measures updated at progress report unless specified.     Treatment     Manju received the treatments listed below:      manual therapy techniques: Joint mobilizations were applied to the: bilateral knees for 10 minutes, including:  Medial Patellar Glide Grade III  Lateral Patellar Glide Grade III  Extension Joint Mob Grade III    neuromuscular re-education activities to improve: Balance, Coordination, Kinesthetic, Sense, Proprioception, and Posture for 17 minutes. The following activities were included:  Heel Prop 3 mins on each side  Heel Slides 2 x 10 on both sides  LAQ 3 x 12 with 4# on each side  Bridges 3 x 10    therapeutic activities to improve functional performance for 23 minutes, including:  Recumbent Bicycle 5 mins level 1.6  Shuttle Squats 2 mins with 1 black cord, 1 red cord  Shuttle Squats 3 x 10 with 2 black cords, 1 red cord  Standing Hip abduction 3 x 10  Standing heel raises 3 x 10  Step Ups 3 x 12 from 4 inch step  Sit to stand 3 x 12 from elevated surface    Not today:  Flexion Joint Mob Grade III  Standing Knee Extension with orange band 2 x 10 on each side  Standing Hip Extension 3 x 10    Patient Education and Home Exercises       Education provided:   - Patient was provided education on for continued compliance with HEP for continued functional mobility and strength gains for return to prior level of function.      Written Home Exercises Provided: Patient instructed to cont prior HEP. Exercises were reviewed and Manju was able to demonstrate them prior to the end of the session.  Manju demonstrated good understanding of the education provided. See Electronic Medical Record under Patient Instructions for exercises " provided during therapy sessions    Assessment   Patient presents to the clinic with low symptom irritability pre-session and low irritability post-session. Manual therapy was performed to bilateral knees to improve joint play and allow for optimal movement during corrective exercises.   Patient demonstrates improvement when performing sit to stand from elevated surface exercise; she is able to tolerate more repetitions.  Patient demonstrated improvements when using the recumbent bicycle; she is able to tolerate more flexion during the exercise.       Patient will continue to benefit from skilled outpatient physical therapy to address the deficits listed in the problem list box on initial evaluation, provide patient/family education and to maximize patient's level of independence in the home and community environment.     Manju Is progressing well towards her goals.   Patient prognosis is Good.     Patient's spiritual, cultural and educational needs considered and pt agreeable to plan of care and goals.     Anticipated barriers to physical therapy: none    Goals:  Short Term Goals (4 weeks):  1. Patient will have improved AROM of the  Bilateral  knees to 0 degrees of extension to demonstrate a more optimal gait pattern.  2. Patient will have decreased complaints of pain to 1/10.  3. Patient will be independent and compliant with issued HEP.  4. Patient will be able to complete 5 times sit to stand test within 2 seconds of age related norms.     Long Term Goals (12 weeks):   Patient will have improved AROM of the  bilateral  knees to +3 degrees of extension to demonstrate a more optimal gait pattern.  2. Patient will have improved strength of the  bilateral  hip extensors to 4+/5 to be able to rise from a chair more efficiently.  3. Patient will be able to resume prior functional level with no deficits.   4. Patient will have overall improvement in condition to have increased score on KOOS to 62% to show  clinically important difference in patient perceived functional ability.  5. Pt will improve FOTO limitation score to less than or equal to 44% for improved self perception of functional performance with daily activities.    Plan     Plan of care Certification: 5/3/2024 to 5/19/2024.     Outpatient Physical Therapy 2 times weekly for 12 weeks to include the following interventions: Gait Training, Manual Therapy, Neuromuscular Re-ed, Patient Education, Therapeutic Activities, and Therapeutic Exercise, Blood Flow Restriction Training, Trigger Point Dry Needling as needed.        Kevin Fernandez PT, DPT  Board Certified Orthopaedic Clinical Specialist

## 2024-05-14 ENCOUNTER — TELEPHONE (OUTPATIENT)
Dept: HEMATOLOGY/ONCOLOGY | Facility: CLINIC | Age: 74
End: 2024-05-14
Payer: MEDICARE

## 2024-05-16 NOTE — PROGRESS NOTES
"[facility]   Physical Therapy Treatment Note      Name: Manju Aranda  Clinic Number: [id number]    Therapy Diagnosis:   Encounter Diagnoses   Name Primary?    Primary osteoarthritis of both knees Yes    Decreased range of motion of both knees     Impaired mobility and activities of daily living            Physician: Mack Torre III, *    Visit Date: 5/17/2024    Physician Orders: PT Eval and Treat   Medical Diagnosis from Referral: Primary osteoarthritis of both knees  Evaluation Date: 5/3/2024  Authorization Period Expiration: 4/26/2024 - 4/26/2025   Plan of Care Expiration: 7/25/2025  Visit # / Visits authorized: 4/20  FOTO 1st follow up:  FOTO 2nd follow up:    PTA Visit #: 0/5     Precautions: standard    Time In: 10:00 am  Time Out: 10:50 am  Total Appointment Time: 30 minutes (Dbl Book)    Subjective     Patient reports: Patient reports no pain or soreness in her knees after last visit or otherwise.   She was compliant with home exercise program.  Response to previous treatment: Some soreness reported  Functional change: too early    Pain: 2/10  Location: bilateral knee      Objective    Asterisk Signs:   Range of Motion (Active):   Knee Right  Left    Flexion 124 121   Extension -6 -4         Lower Extremity Strength  Right LE   Left LE     Quadriceps: 4/5 Quadriceps: 4/5   Hamstrings: 4/5 Hamstrings: 4-/5   Hip extension:  4-/5 Hip extension: 4-/5   Hip Abduction: 4-/5 Hip Abduction: 4-/5         Joint Mobility:   Medial Patellar Joint Mobility: R 1/6    L  1/6  Lateral Patellar Joint Mobility: R 1/6    L  2/6  Extension Joint Mobility: R 1/6    L  1/6  Flexion Joint Mobility: R 1/6    L  1/6    5/6/2024  Left Knee Extension +2  Right Knee Extension -4    Left Knee Flexion 116   Right Knee Flexion 125    5/6/2024  Five Times Sit to Stand Test:  How long the patient takes to completed 5 sit to stand form chair with arms folded across chest: 17 sec  "Inability to perform " "test in less than 13.6 seconds indicates increased disability and Morbidity." (Sneha 2000).  Normative values for community dwelling elderly:   60-68y/o: 11.4 seconds  70-80y/o: 12.6 seconds  80-88y/o: 14.8 seconds    Objective Measures updated at progress report unless specified.     Treatment     Manju received the treatments listed below:      manual therapy techniques: Joint mobilizations were applied to the: bilateral knees for 10 minutes, including:  Medial Patellar Glide Grade III  Lateral Patellar Glide Grade III  Extension Joint Mob Grade III    neuromuscular re-education activities to improve: Balance, Coordination, Kinesthetic, Sense, Proprioception, and Posture for 17 minutes. The following activities were included:  Heel Prop 3 mins on each side  Heel Slides 2 x 10 on both sides  LAQ 3 x 12 with 4# on each side  Bridges 3 x 10  Straight Leg Raise 3 x 10 on each side    therapeutic activities to improve functional performance for 23 minutes, including:  Recumbent Bicycle 5 mins level 1.6  Shuttle Squats 2 mins with 1 black cord, 1 red cord  Shuttle Squats 3 x 10 with 2 black cords, 1 red cord  Standing Hip abduction 3 x 10  Standing heel raises 3 x 10  Step Ups 3 x 12 from 4 inch step  Sit to stand 3 x 12 from elevated surface    Not today:  Flexion Joint Mob Grade III  Standing Knee Extension with orange band 2 x 10 on each side  Standing Hip Extension 3 x 10    Patient Education and Home Exercises       Education provided:   - Patient was provided education on for continued compliance with HEP for continued functional mobility and strength gains for return to prior level of function.      Written Home Exercises Provided: Patient instructed to cont prior HEP. Exercises were reviewed and Manju was able to demonstrate them prior to the end of the session.  Manju demonstrated good understanding of the education provided. See Electronic Medical Record under Patient Instructions for exercises " provided during therapy sessions    Assessment   Patient presents to the clinic with low symptom irritability pre-session and low irritability post-session. Manual therapy was performed to bilateral knees to improve joint play and allow for optimal movement during corrective exercises.   Patient demonstrates improvement when performing sit to stand from elevated surface exercise; she is able to tolerate more repetitions.  Patient demonstrated improvements when using the recumbent bicycle; she is able to tolerate more flexion during the exercise.    Patient will continue to benefit from skilled outpatient physical therapy to address the deficits listed in the problem list box on initial evaluation, provide patient/family education and to maximize patient's level of independence in the home and community environment.     Manju Is progressing well towards her goals.   Patient prognosis is Good.     Patient's spiritual, cultural and educational needs considered and pt agreeable to plan of care and goals.     Anticipated barriers to physical therapy: none    Goals:  Short Term Goals (4 weeks):  1. Patient will have improved AROM of the  Bilateral  knees to 0 degrees of extension to demonstrate a more optimal gait pattern.  2. Patient will have decreased complaints of pain to 1/10.  3. Patient will be independent and compliant with issued HEP.  4. Patient will be able to complete 5 times sit to stand test within 2 seconds of age related norms.     Long Term Goals (12 weeks):   Patient will have improved AROM of the  bilateral  knees to +3 degrees of extension to demonstrate a more optimal gait pattern.  2. Patient will have improved strength of the  bilateral  hip extensors to 4+/5 to be able to rise from a chair more efficiently.  3. Patient will be able to resume prior functional level with no deficits.   4. Patient will have overall improvement in condition to have increased score on KOOS to 62% to show clinically  important difference in patient perceived functional ability.  5. Pt will improve FOTO limitation score to less than or equal to 44% for improved self perception of functional performance with daily activities.    Plan     Plan of care Certification: 5/3/2024 to 7/19/2024.     Outpatient Physical Therapy 2 times weekly for 12 weeks to include the following interventions: Gait Training, Manual Therapy, Neuromuscular Re-ed, Patient Education, Therapeutic Activities, and Therapeutic Exercise, Blood Flow Restriction Training, Trigger Point Dry Needling as needed.        Kevin Fernandez PT, DPT  Board Certified Orthopaedic Clinical Specialist

## 2024-05-17 ENCOUNTER — CLINICAL SUPPORT (OUTPATIENT)
Dept: REHABILITATION | Facility: OTHER | Age: 74
End: 2024-05-17
Payer: MEDICARE

## 2024-05-17 DIAGNOSIS — M17.0 PRIMARY OSTEOARTHRITIS OF BOTH KNEES: Primary | ICD-10-CM

## 2024-05-17 DIAGNOSIS — M25.662 DECREASED RANGE OF MOTION OF BOTH KNEES: ICD-10-CM

## 2024-05-17 DIAGNOSIS — Z74.09 IMPAIRED MOBILITY AND ACTIVITIES OF DAILY LIVING: ICD-10-CM

## 2024-05-17 DIAGNOSIS — M25.661 DECREASED RANGE OF MOTION OF BOTH KNEES: ICD-10-CM

## 2024-05-17 DIAGNOSIS — Z78.9 IMPAIRED MOBILITY AND ACTIVITIES OF DAILY LIVING: ICD-10-CM

## 2024-05-17 PROCEDURE — 97112 NEUROMUSCULAR REEDUCATION: CPT | Mod: KX,HCNC,PN

## 2024-05-17 PROCEDURE — 97530 THERAPEUTIC ACTIVITIES: CPT | Mod: KX,HCNC,PN

## 2024-05-22 ENCOUNTER — HOSPITAL ENCOUNTER (OUTPATIENT)
Dept: CARDIOLOGY | Facility: OTHER | Age: 74
Discharge: HOME | End: 2024-05-22
Attending: STUDENT IN AN ORGANIZED HEALTH CARE EDUCATION/TRAINING PROGRAM
Payer: MEDICARE

## 2024-05-22 VITALS
WEIGHT: 263 LBS | BODY MASS INDEX: 39.86 KG/M2 | SYSTOLIC BLOOD PRESSURE: 122 MMHG | HEIGHT: 68 IN | HEART RATE: 79 BPM | DIASTOLIC BLOOD PRESSURE: 80 MMHG

## 2024-05-22 DIAGNOSIS — I71.9 AORTIC ANEURYSM, UNSPECIFIED PORTION OF AORTA, UNSPECIFIED WHETHER RUPTURED: ICD-10-CM

## 2024-05-22 LAB
ASCENDING AORTA: 3.63 CM
AV INDEX (PROSTH): 0.62
AV MEAN GRADIENT: 7 MMHG
AV PEAK GRADIENT: 12 MMHG
AV REGURGITATION PRESSURE HALF TIME: 463 MS
AV VALVE AREA BY VELOCITY RATIO: 2.64 CM²
AV VALVE AREA: 2.49 CM²
AV VELOCITY RATIO: 0.66
BSA FOR ECHO PROCEDURE: 2.39 M2
CV ECHO LV RWT: 0.4 CM
DOP CALC AO PEAK VEL: 1.73 M/S
DOP CALC AO VTI: 30.8 CM
DOP CALC LVOT AREA: 4 CM2
DOP CALC LVOT DIAMETER: 2.26 CM
DOP CALC LVOT PEAK VEL: 1.14 M/S
DOP CALC LVOT STROKE VOLUME: 76.58 CM3
DOP CALC RVOT PEAK VEL: 0.91 M/S
DOP CALC RVOT VTI: 15.7 CM
DOP CALCLVOT PEAK VEL VTI: 19.1 CM
E WAVE DECELERATION TIME: 278.22 MSEC
E/A RATIO: 0.56
E/E' RATIO: 8 M/S
ECHO LV POSTERIOR WALL: 1.01 CM (ref 0.6–1.1)
FRACTIONAL SHORTENING: 27 % (ref 28–44)
INTERVENTRICULAR SEPTUM: 1.01 CM (ref 0.6–1.1)
IVC DIAMETER: 1.51 CM
IVRT: 129.4 MSEC
LA MAJOR: 5.02 CM
LA MINOR: 6.13 CM
LA WIDTH: 4.3 CM
LEFT ATRIUM SIZE: 4.28 CM
LEFT ATRIUM VOLUME INDEX MOD: 27.5 ML/M2
LEFT ATRIUM VOLUME INDEX: 37.5 ML/M2
LEFT ATRIUM VOLUME MOD: 63.32 CM3
LEFT ATRIUM VOLUME: 86.35 CM3
LEFT INTERNAL DIMENSION IN SYSTOLE: 3.65 CM (ref 2.1–4)
LEFT VENTRICLE DIASTOLIC VOLUME INDEX: 51.73 ML/M2
LEFT VENTRICLE DIASTOLIC VOLUME: 118.98 ML
LEFT VENTRICLE MASS INDEX: 80 G/M2
LEFT VENTRICLE SYSTOLIC VOLUME INDEX: 24.4 ML/M2
LEFT VENTRICLE SYSTOLIC VOLUME: 56.18 ML
LEFT VENTRICULAR INTERNAL DIMENSION IN DIASTOLE: 5.01 CM (ref 3.5–6)
LEFT VENTRICULAR MASS: 185.04 G
LV LATERAL E/E' RATIO: 6 M/S
LV SEPTAL E/E' RATIO: 12 M/S
LVOT MG: 2.6 MMHG
LVOT MV: 0.76 CM/S
MV PEAK A VEL: 0.86 M/S
MV PEAK E VEL: 0.48 M/S
MV STENOSIS PRESSURE HALF TIME: 80.68 MS
MV VALVE AREA P 1/2 METHOD: 2.73 CM2
OHS CV RV/LV RATIO: 0.68 CM
PISA AR MAX VEL: 4.4 M/S
PISA MRMAX VEL: 5.36 M/S
PISA TR MAX VEL: 2.19 M/S
PULM VEIN S/D RATIO: 1.5
PV MEAN GRADIENT: 2 MMHG
PV PEAK D VEL: 0.36 M/S
PV PEAK GRADIENT: 4 MMHG
PV PEAK S VEL: 0.54 M/S
PV PEAK VELOCITY: 1.03 M/S
RA MAJOR: 5.94 CM
RA PRESSURE ESTIMATED: 3 MMHG
RA WIDTH: 3.5 CM
RIGHT VENTRICULAR END-DIASTOLIC DIMENSION: 3.4 CM
RV TB RVSP: 5 MMHG
RV TISSUE DOPPLER FREE WALL SYSTOLIC VELOCITY 1 (APICAL 4 CHAMBER VIEW): 12.94 CM/S
SINUS: 3.2 CM
STJ: 3.37 CM
TDI LATERAL: 0.08 M/S
TDI SEPTAL: 0.04 M/S
TDI: 0.06 M/S
TR MAX PG: 19 MMHG
TRICUSPID ANNULAR PLANE SYSTOLIC EXCURSION: 2.2 CM
TV REST PULMONARY ARTERY PRESSURE: 22 MMHG
Z-SCORE OF LEFT VENTRICULAR DIMENSION IN END DIASTOLE: -5.76
Z-SCORE OF LEFT VENTRICULAR DIMENSION IN END SYSTOLE: -3.06

## 2024-05-22 PROCEDURE — 93306 TTE W/DOPPLER COMPLETE: CPT | Mod: 26,HCNC,, | Performed by: INTERNAL MEDICINE

## 2024-05-22 PROCEDURE — 93306 TTE W/DOPPLER COMPLETE: CPT | Mod: HCNC

## 2024-05-24 ENCOUNTER — TELEPHONE (OUTPATIENT)
Dept: HEPATOLOGY | Facility: CLINIC | Age: 74
End: 2024-05-24
Payer: MEDICARE

## 2024-05-24 NOTE — TELEPHONE ENCOUNTER
I called the pt and spoke to her and I did reschedule her on 8/6/24 at 2:00 pm.Send schedule by mail.

## 2024-05-24 NOTE — TELEPHONE ENCOUNTER
----- Message from Sole Russo sent at 5/24/2024  9:49 AM CDT -----  Regarding: Appt Access  Contact: [phone]  CONSULT/ADVISORY    Name of Caller: KEESHA DUARTE [[id number]]    Contact Preference:   [phone]    Nature of Call: Pt is requesting appt on 06/20/2024 at 11am be changed to around 1pm.  No appts avail.  Next appt is 08/2024, pt declined.  Please call.

## 2024-05-24 NOTE — PROGRESS NOTES
"[facility]   Physical Therapy Treatment Note      Name: Manju Aranda  Clinic Number: [id number]    Therapy Diagnosis:   Encounter Diagnoses   Name Primary?    Primary osteoarthritis of both knees Yes    Decreased range of motion of both knees     Impaired mobility and activities of daily living        Physician: Mack Torre III, *    Visit Date: 2024    Physician Orders: PT Eval and Treat   Medical Diagnosis from Referral: Primary osteoarthritis of both knees  Evaluation Date: 5/3/2024  Authorization Period Expiration: 2024 - 2025   Plan of Care Expiration: 2025  Visit # / Visits authorized:   FOTO 1st follow up:  FOTO 2nd follow up:    PTA Visit #:      Precautions: standard    Time In: 9:40 am  Time Out: 10:33 am  Total Appointment Time: 53 minutes    Subjective     Patient reports: that she was on the road traveling to a wedding in [place] last week. Did a lot of walking and caught a "bug" and felt a little ill last week.   Patient will need a short 1-2 week leave of absence from physical therapy in  to care for her daughter/ grandchild.  She was compliant with home exercise program.  Response to previous treatment: Some soreness reported  Functional change: too early    Pain: 2/10  Location: bilateral knee      Objective    Asterisk Signs:   Range of Motion (Active):   Knee Right  Left    Flexion 124 121   Extension -6 -4         Lower Extremity Strength  Right LE   Left LE     Quadriceps: 4/5 Quadriceps: 4/5   Hamstrings: 4/5 Hamstrings: 4-/5   Hip extension:  4-/5 Hip extension: 4-/5   Hip Abduction: 4-/5 Hip Abduction: 4-/5         Joint Mobility:   Medial Patellar Joint Mobility: R 1/6    L  1/6  Lateral Patellar Joint Mobility: R 1/6    L  2/6  Extension Joint Mobility: R 1/6    L  1/6  Flexion Joint Mobility: R 1/6    L  1/6    2024  Left Knee Extension +2  Right Knee Extension -4    Left Knee Flexion 116   Right Knee Flexion " "125    5/6/2024  Five Times Sit to Stand Test:  How long the patient takes to completed 5 sit to stand form chair with arms folded across chest: 17 sec  "Inability to perform test in less than 13.6 seconds indicates increased disability and Morbidity." (Sneha 2000).  Normative values for community dwelling elderly:   60-70y/o: 11.4 seconds  70-78y/o: 12.6 seconds  80-88y/o: 14.8 seconds    Objective Measures updated at progress report unless specified.     Treatment     Manju received the treatments listed below:      manual therapy techniques: Joint mobilizations were applied to the: bilateral knees for 10 minutes, including:  Medial Patellar Glide Grade III  Lateral Patellar Glide Grade III  Extension Joint Mob Grade III  Flexion Joint Mob Grade III    neuromuscular re-education activities to improve: Balance, Coordination, Kinesthetic, Sense, Proprioception, and Posture for 20 minutes. The following activities were included:  Heel Prop 3 mins on each side  Heel Slides 2 x 10 on both sides  LAQ 3 x 12 with 5# on each side  Bridges 3 x 10    therapeutic activities to improve functional performance for 23 minutes, including:  Recumbent Bicycle 5 mins level 1.6  Shuttle Squats 2 mins with 1 black cord, 1 red cord  Lateral Walks 3 laps Medium Band   Standing heel raises 3 x 10  Step Ups 3 x 12 from 4 inch step  Sit to stand 3 x 12 from elevated surface  Standing Knee Extension with orange band 2 x 10 on each side  Standing Hip Extension 3 x 10    Patient Education and Home Exercises       Education provided:   - Patient was provided education on for continued compliance with HEP for continued functional mobility and strength gains for return to prior level of function.      Written Home Exercises Provided: Patient instructed to cont prior HEP. Exercises were reviewed and Manju was able to demonstrate them prior to the end of the session.  Manju demonstrated good understanding of the education provided. See " Electronic Medical Record under Patient Instructions for exercises provided during therapy sessions    Assessment   Patient presents to the clinic with low symptom irritability pre-session and low irritability post-session. Manual therapy was performed to bilateral knees to improve joint play and allow for optimal movement during corrective exercises.   Patient demonstrates improvement when performing sit to stand from elevated surface exercise; she is able to tolerate more repetitions.  Patient demonstrated improvements when using the recumbent bicycle; she is able to tolerate more flexion during the exercise.       Patient will continue to benefit from skilled outpatient physical therapy to address the deficits listed in the problem list box on initial evaluation, provide patient/family education and to maximize patient's level of independence in the home and community environment.     Manju Is progressing well towards her goals.   Patient prognosis is Good.     Patient's spiritual, cultural and educational needs considered and pt agreeable to plan of care and goals.     Anticipated barriers to physical therapy: none    Goals:  Short Term Goals (4 weeks):  1. Patient will have improved AROM of the  Bilateral  knees to 0 degrees of extension to demonstrate a more optimal gait pattern. MET  2. Patient will have decreased complaints of pain to 1/10.MET  3. Patient will be independent and compliant with issued HEP.MET  4. Patient will be able to complete 5 times sit to stand test within 2 seconds of age related norms.     Long Term Goals (12 weeks):   Patient will have improved AROM of the  bilateral  knees to +3 degrees of extension to demonstrate a more optimal gait pattern.  2. Patient will have improved strength of the  bilateral  hip extensors to 4+/5 to be able to rise from a chair more efficiently.  3. Patient will be able to resume prior functional level with no deficits.   4. Patient will have overall  improvement in condition to have increased score on KOOS to 62% to show clinically important difference in patient perceived functional ability.  5. Pt will improve FOTO limitation score to less than or equal to 44% for improved self perception of functional performance with daily activities.    Plan     Plan of care Certification: 5/3/2024 to 5/19/2024.     Outpatient Physical Therapy 2 times weekly for 12 weeks to include the following interventions: Gait Training, Manual Therapy, Neuromuscular Re-ed, Patient Education, Therapeutic Activities, and Therapeutic Exercise, Blood Flow Restriction Training, Trigger Point Dry Needling as needed.        Kevin Fernandez PT, DPT  Board Certified Orthopaedic Clinical Specialist

## 2024-05-27 ENCOUNTER — CLINICAL SUPPORT (OUTPATIENT)
Dept: REHABILITATION | Facility: OTHER | Age: 74
End: 2024-05-27
Payer: MEDICARE

## 2024-05-27 ENCOUNTER — TELEPHONE (OUTPATIENT)
Dept: CARDIOTHORACIC SURGERY | Facility: CLINIC | Age: 74
End: 2024-05-27
Payer: MEDICARE

## 2024-05-27 ENCOUNTER — OFFICE VISIT (OUTPATIENT)
Dept: CARDIOTHORACIC SURGERY | Facility: CLINIC | Age: 74
End: 2024-05-27
Payer: MEDICARE

## 2024-05-27 VITALS
DIASTOLIC BLOOD PRESSURE: 56 MMHG | BODY MASS INDEX: 39.74 KG/M2 | SYSTOLIC BLOOD PRESSURE: 106 MMHG | HEIGHT: 68 IN | OXYGEN SATURATION: 96 % | HEART RATE: 88 BPM | WEIGHT: 262.25 LBS

## 2024-05-27 DIAGNOSIS — M17.0 PRIMARY OSTEOARTHRITIS OF BOTH KNEES: Primary | ICD-10-CM

## 2024-05-27 DIAGNOSIS — M25.661 DECREASED RANGE OF MOTION OF BOTH KNEES: ICD-10-CM

## 2024-05-27 DIAGNOSIS — Z78.9 IMPAIRED MOBILITY AND ACTIVITIES OF DAILY LIVING: ICD-10-CM

## 2024-05-27 DIAGNOSIS — M25.662 DECREASED RANGE OF MOTION OF BOTH KNEES: ICD-10-CM

## 2024-05-27 DIAGNOSIS — Z74.09 IMPAIRED MOBILITY AND ACTIVITIES OF DAILY LIVING: ICD-10-CM

## 2024-05-27 DIAGNOSIS — I71.21 ANEURYSM OF ASCENDING AORTA WITHOUT RUPTURE: Primary | ICD-10-CM

## 2024-05-27 PROCEDURE — 3074F SYST BP LT 130 MM HG: CPT | Mod: HCNC,CPTII,S$GLB, | Performed by: THORACIC SURGERY (CARDIOTHORACIC VASCULAR SURGERY)

## 2024-05-27 PROCEDURE — 99999 PR PBB SHADOW E&M-EST. PATIENT-LVL III: CPT | Mod: PBBFAC,HCNC,, | Performed by: THORACIC SURGERY (CARDIOTHORACIC VASCULAR SURGERY)

## 2024-05-27 PROCEDURE — 3066F NEPHROPATHY DOC TX: CPT | Mod: HCNC,CPTII,S$GLB, | Performed by: THORACIC SURGERY (CARDIOTHORACIC VASCULAR SURGERY)

## 2024-05-27 PROCEDURE — 97112 NEUROMUSCULAR REEDUCATION: CPT | Mod: HCNC,PN

## 2024-05-27 PROCEDURE — 97530 THERAPEUTIC ACTIVITIES: CPT | Mod: HCNC,PN

## 2024-05-27 PROCEDURE — 3078F DIAST BP <80 MM HG: CPT | Mod: HCNC,CPTII,S$GLB, | Performed by: THORACIC SURGERY (CARDIOTHORACIC VASCULAR SURGERY)

## 2024-05-27 PROCEDURE — 3008F BODY MASS INDEX DOCD: CPT | Mod: HCNC,CPTII,S$GLB, | Performed by: THORACIC SURGERY (CARDIOTHORACIC VASCULAR SURGERY)

## 2024-05-27 PROCEDURE — 97140 MANUAL THERAPY 1/> REGIONS: CPT | Mod: HCNC,PN

## 2024-05-27 PROCEDURE — 1159F MED LIST DOCD IN RCRD: CPT | Mod: HCNC,CPTII,S$GLB, | Performed by: THORACIC SURGERY (CARDIOTHORACIC VASCULAR SURGERY)

## 2024-05-27 PROCEDURE — 99215 OFFICE O/P EST HI 40 MIN: CPT | Mod: HCNC,S$GLB,, | Performed by: THORACIC SURGERY (CARDIOTHORACIC VASCULAR SURGERY)

## 2024-05-27 PROCEDURE — 3044F HG A1C LEVEL LT 7.0%: CPT | Mod: HCNC,CPTII,S$GLB, | Performed by: THORACIC SURGERY (CARDIOTHORACIC VASCULAR SURGERY)

## 2024-05-27 PROCEDURE — 3288F FALL RISK ASSESSMENT DOCD: CPT | Mod: HCNC,CPTII,S$GLB, | Performed by: THORACIC SURGERY (CARDIOTHORACIC VASCULAR SURGERY)

## 2024-05-27 PROCEDURE — 1101F PT FALLS ASSESS-DOCD LE1/YR: CPT | Mod: HCNC,CPTII,S$GLB, | Performed by: THORACIC SURGERY (CARDIOTHORACIC VASCULAR SURGERY)

## 2024-05-27 PROCEDURE — 4010F ACE/ARB THERAPY RXD/TAKEN: CPT | Mod: HCNC,CPTII,S$GLB, | Performed by: THORACIC SURGERY (CARDIOTHORACIC VASCULAR SURGERY)

## 2024-05-27 PROCEDURE — 1160F RVW MEDS BY RX/DR IN RCRD: CPT | Mod: HCNC,CPTII,S$GLB, | Performed by: THORACIC SURGERY (CARDIOTHORACIC VASCULAR SURGERY)

## 2024-05-27 PROCEDURE — 3061F NEG MICROALBUMINURIA REV: CPT | Mod: HCNC,CPTII,S$GLB, | Performed by: THORACIC SURGERY (CARDIOTHORACIC VASCULAR SURGERY)

## 2024-05-27 RX ORDER — PROMETHAZINE HYDROCHLORIDE 6.25 MG/5ML
5 SYRUP ORAL EVERY 8 HOURS PRN
COMMUNITY
Start: 2024-02-27

## 2024-05-27 RX ORDER — METRONIDAZOLE 500 MG/1
500 TABLET ORAL ONCE
COMMUNITY
Start: 2024-02-19

## 2024-05-27 NOTE — TELEPHONE ENCOUNTER
Pt called to schedule TAA f/u with Dr. Demarco.  Pt scheduled to be seen today.  Pt provided with appt time and location, which she verbalized understanding to.

## 2024-05-27 NOTE — PROGRESS NOTES
Subjective:      Patient ID: Manju Aranda is a 73 y.o. female.    Chief Complaint: No chief complaint on file.      HPI:  Manju Aranda is a 73 y.o. female who presents to TAA  follow up. Medical conditions include  HTN, PCKD, Graves disease, smoldering multiple myeloma, migraines, diverticulosis, BECKY on CPAP, hepatic cysts, vitamin D deficiency, obesity with BMI of 40. Recent surveillance CTA 5/2/2024  revealed TAA remains stable at 4 cm. TTE 5/22/2024: Preserve LVEF 50-55% with G1 DD, no significant AR. Patient doing well, denies chest pain or back pain. Reports good BP control and is compliance with medication.           Current medications Reviewed  Current Outpatient Medications on File Prior to Visit   Medication Sig Dispense Refill    ascorbic acid, vitamin C, (VITAMIN C) 500 MG tablet Take 500 mg by mouth once daily.      aspirin 81 MG Chew Take 81 mg by mouth once daily.      azelastine (ASTELIN) 137 mcg (0.1 %) nasal spray 2 sprays (274 mcg total) by Nasal route 2 (two) times daily. 30 mL 2    cholecalciferol, vitamin D3, (VITAMIN D3) 50 mcg (2,000 unit) Cap capsule Take 1 capsule (2,000 Units total) by mouth once daily.      magnesium oxide (MAG-OX) 400 mg tablet Take 400 mg by mouth once daily.      multivit with min-folic acid 0.4 mg Tab Take 0.4 mg by mouth once daily.      olmesartan (BENICAR) 40 MG tablet Take 1 tablet (40 mg total) by mouth once daily. 90 tablet 3    psyllium (METAMUCIL) powder Take 1 packet by mouth Daily. (Patient not taking: Reported on 4/26/2024)      rizatriptan (MAXALT) 10 MG tablet TAKE 1 TABLET(10 MG) BY MOUTH EVERY 2 HOURS AS NEEDED FOR MIGRAINE. MAX 30 MG/ 24 AT BEDTIME 12 tablet 11    traZODone (DESYREL) 100 MG tablet Take 1 tablet (100 mg total) by mouth nightly as needed for Insomnia. 90 tablet 1     Current Facility-Administered Medications on File Prior to Visit   Medication Dose Route Frequency Provider Last Rate Last Admin    LIDOcaine HCl 2% urojet    Urethral 1 time in Clinic/HOD Jovanna Bui MD           Review of Systems   Constitutional:  Negative for activity change, appetite change, fatigue and fever.   HENT:  Negative for nosebleeds.    Respiratory:  Negative for cough and shortness of breath.    Cardiovascular:  Negative for chest pain, palpitations and leg swelling.   Gastrointestinal:  Negative for abdominal distention, abdominal pain and nausea.   Genitourinary:  Negative for frequency.   Musculoskeletal:  Negative for arthralgias and myalgias.   Skin:  Negative for rash.   Neurological:  Negative for dizziness and numbness.   Hematological:  Does not bruise/bleed easily.     Objective:   Physical Exam  Constitutional:       Appearance: Normal appearance.   HENT:      Head: Normocephalic and atraumatic.   Eyes:      Extraocular Movements: Extraocular movements intact.   Cardiovascular:      Rate and Rhythm: Normal rate.   Pulmonary:      Effort: Pulmonary effort is normal.   Abdominal:      General: Abdomen is flat.      Palpations: Abdomen is soft.   Musculoskeletal:         General: Normal range of motion.      Cervical back: Normal range of motion.   Skin:     General: Skin is warm and dry.      Capillary Refill: Capillary refill takes less than 2 seconds.      Coloration: Skin is not pale.   Neurological:      General: No focal deficit present.      Mental Status: She is alert.         Diagnotic Results: reviewed  TTE 5/22/2024    Left Ventricle: The left ventricle is normal in size. Normal wall thickness. There is normal systolic function with a visually estimated ejection fraction of 55 - 60%. Grade I diastolic dysfunction.    Right Ventricle: Normal right ventricular cavity size. Wall thickness is normal. Systolic function is normal.    Left Atrium: Left atrium is mildly dilated.    Right Atrium: Right atrium is mildly dilated.    IVC/SVC: Normal venous pressure at 3 mmHg.    Pericardium: There is a trivial effusion. No indication of  cardiac tamponade.  Sinus 3.2 cm         STJ 3.37 cm         Ascending aorta 3.63 cm             CTA aorta 5/1/2024  Impression:     1. Unchanged mild fusiform ectasia of the ascending aorta measuring up to 4.0 cm.  2. Stable pulmonary micronodules as above.  3. Additional findings.  Please see the above discussion    CT Chest 6/16/2023:  Impression:  Mild fusiform dilation of the ascending aorta reaching maximum 4.0 cm.  No evidence of dissection.  Cardiomegaly, stable.  Multiple solid pulmonary micro nodules measuring up to 4 mm.  For multiple solid nodules all <6 mm, Fleischner Society 2017 guidelines recommend no routine follow up for a low risk patient, or follow up with non-contrast chest CT at 12 months after discovery in a high risk patient.  Additional stable findings as above.     ECHO 6/2023:  The left ventricle is normal in size with normal systolic function.  The estimated ejection fraction is 55%.  Indeterminate left ventricular diastolic function.  Normal right ventricular size with normal right ventricular systolic function.  Mild left atrial enlargement.  Mild tricuspid regurgitation.  The estimated PA systolic pressure is 34 mmHg.  Normal central venous pressure (3 mmHg).  Trivial circumferential pericardial effusion.  The ascending aorta is mildly dilated.  Assessment:   TAA   Plan:   All pertinent labs and images reviewed. TAA remains stable. RTC PRN. No need for routine Ct scan for TAA.

## 2024-05-29 NOTE — PROGRESS NOTES
[facility]   Physical Therapy Treatment Note      Name: Manju Aranda  Clinic Number: [id number]    Therapy Diagnosis:   Encounter Diagnoses   Name Primary?    Primary osteoarthritis of both knees Yes    Decreased range of motion of both knees     Impaired mobility and activities of daily living          Physician: Mack Torre III, *    Visit Date: 2024    Physician Orders: PT Eval and Treat   Medical Diagnosis from Referral: Primary osteoarthritis of both knees  Evaluation Date: 5/3/2024  Authorization Period Expiration: 2024 - 2025   Plan of Care Expiration: 2025  Visit # / Visits authorized:   FOTO 1st follow up:  FOTO 2nd follow up:    PTA Visit #:      Precautions: standard    Time In: 9:57 am  Time Out: 10:50 am  Total Appointment Time: 53 minutes    Subjective     Patient reports: Patient reports some soreness after last visit. She reports no pain or soreness upon arrival today.  Patient will need a short 1-2 week leave of absence from physical therapy in  to care for her daughter/ grandchild.  She was compliant with home exercise program.  Response to previous treatment: Some soreness reported  Functional change: too early    Pain: 2/10  Location: bilateral knee      Objective    Asterisk Signs:   Range of Motion (Active):   Knee Right  Left    Flexion 124 121   Extension -6 -4         Lower Extremity Strength  Right LE   Left LE     Quadriceps: 4/5 Quadriceps: 4/5   Hamstrings: 4/5 Hamstrings: 4-/5   Hip extension:  4-/5 Hip extension: 4-/5   Hip Abduction: 4-/5 Hip Abduction: 4-/5         Joint Mobility:   Medial Patellar Joint Mobility: R 1/6    L  1/6  Lateral Patellar Joint Mobility: R 1/6    L  2/6  Extension Joint Mobility: R 1/6    L  1/6  Flexion Joint Mobility: R 1/6    L  1/6    2024  Left Knee Extension +2  Right Knee Extension -4    Left Knee Flexion 116   Right Knee Flexion 125    2024  Five Times Sit to Stand  "Test:  How long the patient takes to completed 5 sit to stand form chair with arms folded across chest: 17 sec  "Inability to perform test in less than 13.6 seconds indicates increased disability and Morbidity." (Sneha 2000).  Normative values for community dwelling elderly:   60-68y/o: 11.4 seconds  70-78y/o: 12.6 seconds  80-90y/o: 14.8 seconds    Objective Measures updated at progress report unless specified.     Treatment     Manju received the treatments listed below:      manual therapy techniques: Joint mobilizations were applied to the: bilateral knees for 10 minutes, including:  Medial Patellar Glide Grade III  Lateral Patellar Glide Grade III  Extension Joint Mob Grade III  Flexion Joint Mob Grade III    neuromuscular re-education activities to improve: Balance, Coordination, Kinesthetic, Sense, Proprioception, and Posture for 20 minutes. The following activities were included:  Heel Prop 3 mins on each side  Heel Slides 2 x 10 on both sides  LAQ 3 x 12 with 6# on each side  Bridges 3 x 10  Single Leg Raise 2 x 10 on each side  Hamstring curls 3 x 10 on each side    therapeutic activities to improve functional performance for 23 minutes, including:  Recumbent Bicycle 5 mins level 1.6  Shuttle Squats 1 mins with 1 black cord  Shuttle Squats 3 x 10 with 2 black cords  Sled Push 2 laps on clinic floor (not turf)  Lateral Walks 3 laps Medium Band   Standing heel raises 3 x 10  Step Ups 3 x 12 from 4 inch step  Sit to stand 3 x 8  Standing Knee Extension with orange band 2 x 10 on each side  Standing Hip Extension 3 x 10    Patient Education and Home Exercises       Education provided:   - Patient was provided education on for continued compliance with HEP for continued functional mobility and strength gains for return to prior level of function.      Written Home Exercises Provided: Patient instructed to cont prior HEP. Exercises were reviewed and Manju was able to demonstrate them prior to the end of " the session.  Manju demonstrated good understanding of the education provided. See Electronic Medical Record under Patient Instructions for exercises provided during therapy sessions    Assessment   Patient presents to the clinic with low symptom irritability pre-session and low irritability post-session. Manual therapy was performed to bilateral knees to improve joint play and allow for optimal movement during corrective exercises.   Patient demonstrates improvement when performing sit to stand; she is able to performs repetitions more quickly than before.  Patient demonstrated improvements when using the recumbent bicycle; she is able to tolerate more flexion during the exercise.       Patient will continue to benefit from skilled outpatient physical therapy to address the deficits listed in the problem list box on initial evaluation, provide patient/family education and to maximize patient's level of independence in the home and community environment.     Manju Is progressing well towards her goals.   Patient prognosis is Good.     Patient's spiritual, cultural and educational needs considered and pt agreeable to plan of care and goals.     Anticipated barriers to physical therapy: none    Goals:  Short Term Goals (4 weeks):  1. Patient will have improved AROM of the  Bilateral  knees to 0 degrees of extension to demonstrate a more optimal gait pattern. MET  2. Patient will have decreased complaints of pain to 1/10.MET  3. Patient will be independent and compliant with issued HEP.MET  4. Patient will be able to complete 5 times sit to stand test within 2 seconds of age related norms.     Long Term Goals (12 weeks):   Patient will have improved AROM of the  bilateral  knees to +3 degrees of extension to demonstrate a more optimal gait pattern.  2. Patient will have improved strength of the  bilateral  hip extensors to 4+/5 to be able to rise from a chair more efficiently.  3. Patient will be able to resume  prior functional level with no deficits.   4. Patient will have overall improvement in condition to have increased score on KOOS to 62% to show clinically important difference in patient perceived functional ability.  5. Pt will improve FOTO limitation score to less than or equal to 44% for improved self perception of functional performance with daily activities.    Plan     Plan of care Certification: 5/3/2024 to 5/19/2024.     Outpatient Physical Therapy 2 times weekly for 12 weeks to include the following interventions: Gait Training, Manual Therapy, Neuromuscular Re-ed, Patient Education, Therapeutic Activities, and Therapeutic Exercise, Blood Flow Restriction Training, Trigger Point Dry Needling as needed.        Kevin Fernandez PT, DPT  Board Certified Orthopaedic Clinical Specialist

## 2024-05-31 ENCOUNTER — CLINICAL SUPPORT (OUTPATIENT)
Dept: REHABILITATION | Facility: OTHER | Age: 74
End: 2024-05-31
Payer: MEDICARE

## 2024-05-31 DIAGNOSIS — M17.0 PRIMARY OSTEOARTHRITIS OF BOTH KNEES: Primary | ICD-10-CM

## 2024-05-31 DIAGNOSIS — Z74.09 IMPAIRED MOBILITY AND ACTIVITIES OF DAILY LIVING: ICD-10-CM

## 2024-05-31 DIAGNOSIS — Z78.9 IMPAIRED MOBILITY AND ACTIVITIES OF DAILY LIVING: ICD-10-CM

## 2024-05-31 DIAGNOSIS — M25.661 DECREASED RANGE OF MOTION OF BOTH KNEES: ICD-10-CM

## 2024-05-31 DIAGNOSIS — M25.662 DECREASED RANGE OF MOTION OF BOTH KNEES: ICD-10-CM

## 2024-05-31 PROCEDURE — 97140 MANUAL THERAPY 1/> REGIONS: CPT | Mod: KX,HCNC,PN

## 2024-05-31 PROCEDURE — 97530 THERAPEUTIC ACTIVITIES: CPT | Mod: KX,HCNC,PN

## 2024-05-31 PROCEDURE — 97112 NEUROMUSCULAR REEDUCATION: CPT | Mod: KX,HCNC,PN

## 2024-06-17 ENCOUNTER — CLINICAL SUPPORT (OUTPATIENT)
Dept: REHABILITATION | Facility: OTHER | Age: 74
End: 2024-06-17
Payer: MEDICARE

## 2024-06-17 DIAGNOSIS — M25.661 DECREASED RANGE OF MOTION OF BOTH KNEES: ICD-10-CM

## 2024-06-17 DIAGNOSIS — M25.662 DECREASED RANGE OF MOTION OF BOTH KNEES: ICD-10-CM

## 2024-06-17 DIAGNOSIS — Z74.09 IMPAIRED MOBILITY AND ACTIVITIES OF DAILY LIVING: ICD-10-CM

## 2024-06-17 DIAGNOSIS — M17.0 PRIMARY OSTEOARTHRITIS OF BOTH KNEES: Primary | ICD-10-CM

## 2024-06-17 DIAGNOSIS — Z78.9 IMPAIRED MOBILITY AND ACTIVITIES OF DAILY LIVING: ICD-10-CM

## 2024-06-17 PROCEDURE — 97530 THERAPEUTIC ACTIVITIES: CPT | Mod: HCNC,PN

## 2024-06-17 PROCEDURE — 97140 MANUAL THERAPY 1/> REGIONS: CPT | Mod: HCNC,PN

## 2024-06-17 PROCEDURE — 97112 NEUROMUSCULAR REEDUCATION: CPT | Mod: HCNC,PN

## 2024-06-17 NOTE — PROGRESS NOTES
[facility]   Physical Therapy Treatment Note      Name: Manju Aranda  Clinic Number: [id number]    Therapy Diagnosis:   Encounter Diagnoses   Name Primary?    Primary osteoarthritis of both knees Yes    Decreased range of motion of both knees     Impaired mobility and activities of daily living      Physician: Mack Torre III, *    Visit Date: 2024    Physician Orders: PT Eval and Treat   Medical Diagnosis from Referral: Primary osteoarthritis of both knees  Evaluation Date: 5/3/2024  Authorization Period Expiration: 2024 - 2025   Plan of Care Expiration: 2025  Visit # / Visits authorized:   FOTO 1st follow up:  FOTO 2nd follow up:    PTA Visit #:      Precautions: standard    Time In: 10:55 am  Time Out: 11:48 am  Total Appointment Time: 53 minutes    Subjective     Patient reports: that she has been able to walk and stand for longer periods with less pain.   Patient will need a short 1-2 week leave of absence from physical therapy in  to care for her daughter/ grandchild.  She was compliant with home exercise program.  Response to previous treatment: Some soreness reported  Functional change: too early    Pain: 1/10  Location: bilateral knee      Objective    Asterisk Signs:   Range of Motion (Active):   Knee Right  Left    Flexion 124 121   Extension -6 -4         Lower Extremity Strength  Right LE   Left LE     Quadriceps: 4/5 Quadriceps: 4/5   Hamstrings: 4/5 Hamstrings: 4-/5   Hip extension:  4-/5 Hip extension: 4-/5   Hip Abduction: 4-/5 Hip Abduction: 4-/5         Joint Mobility:   Medial Patellar Joint Mobility: R 1/6    L  1/6  Lateral Patellar Joint Mobility: R 1/6    L  2/6  Extension Joint Mobility: R 1/6    L  1/6  Flexion Joint Mobility: R 1/6    L  1/6    2024  Left Knee Extension +2  Right Knee Extension +2    Left Knee Flexion 128  Right Knee Flexion 129    2024  Five Times Sit to Stand Test:  How long the patient  "takes to completed 5 sit to stand form chair with arms folded across chest: 12.5 sec  "Inability to perform test in less than 13.6 seconds indicates increased disability and Morbidity." (Sneha 2000).  Normative values for community dwelling elderly:   60-68y/o: 11.4 seconds  70-80y/o: 12.6 seconds  80-90y/o: 14.8 seconds    Objective Measures updated at progress report unless specified.     Treatment     Manju received the treatments listed below:      manual therapy techniques: Joint mobilizations were applied to the: bilateral knees for 10 minutes, including:  Medial Patellar Glide Grade III  Lateral Patellar Glide Grade III  Extension Joint Mob Grade III  Flexion Joint Mob Grade III    neuromuscular re-education activities to improve: Balance, Coordination, Kinesthetic, Sense, Proprioception, and Posture for 20 minutes. The following activities were included:  Heel Prop 3 mins on each side  Knee Extension Matrix 25# 3 x 12 reps   Bridges 3 x 10  Straight Leg Raise 2 x 10 on each side  Hamstring curls matrix 25# 3 x 12 reps     therapeutic activities to improve functional performance for 23 minutes, including:  Recumbent Bicycle 8 mins level 3  Shuttle Squats 3 x 10 with 2 black cords  Sled Push 2 laps on clinic floor  Lateral Walks 3 laps Medium Band   Standing heel raises 3 x 10  Step Ups 3 x 12 from 4 inch step  Sit to stand 3 x 10  Standing Knee Extension with orange band 2 x 10 on each side  Standing Hip Extension 3 x 10    Patient Education and Home Exercises       Education provided:   - Patient was provided education on for continued compliance with HEP for continued functional mobility and strength gains for return to prior level of function.      Written Home Exercises Provided: Patient instructed to cont prior HEP. Exercises were reviewed and Manju was able to demonstrate them prior to the end of the session.  Manju demonstrated good understanding of the education provided. See Electronic " Medical Record under Patient Instructions for exercises provided during therapy sessions    Assessment   Patient presents to the clinic with low symptom irritability pre-session and low irritability post-session. Manual therapy was performed to bilateral knees to improve joint play and allow for optimal movement during corrective exercises.   Patient demonstrates improvement when performing sit to stand; she is able to performs repetitions without use of the Upper Extremities for support.   Patient demonstrated improvements when using the recumbent bicycle she is able to maintain increased speed and knee flexion angles.     Patient will continue to benefit from skilled outpatient physical therapy to address the deficits listed in the problem list box on initial evaluation, provide patient/family education and to maximize patient's level of independence in the home and community environment.     Manju Is progressing well towards her goals.   Patient prognosis is Good.     Patient's spiritual, cultural and educational needs considered and pt agreeable to plan of care and goals.     Anticipated barriers to physical therapy: none    Goals:  Short Term Goals (4 weeks):  1. Patient will have improved AROM of the  Bilateral  knees to 0 degrees of extension to demonstrate a more optimal gait pattern. MET  2. Patient will have decreased complaints of pain to 1/10.MET  3. Patient will be independent and compliant with issued HEP.MET  4. Patient will be able to complete 5 times sit to stand test within 2 seconds of age related norms.     Long Term Goals (12 weeks):   Patient will have improved AROM of the  bilateral  knees to +3 degrees of extension to demonstrate a more optimal gait pattern.  2. Patient will have improved strength of the  bilateral  hip extensors to 4+/5 to be able to rise from a chair more efficiently.  3. Patient will be able to resume prior functional level with no deficits.   4. Patient will have  overall improvement in condition to have increased score on KOOS to 62% to show clinically important difference in patient perceived functional ability.  5. Pt will improve FOTO limitation score to less than or equal to 44% for improved self perception of functional performance with daily activities.    Plan     Plan of care Certification: 5/3/2024 to 5/19/2024.     Outpatient Physical Therapy 2 times weekly for 12 weeks to include the following interventions: Gait Training, Manual Therapy, Neuromuscular Re-ed, Patient Education, Therapeutic Activities, and Therapeutic Exercise, Blood Flow Restriction Training, Trigger Point Dry Needling as needed.        Kevin Fernandez PT, DPT  Board Certified Orthopaedic Clinical Specialist

## 2024-06-18 ENCOUNTER — OFFICE VISIT (OUTPATIENT)
Dept: SLEEP MEDICINE | Facility: CLINIC | Age: 74
End: 2024-06-18
Payer: MEDICARE

## 2024-06-18 VITALS
DIASTOLIC BLOOD PRESSURE: 80 MMHG | WEIGHT: 260.13 LBS | HEIGHT: 68 IN | HEART RATE: 83 BPM | SYSTOLIC BLOOD PRESSURE: 119 MMHG | BODY MASS INDEX: 39.42 KG/M2

## 2024-06-18 DIAGNOSIS — G47.00 INSOMNIA, UNSPECIFIED TYPE: Primary | ICD-10-CM

## 2024-06-18 PROCEDURE — 1126F AMNT PAIN NOTED NONE PRSNT: CPT | Mod: HCNC,CPTII,S$GLB, | Performed by: NURSE PRACTITIONER

## 2024-06-18 PROCEDURE — 4010F ACE/ARB THERAPY RXD/TAKEN: CPT | Mod: HCNC,CPTII,S$GLB, | Performed by: NURSE PRACTITIONER

## 2024-06-18 PROCEDURE — 3044F HG A1C LEVEL LT 7.0%: CPT | Mod: HCNC,CPTII,S$GLB, | Performed by: NURSE PRACTITIONER

## 2024-06-18 PROCEDURE — 3061F NEG MICROALBUMINURIA REV: CPT | Mod: HCNC,CPTII,S$GLB, | Performed by: NURSE PRACTITIONER

## 2024-06-18 PROCEDURE — 3066F NEPHROPATHY DOC TX: CPT | Mod: HCNC,CPTII,S$GLB, | Performed by: NURSE PRACTITIONER

## 2024-06-18 PROCEDURE — 99999 PR PBB SHADOW E&M-EST. PATIENT-LVL III: CPT | Mod: PBBFAC,HCNC,, | Performed by: NURSE PRACTITIONER

## 2024-06-18 PROCEDURE — 1159F MED LIST DOCD IN RCRD: CPT | Mod: HCNC,CPTII,S$GLB, | Performed by: NURSE PRACTITIONER

## 2024-06-18 PROCEDURE — 3079F DIAST BP 80-89 MM HG: CPT | Mod: HCNC,CPTII,S$GLB, | Performed by: NURSE PRACTITIONER

## 2024-06-18 PROCEDURE — 3008F BODY MASS INDEX DOCD: CPT | Mod: HCNC,CPTII,S$GLB, | Performed by: NURSE PRACTITIONER

## 2024-06-18 PROCEDURE — 99214 OFFICE O/P EST MOD 30 MIN: CPT | Mod: HCNC,S$GLB,, | Performed by: NURSE PRACTITIONER

## 2024-06-18 PROCEDURE — 3074F SYST BP LT 130 MM HG: CPT | Mod: HCNC,CPTII,S$GLB, | Performed by: NURSE PRACTITIONER

## 2024-06-18 RX ORDER — ESZOPICLONE 2 MG/1
2 TABLET, FILM COATED ORAL NIGHTLY
Qty: 30 TABLET | Refills: 5 | Status: SHIPPED | OUTPATIENT
Start: 2024-06-18 | End: 2024-12-15

## 2024-06-18 NOTE — PROGRESS NOTES
Cc: headache/BECKY    Headaches stable., less migraines. Maxalt is an effective abortive. Still hard to stay asleep, will be up working crossword puzzles few hours then return to sleepbut not reapply her mask.   Not able to keep cpap mask on consistently >4hr. Using nose pillows w/o chin strap. Tried and was intolerable.   Remote 60d avg 3:13h/n AHI 7.8, 90 %tile 13.6 (1 night 6hr, ahi >5 and 90% tile 16cm)- DS2 machine  Has 100mg trazadone not helping  Had titration study, apap 12-15cm recommended      HISTORY aLL VB:  10/5/15:   She was last seen 3/31/15.  She continues on Magnesium 250mg 2 tabs bid for migraines prevention, but stopped Riboflavin 100mg bid and CoQ10 100mg bid due to worsening HA in past. No recurrent morning headache. No longer keeping HA diaries but reports since last seen HA remain infrequent, occuring <5/month. None on recent cruise! Spicy foods, stress, lack of sleep, and weather are known triggers. HA remain typically mild-moderate (only few severe HA in interim), lasting 2-4+ hrs. Longer duration when at work and unable to take Maxalt due to sleepiness. At work she will take Midrin 2 tabs at onset which remains very helpful within 30min, not having to repeat dose. She has a prodrome (tingling of the forehead) but no auras, before the gradual onset of non-radiating frontal (center or right) throbbing or pressure pains associated with photo/ phonophobia and nausea. She denies vomiting, focal neurological deficits or autonomic deficits. Other triggers remain gatigue, and stress.  Resting helps while movement can intensify pain. HIT-6 score - 54      10/17/16: Reports stable frequency of headaches, ~ 1-2 /month. Midrin remains effective. No severe episodes. No zofran use, but this does help nausea prn. Headache remains same nature/location. HIT=6 score= 53. Having knee pain. Interim dx hyperthyroidism. She has lost weight on new medication/dx. Continues to take Mag 500mg twice daily, no loose  "stools "helps my arthritis too".     10/25/17:  Since last seen denies interval medical change. Reports headache q 2 mos. Cumen/herbs/pepper/basil/rosemary/highly seasoned food/seafood are triggers. Abortive meds remain effective. HA remain same location/nature. Maxalt works well.   Trying to walk at [employer] track.     11/2018:  Since last seen denies interval medical change except colonoscopy yesterday. Reports headache infrequent except recently 5d due to weather change.  Cumen/herbs/pepper/basil/rosemary/highly seasoned food/seafood are triggers. Abortive meds remain effective/maxalt. Midrin too costly.  HA remain same location/nature  2# loss    9/2021 Stopped apap due to recall. Having "sinus" headaches ~ 2 mos, doesn't feel like typical migraines. On tylenol for pain and daily allergra. Waking with them. BP meds also adjusted around same time. Was using nose pillow mask.  Sleep a little more disrupted w/o machine. Has registered her machine.  Maxalt continues to abort pain well. Biggest known trigger is weather change.  Cumen/herbs/pepper/basil/rosemary/highly seasoned food/seafood are other triggers. Migrainous HA remain same location/nature from 2015. Still taking Mag  bp stable    PSG 7/2020 : Moderate to loud snoring was present. The overall AHI was 89.5 with an oxygen patrick of 78.0%. The supine AHI was 89.7 and the REM AHI was 53.3. (all supine)   Tiotration 3/2023 in epic    IMPRESSION:   Migraine without aura, stable  BECKY, severe. She remains adherent with pap, mask removal problematic,  AHI<10  ADPKD    PLAN:    Continue Mag 250mg 2 tabs bid  Continue Maxalt 5-10 mg tab 1 tab PO q 2h, up to maximum of 30 mg/day. Do not delay treatment to avoid progression of a migraine.   Continue Zofran 4-8mg PO q8h prn for HA/nausea     Adjusted remotely today apap to 12-17cm. Notify me if any pressure intolerance and ramp reduced from 45min to 30min  REFER to CBT-I   Sleep restriction encouraged +- 50-100mg trazadone, " and if still not able to get more consolidated sleep then begin lunesta 2mg qhs  Rtc re-eval 3mos sooner if needed

## 2024-06-18 NOTE — PROGRESS NOTES
"[facility] OUTPATIENT THERAPY AND WELLNESS   Physical Therapy Treatment Note      Name: Manju Aranda  Clinic Number: [id number]    Therapy Diagnosis:   Encounter Diagnoses   Name Primary?    Primary osteoarthritis of both knees Yes    Decreased range of motion of both knees     Impaired mobility and activities of daily living        Physician: Mack Torre III, *    Visit Date: 6/19/2024    Physician Orders: PT Eval and Treat   Medical Diagnosis from Referral: Primary osteoarthritis of both knees  Evaluation Date: 5/3/2024  Authorization Period Expiration: 4/26/2024 - 4/26/2025   Plan of Care Expiration: 7/25/2025  Visit # / Visits authorized: 8/20  FOTO 1st follow up:  FOTO 2nd follow up:    PTA Visit #: 0/5     Precautions: standard    Time In: 10:53 am  Time Out: 11:48 am  Total Appointment Time: 53 minutes    Subjective     Patient reports: that she is a little sore in the knees today from last session.   She was compliant with home exercise program.  Response to previous treatment: Some soreness reported  Functional change: too early    Pain: 1/10  Location: bilateral knee      Objective    Asterisk Signs:   Range of Motion (Active):   Knee Right  Left    Flexion 124 121   Extension -6 -4         Lower Extremity Strength  Right LE   Left LE     Quadriceps: 4/5 Quadriceps: 4/5   Hamstrings: 4/5 Hamstrings: 4-/5   Hip extension:  4-/5 Hip extension: 4-/5   Hip Abduction: 4-/5 Hip Abduction: 4-/5         Joint Mobility:   Medial Patellar Joint Mobility: R 1/6    L  1/6  Lateral Patellar Joint Mobility: R 1/6    L  2/6  Extension Joint Mobility: R 1/6    L  1/6  Flexion Joint Mobility: R 1/6    L  1/6    5/6/2024  Left Knee Extension +2  Right Knee Extension +2    Left Knee Flexion 128  Right Knee Flexion 129    5/6/2024  Five Times Sit to Stand Test:  How long the patient takes to completed 5 sit to stand form chair with arms folded across chest: 12.5 sec  "Inability to perform test in less than 13.6 seconds " "indicates increased disability and Morbidity." (Sneha 2000).  Normative values for community dwelling elderly:   60-68y/o: 11.4 seconds  70-80y/o: 12.6 seconds  80-88y/o: 14.8 seconds    Objective Measures updated at progress report unless specified.     Treatment     Manju received the treatments listed below:      manual therapy techniques: Joint mobilizations were applied to the: bilateral knees for 10 minutes, including:  Medial Patellar Glide Grade III  Lateral Patellar Glide Grade III  Extension Joint Mob Grade III  Flexion Joint Mob Grade III    neuromuscular re-education activities to improve: Balance, Coordination, Kinesthetic, Sense, Proprioception, and Posture for 20 minutes. The following activities were included:  Heel Prop 3 mins on each side  Knee Extension Matrix 25# 3 x 12 reps   Bridges 3 x 10  Straight Leg Raise 2 x 10 on each side  Hamstring curls matrix 25# 3 x 12 reps     therapeutic activities to improve functional performance for 23 minutes, including:  Recumbent Bicycle 8 mins level 3  Shuttle Squats 3 x 10 with 2 black cords  Sled Push 2 laps on clinic floor  Lateral Walks 3 laps Medium Band   Standing heel raises 3 x 10  Step Ups 3 x 12 from 4 inch step  Sit to stand 3 x 10  Standing Knee Extension with orange band 2 x 10 on each side  Standing Hip Extension 3 x 10    Patient Education and Home Exercises       Education provided:   - Patient was provided education on for continued compliance with HEP for continued functional mobility and strength gains for return to prior level of function.      Written Home Exercises Provided: Patient instructed to cont prior HEP. Exercises were reviewed and Manju was able to demonstrate them prior to the end of the session.  Manju demonstrated good understanding of the education provided. See Electronic Medical Record under Patient Instructions for exercises provided during therapy sessions    Assessment   Patient presents to the clinic with " low symptom irritability pre-session and low irritability post-session. Manual therapy was performed to bilateral knees to improve joint play and allow for optimal movement during corrective exercises.   Patient demonstrates improvement when performing sit to stand; she is able to performs repetitions without use of the Upper Extremities for support.   Patient demonstrated improvements when using the recumbent bicycle she is able to maintain increased speed and knee flexion angles.     Patient will continue to benefit from skilled outpatient physical therapy to address the deficits listed in the problem list box on initial evaluation, provide patient/family education and to maximize patient's level of independence in the home and community environment.     Manju Is progressing well towards her goals.   Patient prognosis is Good.     Patient's spiritual, cultural and educational needs considered and pt agreeable to plan of care and goals.     Anticipated barriers to physical therapy: none    Goals:  Short Term Goals (4 weeks):  1. Patient will have improved AROM of the  Bilateral  knees to 0 degrees of extension to demonstrate a more optimal gait pattern. MET  2. Patient will have decreased complaints of pain to 1/10.MET  3. Patient will be independent and compliant with issued HEP.MET  4. Patient will be able to complete 5 times sit to stand test within 2 seconds of age related norms.     Long Term Goals (12 weeks):   Patient will have improved AROM of the  bilateral  knees to +3 degrees of extension to demonstrate a more optimal gait pattern.  2. Patient will have improved strength of the  bilateral  hip extensors to 4+/5 to be able to rise from a chair more efficiently.  3. Patient will be able to resume prior functional level with no deficits.   4. Patient will have overall improvement in condition to have increased score on KOOS to 62% to show clinically important difference in patient perceived functional  ability.  5. Pt will improve FOTO limitation score to less than or equal to 44% for improved self perception of functional performance with daily activities.    Plan     Plan of care Certification: 5/3/2024 to 5/19/2024.     Outpatient Physical Therapy 2 times weekly for 12 weeks to include the following interventions: Gait Training, Manual Therapy, Neuromuscular Re-ed, Patient Education, Therapeutic Activities, and Therapeutic Exercise, Blood Flow Restriction Training, Trigger Point Dry Needling as needed.        Kevin Fernandez PT, DPT  Board Certified Orthopaedic Clinical Specialist

## 2024-06-19 ENCOUNTER — CLINICAL SUPPORT (OUTPATIENT)
Dept: REHABILITATION | Facility: OTHER | Age: 74
End: 2024-06-19
Payer: MEDICARE

## 2024-06-19 DIAGNOSIS — Z74.09 IMPAIRED MOBILITY AND ACTIVITIES OF DAILY LIVING: ICD-10-CM

## 2024-06-19 DIAGNOSIS — M17.0 PRIMARY OSTEOARTHRITIS OF BOTH KNEES: Primary | ICD-10-CM

## 2024-06-19 DIAGNOSIS — Z78.9 IMPAIRED MOBILITY AND ACTIVITIES OF DAILY LIVING: ICD-10-CM

## 2024-06-19 DIAGNOSIS — M25.662 DECREASED RANGE OF MOTION OF BOTH KNEES: ICD-10-CM

## 2024-06-19 DIAGNOSIS — M25.661 DECREASED RANGE OF MOTION OF BOTH KNEES: ICD-10-CM

## 2024-06-19 PROCEDURE — 97530 THERAPEUTIC ACTIVITIES: CPT | Mod: HCNC,PN

## 2024-06-19 PROCEDURE — 97112 NEUROMUSCULAR REEDUCATION: CPT | Mod: HCNC,PN

## 2024-06-19 PROCEDURE — 97140 MANUAL THERAPY 1/> REGIONS: CPT | Mod: HCNC,PN

## 2024-06-20 ENCOUNTER — PATIENT MESSAGE (OUTPATIENT)
Dept: PSYCHIATRY | Facility: CLINIC | Age: 74
End: 2024-06-20
Payer: MEDICARE

## 2024-06-24 ENCOUNTER — CLINICAL SUPPORT (OUTPATIENT)
Dept: REHABILITATION | Facility: OTHER | Age: 74
End: 2024-06-24
Payer: MEDICARE

## 2024-06-24 DIAGNOSIS — M25.661 DECREASED RANGE OF MOTION OF BOTH KNEES: ICD-10-CM

## 2024-06-24 DIAGNOSIS — Z78.9 IMPAIRED MOBILITY AND ACTIVITIES OF DAILY LIVING: ICD-10-CM

## 2024-06-24 DIAGNOSIS — M17.0 PRIMARY OSTEOARTHRITIS OF BOTH KNEES: Primary | ICD-10-CM

## 2024-06-24 DIAGNOSIS — M25.662 DECREASED RANGE OF MOTION OF BOTH KNEES: ICD-10-CM

## 2024-06-24 DIAGNOSIS — Z74.09 IMPAIRED MOBILITY AND ACTIVITIES OF DAILY LIVING: ICD-10-CM

## 2024-06-24 PROCEDURE — 97140 MANUAL THERAPY 1/> REGIONS: CPT | Mod: HCNC,PN

## 2024-06-24 PROCEDURE — 97112 NEUROMUSCULAR REEDUCATION: CPT | Mod: HCNC,PN

## 2024-06-24 PROCEDURE — 97530 THERAPEUTIC ACTIVITIES: CPT | Mod: HCNC,PN

## 2024-06-24 NOTE — PROGRESS NOTES
"[facility]   Physical Therapy Treatment Note      Name: Manju Aranda  Clinic Number: [id number]    Therapy Diagnosis:   Encounter Diagnoses   Name Primary?    Primary osteoarthritis of both knees Yes    Decreased range of motion of both knees     Impaired mobility and activities of daily living        Physician: Mack Torre III, *    Visit Date: 6/24/2024    Physician Orders: PT Eval and Treat   Medical Diagnosis from Referral: Primary osteoarthritis of both knees  Evaluation Date: 5/3/2024  Authorization Period Expiration: 4/26/2024 - 4/26/2025   Plan of Care Expiration: 7/25/2025  Visit # / Visits authorized: 9/20  FOTO 1st follow up:  FOTO 2nd follow up:    PTA Visit #: 0/5     Precautions: standard    Time In: 9:59 am  Time Out: 10:52 am  Total Appointment Time: 53 minutes    Subjective     Patient reports: that she is doing well today. A little bit of pain but more soreness.   She was compliant with home exercise program.  Response to previous treatment: Some soreness reported  Functional change: too early    Pain: 1/10  Location: bilateral knee      Objective    Asterisk Signs:   Range of Motion (Active):   Knee Right  Left    Flexion 124 121   Extension -6 -4         Lower Extremity Strength  Right LE   Left LE     Quadriceps: 4/5 Quadriceps: 4/5   Hamstrings: 4/5 Hamstrings: 4-/5   Hip extension:  4-/5 Hip extension: 4-/5   Hip Abduction: 4-/5 Hip Abduction: 4-/5         Joint Mobility:   Medial Patellar Joint Mobility: R 1/6    L  1/6  Lateral Patellar Joint Mobility: R 1/6    L  2/6  Extension Joint Mobility: R 1/6    L  1/6  Flexion Joint Mobility: R 1/6    L  1/6    5/6/2024  Left Knee Extension +2  Right Knee Extension +2    Left Knee Flexion 128  Right Knee Flexion 129    5/6/2024  Five Times Sit to Stand Test:  How long the patient takes to completed 5 sit to stand form chair with arms folded across chest: 12.5 sec  "Inability to perform test in less than 13.6 " "seconds indicates increased disability and Morbidity." (Sneha 2000).  Normative values for community dwelling elderly:   60-70y/o: 11.4 seconds  70-80y/o: 12.6 seconds  80-88y/o: 14.8 seconds    Objective Measures updated at progress report unless specified.     Treatment     Manju received the treatments listed below:      manual therapy techniques: Joint mobilizations were applied to the: bilateral knees for 10 minutes, including:  Medial Patellar Glide Grade III  Lateral Patellar Glide Grade III  Extension Joint Mob Grade III  Flexion Joint Mob Grade III    neuromuscular re-education activities to improve: Balance, Coordination, Kinesthetic, Sense, Proprioception, and Posture for 20 minutes. The following activities were included:  Heel Prop 3 mins on each side  Knee Extension Matrix 25# 3 x 12 reps   Bridges 3 x 10  Straight Leg Raise 2 x 10 on each side  Hamstring curls matrix 25# 3 x 12 reps     therapeutic activities to improve functional performance for 23 minutes, including:  Recumbent Bicycle 8 mins level 3  Shuttle Squats 3 x 10 with 2 black cords  Sled Push 2 laps on clinic floor  Lateral Walks 3 laps Medium Band   Standing heel raises 3 x 10  Step Ups 3 x 12 from 4 inch step  Sit to stand 3 x 10  Standing Knee Extension with orange band 2 x 10 on each side  Standing Hip Extension 3 x 10    Patient Education and Home Exercises       Education provided:   - Patient was provided education on for continued compliance with HEP for continued functional mobility and strength gains for return to prior level of function.      Written Home Exercises Provided: Patient instructed to cont prior HEP. Exercises were reviewed and Manju was able to demonstrate them prior to the end of the session.  Manju demonstrated good understanding of the education provided. See Electronic Medical Record under Patient Instructions for exercises provided during therapy sessions    Assessment   Patient presents to the " clinic with low symptom irritability pre-session and low irritability post-session. Manual therapy was performed to bilateral knees to improve joint play and allow for optimal movement during corrective exercises.   Patient demonstrates improvement when performing sit to stand; she is able to performs repetitions without use of the Upper Extremities for support.   Patient demonstrated improvements when using the recumbent bicycle she is able to maintain increased speed and knee flexion angles.     Patient will continue to benefit from skilled outpatient physical therapy to address the deficits listed in the problem list box on initial evaluation, provide patient/family education and to maximize patient's level of independence in the home and community environment.     Manju Is progressing well towards her goals.   Patient prognosis is Good.     Patient's spiritual, cultural and educational needs considered and pt agreeable to plan of care and goals.     Anticipated barriers to physical therapy: none    Goals:  Short Term Goals (4 weeks):  1. Patient will have improved AROM of the  Bilateral  knees to 0 degrees of extension to demonstrate a more optimal gait pattern. MET  2. Patient will have decreased complaints of pain to 1/10.MET  3. Patient will be independent and compliant with issued HEP.MET  4. Patient will be able to complete 5 times sit to stand test within 2 seconds of age related norms.     Long Term Goals (12 weeks):   Patient will have improved AROM of the  bilateral  knees to +3 degrees of extension to demonstrate a more optimal gait pattern.  2. Patient will have improved strength of the  bilateral  hip extensors to 4+/5 to be able to rise from a chair more efficiently.  3. Patient will be able to resume prior functional level with no deficits.   4. Patient will have overall improvement in condition to have increased score on KOOS to 62% to show clinically important difference in patient perceived  functional ability.  5. Pt will improve FOTO limitation score to less than or equal to 44% for improved self perception of functional performance with daily activities.    Plan     Plan of care Certification: 5/3/2024 to 5/19/2024.     Outpatient Physical Therapy 2 times weekly for 12 weeks to include the following interventions: Gait Training, Manual Therapy, Neuromuscular Re-ed, Patient Education, Therapeutic Activities, and Therapeutic Exercise, Blood Flow Restriction Training, Trigger Point Dry Needling as needed.        Kevin Fernandez PT, DPT  Board Certified Orthopaedic Clinical Specialist

## 2024-06-25 NOTE — PROGRESS NOTES
"[facility]   Physical Therapy Treatment Note      Name: Manju Aranda  Clinic Number: [id number]    Therapy Diagnosis:   Encounter Diagnoses   Name Primary?    Primary osteoarthritis of both knees Yes    Decreased range of motion of both knees     Impaired mobility and activities of daily living          Physician: Mack Torre III, *    Visit Date: 6/26/2024    Physician Orders: PT Eval and Treat   Medical Diagnosis from Referral: Primary osteoarthritis of both knees  Evaluation Date: 5/3/2024  Authorization Period Expiration: 4/26/2024 - 4/26/2025   Plan of Care Expiration: 7/25/2025  Visit # / Visits authorized: `10/20  FOTO 1st follow up:  FOTO 2nd follow up:    PTA Visit #: 0/5     Precautions: standard    Time In: 9:52 am  Time Out: 10:50 am  Total Appointment Time: 56 minutes    Subjective     Patient reports: that she is doing well. No increase in symptoms this morning.   She was compliant with home exercise program.  Response to previous treatment: Some soreness reported  Functional change: too early    Pain: 1/10  Location: bilateral knee      Objective    Asterisk Signs:   Range of Motion (Active):   Knee Right  Left    Flexion 124 121   Extension -6 -4         Lower Extremity Strength  Right LE   Left LE     Quadriceps: 4/5 Quadriceps: 4/5   Hamstrings: 4/5 Hamstrings: 4-/5   Hip extension:  4-/5 Hip extension: 4-/5   Hip Abduction: 4-/5 Hip Abduction: 4-/5         Joint Mobility:   Medial Patellar Joint Mobility: R 1/6    L  1/6  Lateral Patellar Joint Mobility: R 1/6    L  2/6  Extension Joint Mobility: R 1/6    L  1/6  Flexion Joint Mobility: R 1/6    L  1/6    5/6/2024  Left Knee Extension +2  Right Knee Extension +2    Left Knee Flexion 128  Right Knee Flexion 129    5/6/2024  Five Times Sit to Stand Test:  How long the patient takes to completed 5 sit to stand form chair with arms folded across chest: 12.5 sec  "Inability to perform test in less than 13.6 seconds " "indicates increased disability and Morbidity." (Sneha 2000).  Normative values for community dwelling elderly:   60-70y/o: 11.4 seconds  70-80y/o: 12.6 seconds  80-88y/o: 14.8 seconds    Objective Measures updated at progress report unless specified.     Treatment     Manju received the treatments listed below:      manual therapy techniques: Joint mobilizations were applied to the: bilateral knees for 10 minutes, including:  Medial Patellar Glide Grade III  Lateral Patellar Glide Grade III  Extension Joint Mob Grade III  Flexion Joint Mob Grade III    neuromuscular re-education activities to improve: Balance, Coordination, Kinesthetic, Sense, Proprioception, and Posture for 23 minutes. The following activities were included:  Heel Prop 3 mins on each side  Knee Extension Matrix 25# 3 x 12 reps   Bridges 3 x 10  Straight Leg Raise 2 x 10 on each side  Hamstring curls matrix 25# 3 x 12 reps     therapeutic activities to improve functional performance for 23 minutes, including:  Recumbent Bicycle 8 mins level 3  Shuttle Squats 3 x 10 with 2 black cords  Sled Push 2 laps on clinic floor  Lateral Walks 3 laps Medium Band   Standing heel raises 3 x 10  Step Ups 3 x 12 from 4 inch step  Sit to stand 3 x 10  Standing Knee Extension with orange band 2 x 10 on each side  Standing Hip Extension 3 x 10    Patient Education and Home Exercises       Education provided:   - Patient was provided education on for continued compliance with HEP for continued functional mobility and strength gains for return to prior level of function.      Written Home Exercises Provided: Patient instructed to cont prior HEP. Exercises were reviewed and Manju was able to demonstrate them prior to the end of the session.  Manju demonstrated good understanding of the education provided. See Electronic Medical Record under Patient Instructions for exercises provided during therapy sessions    Assessment   Patient presents to the clinic with " low symptom irritability pre-session and low irritability post-session. Manual therapy was performed to bilateral knees to improve joint play and allow for optimal movement during corrective exercises.   Patient demonstrates improvement when performing sit to stand; she is able to performs repetitions without use of the Upper Extremities for support.   Patient demonstrated improvements when using the recumbent bicycle she is able to maintain increased speed and knee flexion angles.     Patient will continue to benefit from skilled outpatient physical therapy to address the deficits listed in the problem list box on initial evaluation, provide patient/family education and to maximize patient's level of independence in the home and community environment.     Manju Is progressing well towards her goals.   Patient prognosis is Good.     Patient's spiritual, cultural and educational needs considered and pt agreeable to plan of care and goals.     Anticipated barriers to physical therapy: none    Goals:  Short Term Goals (4 weeks):  1. Patient will have improved AROM of the  Bilateral  knees to 0 degrees of extension to demonstrate a more optimal gait pattern. MET  2. Patient will have decreased complaints of pain to 1/10.MET  3. Patient will be independent and compliant with issued HEP.MET  4. Patient will be able to complete 5 times sit to stand test within 2 seconds of age related norms.     Long Term Goals (12 weeks):   Patient will have improved AROM of the  bilateral  knees to +3 degrees of extension to demonstrate a more optimal gait pattern.  2. Patient will have improved strength of the  bilateral  hip extensors to 4+/5 to be able to rise from a chair more efficiently.  3. Patient will be able to resume prior functional level with no deficits.   4. Patient will have overall improvement in condition to have increased score on KOOS to 62% to show clinically important difference in patient perceived functional  ability.  5. Pt will improve FOTO limitation score to less than or equal to 44% for improved self perception of functional performance with daily activities.    Plan     Plan of care Certification: 5/3/2024 to 5/19/2024.     Outpatient Physical Therapy 2 times weekly for 12 weeks to include the following interventions: Gait Training, Manual Therapy, Neuromuscular Re-ed, Patient Education, Therapeutic Activities, and Therapeutic Exercise, Blood Flow Restriction Training, Trigger Point Dry Needling as needed.        Kevin Fernandez PT, DPT  Board Certified Orthopaedic Clinical Specialist

## 2024-06-26 ENCOUNTER — CLINICAL SUPPORT (OUTPATIENT)
Dept: REHABILITATION | Facility: OTHER | Age: 74
End: 2024-06-26
Payer: MEDICARE

## 2024-06-26 DIAGNOSIS — Z74.09 IMPAIRED MOBILITY AND ACTIVITIES OF DAILY LIVING: ICD-10-CM

## 2024-06-26 DIAGNOSIS — Z78.9 IMPAIRED MOBILITY AND ACTIVITIES OF DAILY LIVING: ICD-10-CM

## 2024-06-26 DIAGNOSIS — M17.0 PRIMARY OSTEOARTHRITIS OF BOTH KNEES: Primary | ICD-10-CM

## 2024-06-26 DIAGNOSIS — M25.662 DECREASED RANGE OF MOTION OF BOTH KNEES: ICD-10-CM

## 2024-06-26 DIAGNOSIS — M25.661 DECREASED RANGE OF MOTION OF BOTH KNEES: ICD-10-CM

## 2024-06-26 PROCEDURE — 97140 MANUAL THERAPY 1/> REGIONS: CPT | Mod: HCNC,PN

## 2024-06-26 PROCEDURE — 97530 THERAPEUTIC ACTIVITIES: CPT | Mod: HCNC,PN

## 2024-06-26 PROCEDURE — 97112 NEUROMUSCULAR REEDUCATION: CPT | Mod: HCNC,PN

## 2024-07-15 ENCOUNTER — PATIENT MESSAGE (OUTPATIENT)
Dept: ADMINISTRATIVE | Facility: HOSPITAL | Age: 74
End: 2024-07-15
Payer: MEDICARE

## 2024-07-15 ENCOUNTER — PATIENT OUTREACH (OUTPATIENT)
Dept: ADMINISTRATIVE | Facility: HOSPITAL | Age: 74
End: 2024-07-15
Payer: MEDICARE

## 2024-07-15 DIAGNOSIS — Z78.0 POST-MENOPAUSAL: Primary | ICD-10-CM

## 2024-07-25 ENCOUNTER — TELEPHONE (OUTPATIENT)
Dept: HEMATOLOGY/ONCOLOGY | Facility: CLINIC | Age: 74
End: 2024-07-25
Payer: MEDICARE

## 2024-07-29 ENCOUNTER — HOSPITAL ENCOUNTER (OUTPATIENT)
Dept: RADIOLOGY | Facility: CLINIC | Age: 74
Discharge: HOME | End: 2024-07-29
Attending: STUDENT IN AN ORGANIZED HEALTH CARE EDUCATION/TRAINING PROGRAM
Payer: MEDICARE

## 2024-07-29 DIAGNOSIS — Z78.0 POST-MENOPAUSAL: ICD-10-CM

## 2024-07-29 PROCEDURE — 77080 DXA BONE DENSITY AXIAL: CPT | Mod: 26,HCNC,, | Performed by: INTERNAL MEDICINE

## 2024-07-29 PROCEDURE — 77080 DXA BONE DENSITY AXIAL: CPT | Mod: TC,HCNC

## 2024-07-30 ENCOUNTER — OFFICE VISIT (OUTPATIENT)
Dept: OBSTETRICS AND GYNECOLOGY | Facility: CLINIC | Age: 74
End: 2024-07-30
Payer: MEDICARE

## 2024-07-30 VITALS
SYSTOLIC BLOOD PRESSURE: 116 MMHG | WEIGHT: 259.5 LBS | DIASTOLIC BLOOD PRESSURE: 84 MMHG | HEIGHT: 68 IN | BODY MASS INDEX: 39.33 KG/M2

## 2024-07-30 DIAGNOSIS — Z01.419 WELL WOMAN EXAM WITH ROUTINE GYNECOLOGICAL EXAM: Primary | ICD-10-CM

## 2024-07-30 PROCEDURE — 1101F PT FALLS ASSESS-DOCD LE1/YR: CPT | Mod: HCNC,CPTII,S$GLB, | Performed by: STUDENT IN AN ORGANIZED HEALTH CARE EDUCATION/TRAINING PROGRAM

## 2024-07-30 PROCEDURE — G0101 CA SCREEN;PELVIC/BREAST EXAM: HCPCS | Mod: HCNC,S$GLB,, | Performed by: STUDENT IN AN ORGANIZED HEALTH CARE EDUCATION/TRAINING PROGRAM

## 2024-07-30 PROCEDURE — 99999 PR PBB SHADOW E&M-EST. PATIENT-LVL III: CPT | Mod: PBBFAC,HCNC,, | Performed by: STUDENT IN AN ORGANIZED HEALTH CARE EDUCATION/TRAINING PROGRAM

## 2024-07-30 PROCEDURE — 3044F HG A1C LEVEL LT 7.0%: CPT | Mod: HCNC,CPTII,S$GLB, | Performed by: STUDENT IN AN ORGANIZED HEALTH CARE EDUCATION/TRAINING PROGRAM

## 2024-07-30 PROCEDURE — 3079F DIAST BP 80-89 MM HG: CPT | Mod: HCNC,CPTII,S$GLB, | Performed by: STUDENT IN AN ORGANIZED HEALTH CARE EDUCATION/TRAINING PROGRAM

## 2024-07-30 PROCEDURE — 3074F SYST BP LT 130 MM HG: CPT | Mod: HCNC,CPTII,S$GLB, | Performed by: STUDENT IN AN ORGANIZED HEALTH CARE EDUCATION/TRAINING PROGRAM

## 2024-07-30 PROCEDURE — 1159F MED LIST DOCD IN RCRD: CPT | Mod: HCNC,CPTII,S$GLB, | Performed by: STUDENT IN AN ORGANIZED HEALTH CARE EDUCATION/TRAINING PROGRAM

## 2024-07-30 PROCEDURE — 4010F ACE/ARB THERAPY RXD/TAKEN: CPT | Mod: HCNC,CPTII,S$GLB, | Performed by: STUDENT IN AN ORGANIZED HEALTH CARE EDUCATION/TRAINING PROGRAM

## 2024-07-30 PROCEDURE — 3061F NEG MICROALBUMINURIA REV: CPT | Mod: HCNC,CPTII,S$GLB, | Performed by: STUDENT IN AN ORGANIZED HEALTH CARE EDUCATION/TRAINING PROGRAM

## 2024-07-30 PROCEDURE — 3288F FALL RISK ASSESSMENT DOCD: CPT | Mod: HCNC,CPTII,S$GLB, | Performed by: STUDENT IN AN ORGANIZED HEALTH CARE EDUCATION/TRAINING PROGRAM

## 2024-07-30 PROCEDURE — 1160F RVW MEDS BY RX/DR IN RCRD: CPT | Mod: HCNC,CPTII,S$GLB, | Performed by: STUDENT IN AN ORGANIZED HEALTH CARE EDUCATION/TRAINING PROGRAM

## 2024-07-30 PROCEDURE — 1126F AMNT PAIN NOTED NONE PRSNT: CPT | Mod: HCNC,CPTII,S$GLB, | Performed by: STUDENT IN AN ORGANIZED HEALTH CARE EDUCATION/TRAINING PROGRAM

## 2024-07-30 PROCEDURE — 3066F NEPHROPATHY DOC TX: CPT | Mod: HCNC,CPTII,S$GLB, | Performed by: STUDENT IN AN ORGANIZED HEALTH CARE EDUCATION/TRAINING PROGRAM

## 2024-07-30 NOTE — PROGRESS NOTES
History & Physical  Gynecology      SUBJECTIVE:     Chief Complaint: Well Woman       History of Present Illness:  73 y.o. postmenopausal female  here for annual.  Otherwise no complaints.  S/p hysterectomy >40 years ago for fibroids/benign indications.  Notices sensation of vaginal dc every now and then no odor, no irritation/itching. Not leaking urine. No bleeding. Not sexually active.    Last Pap: remote  History of abnormal pap: No  Last MMG: UTD 2023 BIRADS 1, [place]: 2.38 %  Colon cancer screening: UTD 2023, 3-yr interval  DXA: UTD 2024 Normal BMD  Denies FHx ovarian, uterine cancer   MU- colon ca   PGM- breast ca      Review of patient's allergies indicates:  No Known Allergies    Past Medical History:   Diagnosis Date    Allergy     Anemia     Arthritis     Cholelithiases     Cyst of kidney, acquired     Diverticulitis     Elevated TSH     Family history of Graves' disease: daughter, maternal aunt, maternal uncle 2016    Glaucoma suspect     Graves disease     Hx of colonic polyps     Hypertension     Liver cyst     MGUS (monoclonal gammopathy of unknown significance)     Migraine headache     Mitral valve problem     leakage    Obesity     Paraproteinemia     Sleep apnea     + CPAP    Smoldering multiple myeloma     Tricuspid valve disease     leakage     Past Surgical History:   Procedure Laterality Date    APPENDECTOMY      COLONOSCOPY N/A 10/23/2015    Procedure: COLONOSCOPY;  Surgeon: Brandon Ruelas MD;  Location: [address]);  Service: Endoscopy;  Laterality: N/A;  Had divertiulitis in 2015 with a recommendation for colonoscopy in 8 weeks    Dr. Ruelas is her GI physician    COLONOSCOPY N/A 2018    Procedure: COLONOSCOPY;  Surgeon: Brandon Ruelas MD;  Location: [address]);  Service: Endoscopy;  Laterality: N/A;  Dr. Ruelas did the last one multiple polyps, patient had recent diverticulitis should not schedule before Oct 10th     COLONOSCOPY N/A 2023    Procedure: COLONOSCOPY;  Surgeon: Lu Lewis MD;  Location: [facility] ([address] [place] [address]);  Service: Endoscopy;  Laterality: N/A;  pt offered earlier dates but stated she is out town and will be returning the evening of   cardiac clearance recieved-see tele encounter 23 referred by Dr. Lewis/PEG/instr. mailed and to portal-   pre-call no answer; MB    CYSTOSCOPY      HYSTERECTOMY   or     menorrhagia     OB History          4    Para   4    Term   4            AB        Living   4         SAB        IAB        Ectopic        Multiple        Live Births   4               Family History   Problem Relation Name Age of Onset    Heart disease Mother          MI    Heart attack Mother      Hypertension Father      Irregular heart beat Sister          fast heart rate    Cataracts Sister      Glaucoma Sister      No Known Problems Sister      Graves' disease Daughter Sanita     No Known Problems Daughter Edith     Heart disease Maternal Aunt          MI    Heart disease Maternal Aunt          MI    Colon cancer Maternal Uncle      Cancer Maternal Uncle          colon ca    Breast cancer Paternal Grandmother      Cancer Paternal Grandmother          GYN cancer - unknown cancer    No Known Problems Son Jus     No Known Problems Son Denoid     Melanoma Neg Hx      Ovarian cancer Neg Hx      Colon polyps Neg Hx      Rectal cancer Neg Hx      Stomach cancer Neg Hx      Esophageal cancer Neg Hx      Ulcerative colitis Neg Hx      Crohn's disease Neg Hx      Amblyopia Neg Hx      Blindness Neg Hx      Macular degeneration Neg Hx      Strabismus Neg Hx      Retinal detachment Neg Hx       Social History     Tobacco Use    Smoking status: Former     Current packs/day: 0.00     Types: Cigarettes     Start date: 1992     Quit date: 1995     Years since quittin.5     Passive exposure: Never    Smokeless tobacco: Never   Substance Use Topics    Alcohol  use: No    Drug use: No       Current Outpatient Medications   Medication Sig    ascorbic acid, vitamin C, (VITAMIN C) 500 MG tablet Take 500 mg by mouth once daily.    aspirin 81 MG Chew Take 81 mg by mouth once daily.    azelastine (ASTELIN) 137 mcg (0.1 %) nasal spray 2 sprays (274 mcg total) by Nasal route 2 (two) times daily.    cholecalciferol, vitamin D3, (VITAMIN D3) 50 mcg (2,000 unit) Cap capsule Take 1 capsule (2,000 Units total) by mouth once daily.    eszopiclone (LUNESTA) 2 MG Tab Take 1 tablet (2 mg total) by mouth every evening.    magnesium oxide (MAG-OX) 400 mg tablet Take 400 mg by mouth once daily.    metroNIDAZOLE (FLAGYL) 500 MG tablet Take 500 mg by mouth once.    multivit with min-folic acid 0.4 mg Tab Take 0.4 mg by mouth once daily.    olmesartan (BENICAR) 40 MG tablet Take 1 tablet (40 mg total) by mouth once daily.    psyllium (METAMUCIL) powder Take 1 packet by mouth Daily.    rizatriptan (MAXALT) 10 MG tablet TAKE 1 TABLET(10 MG) BY MOUTH EVERY 2 HOURS AS NEEDED FOR MIGRAINE. MAX 30 MG/ 24 AT BEDTIME    traZODone (DESYREL) 100 MG tablet Take 1 tablet (100 mg total) by mouth nightly as needed for Insomnia.     Current Facility-Administered Medications   Medication    LIDOcaine HCl 2% urojet         Review of Systems:  Review of Systems   Constitutional:  Negative for fever.   Respiratory:  Negative for cough and shortness of breath.    Cardiovascular:  Negative for chest pain and leg swelling.   Gastrointestinal:  Negative for nausea and vomiting.   Endocrine: Negative for diabetes.   Genitourinary:  Negative for dysuria, vaginal discharge and postmenopausal bleeding.   Integumentary:  Negative for rash, breast mass, nipple discharge, breast skin changes and breast tenderness.   Neurological:  Negative for syncope.   Psychiatric/Behavioral:  Negative for depression. The patient is not nervous/anxious.    Breast: Negative for lump, mass, mastodynia, nipple discharge, skin changes and  tenderness       OBJECTIVE:     Physical Exam:  Physical Exam  Exam conducted with a chaperone present.   Constitutional:       General: She is not in acute distress.     Appearance: Normal appearance.   HENT:      Head: Normocephalic and atraumatic.   Eyes:      Extraocular Movements: Extraocular movements intact.      Conjunctiva/sclera: Conjunctivae normal.   Pulmonary:      Effort: Pulmonary effort is normal. No respiratory distress.   Chest:   Breasts:     Right: No inverted nipple, mass, nipple discharge, skin change or tenderness.      Left: No inverted nipple, mass, nipple discharge, skin change or tenderness.   Abdominal:      General: Abdomen is flat. There is no distension.      Palpations: Abdomen is soft. There is no mass.      Tenderness: There is no abdominal tenderness. There is no guarding or rebound.      Hernia: No hernia is present.   Genitourinary:     General: Normal vulva.      Labia:         Right: No rash, tenderness or lesion.         Left: No rash, tenderness or lesion.       Urethra: No prolapse, urethral pain, urethral swelling or urethral lesion.      Vagina: Normal. No vaginal discharge, tenderness or bleeding.      Cervix: No friability.      Uterus: Absent.       Adnexa: Right adnexa normal and left adnexa normal.        Right: No mass, tenderness or fullness.          Left: No mass, tenderness or fullness.     Musculoskeletal:         General: No swelling or deformity. Normal range of motion.      Cervical back: Normal range of motion and neck supple.      Right lower leg: No edema.      Left lower leg: No edema.   Skin:     General: Skin is warm and dry.   Neurological:      Mental Status: She is alert and oriented to person, place, and time.   Psychiatric:         Mood and Affect: Mood normal.         Behavior: Behavior normal.           ASSESSMENT:       ICD-10-CM ICD-9-CM    1. Well woman exam with routine gynecological exam  Z01.419 V72.31                Plan:      Manju was  seen today for well woman.    Diagnoses and all orders for this visit:    Well woman exam with routine gynecological exam    Pap not indicated  Pelvic, breast exams within normal limits- small amt normal healthy d/c noted which is reassuring as we discussed.  MMG, colonoscopy, DXA up to date    RTC 2 years for WWE or sooner STAR Mclean MD  Obstetrics & Gynecology   [facility]

## 2024-08-06 ENCOUNTER — OFFICE VISIT (OUTPATIENT)
Dept: HEPATOLOGY | Facility: CLINIC | Age: 74
End: 2024-08-06
Attending: INTERNAL MEDICINE
Payer: MEDICARE

## 2024-08-06 VITALS
WEIGHT: 260.13 LBS | HEART RATE: 86 BPM | HEIGHT: 68 IN | DIASTOLIC BLOOD PRESSURE: 71 MMHG | BODY MASS INDEX: 39.42 KG/M2 | SYSTOLIC BLOOD PRESSURE: 114 MMHG

## 2024-08-06 DIAGNOSIS — D47.2 SMOLDERING MULTIPLE MYELOMA (SMM): ICD-10-CM

## 2024-08-06 DIAGNOSIS — K76.89 SIMPLE HEPATIC CYST: ICD-10-CM

## 2024-08-06 PROCEDURE — 99999 PR PBB SHADOW E&M-EST. PATIENT-LVL IV: CPT | Mod: PBBFAC,HCNC,, | Performed by: INTERNAL MEDICINE

## 2024-08-08 ENCOUNTER — OFFICE VISIT (OUTPATIENT)
Dept: ORTHOPEDICS | Facility: CLINIC | Age: 74
End: 2024-08-08
Payer: MEDICARE

## 2024-08-08 VITALS — BODY MASS INDEX: 39.77 KG/M2 | WEIGHT: 262.44 LBS | HEIGHT: 68 IN

## 2024-08-08 DIAGNOSIS — M17.12 PRIMARY OSTEOARTHRITIS OF LEFT KNEE: ICD-10-CM

## 2024-08-08 DIAGNOSIS — M17.0 PRIMARY OSTEOARTHRITIS OF BOTH KNEES: Primary | ICD-10-CM

## 2024-08-08 PROCEDURE — 3044F HG A1C LEVEL LT 7.0%: CPT | Mod: HCNC,CPTII,S$GLB, | Performed by: ORTHOPAEDIC SURGERY

## 2024-08-08 PROCEDURE — 4010F ACE/ARB THERAPY RXD/TAKEN: CPT | Mod: HCNC,CPTII,S$GLB, | Performed by: ORTHOPAEDIC SURGERY

## 2024-08-08 PROCEDURE — 99213 OFFICE O/P EST LOW 20 MIN: CPT | Mod: HCNC,S$GLB,, | Performed by: ORTHOPAEDIC SURGERY

## 2024-08-08 PROCEDURE — 1159F MED LIST DOCD IN RCRD: CPT | Mod: HCNC,CPTII,S$GLB, | Performed by: ORTHOPAEDIC SURGERY

## 2024-08-08 PROCEDURE — 1101F PT FALLS ASSESS-DOCD LE1/YR: CPT | Mod: HCNC,CPTII,S$GLB, | Performed by: ORTHOPAEDIC SURGERY

## 2024-08-08 PROCEDURE — 3066F NEPHROPATHY DOC TX: CPT | Mod: HCNC,CPTII,S$GLB, | Performed by: ORTHOPAEDIC SURGERY

## 2024-08-08 PROCEDURE — 3008F BODY MASS INDEX DOCD: CPT | Mod: HCNC,CPTII,S$GLB, | Performed by: ORTHOPAEDIC SURGERY

## 2024-08-08 PROCEDURE — 99999 PR PBB SHADOW E&M-EST. PATIENT-LVL III: CPT | Mod: PBBFAC,HCNC,, | Performed by: ORTHOPAEDIC SURGERY

## 2024-08-08 PROCEDURE — 1126F AMNT PAIN NOTED NONE PRSNT: CPT | Mod: HCNC,CPTII,S$GLB, | Performed by: ORTHOPAEDIC SURGERY

## 2024-08-08 PROCEDURE — 3288F FALL RISK ASSESSMENT DOCD: CPT | Mod: HCNC,CPTII,S$GLB, | Performed by: ORTHOPAEDIC SURGERY

## 2024-08-08 PROCEDURE — 3061F NEG MICROALBUMINURIA REV: CPT | Mod: HCNC,CPTII,S$GLB, | Performed by: ORTHOPAEDIC SURGERY

## 2024-08-09 ENCOUNTER — LAB VISIT (OUTPATIENT)
Dept: LAB | Facility: OTHER | Age: 74
End: 2024-08-09
Attending: STUDENT IN AN ORGANIZED HEALTH CARE EDUCATION/TRAINING PROGRAM
Payer: MEDICARE

## 2024-08-09 DIAGNOSIS — D47.2 SMOLDERING MULTIPLE MYELOMA (SMM): ICD-10-CM

## 2024-08-09 LAB
ALBUMIN SERPL BCP-MCNC: 3.5 G/DL (ref 3.5–5.2)
ALP SERPL-CCNC: 70 U/L (ref 55–135)
ALT SERPL W/O P-5'-P-CCNC: 19 U/L (ref 10–44)
ANION GAP SERPL CALC-SCNC: 7 MMOL/L (ref 8–16)
AST SERPL-CCNC: 21 U/L (ref 10–40)
BASOPHILS # BLD AUTO: 0.01 K/UL (ref 0–0.2)
BASOPHILS NFR BLD: 0.3 % (ref 0–1.9)
BILIRUB SERPL-MCNC: 0.9 MG/DL (ref 0.1–1)
BUN SERPL-MCNC: 15 MG/DL (ref 8–23)
CALCIUM SERPL-MCNC: 9.9 MG/DL (ref 8.7–10.5)
CHLORIDE SERPL-SCNC: 107 MMOL/L (ref 95–110)
CO2 SERPL-SCNC: 28 MMOL/L (ref 23–29)
CREAT SERPL-MCNC: 1.1 MG/DL (ref 0.5–1.4)
DIFFERENTIAL METHOD BLD: ABNORMAL
EOSINOPHIL # BLD AUTO: 0 K/UL (ref 0–0.5)
EOSINOPHIL NFR BLD: 0.3 % (ref 0–8)
ERYTHROCYTE [DISTWIDTH] IN BLOOD BY AUTOMATED COUNT: 11.4 % (ref 11.5–14.5)
EST. GFR  (NO RACE VARIABLE): 53 ML/MIN/1.73 M^2
GLUCOSE SERPL-MCNC: 87 MG/DL (ref 70–110)
HCT VFR BLD AUTO: 36.4 % (ref 37–48.5)
HGB BLD-MCNC: 11 G/DL (ref 12–16)
IGA SERPL-MCNC: 100 MG/DL (ref 40–350)
IGG SERPL-MCNC: 1524 MG/DL (ref 650–1600)
IGM SERPL-MCNC: 36 MG/DL (ref 50–300)
IMM GRANULOCYTES # BLD AUTO: 0.02 K/UL (ref 0–0.04)
IMM GRANULOCYTES NFR BLD AUTO: 0.6 % (ref 0–0.5)
LYMPHOCYTES # BLD AUTO: 1.2 K/UL (ref 1–4.8)
LYMPHOCYTES NFR BLD: 35.7 % (ref 18–48)
MCH RBC QN AUTO: 30.8 PG (ref 27–31)
MCHC RBC AUTO-ENTMCNC: 30.2 G/DL (ref 32–36)
MCV RBC AUTO: 102 FL (ref 82–98)
MONOCYTES # BLD AUTO: 0.3 K/UL (ref 0.3–1)
MONOCYTES NFR BLD: 9.4 % (ref 4–15)
NEUTROPHILS # BLD AUTO: 1.8 K/UL (ref 1.8–7.7)
NEUTROPHILS NFR BLD: 53.7 % (ref 38–73)
NRBC BLD-RTO: 0 /100 WBC
PLATELET # BLD AUTO: 210 K/UL (ref 150–450)
PMV BLD AUTO: 9.7 FL (ref 9.2–12.9)
POTASSIUM SERPL-SCNC: 4.1 MMOL/L (ref 3.5–5.1)
PROT SERPL-MCNC: 6.8 G/DL (ref 6–8.4)
RBC # BLD AUTO: 3.57 M/UL (ref 4–5.4)
SODIUM SERPL-SCNC: 142 MMOL/L (ref 136–145)
WBC # BLD AUTO: 3.42 K/UL (ref 3.9–12.7)

## 2024-08-09 PROCEDURE — 85025 COMPLETE CBC W/AUTO DIFF WBC: CPT | Mod: HCNC | Performed by: INTERNAL MEDICINE

## 2024-08-09 PROCEDURE — 80053 COMPREHEN METABOLIC PANEL: CPT | Mod: HCNC | Performed by: INTERNAL MEDICINE

## 2024-08-09 PROCEDURE — 86334 IMMUNOFIX E-PHORESIS SERUM: CPT | Mod: 26,HCNC,, | Performed by: PATHOLOGY

## 2024-08-09 PROCEDURE — 84165 PROTEIN E-PHORESIS SERUM: CPT | Mod: HCNC | Performed by: INTERNAL MEDICINE

## 2024-08-09 PROCEDURE — 83521 IG LIGHT CHAINS FREE EACH: CPT | Mod: HCNC | Performed by: INTERNAL MEDICINE

## 2024-08-09 PROCEDURE — 82784 ASSAY IGA/IGD/IGG/IGM EACH: CPT | Mod: 59,HCNC | Performed by: INTERNAL MEDICINE

## 2024-08-09 PROCEDURE — 36415 COLL VENOUS BLD VENIPUNCTURE: CPT | Mod: HCNC | Performed by: INTERNAL MEDICINE

## 2024-08-09 PROCEDURE — 86334 IMMUNOFIX E-PHORESIS SERUM: CPT | Mod: HCNC | Performed by: INTERNAL MEDICINE

## 2024-08-09 PROCEDURE — 84165 PROTEIN E-PHORESIS SERUM: CPT | Mod: 26,HCNC,, | Performed by: PATHOLOGY

## 2024-08-12 ENCOUNTER — OFFICE VISIT (OUTPATIENT)
Dept: HEMATOLOGY/ONCOLOGY | Facility: CLINIC | Age: 74
End: 2024-08-12
Payer: MEDICARE

## 2024-08-12 VITALS
OXYGEN SATURATION: 96 % | BODY MASS INDEX: 39.96 KG/M2 | TEMPERATURE: 98 F | SYSTOLIC BLOOD PRESSURE: 148 MMHG | HEART RATE: 70 BPM | DIASTOLIC BLOOD PRESSURE: 67 MMHG | RESPIRATION RATE: 18 BRPM | WEIGHT: 263.69 LBS | HEIGHT: 68 IN

## 2024-08-12 DIAGNOSIS — D72.819 CHRONIC LEUKOPENIA: ICD-10-CM

## 2024-08-12 DIAGNOSIS — D47.2 SMOLDERING MULTIPLE MYELOMA (SMM): Primary | ICD-10-CM

## 2024-08-12 DIAGNOSIS — D63.8 ANEMIA OF CHRONIC DISEASE: ICD-10-CM

## 2024-08-12 LAB
ALBUMIN SERPL ELPH-MCNC: 3.58 G/DL (ref 3.35–5.55)
ALPHA1 GLOB SERPL ELPH-MCNC: 0.24 G/DL (ref 0.17–0.41)
ALPHA2 GLOB SERPL ELPH-MCNC: 0.55 G/DL (ref 0.43–0.99)
B-GLOBULIN SERPL ELPH-MCNC: 0.68 G/DL (ref 0.5–1.1)
GAMMA GLOB SERPL ELPH-MCNC: 1.34 G/DL (ref 0.67–1.58)
INTERPRETATION SERPL IFE-IMP: NORMAL
KAPPA LC SER QL IA: 12.31 MG/DL (ref 0.33–1.94)
KAPPA LC/LAMBDA SER IA: 8.21 (ref 0.26–1.65)
LAMBDA LC SER QL IA: 1.5 MG/DL (ref 0.57–2.63)
PROT SERPL-MCNC: 6.4 G/DL (ref 6–8.4)

## 2024-08-12 PROCEDURE — 99999 PR PBB SHADOW E&M-EST. PATIENT-LVL IV: CPT | Mod: PBBFAC,HCNC,,

## 2024-08-12 NOTE — PROGRESS NOTES
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 Past medical history of HTN, migraines, Graves' eye, disease/hypothyroidism, polycystic kidney disease, diverticulosis, BECKY, and mitral/tricuspid valve disorders.    72 y.o. female'; Ms. Aranda is here for  IgG kappa smoldering myeloma. Follow up.   Overall doing well since our last appt with no acute complaints.    No focal pain. Denies fever, chills, nightsweats, bleeding, brusing, lymphadenopathy, signs/symptoms of splenomegaly, focal pain, neuropathy, recurrent infection .   Her  4/28/23  labs show no evidence of biochemical or clinical progression.    Interval History 8/12/2024:  She reports she is feeling well and without changes to her symptoms. Patient denies fever, chills, drenching night sweats, unexplained weight loss, new/worsening back pain, chest pain, shortness of breath, peripheral neuropathy, N/V/D. She follows twice/year with her PCP for her blood pressure.      Past Medical History, Social History, and Past Family History are unchanged since last evaluation except for HPI.    CURRENT MEDICATIONS:   Current Outpatient Medications   Medication Sig    ascorbic acid, vitamin C, (VITAMIN C) 500 MG tablet Take 500 mg by mouth once daily.    aspirin 81 MG Chew Take 81 mg by mouth once daily.    azelastine (ASTELIN) 137 mcg (0.1 %) nasal spray 2 sprays (274 mcg total) by Nasal route 2 (two) times daily.    cholecalciferol, vitamin D3, (VITAMIN D3) 50 mcg (2,000 unit) Cap capsule Take 1 capsule (2,000 Units total) by mouth once daily.    eszopiclone (LUNESTA) 2 MG Tab Take 1 tablet (2 mg total) by mouth every evening.    magnesium oxide (MAG-OX) 400 mg tablet Take 400 mg by mouth once daily.     metroNIDAZOLE (FLAGYL) 500 MG tablet Take 500 mg by mouth once.    multivit with min-folic acid 0.4 mg Tab Take 0.4 mg by mouth once daily.    olmesartan (BENICAR) 40 MG tablet Take 1 tablet (40 mg total) by mouth once daily.    psyllium (METAMUCIL) powder Take 1 packet by mouth Daily.    rizatriptan (MAXALT) 10 MG tablet TAKE 1 TABLET(10 MG) BY MOUTH EVERY 2 HOURS AS NEEDED FOR MIGRAINE. MAX 30 MG/ 24 AT BEDTIME    traZODone (DESYREL) 100 MG tablet Take 1 tablet (100 mg total) by mouth nightly as needed for Insomnia.     Current Facility-Administered Medications   Medication    LIDOcaine HCl 2% urojet     ALLERGIES:   Review of patient's allergies indicates:  No Known Allergies    Review of Systems:     Review of Systems   Constitutional:  Negative for chills, diaphoresis, fever and weight loss.   HENT:  Negative for congestion, sinus pain and sore throat.    Eyes:  Negative for blurred vision and pain.   Respiratory:  Negative for shortness of breath and wheezing.    Cardiovascular:  Negative for chest pain and palpitations.   Gastrointestinal:  Negative for blood in stool, diarrhea, nausea and vomiting.   Genitourinary:  Negative for dysuria and hematuria.   Musculoskeletal:  Negative for back pain, falls and neck pain.   Skin:  Negative for rash.   Neurological:  Negative for dizziness, sensory change, weakness and headaches.   Psychiatric/Behavioral:  The patient is not nervous/anxious and does not have insomnia.        Physical Exam:     Vitals:    08/12/24 1117   BP: (!) 148/67   Pulse: 70   Resp: 18   Temp: 98.3 °F (36.8 °C)       Physical Exam  Vitals reviewed.   Constitutional:       General: She is not in acute distress.     Appearance: Normal appearance. She is obese. She is not ill-appearing.   HENT:      Head: Normocephalic and atraumatic.      Nose: Nose normal.      Mouth/Throat:      Mouth: Mucous membranes are moist.      Pharynx: Oropharynx is clear. No oropharyngeal exudate.   Eyes:      Pupils:  Pupils are equal, round, and reactive to light.   Cardiovascular:      Rate and Rhythm: Normal rate and regular rhythm.      Heart sounds: No murmur heard.  Pulmonary:      Effort: Pulmonary effort is normal.      Breath sounds: Normal breath sounds. No wheezing.   Abdominal:      General: Bowel sounds are normal.      Palpations: Abdomen is soft.      Tenderness: There is no abdominal tenderness.   Musculoskeletal:         General: Normal range of motion.      Cervical back: Normal range of motion and neck supple.      Right lower leg: No edema.      Left lower leg: No edema.   Skin:     General: Skin is warm and dry.      Capillary Refill: Capillary refill takes less than 2 seconds.      Findings: No lesion or rash.   Neurological:      Mental Status: She is alert and oriented to person, place, and time.      Motor: No weakness.   Psychiatric:         Mood and Affect: Mood normal.         Behavior: Behavior normal.         Thought Content: Thought content normal.        ECOG Performance Status: (foot note - ECOG PS provided by Eastern Cooperative Oncology Group) 0 - Asymptomatic    Karnofsky Performance Score:  100%- Normal, No Complaints, No Evidence of Disease    Labs:   Lab Results   Component Value Date    WBC 3.42 (L) 08/09/2024    HGB 11.0 (L) 08/09/2024    HCT 36.4 (L) 08/09/2024     (H) 08/09/2024     08/09/2024     Lab Results   Component Value Date     08/09/2024    K 4.1 08/09/2024     08/09/2024    CO2 28 08/09/2024    BUN 15 08/09/2024    CREATININE 1.1 08/09/2024    ALBUMIN 3.5 08/09/2024    BILITOT 0.9 08/09/2024    ALKPHOS 70 08/09/2024    AST 21 08/09/2024    ALT 19 08/09/2024     Imaging:   None    Pathology:  None     Assessment and Plan:     Problem List Items Addressed This Visit       Smoldering multiple myeloma (SMM) - Primary  IgG kappa smoldering myeloma diagnosed 2010, under observation since that time; previous pt of Dr. Lora; transitioned care to   [facility] Sept 2015.    Mild intermittent leukopenia/anemia are chronic and pre-date myeloma diagnosis; stable  Biochemical studies/Labs from 8/9/2024 with no evidence of crab or progression   Discussed now that >10 years from diagnosis with no progression to symptomatic myeloma her risk of progression now decreases significantly and her disease is more clinically like an MGUS, but that we should still monitor her biochemcial studies q 6 months and see her for MD appt annually   Plan to image based on symptoms only   Educated on symptoms for which to seek attn in our clinic earlier       Anemia/Leukopenia  Chronic, pre-dating smoldering myeloma diagnosis  Anemia and leukopenia long-standing, going back to 2009  Both stable and unchanged        BMT Chart Routing      Follow up with physician    Follow up with [facility] 1 year. Smoldering Myeloma   Provider visit type    Infusion scheduling note    Injection scheduling note    Labs CBC, CMP, immunofixation, immunoglobulins, free light chains and SPEP   Scheduling:  Preferred lab:  Lab interval: every 6 months  Labs in 6 months, and again in 1 year 2-3 days prior to follow up   Imaging    Pharmacy appointment    Other referrals                 Orders Placed:      Orders Placed This Encounter    CBC W/ AUTO DIFFERENTIAL    CMP    Immunoglobulins (IgG, IgA, IgM) Quantitative    FREE LT CHAIN ANAL    PROTEIN ELECTROPHORESIS, SERUM    IMMUNOFIXATION ELECTROPHORESIS, SERUM       Total time of this visit was 25 minutes, including time spent face to face with patient and/or via video/audio, and also in preparing for today's visit for MDM and documentation. (Medical Decision Making, including consideration of possible diagnoses, management options, complex medical record review, review of diagnostic tests and information, consideration and discussion of significant complications based on comorbidities, and discussion with providers involved with the care of the patient). Greater  than 50% was spent face to face with the patient counseling and coordinating care.    Thank you for allowing me to partake in the care of this patient. If there are any questions, please do not hesitate to reach out.    FRANCA Baltazar-BRIAN  Hematologic Malignancies, Stem Cell Transplantation, and Cellular Therapy  [facility]

## 2024-08-13 LAB
PATHOLOGIST INTERPRETATION IFE: NORMAL
PATHOLOGIST INTERPRETATION SPE: NORMAL

## 2024-08-17 DIAGNOSIS — L02.92 BOIL: Primary | ICD-10-CM

## 2024-08-17 RX ORDER — SULFAMETHOXAZOLE AND TRIMETHOPRIM 400; 80 MG/1; MG/1
1 TABLET ORAL DAILY
Qty: 10 TABLET | Refills: 0 | Status: SHIPPED | OUTPATIENT
Start: 2024-08-17 | End: 2024-08-27

## 2024-08-18 ENCOUNTER — HOSPITAL ENCOUNTER (OUTPATIENT)
Dept: RADIOLOGY | Facility: HOSPITAL | Age: 74
Discharge: HOME | End: 2024-08-18
Attending: INTERNAL MEDICINE
Payer: MEDICARE

## 2024-08-18 DIAGNOSIS — K76.89 SIMPLE HEPATIC CYST: ICD-10-CM

## 2024-08-18 PROCEDURE — 25500020 PHARM REV CODE 255: Mod: HCNC | Performed by: INTERNAL MEDICINE

## 2024-08-18 PROCEDURE — 74183 MRI ABD W/O CNTR FLWD CNTR: CPT | Mod: 26,HCNC,, | Performed by: RADIOLOGY

## 2024-08-18 PROCEDURE — 74183 MRI ABD W/O CNTR FLWD CNTR: CPT | Mod: TC,HCNC

## 2024-08-18 PROCEDURE — A9585 GADOBUTROL INJECTION: HCPCS | Mod: HCNC | Performed by: INTERNAL MEDICINE

## 2024-08-18 RX ORDER — GADOBUTROL 604.72 MG/ML
10 INJECTION INTRAVENOUS
Status: COMPLETED | OUTPATIENT
Start: 2024-08-18 | End: 2024-08-18

## 2024-08-18 RX ADMIN — GADOBUTROL 10 ML: 604.72 INJECTION INTRAVENOUS at 09:08

## 2024-08-19 ENCOUNTER — PATIENT MESSAGE (OUTPATIENT)
Dept: HEPATOLOGY | Facility: CLINIC | Age: 74
End: 2024-08-19
Payer: MEDICARE

## 2024-09-04 ENCOUNTER — OFFICE VISIT (OUTPATIENT)
Dept: URGENT CARE | Facility: CLINIC | Age: 74
End: 2024-09-04
Payer: MEDICARE

## 2024-09-04 VITALS
HEART RATE: 78 BPM | HEIGHT: 68 IN | BODY MASS INDEX: 39.86 KG/M2 | TEMPERATURE: 97 F | SYSTOLIC BLOOD PRESSURE: 126 MMHG | RESPIRATION RATE: 19 BRPM | DIASTOLIC BLOOD PRESSURE: 84 MMHG | OXYGEN SATURATION: 98 % | WEIGHT: 263 LBS

## 2024-09-04 DIAGNOSIS — U07.1 COVID-19 VIRUS DETECTED: ICD-10-CM

## 2024-09-04 DIAGNOSIS — J06.9 URI, ACUTE: ICD-10-CM

## 2024-09-04 DIAGNOSIS — R05.9 COUGH, UNSPECIFIED TYPE: Primary | ICD-10-CM

## 2024-09-04 DIAGNOSIS — U07.1 LAB TEST POSITIVE FOR DETECTION OF COVID-19 VIRUS: ICD-10-CM

## 2024-09-04 LAB
CTP QC/QA: YES
SARS-COV-2 AG RESP QL IA.RAPID: POSITIVE

## 2024-09-04 RX ORDER — BENZONATATE 100 MG/1
200 CAPSULE ORAL 3 TIMES DAILY PRN
Qty: 30 CAPSULE | Refills: 1 | Status: SHIPPED | OUTPATIENT
Start: 2024-09-04

## 2024-09-04 RX ORDER — NIRMATRELVIR AND RITONAVIR 300-100 MG
KIT ORAL
Qty: 30 TABLET | Refills: 0 | Status: SHIPPED | OUTPATIENT
Start: 2024-09-04

## 2024-09-04 RX ORDER — LORATADINE 10 MG/1
10 TABLET ORAL DAILY
Qty: 30 TABLET | Refills: 2 | Status: SHIPPED | OUTPATIENT
Start: 2024-09-04 | End: 2025-09-04

## 2024-09-04 NOTE — PROGRESS NOTES
"Subjective:      Patient ID: Manju Aranda is a 73 y.o. female.    Vitals:  height is 5' 8" (1.727 m) and weight is 119.3 kg (263 lb). Her tympanic temperature is 97 °F (36.1 °C). Her blood pressure is 126/84 and her pulse is 78. Her respiration is 19 and oxygen saturation is 98%.     Chief Complaint: Cough    Patient presents with cough, congestion, runny nose. Onset 4 days . Low grade fever      Cough  This is a new problem. Episode onset: -4days. Associated symptoms include nasal congestion and postnasal drip. Pertinent negatives include no fever, headaches or sore throat. She has tried nothing for the symptoms.       Constitution: Negative for fever.   HENT:  Positive for postnasal drip. Negative for sore throat.    Respiratory:  Positive for cough.    Neurological:  Negative for headaches.      Objective:     Physical Exam   Constitutional: She is oriented to person, place, and time. She appears well-developed. She is cooperative.  Non-toxic appearance. She does not appear ill. No distress.   HENT:   Head: Normocephalic and atraumatic.   Ears:   Right Ear: Hearing, tympanic membrane, external ear and ear canal normal.   Left Ear: Hearing, tympanic membrane, external ear and ear canal normal.   Nose: Nose normal. No mucosal edema, rhinorrhea or nasal deformity. No epistaxis. Right sinus exhibits no maxillary sinus tenderness and no frontal sinus tenderness. Left sinus exhibits no maxillary sinus tenderness and no frontal sinus tenderness.   Mouth/Throat: Uvula is midline and mucous membranes are normal. Mucous membranes are moist. No trismus in the jaw. Normal dentition. No uvula swelling. Posterior oropharyngeal erythema present. No oropharyngeal exudate. Oropharynx is clear.   Eyes: Conjunctivae and lids are normal. Right eye exhibits no discharge. Left eye exhibits no discharge. No scleral icterus.   Neck: Trachea normal and phonation normal. Neck supple.   Cardiovascular: Normal rate, regular rhythm, " normal heart sounds and normal pulses.   Pulmonary/Chest: Effort normal and breath sounds normal. No respiratory distress.   Abdominal: Normal appearance and bowel sounds are normal. She exhibits no distension and no mass. Soft. There is no abdominal tenderness.   Musculoskeletal: Normal range of motion.         General: No deformity. Normal range of motion.   Neurological: She is alert and oriented to person, place, and time. She exhibits normal muscle tone. Coordination normal.   Skin: Skin is warm, dry, intact, not diaphoretic and not pale.   Psychiatric: Her speech is normal and behavior is normal. Judgment and thought content normal.   Nursing note and vitals reviewed.      Assessment:     1. Cough, unspecified type    2. URI, acute    3. Lab test positive for detection of COVID-19 virus        Plan:       Cough, unspecified type  -     SARS Coronavirus 2 Antigen, POCT Manual Read    URI, acute    Lab test positive for detection of COVID-19 virus    Other orders  -     nirmatrelvir-ritonavir (PAXLOVID) 300 mg (150 mg x 2)-100 mg copackaged tablets (EUA); Take 3 tablets by mouth 2 (two) times daily. Each dose contains 2 nirmatrelvir (pink tablets) and 1 ritonavir (white tablet). Take all 3 tablets together  Dispense: 30 tablet; Refill: 0  -     benzonatate (TESSALON PERLES) 100 MG capsule; Take 2 capsules (200 mg total) by mouth 3 (three) times daily as needed for Cough.  Dispense: 30 capsule; Refill: 1  -     loratadine (CLARITIN) 10 mg tablet; Take 1 tablet (10 mg total) by mouth once daily.  Dispense: 30 tablet; Refill: 2       Results for orders placed or performed in visit on 09/04/24   SARS Coronavirus 2 Antigen, POCT Manual Read   Result Value Ref Range    SARS Coronavirus 2 Antigen Positive (A) Negative     Acceptable Yes      *Note: Due to a large number of results and/or encounters for the requested time period, some results have not been displayed. A complete set of results can be found  in Results Review.

## 2024-09-12 ENCOUNTER — TELEPHONE (OUTPATIENT)
Dept: NEPHROLOGY | Facility: CLINIC | Age: 74
End: 2024-09-12
Payer: MEDICARE

## 2024-10-03 ENCOUNTER — TELEPHONE (OUTPATIENT)
Dept: ORTHOPEDICS | Facility: CLINIC | Age: 74
End: 2024-10-03
Payer: MEDICARE

## 2024-10-03 NOTE — TELEPHONE ENCOUNTER
Spoke to pt in regards to the message below, pt states she would like to move her surgery date up from February 24, 2025 if possible. She states that she is in a great amount of pain and can not wait until next year. Informed pt I would send the message over to [place].     Sincerely,  Janice Hawkins, [facility]   Certified Clinical Medical Assistant to Dr. Mack alcala  Phone: [phone]  Fax: [phone]      ----- Message from Juan A sent at 10/3/2024  8:25 AM CDT -----  Regarding: PT'S REQUESTING A CALL BACK REGARDING HER SURGERY DATE  Contact: PT  Confirmed contact info below:  Contact Name: Manju Aranda  Phone Number: [phone]

## 2024-10-04 ENCOUNTER — TELEPHONE (OUTPATIENT)
Dept: ORTHOPEDICS | Facility: CLINIC | Age: 74
End: 2024-10-04
Payer: MEDICARE

## 2024-10-04 NOTE — TELEPHONE ENCOUNTER
I called the patient today regarding her surgery. The patient is going to move her surgery up to 12/23 . The patient verbalized understanding and has no further questions.         ----- Message from Med Assistant Camacho sent at 10/3/2024  9:59 AM CDT -----  Regarding: FW: PT'S REQUESTING A CALL BACK REGARDING HER SURGERY DATE  Contact: PT  Good Morning,     Spoke to pt in regards to the message below, pt states she would like to move her surgery date up from February 24, 2025 if possible. She states that she is in a great amount of pain and can not wait until next year.     Sincerely,  Janice Hawkins, [facility]   Certified Clinical Medical Assistant to Dr. Mack alcala  Phone: [phone]  Fax: [phone]  ----- Message -----  From: Juan A Burt  Sent: 10/3/2024   8:27 AM CDT  To: Nicho DUARTE Staff  Subject: PT'S REQUESTING A CALL BACK REGARDING HER COWAN#    Confirmed contact info below:  Contact Name: Manju Aranda  Phone Number: [phone]

## 2024-10-21 ENCOUNTER — PATIENT OUTREACH (OUTPATIENT)
Dept: ADMINISTRATIVE | Facility: HOSPITAL | Age: 74
End: 2024-10-21
Payer: MEDICARE

## 2024-10-21 ENCOUNTER — PATIENT MESSAGE (OUTPATIENT)
Dept: ADMINISTRATIVE | Facility: HOSPITAL | Age: 74
End: 2024-10-21
Payer: MEDICARE

## 2024-10-21 DIAGNOSIS — Z12.31 ENCOUNTER FOR SCREENING MAMMOGRAM FOR MALIGNANT NEOPLASM OF BREAST: Primary | ICD-10-CM

## 2024-11-04 DIAGNOSIS — M17.12 PRIMARY OSTEOARTHRITIS OF LEFT KNEE: Primary | ICD-10-CM

## 2024-11-06 ENCOUNTER — PATIENT MESSAGE (OUTPATIENT)
Dept: OPTOMETRY | Facility: CLINIC | Age: 74
End: 2024-11-06
Payer: MEDICARE

## 2024-11-25 ENCOUNTER — TELEPHONE (OUTPATIENT)
Dept: PREADMISSION TESTING | Facility: HOSPITAL | Age: 74
End: 2024-11-25
Payer: MEDICARE

## 2024-11-25 DIAGNOSIS — M17.12 PRIMARY OSTEOARTHRITIS OF LEFT KNEE: Primary | ICD-10-CM

## 2024-11-25 DIAGNOSIS — M79.609 PAIN IN EXTREMITY, UNSPECIFIED EXTREMITY: Primary | ICD-10-CM

## 2024-11-25 DIAGNOSIS — Z01.818 PRE-OP TESTING: ICD-10-CM

## 2024-11-25 DIAGNOSIS — E05.00 GRAVES' EYE DISEASE: ICD-10-CM

## 2024-11-25 NOTE — TELEPHONE ENCOUNTER
----- Message from JESSICA Espinosa sent at 11/25/2024 12:51 PM CST -----  (12/23) Please schedule EKG and labs (CBC, CMP, PT/INR, TSH).  Thanks,  Francisca

## 2024-11-25 NOTE — ANESTHESIA PAT ROS NOTE
11/25/2024  Manju Aranda is a 74 y.o., female.      Pre-op Assessment          Review of Systems           Anesthesia Assessment: Preoperative EQUATION    Planned Procedure: Procedure(s) (LRB):  ARTHROPLASTY, KNEE, TOTAL, USING [employer] COMPUTER-ASSISTED NAVIGATION: LEFT: DEPUY - ATTUNE (Left)  Requested Anesthesia Type:Regional  Surgeon: Mack Torre III, MD  Service: Orthopedics  Known or anticipated Date of Surgery:12/23/2024    Surgeon notes: reviewed    Electronic QUestionnaire Assessment completed via nurse interview with patient.        Triage considerations:     The patient has no apparent active cardiac condition (No unstable coronary Syndrome such as severe unstable angina or recent [<1 month] myocardial infarction, decompensated CHF, severe valvular   disease or significant arrhythmia)    Previous anesthesia records:GETA and No problems  6/13/23   COLONOSCOPY   Airway:  Mallampati: II   Mouth Opening: Normal  TM Distance: Normal  Tongue: Normal  Neck ROM: Normal ROM      Last PCP note: 6-12 months ago , within [facility]   Subspecialty notes: Cardiology: General, Endocrinology, Hematology/Oncology, Hepatology, Cardiothoracic Surgery    Other important co-morbidities: HTN, Hypothyroid, Obesity, BECKY, and Graves Eye Disease, PCKD, Smoldering MM, Simple Hepatic Cyst, TAA (stable @ 4cm), Grade 1DD, Mild Anemia       Tests already available:  Available tests,  within [facility] .   9/4/24 SARS  8/18/24 Abd MRI  8/9/24 CMP, CBC  5/22/24 ECHO, EKG   5/1/24 CTA Chest   4/17/24 A1C  2/27/24 TSH             Instructions given. (See in Nurse's note)    Optimization:  Anesthesia Preop Clinic Assessment  Indicated POC    Medical Opinion Indicated       Sub-specialist consult indicated:   [facility] NP         Plan:    Testing:  CMP, EKG, Hematology Profile, PT/INR, and TSH   Pre-anesthesia  visit        Visit focus: possible regional anesthesia and/or nerve block      Consultation:[facility] NP for medical and anesthesia optimization       Patient  has previously scheduled Medical Appointment: 12/3    Navigation: Tests Scheduled.              Consults scheduled.             Results will be tracked by Preop Clinic.

## 2024-11-26 DIAGNOSIS — G47.00 INSOMNIA, UNSPECIFIED TYPE: ICD-10-CM

## 2024-11-26 RX ORDER — TRAZODONE HYDROCHLORIDE 100 MG/1
100 TABLET ORAL NIGHTLY PRN
Qty: 90 TABLET | Refills: 1 | Status: SHIPPED | OUTPATIENT
Start: 2024-11-26

## 2024-11-26 NOTE — TELEPHONE ENCOUNTER
No care due was identified.  Health [facility] Embedded Care Due Messages. Reference number: [id number].   11/26/2024 10:15:53 AM CST

## 2024-11-29 PROBLEM — Z78.9 IMPAIRED MOBILITY AND ACTIVITIES OF DAILY LIVING: Status: RESOLVED | Noted: 2024-05-03 | Resolved: 2024-11-29

## 2024-11-29 PROBLEM — M25.662 DECREASED RANGE OF MOTION OF LEFT KNEE: Status: ACTIVE | Noted: 2024-11-29

## 2024-11-29 PROBLEM — M17.0 PRIMARY OSTEOARTHRITIS OF BOTH KNEES: Status: RESOLVED | Noted: 2024-05-03 | Resolved: 2024-11-29

## 2024-11-29 PROBLEM — M25.661 DECREASED RANGE OF MOTION OF BOTH KNEES: Status: RESOLVED | Noted: 2024-05-03 | Resolved: 2024-11-29

## 2024-11-29 PROBLEM — G89.29 CHRONIC PAIN OF LEFT KNEE: Status: ACTIVE | Noted: 2024-11-29

## 2024-11-29 PROBLEM — M25.562 CHRONIC PAIN OF LEFT KNEE: Status: ACTIVE | Noted: 2024-11-29

## 2024-11-29 PROBLEM — M25.662 DECREASED RANGE OF MOTION OF BOTH KNEES: Status: RESOLVED | Noted: 2024-05-03 | Resolved: 2024-11-29

## 2024-11-29 PROBLEM — Z74.09 IMPAIRED MOBILITY AND ACTIVITIES OF DAILY LIVING: Status: RESOLVED | Noted: 2024-05-03 | Resolved: 2024-11-29

## 2024-11-29 PROBLEM — M17.12 PRIMARY OSTEOARTHRITIS OF LEFT KNEE: Status: ACTIVE | Noted: 2024-11-29

## 2024-12-02 ENCOUNTER — CLINICAL SUPPORT (OUTPATIENT)
Dept: REHABILITATION | Facility: OTHER | Age: 74
End: 2024-12-02
Attending: ORTHOPAEDIC SURGERY
Payer: MEDICARE

## 2024-12-02 DIAGNOSIS — G89.29 CHRONIC PAIN OF LEFT KNEE: ICD-10-CM

## 2024-12-02 DIAGNOSIS — M25.662 DECREASED RANGE OF MOTION OF LEFT KNEE: ICD-10-CM

## 2024-12-02 DIAGNOSIS — M17.12 PRIMARY OSTEOARTHRITIS OF LEFT KNEE: Primary | ICD-10-CM

## 2024-12-02 DIAGNOSIS — M25.562 CHRONIC PAIN OF LEFT KNEE: ICD-10-CM

## 2024-12-02 PROCEDURE — 97163 PT EVAL HIGH COMPLEX 45 MIN: CPT | Mod: HCNC,PN

## 2024-12-02 PROCEDURE — 97140 MANUAL THERAPY 1/> REGIONS: CPT | Mod: HCNC,PN

## 2024-12-02 PROCEDURE — 97530 THERAPEUTIC ACTIVITIES: CPT | Mod: HCNC,PN

## 2024-12-02 NOTE — PLAN OF CARE
[facility]  Physical Therapy Initial Evaluation    Name: Manju Aranda  Clinic Number: [id number]    Therapy Diagnosis:   Encounter Diagnoses   Name Primary?    Primary osteoarthritis of left knee Yes    Decreased range of motion of left knee     Chronic pain of left knee      Physician: Mack Torre III, *    Physician Orders: PT Eval and Treat  Medical Diagnosis from Referral: Primary osteoarthritis of left knee [M17.12]   Evaluation Date: 12/2/2024  Authorization Period Expiration: 8/8/2025  Plan of Care Expiration: 12/23/2024  Visit # / Visits authorized: 1/ 1; future visits pending  FOTO 1st follow up:  FOTO 2nd follow up:    Time In: 8:55 am  Time Out: 9:50 am  Total Billable Time: 55 minutes    Precautions: Standard    Subjective   Date of onset: Chronic   History of current condition - Manju acosta chief complaint of left knee pain that is chronic in nature. Patient reports that she is undergoing Left TKA on 12/23/24 with Dr. Torre. Patient states that she has been experiencing pain with prolonged sitting and walking over the past 2-3 months that has not improved. Patient is scheduled for pre-op EKG, labs, and X ray tomorrow from 8:45 am to 1pm. Patient states that she also has joint camp scheduled for 12/18/2024.        Past Medical History:   Diagnosis Date    Allergy     Anemia     Arthritis     Cholelithiases     Cyst of kidney, acquired     Diverticulitis     Elevated TSH     Family history of Graves' disease: daughter, maternal aunt, maternal uncle 09/13/2016    Glaucoma suspect     Graves disease     Hx of colonic polyps     Hypertension 1981    Liver cyst     MGUS (monoclonal gammopathy of unknown significance)     Migraine headache     Mitral valve problem     leakage    Obesity     Paraproteinemia     Sleep apnea     + CPAP    Smoldering multiple myeloma 2013    Tricuspid valve disease     leakage     Manju Aranda  has a past surgical history that includes  "Appendectomy; Hysterectomy (1978 or 1979); Colonoscopy (N/A, 10/23/2015); Colonoscopy (N/A, 11/05/2018); Cystoscopy; and Colonoscopy (N/A, 6/19/2023).    Manju has a current medication list which includes the following prescription(s): ascorbic acid (vitamin c), aspirin, azelastine, benzonatate, cholecalciferol (vitamin d3), eszopiclone, loratadine, magnesium oxide, metronidazole, multivit with min-folic acid, paxlovid, olmesartan, psyllium, rizatriptan, and trazodone, and the following Facility-Administered Medications: lidocaine hcl 2%.    Review of patient's allergies indicates:  No Known Allergies     Imaging:    FINDINGS:  DJD with significant narrowing of the lateral tibiofemoral joint spaces.  No fracture or dislocation.  No bone destruction identified    Prior Therapy: Yes, 2024 for Knee OA  Social History: Lives in [place], [place]   Occupation: Retired  Prior Level of Function: Independent with all ADL/iADLs   Current Level of Function: Independent with all ADL/iADLs     Pain:  Current 7/10, worst 8/10, best 6/10   Location: left knee   Description: Aching, Throbbing, Grabbing, Tight, and Deep  Aggravating Factors: Sitting, Standing, Walking, Night Time, Morning, Extension, Flexing, Lifting, and Getting out of bed/chair  Easing Factors: pain medication, ice, lying down, heating pad, rest, and elevation    Pts goals: improve her knee pain and mobility/strength prior to surgery     Objective     Observation: patient with joint effusion on the Left > Right     Posture: genu valgus noted during static standing     Functional tests:   Five Times Sit to Stand Test:  How long the patient takes to completed 5 sit to stand form chair with arms folded across chest: 18.91"  "Inability to perform test in less than 13.6 seconds indicates increased disability and Morbidity." (Sneha 2000).  Normative values for community dwelling elderly:   60-70y/o: 11.4 seconds  70-80y/o: 12.6 seconds  80-88y/o: 14.8 " "seconds      Timed Up and Go     Trial 1: 12.61" seconds  Trial 2: 12.67" seconds  Trial 3: 12.56" seconds    Average seconds: 12.6''     Seconds Rating   <10 Freely mobile   <20 Mostly independent   20-29 Variable mobility   >20 Impaired mobility       Tandem  Right 20 seconds   Left 20 seconds     Range of Motion (Passive):   Knee Right  Left    Flexion 125 111   Extension -2 -5     Range of Motion (Active):   Knee Right  Left    Flexion 123 110   Extension -7 -8       Lower Extremity Strength  Right LE  Left LE    Quadriceps: 36.4# Quadriceps: 38.4#   Hamstrings: 41.0# Hamstrings: 39.8#   Hip flexion (supine): 4/5 Hip flexion (supine): 4/5   Hip extension:  3+/5 Hip extension: 3/5   PGM: 3+/5 PGM:  3/5   Hip ER:  4-/5 Hip ER:  3+/5   Hip IR: 4/5 Hip IR: 4/5     Special Tests:   Right Left   Valgus Stress Test - + pain   Varus Stress test - + pain   Lachman's test - -   Posterior Lachman - -   Jericho's Test - -   Thessaly's Test - -   Patellar Grind Test - -     Joint Mobility:   Tibiofemoral Joint 2/6, posterior and anterior glide  Patellofemoral Joint 2/6, inferior   Proximal Tib/Fib Joint 3/6  Talocrural Joint 3/6     Palpation: tenderness to palpation over medial and lateral joint line    Sensation: Normal    Flexibility:    Ely's test: R = - ; L = + lacking 10 degrees    Hamstrings: R = - ; L = + lacking 15 degrees    Mary's test: R = - ; L = + lacking 10 degrees    Edema: + Stroke Test      Intake Outcome Measure for FOTO Knee Survey    Therapist reviewed FOTO scores for Manju Aranda on 12/2/2024.   FOTO documents entered into [employer] - see Media section.    Intake Score: 69%  Predicted Score: 49%    Primary Measure Score Range Intake Score Score Interpretation  KOOS, JR. 0 - 100   42.3 Lower Score = Greater Disability       TREATMENT   Treatment Time In: 9:30 am  Treatment Time Out: 9:50 am  Total Treatment time separate from Evaluation: 20 minutes    Manju participated in dynamic functional " therapeutic activities to improve functional performance for 10  minutes, including:  HEP and Education of Accelerated Protocol (3x/week 6 weeks)  Quad Sets 30x's   Heel Prop 5' 5#   Heel Slides 3'   Straight Leg Raise 20x's      Manju received the following manual therapy techniques: Joint mobilizations were applied to the: Left Knee for 10 minutes, including:  Inferior Patellar Glides Grade II  Flexion/Extension Mobilizations Grade II/III    Home Exercises and Patient Education Provided    Education provided re: prognosis, activity modification, goals for therapy, role of therapy for care, exercises/HEP    Written Home Exercises Provided: Yes.  Exercises were reviewed and Manju was able to demonstrate them prior to the end of the session.   Pt received a written copy of exercises to perform at home. Manju demonstrated good understanding of the education provided.     See EMR under patient instructions for exercises given.   Assessment   Manju is a 74 y.o. female referred to outpatient Physical Therapy with a medical diagnosis of Left knee OA. Pt presents with signs and symptoms consistent with hypomobility syndrome of the left knee secondary to adduction/IR of the Left hip. Patient with notable hamstring weakness and quadriceps motor control deficits.     Pt prognosis is Good.   Pt will benefit from skilled outpatient Physical Therapy to address the deficits stated above and in the chart below, provide pt/family education, and to maximize pt's level of independence.     Plan of care discussed with patient: Yes  Patient's spiritual, cultural and educational needs considered and patient is agreeable to the plan of care and goals as stated below:     Anticipated Barriers for therapy: None    Medical Necessity is demonstrated by the following  History  Co-morbidities and personal factors that may impact the plan of care [] LOW: no personal factors / co-morbidities  [] MODERATE: 1-2 personal factors /  co-morbidities  [x] HIGH: 3+ personal factors / co-morbidities    Moderate / High Support Documentation:   Allergy    Anemia    Arthritis    Cholelithiases    Cyst of kidney, acquired    Diverticulitis    Elevated TSH    Family history of Graves' disease: daughter, maternal aunt, maternal uncle 09/13/2016   Glaucoma suspect    Graves disease    Hx of colonic polyps    Hypertension 1981   Liver cyst    MGUS (monoclonal gammopathy of unknown significance)    Migraine headache    Mitral valve problem    leakage   Obesity    Paraproteinemia    Sleep apnea    + CPAP   Smoldering multiple myeloma 2013   Tricuspid valve disease    leakage        Examination  Body Structures and Functions, activity limitations and participation restrictions that may impact the plan of care [] LOW: addressing 1-2 elements  [] MODERATE: 3+ elements  [x] HIGH: 4+ elements (please support below)    Moderate / High Support Documentation:   Genu Valgus  Knee Mobility  Left Lower Extremity Strength  Gait     Clinical Presentation [] LOW: stable  [] MODERATE: Evolving  [x] HIGH: Unstable     Decision Making/ Complexity Score: high       Goals:  Short Term Goals (2 weeks):  1. Patient will have improved AROM of the left knee to 0-120 degrees for pre-operative mobility gains.  2. Patient will have decreased complaints of pain to 3/10 with prolonged walking/sitting.  3. Patient will be independent and compliant with issued HEP.    Long Term Goals (3 weeks):   Patient will have improved AROM of the left knee to 0-122 degrees for pre-operative mobility gains.  2. Patient will have improved strength of the left knee to >90% of the Right hamstring for improved force production.  3. Patient will be able to resume prior functional level with no deficits.   4. Patient will have overall improvement in condition to have increased score on KOOS to 60% to show clinically important difference in patient perceived functional ability.  5. Pt will improve FOTO  limitation score to less than or equal to 49% for improved self perception of functional performance with daily activities.  Plan   Plan of care Certification: 12/2/2024 to 12/23/2024    Outpatient Physical Therapy 1-2 times weekly for 4-6 weeks to include the following interventions: Electrical Stimulation as needed, Gait Training, Manual Therapy, Moist Heat/ Ice, Neuromuscular Re-ed, Orthotic Management and Training, Patient Education, Self Care, Therapeutic Activities, and Therapeutic Exercise, Blood Flow Restriction Training, Trigger Point Dry Needling as needed.      Kevin Fernandez PT, DPT  Board Certified Orthopaedic Clinical Specialist

## 2024-12-03 ENCOUNTER — OFFICE VISIT (OUTPATIENT)
Dept: ORTHOPEDICS | Facility: CLINIC | Age: 74
End: 2024-12-03
Payer: MEDICARE

## 2024-12-03 ENCOUNTER — HOSPITAL ENCOUNTER (OUTPATIENT)
Dept: RADIOLOGY | Facility: HOSPITAL | Age: 74
Discharge: HOME | End: 2024-12-03
Attending: ORTHOPAEDIC SURGERY
Payer: MEDICARE

## 2024-12-03 ENCOUNTER — OFFICE VISIT (OUTPATIENT)
Dept: INTERNAL MEDICINE | Facility: CLINIC | Age: 74
End: 2024-12-03
Payer: MEDICARE

## 2024-12-03 ENCOUNTER — TELEPHONE (OUTPATIENT)
Dept: ORTHOPEDICS | Facility: CLINIC | Age: 74
End: 2024-12-03

## 2024-12-03 ENCOUNTER — HOSPITAL ENCOUNTER (OUTPATIENT)
Dept: CARDIOLOGY | Facility: CLINIC | Age: 74
Discharge: HOME | End: 2024-12-03
Payer: MEDICARE

## 2024-12-03 VITALS
TEMPERATURE: 98 F | OXYGEN SATURATION: 98 % | BODY MASS INDEX: 36.86 KG/M2 | DIASTOLIC BLOOD PRESSURE: 80 MMHG | WEIGHT: 257.5 LBS | HEIGHT: 70 IN | SYSTOLIC BLOOD PRESSURE: 140 MMHG | HEART RATE: 79 BPM

## 2024-12-03 VITALS — HEIGHT: 70 IN | WEIGHT: 257.19 LBS | BODY MASS INDEX: 36.82 KG/M2

## 2024-12-03 DIAGNOSIS — M17.12 PRIMARY OSTEOARTHRITIS OF LEFT KNEE: ICD-10-CM

## 2024-12-03 DIAGNOSIS — Z01.818 PRE-OP TESTING: ICD-10-CM

## 2024-12-03 DIAGNOSIS — G43.109 MIGRAINE WITH AURA AND WITHOUT STATUS MIGRAINOSUS, NOT INTRACTABLE: Primary | ICD-10-CM

## 2024-12-03 DIAGNOSIS — N18.31 STAGE 3A CHRONIC KIDNEY DISEASE: ICD-10-CM

## 2024-12-03 DIAGNOSIS — G47.33 OSA ON CPAP: ICD-10-CM

## 2024-12-03 DIAGNOSIS — D72.819 LEUKOPENIA, UNSPECIFIED TYPE: ICD-10-CM

## 2024-12-03 DIAGNOSIS — M17.12 PRIMARY OSTEOARTHRITIS OF LEFT KNEE: Primary | ICD-10-CM

## 2024-12-03 DIAGNOSIS — Z92.89 HISTORY OF ECHOCARDIOGRAM: ICD-10-CM

## 2024-12-03 DIAGNOSIS — D47.2 SMOLDERING MULTIPLE MYELOMA (SMM): ICD-10-CM

## 2024-12-03 DIAGNOSIS — I71.21 ANEURYSM OF ASCENDING AORTA WITHOUT RUPTURE: ICD-10-CM

## 2024-12-03 DIAGNOSIS — E66.01 SEVERE OBESITY WITH BODY MASS INDEX (BMI) OF 35.0 TO 39.9 WITH COMORBIDITY: ICD-10-CM

## 2024-12-03 DIAGNOSIS — Q61.3 POLYCYSTIC KIDNEY DISEASE: ICD-10-CM

## 2024-12-03 DIAGNOSIS — I10 PRIMARY HYPERTENSION: ICD-10-CM

## 2024-12-03 DIAGNOSIS — M25.662 DECREASED RANGE OF MOTION OF LEFT KNEE: ICD-10-CM

## 2024-12-03 DIAGNOSIS — D64.9 ANEMIA, UNSPECIFIED TYPE: ICD-10-CM

## 2024-12-03 PROBLEM — N18.30 CKD (CHRONIC KIDNEY DISEASE) STAGE 3, GFR 30-59 ML/MIN: Status: ACTIVE | Noted: 2024-12-03

## 2024-12-03 LAB
OHS QRS DURATION: 90 MS
OHS QTC CALCULATION: 412 MS

## 2024-12-03 PROCEDURE — 99999 PR PBB SHADOW E&M-EST. PATIENT-LVL IV: CPT | Mod: PBBFAC,HCNC,, | Performed by: NURSE PRACTITIONER

## 2024-12-03 PROCEDURE — 99999 PR PBB SHADOW E&M-EST. PATIENT-LVL III: CPT | Mod: PBBFAC,HCNC,, | Performed by: ORTHOPAEDIC SURGERY

## 2024-12-03 PROCEDURE — 73560 X-RAY EXAM OF KNEE 1 OR 2: CPT | Mod: 26,HCNC,LT, | Performed by: RADIOLOGY

## 2024-12-03 PROCEDURE — 93005 ELECTROCARDIOGRAM TRACING: CPT | Mod: HCNC,S$GLB,, | Performed by: ANESTHESIOLOGY

## 2024-12-03 PROCEDURE — 93010 ELECTROCARDIOGRAM REPORT: CPT | Mod: HCNC,S$GLB,, | Performed by: INTERNAL MEDICINE

## 2024-12-03 PROCEDURE — 73560 X-RAY EXAM OF KNEE 1 OR 2: CPT | Mod: TC,HCNC,LT

## 2024-12-03 PROCEDURE — 99999 PR PBB SHADOW E&M-EST. PATIENT-LVL III: CPT | Mod: PBBFAC,HCNC,,

## 2024-12-03 RX ORDER — SODIUM CHLORIDE 9 MG/ML
INJECTION, SOLUTION INTRAVENOUS CONTINUOUS
OUTPATIENT
Start: 2024-12-03 | End: 2024-12-04

## 2024-12-03 RX ORDER — BISACODYL 10 MG/1
10 SUPPOSITORY RECTAL EVERY 12 HOURS PRN
OUTPATIENT
Start: 2024-12-03

## 2024-12-03 RX ORDER — MORPHINE SULFATE 2 MG/ML
2 INJECTION, SOLUTION INTRAMUSCULAR; INTRAVENOUS
OUTPATIENT
Start: 2024-12-03

## 2024-12-03 RX ORDER — MIDAZOLAM HYDROCHLORIDE 1 MG/ML
4 INJECTION, SOLUTION INTRAMUSCULAR; INTRAVENOUS
OUTPATIENT
Start: 2024-12-03 | End: 2024-12-04

## 2024-12-03 RX ORDER — MUPIROCIN 20 MG/G
1 OINTMENT TOPICAL
OUTPATIENT
Start: 2024-12-03

## 2024-12-03 RX ORDER — PREGABALIN 75 MG/1
75 CAPSULE ORAL
OUTPATIENT
Start: 2024-12-03

## 2024-12-03 RX ORDER — ACETAMINOPHEN 500 MG
1000 TABLET ORAL
OUTPATIENT
Start: 2024-12-03

## 2024-12-03 RX ORDER — METHOCARBAMOL 750 MG/1
750 TABLET, FILM COATED ORAL 3 TIMES DAILY
OUTPATIENT
Start: 2024-12-03

## 2024-12-03 RX ORDER — TALC
6 POWDER (GRAM) TOPICAL NIGHTLY PRN
OUTPATIENT
Start: 2024-12-03

## 2024-12-03 RX ORDER — OXYCODONE HYDROCHLORIDE 5 MG/1
10 TABLET ORAL
OUTPATIENT
Start: 2024-12-03

## 2024-12-03 RX ORDER — MUPIROCIN 20 MG/G
1 OINTMENT TOPICAL 2 TIMES DAILY
OUTPATIENT
Start: 2024-12-03 | End: 2024-12-08

## 2024-12-03 RX ORDER — SODIUM CHLORIDE 0.9 % (FLUSH) 0.9 %
10 SYRINGE (ML) INJECTION
OUTPATIENT
Start: 2024-12-03

## 2024-12-03 RX ORDER — ASPIRIN 81 MG/1
81 TABLET ORAL 2 TIMES DAILY
OUTPATIENT
Start: 2024-12-03

## 2024-12-03 RX ORDER — PROCHLORPERAZINE EDISYLATE 5 MG/ML
5 INJECTION INTRAMUSCULAR; INTRAVENOUS EVERY 6 HOURS PRN
OUTPATIENT
Start: 2024-12-03

## 2024-12-03 RX ORDER — LIDOCAINE HYDROCHLORIDE 10 MG/ML
1 INJECTION, SOLUTION EPIDURAL; INFILTRATION; INTRACAUDAL; PERINEURAL
OUTPATIENT
Start: 2024-12-03

## 2024-12-03 RX ORDER — AMOXICILLIN 250 MG
1 CAPSULE ORAL 2 TIMES DAILY
OUTPATIENT
Start: 2024-12-03

## 2024-12-03 RX ORDER — PREGABALIN 75 MG/1
75 CAPSULE ORAL NIGHTLY
OUTPATIENT
Start: 2024-12-03

## 2024-12-03 RX ORDER — OXYCODONE HYDROCHLORIDE 5 MG/1
5 TABLET ORAL
OUTPATIENT
Start: 2024-12-03

## 2024-12-03 RX ORDER — POLYETHYLENE GLYCOL 3350 17 G/17G
17 POWDER, FOR SOLUTION ORAL DAILY
OUTPATIENT
Start: 2024-12-04

## 2024-12-03 RX ORDER — ACETAMINOPHEN 500 MG
1000 TABLET ORAL EVERY 6 HOURS
OUTPATIENT
Start: 2024-12-03

## 2024-12-03 RX ORDER — NALOXONE HCL 0.4 MG/ML
0.02 VIAL (ML) INJECTION
OUTPATIENT
Start: 2024-12-03

## 2024-12-03 RX ORDER — FAMOTIDINE 20 MG/1
20 TABLET, FILM COATED ORAL 2 TIMES DAILY
OUTPATIENT
Start: 2024-12-03

## 2024-12-03 RX ORDER — SODIUM CHLORIDE 9 MG/ML
INJECTION, SOLUTION INTRAVENOUS
OUTPATIENT
Start: 2024-12-03

## 2024-12-03 RX ORDER — FENTANYL CITRATE 50 UG/ML
25 INJECTION, SOLUTION INTRAMUSCULAR; INTRAVENOUS EVERY 5 MIN PRN
OUTPATIENT
Start: 2024-12-03

## 2024-12-03 RX ORDER — FENTANYL CITRATE 50 UG/ML
100 INJECTION, SOLUTION INTRAMUSCULAR; INTRAVENOUS
OUTPATIENT
Start: 2024-12-03 | End: 2024-12-04

## 2024-12-03 RX ORDER — ONDANSETRON HYDROCHLORIDE 2 MG/ML
4 INJECTION, SOLUTION INTRAVENOUS EVERY 8 HOURS PRN
OUTPATIENT
Start: 2024-12-03

## 2024-12-03 NOTE — ASSESSMENT & PLAN NOTE
Current BP  140/80 today.    Taking: Benicar  Patient reports home BP readings of: 130/72      Lifestyle changes to reduce systolic BP:  exercise 30 minutes per day,  5 days per week or 150 minutes weekly; sodium reduction and avoidance of high salt foods such as processed meats, frozen meals and  fast foods.   Keeping a healthy weight/BMI can help with better BP control    BP acceptable for surgery. I recommend monitoring BP during perioperative period as uncontrolled pain can elevate blood pressure.

## 2024-12-03 NOTE — ASSESSMENT & PLAN NOTE
BUN 17 Cr 1.1 GFR 53.1  Deleterious effects NSAID's , Beneficial effects of Hydration discussed   Tylenol as needed for pain   I suggest monitoring renal function, in put and out put status theodore-operatively. I  suggest avoiding nephrotoxic medication including NSAIDs, COX2 inhibitors, intravenous contrast agent,avoiding hypotension to prevent further renal impairment.

## 2024-12-03 NOTE — H&P
CC:  Left knee pain    Manju Aranda is a 74 y.o. female with history of Left knee pain. Pain is worse with activity and weight bearing.  Patient has experienced interference of activities of daily living due to decreased range of motion and an increase in joint pain and swelling.  Patient has failed non-operative treatment including NSAIDs, corticosteroid injections, viscosupplement injections, and activity modification.  Manju Aranda currently ambulates independently.     Relevant medical conditions of significance in perioperative period:  HTN- on medication managed by PCP  Autosomal dominant polycystic kidney disease with associated CKD stage III- monitored by Nephrology  BECKY on CPAP- monitored by PCP       Given documented medical comorbidities including those listed above and based off of CMS criteria patient would meet inpatient admission status guidelines. This case has been approved as inpatient.     Past Medical History:   Diagnosis Date    Allergy     Anemia     Arthritis     Cholelithiases     Cyst of kidney, acquired     Diverticulitis     Elevated TSH     Family history of Graves' disease: daughter, maternal aunt, maternal uncle 09/13/2016    Glaucoma suspect     Graves disease     Hx of colonic polyps     Hypertension 1981    Liver cyst     MGUS (monoclonal gammopathy of unknown significance)     Migraine headache     Mitral valve problem     leakage    Obesity     Paraproteinemia     Sleep apnea     + CPAP    Smoldering multiple myeloma 2013    Tricuspid valve disease     leakage       Past Surgical History:   Procedure Laterality Date    APPENDECTOMY      COLONOSCOPY N/A 10/23/2015    Procedure: COLONOSCOPY;  Surgeon: Brandon Ruelas MD;  Location: [facility] ([address] [place] [address]);  Service: Endoscopy;  Laterality: N/A;  Had divertiulitis in 5/29/2015 with a recommendation for colonoscopy in 8 weeks    Dr. Ruelas is her GI physician    COLONOSCOPY N/A 11/05/2018    Procedure: COLONOSCOPY;   Surgeon: Brandon Ruelas MD;  Location: [facility] ([address] [place] [address]);  Service: Endoscopy;  Laterality: N/A;  Dr. Ruelas did the last one multiple polyps, patient had recent diverticulitis should not schedule before Oct 10th    COLONOSCOPY N/A 6/19/2023    Procedure: COLONOSCOPY;  Surgeon: Lu Lewis MD;  Location: [facility] ([address] [place] [address]);  Service: Endoscopy;  Laterality: N/A;  pt offered earlier dates but stated she is out town and will be returning the evening of 6/4  cardiac clearance recieved-see tele encounter 5/22/23 5/22 referred by Dr. Lewis/PEG/instr. mailed and to portal-st  6/13 pre-call no answer; MB    CYSTOSCOPY      HYSTERECTOMY  1978 or 1979    menorrhagia       Family History   Problem Relation Name Age of Onset    Heart disease Mother          MI    Heart attack Mother      Hypertension Father      Irregular heart beat Sister          fast heart rate    Cataracts Sister      Glaucoma Sister      No Known Problems Sister      Graves' disease Daughter Sanita     No Known Problems Daughter Edith     Heart disease Maternal Aunt          MI    Heart disease Maternal Aunt          MI    Colon cancer Maternal Uncle      Cancer Maternal Uncle          colon ca    Breast cancer Paternal Grandmother      Cancer Paternal Grandmother          GYN cancer - unknown cancer    No Known Problems Son Jus     No Known Problems Son Denoid     Melanoma Neg Hx      Ovarian cancer Neg Hx      Colon polyps Neg Hx      Rectal cancer Neg Hx      Stomach cancer Neg Hx      Esophageal cancer Neg Hx      Ulcerative colitis Neg Hx      Crohn's disease Neg Hx      Amblyopia Neg Hx      Blindness Neg Hx      Macular degeneration Neg Hx      Strabismus Neg Hx      Retinal detachment Neg Hx         Review of patient's allergies indicates:  No Known Allergies      Current Outpatient Medications:     ascorbic acid, vitamin C, (VITAMIN C) 500 MG tablet, Take 500 mg by mouth once daily., Disp: , Rfl:     aspirin 81 MG Chew, Take  81 mg by mouth once daily., Disp: , Rfl:     azelastine (ASTELIN) 137 mcg (0.1 %) nasal spray, 2 sprays (274 mcg total) by Nasal route 2 (two) times daily. (Patient not taking: Reported on 9/4/2024), Disp: 30 mL, Rfl: 2    cholecalciferol, vitamin D3, (VITAMIN D3) 50 mcg (2,000 unit) Cap capsule, Take 1 capsule (2,000 Units total) by mouth once daily., Disp: , Rfl:     eszopiclone (LUNESTA) 2 MG Tab, Take 1 tablet (2 mg total) by mouth every evening., Disp: 30 tablet, Rfl: 5    loratadine (CLARITIN) 10 mg tablet, Take 1 tablet (10 mg total) by mouth once daily., Disp: 30 tablet, Rfl: 2    magnesium oxide (MAG-OX) 400 mg tablet, Take 400 mg by mouth once daily., Disp: , Rfl:     multivit with min-folic acid 0.4 mg Tab, Take 0.4 mg by mouth once daily., Disp: , Rfl:     olmesartan (BENICAR) 40 MG tablet, Take 1 tablet (40 mg total) by mouth once daily., Disp: 90 tablet, Rfl: 3    psyllium (METAMUCIL) powder, Take 1 packet by mouth Daily., Disp: , Rfl:     rizatriptan (MAXALT) 10 MG tablet, TAKE 1 TABLET(10 MG) BY MOUTH EVERY 2 HOURS AS NEEDED FOR MIGRAINE. MAX 30 MG/ 24 AT BEDTIME, Disp: 12 tablet, Rfl: 11    traZODone (DESYREL) 100 MG tablet, Take 1 tablet (100 mg total) by mouth nightly as needed for Insomnia., Disp: 90 tablet, Rfl: 1    Current Facility-Administered Medications:     LIDOcaine HCl 2% urojet, , Urethral, 1 time in Clinic/HOD, Jovanna Bui MD    Review of Systems:  Constitutional: no fever or chills  Eyes: no visual changes  ENT: no nasal congestion or sore throat  Respiratory: no cough or shortness of breath  Cardiovascular: no chest pain or palpitations  Gastrointestinal: no nausea or vomiting, tolerating diet  Genitourinary: no hematuria or dysuria  Integument/Breast: no rash or pruritis  Hematologic/Lymphatic: no easy bruising or lymphadenopathy  Musculoskeletal: positive for knee pain  Neurological: no seizures or tremors  Behavioral/Psych: no auditory or visual  hallucinations  Endocrine: no heat or cold intolerance    PE:  There were no vitals taken for this visit.  General: Pleasant, cooperative, NAD   Gait: antalgic  HEENT: NCAT, sclera nonicteric   Lungs: Respirations clear bilaterally; equal and unlabored.   CV: S1S2; 2+ bilateral upper and lower extremity pulses.   Skin: Intact throughout with no rashes, erythema, or lesions  Extremities: No LE edema,  no erythema or warmth of the skin in either lower extremity.    Left knee exam:  Knee Range of Motion:  0-115, pain with passive range of motion  Effusion:  Mild  Condition of skin:intact  Location of tenderness:Lateral joint line   Strength:normal  Stability: stable to testing    Hip Examination: painless PROM of hip     Radiographs: Radiographs reveal advanced degenerative changes including subchondral cyst formation, subchondral sclerosis, osteophyte formation, joint space narrowing.     Knee Alignment:  Moderate valgus    Diagnosis: Primary osteoarthritis Left knee    Plan: Left total knee arthroplasty    Due to the serious nature of total joint infection and the high prevalence of community acquired MRSA, vancomycin will be used perioperatively.

## 2024-12-03 NOTE — ASSESSMENT & PLAN NOTE
This client has a  diagnosis of obstructive sleep apnea (BECKY)   Wears CPAP at night  Instructions were given to avoid the following: sleeping supine, weight gain ,alcoholic beverages , sedative medications, and CNS depressant use as these can all worsen BECKY.    Untreated sleep apnea has been shown to increase daytime sleepiness, hypertension, heart disease and stroke which were discussed with the patient at this time      Please use caution with medications that induce respiratory depression in the perioperative period

## 2024-12-03 NOTE — HPI
This is a 74 y.o. female  who presents today for a preoperative evaluation in preparation for left knee replacement  Surgery is indicated for  osteoarthritis   .   Patient is new to me.    The history has been obtained by speaking with the patient and reviewing the electronic medical record and/or outside health information. Significant health conditions for the perioperative period are discussed below in assessment and plan.     Patient reports current health status to be Good.  Denies any new symptoms before surgery.

## 2024-12-03 NOTE — PROGRESS NOTES
Subjective:     HPI:   Manju Aranda is a 74 y.o. female who presents for repeat eval L knee - was trying to get through wedding in 2025 but progressive knee pain would like to move forward with TKA     No interval changes    To review:   The patient has been experiencing bilateral knee pain for over a year, with the left knee being more affected than the right. The pain has progressively worsened over the past few months, preventing her from engaging in physical activities such as walking thrice weekly. Despite her efforts to maintain mobility, the pain persists. She denies any history of myocardial infarction or stent placement, diabetes, thromboembolic events, or surgical interventions on her knees. The pain is primarily localized to the anterior aspect of her knees.     Supplemental Information  She has polycystic kidney disease. She uses a CPAP machine for sleep apnea. She is being monitored for MGUS.        Past surgical history: None.      Hx DVT: None.      Medications: Tylenol (500mg, bid), Aspirin (81mg, daily), no nsaids - gi upset and PCKD, has tried voltaren gel    X arthritis Tylenol   PCKD Aleve   x Voltaren Gel   x [facility] non-op arthritis info     Bursitis info   x Total Joint Info   x HEP: [facility] home exercise conditioning program    x PT   X 24 CSI: intra-articular steroid injection     CSI: greater trochanter bursitis     HA: hyaluronic acid injection   X Comp sleeves Brace:      Referral:         Walkin - 5 blocks     Limitations:  difficulty walking, not able to stand for long period of time   Can't walk with ladies 3 days/week  Getting up and down out of chair     Occupation: Retired - 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 The patient stated that their sister  would be able to help take care of them if they were to have surgery.       The  patient was referred to see Dr. Torre from Dr. Charles, her PCP.       History of Present Illness    CHIEF COMPLAINT:  - Manju presents today for evaluation of knee pain and discussion of knee replacement surgery.    HPI:  Manju presents with a complaint of knee pain. Her knee arthritis is causing stiffness, soreness, and swelling. She is tender to palpation at the medial lateral patella femoral joint lines with moderate effusion. She mentions having sleep apnea and confirms that she has already been set up for post-surgery therapy. Manju denies tingling in the lower extremity.    WORK STATUS:  - Six weeks is a general return to work time for most people after knee replacement surgery.      ROS:  Musculoskeletal: +joint pain, +joint swelling, +joint stiffness  Neurological: -tingling            Objective:   Body mass index is 36.9 kg/m².  Exam:    Physical Exam    Musculoskeletal: No pain with internal and external range of motion over hip joints. Distally neurovascularly intact with good strength, sensation, and pulses. Knee range of motion: 0 to 115 degrees. 7 degrees valgus alignment which does correct. Knee stable to anterior posterior varus and valgus stresses. No flexion contraction or extensor lag. Tender to palpation at the medial lateral patellofemoral joint lines with moderate effusion.  IMAGING:  - X-rays: knee, current visit         0-115, 7 valgus corrects       Imaging:    Results              KNEE L ARTHRITIS     Indication:  Left knee pain  Exam Ordered: Radiographs of the left knee include a standing anteroposterior view, a standing posterioanterior view, a lateral view in full flexion, and a sunrise view  Details of Examination: Exam shows evidence of joint space narrowing, osteophyte formation, and subchondral sclerosis, all consistent with degenerative arthritis of the knee.  No other significant findings are noted.  Impression:  Degenerative Arthritis, Left Knee    Klg4 valgus L knee  "OA severe bone on bone      Assessment/Plan:       ICD-10-CM ICD-9-CM   1. Primary osteoarthritis of left knee  M17.12 715.16        PCKD 1.1/53  BECKY + CPAP    MGUS/smoldering MM, WBC 3.5 - saw hematology 8/24: "IgG kappa smoldering myeloma" "Mild intermittent leukopenia/anemia are chronic and pre-date myeloma diagnosis; stable  Biochemical studies/Labs from 8/9/2024 with no evidence of crab or progression   Discussed now that >10 years from diagnosis with no progression to symptomatic myeloma her risk of progression now decreases significantly and her disease is more clinically like an MGUS, but that we should still monitor her biochemcial studies q 6 months and see her for MD appt annually   Plan to image based on symptoms only   Educated on symptoms for which to seek attn in our clinic earlier "    Hgb 11.0,      BMI 36.9 down from 39  MRI liver 8/24 ok "No suspicious hepatic lesion. Two tiny simple cysts for which no follow-up is recommended. "  Normal DEXA 7/24    Assessment/Plan:     Assessment & Plan    M17.11 Unilateral primary osteoarthritis, right knee  Z01.810 Encounter for preprocedural cardiovascular exam  G47.33 Obstructive sleep apnea (adult) (pediatric)    UNILATERAL PRIMARY OSTEOARTHRITIS, RIGHT KNEE:  - Ordered X-rays of the knee to assist in planning for knee replacement surgery.  - Discussed knee replacement surgery with the patient.  - Explained the procedure, including resurfacing the knee with artificial materials and straightening the knee.  - Mentioned use of robotic technology to assist during surgery.  - Outlined the surgery process, including spinal anesthesia, sedation, and typical duration of 1-1.5 hours.  - Described post-operative care, including getting up and moving with therapy.  - Recovery typically allows for discharge by lunchtime the next day.  - Return to driving after 2-3 weeks for a left knee surgery, when off narcotic pain medicine and strength/response time has " returned.  - Return to work generally after 6 weeks, which is considered soft tissue healing time.         This patient has significant symptoms in their knee that are affecting their quality of life and daily activities.  They have tried non-operative treatment including analgesics, an exercise program, and activity modification, but the symptoms have persisted. I believe they make a good candidate for knee arthroplasty.     The risks and benefits of knee arthroplasty have been discussed with the patient which include, but are not limited to infections (including severe sequelae), potential component failure, fracture, DVT, pulmonary embolus, nerve palsy, poor range of motion, the possibility of bone grafting, persistent pain, wound healing complications, transfusions, medical complications and death.     We discussed surgical options including implant type and why I believe the implant selected is a good match for the patient's needs. The patient expressed understanding and agrees to proceed with the planned surgery.     Pre-operative planning will include the following:  A pre-surgical evaluation by will be arranged.  Pre-op orders will be placed.  We will make arrangements with the operating room for proper time and staffing.  We will make Social Service arrangements for the patient.    Implants:   Company: [employer]  System: Exeros (not available at [facility])  all poly    DVT prophylaxis: ASA 81mg BID x1 month  ??? With smoldering myeloma  Dispo: outpatient PT    Admission status: Outpatient/23hr OBS    Location: [facility] 12/23/24  1 night       Dr Alba and Addis Elizabeth messaged:   Would appreciate hematology input on DVT prophylaxis for total knee arthroplasty 12/23/24 in setting of IgG kappa smoldering myeloma  Would you recommend our standard ASA 81mg BID protocol or is she higher risk for DVT for which I would switch to Eliquis 2.5mg BID  Thank you,  Will    No orders of the defined  types were placed in this encounter.      This note was generated with the assistance of ambient listening technology. Verbal consent was obtained by the patient and accompanying visitor(s) for the recording of patient appointment to facilitate this note. I attest to having reviewed and edited the generated note for accuracy, though some syntax or spelling errors may persist. Please contact the author of this note for any clarification.            Past Medical History:   Diagnosis Date    Allergy     Anemia     Arthritis     Cholelithiases     Cyst of kidney, acquired     Diverticulitis     Elevated TSH     Family history of Graves' disease: daughter, maternal aunt, maternal uncle 09/13/2016    Glaucoma suspect     Graves disease     Hx of colonic polyps     Hypertension 1981    Liver cyst     MGUS (monoclonal gammopathy of unknown significance)     Migraine headache     Mitral valve problem     leakage    Obesity     Paraproteinemia     Sleep apnea     + CPAP    Smoldering multiple myeloma 2013    Tricuspid valve disease     leakage       Past Surgical History:   Procedure Laterality Date    APPENDECTOMY      COLONOSCOPY N/A 10/23/2015    Procedure: COLONOSCOPY;  Surgeon: Brandon Ruelas MD;  [facility]  Service: Endoscopy;  Laterality: N/A;  Had divertiulitis in 5/29/2015 with a recommendation for colonoscopy in 8 weeks    Dr. Ruelas is her GI physician    COLONOSCOPY N/A 11/05/2018    Procedure: COLONOSCOPY;  Surgeon: Brandon Ruelas MD;  [facility]  Service: Endoscopy;  Laterality: N/A;  Dr. Ruelas did the last one multiple polyps, patient had recent diverticulitis should not schedule before Oct 10th    COLONOSCOPY N/A 6/19/2023    Procedure: COLONOSCOPY;  Surgeon: Lu Lewis MD;  [facility]  Service: Endoscopy;  Laterality: N/A;  pt offered earlier dates but stated she is out town and will be returning the evening of 6/4  cardiac  clearance recieved-see tele encounter 23 referred by Dr. Lewis/LOLITA/instr. mailed and to portal-   pre-call no answer; MB    CYSTOSCOPY      HYSTERECTOMY   or     menorrhagia       Family History   Problem Relation Name Age of Onset    Heart disease Mother          MI    Heart attack Mother      Hypertension Father      Irregular heart beat Sister          fast heart rate    Cataracts Sister      Glaucoma Sister      No Known Problems Sister      Graves' disease Daughter Sanita     No Known Problems Daughter Edith     Heart disease Maternal Aunt          MI    Heart disease Maternal Aunt          MI    Colon cancer Maternal Uncle      Cancer Maternal Uncle          colon ca    Breast cancer Paternal Grandmother      Cancer Paternal Grandmother          GYN cancer - unknown cancer    No Known Problems Son Jus     No Known Problems Son Denoid     Melanoma Neg Hx      Ovarian cancer Neg Hx      Colon polyps Neg Hx      Rectal cancer Neg Hx      Stomach cancer Neg Hx      Esophageal cancer Neg Hx      Ulcerative colitis Neg Hx      Crohn's disease Neg Hx      Amblyopia Neg Hx      Blindness Neg Hx      Macular degeneration Neg Hx      Strabismus Neg Hx      Retinal detachment Neg Hx         Social History     Socioeconomic History    Marital status:     Number of children: 4   Occupational History    Occupation: RETIRED   Tobacco Use    Smoking status: Former     Current packs/day: 0.00     Types: Cigarettes     Start date: 1992     Quit date: 1995     Years since quittin.9     Passive exposure: Never    Smokeless tobacco: Never   Substance and Sexual Activity    Alcohol use: No    Drug use: No    Sexual activity: Not Currently     Partners: Male     Birth control/protection: Post-menopausal   Social History Narrative    Lives with sister.         Walks most AM in [employer] [address].      Social Drivers of Health     Financial Resource Strain: Low Risk  (12/3/2024)     Overall Financial Resource Strain (CARDIA)     Difficulty of Paying Living Expenses: Not hard at all   Food Insecurity: No Food Insecurity (12/3/2024)    Hunger Vital Sign     Worried About Running Out of Food in the Last Year: Never true     Ran Out of Food in the Last Year: Never true   Transportation Needs: No Transportation Needs (4/10/2023)    PRAPARE - Transportation     Lack of Transportation (Medical): No     Lack of Transportation (Non-Medical): No   Physical Activity: Unknown (12/3/2024)    Exercise Vital Sign     Days of Exercise per Week: 3 days   Stress: No Stress Concern Present (12/3/2024)    [employer] [place] [employer] - Occupational Stress Questionnaire     Feeling of Stress : Not at all   Housing Stability: Unknown (4/10/2023)    Housing Stability Vital Sign     Unable to Pay for Housing in the Last Year: No     Unstable Housing in the Last Year: No

## 2024-12-03 NOTE — ASSESSMENT & PLAN NOTE
"Patient under the care of Abimael oMss Her last visit was 5/27/24 in which he noted the "TAA remains stable. RTC PRN. No need for routine Ct scan for TAA."  "

## 2024-12-03 NOTE — ASSESSMENT & PLAN NOTE
Transthoracic echo (TTE) complete w/ myocardial strain    Dx: Aortic aneurysm, unspecified portion of aorta, unspecified whether ruptured [I71.9 (ICD-10-CM)]       Left Ventricle: The left ventricle is normal in size. Normal wall thickness. There is normal systolic function with a visually estimated ejection fraction of 55 - 60%. Grade I diastolic dysfunction.    Right Ventricle: Normal right ventricular cavity size. Wall thickness is normal. Systolic function is normal.    Left Atrium: Left atrium is mildly dilated.    Right Atrium: Right atrium is mildly dilated.    IVC/SVC: Normal venous pressure at 3 mmHg.    Pericardium: There is a trivial effusion. No indication of cardiac tamponade.     Echocardiography Findings    Left Ventricle The left ventricle is normal in size. Normal wall thickness. Normal wall motion. There is normal systolic function with a visually estimated ejection fraction of 55 - 60%. Grade I diastolic dysfunction.   Right Ventricle Normal right ventricular cavity size. Wall thickness is normal. Right ventricle wall motion  is normal. Systolic function is normal.   Left Atrium Left atrium is mildly dilated.   Right Atrium Right atrium is mildly dilated.   Aortic Valve The aortic valve is structurally normal. There is normal leaflet mobility. Aortic valve peak velocity is 1.73 m/s. Mean gradient is 7 mmHg. There is no significant regurgitation.   Mitral Valve The mitral valve is structurally normal. There is normal leaflet mobility. There is trace regurgitation.   Tricuspid Valve The tricuspid valve is structurally normal. There is normal leaflet mobility. There is trace regurgitation.   Pulmonic Valve The pulmonic valve is structurally normal. There is normal leaflet mobility. There is no significant regurgitation.   IVC/SVC Normal venous pressure at 3 mmHg.   Ascending Aorta Aortic root is normal in size measuring 3.2 cm. Ascending aorta is normal measuring 3.63 cm.   Pericardium and Other  Findings There is a trivial effusion. No indication of cardiac tamponade.   Pulmonary Artery The estimated pulmonary artery systolic pressure is 22 mmHg.     Exam Details    Performed Procedure Technologist     Transthoracic echo (TTE) complete w/ myocardial strain Andrez Prakash Exam End Exam     5/22/2024 10:58 AM CDT 5/22/2024 11:27 AM CDT

## 2024-12-03 NOTE — PROGRESS NOTES
[facility]  Progress Note    Patient Name: Manju Aranda  MRN: [id number]  Date of Evaluation- 12/03/2024  PCP- Jazzy Charles MD    Future cases for Manju Aranda [[id number]]       Case ID Status Date Time Jose Luis Procedure Provider Location    [id number] [facility] 12/23/2024  7:00  ARTHROPLASTY, KNEE, TOTAL, USING [employer] COMPUTER-ASSISTED NAVIGATION: LEFT: DEPUY - ATTMack Gardiner III, MD [[id number]] [facility]            HPI:  This is a 74 y.o. female  who presents today for a preoperative evaluation in preparation for left knee replacement  Surgery is indicated for  osteoarthritis   .   Patient is new to me.    The history has been obtained by speaking with the patient and reviewing the electronic medical record and/or outside health information. Significant health conditions for the perioperative period are discussed below in assessment and plan.     Patient reports current health status to be Good.  Denies any new symptoms before surgery.       Subjective/ Objective:     Chief Complaint: Preoperative evaulation, perioperative medical management, and complication reduction plan.     Functional Capacity:  Able to climb a flight of stairs without CP SOB or Syncope.  Able to meet 4 METs  Climbs 13 steps daily has raised house      Anesthesia issues: None    Difficulty mouth opening: none    Steroid use in the last 12 months: none     Dental Issues: bottom plate    Family anesthesia difficulty: None       Family Hx of Thrombosis none    Past Medical History:   Diagnosis Date    Allergy     Anemia     Arthritis     Cholelithiases     Cyst of kidney, acquired     Diverticulitis     Elevated TSH     Family history of Graves' disease: daughter, maternal aunt, maternal uncle 09/13/2016    Glaucoma suspect     Graves disease     Hx of colonic polyps     Hypertension 1981    Liver cyst     MGUS (monoclonal gammopathy of unknown significance)     Migraine headache     Mitral valve problem     leakage     Obesity     Paraproteinemia     Sleep apnea     + CPAP    Smoldering multiple myeloma 2013    Tricuspid valve disease     leakage     Past Surgical History:   Procedure Laterality Date    APPENDECTOMY      COLONOSCOPY N/A 10/23/2015    Procedure: COLONOSCOPY;  Surgeon: Brandon Ruelas MD;  Location: [facility] (4TH FLR);  Service: Endoscopy;  Laterality: N/A;  Had divertiulitis in 5/29/2015 with a recommendation for colonoscopy in 8 weeks    Dr. Ruelas is her GI physician    COLONOSCOPY N/A 11/05/2018    Procedure: COLONOSCOPY;  Surgeon: Brandon Ruelas MD;  Location: [facility] (4TH FLR);  Service: Endoscopy;  Laterality: N/A;  Dr. Ruelas did the last one multiple polyps, patient had recent diverticulitis should not schedule before Oct 10th    COLONOSCOPY N/A 6/19/2023    Procedure: COLONOSCOPY;  Surgeon: Lu Lewis MD;  [facility]  Service: Endoscopy;  Laterality: N/A;  pt offered earlier dates but stated she is out town and will be returning the evening of 6/4  cardiac clearance recieved-see tele encounter 5/22/23 5/22 referred by Dr. Lewis/PEG/instr. mailed and to portal-st  6/13 pre-call no answer; MB    CYSTOSCOPY      HYSTERECTOMY  1978 or 1979    menorrhagia       Review of Systems   Constitutional:  Negative for chills, fatigue and fever.   HENT:  Negative for trouble swallowing and voice change.    Eyes:  Negative for photophobia and visual disturbance.        No acute visual changes   Respiratory:  Negative for apnea, cough, chest tightness, shortness of breath and wheezing.         Has Chante wears CPAP   Cardiovascular:  Negative for chest pain, palpitations and leg swelling.   Gastrointestinal:  Negative for abdominal distention, abdominal pain, blood in stool, constipation, diarrhea, nausea and vomiting.        NO FLD, hepatitis, cirrhosis  No BRB or black tarry stool     Genitourinary:  Negative for difficulty urinating, dysuria, flank pain, frequency, hematuria and  "urgency.   Musculoskeletal:  Positive for gait problem. Negative for arthralgias, back pain, joint swelling, myalgias, neck pain and neck stiffness.   Neurological:  Negative for dizziness, tremors, seizures, syncope, weakness, light-headedness, numbness and headaches.   Psychiatric/Behavioral:  Negative for suicidal ideas.           VITALS  Visit Vitals  BP (!) 140/80 (BP Location: Left arm, Patient Position: Sitting)   Pulse 79   Temp 97.7 °F (36.5 °C) (Oral)   Ht 5' 10" (1.778 m)   Wt 116.8 kg (257 lb 8 oz)   SpO2 98%   BMI 36.95 kg/m²          Physical Exam  Constitutional:       General: She is not in acute distress.     Appearance: Normal appearance. She is well-developed. She is not diaphoretic.   HENT:      Head: Normocephalic.      Right Ear: Hearing normal.      Left Ear: Hearing normal.      Nose: Nose normal.      Mouth/Throat:      Lips: Pink.      Mouth: Mucous membranes are moist.      Pharynx: No oropharyngeal exudate.   Eyes:      General: Lids are normal.         Right eye: No discharge.         Left eye: No discharge.      Conjunctiva/sclera: Conjunctivae normal.      Pupils: Pupils are equal, round, and reactive to light.   Neck:      Thyroid: No thyromegaly.      Vascular: No carotid bruit or JVD.      Trachea: Trachea and phonation normal. No tracheal deviation.   Cardiovascular:      Rate and Rhythm: Normal rate and regular rhythm.      Pulses: Normal pulses.           Carotid pulses are 2+ on the right side and 2+ on the left side.       Radial pulses are 2+ on the right side and 2+ on the left side.        Posterior tibial pulses are 2+ on the right side and 2+ on the left side.      Heart sounds: Normal heart sounds. No murmur heard.     No friction rub. No gallop.      Comments: Trac LE edema  Pulmonary:      Effort: Pulmonary effort is normal. No respiratory distress.      Breath sounds: Normal breath sounds. No stridor. No wheezing or rales.      Comments: Clear Anterior and Posterior " BBS  Abdominal:      General: Abdomen is protuberant. Bowel sounds are normal. There is no distension.      Palpations: Abdomen is soft.      Tenderness: There is no abdominal tenderness. There is no guarding.   Musculoskeletal:         General: No tenderness or deformity. Normal range of motion.      Cervical back: Normal range of motion and neck supple. No rigidity.      Right lower leg: No edema.      Left lower leg: No edema.   Lymphadenopathy:      Head:      Right side of head: No submental, submandibular, tonsillar, preauricular, posterior auricular or occipital adenopathy.      Left side of head: No submental, submandibular, tonsillar, preauricular, posterior auricular or occipital adenopathy.      Cervical: No cervical adenopathy.      Right cervical: No superficial cervical adenopathy.     Left cervical: No superficial cervical adenopathy.   Skin:     General: Skin is warm and dry.      Capillary Refill: Capillary refill takes 2 to 3 seconds.      Coloration: Skin is not pale.      Findings: No erythema or rash.   Neurological:      Mental Status: She is alert and oriented to person, place, and time. Mental status is at baseline.      GCS: GCS eye subscore is 4. GCS verbal subscore is 5. GCS motor subscore is 6.      Motor: No abnormal muscle tone.      Coordination: Coordination normal.   Psychiatric:         Attention and Perception: Attention and perception normal.         Mood and Affect: Mood and affect normal.         Speech: Speech normal.         Behavior: Behavior normal. Behavior is cooperative.         Thought Content: Thought content normal.         Cognition and Memory: Cognition and memory normal.         Judgment: Judgment normal.          Significant Labs:  Lab Results   Component Value Date    WBC 3.78 (L) 12/03/2024    HGB 11.8 (L) 12/03/2024    HCT 37.6 12/03/2024     12/03/2024    CHOL 147 04/17/2024    TRIG 71 04/17/2024    HDL 54 04/17/2024    ALT 13 12/03/2024    AST 18  12/03/2024     12/03/2024    K 4.2 12/03/2024     12/03/2024    CREATININE 1.1 12/03/2024    BUN 17 12/03/2024    CO2 25 12/03/2024    TSH 0.772 12/03/2024    INR 1.0 12/03/2024    HGBA1C 4.3 04/17/2024       Diagnostic Studies: No relevant studies.    EKG:   Results for orders placed or performed during the hospital encounter of 12/03/24   EKG 12-lead    Collection Time: 12/03/24  8:26 AM   Result Value Ref Range    QRS Duration 90 ms    [facility] QTC Calculation 412 ms    Narrative    Test Reason : Z01.818,    Vent. Rate :  78 BPM     Atrial Rate :  78 BPM     P-R Int : 200 ms          QRS Dur :  90 ms      QT Int : 362 ms       P-R-T Axes :  55  -3  18 degrees    QTcB Int : 412 ms    Sinus rhythm with occasional Premature ventricular complexes and Premature  atrial complexes  Otherwise normal ECG  When compared with ECG of 17-Apr-2024 10:28,  Premature ventricular complexes are now Present  Confirmed by Stacey Broderick ([id number]) on 12/3/2024 12:51:02 PM    Referred By: VINCE CLARK           Confirmed By: Stacey Broderick       2D ECHO:  TTE:  Results for orders placed or performed during the hospital encounter of 05/22/24   Echo   Result Value Ref Range    BSA 2.39 m2    LVOT stroke volume 76.58 cm3    LVIDd 5.01 3.5 - 6.0 cm    LV Systolic Volume 56.18 mL    LV Systolic Volume Index 24.4 mL/m2    LVIDs 3.65 2.1 - 4.0 cm    LV Diastolic Volume 118.98 mL    LV Diastolic Volume Index 51.73 mL/m2    IVS 1.01 0.6 - 1.1 cm    LVOT diameter 2.26 cm    LVOT area 4.0 cm2    FS 27 (A) 28 - 44 %    Left Ventricle Relative Wall Thickness 0.40 cm    PW 1.01 0.6 - 1.1 cm    LV mass 185.04 g    LV Mass Index 80 g/m2    MV Peak E Nathaniel 0.48 m/s    TDI LATERAL 0.08 m/s    TDI SEPTAL 0.04 m/s    E/E' ratio 8.00 m/s    MV Peak A Nathaniel 0.86 m/s    TR Max Nathaniel 2.19 m/s    E/A ratio 0.56     IVRT 129.40 msec    E wave deceleration time 278.22 msec    LV SEPTAL E/E' RATIO 12.00 m/s    LV LATERAL E/E' RATIO 6.00 m/s    PV Peak S Nathaniel 0.54 m/s     PV Peak D Nathaniel 0.36 m/s    Pulm vein S/D ratio 1.50     LVOT peak nathaniel 1.14 m/s    Left Ventricular Outflow Tract Mean Velocity 0.76 cm/s    Left Ventricular Outflow Tract Mean Gradient 2.60 mmHg    RV S' 12.94 cm/s    RVOT peak VTI 15.7 cm    LA size 4.28 cm    Left Atrium Minor Axis 6.13 cm    Left Atrium Major Axis 5.02 cm    LA Vol (MOD) 63.32 cm3    NOLVIA (MOD) 27.5 mL/m2    RA Major Axis 5.94 cm    AV regurgitation pressure 1/2 time 463.0 ms    AR Max Nathaniel 4.40 m/s    AV mean gradient 7 mmHg    AV peak gradient 12 mmHg    Ao peak nathaniel 1.73 m/s    Ao VTI 30.80 cm    LVOT peak VTI 19.10 cm    AV valve area 2.49 cm²    AV Velocity Ratio 0.66     AV index (prosthetic) 0.62     MONICA by Velocity Ratio 2.64 cm²    Mr max nathaniel 5.36 m/s    MV stenosis pressure 1/2 time 80.68 ms    MV valve area p 1/2 method 2.73 cm2    Triscuspid Valve Regurgitation Peak Gradient 19 mmHg    PV mean gradient 2 mmHg    PV PEAK VELOCITY 1.03 m/s    PV peak gradient 4 mmHg    RVOT peak nathaniel 0.91 m/s    STJ 3.37 cm    Ascending aorta 3.63 cm    IVC diameter 1.51 cm    Mean e' 0.06 m/s    ZLVIDS -3.06     ZLVIDD -5.76     NOLVIA 37.5 mL/m2    LA Vol 86.35 cm3    RVDD 3.40 cm    TAPSE 2.20 cm    RV/LV Ratio 0.68 cm    LA WIDTH 4.3 cm    RA Width 3.5 cm    Sinus 3.2 cm    TV resting pulmonary artery pressure 22 mmHg    RV TB RVSP 5 mmHg    Est. RA pres 3 mmHg    Narrative      Left Ventricle: The left ventricle is normal in size. Normal wall   thickness. There is normal systolic function with a visually estimated   ejection fraction of 55 - 60%. Grade I diastolic dysfunction.    Right Ventricle: Normal right ventricular cavity size. Wall thickness   is normal. Systolic function is normal.    Left Atrium: Left atrium is mildly dilated.    Right Atrium: Right atrium is mildly dilated.    IVC/SVC: Normal venous pressure at 3 mmHg.    Pericardium: There is a trivial effusion. No indication of cardiac   tamponade.         ANANDA:  No results found. However,  due to the size of the patient record, not all encounters were searched. Please check Results Review for a complete set of results.     Imaging     Active Cardiac Conditions: None      Revised Cardiac Risk Index   High -Risk Surgery  Intraperitoneal; Intrathoracic; suprainguinal vascular Yes- + 1 No- 0   History of Ischemic Heart Disease   (Hx of MI/positive exercise test/current chest pain due to ischemia/use of nitrate therapy/EKG with pathological Q waves) Yes- + 1 No- 0   History of CHF  (Pulmonary edema/bilateral rales or S3 gallop/PND/CXR showing pulmonary vascular redistribution) Yes- + 1 No- 0   History of CVA   (Prior stroke or TIA) Yes- + 1 No- 0   Pre-operative treatment with insulin Yes- + 1 No- 0   Pre-operative creatinine > 2mg/dl Yes- + 1 No- 0   Total:    Risk Status:  Estimated risk of cardiac complications after non-cardiac surgery using the Revised Cardiac Risk Index for Preoperative risk is 3.9 %      ARISCAT (Canet) risk index: Low: 1.6% risk of post-op pulmonary complications.    American Society of Anesthesiologists Physical Status classification (ASA): 3             Preoperative cardiac risk assessment-  The patient does not have any active cardiac conditions . Revised cardiac risk index predictors- ---.Functional capacity is more than 4 Mets. She will be undergoing a Orthopedic procedure that carries a Moderate Risk risk     The estimated risk of the rate of adverse cardiac outcomes  3.9    No further cardiac work up is indicated prior to proceeding with the surgery          American Society of Anesthesiologists Physical status classification ( ASA ) class: 3     Postoperative pulmonary complication risk assessment: 1.6     ARISCAT ( Canet) risk index- risk class -  Low, if duration of surgery is under 3 hours, intermediate, if duration of surgery is over 3 hours          Assessment/Plan:     Migraine headache  Maxalt prn migraine    Has a migraine every 2 months  Avoids triggers like  seasoned foods    Primary hypertension  Current BP  140/80 today.    Taking: Benicar  Patient reports home BP readings of: 130/72      Lifestyle changes to reduce systolic BP:  exercise 30 minutes per day,  5 days per week or 150 minutes weekly; sodium reduction and avoidance of high salt foods such as processed meats, frozen meals and  fast foods.   Keeping a healthy weight/BMI can help with better BP control    BP acceptable for surgery. I recommend monitoring BP during perioperative period as uncontrolled pain can elevate blood pressure.         Severe obesity with body mass index (BMI) of 35.0 to 39.9 with comorbidity  BMI 36.9  Patient would benefit from weight loss Lifestyle changes should be made by eating healthy, exercising at least 150 minutes weekly, and avoiding sedentary behavior.     BECKY on CPAP  This client has a  diagnosis of obstructive sleep apnea (BECKY)   Wears CPAP at night  Instructions were given to avoid the following: sleeping supine, weight gain ,alcoholic beverages , sedative medications, and CNS depressant use as these can all worsen BECKY.    Untreated sleep apnea has been shown to increase daytime sleepiness, hypertension, heart disease and stroke which were discussed with the patient at this time      Please use caution with medications that induce respiratory depression in the perioperative period    Polycystic kidney disease    Component      Latest Ref Rng 12/3/2024   BUN      8 - 23 mg/dL 17    Creatinine      0.5 - 1.4 mg/dL 1.1      GFR 53.1    History of echocardiogram  Transthoracic echo (TTE) complete w/ myocardial strain    Dx: Aortic aneurysm, unspecified portion of aorta, unspecified whether ruptured [I71.9 (ICD-10-CM)]       Left Ventricle: The left ventricle is normal in size. Normal wall thickness. There is normal systolic function with a visually estimated ejection fraction of 55 - 60%. Grade I diastolic dysfunction.    Right Ventricle: Normal right ventricular cavity size.  Wall thickness is normal. Systolic function is normal.    Left Atrium: Left atrium is mildly dilated.    Right Atrium: Right atrium is mildly dilated.    IVC/SVC: Normal venous pressure at 3 mmHg.    Pericardium: There is a trivial effusion. No indication of cardiac tamponade.     Echocardiography Findings    Left Ventricle The left ventricle is normal in size. Normal wall thickness. Normal wall motion. There is normal systolic function with a visually estimated ejection fraction of 55 - 60%. Grade I diastolic dysfunction.   Right Ventricle Normal right ventricular cavity size. Wall thickness is normal. Right ventricle wall motion  is normal. Systolic function is normal.   Left Atrium Left atrium is mildly dilated.   Right Atrium Right atrium is mildly dilated.   Aortic Valve The aortic valve is structurally normal. There is normal leaflet mobility. Aortic valve peak velocity is 1.73 m/s. Mean gradient is 7 mmHg. There is no significant regurgitation.   Mitral Valve The mitral valve is structurally normal. There is normal leaflet mobility. There is trace regurgitation.   Tricuspid Valve The tricuspid valve is structurally normal. There is normal leaflet mobility. There is trace regurgitation.   Pulmonic Valve The pulmonic valve is structurally normal. There is normal leaflet mobility. There is no significant regurgitation.   IVC/SVC Normal venous pressure at 3 mmHg.   Ascending Aorta Aortic root is normal in size measuring 3.2 cm. Ascending aorta is normal measuring 3.63 cm.   Pericardium and Other Findings There is a trivial effusion. No indication of cardiac tamponade.   Pulmonary Artery The estimated pulmonary artery systolic pressure is 22 mmHg.     Exam Details    Performed Procedure Technologist     Transthoracic echo (TTE) complete w/ myocardial strain Andrez Prakash           Begin Exam End Exam     5/22/2024 10:58 AM CDT 5/22/2024 11:27 AM CDT            Aneurysm of ascending aorta without rupture  Patient  "under the care of Abimael Moss Her last visit was 5/27/24 in which he noted the "TAA remains stable. RTC PRN. No need for routine Ct scan for TAA."    Smoldering multiple myeloma (SMM)  PAtient managed per [facility]  Last visit  Addis Elizabeth, NP 8/12/24  Plan  "Discussed now that >10 years from diagnosis with no progression to symptomatic myeloma her risk of progression now decreases significantly and her disease is more clinically like an MGUS, but that we should still monitor her biochemcial studies q 6 months and see her for MD appt annually"    Leukopenia  Per [facility]  Anemia/Leukopenia  Chronic, pre-dating smoldering myeloma diagnosis  Anemia and leukopenia long-standing, going back to 2009  Both stable and unchanged    Decreased range of motion of left knee  Sx scheduled 12/23/24    Anemia  Hgb 11.8  HCT 37.6    CKD (chronic kidney disease) stage 3, GFR 30-59 ml/min  BUN 17 Cr 1.1 GFR 53.1  Deleterious effects NSAID's , Beneficial effects of Hydration discussed   Tylenol as needed for pain   I suggest monitoring renal function, in put and out put status theodore-operatively. I  suggest avoiding nephrotoxic medication including NSAIDs, COX2 inhibitors, intravenous contrast agent,avoiding hypotension to prevent further renal impairment.       Preventive perioperative care    Thromboembolic prophylaxis:  Her risk factors for thrombosis include morbid obesity, surgical procedure, age, and reduced mobility.I suggest  thromboembolic prophylaxis ( mechanical/pharmacological, weighing the risk benefits of pharmacological agent use considering theodore procedural bleeding )  during the perioperative period.I suggested being active in the post operative period.      Postoperative pulmonary complication prophylaxis-Risk factors for post operative pulmonary complications include age over 65 years and ASA class >2- I suggest incentive spirometry use, early ambulation, and pain control so as to avoid " diaphragmatic splinting  Brush teeth twice per day, oral rinses, sleep with the head of the bed up 30 degrees     Renal complication prophylaxis-Risk factors for renal complications include age and hypertension . I suggest keeping her well hydrated and avoidance/ minimizing the use of  NSAID's,HYDE 2 Inhibitors ,IV contrast if possible in the perioperative period.I suggested drinking 2 litre's of water a day      Surgical site Infection Prophylaxis-I  suggest appropriate antibiotic for Prophylaxis against Surgical site infections Shower with Hibiclens in the night before surgery and the morning of surgery          In view of Spine procedure the patient  is at risk of postoperative urinary retention.  I suggest avoidance / minimizing the of  Benzodiazepines,Anticholinergic medication,antihistamines ( Benadryl) , if possible in the perioperative period. I suggest using the minimum possible use of opioids for the minimum period of time in the perioperative period. Benadryl avoidance suggested      This visit was focused on Preoperative evaluation, Perioperative Medical management, complication reduction plans. I suggest that the patient follows up with primary care or relevant sub specialists for ongoing health care.    I appreciate the opportunity to be involved in this patients care. Please feel free to contact me if there were any questions about this consultation.    Patient is optimized      I spent a total of 47   minutes on the day of the visit.This includes face to face time and non-face to face time preparing to see the patient (eg, review of tests), obtaining and/or reviewing separately obtained history, documenting clinical information in the electronic or other health record, independently interpreting results and communicating results to the patient/family/caregiver, or care coordinator.      Art French NP  Perioperative Medicine  [facility]   Pager [phone]

## 2024-12-03 NOTE — OUTPATIENT SUBJECTIVE & OBJECTIVE
Outpatient Subjective & Objective      Chief Complaint: Preoperative evaulation, perioperative medical management, and complication reduction plan.     Functional Capacity:  Able to climb a flight of stairs without CP SOB or Syncope.  Able to meet 4 METs  Climbs 13 steps daily has raised house      Anesthesia issues: None    Difficulty mouth opening: none    Steroid use in the last 12 months: none     Dental Issues: bottom plate    Family anesthesia difficulty: None       Family Hx of Thrombosis none    Past Medical History:   Diagnosis Date    Allergy     Anemia     Arthritis     Cholelithiases     Cyst of kidney, acquired     Diverticulitis     Elevated TSH     Family history of Graves' disease: daughter, maternal aunt, maternal uncle 09/13/2016    Glaucoma suspect     Graves disease     Hx of colonic polyps     Hypertension 1981    Liver cyst     MGUS (monoclonal gammopathy of unknown significance)     Migraine headache     Mitral valve problem     leakage    Obesity     Paraproteinemia     Sleep apnea     + CPAP    Smoldering multiple myeloma 2013    Tricuspid valve disease     leakage     Past Surgical History:   Procedure Laterality Date    APPENDECTOMY      COLONOSCOPY N/A 10/23/2015    Procedure: COLONOSCOPY;  Surgeon: Brandon Ruelas MD;  [facility]  Service: Endoscopy;  Laterality: N/A;  Had divertiulitis in 5/29/2015 with a recommendation for colonoscopy in 8 weeks    Dr. Ruelas is her GI physician    COLONOSCOPY N/A 11/05/2018    Procedure: COLONOSCOPY;  Surgeon: Brandon Ruelas MD;  [facility]  Service: Endoscopy;  Laterality: N/A;  Dr. Ruelas did the last one multiple polyps, patient had recent diverticulitis should not schedule before Oct 10th    COLONOSCOPY N/A 6/19/2023    Procedure: COLONOSCOPY;  Surgeon: Lu Lewis MD;  [facility]  Service: Endoscopy;  Laterality: N/A;  pt offered earlier dates but stated she is out town  "and will be returning the evening of 6/4  cardiac clearance recieved-see tele encounter 5/22/23 5/22 referred by Dr. Lewis/LOLITA/instr. mailed and to portal-  6/13 pre-call no answer; MB    CYSTOSCOPY      HYSTERECTOMY  1978 or 1979    menorrhagia       Review of Systems   Constitutional:  Negative for chills, fatigue and fever.   HENT:  Negative for trouble swallowing and voice change.    Eyes:  Negative for photophobia and visual disturbance.        No acute visual changes   Respiratory:  Negative for apnea, cough, chest tightness, shortness of breath and wheezing.         Has Chante wears CPAP   Cardiovascular:  Negative for chest pain, palpitations and leg swelling.   Gastrointestinal:  Negative for abdominal distention, abdominal pain, blood in stool, constipation, diarrhea, nausea and vomiting.        NO FLD, hepatitis, cirrhosis  No BRB or black tarry stool     Genitourinary:  Negative for difficulty urinating, dysuria, flank pain, frequency, hematuria and urgency.   Musculoskeletal:  Positive for gait problem. Negative for arthralgias, back pain, joint swelling, myalgias, neck pain and neck stiffness.   Neurological:  Negative for dizziness, tremors, seizures, syncope, weakness, light-headedness, numbness and headaches.   Psychiatric/Behavioral:  Negative for suicidal ideas.           VITALS  Visit Vitals  BP (!) 140/80 (BP Location: Left arm, Patient Position: Sitting)   Pulse 79   Temp 97.7 °F (36.5 °C) (Oral)   Ht 5' 10" (1.778 m)   Wt 116.8 kg (257 lb 8 oz)   SpO2 98%   BMI 36.95 kg/m²          Physical Exam  Constitutional:       General: She is not in acute distress.     Appearance: Normal appearance. She is well-developed. She is not diaphoretic.   HENT:      Head: Normocephalic.      Right Ear: Hearing normal.      Left Ear: Hearing normal.      Nose: Nose normal.      Mouth/Throat:      Lips: Pink.      Mouth: Mucous membranes are moist.      Pharynx: No oropharyngeal exudate.   Eyes:      General: " Lids are normal.         Right eye: No discharge.         Left eye: No discharge.      Conjunctiva/sclera: Conjunctivae normal.      Pupils: Pupils are equal, round, and reactive to light.   Neck:      Thyroid: No thyromegaly.      Vascular: No carotid bruit or JVD.      Trachea: Trachea and phonation normal. No tracheal deviation.   Cardiovascular:      Rate and Rhythm: Normal rate and regular rhythm.      Pulses: Normal pulses.           Carotid pulses are 2+ on the right side and 2+ on the left side.       Radial pulses are 2+ on the right side and 2+ on the left side.        Posterior tibial pulses are 2+ on the right side and 2+ on the left side.      Heart sounds: Normal heart sounds. No murmur heard.     No friction rub. No gallop.      Comments: Trac LE edema  Pulmonary:      Effort: Pulmonary effort is normal. No respiratory distress.      Breath sounds: Normal breath sounds. No stridor. No wheezing or rales.      Comments: Clear Anterior and Posterior BBS  Abdominal:      General: Abdomen is protuberant. Bowel sounds are normal. There is no distension.      Palpations: Abdomen is soft.      Tenderness: There is no abdominal tenderness. There is no guarding.   Musculoskeletal:         General: No tenderness or deformity. Normal range of motion.      Cervical back: Normal range of motion and neck supple. No rigidity.      Right lower leg: No edema.      Left lower leg: No edema.   Lymphadenopathy:      Head:      Right side of head: No submental, submandibular, tonsillar, preauricular, posterior auricular or occipital adenopathy.      Left side of head: No submental, submandibular, tonsillar, preauricular, posterior auricular or occipital adenopathy.      Cervical: No cervical adenopathy.      Right cervical: No superficial cervical adenopathy.     Left cervical: No superficial cervical adenopathy.   Skin:     General: Skin is warm and dry.      Capillary Refill: Capillary refill takes 2 to 3 seconds.       Coloration: Skin is not pale.      Findings: No erythema or rash.   Neurological:      Mental Status: She is alert and oriented to person, place, and time. Mental status is at baseline.      GCS: GCS eye subscore is 4. GCS verbal subscore is 5. GCS motor subscore is 6.      Motor: No abnormal muscle tone.      Coordination: Coordination normal.   Psychiatric:         Attention and Perception: Attention and perception normal.         Mood and Affect: Mood and affect normal.         Speech: Speech normal.         Behavior: Behavior normal. Behavior is cooperative.         Thought Content: Thought content normal.         Cognition and Memory: Cognition and memory normal.         Judgment: Judgment normal.          Significant Labs:  Lab Results   Component Value Date    WBC 3.78 (L) 12/03/2024    HGB 11.8 (L) 12/03/2024    HCT 37.6 12/03/2024     12/03/2024    CHOL 147 04/17/2024    TRIG 71 04/17/2024    HDL 54 04/17/2024    ALT 13 12/03/2024    AST 18 12/03/2024     12/03/2024    K 4.2 12/03/2024     12/03/2024    CREATININE 1.1 12/03/2024    BUN 17 12/03/2024    CO2 25 12/03/2024    TSH 0.772 12/03/2024    INR 1.0 12/03/2024    HGBA1C 4.3 04/17/2024       Diagnostic Studies: No relevant studies.    EKG:   Results for orders placed or performed during the hospital encounter of 12/03/24   EKG 12-lead    Collection Time: 12/03/24  8:26 AM   Result Value Ref Range    QRS Duration 90 ms    [facility] QTC Calculation 412 ms    Narrative    Test Reason : Z01.818,    Vent. Rate :  78 BPM     Atrial Rate :  78 BPM     P-R Int : 200 ms          QRS Dur :  90 ms      QT Int : 362 ms       P-R-T Axes :  55  -3  18 degrees    QTcB Int : 412 ms    Sinus rhythm with occasional Premature ventricular complexes and Premature  atrial complexes  Otherwise normal ECG  When compared with ECG of 17-Apr-2024 10:28,  Premature ventricular complexes are now Present  Confirmed by Stacey Broderick ([id number]) on 12/3/2024 12:51:02  PM    Referred By: VINCE CLARK           Confirmed By: Stacey Broderick       2D ECHO:  TTE:  Results for orders placed or performed during the hospital encounter of 05/22/24   Echo   Result Value Ref Range    BSA 2.39 m2    LVOT stroke volume 76.58 cm3    LVIDd 5.01 3.5 - 6.0 cm    LV Systolic Volume 56.18 mL    LV Systolic Volume Index 24.4 mL/m2    LVIDs 3.65 2.1 - 4.0 cm    LV Diastolic Volume 118.98 mL    LV Diastolic Volume Index 51.73 mL/m2    IVS 1.01 0.6 - 1.1 cm    LVOT diameter 2.26 cm    LVOT area 4.0 cm2    FS 27 (A) 28 - 44 %    Left Ventricle Relative Wall Thickness 0.40 cm    PW 1.01 0.6 - 1.1 cm    LV mass 185.04 g    LV Mass Index 80 g/m2    MV Peak E Nathaniel 0.48 m/s    TDI LATERAL 0.08 m/s    TDI SEPTAL 0.04 m/s    E/E' ratio 8.00 m/s    MV Peak A Nathaniel 0.86 m/s    TR Max Nathaniel 2.19 m/s    E/A ratio 0.56     IVRT 129.40 msec    E wave deceleration time 278.22 msec    LV SEPTAL E/E' RATIO 12.00 m/s    LV LATERAL E/E' RATIO 6.00 m/s    PV Peak S Nathaniel 0.54 m/s    PV Peak D Nathaniel 0.36 m/s    Pulm vein S/D ratio 1.50     LVOT peak nathaniel 1.14 m/s    Left Ventricular Outflow Tract Mean Velocity 0.76 cm/s    Left Ventricular Outflow Tract Mean Gradient 2.60 mmHg    RV S' 12.94 cm/s    RVOT peak VTI 15.7 cm    LA size 4.28 cm    Left Atrium Minor Axis 6.13 cm    Left Atrium Major Axis 5.02 cm    LA Vol (MOD) 63.32 cm3    NOLVIA (MOD) 27.5 mL/m2    RA Major Axis 5.94 cm    AV regurgitation pressure 1/2 time 463.0 ms    AR Max Nathaniel 4.40 m/s    AV mean gradient 7 mmHg    AV peak gradient 12 mmHg    Ao peak nathaniel 1.73 m/s    Ao VTI 30.80 cm    LVOT peak VTI 19.10 cm    AV valve area 2.49 cm²    AV Velocity Ratio 0.66     AV index (prosthetic) 0.62     MONICA by Velocity Ratio 2.64 cm²    Mr max nathaniel 5.36 m/s    MV stenosis pressure 1/2 time 80.68 ms    MV valve area p 1/2 method 2.73 cm2    Triscuspid Valve Regurgitation Peak Gradient 19 mmHg    PV mean gradient 2 mmHg    PV PEAK VELOCITY 1.03 m/s    PV peak gradient 4 mmHg    RVOT  peak mai 0.91 m/s    STJ 3.37 cm    Ascending aorta 3.63 cm    IVC diameter 1.51 cm    Mean e' 0.06 m/s    ZLVIDS -3.06     ZLVIDD -5.76     NOVLIA 37.5 mL/m2    LA Vol 86.35 cm3    RVDD 3.40 cm    TAPSE 2.20 cm    RV/LV Ratio 0.68 cm    LA WIDTH 4.3 cm    RA Width 3.5 cm    Sinus 3.2 cm    TV resting pulmonary artery pressure 22 mmHg    RV TB RVSP 5 mmHg    Est. RA pres 3 mmHg    Narrative      Left Ventricle: The left ventricle is normal in size. Normal wall   thickness. There is normal systolic function with a visually estimated   ejection fraction of 55 - 60%. Grade I diastolic dysfunction.    Right Ventricle: Normal right ventricular cavity size. Wall thickness   is normal. Systolic function is normal.    Left Atrium: Left atrium is mildly dilated.    Right Atrium: Right atrium is mildly dilated.    IVC/SVC: Normal venous pressure at 3 mmHg.    Pericardium: There is a trivial effusion. No indication of cardiac   tamponade.         ANANDA:  No results found. However, due to the size of the patient record, not all encounters were searched. Please check Results Review for a complete set of results.     Imaging     Active Cardiac Conditions: None      Revised Cardiac Risk Index   High -Risk Surgery  Intraperitoneal; Intrathoracic; suprainguinal vascular Yes- + 1 No- 0   History of Ischemic Heart Disease   (Hx of MI/positive exercise test/current chest pain due to ischemia/use of nitrate therapy/EKG with pathological Q waves) Yes- + 1 No- 0   History of CHF  (Pulmonary edema/bilateral rales or S3 gallop/PND/CXR showing pulmonary vascular redistribution) Yes- + 1 No- 0   History of CVA   (Prior stroke or TIA) Yes- + 1 No- 0   Pre-operative treatment with insulin Yes- + 1 No- 0   Pre-operative creatinine > 2mg/dl Yes- + 1 No- 0   Total:    Risk Status:  Estimated risk of cardiac complications after non-cardiac surgery using the Revised Cardiac Risk Index for Preoperative risk is 3.9 %      ARISCAT (Maria Eugenia) risk index: Low:  1.6% risk of post-op pulmonary complications.    American Society of Anesthesiologists Physical Status classification (ASA): 3         Outpatient Subjective & Objective

## 2024-12-03 NOTE — PROGRESS NOTES
Manju Aranda is a 74 y.o. year old here today for pre surgery optimization visit  in preparation for a Left total knee arthroplasty to be performed by Dr. Torre on 12/23/24.  she was last seen and treated in the clinic on 8/8/2024. she will be medically optimized by the pre op center. There has been no significant change in medical status since last visit. No fever, chills, malaise, or unexplained weight change.      Allergies, Medications, past medical and surgical history reviewed.    Focused examination performed.    Patient saw surgeon in clinic today. All questions answered. Patient encouraged to call with questions. Contact information given.     Pre, theodore, and post operative procedures and expectations discussed. Goals of successful surgery reviewed and include manageable pain levels, surgical site free of infection, medication management, and ambulation with PT/OT assistance. Healthy weight management discussed with patient and caregiver who were receptive to eduction of healthy diet and activity. No other necessary lifestyle changes identified. Educated patient about signs and symptoms of infection, medication management, anticoagulation therapy, risk of tobacco and alcohol use, and self-care to promote healing. Surgical guide given for future reference. Hibiclens given to patient with instructions. All questions were answered.     Manju Aranda verbalized an understanding to the education and goals. Patient has displayed readiness to engage in care and is ready to proceed with surgery.  Patient reports sister is able and ready to provide assistance at home after discharge.    Surgical and blood consents signed.    DME will be arranged. The mobility limitation cannot be sufficiently resolved by the use of a cane. Patient's functional mobility deficit can be sufficiently resolved with the use of a (Rolling Walker or Walker). Patient's mobility limitation significantly impairs their ability to  "participate in one of more activities of daily living. The use of a (Rolling Walker or Walker) will significantly improve the patient's ability to participate in MRADLS and the patient will use it on regular basis in the home."     Manju Aranda will contact us if there are any questions, concerns, or changes in medical status prior to surgery.       Joint class: 12/18/24    Patient has discussed discharge planning with surgeon. Patient will be discharged to home following surgery.   patient will be scheduled with [facility]. PT will be done at the [facility] location.    30 minutes of time was spent on patient education, review of records, templating, H&P, , appointment scheduling and optimizing patient for surgery.      "

## 2024-12-03 NOTE — DISCHARGE INSTRUCTIONS
.Your surgery has been scheduled for:_____12/23/24_____________________________________    [facility]:  [facility] located on the [place] side of the first floor of the           [facility] ([phone])  ____[facility], located on the [facility] side of the            Second floor of the [facility] ([phone])  ____3rd Floor [facility] located on the [facility] side of the [facility] [phone]  __X__[facility]: [address] [place] [place] [address]. [address]     Please Note   Tell your doctor if you take Aspirin, products containing Aspirin, herbal medications  or blood thinners, such as Coumadin, Ticlid, or Plavix.  (Consult your provider regarding holding or stopping before surgery).  Arrange for someone to drive you home following surgery.  You will not be allowed to leave the surgical facility alone or drive yourself home following sedation and anesthesia.    Before Surgery  Stop taking all herbal medications, vitamins, and supplements 7 days prior to surgery  No Motrin/Advil (Ibuprofen) 7 days before surgery  No Aleve (Naproxen) 7 days before surgery   No Goody's/BC Powder 7 days before surgery  Refrain from drinking alcoholic beverages for 24 hours before and after surgery  Stop or limit smoking at least 24 hours prior to surgery  You may take Tylenol for pain    Night before Surgery  Do not eat or drink after midnight  Take a shower or bath (shower is recommended).  Bathe with Hibiclens soap or an antibacterial soap from the neck down.  If not supplied by your surgeon, hibiclens soap will need to be purchased over the counter in pharmacy.  Rinse soap off thoroughly.  Shampoo your hair with your regular shampoo    The Day of Surgery  Take another bath or shower with hibiclens or any antibacterial soap, to reduce the chance of infection.  Take heart  and blood pressure medications with a small sip of water, as advised by the perioperative team.  Do not take fluid pills  You may brush your teeth and rinse your mouth, but do not swallow any additional water.   Do not apply perfumes, powder, body lotions or deodorant on the day of surgery.  Nail polish should be removed.  Do not wear makeup or moisturizer  Wear comfortable clothes, such as a button front shirt and loose fitting pants.  Leave all jewelry, including body piercings, and valuables at home.    Bring any devices you will need after surgery such as crutches or canes.  If you have sleep apnea, please bring your CPAP machine  In the event that your physical condition changes including the onset of a cold or respiratory illness, or if you have to delay or cancel your surgery, please notify your surgeon.

## 2024-12-03 NOTE — ASSESSMENT & PLAN NOTE
"PAtient managed per [facility]  Last visit  Addis Elizabeth, NP 8/12/24  Plan  "Discussed now that >10 years from diagnosis with no progression to symptomatic myeloma her risk of progression now decreases significantly and her disease is more clinically like an MGUS, but that we should still monitor her biochemcial studies q 6 months and see her for MD appt annually"  "

## 2024-12-03 NOTE — ASSESSMENT & PLAN NOTE
Per Heme Onc  Anemia/Leukopenia  Chronic, pre-dating smoldering myeloma diagnosis  Anemia and leukopenia long-standing, going back to 2009  Both stable and unchanged

## 2024-12-03 NOTE — ASSESSMENT & PLAN NOTE
BMI 36.9  Patient would benefit from weight loss Lifestyle changes should be made by eating healthy, exercising at least 150 minutes weekly, and avoiding sedentary behavior.

## 2024-12-03 NOTE — ASSESSMENT & PLAN NOTE
Component      Latest Ref Rn 12/3/2024   BUN      8 - 23 mg/dL 17    Creatinine      0.5 - 1.4 mg/dL 1.1      GFR 53.1

## 2024-12-03 NOTE — H&P (VIEW-ONLY)
CC:  Left knee pain    Manju Aranda is a 74 y.o. female with history of Left knee pain. Pain is worse with activity and weight bearing.  Patient has experienced interference of activities of daily living due to decreased range of motion and an increase in joint pain and swelling.  Patient has failed non-operative treatment including NSAIDs, corticosteroid injections, viscosupplement injections, and activity modification.  Manju Aranda currently ambulates independently.     Relevant medical conditions of significance in perioperative period:  HTN- on medication managed by PCP  Autosomal dominant polycystic kidney disease with associated CKD stage III- monitored by Nephrology  BECKY on CPAP- monitored by PCP       Given documented medical comorbidities including those listed above and based off of CMS criteria patient would meet inpatient admission status guidelines. This case has been approved as inpatient.     Past Medical History:   Diagnosis Date    Allergy     Anemia     Arthritis     Cholelithiases     Cyst of kidney, acquired     Diverticulitis     Elevated TSH     Family history of Graves' disease: daughter, maternal aunt, maternal uncle 09/13/2016    Glaucoma suspect     Graves disease     Hx of colonic polyps     Hypertension 1981    Liver cyst     MGUS (monoclonal gammopathy of unknown significance)     Migraine headache     Mitral valve problem     leakage    Obesity     Paraproteinemia     Sleep apnea     + CPAP    Smoldering multiple myeloma 2013    Tricuspid valve disease     leakage       Past Surgical History:   Procedure Laterality Date    APPENDECTOMY      COLONOSCOPY N/A 10/23/2015    Procedure: COLONOSCOPY;  Surgeon: Brandon Ruelas MD;  Location: [facility] ([address] [place] [address]);  Service: Endoscopy;  Laterality: N/A;  Had divertiulitis in 5/29/2015 with a recommendation for colonoscopy in 8 weeks    Dr. Ruelas is her GI physician    COLONOSCOPY N/A 11/05/2018    Procedure: COLONOSCOPY;   Surgeon: Brandon Ruelas MD;  Location: [facility] ([address] [place] [address]);  Service: Endoscopy;  Laterality: N/A;  Dr. Ruelas did the last one multiple polyps, patient had recent diverticulitis should not schedule before Oct 10th    COLONOSCOPY N/A 6/19/2023    Procedure: COLONOSCOPY;  Surgeon: Lu Lewis MD;  Location: [facility] ([address] [place] [address]);  Service: Endoscopy;  Laterality: N/A;  pt offered earlier dates but stated she is out town and will be returning the evening of 6/4  cardiac clearance recieved-see tele encounter 5/22/23 5/22 referred by Dr. Lewis/PEG/instr. mailed and to portal-st  6/13 pre-call no answer; MB    CYSTOSCOPY      HYSTERECTOMY  1978 or 1979    menorrhagia       Family History   Problem Relation Name Age of Onset    Heart disease Mother          MI    Heart attack Mother      Hypertension Father      Irregular heart beat Sister          fast heart rate    Cataracts Sister      Glaucoma Sister      No Known Problems Sister      Graves' disease Daughter Sanita     No Known Problems Daughter Edith     Heart disease Maternal Aunt          MI    Heart disease Maternal Aunt          MI    Colon cancer Maternal Uncle      Cancer Maternal Uncle          colon ca    Breast cancer Paternal Grandmother      Cancer Paternal Grandmother          GYN cancer - unknown cancer    No Known Problems Son Jus     No Known Problems Son Denoid     Melanoma Neg Hx      Ovarian cancer Neg Hx      Colon polyps Neg Hx      Rectal cancer Neg Hx      Stomach cancer Neg Hx      Esophageal cancer Neg Hx      Ulcerative colitis Neg Hx      Crohn's disease Neg Hx      Amblyopia Neg Hx      Blindness Neg Hx      Macular degeneration Neg Hx      Strabismus Neg Hx      Retinal detachment Neg Hx         Review of patient's allergies indicates:  No Known Allergies      Current Outpatient Medications:     ascorbic acid, vitamin C, (VITAMIN C) 500 MG tablet, Take 500 mg by mouth once daily., Disp: , Rfl:     aspirin 81 MG Chew, Take  81 mg by mouth once daily., Disp: , Rfl:     azelastine (ASTELIN) 137 mcg (0.1 %) nasal spray, 2 sprays (274 mcg total) by Nasal route 2 (two) times daily. (Patient not taking: Reported on 9/4/2024), Disp: 30 mL, Rfl: 2    cholecalciferol, vitamin D3, (VITAMIN D3) 50 mcg (2,000 unit) Cap capsule, Take 1 capsule (2,000 Units total) by mouth once daily., Disp: , Rfl:     eszopiclone (LUNESTA) 2 MG Tab, Take 1 tablet (2 mg total) by mouth every evening., Disp: 30 tablet, Rfl: 5    loratadine (CLARITIN) 10 mg tablet, Take 1 tablet (10 mg total) by mouth once daily., Disp: 30 tablet, Rfl: 2    magnesium oxide (MAG-OX) 400 mg tablet, Take 400 mg by mouth once daily., Disp: , Rfl:     multivit with min-folic acid 0.4 mg Tab, Take 0.4 mg by mouth once daily., Disp: , Rfl:     olmesartan (BENICAR) 40 MG tablet, Take 1 tablet (40 mg total) by mouth once daily., Disp: 90 tablet, Rfl: 3    psyllium (METAMUCIL) powder, Take 1 packet by mouth Daily., Disp: , Rfl:     rizatriptan (MAXALT) 10 MG tablet, TAKE 1 TABLET(10 MG) BY MOUTH EVERY 2 HOURS AS NEEDED FOR MIGRAINE. MAX 30 MG/ 24 AT BEDTIME, Disp: 12 tablet, Rfl: 11    traZODone (DESYREL) 100 MG tablet, Take 1 tablet (100 mg total) by mouth nightly as needed for Insomnia., Disp: 90 tablet, Rfl: 1    Current Facility-Administered Medications:     LIDOcaine HCl 2% urojet, , Urethral, 1 time in Clinic/HOD, Jovanna Bui MD    Review of Systems:  Constitutional: no fever or chills  Eyes: no visual changes  ENT: no nasal congestion or sore throat  Respiratory: no cough or shortness of breath  Cardiovascular: no chest pain or palpitations  Gastrointestinal: no nausea or vomiting, tolerating diet  Genitourinary: no hematuria or dysuria  Integument/Breast: no rash or pruritis  Hematologic/Lymphatic: no easy bruising or lymphadenopathy  Musculoskeletal: positive for knee pain  Neurological: no seizures or tremors  Behavioral/Psych: no auditory or visual  hallucinations  Endocrine: no heat or cold intolerance    PE:  There were no vitals taken for this visit.  General: Pleasant, cooperative, NAD   Gait: antalgic  HEENT: NCAT, sclera nonicteric   Lungs: Respirations clear bilaterally; equal and unlabored.   CV: S1S2; 2+ bilateral upper and lower extremity pulses.   Skin: Intact throughout with no rashes, erythema, or lesions  Extremities: No LE edema,  no erythema or warmth of the skin in either lower extremity.    Left knee exam:  Knee Range of Motion:  0-115, pain with passive range of motion  Effusion:  Mild  Condition of skin:intact  Location of tenderness:Lateral joint line   Strength:normal  Stability: stable to testing    Hip Examination: painless PROM of hip     Radiographs: Radiographs reveal advanced degenerative changes including subchondral cyst formation, subchondral sclerosis, osteophyte formation, joint space narrowing.     Knee Alignment:  Moderate valgus    Diagnosis: Primary osteoarthritis Left knee    Plan: Left total knee arthroplasty    Due to the serious nature of total joint infection and the high prevalence of community acquired MRSA, vancomycin will be used perioperatively.

## 2024-12-04 ENCOUNTER — OFFICE VISIT (OUTPATIENT)
Dept: OPTOMETRY | Facility: CLINIC | Age: 74
End: 2024-12-04
Payer: COMMERCIAL

## 2024-12-04 DIAGNOSIS — H40.013 OAG (OPEN ANGLE GLAUCOMA) SUSPECT, LOW RISK, BILATERAL: ICD-10-CM

## 2024-12-04 DIAGNOSIS — H25.13 NUCLEAR SCLEROSIS, BILATERAL: ICD-10-CM

## 2024-12-04 DIAGNOSIS — H35.341 FULL THICKNESS MACULAR HOLE OF RIGHT EYE: Primary | ICD-10-CM

## 2024-12-04 PROCEDURE — 99999 PR PBB SHADOW E&M-EST. PATIENT-LVL II: CPT | Mod: PBBFAC,,, | Performed by: OPTOMETRIST

## 2024-12-04 PROCEDURE — 99214 OFFICE O/P EST MOD 30 MIN: CPT | Mod: S$GLB,,, | Performed by: OPTOMETRIST

## 2024-12-04 PROCEDURE — 92134 CPTRZ OPH DX IMG PST SGM RTA: CPT | Mod: S$GLB,,, | Performed by: OPTOMETRIST

## 2024-12-04 NOTE — PROGRESS NOTES
HPI    Annual Eyemed Vision Exam   Blurred vision OU Distance and near   Denies itch, tear, burn. No gtts  Denies Flashes, Floaters    Last edited by Nitin Kumar, OD on 12/4/2024  8:10 AM.        Medical exam, no refractive benefit       Assessment /Plan     For exam results, see Encounter Report.    Full thickness macular hole of right eye  -     OCT, Retina - OU - Both Eyes  -Dr Diallo next available  -OCT on file    Nuclear sclerosis, bilateral  -monitor at annual, vision consistent with macula hole not cataracts    OAG (open angle glaucoma) suspect, low risk, bilateral  -Borderline, cont'd monitor as risk      RTC as directed Retina

## 2024-12-04 NOTE — TELEPHONE ENCOUNTER
----- Message from Addis Elizabeth NP sent at 12/3/2024 10:06 AM CST -----  Regarding: RE: TKA 12/23/24, ?DVT proph with IgG kappa smoldering myeloma  Harpreet Butt, I don't believe there is any increased risk with the smoldering without any prior DVT or PE. The ASA should be fine, as that is what we use with our myeloma patient's on revlimid.    Addis  ----- Message -----  From: Mack Torre III, MD  Sent: 12/3/2024   9:58 AM CST  To: Art French NP; Luis Alba MD; #  Subject: TKA 12/23/24, ?DVT proph with IgG kappa smol#    Would appreciate hematology input on DVT prophylaxis for total knee arthroplasty 12/23/24 in setting of IgG kappa smoldering myeloma    Would you recommend our standard ASA 81mg BID protocol or is she higher risk for DVT for which I would switch to Eliquis 2.5mg BID    Thank you,  Will

## 2024-12-05 ENCOUNTER — TELEPHONE (OUTPATIENT)
Dept: OPHTHALMOLOGY | Facility: CLINIC | Age: 74
End: 2024-12-05
Payer: MEDICARE

## 2024-12-09 ENCOUNTER — CLINICAL SUPPORT (OUTPATIENT)
Dept: REHABILITATION | Facility: OTHER | Age: 74
End: 2024-12-09
Payer: MEDICARE

## 2024-12-09 DIAGNOSIS — M25.662 DECREASED RANGE OF MOTION OF LEFT KNEE: ICD-10-CM

## 2024-12-09 DIAGNOSIS — G89.29 CHRONIC PAIN OF LEFT KNEE: ICD-10-CM

## 2024-12-09 DIAGNOSIS — M25.562 CHRONIC PAIN OF LEFT KNEE: ICD-10-CM

## 2024-12-09 DIAGNOSIS — M17.12 PRIMARY OSTEOARTHRITIS OF LEFT KNEE: Primary | ICD-10-CM

## 2024-12-09 PROCEDURE — 97140 MANUAL THERAPY 1/> REGIONS: CPT | Mod: HCNC,PN

## 2024-12-09 PROCEDURE — 97530 THERAPEUTIC ACTIVITIES: CPT | Mod: HCNC,PN

## 2024-12-09 NOTE — PROGRESS NOTES
"[facility]   Physical Therapy Treatment Note      Name: Manju Aranda  Clinic Number: [id number]    Therapy Diagnosis:   Encounter Diagnoses   Name Primary?    Primary osteoarthritis of left knee Yes    Decreased range of motion of left knee     Chronic pain of left knee      Physician: Mack Torre III, *    Visit Date: 12/9/2024    Physician Orders: PT Eval and Treat  Medical Diagnosis from Referral: Primary osteoarthritis of left knee [M17.12]   Evaluation Date: 12/2/2024  Authorization Period Expiration: 8/8/2025  Plan of Care Expiration: 12/23/2024  Visit # / Visits authorized: 1/ 1; future visits pending  FOTO 1st follow up:  FOTO 2nd follow up:    PTA Visit #: 0/5     Precautions: Standard    Time In: 9:00 am  Time Out: 9:53 am  Total Appointment Time: 55 minutes    Subjective     Patient reports: that she is doing well but feels her knee is getting more painful. Worried about the surgery but reassured by Dr. Torre  She was compliant with home exercise program.  Response to previous treatment: first follow up   Functional change: ongoing    Pain: 4/10  Location: left knee      Objective    Asterisk Signs:   Observation: patient with joint effusion on the Left > Right      Posture: genu valgus noted during static standing      Functional tests:   Five Times Sit to Stand Test:  How long the patient takes to completed 5 sit to stand form chair with arms folded across chest: 18.91"  "Inability to perform test in less than 13.6 seconds indicates increased disability and Morbidity." (Sneha 2000).  Normative values for community dwelling elderly:   60-70y/o: 11.4 seconds  70-80y/o: 12.6 seconds  80-90y/o: 14.8 seconds        Timed Up and Go      Trial 1: 12.61" seconds  Trial 2: 12.67" seconds  Trial 3: 12.56" seconds     Average seconds: 12.6''      Seconds Rating   <10 Freely mobile   <20 Mostly independent   20-29 Variable mobility   >20 Impaired mobility       "   Tandem  Right 20 seconds   Left 20 seconds      Range of Motion (Passive):   Knee Right  Left    Flexion 125 111   Extension -2 -5      Range of Motion (Active):   Knee Right  Left    Flexion 123 110   Extension -7 -8         Lower Extremity Strength  Right LE   Left LE     Quadriceps: 36.4# Quadriceps: 38.4#   Hamstrings: 41.0# Hamstrings: 39.8#   Hip flexion (supine): 4/5 Hip flexion (supine): 4/5   Hip extension:  3+/5 Hip extension: 3/5   PGM: 3+/5 PGM:  3/5   Hip ER:  4-/5 Hip ER:  3+/5   Hip IR: 4/5 Hip IR: 4/5      Special Tests:    Right Left   Valgus Stress Test - + pain   Varus Stress test - + pain   Lachman's test - -   Posterior Lachman - -   Jericho's Test - -   Thessaly's Test - -   Patellar Grind Test - -      Joint Mobility:   Tibiofemoral Joint 2/6, posterior and anterior glide  Patellofemoral Joint 2/6, inferior   Proximal Tib/Fib Joint 3/6  Talocrural Joint 3/6      Palpation: tenderness to palpation over medial and lateral joint line     Sensation: Normal     Flexibility:               Ely's test: R = - ; L = + lacking 10 degrees               Hamstrings: R = - ; L = + lacking 15 degrees               Mary's test: R = - ; L = + lacking 10 degrees     Edema: + Stroke Test       Objective Measures updated at progress report unless specified.     Treatment     Manju received the treatments listed below:      manual therapy techniques: Joint mobilizations were applied to the: Left Knee for 10 minutes, including:  Inferior Patellar Glides Grade II  Flexion/Extension Mobilizations Grade II/III    neuromuscular re-education activities to improve: Balance, Coordination, Kinesthetic, Sense, Proprioception, and Posture for 23 minutes. The following activities were included:  Long Arc Quads 30x's terminal extension focus  Shuttle Squats 1 cord 5' for cartilage loading   NuStep Eccentric Control Focus 10' L3     therapeutic activities to improve functional performance for 20  minutes, including:  Quad  Sets 30x's   Heel Prop 5' 5#   Heel Slides 3'   Straight Leg Raise 20x's    Step Ups 20x's     Patient Education and Home Exercises       Education provided:   - Patient was provided education on for continued compliance with HEP for continued functional mobility and strength gains for return to prior level of function.      Written Home Exercises Provided: Patient instructed to cont prior HEP. Exercises were reviewed and Manju was able to demonstrate them prior to the end of the session.  Manju demonstrated good  understanding of the education provided. See Electronic Medical Record under Patient Instructions for exercises provided during therapy sessions    Assessment   Patient presents to the clinic with moderate symptom irritability pre-session and mild irritability post-session. Manual therapy was performed to Left knee to improve joint play and allow for optimal movement during corrective exercises.   Patient demonstrates improving joint mobility and range of motion along with improved motor control of the quadriceps.   Patient demonstrated improvements in squatting tolerance within session.     Patient will continue to benefit from skilled outpatient physical therapy to address the deficits listed in the problem list box on initial evaluation, provide patient/family education and to maximize patient's level of independence in the home and community environment.     Manju Is progressing well towards her goals.   Patient prognosis is Good.     Patient's spiritual, cultural and educational needs considered and pt agreeable to plan of care and goals.     Anticipated barriers to physical therapy: None    Goals:   Short Term Goals (2 weeks):  1. Patient will have improved AROM of the left knee to 0-120 degrees for pre-operative mobility gains.  2. Patient will have decreased complaints of pain to 3/10 with prolonged walking/sitting.  3. Patient will be independent and compliant with issued HEP.     Long  Term Goals (3 weeks):   Patient will have improved AROM of the left knee to 0-122 degrees for pre-operative mobility gains.  2. Patient will have improved strength of the left knee to >90% of the Right hamstring for improved force production.  3. Patient will be able to resume prior functional level with no deficits.   4. Patient will have overall improvement in condition to have increased score on KOOS to 60% to show clinically important difference in patient perceived functional ability.  5. Pt will improve FOTO limitation score to less than or equal to 49% for improved self perception of functional performance with daily activities.  Plan   Plan of care Certification: 12/2/2024 to 12/23/2024     Outpatient Physical Therapy 1-2 times weekly for 4-6 weeks to include the following interventions: Electrical Stimulation as needed, Gait Training, Manual Therapy, Moist Heat/ Ice, Neuromuscular Re-ed, Orthotic Management and Training, Patient Education, Self Care, Therapeutic Activities, and Therapeutic Exercise, Blood Flow Restriction Training, Trigger Point Dry Needling as needed.       Kevin Fernandez PT, DPT  Board Certified Orthopaedic Clinical Specialist

## 2024-12-10 ENCOUNTER — PATIENT MESSAGE (OUTPATIENT)
Dept: INTERNAL MEDICINE | Facility: CLINIC | Age: 74
End: 2024-12-10
Payer: MEDICARE

## 2024-12-10 ENCOUNTER — TELEPHONE (OUTPATIENT)
Dept: OPHTHALMOLOGY | Facility: CLINIC | Age: 74
End: 2024-12-10

## 2024-12-10 ENCOUNTER — OFFICE VISIT (OUTPATIENT)
Dept: OPHTHALMOLOGY | Facility: CLINIC | Age: 74
End: 2024-12-10
Payer: MEDICARE

## 2024-12-10 ENCOUNTER — CLINICAL SUPPORT (OUTPATIENT)
Dept: OPHTHALMOLOGY | Facility: CLINIC | Age: 74
End: 2024-12-10
Payer: MEDICARE

## 2024-12-10 DIAGNOSIS — H35.341 MACULAR HOLE, RIGHT: Primary | ICD-10-CM

## 2024-12-10 DIAGNOSIS — H25.13 AGE-RELATED NUCLEAR CATARACT OF BOTH EYES: ICD-10-CM

## 2024-12-10 DIAGNOSIS — H35.341 MACULAR HOLE OF RIGHT EYE: Primary | ICD-10-CM

## 2024-12-10 DIAGNOSIS — H43.813 VITREOUS DEGENERATION OF BOTH EYES: ICD-10-CM

## 2024-12-10 PROCEDURE — G2211 COMPLEX E/M VISIT ADD ON: HCPCS | Mod: HCNC,S$GLB,, | Performed by: OPHTHALMOLOGY

## 2024-12-10 PROCEDURE — 3061F NEG MICROALBUMINURIA REV: CPT | Mod: HCNC,CPTII,S$GLB, | Performed by: OPHTHALMOLOGY

## 2024-12-10 PROCEDURE — 99999 PR PBB SHADOW E&M-EST. PATIENT-LVL III: CPT | Mod: PBBFAC,HCNC,, | Performed by: OPHTHALMOLOGY

## 2024-12-10 PROCEDURE — 3066F NEPHROPATHY DOC TX: CPT | Mod: HCNC,CPTII,S$GLB, | Performed by: OPHTHALMOLOGY

## 2024-12-10 PROCEDURE — 1160F RVW MEDS BY RX/DR IN RCRD: CPT | Mod: HCNC,CPTII,S$GLB, | Performed by: OPHTHALMOLOGY

## 2024-12-10 PROCEDURE — 4010F ACE/ARB THERAPY RXD/TAKEN: CPT | Mod: HCNC,CPTII,S$GLB, | Performed by: OPHTHALMOLOGY

## 2024-12-10 PROCEDURE — 99204 OFFICE O/P NEW MOD 45 MIN: CPT | Mod: HCNC,S$GLB,, | Performed by: OPHTHALMOLOGY

## 2024-12-10 PROCEDURE — 3044F HG A1C LEVEL LT 7.0%: CPT | Mod: HCNC,CPTII,S$GLB, | Performed by: OPHTHALMOLOGY

## 2024-12-10 PROCEDURE — 92134 CPTRZ OPH DX IMG PST SGM RTA: CPT | Mod: HCNC,S$GLB,, | Performed by: OPHTHALMOLOGY

## 2024-12-10 PROCEDURE — 1159F MED LIST DOCD IN RCRD: CPT | Mod: HCNC,CPTII,S$GLB, | Performed by: OPHTHALMOLOGY

## 2024-12-10 PROCEDURE — 92202 OPSCPY EXTND ON/MAC DRAW: CPT | Mod: 59,HCNC,S$GLB, | Performed by: OPHTHALMOLOGY

## 2024-12-11 PROBLEM — H25.13 AGE-RELATED NUCLEAR CATARACT OF BOTH EYES: Status: ACTIVE | Noted: 2024-12-11

## 2024-12-11 PROBLEM — H35.341 MACULAR HOLE, RIGHT: Status: ACTIVE | Noted: 2024-12-11

## 2024-12-11 PROBLEM — H43.813 VITREOUS DEGENERATION OF BOTH EYES: Status: ACTIVE | Noted: 2024-12-11

## 2024-12-11 NOTE — PROGRESS NOTES
HPI    Patient here for MH OD   Pt sts she could not read chart OD at [facility] or at her eye exam with Dr. Kumar  Before this she has not noticed that big of a change in her [facility]   Last edited by Radha Blanco on 12/10/2024  1:36 PM.         A/P    ICD-10-CM ICD-9-CM   1. Macular hole, right  H35.341 362.54   2. Vitreous degeneration of both eyes  H43.813 379.21   3. Age-related nuclear cataract of both eyes  H25.13 366.16       1. Macular hole, right  Referral from Dr. Kumar for mac hole eval  Pt not sure when vision declined  Last noted to be ok with normal vision in 2023 visit    Exam notable for large mac hole OD with vitreous attachment still  Plan: discussed nature of findings and given mac hole, recommend vitrectomy. Pt having knee surgery and will have to recover for 6-8 weeks. Planning for march 10th under general anesthesia, will recheck pt on march 6th    Visit today is associated with current or anticipated ongoing medical care related to this patients single serious condition/complex condition (macular hole)    Risks, benefits and alternatives to surgery and anesthesia including no treatment were discussed with the patient including: death, coma, blindness, bleeding, retinal detachment, cataract, need for more surgeries and glaucoma. The patient understands and accepts risks All questions were answered and the patient wishes to proceed with surgery    Recommend Pars Plana Vitrectomy / Membrane Peel/ Gas Right Eye   R/B/A to surgery and anesthesia discussed with patient including: death, coma, blindness, bleeding, retinal detachment, cataract, and glaucoma Patient understands and accepts risks All questions answered Patient wishes to proceed with surgery     2. Vitreous degeneration of both eyes  No RT/RD but has mac hole attachment OD  Plan: Observation OS for now, counseled on risk future mac hole, plan as above OD    3. Age-related nuclear cataract of both eyes  Mod NS/CC  Plan: Observation for  now    RTC March 6th DFE/OCTm OU         I saw and examined the patient and reviewed in detail the findings documented. The final examination findings, image interpretations which have been independently interpreted, and plan as documented in the record represent my personal judgment and conclusions.    Victoriano Diallo MD  Vitreoretinal Surgery   [facility]

## 2024-12-16 ENCOUNTER — CLINICAL SUPPORT (OUTPATIENT)
Dept: REHABILITATION | Facility: OTHER | Age: 74
End: 2024-12-16
Attending: PHYSICAL THERAPIST
Payer: MEDICARE

## 2024-12-16 DIAGNOSIS — M25.662 DECREASED RANGE OF MOTION OF LEFT KNEE: ICD-10-CM

## 2024-12-16 DIAGNOSIS — M17.12 PRIMARY OSTEOARTHRITIS OF LEFT KNEE: Primary | ICD-10-CM

## 2024-12-16 DIAGNOSIS — G89.29 CHRONIC PAIN OF LEFT KNEE: ICD-10-CM

## 2024-12-16 DIAGNOSIS — M25.562 CHRONIC PAIN OF LEFT KNEE: ICD-10-CM

## 2024-12-16 PROCEDURE — 97530 THERAPEUTIC ACTIVITIES: CPT | Mod: HCNC,PN | Performed by: PHYSICAL THERAPIST

## 2024-12-16 PROCEDURE — 97140 MANUAL THERAPY 1/> REGIONS: CPT | Mod: HCNC,PN | Performed by: PHYSICAL THERAPIST

## 2024-12-16 NOTE — PROGRESS NOTES
[facility]   Physical Therapy Treatment Note      Name: Manju Aranda  Clinic Number: [id number]    Therapy Diagnosis:   Encounter Diagnoses   Name Primary?    Primary osteoarthritis of left knee Yes    Decreased range of motion of left knee     Chronic pain of left knee        Physician: Mack Torre III, *    Visit Date: 12/16/2024    Physician Orders: PT Eval and Treat  Medical Diagnosis from Referral: Primary osteoarthritis of left knee [M17.12]   Evaluation Date: 12/2/2024  Authorization Period Expiration: 8/8/2025  Plan of Care Expiration: 12/23/2024  Visit # / Visits authorized: 12/20  FOTO 1st follow up:  FOTO 2nd follow up:    PTA Visit #: 0/5     Precautions: Standard    Time In: 9:00 am  Time Out: 9:56am  Total Appointment Time: 56 minutes    Subjective     Patient reports: that she is doing well but feels her knee is getting more painful. Worried about the surgery but reassured by Dr. Torre  She was compliant with home exercise program.  Response to previous treatment: first follow up   Functional change: ongoing    Pain: 4/10  Location: left knee      Objective      Objective Measures updated at progress report unless specified.     Treatment     Manju received the treatments listed below:      manual therapy techniques: Joint mobilizations were applied to the: Left Knee for 10 minutes, including:  Inferior Patellar Glides Grade II  Flexion/Extension Mobilizations Grade II/III    neuromuscular re-education activities to improve: Balance, Coordination, Kinesthetic, Sense, Proprioception, and Posture for 23 minutes. The following activities were included:  Long Arc Quads 30x's terminal extension focus  Shuttle Squats 1.5 cord 5' for cartilage loading   NuStep Eccentric Control Focus 10' L3   Hip abd RTB 3x10 B     therapeutic activities to improve functional performance for 25  minutes, including:  Quad Sets 30x's   Heel Prop 5' 5#   Heel Slides 3'   Straight Leg  Raise 20x's    Step Ups 20x's   Lateral step ups x20     Patient Education and Home Exercises       Education provided:   - Patient was provided education on for continued compliance with HEP for continued functional mobility and strength gains for return to prior level of function.      Written Home Exercises Provided: Patient instructed to cont prior HEP. Exercises were reviewed and Manju was able to demonstrate them prior to the end of the session.  Manju demonstrated good  understanding of the education provided. See Electronic Medical Record under Patient Instructions for exercises provided during therapy sessions    Assessment   Manju presents with minimal knee pain. She was educated on expectations for surgery next week. Updated PLAN OF CARE post op to be performed. She did well with slight advancements today with no increased knee pain.     Patient will continue to benefit from skilled outpatient physical therapy to address the deficits listed in the problem list box on initial evaluation, provide patient/family education and to maximize patient's level of independence in the home and community environment.     Manju Is progressing well towards her goals.   Patient prognosis is Good.     Patient's spiritual, cultural and educational needs considered and pt agreeable to plan of care and goals.     Anticipated barriers to physical therapy: None    Goals:   Short Term Goals (2 weeks):  1. Patient will have improved AROM of the left knee to 0-120 degrees for pre-operative mobility gains.  2. Patient will have decreased complaints of pain to 3/10 with prolonged walking/sitting.  3. Patient will be independent and compliant with issued HEP.     Long Term Goals (3 weeks):   Patient will have improved AROM of the left knee to 0-122 degrees for pre-operative mobility gains.  2. Patient will have improved strength of the left knee to >90% of the Right hamstring for improved force production.  3. Patient  will be able to resume prior functional level with no deficits.   4. Patient will have overall improvement in condition to have increased score on KOOS to 60% to show clinically important difference in patient perceived functional ability.  5. Pt will improve FOTO limitation score to less than or equal to 49% for improved self perception of functional performance with daily activities.  Plan   Plan of care Certification: 12/2/2024 to 12/23/2024     Outpatient Physical Therapy 1-2 times weekly for 4-6 weeks to include the following interventions: Electrical Stimulation as needed, Gait Training, Manual Therapy, Moist Heat/ Ice, Neuromuscular Re-ed, Orthotic Management and Training, Patient Education, Self Care, Therapeutic Activities, and Therapeutic Exercise, Blood Flow Restriction Training, Trigger Point Dry Needling as needed.       Kevin Fernandez PT, DPT  Board Certified Orthopaedic Clinical Specialist

## 2024-12-17 ENCOUNTER — TELEPHONE (OUTPATIENT)
Dept: HEMATOLOGY/ONCOLOGY | Facility: CLINIC | Age: 74
End: 2024-12-17
Payer: MEDICARE

## 2024-12-17 DIAGNOSIS — G47.00 INSOMNIA, UNSPECIFIED TYPE: ICD-10-CM

## 2024-12-17 NOTE — TELEPHONE ENCOUNTER
----- Message from Beckie Marsh MD sent at 12/17/2024  8:38 AM CST -----  Can you help move mammogram from end of December to early Feb? Unless there is an opening prior to 12/23 between 10 AM and 3 PM  She has a knee surgery on 12/23 and will be recovering x 5 weeks.  Thanks!

## 2024-12-18 RX ORDER — ESZOPICLONE 2 MG/1
2 TABLET, FILM COATED ORAL NIGHTLY
Qty: 30 TABLET | Refills: 5 | Status: SHIPPED | OUTPATIENT
Start: 2024-12-18

## 2024-12-20 ENCOUNTER — TELEPHONE (OUTPATIENT)
Dept: ORTHOPEDICS | Facility: CLINIC | Age: 74
End: 2024-12-20
Payer: MEDICARE

## 2024-12-20 DIAGNOSIS — Z96.652 S/P TOTAL KNEE REPLACEMENT, LEFT: Primary | ICD-10-CM

## 2024-12-20 RX ORDER — OXYCODONE HYDROCHLORIDE 5 MG/1
TABLET ORAL
Qty: 50 TABLET | Refills: 0 | Status: SHIPPED | OUTPATIENT
Start: 2024-12-20

## 2024-12-20 RX ORDER — AMOXICILLIN 250 MG
1 CAPSULE ORAL DAILY
Qty: 30 TABLET | Refills: 0 | Status: SHIPPED | OUTPATIENT
Start: 2024-12-20

## 2024-12-20 RX ORDER — CEFADROXIL 500 MG/1
500 CAPSULE ORAL EVERY 12 HOURS
Qty: 14 CAPSULE | Refills: 0 | Status: SHIPPED | OUTPATIENT
Start: 2024-12-20 | End: 2024-12-27

## 2024-12-20 RX ORDER — PANTOPRAZOLE SODIUM 40 MG/1
40 TABLET, DELAYED RELEASE ORAL DAILY
Qty: 30 TABLET | Refills: 0 | Status: SHIPPED | OUTPATIENT
Start: 2024-12-20

## 2024-12-20 RX ORDER — METHOCARBAMOL 750 MG/1
750 TABLET, FILM COATED ORAL 4 TIMES DAILY PRN
Qty: 40 TABLET | Refills: 0 | Status: SHIPPED | OUTPATIENT
Start: 2024-12-20

## 2024-12-20 RX ORDER — ASPIRIN 81 MG/1
81 TABLET ORAL 2 TIMES DAILY
Qty: 60 TABLET | Refills: 0 | Status: SHIPPED | OUTPATIENT
Start: 2024-12-20 | End: 2025-01-19

## 2024-12-20 RX ORDER — DEXTROMETHORPHAN HYDROBROMIDE, GUAIFENESIN 5; 100 MG/5ML; MG/5ML
650 LIQUID ORAL EVERY 8 HOURS
Qty: 120 TABLET | Refills: 0 | Status: SHIPPED | OUTPATIENT
Start: 2024-12-20

## 2024-12-20 NOTE — TELEPHONE ENCOUNTER
I called the patient today regarding surgery on 12/23/2024 with Dr. Mack Torre. I informed the patient that her surgery will be at  [facility] ([facility] [place] [address]). I informed the patient they must arrive at 9:00am and their surgery will start at 11:00am.     Per the [facility] COVID-19 Pre-Procedural Testing Policy (administered 10/26/2022), patients do not need tested for COVID-19 regardless of vaccination status.    I reminded the patient that they cannot eat or drink after midnight, the night before surgery.      I reminded the patient to be careful of their skin over the next few days to make sure they do not get any cuts, scratches or scrapes.    The patient verbalized that they have received their walker, bedside commode and shower chair from [facility].    The patient verbalized understanding and has no further questions.

## 2024-12-23 ENCOUNTER — HOSPITAL ENCOUNTER (OUTPATIENT)
Facility: HOSPITAL | Age: 74
Discharge: HOME | End: 2024-12-24
Attending: ORTHOPAEDIC SURGERY | Admitting: ORTHOPAEDIC SURGERY
Payer: MEDICARE

## 2024-12-23 ENCOUNTER — ANESTHESIA EVENT (OUTPATIENT)
Dept: SURGERY | Facility: HOSPITAL | Age: 74
End: 2024-12-23
Payer: MEDICARE

## 2024-12-23 ENCOUNTER — ANESTHESIA (OUTPATIENT)
Dept: SURGERY | Facility: HOSPITAL | Age: 74
End: 2024-12-23
Payer: MEDICARE

## 2024-12-23 DIAGNOSIS — M17.12 PRIMARY OSTEOARTHRITIS OF LEFT KNEE: Primary | ICD-10-CM

## 2024-12-23 PROCEDURE — 25000003 PHARM REV CODE 250

## 2024-12-23 PROCEDURE — C1713 ANCHOR/SCREW BN/BN,TIS/BN: HCPCS | Performed by: ORTHOPAEDIC SURGERY

## 2024-12-23 PROCEDURE — 25000003 PHARM REV CODE 250: Performed by: NURSE PRACTITIONER

## 2024-12-23 PROCEDURE — 63600175 PHARM REV CODE 636 W HCPCS

## 2024-12-23 PROCEDURE — 94660 CPAP INITIATION&MGMT: CPT

## 2024-12-23 PROCEDURE — 97162 PT EVAL MOD COMPLEX 30 MIN: CPT

## 2024-12-23 PROCEDURE — 63600175 PHARM REV CODE 636 W HCPCS: Performed by: STUDENT IN AN ORGANIZED HEALTH CARE EDUCATION/TRAINING PROGRAM

## 2024-12-23 PROCEDURE — 63600175 PHARM REV CODE 636 W HCPCS: Performed by: ORTHOPAEDIC SURGERY

## 2024-12-23 PROCEDURE — C1776 JOINT DEVICE (IMPLANTABLE): HCPCS | Performed by: ORTHOPAEDIC SURGERY

## 2024-12-23 PROCEDURE — 94761 N-INVAS EAR/PLS OXIMETRY MLT: CPT

## 2024-12-23 PROCEDURE — 97165 OT EVAL LOW COMPLEX 30 MIN: CPT

## 2024-12-23 PROCEDURE — 71000033 HC RECOVERY, INTIAL HOUR: Performed by: ORTHOPAEDIC SURGERY

## 2024-12-23 PROCEDURE — 37000009 HC ANESTHESIA EA ADD 15 MINS: Performed by: ORTHOPAEDIC SURGERY

## 2024-12-23 PROCEDURE — 25000003 PHARM REV CODE 250: Performed by: NURSE ANESTHETIST, CERTIFIED REGISTERED

## 2024-12-23 PROCEDURE — 99900035 HC TECH TIME PER 15 MIN (STAT)

## 2024-12-23 PROCEDURE — 36000712 HC OR TIME LEV V 1ST 15 MIN: Performed by: ORTHOPAEDIC SURGERY

## 2024-12-23 PROCEDURE — 97535 SELF CARE MNGMENT TRAINING: CPT

## 2024-12-23 PROCEDURE — 27447 TOTAL KNEE ARTHROPLASTY: CPT | Mod: 22,HCNC,LT, | Performed by: ORTHOPAEDIC SURGERY

## 2024-12-23 PROCEDURE — 63600175 PHARM REV CODE 636 W HCPCS: Performed by: NURSE ANESTHETIST, CERTIFIED REGISTERED

## 2024-12-23 PROCEDURE — 27201423 OPTIME MED/SURG SUP & DEVICES STERILE SUPPLY: Performed by: ORTHOPAEDIC SURGERY

## 2024-12-23 PROCEDURE — 97116 GAIT TRAINING THERAPY: CPT

## 2024-12-23 PROCEDURE — 37000008 HC ANESTHESIA 1ST 15 MINUTES: Performed by: ORTHOPAEDIC SURGERY

## 2024-12-23 PROCEDURE — 20985 CPTR-ASST DIR MS PX: CPT | Mod: HCNC,,, | Performed by: ORTHOPAEDIC SURGERY

## 2024-12-23 PROCEDURE — 25000003 PHARM REV CODE 250: Performed by: ORTHOPAEDIC SURGERY

## 2024-12-23 PROCEDURE — 36000713 HC OR TIME LEV V EA ADD 15 MIN: Performed by: ORTHOPAEDIC SURGERY

## 2024-12-23 DEVICE — CEMENT BONE SURG SMPLX P RADPQ: Type: IMPLANTABLE DEVICE | Site: KNEE | Status: FUNCTIONAL

## 2024-12-23 DEVICE — ATTUNE PATELLA MEDIALIZED DOME 41MM CEMENTED AOX
Type: IMPLANTABLE DEVICE | Site: KNEE | Status: FUNCTIONAL
Brand: ATTUNE

## 2024-12-23 DEVICE — PIN VELYS ARRAY DRILL 4X175MM: Type: IMPLANTABLE DEVICE | Site: KNEE | Status: FUNCTIONAL

## 2024-12-23 DEVICE — ATTUNE KNEE SYSTEM FEMORAL POSTERIOR STABILIZED SIZE 8 LEFT CEMENTED
Type: IMPLANTABLE DEVICE | Site: KNEE | Status: FUNCTIONAL
Brand: ATTUNE

## 2024-12-23 DEVICE — PIN VELYS ARRAY DRILL 4X125MM: Type: IMPLANTABLE DEVICE | Site: KNEE | Status: FUNCTIONAL

## 2024-12-23 RX ORDER — TALC
6 POWDER (GRAM) TOPICAL NIGHTLY PRN
Status: DISCONTINUED | OUTPATIENT
Start: 2024-12-23 | End: 2024-12-23 | Stop reason: HOSPADM

## 2024-12-23 RX ORDER — MUPIROCIN 20 MG/G
1 OINTMENT TOPICAL 2 TIMES DAILY
Status: DISCONTINUED | OUTPATIENT
Start: 2024-12-23 | End: 2024-12-24 | Stop reason: HOSPADM

## 2024-12-23 RX ORDER — PROCHLORPERAZINE EDISYLATE 5 MG/ML
5 INJECTION INTRAMUSCULAR; INTRAVENOUS EVERY 6 HOURS PRN
Status: DISCONTINUED | OUTPATIENT
Start: 2024-12-23 | End: 2024-12-24 | Stop reason: HOSPADM

## 2024-12-23 RX ORDER — OXYCODONE HYDROCHLORIDE 5 MG/1
5 TABLET ORAL
Status: DISCONTINUED | OUTPATIENT
Start: 2024-12-23 | End: 2024-12-23

## 2024-12-23 RX ORDER — TRAZODONE HYDROCHLORIDE 100 MG/1
100 TABLET ORAL NIGHTLY PRN
Status: DISCONTINUED | OUTPATIENT
Start: 2024-12-23 | End: 2024-12-23

## 2024-12-23 RX ORDER — TRANEXAMIC ACID 100 MG/ML
INJECTION, SOLUTION INTRAVENOUS
Status: DISCONTINUED | OUTPATIENT
Start: 2024-12-23 | End: 2024-12-23

## 2024-12-23 RX ORDER — TRAZODONE HYDROCHLORIDE 50 MG/1
100 TABLET ORAL NIGHTLY PRN
Status: DISCONTINUED | OUTPATIENT
Start: 2024-12-23 | End: 2024-12-24 | Stop reason: HOSPADM

## 2024-12-23 RX ORDER — ROPIVACAINE HYDROCHLORIDE 5 MG/ML
INJECTION, SOLUTION EPIDURAL; INFILTRATION; PERINEURAL
Status: DISCONTINUED | OUTPATIENT
Start: 2024-12-23 | End: 2024-12-23 | Stop reason: HOSPADM

## 2024-12-23 RX ORDER — METHOCARBAMOL 750 MG/1
750 TABLET, FILM COATED ORAL 3 TIMES DAILY
Status: DISCONTINUED | OUTPATIENT
Start: 2024-12-23 | End: 2024-12-24 | Stop reason: HOSPADM

## 2024-12-23 RX ORDER — PROPOFOL 10 MG/ML
VIAL (ML) INTRAVENOUS CONTINUOUS PRN
Status: DISCONTINUED | OUTPATIENT
Start: 2024-12-23 | End: 2024-12-23

## 2024-12-23 RX ORDER — CETIRIZINE HYDROCHLORIDE 10 MG/1
10 TABLET ORAL DAILY
Status: DISCONTINUED | OUTPATIENT
Start: 2024-12-24 | End: 2024-12-24 | Stop reason: HOSPADM

## 2024-12-23 RX ORDER — MORPHINE SULFATE 2 MG/ML
2 INJECTION, SOLUTION INTRAMUSCULAR; INTRAVENOUS
Status: DISCONTINUED | OUTPATIENT
Start: 2024-12-23 | End: 2024-12-23

## 2024-12-23 RX ORDER — FENTANYL CITRATE 50 UG/ML
25 INJECTION, SOLUTION INTRAMUSCULAR; INTRAVENOUS EVERY 5 MIN PRN
Status: DISCONTINUED | OUTPATIENT
Start: 2024-12-23 | End: 2024-12-23 | Stop reason: HOSPADM

## 2024-12-23 RX ORDER — MIDAZOLAM HYDROCHLORIDE 1 MG/ML
4 INJECTION, SOLUTION INTRAMUSCULAR; INTRAVENOUS
Status: DISCONTINUED | OUTPATIENT
Start: 2024-12-23 | End: 2024-12-23 | Stop reason: HOSPADM

## 2024-12-23 RX ORDER — PREGABALIN 75 MG/1
75 CAPSULE ORAL
Status: COMPLETED | OUTPATIENT
Start: 2024-12-23 | End: 2024-12-23

## 2024-12-23 RX ORDER — ACETAMINOPHEN 500 MG
1000 TABLET ORAL EVERY 6 HOURS
Status: DISCONTINUED | OUTPATIENT
Start: 2024-12-23 | End: 2024-12-24 | Stop reason: HOSPADM

## 2024-12-23 RX ORDER — SODIUM CHLORIDE 9 MG/ML
INJECTION, SOLUTION INTRAVENOUS
Status: DISCONTINUED | OUTPATIENT
Start: 2024-12-23 | End: 2024-12-23 | Stop reason: HOSPADM

## 2024-12-23 RX ORDER — VANCOMYCIN HYDROCHLORIDE 1 G/20ML
INJECTION, POWDER, LYOPHILIZED, FOR SOLUTION INTRAVENOUS
Status: DISCONTINUED | OUTPATIENT
Start: 2024-12-23 | End: 2024-12-23 | Stop reason: HOSPADM

## 2024-12-23 RX ORDER — AMOXICILLIN 250 MG
1 CAPSULE ORAL 2 TIMES DAILY
Status: DISCONTINUED | OUTPATIENT
Start: 2024-12-23 | End: 2024-12-24 | Stop reason: HOSPADM

## 2024-12-23 RX ORDER — LIDOCAINE HYDROCHLORIDE 10 MG/ML
1 INJECTION, SOLUTION EPIDURAL; INFILTRATION; INTRACAUDAL; PERINEURAL
Status: DISCONTINUED | OUTPATIENT
Start: 2024-12-23 | End: 2024-12-23 | Stop reason: HOSPADM

## 2024-12-23 RX ORDER — POLYETHYLENE GLYCOL 3350 17 G/17G
17 POWDER, FOR SOLUTION ORAL DAILY
Status: DISCONTINUED | OUTPATIENT
Start: 2024-12-23 | End: 2024-12-24 | Stop reason: HOSPADM

## 2024-12-23 RX ORDER — ONDANSETRON HYDROCHLORIDE 2 MG/ML
INJECTION, SOLUTION INTRAVENOUS
Status: DISCONTINUED | OUTPATIENT
Start: 2024-12-23 | End: 2024-12-23

## 2024-12-23 RX ORDER — TRANEXAMIC ACID 100 MG/ML
INJECTION, SOLUTION INTRAVENOUS
Status: DISCONTINUED | OUTPATIENT
Start: 2024-12-23 | End: 2024-12-23 | Stop reason: HOSPADM

## 2024-12-23 RX ORDER — FAMOTIDINE 10 MG/ML
INJECTION INTRAVENOUS
Status: DISCONTINUED | OUTPATIENT
Start: 2024-12-23 | End: 2024-12-23

## 2024-12-23 RX ORDER — SODIUM CHLORIDE 9 MG/ML
INJECTION, SOLUTION INTRAVENOUS CONTINUOUS
Status: DISCONTINUED | OUTPATIENT
Start: 2024-12-23 | End: 2024-12-23

## 2024-12-23 RX ORDER — DOCUSATE SODIUM 100 MG
400 CAPSULE ORAL
Status: DISCONTINUED | OUTPATIENT
Start: 2024-12-23 | End: 2024-12-24 | Stop reason: HOSPADM

## 2024-12-23 RX ORDER — OXYCODONE HYDROCHLORIDE 5 MG/1
5 TABLET ORAL
Status: DISCONTINUED | OUTPATIENT
Start: 2024-12-23 | End: 2024-12-24 | Stop reason: HOSPADM

## 2024-12-23 RX ORDER — PHENYLEPHRINE HYDROCHLORIDE 10 MG/ML
INJECTION INTRAVENOUS
Status: DISCONTINUED | OUTPATIENT
Start: 2024-12-23 | End: 2024-12-23

## 2024-12-23 RX ORDER — DEXAMETHASONE SODIUM PHOSPHATE 4 MG/ML
INJECTION, SOLUTION INTRA-ARTICULAR; INTRALESIONAL; INTRAMUSCULAR; INTRAVENOUS; SOFT TISSUE
Status: DISCONTINUED | OUTPATIENT
Start: 2024-12-23 | End: 2024-12-23

## 2024-12-23 RX ORDER — MORPHINE SULFATE 4 MG/ML
INJECTION, SOLUTION INTRAMUSCULAR; INTRAVENOUS
Status: DISCONTINUED | OUTPATIENT
Start: 2024-12-23 | End: 2024-12-23 | Stop reason: HOSPADM

## 2024-12-23 RX ORDER — OXYCODONE HYDROCHLORIDE 10 MG/1
10 TABLET ORAL
Status: DISCONTINUED | OUTPATIENT
Start: 2024-12-23 | End: 2024-12-23

## 2024-12-23 RX ORDER — CEFAZOLIN 2 G/1
2 INJECTION, POWDER, FOR SOLUTION INTRAMUSCULAR; INTRAVENOUS
Status: COMPLETED | OUTPATIENT
Start: 2024-12-23 | End: 2024-12-24

## 2024-12-23 RX ORDER — MUPIROCIN 20 MG/G
1 OINTMENT TOPICAL
Status: COMPLETED | OUTPATIENT
Start: 2024-12-23 | End: 2024-12-23

## 2024-12-23 RX ORDER — CEFAZOLIN SODIUM 1 G/3ML
INJECTION, POWDER, FOR SOLUTION INTRAMUSCULAR; INTRAVENOUS
Status: DISCONTINUED | OUTPATIENT
Start: 2024-12-23 | End: 2024-12-23

## 2024-12-23 RX ORDER — ONDANSETRON HYDROCHLORIDE 2 MG/ML
4 INJECTION, SOLUTION INTRAVENOUS EVERY 8 HOURS PRN
Status: DISCONTINUED | OUTPATIENT
Start: 2024-12-23 | End: 2024-12-24 | Stop reason: HOSPADM

## 2024-12-23 RX ORDER — FAMOTIDINE 20 MG/1
20 TABLET, FILM COATED ORAL 2 TIMES DAILY
Status: DISCONTINUED | OUTPATIENT
Start: 2024-12-23 | End: 2024-12-24 | Stop reason: HOSPADM

## 2024-12-23 RX ORDER — CEFAZOLIN 2 G/1
2 INJECTION, POWDER, FOR SOLUTION INTRAMUSCULAR; INTRAVENOUS
Status: DISCONTINUED | OUTPATIENT
Start: 2024-12-23 | End: 2024-12-23 | Stop reason: HOSPADM

## 2024-12-23 RX ORDER — ACETAMINOPHEN 500 MG
1000 TABLET ORAL
Status: COMPLETED | OUTPATIENT
Start: 2024-12-23 | End: 2024-12-23

## 2024-12-23 RX ORDER — BISACODYL 10 MG/1
10 SUPPOSITORY RECTAL EVERY 12 HOURS PRN
Status: DISCONTINUED | OUTPATIENT
Start: 2024-12-23 | End: 2024-12-24 | Stop reason: HOSPADM

## 2024-12-23 RX ORDER — KETAMINE HCL IN 0.9 % NACL 50 MG/5 ML
SYRINGE (ML) INTRAVENOUS
Status: DISCONTINUED | OUTPATIENT
Start: 2024-12-23 | End: 2024-12-23

## 2024-12-23 RX ORDER — FENTANYL CITRATE 50 UG/ML
100 INJECTION, SOLUTION INTRAMUSCULAR; INTRAVENOUS
Status: DISCONTINUED | OUTPATIENT
Start: 2024-12-23 | End: 2024-12-23 | Stop reason: HOSPADM

## 2024-12-23 RX ORDER — NALOXONE HCL 0.4 MG/ML
0.02 VIAL (ML) INJECTION
Status: DISCONTINUED | OUTPATIENT
Start: 2024-12-23 | End: 2024-12-24 | Stop reason: HOSPADM

## 2024-12-23 RX ORDER — ZOLPIDEM TARTRATE 5 MG/1
5 TABLET ORAL NIGHTLY PRN
Status: DISCONTINUED | OUTPATIENT
Start: 2024-12-23 | End: 2024-12-23

## 2024-12-23 RX ORDER — ZOLPIDEM TARTRATE 5 MG/1
5 TABLET ORAL NIGHTLY PRN
Status: DISCONTINUED | OUTPATIENT
Start: 2024-12-23 | End: 2024-12-24 | Stop reason: HOSPADM

## 2024-12-23 RX ORDER — MORPHINE SULFATE 1 MG/ML
INJECTION, SOLUTION EPIDURAL; INTRATHECAL; INTRAVENOUS
Status: DISCONTINUED | OUTPATIENT
Start: 2024-12-23 | End: 2024-12-23 | Stop reason: HOSPADM

## 2024-12-23 RX ORDER — SODIUM CHLORIDE 0.9 % (FLUSH) 0.9 %
10 SYRINGE (ML) INJECTION
Status: DISCONTINUED | OUTPATIENT
Start: 2024-12-23 | End: 2024-12-23 | Stop reason: HOSPADM

## 2024-12-23 RX ORDER — MIDAZOLAM HYDROCHLORIDE 1 MG/ML
INJECTION INTRAMUSCULAR; INTRAVENOUS
Status: DISCONTINUED | OUTPATIENT
Start: 2024-12-23 | End: 2024-12-23

## 2024-12-23 RX ORDER — METHYLPREDNISOLONE ACETATE 40 MG/ML
INJECTION, SUSPENSION INTRA-ARTICULAR; INTRALESIONAL; INTRAMUSCULAR; SOFT TISSUE
Status: DISCONTINUED | OUTPATIENT
Start: 2024-12-23 | End: 2024-12-23 | Stop reason: HOSPADM

## 2024-12-23 RX ORDER — PREGABALIN 75 MG/1
75 CAPSULE ORAL NIGHTLY
Status: DISCONTINUED | OUTPATIENT
Start: 2024-12-23 | End: 2024-12-24 | Stop reason: HOSPADM

## 2024-12-23 RX ORDER — VALSARTAN 160 MG/1
320 TABLET ORAL DAILY
Status: DISCONTINUED | OUTPATIENT
Start: 2024-12-23 | End: 2024-12-24 | Stop reason: HOSPADM

## 2024-12-23 RX ORDER — ASPIRIN 81 MG/1
81 TABLET ORAL 2 TIMES DAILY
Status: DISCONTINUED | OUTPATIENT
Start: 2024-12-23 | End: 2024-12-24 | Stop reason: HOSPADM

## 2024-12-23 RX ADMIN — TRANEXAMIC ACID 1000 MG: 100 INJECTION INTRAVENOUS at 11:12

## 2024-12-23 RX ADMIN — PHENYLEPHRINE HYDROCHLORIDE 100 MCG: 10 INJECTION INTRAVENOUS at 12:12

## 2024-12-23 RX ADMIN — FAMOTIDINE 20 MG: 20 TABLET ORAL at 08:12

## 2024-12-23 RX ADMIN — METHOCARBAMOL 750 MG: 750 TABLET ORAL at 08:12

## 2024-12-23 RX ADMIN — ACETAMINOPHEN 1000 MG: 500 TABLET ORAL at 09:12

## 2024-12-23 RX ADMIN — FENTANYL CITRATE 25 MCG: 50 INJECTION INTRAMUSCULAR; INTRAVENOUS at 02:12

## 2024-12-23 RX ADMIN — OXYCODONE 5 MG: 5 TABLET ORAL at 02:12

## 2024-12-23 RX ADMIN — DEXAMETHASONE SODIUM PHOSPHATE 8 MG: 4 INJECTION, SOLUTION INTRAMUSCULAR; INTRAVENOUS at 11:12

## 2024-12-23 RX ADMIN — SENNOSIDES AND DOCUSATE SODIUM 1 TABLET: 50; 8.6 TABLET ORAL at 08:12

## 2024-12-23 RX ADMIN — METHOCARBAMOL 750 MG: 750 TABLET ORAL at 02:12

## 2024-12-23 RX ADMIN — ZOLPIDEM TARTRATE 5 MG: 5 TABLET ORAL at 08:12

## 2024-12-23 RX ADMIN — CEFAZOLIN 2 G: 2 INJECTION, POWDER, FOR SOLUTION INTRAMUSCULAR; INTRAVENOUS at 07:12

## 2024-12-23 RX ADMIN — CEFAZOLIN 2 G: 330 INJECTION, POWDER, FOR SOLUTION INTRAMUSCULAR; INTRAVENOUS at 11:12

## 2024-12-23 RX ADMIN — PHENYLEPHRINE HYDROCHLORIDE 200 MCG: 10 INJECTION INTRAVENOUS at 12:12

## 2024-12-23 RX ADMIN — SODIUM CHLORIDE, SODIUM GLUCONATE, SODIUM ACETATE, POTASSIUM CHLORIDE, MAGNESIUM CHLORIDE, SODIUM PHOSPHATE, DIBASIC, AND POTASSIUM PHOSPHATE: .53; .5; .37; .037; .03; .012; .00082 INJECTION, SOLUTION INTRAVENOUS at 12:12

## 2024-12-23 RX ADMIN — ONDANSETRON 4 MG: 2 INJECTION INTRAMUSCULAR; INTRAVENOUS at 11:12

## 2024-12-23 RX ADMIN — GLYCOPYRROLATE 0.2 MG: 0.2 INJECTION, SOLUTION INTRAMUSCULAR; INTRAVENOUS at 01:12

## 2024-12-23 RX ADMIN — FAMOTIDINE 20 MG: 10 INJECTION, SOLUTION INTRAVENOUS at 11:12

## 2024-12-23 RX ADMIN — ACETAMINOPHEN 1000 MG: 500 TABLET ORAL at 11:12

## 2024-12-23 RX ADMIN — MIDAZOLAM HYDROCHLORIDE 2 MG: 1 INJECTION, SOLUTION INTRAMUSCULAR; INTRAVENOUS at 11:12

## 2024-12-23 RX ADMIN — OXYCODONE 5 MG: 5 TABLET ORAL at 09:12

## 2024-12-23 RX ADMIN — SODIUM CHLORIDE: 9 INJECTION, SOLUTION INTRAVENOUS at 11:12

## 2024-12-23 RX ADMIN — PREGABALIN 75 MG: 75 CAPSULE ORAL at 08:12

## 2024-12-23 RX ADMIN — PREGABALIN 75 MG: 75 CAPSULE ORAL at 09:12

## 2024-12-23 RX ADMIN — TRAZODONE HYDROCHLORIDE 50 MG: 50 TABLET ORAL at 08:12

## 2024-12-23 RX ADMIN — Medication 400 ML: at 11:12

## 2024-12-23 RX ADMIN — MUPIROCIN 1 G: 20 OINTMENT TOPICAL at 08:12

## 2024-12-23 RX ADMIN — TRANEXAMIC ACID 1000 MG: 100 INJECTION INTRAVENOUS at 01:12

## 2024-12-23 RX ADMIN — ASPIRIN 81 MG: 81 TABLET, COATED ORAL at 08:12

## 2024-12-23 RX ADMIN — VANCOMYCIN HYDROCHLORIDE 1500 MG: 1.5 INJECTION, POWDER, LYOPHILIZED, FOR SOLUTION INTRAVENOUS at 09:12

## 2024-12-23 RX ADMIN — MEPIVACAINE HYDROCHLORIDE 3.4 ML: 15 INJECTION, SOLUTION EPIDURAL; INFILTRATION at 11:12

## 2024-12-23 RX ADMIN — OXYCODONE 5 MG: 5 TABLET ORAL at 06:12

## 2024-12-23 RX ADMIN — PROPOFOL 100 MCG/KG/MIN: 10 INJECTION, EMULSION INTRAVENOUS at 11:12

## 2024-12-23 RX ADMIN — ACETAMINOPHEN 1000 MG: 500 TABLET ORAL at 06:12

## 2024-12-23 RX ADMIN — MUPIROCIN 1 G: 20 OINTMENT TOPICAL at 09:12

## 2024-12-23 RX ADMIN — Medication 20 MG: at 11:12

## 2024-12-23 NOTE — ANESTHESIA POSTPROCEDURE EVALUATION
Anesthesia Post Evaluation    Patient: Manju Aranda    Procedure(s) Performed: Procedure(s) (LRB):  ARTHROPLASTY, KNEE, TOTAL, USING [employer] COMPUTER-ASSISTED NAVIGATION: LEFT: DEPUY - BESSY (Left)    Final Anesthesia Type: spinal      Patient location during evaluation: PACU  Patient participation: Yes- Able to Participate  Level of consciousness: awake and alert and oriented  Post-procedure vital signs: reviewed and stable  Pain management: adequate  Airway patency: patent    PONV status at discharge: No PONV  Anesthetic complications: no      Cardiovascular status: hemodynamically stable  Respiratory status: nasal cannula  Hydration status: euvolemic  Follow-up not needed.          Vitals Value Taken Time   /56 12/23/24 1635   Temp 36.9 °C (98.4 °F) 12/23/24 1635   Pulse 64 12/23/24 1635   Resp 17 12/23/24 1635   SpO2 96 % 12/23/24 1635         Event Time   Out of Recovery 15:23:00         Pain/Kayleigh Score: Pain Rating Prior to Med Admin: 7 (12/23/2024  2:47 PM)  Kayleigh Score: 9 (12/23/2024  2:39 PM)

## 2024-12-23 NOTE — PT/OT/SLP EVAL
Physical Therapy Evaluation and Treatment    Patient Name:  Manju Aranda   MRN:  [id number]  Admit Date: 12/23/2024  Admitting Diagnosis:  Primary osteoarthritis of left knee   Length of Stay: 0 days  Recent Surgery: Procedure(s) (LRB):  ARTHROPLASTY, KNEE, TOTAL, USING [employer] COMPUTER-ASSISTED NAVIGATION: LEFT: DEPUY - ATTUNE (Left) Day of Surgery    Recommendations:     Discharge Recommendations:  Low Intensity Therapy  Discharge Equipment Recommendations: bedside commode, walker, rolling   Justification for Equipment: The mobility limitation cannot be sufficiently resolved by the use of a cane. Patient's functional mobility deficit can be sufficiently resolved with the use of a Rolling Walker. Patient's mobility limitation significantly impairs their ability to participate in one of more activities of daily living. The use of a Rolling Walker will significantly improve the patient's ability to participate in MRADLS and the patient will use it on regular basis in the home.   Barriers to discharge: Evolving Clinical Presentation    Assessment:     Manju Aranda is a 74 y.o. female admitted with a medical diagnosis of Primary osteoarthritis of left knee.  She presents with the following impairments/functional limitations: impaired functional mobility, impaired endurance, pain, impaired self care skills, decreased lower extremity function, orthopedic precautions.     Pt agreeable to therapy, very pleasant and cooperative. Successful gait trial today with good gait pattern and overall tolerance. Progress as tolerated.    Rehab Prognosis: Good; patient would benefit from acute skilled PT services to address these deficits and reach maximum level of function.      Treatment Tolerated: Well    Highest level of mobility achieved this visit: ambulates 80ft w/ RW and CGA    Activity with RN/PCT: ambulate with one person assist    Plan:     During this hospitalization, patient to be seen 4 x/week to address the  "identified rehab impairments via gait training, therapeutic activities, neuromuscular re-education, therapeutic exercises and progress towards the established goals.    Plan of Care Expires:  01/23/25    Subjective     RN Deedee notified prior to session. Pt's sister present upon PT entrance into room.    Chief Complaint: pain  Patient/Family Comments/goals: "I can feel that knee bending"  Pain/Comfort:  Pain Rating 1: 8/10  Location - Side 1: Left  Location - Orientation 1: generalized  Location 1: knee  Pain Addressed 1: Reposition, Distraction, Pre-medicate for activity, Nurse notified    Social History:  Residence: lives with their family 1-story house with [address] and lift access.  Support available: family  Equipment Used: shower chair  Equipment Owned (not using): None  Prior level of function: independent  Work: Homemaker   Drive: yes.       Objective:     Additional staff present: none    Patient found up in chair with: FCD, cryotherapy     General Precautions: Standard, fall   Orthopedic Precautions:LLE weight bearing as tolerated   Braces: N/A   Body mass index is 36.88 kg/m².  Oxygen Device: Room Air    Vitals: BP (!) 118/56 (BP Location: Left arm, Patient Position: Sitting)   Pulse 64   Temp 98.4 °F (36.9 °C) (Oral)   Resp 17   Ht 5' 10" (1.778 m)   Wt 116.6 kg (257 lb)   SpO2 96%   Breastfeeding No   BMI 36.88 kg/m²     Exams:  Cognition:   Alert and Cooperative  Command following: Follows multistep verbal commands  Fluency: clear/fluent  Hearing: Intact  Vision:  Intact  Skin Integrity: Visible skin intact and Intact dressing present L knee    Physical Exam:   Edema - None noted  ROM - GERRI LEs WFL  Strength - RLE grossly WFL, L hip 3/5, L knee 4-/5, L ankle 4/5   Sensation - Intact to light touch  Coordination - No deficits noted    Outcome Measures:    AM-PAC 6 CLICK MOBILITY  Turning over in bed (including adjusting bedclothes, sheets and blankets)?: 3  Sitting down on and standing up from " a chair with arms (e.g., wheelchair, bedside commode, etc.): 3  Moving from lying on back to sitting on the side of the bed?: 3  Moving to and from a bed to a chair (including a wheelchair)?: 3  Need to walk in hospital room?: 3  Climbing 3-5 steps with a railing?: 3  Basic Mobility Total Score: 18     Functional Mobility:    Bed Mobility:   Pt found/returned to bedside chair    Transfers:   Sit > Stand Transfer: stand by assistance with rolling walker  Stand > Sit Transfer: stand by assistance with rolling walker  x1 trials from bedside chair    Standing Balance:  Assistance Level Required: Stand-by Assistance  Patient used: rolling walker  Time: 13 min  Postural deviations noted: no deviations noted      Gait:   Patient ambulated: 80ft   Patient required: contact guard  Patient used:  rolling walker  Gait Pattern observed: swing-through gait  Gait Deviation(s): decreased step length, decreased mary, and decreased weight shift  Impairments due to: pain and decreased endurance  Gait belt utilized    Education:  Time provided for education, counseling and discussion of health disposition in regards to patient's current status  All questions answered within PT scope of practice and to patient's satisfaction  PT role in POC to address current functional deficits  Pt educated on proper body mechanics, safety techniques, and energy conservation with PT facilitation and cueing throughout session    Patient left up in chair with all lines intact, call button in reach, RN Deedee jenkins, and sister present.    GOALS:   Multidisciplinary Problems       Physical Therapy Goals          Problem: Physical Therapy    Goal Priority Disciplines Outcome Interventions   Physical Therapy Goal     PT, PT/OT Progressing    Description: Goals to met by 1/6/2025    1. Bed to chair transfer with Supervision using Rolling Walker  2. Gait  x 150 feet with Stand-by Assistance using Rolling Walker   3. Ascend/descend 4 inch curb step  Contact Guard Assistance using Rolling Walker.   4. Lower extremity exercise program x15 reps per Instruction, with assistance as needed in order to facilitate improved strength, improved postural control, and improvement in functional independence    Rolling Walker- Patient demonstrates a mobility limitation that significantly impairs their ability to participate in one or more mobility related activities of daily living. Patient's mobility limitation cannot be sufficiently resolved with the use of a cane, but can be sufficiently resolved with the use of a rolling walker.The use of a rolling walker will considerably improve their ability to participate in MRADLs. Patient will use the walker on a regular basis at home.                         History:     Past Medical History:   Diagnosis Date    Allergy     Anemia     Arthritis     Cholelithiases     Cyst of kidney, acquired     Diverticulitis     Elevated TSH     Family history of Graves' disease: daughter, maternal aunt, maternal uncle 09/13/2016    Glaucoma suspect     Graves disease     Hx of colonic polyps     Hypertension 1981    Liver cyst     MGUS (monoclonal gammopathy of unknown significance)     Migraine headache     Mitral valve problem     leakage    Obesity     Paraproteinemia     Sleep apnea     + CPAP    Smoldering multiple myeloma 2013    Tricuspid valve disease     leakage       Past Surgical History:   Procedure Laterality Date    APPENDECTOMY      COLONOSCOPY N/A 10/23/2015    Procedure: COLONOSCOPY;  Surgeon: Brandon Ruelas MD;  Location: [address]);  Service: Endoscopy;  Laterality: N/A;  Had divertiulitis in 5/29/2015 with a recommendation for colonoscopy in 8 weeks    Dr. Ruelas is her GI physician    COLONOSCOPY N/A 11/05/2018    Procedure: COLONOSCOPY;  Surgeon: Brandon Ruelas MD;  Location: [address]);  Service: Endoscopy;  Laterality: N/A;  Dr. Ruelas did the last one multiple polyps, patient had recent  diverticulitis should not schedule before Oct 10th    COLONOSCOPY N/A 6/19/2023    Procedure: COLONOSCOPY;  Surgeon: Lu Lewis MD;  Location: [facility] ([address] [place] [address]);  Service: Endoscopy;  Laterality: N/A;  pt offered earlier dates but stated she is out town and will be returning the evening of 6/4  cardiac clearance recieved-see tele encounter 5/22/23 5/22 referred by Dr. Lewis/PEG/instr. mailed and to portal-  6/13 pre-call no answer; MB    CYSTOSCOPY      HYSTERECTOMY  1978 or 1979    menorrhagia       Time Tracking:     PT Received On: 12/23/24  PT Start Time: 1612     PT Stop Time: 1633  PT Total Time (min): 21 min     Billable Minutes: Evaluation 1 procedure and Gait Training 10 min    Mic Cronin, PT, DPT  12/23/2024

## 2024-12-23 NOTE — PLAN OF CARE
Problem: Occupational Therapy  Goal: Occupational Therapy Goal  Description: Goals to be met by: 12/24/24     Patient will increase functional independence with ADLs by performing:    UE Dressing with Modified [place].  LE Dressing with Modified [place] and Assistive Devices as needed.  Grooming while standing at sink with Modified [place].  Toileting from bedside commode with Modified [place] for hygiene and clothing management.   Bathing from  shower chair/bench with Modified [place].  Toilet transfer to bedside commode with Modified [place].    Outcome: Progressing

## 2024-12-23 NOTE — PLAN OF CARE
Problem: Physical Therapy  Goal: Physical Therapy Goal  Description: Goals to met by 1/6/2025    1. Bed to chair transfer with Supervision using Rolling Walker  2. Gait  x 150 feet with Stand-by Assistance using Rolling Walker   3. Ascend/descend 4 inch curb step Contact Guard Assistance using Rolling Walker.   4. Lower extremity exercise program x15 reps per Instruction, with assistance as needed in order to facilitate improved strength, improved postural control, and improvement in functional independence    Rolling Walker- Patient demonstrates a mobility limitation that significantly impairs their ability to participate in one or more mobility related activities of daily living. Patient's mobility limitation cannot be sufficiently resolved with the use of a cane, but can be sufficiently resolved with the use of a rolling walker.The use of a rolling walker will considerably improve their ability to participate in MRADLs. Patient will use the walker on a regular basis at home.      PT Eval: 12/23/2024

## 2024-12-23 NOTE — ANESTHESIA PROCEDURE NOTES
Spinal    Diagnosis: left knee tka  Patient location during procedure: OR  Start time: 12/23/2024 11:41 AM  Timeout: 12/23/2024 11:41 AM  End time: 12/23/2024 11:45 AM    Staffing  Authorizing Provider: Miladis Montgomery MD  Performing Provider: Miladis Montgoemry MD    Staffing  Performed by: Miladis Montgomery MD  Authorized by: Miladis Montgomery MD    Preanesthetic Checklist  Completed: patient identified, IV checked, site marked, risks and benefits discussed, surgical consent, monitors and equipment checked, pre-op evaluation and timeout performed  Spinal Block  Patient position: sitting  Prep: ChloraPrep  Patient monitoring: heart rate, cardiac monitor, continuous pulse ox and frequent blood pressure checks  Approach: midline  Location: L3-4  Injection technique: single shot  CSF Fluid: clear free-flowing CSF  Needle  Needle type: pencil-tip   Needle gauge: 25 G  Needle length: 3.5 in  Additional Documentation: negative aspiration for heme and no paresthesia on injection  Needle localization: anatomical landmarks  Assessment  Ease of block: easy  Patient's tolerance of the procedure: comfortable throughout block and no complaints  Medications:    Medications: mepivacaine (CARBOCAINE) injection 15 mg/mL (1.5%) - Other   3.4 mL - 12/23/2024 11:45:00 AM

## 2024-12-23 NOTE — ANESTHESIA PREPROCEDURE EVALUATION
12/23/2024    Manju Aranda is a 74 y.o. female  who presents  for Procedure(s):  ARTHROPLASTY, KNEE, TOTAL, USING [place] COMPUTER-ASSISTED NAVIGATION: LEFT: DEPUY - ATTUNE       Review of patient's allergies indicates:  No Known Allergies    Wt Readings from Last 1 Encounters:   12/23/24 0912 116.6 kg (257 lb)       BP Readings from Last 3 Encounters:   12/23/24 132/79   12/03/24 (!) 140/80   09/04/24 126/84       Patient Active Problem List   Diagnosis    Primary hypertension    Simple hepatic cyst    Paraproteinemia    Smoldering multiple myeloma (SMM)    Migraine headache    Mitral valve disorder    Tricuspid valve disease    ADPKD (autosomal dominant polycystic kidney disease)    Benign hypertensive renal disease without renal failure    Aortic atherosclerosis    Microhematuria    Graves disease    Severe obesity with body mass index (BMI) of 35.0 to 39.9 with comorbidity    History of adenomatous polyp of colon    BECKY on CPAP    Graves' eye disease    Polycystic kidney disease    Diverticulosis    Chronic migraine without aura without status migrainosus, not intractable    Vitamin D deficiency    Mild aortic valve sclerosis    Aneurysm of ascending aorta without rupture    Primary osteoarthritis of left knee    Decreased range of motion of left knee    Chronic pain of left knee    History of echocardiogram    Leukopenia    CKD (chronic kidney disease) stage 3, GFR 30-59 ml/min    Anemia    Macular hole, right    Vitreous degeneration of both eyes    Age-related nuclear cataract of both eyes       Past Surgical History:   Procedure Laterality Date    APPENDECTOMY      COLONOSCOPY N/A 10/23/2015    Procedure: COLONOSCOPY;  Surgeon: Brandon Ruelas MD;  Location: [address]);  Service: Endoscopy;  Laterality: N/A;  Had divertiulitis in 5/29/2015 with a recommendation for colonoscopy in 8 weeks    Dr. Ruelas is her GI physician    COLONOSCOPY N/A 11/05/2018    Procedure: COLONOSCOPY;  Surgeon: Brandon  ERNESTINE Ruelas MD;  Location: [facility] (4TH FLR);  Service: Endoscopy;  Laterality: N/A;  Dr. Ruelas did the last one multiple polyps, patient had recent diverticulitis should not schedule before Oct 10th    COLONOSCOPY N/A 2023    Procedure: COLONOSCOPY;  Surgeon: Lu Lewis MD;  [facility]  Service: Endoscopy;  Laterality: N/A;  pt offered earlier dates but stated she is out town and will be returning the evening of   cardiac clearance recieved-see tele encounter 23 referred by Dr. Lewis/PEG/instr. mailed and to portal-st   pre-call no answer; MB    CYSTOSCOPY      HYSTERECTOMY   or     menorrhagia       Social History     Socioeconomic History    Marital status:     Number of children: 4   Occupational History    Occupation: RETIRED   Tobacco Use    Smoking status: Former     Current packs/day: 0.00     Types: Cigarettes     Start date: 1992     Quit date: 1995     Years since quittin.9     Passive exposure: Never    Smokeless tobacco: Never   Substance and Sexual Activity    Alcohol use: No    Drug use: No    Sexual activity: Not Currently     Partners: Male     Birth control/protection: Post-menopausal   Social History Narrative    Lives with sister.         [place] most [place]      Social Drivers of Health     Financial Resource Strain: Low Risk  (12/3/2024)    Overall Financial Resource Strain (CARDIA)     Difficulty of Paying Living Expenses: Not hard at all   Food Insecurity: No Food Insecurity (12/3/2024)    Hunger Vital Sign     Worried About Running Out of Food in the Last Year: Never true     Ran Out of Food in the Last Year: Never true   Transportation Needs: No Transportation Needs (4/10/2023)    PRAPARE - Transportation     Lack of Transportation (Medical): No     Lack of Transportation (Non-Medical): No   Physical Activity: Unknown (12/3/2024)    Exercise Vital Sign     Days of Exercise per Week:  "3 days   Stress: No Stress Concern Present (12/3/2024)    [employer] - Occupational Stress Questionnaire     Feeling of Stress : Not at all   Housing Stability: Unknown (4/10/2023)    Housing Stability Vital Sign     Unable to Pay for Housing in the Last Year: No     Unstable Housing in the Last Year: No         Chemistry        Component Value Date/Time     12/03/2024 1221    K 4.2 12/03/2024 1221     12/03/2024 1221    CO2 25 12/03/2024 1221    BUN 17 12/03/2024 1221    CREATININE 1.1 12/03/2024 1221    GLU 87 12/03/2024 1221        Component Value Date/Time    CALCIUM 9.8 12/03/2024 1221    ALKPHOS 87 12/03/2024 1221    AST 18 12/03/2024 1221    ALT 13 12/03/2024 1221    BILITOT 0.6 12/03/2024 1221    ESTGFRAFRICA >60.0 04/08/2022 0847    EGFRNONAA >60.0 04/08/2022 0847            Lab Results   Component Value Date    WBC 3.78 (L) 12/03/2024    HGB 11.8 (L) 12/03/2024    HCT 37.6 12/03/2024     12/03/2024    CHOL 147 04/17/2024    TRIG 71 04/17/2024    HDL 54 04/17/2024    ALT 13 12/03/2024    AST 18 12/03/2024     12/03/2024    K 4.2 12/03/2024     12/03/2024    CREATININE 1.1 12/03/2024    BUN 17 12/03/2024    CO2 25 12/03/2024    TSH 0.772 12/03/2024    INR 1.0 12/03/2024    HGBA1C 4.3 04/17/2024       No results for input(s): "PT", "INR", "PROTIME", "APTT" in the last 72 hours.    Results for orders placed or performed during the hospital encounter of 09/07/16   2D echo with color flow doppler    Collection Time: 09/08/16 11:34 AM   Result Value Ref Range    EF + QEF 60 55 - 65    Diastolic Dysfunction No     Est. PA Systolic Pressure 30.69             Pre-op Assessment    I have reviewed the Patient Summary Reports.     I have reviewed the Nursing Notes. I have reviewed the NPO Status.   I have reviewed the Medications.     Review of Systems  Social:  Former Smoker       Hematology/Oncology:                                  Oncology Comments: " Multiple myeloma      Cardiovascular:     Hypertension Valvular problems/Murmurs, MVP              Ascending aortic aneurysm stable at 4cm     TTE 04/2024 ·    Left Ventricle: The left ventricle is normal in size. Normal wall thickness. There is normal systolic function with a visually estimated ejection fraction of 55 - 60%. Grade I diastolic dysfunction.  ·  Right Ventricle: Normal right ventricular cavity size. Wall thickness is normal. Systolic function is normal.  ·  Left Atrium: Left atrium is mildly dilated.  ·  Right Atrium: Right atrium is mildly dilated.  ·  IVC/SVC: Normal venous pressure at 3 mmHg.  ·  Pericardium: There is a trivial effusion. No indication of cardiac tamponade.                       Hypertension         Pulmonary:        Sleep Apnea     Obstructive Sleep Apnea (BECKY).           Renal/:  Chronic Renal Disease   Polycystic kidney disease      Kidney Function/Disease             Hepatic/GI:      Liver Disease,            Liver Disease        Musculoskeletal:  Arthritis        Arthritis          Neurological:    Neuromuscular Disease,  Headaches      Dx of Headaches   Arthritis                         Neuromuscular Disease   Endocrine:   Hypothyroidism        Morbid Obesity / BMI > 40      Physical Exam  General: Well nourished, Cooperative, Alert and Oriented    Airway:  Mallampati: II   Mouth Opening: Normal  TM Distance: Normal  Tongue: Normal  Neck ROM: Normal ROM    Dental:  Periodontal disease, Dentures          Anesthesia Assessment: Preoperative EQUATION    Planned Procedure: Procedure(s) (LRB):  ARTHROPLASTY, KNEE, TOTAL, USING [employer] COMPUTER-ASSISTED NAVIGATION: LEFT: DEPUY - [facility] (Left)  Requested Anesthesia Type:Regional  Surgeon: Mack Torre III, MD  Service: Orthopedics  Known or anticipated Date of Surgery:12/23/2024    Surgeon notes: reviewed    Electronic QUestionnaire Assessment completed via nurse interview with patient.        Triage considerations:     The  patient has no apparent active cardiac condition (No unstable coronary Syndrome such as severe unstable angina or recent [<1 month] myocardial infarction, decompensated CHF, severe valvular   disease or significant arrhythmia)    Previous anesthesia records:GETA and No problems  6/13/23   COLONOSCOPY   Airway:  Mallampati: II   Mouth Opening: Normal  TM Distance: Normal  Tongue: Normal  Neck ROM: Normal ROM      Last PCP note: 6-12 months ago , within [facility]   Subspecialty notes: Cardiology: General, Endocrinology, Hematology/Oncology, Hepatology, Cardiothoracic Surgery    Other important co-morbidities: HTN, Hypothyroid, Obesity, BECKY, and Graves Eye Disease, PCKD, Smoldering MM, Simple Hepatic Cyst, TAA (stable @ 4cm), Grade 1DD, Mild Anemia       Tests already available:  Available tests,  within [facility] .   9/4/24 SARS  8/18/24 Abd MRI  8/9/24 CMP, CBC  5/22/24 ECHO, EKG   5/1/24 CTA Chest   4/17/24 A1C  2/27/24 TSH             Instructions given. (See in Nurse's note)    Optimization:  Anesthesia Preop Clinic Assessment  Indicated POC    Medical Opinion Indicated       Sub-specialist consult indicated:   [facility] Pre Op Center NP         Plan:    Testing:  CMP, EKG, Hematology Profile, PT/INR, and TSH   Pre-anesthesia  visit       Visit focus: possible regional anesthesia and/or nerve block      Consultation:Pre Op Center NP for medical and anesthesia optimization       Patient  has previously scheduled Medical Appointment: 12/3    Navigation: Tests Scheduled.              Consults scheduled.             Results will be tracked by Preop Clinic.                  Anesthesia Plan  Type of Anesthesia, risks & benefits discussed:    Anesthesia Type: Gen ETT, Gen Natural Airway, Epidural, Spinal, CSE  Intra-op Monitoring Plan: Standard ASA Monitors  Post Op Pain Control Plan: multimodal analgesia and IV/PO Opioids PRN  Induction:  IV  Airway Plan: Video  Informed Consent: Informed consent signed with the Patient and all  parties understand the risks and agree with anesthesia plan.  All questions answered.   ASA Score: 3  Day of Surgery Review of History & Physical: H&P Update referred to the surgeon/provider.  Anesthesia Plan Notes: Preoperative evaluation completed by chart review, updated DOS after patient evaluation and physical examination.    Discussed: spinal/combined spinal epidural anesthesia, general anesthesia with native airway vs airway. Discussed conversion to general anesthesia with airway in case of emergency, ineffective spinal anesthesia, or patient intolerance and risks associated with to include but not be limited to: dental or lip injury, post operative nausea and vomiting, cardiac arrest, stroke, or even death.     Patient voiced understanding and elects to proceed.       Ready For Surgery From Anesthesia Perspective.     .

## 2024-12-23 NOTE — OP NOTE
[facility] Left TKA  OPERATIVE NOTE    Date of Operation:   12/23/2024    Providers Performing:   Surgeon(s):  Mack Torre III, MD    Assistant: Matthew Plata MD    Operative Procedure:   Left Total Knee Arthroplasty, using Depuy VELYS robotic assisted solution, CPT 73065  Computer-assisted surgical navigational procedure for musculoskeletal procedures, image-less, CPT 62469  Surgical techniques requiring use of robotic surgical system, CPT     -22 modifier for CDC class 2 or higher obesity (BMI 36.9) and very large stature requiring prolonged operative time and increased case complexity and difficulty      Preoperative Diagnosis:   Left Knee Osteoarthritis, ICD-10 M17.12    Postoperative Diagnosis:   SAME    Components Used:   Depuy  Femur: Attune PS Size 8  Tibia: Attune all poly AOX PS Size 8 x  5mm  Patella: Attune Medialized Dome 41 mm    2 packs cement: Simplex P    Implant Name Type Inv. Item Serial No.  Lot No. LRB No. Used Action   CEMENT BONE SURG SMPLX P RADPQ - [id number]  CEMENT BONE SURG SMPLX P RADPQ  [employer]. [id number] Left 2 Implanted   PIN VELYS ARRAY DRILL [id number] - [id number]  PIN VELYS ARRAY DRILL [id number]  [employer] [id number] Left 2 Implanted   PIN VELYS ARRAY DRILL [id number] - [id number]  PIN VELYS ARRAY DRILL [id number]  [employer] [id number] Left 2 Implanted   PATELLA ATTUNE MEDLZD DOME 41 - [id number]  PATELLA ATTUNE MEDLZD DOME 41  [employer] [id number] Left 1 Implanted   attune femoral posterior stabilized size 8 left cemented    [employer] [id number] Left 1 Implanted   attune knee system all poly tibial implant posterior stabilized cemented size 8    [employer] [id number] Left 1 Implanted       Anesthesia:   Spinal    DAVIN cocktail: Torre Block, no toradol    Estimated Blood Loss:   350 cc    Specimens:   None    Complications:   None    Indications:   The patient has failed non-operative measures including medications, injections and activity  modifications for their knee.  After an explanation of risks and benefits of knee arthroplasty surgery, the patient wishes to proceed with surgery.    Operative Notes:   The patient was greeted in the pre-op holding area.  The patients knee was marked with a surgical marker by the surgeon.  Anesthesia per above technique was administered by the anesthesia team.  The patient was placed in the supine position on the OR table with all bony prominences padded.  A tourniquet was not used.  IV antibiotics were administered.  The leg was prepped and draped in the usual sterile fashion.  A timeout was performed and the correct patient, site and procedure were identified.    A midline incision was made with a standard medial parapatellar arthrotomy.  The standard medial capsular release was performed along with the lateral gutter.  The patella was mobilized from the lateral parapatellar ligament and bony osteophytes were removed.  The intercondylar notch was cleared of the ACL and PCL.    The femoral and tibial guide pins where placed and the arrays were positioned and tightened to the pins. The hip was then brought through a range of motion and the hip center was identified, medial and lateral malleolus were identified and the tibia and femur were registered.     Using a tibia first with tensioner technique the joint was first tensioned manually in extension and flexion and through the full range of motion to define our gaps. Provisional cuts/implants were positioned virtually for appropriate size gaps and implant placement.    The FastConnectYS robot was then brought into position and checkpoints were confirmed. Care was taken during the burring process to protect the soft tissue. We cut the tibia. The tensioner was then placed in the joint and the knee was again extension and flexion and through the full range of motion to define our gaps. The cuts/implants were positioned virtually for appropriate size gaps and implant placement  and the femoral cuts were then made.     The PS box was cut. Meniscal remnants were removed and the knee was brought into flexion and posterior osteophytes were removed.      At this time the trial implants were placed and knee assessed for stability, and flexion arc. The patella was prepared using free-hand technique and the three-holed lug drill guide was sized and appropriately assessed. Patella tracking was found to be acceptable. The tibial component rotation was marked. The trials and guide pins were removed. The tibial component was positioned and the keel was drilled and punched.    The wound was copiously irrigated with pulsitile lavage and the bony surfaces dried.  Cement was mixed on the back table and applied to all components.  Cementation of the tibial, femoral, and patellar components was performed sequentially with removal of all excess cement. While the cement dried and a 0.35% betadine solution was left to soak in the surgical field.     After the cement was dry hemostasis was obtained with bovie electrocautery.  The knee was again copiously irrigated with pulsatile lavage.     1 gram of vancomycin powder was placed in the knee. The arthrotomy was closed with interrupted #1 vicryl suture and #2 quill, the subcuticular layer was closed with 2-0 vicryl suture and a running 4-0 monocryl was used to closed the skin surface.  Pin sites were closed with 4-0 monocryl and skin glue. Surgicel sealant was placed over the top of the incision and sterile dressing placed over the wound. Appropriately sized TEDs hose were placed for edema control.     Sponge and needle counts were correct.    The first-assistant was critical to all steps of the operation, including retraction and leg stabilization during exposure and bone preparation, as well as the deep and superficial wound closure.  Dr. Torre performed and/or supervised the key portions of this surgical procedure, including evaluation of the bone cuts,  reshaping of the bony elements, and insertion of the provisional and final components.    Postoperative Plan:  The patient will be anticoagulated with 81mg aspirin twice daily for one month.   They will receive 24 hours of post-operative antibiotics.    Activity will be weight bearing as tolerated.    ASA ok per hematology  No celebrex    Due patient and or surgical factors the patient will receive an Rx for cefadroxil 500mg BID x7 days after discharge per [place] data:   Tamy MM, Sara JE, Dayron LONG, Toro SPAIN. [facility] [place] Clinical Research Award: Extended Oral Antibiotics Prevent Periprosthetic Joint Infection in High-Risk Cases: 3855 Patients With 1-Year Follow-Up. J Arthroplasty. 2021 Jul;36(7S):S18-S25. doi: [email or web address]/[email or web address]. Epub 2021 Jan 23. PMID: [id number].      Signed by: Mack Torre III, MD

## 2024-12-23 NOTE — NURSING TRANSFER
Nursing Transfer Note      12/23/2024   3:23 PM    Nurse giving handoff:carina pate  Nurse receiving handoff:santi pate    Reason patient is being transferred: admit    Transfer To: 301    Transfer via bed    Transfer with n/a    Transported by rn x 2    Any special needs or follow-up needed: n/a    Patient belongings transferred with patient: Yes    Chart send with patient: Yes    Notified: sister    Upon arrival to floor: cardiac monitor applied, patient oriented to room, call bell in reach, and bed in lowest position

## 2024-12-23 NOTE — PROGRESS NOTES
Pre op completed.  Patient belongings to be placed in locker. Bed in lowest position. Call light within reach. Family at beside. DME needed. Bear hugger refused.

## 2024-12-23 NOTE — PT/OT/SLP EVAL
"Occupational Therapy Evaluation and Treatment    Name: Manju Aranda  MRN: [id number]  Admitting Diagnosis: Primary osteoarthritis of left knee Day of Surgery  Recent Surgery: Procedure(s) (LRB):  ARTHROPLASTY, KNEE, TOTAL, USING [employer] COMPUTER-ASSISTED NAVIGATION: LEFT: DEPUY - ATTUNE (Left) Day of Surgery    Recommendations:     Discharge Recommendations: Low Intensity Therapy  Level of Assistance Recommended: 24 hours supervision  Discharge Equipment Recommendations: bedside commode, walker, rolling  Barriers to discharge: None    Assessment:     Manju Aranda is a 74 y.o. female with a medical diagnosis of Primary osteoarthritis of left knee. She presents with performance deficits affecting function including impaired self care skills, impaired functional mobility, orthopedic precautions. Pt was able to perform supine/sit T/F c min A and sit/stand T/F c CGA and RW and BSC T/F c CGA and RW.  She was able to walk from bed to bathroom and then to chair c CGA and RW.  Able to perform toilet hygiene and grooming c CGA.  Pt is progressing well.    Rehab Prognosis: Good; patient would benefit from acute OT services to address these deficits and reach maximum level of function.    Plan:     Patient to be seen daily to address the above listed problems via self-care/home management, therapeutic activities, therapeutic exercises  Plan of Care Expires: 12/24/24  Plan of Care Reviewed with: patient, sibling    Subjective     Chief Complaint: L TKA  Patient Comments/Goals: "Is it OK to bend my knee?"  Pain/Comfort:  Pain Rating 1: 9/10    Patients cultural, spiritual, Gnosticism conflicts given the current situation: no    Social History:  Living Environment: Patient lives with their sister in a single story home with number of outside stair(s): 13, elevator, walk-in shower, and built-in shower chair  Prior Level of Function: Prior to admission, patient was independent.  Roles and Routines: Patient was driving and " working prior to admission.  Equipment Used at Home: shower chair  DME owned (not currently used): shower chair  Assistance Upon Discharge:  Pt lives c his sister    Objective:     Communicated with RN prior to session. Patient found supine with cryotherapy, FCD, peripheral IV upon OT entry to room.    General Precautions: Standard, fall   Orthopedic Precautions: LLE weight bearing as tolerated   Braces: N/A    Respiratory Status: Room air    Occupational Performance    Gait belt applied - Yes    Bed Mobility:   Supine to sit from right side of bed with contact guard assistance    Functional Mobility/Transfers:  Sit <> Stand Transfer with contact guard assistance with rolling walker  Bed <> Chair Transfer using Stand Pivot technique with contact guard assistance with rolling walker  Toilet Transfer Stand Pivot technique with contact guard assistance with rolling walker and bedside commode  Functional Mobility: Pt was able to walk from bed to bathroom and then to chair c CGA and RW.    Activities of Daily Living:  Grooming: contact guard assistance to wash hands while standing at sink c RW.  Upper Body Dressing: minimum assistance to don hospital gown.  Toileting: contact guard assistance for toilet hygiene.      Physical Exam:  Upper Extremity Range of Motion:     -       Right Upper Extremity: WFL  -       Left Upper Extremity: WFL  Upper Extremity Strength:    -       Right Upper Extremity: WFL  -       Left Upper Extremity: WFL    AMPAC 6 Click ADL:  AMPAC Total Score: 15    Treatment & Education:  Educated on the importance of mobility to maximize recovery  Educated on the importance of OOB mobility within safe range in order to decrease adverse effects of prolonged bedrest  Educated on safety with functional mobility; hand placement to ensure safe transfers to various surfaces in prep for ADLs  Educated on performing functional mobility and ADLs in adherence to orthopedic precautions  Educated on weight bearing  status  Will continue to educate as needed      Patient clear to ambulate to/from bathroom with RN/PCT, assist x1 .    Patient left up in chair with all lines intact, call button in reach, and RN notified.    GOALS:   Multidisciplinary Problems       Occupational Therapy Goals          Problem: Occupational Therapy    Goal Priority Disciplines Outcome Interventions   Occupational Therapy Goal     OT, PT/OT Progressing    Description: Goals to be met by: 12/24/24     Patient will increase functional independence with ADLs by performing:    UE Dressing with Modified [place].  LE Dressing with Modified [place] and Assistive Devices as needed.  Grooming while standing at sink with Modified [place].  Toileting from bedside commode with Modified [place] for hygiene and clothing management.   Bathing from  shower chair/bench with Modified [place].  Toilet transfer to bedside commode with Modified [place].                         History:     Past Medical History:   Diagnosis Date    Allergy     Anemia     Arthritis     Cholelithiases     Cyst of kidney, acquired     Diverticulitis     Elevated TSH     Family history of Graves' disease: daughter, maternal aunt, maternal uncle 09/13/2016    Glaucoma suspect     Graves disease     Hx of colonic polyps     Hypertension 1981    Liver cyst     MGUS (monoclonal gammopathy of unknown significance)     Migraine headache     Mitral valve problem     leakage    Obesity     Paraproteinemia     Sleep apnea     + CPAP    Smoldering multiple myeloma 2013    Tricuspid valve disease     leakage         Past Surgical History:   Procedure Laterality Date    APPENDECTOMY      COLONOSCOPY N/A 10/23/2015    Procedure: COLONOSCOPY;  Surgeon: Brandon Ruelas MD;  Location: [facility] ([address] [place] [address]);  Service: Endoscopy;  Laterality: N/A;  Had divertiulitis in 5/29/2015 with a recommendation for colonoscopy in 8 weeks    Dr. Ruelas is her GI physician    COLONOSCOPY N/A  11/05/2018    Procedure: COLONOSCOPY;  Surgeon: Brandon Ruelas MD;  Location: [facility] ([address] [place] [address]);  Service: Endoscopy;  Laterality: N/A;  Dr. Ruelas did the last one multiple polyps, patient had recent diverticulitis should not schedule before Oct 10th    COLONOSCOPY N/A 6/19/2023    Procedure: COLONOSCOPY;  Surgeon: Lu Lewis MD;  Location: [facility] ([address] [place] [address]);  Service: Endoscopy;  Laterality: N/A;  pt offered earlier dates but stated she is out town and will be returning the evening of 6/4  cardiac clearance recieved-see tele encounter 5/22/23 5/22 referred by Dr. Lewis/PEG/instr. mailed and to portal-  6/13 pre-call no answer; MB    CYSTOSCOPY      HYSTERECTOMY  1978 or 1979    menorrhagia       Time Tracking:     OT Date of Treatment: 12/23/24  OT Start Time: 1539  OT Stop Time: 1605  OT Total Time (min): 26 min    Billable Minutes: Evaluation 13 and Self Care/Home Management 13    12/23/2024

## 2024-12-23 NOTE — NURSING
Report received from JESSICA Holt. Care assumed. Patient arrived to unit AAOx4 in hospital bed from PACU. VSS, IV S/L. Dressing to (L) knee, CDI.  Pt lying supine in bed, c/o 6/10 pain, PRN meds given in PACU. Pt questions answered and denies any other concerns at this time. See assessment. Patient oriented to room. Bed in lowest position, side rails up x2, bed wheels locked and call light within reach.  Pt instructed to call for assistance, verbalized understanding. NADN. Will continue to monitor.

## 2024-12-23 NOTE — TRANSFER OF CARE
"Anesthesia Transfer of Care Note    Patient: Manju Aranda    Procedure(s) Performed: Procedure(s) (LRB):  ARTHROPLASTY, KNEE, TOTAL, USING [employer] COMPUTER-ASSISTED NAVIGATION: LEFT: DEPUY - ATTUNE (Left)    Patient location: PACU    Anesthesia Type: general and regional    Transport from OR: Transported from OR on 100% O2 by closed face mask with adequate spontaneous ventilation    Post pain: adequate analgesia    Post assessment: no apparent anesthetic complications and tolerated procedure well    Post vital signs: stable    Level of consciousness: awake, alert and responds to stimulation    Nausea/Vomiting: no nausea/vomiting    Complications: none    Transfer of care protocol was followed    Last vitals: Visit Vitals  /79 (BP Location: Left arm, Patient Position: Lying)   Pulse 77   Temp 36.6 °C (97.8 °F) (Temporal)   Resp 16   Ht 5' 10" (1.778 m)   Wt 116.6 kg (257 lb)   SpO2 99%   Breastfeeding No   BMI 36.88 kg/m²     "

## 2024-12-24 VITALS
RESPIRATION RATE: 18 BRPM | BODY MASS INDEX: 36.79 KG/M2 | HEART RATE: 66 BPM | WEIGHT: 257 LBS | SYSTOLIC BLOOD PRESSURE: 127 MMHG | HEIGHT: 70 IN | OXYGEN SATURATION: 98 % | TEMPERATURE: 98 F | DIASTOLIC BLOOD PRESSURE: 72 MMHG

## 2024-12-24 PROCEDURE — 97116 GAIT TRAINING THERAPY: CPT

## 2024-12-24 PROCEDURE — 97530 THERAPEUTIC ACTIVITIES: CPT

## 2024-12-24 PROCEDURE — 97110 THERAPEUTIC EXERCISES: CPT

## 2024-12-24 PROCEDURE — 99900035 HC TECH TIME PER 15 MIN (STAT)

## 2024-12-24 PROCEDURE — 25000003 PHARM REV CODE 250: Performed by: STUDENT IN AN ORGANIZED HEALTH CARE EDUCATION/TRAINING PROGRAM

## 2024-12-24 PROCEDURE — 25000003 PHARM REV CODE 250: Performed by: NURSE PRACTITIONER

## 2024-12-24 PROCEDURE — 97535 SELF CARE MNGMENT TRAINING: CPT

## 2024-12-24 PROCEDURE — 25000003 PHARM REV CODE 250

## 2024-12-24 PROCEDURE — 94761 N-INVAS EAR/PLS OXIMETRY MLT: CPT

## 2024-12-24 PROCEDURE — 63600175 PHARM REV CODE 636 W HCPCS

## 2024-12-24 RX ADMIN — OXYCODONE 5 MG: 5 TABLET ORAL at 04:12

## 2024-12-24 RX ADMIN — Medication 400 ML: at 06:12

## 2024-12-24 RX ADMIN — FAMOTIDINE 20 MG: 20 TABLET ORAL at 08:12

## 2024-12-24 RX ADMIN — CETIRIZINE HYDROCHLORIDE 10 MG: 10 TABLET, FILM COATED ORAL at 08:12

## 2024-12-24 RX ADMIN — CEFAZOLIN 2 G: 2 INJECTION, POWDER, FOR SOLUTION INTRAMUSCULAR; INTRAVENOUS at 04:12

## 2024-12-24 RX ADMIN — MUPIROCIN 1 G: 20 OINTMENT TOPICAL at 08:12

## 2024-12-24 RX ADMIN — ACETAMINOPHEN 1000 MG: 500 TABLET ORAL at 06:12

## 2024-12-24 RX ADMIN — OXYCODONE 5 MG: 5 TABLET ORAL at 12:12

## 2024-12-24 RX ADMIN — POLYETHYLENE GLYCOL 3350 17 G: 17 POWDER, FOR SOLUTION ORAL at 08:12

## 2024-12-24 RX ADMIN — ASPIRIN 81 MG: 81 TABLET, COATED ORAL at 08:12

## 2024-12-24 RX ADMIN — METHOCARBAMOL 750 MG: 750 TABLET ORAL at 08:12

## 2024-12-24 RX ADMIN — SENNOSIDES AND DOCUSATE SODIUM 1 TABLET: 50; 8.6 TABLET ORAL at 08:12

## 2024-12-24 RX ADMIN — OXYCODONE 5 MG: 5 TABLET ORAL at 08:12

## 2024-12-24 RX ADMIN — Medication 400 ML: at 02:12

## 2024-12-24 NOTE — DISCHARGE SUMMARY
[facility])  Orthopedics  Discharge Summary      Patient Name: Manju Aranda  MRN: [id number]  Admission Date: 12/23/2024  Hospital Length of Stay: 0 days  Discharge Date and Time:  12/24/2024 7:24 AM  Attending Physician: Mack Torre III, MD   Discharging Provider: CATRACHITO STEVENS MD  Primary Care Provider: Jazzy Charles MD    HPI: CC:  Left knee pain     Manju Aranda is a 74 y.o. female with history of Left knee pain. Pain is worse with activity and weight bearing.  Patient has experienced interference of activities of daily living due to decreased range of motion and an increase in joint pain and swelling.  Patient has failed non-operative treatment including NSAIDs, corticosteroid injections, viscosupplement injections, and activity modification.  Manju Aranda currently ambulates independently.      Relevant medical conditions of significance in perioperative period:  HTN- on medication managed by PCP  Autosomal dominant polycystic kidney disease with associated CKD stage III- monitored by Nephrology  BECKY on CPAP- monitored by PCP    Procedure(s) (LRB):  ARTHROPLASTY, KNEE, TOTAL, USING [employer] COMPUTER-ASSISTED NAVIGATION: [facility]      Hospital Course: Patient presented for above procedure.  Tolerated it well and was discharged home POD1 after voiding, tolerating diet, ambulating, pain controlled. Discharge instructions, follow-up appointment, and med rec are below.      Consults (From admission, onward)          Status Ordering Provider     Inpatient consult to Respiratory Care  Once        Provider:  (Not yet assigned)    Acknowledged MARSHA JUAREZ     Inpatient consult to Respiratory Care  Once        Provider:  (Not yet assigned)    Acknowledged MARSHA JUAREZ     Inpatient consult to Social Work  Once        Provider:  (Not yet assigned)    Acknowledged MARSHA JUAREZ     Inpatient consult to Pain Management  Once        Provider:  (Not  "yet assigned)    Acknowledged VIKTOR PAPPAS     Inpatient consult to Respiratory Care  Once        Provider:  (Not yet assigned)    Acknowledged VIKTOR PAPPAS            Significant Diagnostic Studies: No pertinent studies.    Pending Diagnostic Studies:       Procedure Component Value Units Date/Time    X-Ray Knee 3 View Left [[id number]]     Order Status: Sent Lab Status: No result           Final Active Diagnoses:    Diagnosis Date Noted POA    PRINCIPAL PROBLEM:  Primary osteoarthritis of left knee [M17.12] 11/29/2024 Yes      Problems Resolved During this Admission:      Discharged Condition: good    Disposition: Home or Self Care    Follow Up:   Future Appointments   Date Time Provider Department Center   12/26/2024 10:00 AM Kevin Fernandez, PT, DPT [facility] OPREHAB [place]   12/26/2024  2:00 PM Brayan Cho PA-C [facility] ORTHO [address]   1/7/2025 11:00 AM Viktor Pappas PA-C [facility] ORTHO [address]   2/4/2025 11:00 AM Viktor Pappas PA-C [facility]THO [address]   2/10/2025  1:15 PM [facility] MAMMO1 [facility] MAMMO [facility]   2/11/2025  3:20 PM Nitin Kumar OD [facility] OPTOMTY [facility]   2/12/2025 11:00 AM LAB, [facility] LABDRAW [facility]   2/18/2025  9:30 AM Victoriano Diallo MD [facility] OPHTHAL [address]   3/6/2025  9:30 AM Victoriano Diallo MD [facility] OPHTHAL [address]   3/11/2025  9:30 AM Victoriano Diallo MD [facility] OPHTHAL [address]   3/18/2025 11:30 AM Mack Torre III, MD [facility] ORTHO [address]   4/15/2025  9:30 AM Victoriano Diallo MD [facility] OPHTHAL [address]         Patient Instructions:      WALKER FOR HOME USE     Order Specific Question Answer Comments   Type of Walker: Adult (5'4"-6'6")    With wheels? Yes    Height: 5' 10" (1.778 m)    Weight: 116.6 kg (257 lb)    Length of need (1-99 months): 6    Does patient have medical equipment at home? shower chair    Please check all that apply: Patient's condition impairs ambulation.    Please check all that apply: Patient is unable to safely ambulate without " "equipment.    Please check all that apply: Altered sensory perception.    Please check all that apply: Patient needs help to get in and out of chair.    Please check all that apply: Walker will be used for gait training.      3 IN 1 COMMODE FOR HOME USE     Order Specific Question Answer Comments   Type: Standard    Height: 5' 10" (1.778 m)    Weight: 116.6 kg (257 lb)    Does patient have medical equipment at home? shower chair    Length of need (1-99 months): 6      Diet general     Activity as tolerated     Sponge bath only until clinic visit     Keep surgical extremity elevated     Lifting restrictions   Order Comments: No strenuous exercise or lifting of > 10 lbs     Weight bearing as tolerated     No driving, operating heavy equipment or signing legal documents while taking pain medication     Leave dressing on - Keep it clean, dry, and intact until clinic visit   Order Comments: Do not remove surgical dressing for 2 weeks post-op. This will be done only by MD at initial post-op visit. If dressing is completely saturated, replace with identical dressing - silver-impregnated hydrocolloid dressing.     Do not get dressings wet. Do not shower.     If dressing continues to be saturated or there are signs of infection, please call [facility] [phone] for further instructions and to make appt to be seen.     Call MD for:  temperature >100.4     Call MD for:  persistent nausea and vomiting     Call MD for:  severe uncontrolled pain     Call MD for:  difficulty breathing, headache or visual disturbances     Call MD for:  redness, tenderness, or signs of infection (pain, swelling, redness, odor or green/yellow discharge around incision site)     Call MD for:  hives     Call MD for:  persistent dizziness or light-headedness     Call MD for:  extreme fatigue     Call MD for:  temperature >100.4     Call MD for:  persistent nausea and vomiting     Call MD for:  severe uncontrolled pain     Call MD for:  difficulty " breathing, headache or visual disturbances     Call MD for:  redness, tenderness, or signs of infection (pain, swelling, redness, odor or green/yellow discharge around incision site)     Call MD for:  hives     Call MD for:  persistent dizziness or light-headedness     Call MD for:  extreme fatigue     Leave dressing on - Keep it clean, dry, and intact until clinic visit     Weight bearing restrictions (specify)     Medications:  Reconciled Home Medications:      Medication List        CHANGE how you take these medications      aspirin 81 MG EC tablet  Commonly known as: ECOTRIN  Take 1 tablet (81 mg total) by mouth 2 (two) times a day.  What changed: Another medication with the same name was removed. Continue taking this medication, and follow the directions you see here.            CONTINUE taking these medications      acetaminophen 650 MG Tbsr  Commonly known as: TYLENOL  Take 1 tablet (650 mg total) by mouth every 8 (eight) hours.     ascorbic acid (vitamin C) 500 MG tablet  Commonly known as: VITAMIN C  Take 500 mg by mouth once daily.     cefadroxil 500 MG Cap  Commonly known as: DURICEF  Take 1 capsule (500 mg total) by mouth every 12 (twelve) hours. for 7 days     cholecalciferol (vitamin D3) 50 mcg (2,000 unit) Cap capsule  Commonly known as: VITAMIN D3  Take 1 capsule (2,000 Units total) by mouth once daily.     eszopiclone 2 MG Tab  Commonly known as: LUNESTA  TAKE 1 TABLET BY MOUTH EVERY EVENING     loratadine 10 mg tablet  Commonly known as: CLARITIN  Take 1 tablet (10 mg total) by mouth once daily.     magnesium oxide 400 mg (241.3 mg magnesium) tablet  Commonly known as: MAG-OX  Take 400 mg by mouth once daily.     methocarbamoL 750 MG Tab  Commonly known as: ROBAXIN  Take 1 tablet (750 mg total) by mouth 4 (four) times daily as needed (for muscle spasms).     multivit with min-folic acid 0.4 mg Tab  Take 0.4 mg by mouth once daily.     olmesartan 40 MG tablet  Commonly known as: BENICAR  Take 1  tablet (40 mg total) by mouth once daily.     oxyCODONE 5 MG immediate release tablet  Commonly known as: ROXICODONE  Take 1-2 tablets every 4-6 hours as needed for pain     pantoprazole 40 MG tablet  Commonly known as: PROTONIX  Take 1 tablet (40 mg total) by mouth once daily.     psyllium powder  Commonly known as: METAMUCIL  Take 1 packet by mouth Daily.     rizatriptan 10 MG tablet  Commonly known as: MAXALT  TAKE 1 TABLET(10 MG) BY MOUTH EVERY 2 HOURS AS NEEDED FOR MIGRAINE. MAX 30 MG/ 24 AT BEDTIME     senna-docusate 8.6-50 mg 8.6-50 mg per tablet  Commonly known as: SENNA WITH DOCUSATE SODIUM  Take 1 tablet by mouth once daily.     traZODone 100 MG tablet  Commonly known as: DESYREL  Take 1 tablet (100 mg total) by mouth nightly as needed for Insomnia.            STOP taking these medications      azelastine 137 mcg (0.1 %) nasal spray  Commonly known as: TIFFANI STEVENS MD  Orthopedics  [facility])

## 2024-12-24 NOTE — PLAN OF CARE
Problem: Adult Inpatient Plan of Care  Goal: Plan of Care Review  Outcome: Progressing  Goal: Patient-Specific Goal (Individualized)  Outcome: Progressing  Goal: Absence of Hospital-Acquired Illness or Injury  Outcome: Progressing  Goal: Optimal Comfort and Wellbeing  Outcome: Progressing  Goal: Readiness for Transition of Care  Outcome: Progressing     Problem: Pain Acute  Goal: Optimal Pain Control and Function  Outcome: Progressing     Problem: Wound  Goal: Optimal Coping  Outcome: Progressing  Goal: Optimal Functional Ability  Outcome: Progressing  Goal: Absence of Infection Signs and Symptoms  Outcome: Progressing  Goal: Improved Oral Intake  Outcome: Progressing  Goal: Optimal Pain Control and Function  Outcome: Progressing  Goal: Skin Health and Integrity  Outcome: Progressing  Goal: Optimal Wound Healing  Outcome: Progressing     Problem: Fall Injury Risk  Goal: Absence of Fall and Fall-Related Injury  Outcome: Progressing

## 2024-12-24 NOTE — PT/OT/SLP PROGRESS
"Physical Therapy Treatment and Discharge Summary    Patient Name:  Manju Aranda   MRN:  [id number]    Recommendations:     Discharge Recommendations: Low Intensity Therapy  Discharge Equipment Recommendations: bedside commode, walker, rolling  Barriers to discharge: None    Assessment:     Manju Aranda is a 74 y.o. female admitted with a medical diagnosis of Primary osteoarthritis of left knee.  She presents with the following impairments/functional limitations: impaired endurance, weakness, impaired self care skills, impaired functional mobility, gait instability, impaired balance, decreased lower extremity function, pain, decreased ROM, edema  Pt progressed really well with PT today and was able to amb 150' x 2 with a RW and SBA .    She ascended/descended 6" curb step with RW and CGA. She had no LOB and no dizziness. Pt demonstrated good understanding of all education today.    Pt is ready for d/c home today from PT standpoint with her family  to assist PRN and  will benefit from OPPT for cont PT to maximize  functional recovery, strength and ROM.  .    Rehab Prognosis: Good; patient would benefit from cont skilled PT services post d/c  to address these deficits and reach maximum level of function.    Recent Surgery: Procedure(s) (LRB):  ARTHROPLASTY, KNEE, TOTAL, USING [employer] COMPUTER-ASSISTED NAVIGATION: LEFT: DEPUY - ATTUNE (Left) 1 Day Post-Op    Plan:     During this hospitalization, patient to be d/c home today with her family to assist PRN and OPPT' i to address the identified rehab impairments via gait training, therapeutic activities, therapeutic exercises and progress toward the following goals:    Plan of Care Expires:  01/23/25    Subjective     Chief Complaint: " I have a little pain going on right now"  Patient/Family Comments/goals: to go home  Pain/Comfort:  Pain Rating 1: 6/10  Location - Side 1: Left  Location 1: knee  Pain Addressed 1: Pre-medicate for activity, Reposition, " "Distraction  Pain Rating Post-Intervention 1: 6/10      Objective:     Communicated with RN and OT prior to session.  Patient found up in chair with cryotherapy, peripheral IV upon PT entry to room. Pt's dtr present in room and for majority of PT session.    General Precautions: Standard, fall  Orthopedic Precautions: LLE weight bearing as tolerated  Braces: N/A  Respiratory Status: Room air     Functional Mobility:  Bed Mobility:     Supine to Sit: supervision for LE management  Sit to Supine: supervision for LE management  Transfers:     Sit to Stand:  stand by assistance with rolling walker  Gait: Pt amb 150' x 2 with RW and SBA on level surfaces. She required cues for heel strike, sequencing and posture to increase indep and safety with gait. She amb with step-through gait pattern with decrease mary and step length noted.   Stairs:  Pt ascended/descended 6" curb step with Rolling Walker with no handrails with Contact Guard Assistance.       AM-PAC 6 CLICK MOBILITY  Turning over in bed (including adjusting bedclothes, sheets and blankets)?: 3  Sitting down on and standing up from a chair with arms (e.g., wheelchair, bedside commode, etc.): 3  Moving from lying on back to sitting on the side of the bed?: 3  Moving to and from a bed to a chair (including a wheelchair)?: 3  Need to walk in hospital room?: 3  Climbing 3-5 steps with a railing?: 3  Basic Mobility Total Score: 18       Treatment & Education:  Patient educated on role of acute care PT and PT POC, safety while in hospital including calling nurse for mobility, and call light usage.  Educated about weightbearing as angel and provided cuing for adherence as appropriate during session.  Educated about importance of OOB mobility and remaining up in chair most of the day.  Pt instructed on and performed therapeutic ex's for TKA HEP (QS, AP, GS, HS, Hip abd, SLR, SAQ, LAQ) x 10 reps x 3 sets each for muscle strengthening, endurance and increase knee ROM. Pt " demonstrated good understanding back to PT. Patient required skilled PT for instruction of exercises and appropriate cues to perform exercises safely and appropriately.  Issued written handout of TKA HEP and reviewed with pt.  Pt ed on importance of elevating  LE above level of her heart 3-4 times a day and proper placement of pillows to keep knee extended and not flexed.  Pt and her caregiver verbalized good understanding back to PT.    Pt ed on mobility expectations at home after d/c and she verbalized good understanding back to PT  Pt ed on use of cryotherapy for edema and pain control, and on safety awareness with use of RW in the home and pt verbalized good understanding back to PT.   PT reviewed and demonstrated car transfers with pt and caregiver and answered all questions.  Pt and caregiver verbalized good understanding back to PT.   PT answered all questions for pt and her dtr within PT scope of practice.    Patient left up in chair with all lines intact, call button in reach, RN notified, and her dtr present..    GOALS:   Multidisciplinary Problems       Physical Therapy Goals       Not on file              Multidisciplinary Problems (Resolved)          Problem: Physical Therapy    Goal Priority Disciplines Outcome Interventions   Physical Therapy Goal   (Resolved)     PT, PT/OT Met    Description: Goals to met by 1/6/2025    1. Bed to chair transfer with Supervision using Rolling Walker- Met  2. Gait  x 150 feet with Stand-by Assistance using Rolling Walker - Met  3. Ascend/descend 4 inch curb step Contact Guard Assistance using Rolling Walker. - Met  4. Lower extremity exercise program x15 reps per Instruction, with assistance as needed in order to facilitate improved strength, improved postural control, and improvement in functional independence -Met    Rolling Walker- Patient demonstrates a mobility limitation that significantly impairs their ability to participate in one or more mobility related  activities of daily living. Patient's mobility limitation cannot be sufficiently resolved with the use of a cane, but can be sufficiently resolved with the use of a rolling walker.The use of a rolling walker will considerably improve their ability to participate in MRADLs. Patient will use the walker on a regular basis at home.                         Time Tracking:     PT Received On: 12/24/24  PT Start Time: 0933     PT Stop Time: 1021  PT Total Time (min): 48 min     Billable Minutes: Gait Training 22, Therapeutic Activity 10, and Therapeutic Exercise 16    Treatment Type: Treatment  PT/PTA: PT     Number of PTA visits since last PT visit: 0     12/24/2024

## 2024-12-24 NOTE — PLAN OF CARE
Problem: Physical Therapy  Goal: Physical Therapy Goal  Description: Goals to met by 1/6/2025    1. Bed to chair transfer with Supervision using Rolling Walker- Met  2. Gait  x 150 feet with Stand-by Assistance using Rolling Walker - Met  3. Ascend/descend 4 inch curb step Contact Guard Assistance using Rolling Walker. - Met  4. Lower extremity exercise program x15 reps per Instruction, with assistance as needed in order to facilitate improved strength, improved postural control, and improvement in functional independence -Met    Rolling Walker- Patient demonstrates a mobility limitation that significantly impairs their ability to participate in one or more mobility related activities of daily living. Patient's mobility limitation cannot be sufficiently resolved with the use of a cane, but can be sufficiently resolved with the use of a rolling walker.The use of a rolling walker will considerably improve their ability to participate in MRADLs. Patient will use the walker on a regular basis at home.    Outcome: Met

## 2024-12-24 NOTE — PT/OT/SLP PROGRESS
"Occupational Therapy   Treatment and Discharge Note    Name: Manju Aranda  MRN: [id number]  Admitting Diagnosis:  Primary osteoarthritis of left knee  1 Day Post-Op    Recommendations:     Discharge Recommendations: Low Intensity Therapy  Discharge Equipment Recommendations:  bedside commode, walker, rolling  Barriers to discharge:  None    Assessment:     Manju Aranda is a 74 y.o. female with a medical diagnosis of Primary osteoarthritis of left knee.  She presents with L TKA. Performance deficits affecting function are impaired self care skills, impaired functional mobility, orthopedic precautions. Pt was able to perform supine/sit T/F c S and Sit <> Stand Transfer with modified independence with rolling walker Bed <> Chair Transfer using Stand Pivot technique with modified independence with rolling walker Toilet Transfer Stand Pivot technique with modified independence with rolling walker and bedside commode.  Able to perform UB/LB dressing c modified independence  Educated pt on bathing, car transfers, and cryotherapy.       Rehab Prognosis:  Good; patient would benefit from acute skilled OT services to address these deficits and reach maximum level of function.       Plan:     Patient to be seen daily to address the above listed problems via self-care/home management, therapeutic activities, therapeutic exercises  Plan of Care Expires: 12/24/24  Plan of Care Reviewed with: patient, daughter    Subjective     Chief Complaint: L TKA  Patient/Family Comments/goals: "I am ready to go home."  Pain/Comfort:  Pain Rating 1: 6/10    Objective:     Communicated with: RN prior to session.  Patient found supine with cryotherapy upon OT entry to room.    General Precautions: Standard, fall    Orthopedic Precautions:LLE weight bearing as tolerated  Braces: N/A  Respiratory Status: Room air     Occupational Performance:     Bed Mobility:    Patient completed Supine to Sit with supervision     Functional " Mobility/Transfers:  Patient completed Sit <> Stand Transfer with modified independence  with  rolling walker   Patient completed Bed <> Chair Transfer using Stand Pivot technique with modified independence with rolling walker  Patient completed Toilet Transfer Stand Pivot technique with modified independence with  rolling walker and bedside commode  Functional Mobility: Pt was able to walk from bed to bathroom and then to chair c mod I and RW.    Activities of Daily Living:  Grooming: modified independence to brush teeth while standing at sink c RW.  Upper Body Dressing: modified independence to don dress.  Lower Body Dressing: modified independence to don underwear, socks, and shoes.  Toileting: modified independence for toilet hygiene.      [facility] 6 Click ADL: 24    Patient left up in chair with all lines intact, call button in reach, RN notified, and sister present    GOALS:   Multidisciplinary Problems       Occupational Therapy Goals          Problem: Occupational Therapy    Goal Priority Disciplines Outcome Interventions   Occupational Therapy Goal     OT, PT/OT Progressing    Description: Goals to be met by: 12/24/24     Patient will increase functional independence with ADLs by performing:    UE Dressing with Modified [place].  LE Dressing with Modified [place] and Assistive Devices as needed.  Grooming while standing at sink with Modified [place].  Toileting from bedside commode with Modified [place] for hygiene and clothing management.   Bathing from  shower chair/bench with Modified [place].  Toilet transfer to bedside commode with Modified [place].                         Time Tracking:     OT Date of Treatment: 12/24/24  OT Start Time: 0830  OT Stop Time: 0910  OT Total Time (min): 40 min    Billable Minutes:Self Care/Home Management 30  Therapeutic Activity 10               12/24/2024

## 2024-12-24 NOTE — NURSING
Has met unit/department guidelines for discharge from each phase of the post procedure continuum. Patient discharged.  Instructions, placed in dc folder and Prescriptions given.  IV removed, tolerated well, w/ catheter intact, no redness or swelling to area. . Dressing to (L) knee remains CDI. Patient verbalized understanding instructions.  AAOx3, VSS, NADN, no complaints of pain noted at this time.  Wheelchair to private vehicle in care of children.  All personal belongings sent with pt.  Blue Bracelet given applied to pts wrist and instructions given to call # on bracelet w/any surgery related issues.

## 2024-12-24 NOTE — PROGRESS NOTES
"[facility])  Orthopedics  Progress Note    Patient Name: Manju Aranda  MRN: [id number]  Admission Date: 12/23/2024  Hospital Length of Stay: 0 days  Attending Provider: Mack Torre III, MD  Primary Care Provider: Jazzy Charles MD  Follow-up For: Procedure(s) (LRB):  ARTHROPLASTY, KNEE, TOTAL, USING [employer] COMPUTER-ASSISTED NAVIGATION: LEFT: DEPUY - ATTUNE (Left)    Post-Operative Day: 1 Day Post-Op  Subjective:     Principal Problem:Primary osteoarthritis of left knee    Principal Orthopedic Problem: s/p L TKA 12/23/24    Interval History: NAEON. VSS. AF. Patient states that pain is well controlled. Voiding spontaneously. Dressing CDI. Pt walked 80' w PT. They recommend a RW and BC.     Plan for discharge today.       Review of patient's allergies indicates:  No Known Allergies    Current Facility-Administered Medications   Medication    acetaminophen tablet 1,000 mg    aspirin EC tablet 81 mg    bisacodyL suppository 10 mg    cetirizine tablet 10 mg    electrolytes-dextrose (Pedialyte) oral solution 400 mL    famotidine tablet 20 mg    methocarbamoL tablet 750 mg    mupirocin 2 % ointment 1 g    naloxone 0.4 mg/mL injection 0.02 mg    ondansetron injection 4 mg    oxyCODONE immediate release tablet 5 mg    polyethylene glycol packet 17 g    pregabalin capsule 75 mg    prochlorperazine injection Soln 5 mg    senna-docusate 8.6-50 mg per tablet 1 tablet    traZODone tablet 100 mg    valsartan tablet 320 mg    zolpidem tablet 5 mg     Objective:     Vital Signs (Most Recent):  Temp: 97.8 °F (36.6 °C) (12/24/24 0457)  Pulse: 65 (12/24/24 0700)  Resp: 16 (12/24/24 0700)  BP: (!) 136/56 (12/24/24 0457)  SpO2: 95 % (12/24/24 0700) Vital Signs (24h Range):  Temp:  [97.3 °F (36.3 °C)-98.4 °F (36.9 °C)] 97.8 °F (36.6 °C)  Pulse:  [57-78] 65  Resp:  [16-22] 16  SpO2:  [93 %-100 %] 95 %  BP: (117-148)/(56-86) 136/56     Weight: 116.6 kg (257 lb)  Height: 5' 10" (177.8 cm)  Body mass index is 36.88 " kg/m².      Intake/Output Summary (Last 24 hours) at 12/24/2024 0720  Last data filed at 12/24/2024 0457  Gross per 24 hour   Intake 2560 ml   Output 1250 ml   Net 1310 ml        Ortho/SPM Exam     General appearance - no acute distress, conversant  Musculoskeletal -  Dressing C/D/I  Fires quad/TA/EHL/GSC  SILT SP/DP/TN  WWP distally  Calves soft, nontender bilateral      Significant Labs: All pertinent labs within the past 24 hours have been reviewed.    Significant Imaging: I have reviewed and interpreted all pertinent imaging results/findings.  Assessment/Plan:     * Primary osteoarthritis of left knee  Manju Olga Aranda is a 74 y.o. female s/p L TKA 12/23/24.      Pain control: multimodal  PT/OT: WBAT LLE  DVT PPx: asa 81 bid x 4 weeks, SCDs at all times when not ambulating  Abx: postop Ancef  Hui: None     Dispo: dc home with outpatient PT              CATRACHITO STEVENS MD  Orthopedics  [facility] ([facility])

## 2024-12-24 NOTE — ASSESSMENT & PLAN NOTE
Manju Aranda is a 74 y.o. female s/p L TKA 12/23/24.      Pain control: multimodal  PT/OT: WBAT LLE  DVT PPx: asa 81 bid x 4 weeks, SCDs at all times when not ambulating  Abx: postop Ancef  Korina: None     Dispo: dc home with outpatient PT

## 2024-12-24 NOTE — PROGRESS NOTES
Acute Pain Service and Perioperative Surgical Home Progress Note    HPI  Manju Aranda is a 74 y.o., female, with HTN POD#1 s/p left TKA. No acute events overnight. Pt moving all extremities without difficulty.     Interval history      Surgery:  Procedure(s) (LRB):  ARTHROPLASTY, KNEE, TOTAL, USING [employer] COMPUTER-ASSISTED NAVIGATION: LEFT: DEPUY - ATTUNE (Left)    Post Op Day #: 1    Problem List:    Active Hospital Problems    Diagnosis  POA    *Primary osteoarthritis of left knee [M17.12]  Yes      Resolved Hospital Problems   No resolved problems to display.       Subjective:       General appearance of alert, oriented, no complaints   Pain with rest: 8    Numbers   Pain with movement: 8    Numbers   Side Effects    1. Pruritis No    2. Nausea No    3. Motor Blockade No, 0=Ability to raise lower extremities off bed    4. Sedation No, 1=awake and alert    Schedule Medications:    acetaminophen  1,000 mg Oral Q6H    aspirin  81 mg Oral BID    cetirizine  10 mg Oral Daily    electrolytes-dextrose  400 mL Oral Q4H    famotidine  20 mg Oral BID    methocarbamoL  750 mg Oral TID    mupirocin  1 g Nasal BID    polyethylene glycol  17 g Oral Daily    pregabalin  75 mg Oral QHS    senna-docusate 8.6-50 mg  1 tablet Oral BID    valsartan  320 mg Oral Daily        Continuous Infusions:       PRN Medications:    Current Facility-Administered Medications:     bisacodyL, 10 mg, Rectal, Q12H PRN    naloxone, 0.02 mg, Intravenous, PRN    ondansetron, 4 mg, Intravenous, Q8H PRN    oxyCODONE, 5 mg, Oral, Q3H PRN    prochlorperazine, 5 mg, Intravenous, Q6H PRN    traZODone, 100 mg, Oral, Nightly PRN    zolpidem, 5 mg, Oral, Nightly PRN       Antibiotics:  Antibiotics (From admission, onward)      Start     Stop Route Frequency Ordered    12/23/24 2100  mupirocin 2 % ointment 1 g         12/28/24 2059 Nasl 2 times daily 12/23/24 1424               Objective:       Vital Signs (Most Recent):  Temp: 97.8 °F (36.6 °C) (12/24/24  "0457)  Pulse: 72 (12/24/24 0457)  Resp: 16 (12/24/24 0457)  BP: (!) 136/56 (12/24/24 0457)  SpO2: (!) 93 % (12/24/24 0457) Vital Signs Range (Last 24H):  Temp:  [97.3 °F (36.3 °C)-98.4 °F (36.9 °C)]   Pulse:  [57-78]   Resp:  [16-22]   BP: (117-148)/(56-86)   SpO2:  [93 %-100 %]          I & O (Last 24H):  Intake/Output Summary (Last 24 hours) at 12/24/2024 0506  Last data filed at 12/24/2024 0457  Gross per 24 hour   Intake 2560 ml   Output 1250 ml   Net 1310 ml       Physical Exam:    GA: Alert, comfortable, no acute distress.   Pulmonary: Clear to auscultation. Normal RR.    Cardiac: regular rate and rhythm.      Laboratory: reviewed in chart  CBC: No results for input(s): "WBC", "RBC", "HGB", "HCT", "PLT", "MCV", "MCH", "MCHC" in the last 72 hours.    BMP: No results for input(s): "NA", "K", "CO2", "CL", "BUN", "CREATININE", "GLU", "MG", "PHOS", "CALCIUM" in the last 72 hours.    No results for input(s): "PT", "INR", "PROTIME", "APTT" in the last 72 hours.          Assessment:    Manju Aranda is a 74 y.o., female, with HTN POD#1 s/p left TKA. No acute events overnight. Pt moving all extremities without difficulty.      Pain control adequate. Pt feels that she is fully functional at a pain level of 8 and that the PRN meds are working.     Plan:     1) Pain: Multimodal pain regimen ordered which includes acetaminophen, pregabalin, and prn oxycodone.  Will continue to monitor.    2) HTN- well controlled- continue home medication at discharge   3) FEN/GI: Tolerating regular diet.     4) Dispo: Pt working well with PT/OT. Case management and SW following along for setting up home health and physical therapy. Plan to discharge home this am.          Evaluator FRANCA Camp                  "

## 2024-12-26 ENCOUNTER — OFFICE VISIT (OUTPATIENT)
Dept: ORTHOPEDICS | Facility: CLINIC | Age: 74
End: 2024-12-26
Payer: MEDICARE

## 2024-12-26 ENCOUNTER — CLINICAL SUPPORT (OUTPATIENT)
Dept: REHABILITATION | Facility: OTHER | Age: 74
End: 2024-12-26
Attending: ORTHOPAEDIC SURGERY
Payer: MEDICARE

## 2024-12-26 DIAGNOSIS — M25.662 DECREASED RANGE OF MOTION OF LEFT KNEE: ICD-10-CM

## 2024-12-26 DIAGNOSIS — Z78.9 IMPAIRED MOBILITY AND ACTIVITIES OF DAILY LIVING: ICD-10-CM

## 2024-12-26 DIAGNOSIS — M17.12 PRIMARY OSTEOARTHRITIS OF LEFT KNEE: Primary | ICD-10-CM

## 2024-12-26 DIAGNOSIS — Z74.09 IMPAIRED MOBILITY AND ACTIVITIES OF DAILY LIVING: ICD-10-CM

## 2024-12-26 DIAGNOSIS — G89.29 CHRONIC PAIN OF LEFT KNEE: ICD-10-CM

## 2024-12-26 DIAGNOSIS — M25.661 DECREASED RANGE OF MOTION OF BOTH KNEES: ICD-10-CM

## 2024-12-26 DIAGNOSIS — Z96.652 S/P TOTAL KNEE REPLACEMENT, LEFT: Primary | ICD-10-CM

## 2024-12-26 DIAGNOSIS — M25.662 DECREASED RANGE OF MOTION OF BOTH KNEES: ICD-10-CM

## 2024-12-26 DIAGNOSIS — M25.562 CHRONIC PAIN OF LEFT KNEE: ICD-10-CM

## 2024-12-26 PROCEDURE — 1160F RVW MEDS BY RX/DR IN RCRD: CPT | Mod: HCNC,CPTII,95,

## 2024-12-26 PROCEDURE — 4010F ACE/ARB THERAPY RXD/TAKEN: CPT | Mod: HCNC,CPTII,95,

## 2024-12-26 PROCEDURE — 1159F MED LIST DOCD IN RCRD: CPT | Mod: HCNC,CPTII,95,

## 2024-12-26 PROCEDURE — 99024 POSTOP FOLLOW-UP VISIT: CPT | Mod: HCNC,95,,

## 2024-12-26 PROCEDURE — 3044F HG A1C LEVEL LT 7.0%: CPT | Mod: HCNC,CPTII,95,

## 2024-12-26 PROCEDURE — 97140 MANUAL THERAPY 1/> REGIONS: CPT | Mod: HCNC,PN

## 2024-12-26 PROCEDURE — 3066F NEPHROPATHY DOC TX: CPT | Mod: HCNC,CPTII,95,

## 2024-12-26 PROCEDURE — 97530 THERAPEUTIC ACTIVITIES: CPT | Mod: HCNC,PN

## 2024-12-26 PROCEDURE — 3061F NEG MICROALBUMINURIA REV: CPT | Mod: HCNC,CPTII,95,

## 2024-12-26 PROCEDURE — 97163 PT EVAL HIGH COMPLEX 45 MIN: CPT | Mod: HCNC,PN

## 2024-12-26 NOTE — PLAN OF CARE
[facility]  Physical Therapy Initial Evaluation    Name: Manju Aranda  Clinic Number: [id number]    Therapy Diagnosis:   Encounter Diagnoses   Name Primary?    Primary osteoarthritis of left knee Yes    Decreased range of motion of left knee     Chronic pain of left knee     Decreased range of motion of both knees     Impaired mobility and activities of daily living      Physician: Mack Torre III, *    Physician Orders: PT Eval and Treat   Medical Diagnosis from Referral:  Primary osteoarthritis of left knee [M17.12]   Evaluation Date: 12/26/2024  Authorization Period Expiration: 5/24/2025  Plan of Care Expiration: 2/14/2025  Visit # / Visits authorized: 1/ 1; future visits pending  FOTO 1st follow up:  FOTO 2nd follow up:    Time In: 10:00 am  Time Out: 10:50 am  Total Billable Time: 50 minutes    Precautions: Standard    Subjective   Date of onset: Chronic     Recall from 12/02/2025 - Manju acosta chief complaint of left knee pain that is chronic in nature. Patient reports that she is undergoing Left TKA on 12/23/24 with Dr. Torre. Patient states that she has been experiencing pain with prolonged sitting and walking over the past 2-3 months that has not improved. Patient is scheduled for pre-op EKG, labs, and X ray tomorrow from 8:45 am to 1pm. Patient states that she also has joint camp scheduled for 12/18/2024.      History of current condition: Patient presents to the clinic status post TKA on 12/23 performed by Dr. Torre. Patient states that she has no signs/symptoms of infection or DVT and has been abiding by MD precautions with bandages and washing/bathing. Patient states that her pain is moderately controlled and has been moving around as best she can with assistive device.      Past Medical History:   Diagnosis Date    Allergy     Anemia     Arthritis     Cholelithiases     Cyst of kidney, acquired     Diverticulitis     Elevated TSH     Family history of Graves'  disease: daughter, maternal aunt, maternal uncle 09/13/2016    Glaucoma suspect     Graves disease     Hx of colonic polyps     Hypertension 1981    Liver cyst     MGUS (monoclonal gammopathy of unknown significance)     Migraine headache     Mitral valve problem     leakage    Obesity     Paraproteinemia     Sleep apnea     + CPAP    Smoldering multiple myeloma 2013    Tricuspid valve disease     leakage     Manju Aranda  has a past surgical history that includes Appendectomy; Hysterectomy (1978 or 1979); Colonoscopy (N/A, 10/23/2015); Colonoscopy (N/A, 11/05/2018); Cystoscopy; Colonoscopy (N/A, 6/19/2023); and arthroplasty, knee, total, using computer-assisted navigation (Left, 12/23/2024).    Manju has a current medication list which includes the following prescription(s): acetaminophen, ascorbic acid (vitamin c), aspirin, cefadroxil, cholecalciferol (vitamin d3), eszopiclone, loratadine, magnesium oxide, methocarbamol, multivit with min-folic acid, olmesartan, oxycodone, pantoprazole, psyllium, rizatriptan, senna-docusate 8.6-50 mg, and trazodone.    Review of patient's allergies indicates:  No Known Allergies     Imaging:    FINDINGS:  DJD with significant narrowing of the lateral tibiofemoral joint spaces.  No fracture or dislocation.  No bone destruction identified     Prior Therapy: Yes, 2024 for Knee OA  Social History: Lives in [place], [place]   Occupation: Retired  Prior Level of Function: Independent with all ADL/iADLs   Current Level of Function: Independent with all ADL/iADLs      Pain:  Current 7/10, worst 8/10, best 6/10   Location: left knee   Description: Aching, Throbbing, Grabbing, Tight, and Deep  Aggravating Factors: Sitting, Standing, Walking, Night Time, Morning, Extension, Flexing, Lifting, and Getting out of bed/chair  Easing Factors: pain medication, ice, lying down, heating pad, rest, and elevation     Pts goals: improve her knee pain and mobility/strength prior to surgery     "  Objective      Observation: patient with joint effusion on the Left > Right      Posture: genu valgus noted during static standing      Functional tests:   Five Times Sit to Stand Test:  How long the patient takes to completed 5 sit to stand form chair with arms folded across chest: 18.91"  "Inability to perform test in less than 13.6 seconds indicates increased disability and Morbidity." (Sneha 2000).  Normative values for community dwelling elderly:   60-68y/o: 11.4 seconds  70-80y/o: 12.6 seconds  80-90y/o: 14.8 seconds        Timed Up and Go      Trial 1: 12.61" seconds  Trial 2: 12.67" seconds  Trial 3: 12.56" seconds     Average seconds: 12.6''      Seconds Rating   <10 Freely mobile   <20 Mostly independent   20-29 Variable mobility   >20 Impaired mobility         Tandem  Right 20 seconds   Left 20 seconds      Range of Motion (Passive):   Knee Right  Left    Flexion 125 111   Extension -2 -5      Range of Motion (Active):   Knee Right  Left    Flexion 123 110   Extension -7 -8         Lower Extremity Strength  Right LE   Left LE     Quadriceps: 36.4# Quadriceps: 38.4#   Hamstrings: 41.0# Hamstrings: 39.8#   Hip flexion (supine): 4/5 Hip flexion (supine): 4/5   Hip extension:  3+/5 Hip extension: 3/5   PGM: 3+/5 PGM:  3/5   Hip ER:  4-/5 Hip ER:  3+/5   Hip IR: 4/5 Hip IR: 4/5      Special Tests:    Right Left   Valgus Stress Test - + pain   Varus Stress test - + pain   Lachman's test - -   Posterior Lachman - -   Jericho's Test - -   Thessaly's Test - -   Patellar Grind Test - -      Joint Mobility:   Tibiofemoral Joint 2/6, posterior and anterior glide  Patellofemoral Joint 2/6, inferior   Proximal Tib/Fib Joint 3/6  Talocrural Joint 3/6      Palpation: tenderness to palpation over medial and lateral joint line     Sensation: Normal     Flexibility:               Ely's test: R = - ; L = + lacking 10 degrees               Hamstrings: R = - ; L = + lacking 15 degrees               Mary's test: R = - ; L " = + lacking 10 degrees     Edema: + Stroke Test       Intake Outcome Measure for FOTO Knee Survey    Therapist reviewed FOTO scores for Manju Aranda on 12/26/2024.   FOTO documents entered into [employer] - see Media section.    Intake Score: 81%  Predicted Score: 41%    Primary Measure Score Range Intake Score Score Interpretation  KOOS, JR. 0 - 100   31.3 Lower Score = Greater Disability     TREATMENT   Treatment Time In: 10:30 am  Treatment Time Out: 9:50 am  Total Treatment time separate from Evaluation: 20 minutes     Manju participated in dynamic functional therapeutic activities to improve functional performance for 10  minutes, including:  HEP and Education of Accelerated Protocol (3x/week 6 weeks)  Quad Sets 30x's   Heel Prop 5' 5#   Heel Slides 3'   Straight Leg Raise 20x's       Manju received the following manual therapy techniques: Joint mobilizations were applied to the: Left Knee for 10 minutes, including:  Inferior Patellar Glides Grade II  Flexion/Extension Mobilizations Grade II     Home Exercises and Patient Education Provided     Education provided re: prognosis, activity modification, goals for therapy, role of therapy for care, exercises/HEP     Written Home Exercises Provided: Yes.  Exercises were reviewed and Manju was able to demonstrate them prior to the end of the session.   Pt received a written copy of exercises to perform at home. Manju demonstrated good understanding of the education provided.      See EMR under patient instructions for exercises given.   Assessment   Manju is a 74 y.o. female referred to outpatient Physical Therapy with a medical diagnosis of Left knee OA. Pt presents with signs and symptoms consistent with hypomobility syndrome of the left knee secondary to adduction/IR of the Left hip. Patient with notable hamstring weakness and quadriceps motor control deficits.      Pt prognosis is Good.   Pt will benefit from skilled outpatient Physical Therapy to  address the deficits stated above and in the chart below, provide pt/family education, and to maximize pt's level of independence.      Plan of care discussed with patient: Yes  Patient's spiritual, cultural and educational needs considered and patient is agreeable to the plan of care and goals as stated below:      Anticipated Barriers for therapy: None     Medical Necessity is demonstrated by the following  History  Co-morbidities and personal factors that may impact the plan of care [] LOW: no personal factors / co-morbidities  [] MODERATE: 1-2 personal factors / co-morbidities  [x] HIGH: 3+ personal factors / co-morbidities     Moderate / High Support Documentation:   Allergy     Anemia     Arthritis     Cholelithiases     Cyst of kidney, acquired     Diverticulitis     Elevated TSH     Family history of Graves' disease: daughter, maternal aunt, maternal uncle 09/13/2016   Glaucoma suspect     Graves disease     Hx of colonic polyps     Hypertension 1981   Liver cyst     MGUS (monoclonal gammopathy of unknown significance)     Migraine headache     Mitral valve problem     leakage   Obesity     Paraproteinemia     Sleep apnea     + CPAP   Smoldering multiple myeloma 2013   Tricuspid valve disease     leakage          Examination  Body Structures and Functions, activity limitations and participation restrictions that may impact the plan of care [] LOW: addressing 1-2 elements  [] MODERATE: 3+ elements  [x] HIGH: 4+ elements (please support below)     Moderate / High Support Documentation:   Genu Valgus  Knee Mobility  Left Lower Extremity Strength  Gait      Clinical Presentation [] LOW: stable  [] MODERATE: Evolving  [x] HIGH: Unstable      Decision Making/ Complexity Score: high         Goals:  Short Term Goals (3 weeks):  1. Patient will have improved AROM of the left knee to 0-120 degrees for pre-operative mobility gains.  2. Patient will have decreased complaints of pain to 3/10 with prolonged  walking/sitting.  3. Patient will be independent and compliant with issued HEP.     Long Term Goals (6 weeks):   Patient will have improved AROM of the left knee to 0-122 degrees for pre-operative mobility gains.  2. Patient will have improved strength of the left knee to >90% of the Right hamstring for improved force production.  3. Patient will be able to resume prior functional level with no deficits.   4. Patient will have overall improvement in condition to have increased score on KOOS to 60% to show clinically important difference in patient perceived functional ability.  5. Pt will improve FOTO limitation score to less than or equal to 49% for improved self perception of functional performance with daily activities.  Plan   Plan of care Certification: 12/26/24-2/1/2025     Outpatient Physical Therapy 3 times weekly for 6 weeks to include the following interventions: Electrical Stimulation as needed, Gait Training, Manual Therapy, Moist Heat/ Ice, Neuromuscular Re-ed, Orthotic Management and Training, Patient Education, Self Care, Therapeutic Activities, and Therapeutic Exercise, Blood Flow Restriction Training, Trigger Point Dry Needling as needed.    Kevin Fernandez PT, DPT  Board Certified Orthopaedic Clinical Specialist    Cryotherapy Text: The wound bed was treated with cryotherapy after the biopsy was performed.

## 2024-12-26 NOTE — PROGRESS NOTES
[facility]  Physical Therapy Initial Evaluation    Name: Manju Aranda  Clinic Number: [id number]    Therapy Diagnosis:   Encounter Diagnoses   Name Primary?    Primary osteoarthritis of left knee Yes    Decreased range of motion of left knee     Chronic pain of left knee     Decreased range of motion of both knees     Impaired mobility and activities of daily living      Physician: Mack Torre III, *    Physician Orders: PT Eval and Treat   Medical Diagnosis from Referral:  Primary osteoarthritis of left knee [M17.12]   Evaluation Date: 12/26/2024  Authorization Period Expiration: 5/24/2025  Plan of Care Expiration: 2/14/2025  Visit # / Visits authorized: 1/ 1; future visits pending  FOTO 1st follow up:  FOTO 2nd follow up:    Time In: 10:00 am  Time Out: 10:50 am  Total Billable Time: 50 minutes    Precautions: Standard    Subjective   Date of onset: Chronic     Recall from 12/02/2025 - Manju acosta chief complaint of left knee pain that is chronic in nature. Patient reports that she is undergoing Left TKA on 12/23/24 with Dr. Torre. Patient states that she has been experiencing pain with prolonged sitting and walking over the past 2-3 months that has not improved. Patient is scheduled for pre-op EKG, labs, and X ray tomorrow from 8:45 am to 1pm. Patient states that she also has joint camp scheduled for 12/18/2024.      History of current condition: Patient presents to the clinic status post TKA on 12/23 performed by Dr. Torre. Patient states that she has no signs/symptoms of infection or DVT and has been abiding by MD precautions with bandages and washing/bathing. Patient states that her pain is moderately controlled and has been moving around as best she can with assistive device.      Past Medical History:   Diagnosis Date    Allergy     Anemia     Arthritis     Cholelithiases     Cyst of kidney, acquired     Diverticulitis     Elevated TSH     Family history of Graves'  disease: daughter, maternal aunt, maternal uncle 09/13/2016    Glaucoma suspect     Graves disease     Hx of colonic polyps     Hypertension 1981    Liver cyst     MGUS (monoclonal gammopathy of unknown significance)     Migraine headache     Mitral valve problem     leakage    Obesity     Paraproteinemia     Sleep apnea     + CPAP    Smoldering multiple myeloma 2013    Tricuspid valve disease     leakage     Manju Aranda  has a past surgical history that includes Appendectomy; Hysterectomy (1978 or 1979); Colonoscopy (N/A, 10/23/2015); Colonoscopy (N/A, 11/05/2018); Cystoscopy; Colonoscopy (N/A, 6/19/2023); and arthroplasty, knee, total, using computer-assisted navigation (Left, 12/23/2024).    Manju has a current medication list which includes the following prescription(s): acetaminophen, ascorbic acid (vitamin c), aspirin, cefadroxil, cholecalciferol (vitamin d3), eszopiclone, loratadine, magnesium oxide, methocarbamol, multivit with min-folic acid, olmesartan, oxycodone, pantoprazole, psyllium, rizatriptan, senna-docusate 8.6-50 mg, and trazodone.    Review of patient's allergies indicates:  No Known Allergies     Imaging:    FINDINGS:  DJD with significant narrowing of the lateral tibiofemoral joint spaces.  No fracture or dislocation.  No bone destruction identified     Prior Therapy: Yes, 2024 for Knee OA  Social History: Lives in [place], [place]   Occupation: Retired  Prior Level of Function: Independent with all ADL/iADLs   Current Level of Function: Independent with all ADL/iADLs      Pain:  Current 7/10, worst 8/10, best 6/10   Location: left knee   Description: Aching, Throbbing, Grabbing, Tight, and Deep  Aggravating Factors: Sitting, Standing, Walking, Night Time, Morning, Extension, Flexing, Lifting, and Getting out of bed/chair  Easing Factors: pain medication, ice, lying down, heating pad, rest, and elevation     Pts goals: improve her knee pain and mobility/strength prior to surgery     "  Objective      Observation: patient with joint effusion on the Left > Right      Posture: genu valgus noted during static standing      Functional tests:   Five Times Sit to Stand Test:  How long the patient takes to completed 5 sit to stand form chair with arms folded across chest: 21.91"  "Inability to perform test in less than 13.6 seconds indicates increased disability and Morbidity." (Sneha 2000).  Normative values for community dwelling elderly:   60-68y/o: 11.4 seconds  70-80y/o: 12.6 seconds  80-90y/o: 14.8 seconds        Timed Up and Go      Average seconds: 24.6''      Seconds Rating   <10 Freely mobile   <20 Mostly independent   20-29 Variable mobility   >20 Impaired mobility         Tandem  Right 20 seconds   Left 20 seconds      Range of Motion (Passive):   Knee Right  Left    Flexion 125 65   Extension 0 -3      Range of Motion (Active):   Knee Right  Left    Flexion 123 60   Extension -3 -5         Joint Mobility:   Tibiofemoral Joint 2/6, posterior and anterior glide  Patellofemoral Joint 2/6, inferior   Proximal Tib/Fib Joint 3/6  Talocrural Joint 3/6      Palpation: tenderness to palpation over medial and lateral joint line     Sensation: Normal  Edema:   Right 47 cm  Left 49.1 cm       Intake Outcome Measure for FOTO Knee Survey    Therapist reviewed FOTO scores for Manju Aranda on 12/26/2024.   FOTO documents entered into [employer] - see Media section.    Intake Score: 81%  Predicted Score: 41%    Primary Measure Score Range Intake Score Score Interpretation  KOOS, JR. 0 - 100   31.3 Lower Score = Greater Disability     TREATMENT   Treatment Time In: 10:30 am  Treatment Time Out: 10:50 am  Total Treatment time separate from Evaluation: 20 minutes     Manju participated in dynamic functional therapeutic activities to improve functional performance for 10  minutes, including:  HEP and Education of Accelerated Protocol (3x/week 6 weeks)  Quad Sets 30x's   Heel Prop 5' 5#   Heel Slides 3' "   Straight Leg Raise 20x's       Manju received the following manual therapy techniques: Joint mobilizations were applied to the: Left Knee for 10 minutes, including:  Inferior Patellar Glides Grade II  Flexion/Extension Mobilizations Grade II/III     Home Exercises and Patient Education Provided     Education provided re: prognosis, activity modification, goals for therapy, role of therapy for care, exercises/HEP     Written Home Exercises Provided: Yes.  Exercises were reviewed and Manju was able to demonstrate them prior to the end of the session.   Pt received a written copy of exercises to perform at home. Manju demonstrated good understanding of the education provided.      See EMR under patient instructions for exercises given.   Assessment   Manju is a 74 y.o. female referred to outpatient Physical Therapy with a medical diagnosis of Left knee OA. Pt presents with signs and symptoms consistent with hypomobility syndrome of the left knee secondary to adduction/IR of the Left hip. Patient with notable hamstring weakness and quadriceps motor control deficits.      Pt prognosis is Good.   Pt will benefit from skilled outpatient Physical Therapy to address the deficits stated above and in the chart below, provide pt/family education, and to maximize pt's level of independence.      Plan of care discussed with patient: Yes  Patient's spiritual, cultural and educational needs considered and patient is agreeable to the plan of care and goals as stated below:      Anticipated Barriers for therapy: None     Medical Necessity is demonstrated by the following  History  Co-morbidities and personal factors that may impact the plan of care [] LOW: no personal factors / co-morbidities  [] MODERATE: 1-2 personal factors / co-morbidities  [x] HIGH: 3+ personal factors / co-morbidities     Moderate / High Support Documentation:   Allergy     Anemia     Arthritis     Cholelithiases     Cyst of kidney, acquired      Diverticulitis     Elevated TSH     Family history of Graves' disease: daughter, maternal aunt, maternal uncle 09/13/2016   Glaucoma suspect     Graves disease     Hx of colonic polyps     Hypertension 1981   Liver cyst     MGUS (monoclonal gammopathy of unknown significance)     Migraine headache     Mitral valve problem     leakage   Obesity     Paraproteinemia     Sleep apnea     + CPAP   Smoldering multiple myeloma 2013   Tricuspid valve disease     leakage          Examination  Body Structures and Functions, activity limitations and participation restrictions that may impact the plan of care [] LOW: addressing 1-2 elements  [] MODERATE: 3+ elements  [x] HIGH: 4+ elements (please support below)     Moderate / High Support Documentation:   Genu Valgus  Knee Mobility  Left Lower Extremity Strength  Gait      Clinical Presentation [] LOW: stable  [] MODERATE: Evolving  [x] HIGH: Unstable      Decision Making/ Complexity Score: high         Goals:  Short Term Goals (3 weeks):  1. Patient will have improved AROM of the left knee to 0-100 degrees for pre-operative mobility gains.  2. Patient will have decreased complaints of pain to 3/10 with prolonged walking/sitting.  3. Patient will be independent and compliant with issued HEP.     Long Term Goals (6 weeks):   Patient will have improved AROM of the left knee to 0-120 degrees for pre-operative mobility gains.  2. Patient will have improved strength of the left knee to >90% of the Right hamstring for improved force production.  3. Patient will be able to resume prior functional level with no deficits.   4. Patient will have overall improvement in condition to have increased score on KOOS to 60% to show clinically important difference in patient perceived functional ability.  5. Pt will improve FOTO limitation score to less than or equal to 49% for improved self perception of functional performance with daily activities.  Plan   Plan of care Certification: 12/2/2024  to 12/23/2024     Outpatient Physical Therapy 1-2 times weekly for 4-6 weeks to include the following interventions: Electrical Stimulation as needed, Gait Training, Manual Therapy, Moist Heat/ Ice, Neuromuscular Re-ed, Orthotic Management and Training, Patient Education, Self Care, Therapeutic Activities, and Therapeutic Exercise, Blood Flow Restriction Training, Trigger Point Dry Needling as needed.    Kevin Fernandez PT, DPT  Board Certified Orthopaedic Clinical Specialist

## 2024-12-26 NOTE — PROGRESS NOTES
I called the patient today regarding her  surgery with Dr. Torre. The patient had a left TKA on 12/23/2024.     Pain Scale: 7 / 10    Any issues with Fever: No.    Any issues with medications (specifically DVT prophylaxis): No.    Pain medication: yes;  Celebrex : no  Protonix : yes  Resume home meds : Yes    Any issues with:   Bowel movements: No. Bm this am  Passing janett: No.  Urination: No.    Completing at home exercises: Yes    Any concerns regarding their dressing/bandage:  No.    Patient confirmed first OP-PT appointment:  [facility] location on 12/26/2024 at 10am.     Any other concerns: No.    Blue Bracelet : yes    The patient was informed that if they have any urgent issues with their bandage, medications or any other health concerns regarding their surgery to call the 24/7 Orthopedic Post-op Hot Line at [phone]. The patient was reminded that if they have any chest pain or shortness of breath to call 911 or go to the ER.    The patient verbalized understanding and does not have any other questions.

## 2024-12-30 ENCOUNTER — CLINICAL SUPPORT (OUTPATIENT)
Dept: REHABILITATION | Facility: OTHER | Age: 74
End: 2024-12-30
Payer: MEDICARE

## 2024-12-30 DIAGNOSIS — M25.662 DECREASED RANGE OF MOTION OF LEFT KNEE: ICD-10-CM

## 2024-12-30 DIAGNOSIS — M17.12 PRIMARY OSTEOARTHRITIS OF LEFT KNEE: Primary | ICD-10-CM

## 2024-12-30 DIAGNOSIS — M25.562 CHRONIC PAIN OF LEFT KNEE: ICD-10-CM

## 2024-12-30 DIAGNOSIS — G89.29 CHRONIC PAIN OF LEFT KNEE: ICD-10-CM

## 2024-12-30 PROCEDURE — 97140 MANUAL THERAPY 1/> REGIONS: CPT | Mod: HCNC,PN

## 2024-12-30 PROCEDURE — 97530 THERAPEUTIC ACTIVITIES: CPT | Mod: HCNC,PN

## 2024-12-30 PROCEDURE — 97112 NEUROMUSCULAR REEDUCATION: CPT | Mod: HCNC,PN

## 2024-12-30 NOTE — PROGRESS NOTES
"[facility]   Physical Therapy Treatment Note      Name: Manju Aranda  Clinic Number: [id number]    Therapy Diagnosis:   Encounter Diagnoses   Name Primary?    Primary osteoarthritis of left knee Yes    Decreased range of motion of left knee     Chronic pain of left knee      Physician: Mack Torre III, *    Visit Date: 12/30/2024    Physician Orders: PT Eval and Treat   Medical Diagnosis from Referral:  Primary osteoarthritis of left knee [M17.12]   Evaluation Date: 12/26/2024  Authorization Period Expiration: 5/24/2025  Plan of Care Expiration: 2/14/2025  Visit # / Visits authorized: 2/20  FOTO 1st follow up:  FOTO 2nd follow up:    PTA Visit #: 0/5     Precautions: Standard    Time In: 9:50 am  Time Out: 10:45 am  Total Appointment Time: 55 minutes    Subjective     Patient reports: that she is having trouble lifting her knee.  She was compliant with home exercise program.  Response to previous treatment: first follow up  Functional change: ongoing    Pain: 4/10  Location: left knee      Objective    Asterisk Signs:   Observation: patient with joint effusion on the Left > Right      Posture: genu valgus noted during static standing      Functional tests:   Five Times Sit to Stand Test:  How long the patient takes to completed 5 sit to stand form chair with arms folded across chest: 18.91"  "Inability to perform test in less than 13.6 seconds indicates increased disability and Morbidity." (Sneha 2000).  Normative values for community dwelling elderly:   60-70y/o: 11.4 seconds  70-78y/o: 12.6 seconds  80-90y/o: 14.8 seconds        Timed Up and Go      Trial 1: 12.61" seconds  Trial 2: 12.67" seconds  Trial 3: 12.56" seconds     Average seconds: 12.6''      Seconds Rating   <10 Freely mobile   <20 Mostly independent   20-29 Variable mobility   >20 Impaired mobility         Tandem  Right 20 seconds   Left 20 seconds      Range of Motion (Passive):   Knee Right  Left    Flexion " 125 111   Extension -2 -5      Range of Motion (Active):   Knee Right  Left    Flexion 123 110   Extension -7 -8         Lower Extremity Strength  Right LE   Left LE     Quadriceps: 36.4# Quadriceps: 38.4#   Hamstrings: 41.0# Hamstrings: 39.8#   Hip flexion (supine): 4/5 Hip flexion (supine): 4/5   Hip extension:  3+/5 Hip extension: 3/5   PGM: 3+/5 PGM:  3/5   Hip ER:  4-/5 Hip ER:  3+/5   Hip IR: 4/5 Hip IR: 4/5      Special Tests:    Right Left   Valgus Stress Test - + pain   Varus Stress test - + pain   Lachman's test - -   Posterior Lachman - -   Jericho's Test - -   Thessaly's Test - -   Patellar Grind Test - -      Joint Mobility:   Tibiofemoral Joint 2/6, posterior and anterior glide  Patellofemoral Joint 2/6, inferior   Proximal Tib/Fib Joint 3/6  Talocrural Joint 3/6      Palpation: tenderness to palpation over medial and lateral joint line     Sensation: Normal     Flexibility:               Ely's test: R = - ; L = + lacking 10 degrees               Hamstrings: R = - ; L = + lacking 15 degrees               Mary's test: R = - ; L = + lacking 10 degrees     Edema: + Stroke Test    Objective Measures updated at progress report unless specified.     Treatment     Manju received the treatments listed below:      Manju received the following manual therapy techniques: Joint mobilizations were applied to the: Left Knee for 10 minutes, including:  Inferior Patellar Glides Grade II  Flexion/Extension Mobilizations Grade II    neuromuscular re-education activities to improve: Balance, Coordination, Kinesthetic, Sense, Proprioception, and Posture for 23 minutes. The following activities were included:  NMES + Quad Set 5'   NMES + Straight Leg Raise 30x's   TKE with Band 30x's  Short Arc Quad 30x's   Long Arc Quad 30x's     therapeutic activities to improve functional performance for 20  minutes, including:  HEP and Education   Heel Prop 5' 5#   Heel Slides 3'   Sit to Stand 2 x 5 reps     Patient Education  and Home Exercises       Education provided:   - Patient was provided education on for continued compliance with HEP for continued functional mobility and strength gains for return to prior level of function.      Written Home Exercises Provided: Patient instructed to cont prior HEP. Exercises were reviewed and Manju was able to demonstrate them prior to the end of the session.  Manju demonstrated good  understanding of the education provided. See Electronic Medical Record under Patient Instructions for exercises provided during therapy sessions    Assessment   Patient presents to the clinic with moderate symptom irritability pre-session and mild irritability post-session. Manual therapy was performed to Left knee to improve joint play and allow for optimal movement during corrective exercises.   Patient demonstrates improving but limited quadriceps motor control and knee flexion to 70 degrees.   Patient demonstrated improvements in quadriceps motor control and Straight Leg Raise within session.     Patient will continue to benefit from skilled outpatient physical therapy to address the deficits listed in the problem list box on initial evaluation, provide patient/family education and to maximize patient's level of independence in the home and community environment.     Manju Is progressing well towards her goals.   Patient prognosis is Good.     Patient's spiritual, cultural and educational needs considered and pt agreeable to plan of care and goals.     Anticipated barriers to physical therapy: None    Goals:   Short Term Goals (3 weeks):  1. Patient will have improved AROM of the left knee to 0-120 degrees for pre-operative mobility gains.  2. Patient will have decreased complaints of pain to 3/10 with prolonged walking/sitting.  3. Patient will be independent and compliant with issued HEP.     Long Term Goals (6 weeks):   Patient will have improved AROM of the left knee to 0-122 degrees for  pre-operative mobility gains.  2. Patient will have improved strength of the left knee to >90% of the Right hamstring for improved force production.  3. Patient will be able to resume prior functional level with no deficits.   4. Patient will have overall improvement in condition to have increased score on KOOS to 60% to show clinically important difference in patient perceived functional ability.  5. Pt will improve FOTO limitation score to less than or equal to 49% for improved self perception of functional performance with daily activities.  Plan   Plan of care Certification: 12/26/24-2/1/2025     Outpatient Physical Therapy 3 times weekly for 6 weeks to include the following interventions: Electrical Stimulation as needed, Gait Training, Manual Therapy, Moist Heat/ Ice, Neuromuscular Re-ed, Orthotic Management and Training, Patient Education, Self Care, Therapeutic Activities, and Therapeutic Exercise, Blood Flow Restriction Training, Trigger Point Dry Needling as needed.    Kevin Fernandez PT, DPT  Board Certified Orthopaedic Clinical Specialist

## 2024-12-31 NOTE — PROGRESS NOTES
"[facility]   Physical Therapy Treatment Note      Name: Manju Aranda  Clinic Number: [id number]    Therapy Diagnosis:   Encounter Diagnoses   Name Primary?    Primary osteoarthritis of left knee Yes    Decreased range of motion of left knee     Chronic pain of left knee        Physician: Mack Torre III, *    Visit Date: 1/2/2025    Physician Orders: PT Eval and Treat   Medical Diagnosis from Referral:  Primary osteoarthritis of left knee [M17.12]   Evaluation Date: 12/26/2024  Authorization Period Expiration: 5/24/2025  Plan of Care Expiration: 2/14/2025  Visit # / Visits authorized: 1/20  FOTO 1st follow up:  FOTO 2nd follow up:    PTA Visit #: 0/5     Precautions: Standard    Time In: 8:43 am  Time Out: 9:37 am  Total Appointment Time: 55 minutes    Subjective     Patient reports: that she is continuing to have some pain but overall doing well.   She was compliant with home exercise program.  Response to previous treatment: improved mobility and gait  Functional change: ongoing    Pain: 4/10  Location: left knee      Objective    Asterisk Signs:   Observation: patient with joint effusion on the Left > Right      Posture: genu valgus noted during static standing      Functional tests:   Five Times Sit to Stand Test:  How long the patient takes to completed 5 sit to stand form chair with arms folded across chest: 18.91"  "Inability to perform test in less than 13.6 seconds indicates increased disability and Morbidity." (Sneha 2000).  Normative values for community dwelling elderly:   60-68y/o: 11.4 seconds  70-78y/o: 12.6 seconds  80-90y/o: 14.8 seconds        Timed Up and Go      Trial 1: 12.61" seconds  Trial 2: 12.67" seconds  Trial 3: 12.56" seconds     Average seconds: 12.6''      Seconds Rating   <10 Freely mobile   <20 Mostly independent   20-29 Variable mobility   >20 Impaired mobility         Tandem  Right 20 seconds   Left 20 seconds      Range of Motion (Passive): "   Knee Right  Left    Flexion 125 77   Extension -2 +1      Range of Motion (Active):   Knee Right  Left    Flexion 123 70   Extension -7 0         Lower Extremity Strength  Right LE   Left LE     Quadriceps: 36.4# Quadriceps: 38.4#   Hamstrings: 41.0# Hamstrings: 39.8#   Hip flexion (supine): 4/5 Hip flexion (supine): 4/5   Hip extension:  3+/5 Hip extension: 3/5   PGM: 3+/5 PGM:  3/5   Hip ER:  4-/5 Hip ER:  3+/5   Hip IR: 4/5 Hip IR: 4/5      Special Tests:    Right Left   Valgus Stress Test - + pain   Varus Stress test - + pain   Lachman's test - -   Posterior Lachman - -   Jericho's Test - -   Thessaly's Test - -   Patellar Grind Test - -      Joint Mobility:   Tibiofemoral Joint 2/6, posterior and anterior glide  Patellofemoral Joint 2/6, inferior   Proximal Tib/Fib Joint 3/6  Talocrural Joint 3/6      Palpation: tenderness to palpation over medial and lateral joint line     Sensation: Normal     Flexibility:               Ely's test: R = - ; L = + lacking 10 degrees               Hamstrings: R = - ; L = + lacking 15 degrees               Mary's test: R = - ; L = + lacking 10 degrees     Edema: + Stroke Test    Objective Measures updated at progress report unless specified.     Treatment     Manju received the treatments listed below:      Manju received the following manual therapy techniques: Joint mobilizations were applied to the: Left Knee for 10 minutes, including:  Inferior Patellar Glides Grade II  Flexion/Extension Mobilizations Grade II    neuromuscular re-education activities to improve: Balance, Coordination, Kinesthetic, Sense, Proprioception, and Posture for 23 minutes. The following activities were included:  NMES + Quad Set 5'   NMES + Straight Leg Raise 30x's   TKE with Band 30x's [place]   Short Arc Quad 30x's with strap hold   Long Arc Quad 30x's     therapeutic activities to improve functional performance for 20  minutes, including:  NuStep 10' L3   Heel Prop 5' 5#   Heel Slides 3'    Sit to Stand 2 x 5 reps     Patient Education and Home Exercises       Education provided:   - Patient was provided education on for continued compliance with HEP for continued functional mobility and strength gains for return to prior level of function.      Written Home Exercises Provided: Patient instructed to cont prior HEP. Exercises were reviewed and Manju was able to demonstrate them prior to the end of the session.  Manju demonstrated good  understanding of the education provided. See Electronic Medical Record under Patient Instructions for exercises provided during therapy sessions    Assessment   Patient presents to the clinic with moderate symptom irritability pre-session and mild irritability post-session. Manual therapy was performed to Left knee to improve joint play and allow for optimal movement during corrective exercises.   Patient demonstrates improving but limited quadriceps motor control and knee flexion to 70 degrees.   Patient demonstrated improvements in quadriceps motor control and Straight Leg Raise within session.     Patient will continue to benefit from skilled outpatient physical therapy to address the deficits listed in the problem list box on initial evaluation, provide patient/family education and to maximize patient's level of independence in the home and community environment.     Manju Is progressing well towards her goals.   Patient prognosis is Good.     Patient's spiritual, cultural and educational needs considered and pt agreeable to plan of care and goals.     Anticipated barriers to physical therapy: None    Goals:   Short Term Goals (3 weeks):  1. Patient will have improved AROM of the left knee to 0-120 degrees for pre-operative mobility gains.  2. Patient will have decreased complaints of pain to 3/10 with prolonged walking/sitting.  3. Patient will be independent and compliant with issued HEP.     Long Term Goals (6 weeks):   Patient will have improved AROM of  the left knee to 0-122 degrees for pre-operative mobility gains.  2. Patient will have improved strength of the left knee to >90% of the Right hamstring for improved force production.  3. Patient will be able to resume prior functional level with no deficits.   4. Patient will have overall improvement in condition to have increased score on KOOS to 60% to show clinically important difference in patient perceived functional ability.  5. Pt will improve FOTO limitation score to less than or equal to 49% for improved self perception of functional performance with daily activities.  Plan   Plan of care Certification: 12/26/24-2/1/2025     Outpatient Physical Therapy 3 times weekly for 6 weeks to include the following interventions: Electrical Stimulation as needed, Gait Training, Manual Therapy, Moist Heat/ Ice, Neuromuscular Re-ed, Orthotic Management and Training, Patient Education, Self Care, Therapeutic Activities, and Therapeutic Exercise, Blood Flow Restriction Training, Trigger Point Dry Needling as needed.    Kevin Fernandez PT, DPT  Board Certified Orthopaedic Clinical Specialist

## 2025-01-02 ENCOUNTER — CLINICAL SUPPORT (OUTPATIENT)
Dept: REHABILITATION | Facility: OTHER | Age: 75
End: 2025-01-02
Payer: MEDICARE

## 2025-01-02 DIAGNOSIS — M25.662 DECREASED RANGE OF MOTION OF LEFT KNEE: ICD-10-CM

## 2025-01-02 DIAGNOSIS — M25.562 CHRONIC PAIN OF LEFT KNEE: ICD-10-CM

## 2025-01-02 DIAGNOSIS — M17.12 PRIMARY OSTEOARTHRITIS OF LEFT KNEE: Primary | ICD-10-CM

## 2025-01-02 DIAGNOSIS — G89.29 CHRONIC PAIN OF LEFT KNEE: ICD-10-CM

## 2025-01-06 ENCOUNTER — CLINICAL SUPPORT (OUTPATIENT)
Dept: REHABILITATION | Facility: OTHER | Age: 75
End: 2025-01-06
Payer: MEDICARE

## 2025-01-06 DIAGNOSIS — G89.29 CHRONIC PAIN OF LEFT KNEE: ICD-10-CM

## 2025-01-06 DIAGNOSIS — M17.12 PRIMARY OSTEOARTHRITIS OF LEFT KNEE: Primary | ICD-10-CM

## 2025-01-06 DIAGNOSIS — M25.562 CHRONIC PAIN OF LEFT KNEE: ICD-10-CM

## 2025-01-06 DIAGNOSIS — M25.662 DECREASED RANGE OF MOTION OF LEFT KNEE: ICD-10-CM

## 2025-01-06 PROCEDURE — 97530 THERAPEUTIC ACTIVITIES: CPT | Mod: HCNC,PN

## 2025-01-06 PROCEDURE — 97140 MANUAL THERAPY 1/> REGIONS: CPT | Mod: HCNC,PN

## 2025-01-06 NOTE — PROGRESS NOTES
"[facility]   Physical Therapy Treatment Note      Name: Manju Aranda  Clinic Number: [id number]    Therapy Diagnosis:   Encounter Diagnoses   Name Primary?    Primary osteoarthritis of left knee Yes    Decreased range of motion of left knee     Chronic pain of left knee          Physician: Mack Torre III, *    Visit Date: 1/6/2025    Physician Orders: PT Eval and Treat   Medical Diagnosis from Referral:  Primary osteoarthritis of left knee [M17.12]   Evaluation Date: 12/26/2024  Authorization Period Expiration: 5/24/2025  Plan of Care Expiration: 2/14/2025  Visit # / Visits authorized: 2/20  FOTO 1st follow up: complete  FOTO 2nd follow up:    PTA Visit #: 0/5     Precautions: Standard    Time In: 9:10 am  Time Out: 10:03 am  Total Appointment Time: 40 minutes (Dbl Book)    Subjective     Patient reports: that she feels the cold and it increased pain a bit.  She was compliant with home exercise program.  Response to previous treatment: improved mobility and gait  Functional change: ongoing    Pain: 4/10  Location: left knee      Objective    Asterisk Signs:   Observation: patient with joint effusion on the Left > Right      Posture: genu valgus noted during static standing      Functional tests:   Five Times Sit to Stand Test:  How long the patient takes to completed 5 sit to stand form chair with arms folded across chest: 15.5 "   "Inability to perform test in less than 13.6 seconds indicates increased disability and Morbidity." (Sneha 2000).  Normative values for community dwelling elderly:   60-68y/o: 11.4 seconds  70-80y/o: 12.6 seconds  80-88y/o: 14.8 seconds        Timed Up and Go      Average seconds: 17.5''      Seconds Rating   <10 Freely mobile   <20 Mostly independent   20-29 Variable mobility   >20 Impaired mobility      Range of Motion (Passive):   Knee Right  Left    Flexion 125 91   Extension -2 2      Range of Motion (Active):   Knee Right  Left    Flexion " "123 89   Extension -7 1      Edema: + Stroke Test    Objective Measures updated at progress report unless specified.     Treatment     Manju received the treatments listed below:      Manju received the following manual therapy techniques: Joint mobilizations were applied to the: Left Knee for 10 minutes, including:  Inferior Patellar Glides Grade II  Flexion/Extension Mobilizations Grade II    neuromuscular re-education activities to improve: Balance, Coordination, Kinesthetic, Sense, Proprioception, and Posture for 20 minutes. The following activities were included:  Quad Set 5'   Straight Leg Raise 30x's   TKE with Band 30x's [place]   Short Arc Quad 30x's with strap hold   Long Arc Quad 30x's     therapeutic activities to improve functional performance for 25 minutes, including:  NuStep 10' L3   Heel Prop 5' 5#   Heel Slides 3'   Sit to Stand 2 x 5 reps   Step Up 4" 2 x 10 reps   Standing Lateral Walks on Plinth with Upper Extremity support 3 laps     Patient Education and Home Exercises       Education provided:   - Patient was provided education on for continued compliance with HEP for continued functional mobility and strength gains for return to prior level of function.      Written Home Exercises Provided: Patient instructed to cont prior HEP. Exercises were reviewed and Manju was able to demonstrate them prior to the end of the session.  Manju demonstrated good  understanding of the education provided. See Electronic Medical Record under Patient Instructions for exercises provided during therapy sessions    Assessment   Patient presents to the clinic with mild symptom irritability pre-session and mild irritability post-session. Manual therapy was performed to Left knee to improve joint play and allow for optimal movement during corrective exercises.   Patient demonstrates improving knee flexion to 90 degrees and terminal extension.   Patient demonstrated improvements in step up/sit to stand " within session.   Patient will continue to benefit from skilled outpatient physical therapy to address the deficits listed in the problem list box on initial evaluation, provide patient/family education and to maximize patient's level of independence in the home and community environment.     Manju Is progressing well towards her goals.   Patient prognosis is Good.     Patient's spiritual, cultural and educational needs considered and pt agreeable to plan of care and goals.     Anticipated barriers to physical therapy: None    Goals:   Short Term Goals (3 weeks):  1. Patient will have improved AROM of the left knee to 0-120 degrees for pre-operative mobility gains. progressing  2. Patient will have decreased complaints of pain to 3/10 with prolonged walking/sitting.MET  3. Patient will be independent and compliant with issued HEP.MET     Long Term Goals (6 weeks):   Patient will have improved AROM of the left knee to 0-122 degrees for pre-operative mobility gains.  2. Patient will have improved strength of the left knee to >90% of the Right hamstring for improved force production.  3. Patient will be able to resume prior functional level with no deficits.   4. Patient will have overall improvement in condition to have increased score on KOOS to 60% to show clinically important difference in patient perceived functional ability.  5. Pt will improve FOTO limitation score to less than or equal to 49% for improved self perception of functional performance with daily activities.  Plan   Plan of care Certification: 12/26/24-2/1/2025     Outpatient Physical Therapy 3 times weekly for 6 weeks to include the following interventions: Electrical Stimulation as needed, Gait Training, Manual Therapy, Moist Heat/ Ice, Neuromuscular Re-ed, Orthotic Management and Training, Patient Education, Self Care, Therapeutic Activities, and Therapeutic Exercise, Blood Flow Restriction Training, Trigger Point Dry Needling as  needed.    Kevin Fernandez PT, DPT  Board Certified Orthopaedic Clinical Specialist

## 2025-01-07 ENCOUNTER — OFFICE VISIT (OUTPATIENT)
Dept: ORTHOPEDICS | Facility: CLINIC | Age: 75
End: 2025-01-07
Payer: MEDICARE

## 2025-01-07 DIAGNOSIS — Z96.652 S/P TOTAL KNEE REPLACEMENT, LEFT: Primary | ICD-10-CM

## 2025-01-07 PROCEDURE — 1125F AMNT PAIN NOTED PAIN PRSNT: CPT | Mod: HCNC,CPTII,S$GLB,

## 2025-01-07 PROCEDURE — 99024 POSTOP FOLLOW-UP VISIT: CPT | Mod: HCNC,S$GLB,,

## 2025-01-07 PROCEDURE — 99999 PR PBB SHADOW E&M-EST. PATIENT-LVL III: CPT | Mod: PBBFAC,HCNC,,

## 2025-01-07 PROCEDURE — 1160F RVW MEDS BY RX/DR IN RCRD: CPT | Mod: HCNC,CPTII,S$GLB,

## 2025-01-07 PROCEDURE — 1159F MED LIST DOCD IN RCRD: CPT | Mod: HCNC,CPTII,S$GLB,

## 2025-01-07 RX ORDER — OXYCODONE HYDROCHLORIDE 5 MG/1
TABLET ORAL
Qty: 50 TABLET | Refills: 0 | Status: SHIPPED | OUTPATIENT
Start: 2025-01-07

## 2025-01-07 RX ORDER — METHOCARBAMOL 750 MG/1
750 TABLET, FILM COATED ORAL 4 TIMES DAILY PRN
Qty: 40 TABLET | Refills: 0 | Status: SHIPPED | OUTPATIENT
Start: 2025-01-07

## 2025-01-08 ENCOUNTER — CLINICAL SUPPORT (OUTPATIENT)
Dept: REHABILITATION | Facility: OTHER | Age: 75
End: 2025-01-08
Payer: MEDICARE

## 2025-01-08 ENCOUNTER — DOCUMENTATION ONLY (OUTPATIENT)
Dept: REHABILITATION | Facility: OTHER | Age: 75
End: 2025-01-08
Payer: MEDICARE

## 2025-01-08 DIAGNOSIS — G89.29 CHRONIC PAIN OF LEFT KNEE: ICD-10-CM

## 2025-01-08 DIAGNOSIS — M25.662 DECREASED RANGE OF MOTION OF LEFT KNEE: ICD-10-CM

## 2025-01-08 DIAGNOSIS — M17.12 PRIMARY OSTEOARTHRITIS OF LEFT KNEE: Primary | ICD-10-CM

## 2025-01-08 DIAGNOSIS — M25.562 CHRONIC PAIN OF LEFT KNEE: ICD-10-CM

## 2025-01-08 PROCEDURE — 97112 NEUROMUSCULAR REEDUCATION: CPT | Mod: HCNC,PN,CQ

## 2025-01-08 PROCEDURE — 97530 THERAPEUTIC ACTIVITIES: CPT | Mod: HCNC,PN,CQ

## 2025-01-08 PROCEDURE — 97140 MANUAL THERAPY 1/> REGIONS: CPT | Mod: HCNC,PN,CQ

## 2025-01-08 NOTE — PROGRESS NOTES
"[facility]   Physical Therapy Treatment Note      Name: Manju Aranda  Clinic Number: [id number]    Therapy Diagnosis:   Encounter Diagnoses   Name Primary?    Primary osteoarthritis of left knee Yes    Decreased range of motion of left knee     Chronic pain of left knee          Physician: Mack Torre III, *    Visit Date: 1/8/2025    Physician Orders: PT Eval and Treat   Medical Diagnosis from Referral:  Primary osteoarthritis of left knee [M17.12]   Evaluation Date: 12/26/2024  Authorization Period Expiration: 5/24/2025  Plan of Care Expiration: 2/14/2025  Visit # / Visits authorized: 4/10  FOTO 1st follow up: complete  FOTO 2nd follow up:      Precautions: Standard    Time In: 0942  Time Out: 1059  Total Appointment Time: 38 minutes     Subjective     Patient reports: she saw her MD and he was very happy with how her knee is bending /straightening.   She was compliant with home exercise program.  Response to previous treatment: improved mobility and gait  Functional change: ongoing    Pain: 4/10  Location: left knee      Objective    Asterisk Signs:   Observation: patient with joint effusion on the Left > Right      Posture: genu valgus noted during static standing      Functional tests:   Five Times Sit to Stand Test:  How long the patient takes to completed 5 sit to stand form chair with arms folded across chest: 15.5 "   "Inability to perform test in less than 13.6 seconds indicates increased disability and Morbidity." (Sneha 2000).  Normative values for community dwelling elderly:   60-70y/o: 11.4 seconds  70-80y/o: 12.6 seconds  80-88y/o: 14.8 seconds        Timed Up and Go      Average seconds: 17.5''      Seconds Rating   <10 Freely mobile   <20 Mostly independent   20-29 Variable mobility   >20 Impaired mobility      Range of Motion (Passive):   Knee Right  Left    Flexion 125 91   Extension -2 2      Range of Motion (Active):   Knee Right  Left    Flexion 123 90 " "  Extension -7 1      Edema: + Stroke Test    Objective Measures updated at progress report unless specified.     Treatment     Manju received the treatments listed below:      Manju received the following manual therapy techniques: Joint mobilizations were applied to the: Left Knee for 8 minutes, including:  Inferior Patellar Glides Grade II  Flexion/Extension Mobilizations Grade II    neuromuscular re-education activities to improve: Balance, Coordination, Kinesthetic, Sense, Proprioception, and Posture for 15 minutes. The following activities were included:  Quad Set 5'   Straight Leg Raise 30x's   TKE with Band 30x's [place]   Short Arc Quad 30x's with strap hold   Long Arc Quad 30x's     therapeutic activities to improve functional performance for 15 minutes, including:  NuStep 10' L3   Heel Prop 5' 5#   Heel Slides 3'   Sit to Stand 3 x 5 reps   Step Up 6" 2 x 10 reps   Standing Lateral Walks on Plinth with Upper Extremity support 3 laps     Patient Education and Home Exercises       Education provided:   - Patient was provided education on for continued compliance with HEP for continued functional mobility and strength gains for return to prior level of function.      Written Home Exercises Provided: Patient instructed to cont prior HEP. Exercises were reviewed and Manju was able to demonstrate them prior to the end of the session.  Manju demonstrated good  understanding of the education provided. See Electronic Medical Record under Patient Instructions for exercises provided during therapy sessions    Assessment   Manju tolerated session well, We updated objective measurements with improvements being noted with knee flexion. She was also able to perform SLR with improved TKE today. Pt brought in a SPC and this clinician fitted cane appropriately to the styloid process appox 20-30 deg elbow flex. ROM and quad and glute strength appear to be improving as well as functional performance with step " ups/sit to stand.      Manju Is progressing well towards her goals.   Patient prognosis is Good.     Patient's spiritual, cultural and educational needs considered and pt agreeable to plan of care and goals.     Anticipated barriers to physical therapy: None    Goals:   Short Term Goals (3 weeks):  1. Patient will have improved AROM of the left knee to 0-120 degrees for pre-operative mobility gains. progressing  2. Patient will have decreased complaints of pain to 3/10 with prolonged walking/sitting.MET  3. Patient will be independent and compliant with issued HEP.MET     Long Term Goals (6 weeks):   Patient will have improved AROM of the left knee to 0-122 degrees for pre-operative mobility gains.  2. Patient will have improved strength of the left knee to >90% of the Right hamstring for improved force production.  3. Patient will be able to resume prior functional level with no deficits.   4. Patient will have overall improvement in condition to have increased score on KOOS to 60% to show clinically important difference in patient perceived functional ability.  5. Pt will improve FOTO limitation score to less than or equal to 49% for improved self perception of functional performance with daily activities.  Plan   Plan of care Certification: 12/26/24-2/1/2025    Focus on ROM, strength and functional performance      Outpatient Physical Therapy 3 times weekly for 6 weeks to include the following interventions: Electrical Stimulation as needed, Gait Training, Manual Therapy, Moist Heat/ Ice, Neuromuscular Re-ed, Orthotic Management and Training, Patient Education, Self Care, Therapeutic Activities, and Therapeutic Exercise, Blood Flow Restriction Training, Trigger Point Dry Needling as needed.    Thad Dsouza, PTA

## 2025-01-08 NOTE — PROGRESS NOTES
Physical Therapist and Physical Therapist Assistant met face to face to discuss patient's treatment plan and progress towards established goals. Pt will be seen by a physical therapist minimally every 6th visit or every 30 days.    Thad Dsouza, PTA  01/08/2025

## 2025-01-08 NOTE — PROGRESS NOTES
Manju Aranda presents for initial post-operative visit following a left total knee arthroplasty performed by Dr. Torre on 12/23/2024.     Exam:   There were no vitals taken for this visit.   Ambulating well with assistive device.  Incision is clean and dry without drainage or erythema.   ROM today:  2-75  PT ROM on 01/06/2025:  2-91    Initial post-operative radiographs reviewed today revealing a well fixed and aligned prosthesis.    A/P:  2 weeks s/p left total knee arthroplasty    - The patient was advised to keep the incision clean and dry for the next 24 hours after which she may wash the area with antibacterial soap in the shower. Will not submerge until the incision is completely healed.   - Outpatient PT ongoing:  [place]  - Continue aspirin for 1 month post op  - Pain medication:  Refilled along with methocarbamol  - Reviewed antibiotic prophylaxis   - Follow up in 4 weeks with myself. Pt will call clinic with problems/concerns.

## 2025-01-10 ENCOUNTER — CLINICAL SUPPORT (OUTPATIENT)
Dept: REHABILITATION | Facility: OTHER | Age: 75
End: 2025-01-10
Payer: MEDICARE

## 2025-01-10 DIAGNOSIS — M25.562 CHRONIC PAIN OF LEFT KNEE: ICD-10-CM

## 2025-01-10 DIAGNOSIS — M25.662 DECREASED RANGE OF MOTION OF LEFT KNEE: ICD-10-CM

## 2025-01-10 DIAGNOSIS — M17.12 PRIMARY OSTEOARTHRITIS OF LEFT KNEE: Primary | ICD-10-CM

## 2025-01-10 DIAGNOSIS — G89.29 CHRONIC PAIN OF LEFT KNEE: ICD-10-CM

## 2025-01-10 PROCEDURE — 97530 THERAPEUTIC ACTIVITIES: CPT | Mod: HCNC,PN

## 2025-01-10 NOTE — PROGRESS NOTES
"[facility]   Physical Therapy Treatment Note      Name: Manju Aranda  Clinic Number: [id number]    Therapy Diagnosis:   Encounter Diagnoses   Name Primary?    Primary osteoarthritis of left knee Yes    Decreased range of motion of left knee     Chronic pain of left knee            Physician: Mack Torre III, *    Visit Date: 1/10/2025    Physician Orders: PT Eval and Treat   Medical Diagnosis from Referral:  Primary osteoarthritis of left knee [M17.12]   Evaluation Date: 12/26/2024  Authorization Period Expiration: 5/24/2025  Plan of Care Expiration: 2/14/2025  Visit # / Visits authorized: 4/10  FOTO 1st follow up: complete  FOTO 2nd follow up:      Precautions: Standard    Time In: 10:00 am  Time Out: 10:55 am  Total Appointment Time: 38 minutes (Dbl Book)    Subjective     Patient reports: that she is doing well today.   She was compliant with home exercise program.  Response to previous treatment: improved mobility and gait  Functional change: ongoing    Pain: 4/10  Location: left knee      Objective    Asterisk Signs:   Observation: patient with joint effusion on the Left > Right      Posture: genu valgus noted during static standing      Functional tests:   Five Times Sit to Stand Test:  How long the patient takes to completed 5 sit to stand form chair with arms folded across chest: 15.5 "   "Inability to perform test in less than 13.6 seconds indicates increased disability and Morbidity." (Sneha 2000).  Normative values for community dwelling elderly:   60-70y/o: 11.4 seconds  70-80y/o: 12.6 seconds  80-88y/o: 14.8 seconds        Timed Up and Go      Average seconds: 17.5''      Seconds Rating   <10 Freely mobile   <20 Mostly independent   20-29 Variable mobility   >20 Impaired mobility      Range of Motion (Passive):   Knee Right  Left    Flexion 125 91   Extension -2 2      Range of Motion (Active):   Knee Right  Left    Flexion 123 90   Extension -7 1      Edema: + " "Stroke Test    Objective Measures updated at progress report unless specified.     Treatment     Manju received the treatments listed below:      Manju received the following manual therapy techniques: Joint mobilizations were applied to the: Left Knee for 7 minutes, including:  Inferior Patellar Glides Grade II  Flexion/Extension Mobilizations Grade II    neuromuscular re-education activities to improve: Balance, Coordination, Kinesthetic, Sense, Proprioception, and Posture for 10 minutes. The following activities were included:  Quad Set 5'   Straight Leg Raise 30x's   TKE with Band 30x's [place]   Short Arc Quad 30x's with strap hold   Long Arc Quad 30x's     therapeutic activities to improve functional performance for 23 minutes, including:  NuStep 10' L3   Heel Prop 5' 5#   Heel Slides 3'   Sit to Stand 3 x 5 reps   Step Up 6" 2 x 10 reps   Standing Lateral Walks on Plinth with Upper Extremity support 3 laps     Patient Education and Home Exercises       Education provided:   - Patient was provided education on for continued compliance with HEP for continued functional mobility and strength gains for return to prior level of function.      Written Home Exercises Provided: Patient instructed to cont prior HEP. Exercises were reviewed and Manju was able to demonstrate them prior to the end of the session.  Manju demonstrated good  understanding of the education provided. See Electronic Medical Record under Patient Instructions for exercises provided during therapy sessions    Assessment   Patient presents to the clinic with mild symptom irritability pre-session and minimal irritability post-session. Manual therapy was performed to left knee to improve joint play and allow for optimal movement during corrective exercises.   Patient demonstrates improving range of motion, quadriceps motor control, and gait efficiency.   Patient demonstrated improvements in Straight Leg Raise  within session. "       Manju Is progressing well towards her goals.   Patient prognosis is Good.     Patient's spiritual, cultural and educational needs considered and pt agreeable to plan of care and goals.     Anticipated barriers to physical therapy: None    Goals:   Short Term Goals (3 weeks):  1. Patient will have improved AROM of the left knee to 0-120 degrees for pre-operative mobility gains. progressing  2. Patient will have decreased complaints of pain to 3/10 with prolonged walking/sitting.MET  3. Patient will be independent and compliant with issued HEP.MET     Long Term Goals (6 weeks):   Patient will have improved AROM of the left knee to 0-122 degrees for pre-operative mobility gains.  2. Patient will have improved strength of the left knee to >90% of the Right hamstring for improved force production.  3. Patient will be able to resume prior functional level with no deficits.   4. Patient will have overall improvement in condition to have increased score on KOOS to 60% to show clinically important difference in patient perceived functional ability.  5. Pt will improve FOTO limitation score to less than or equal to 49% for improved self perception of functional performance with daily activities.  Plan   Plan of care Certification: 12/26/24-2/1/2025    Focus on ROM, strength and functional performance      Outpatient Physical Therapy 3 times weekly for 6 weeks to include the following interventions: Electrical Stimulation as needed, Gait Training, Manual Therapy, Moist Heat/ Ice, Neuromuscular Re-ed, Orthotic Management and Training, Patient Education, Self Care, Therapeutic Activities, and Therapeutic Exercise, Blood Flow Restriction Training, Trigger Point Dry Needling as needed.    Kevin Fernandez, PT, DPT

## 2025-01-13 ENCOUNTER — CLINICAL SUPPORT (OUTPATIENT)
Dept: REHABILITATION | Facility: OTHER | Age: 75
End: 2025-01-13
Payer: MEDICARE

## 2025-01-13 DIAGNOSIS — M25.562 CHRONIC PAIN OF LEFT KNEE: ICD-10-CM

## 2025-01-13 DIAGNOSIS — M25.662 DECREASED RANGE OF MOTION OF LEFT KNEE: ICD-10-CM

## 2025-01-13 DIAGNOSIS — M17.12 PRIMARY OSTEOARTHRITIS OF LEFT KNEE: Primary | ICD-10-CM

## 2025-01-13 DIAGNOSIS — G89.29 CHRONIC PAIN OF LEFT KNEE: ICD-10-CM

## 2025-01-13 PROCEDURE — 97530 THERAPEUTIC ACTIVITIES: CPT | Mod: HCNC,PN

## 2025-01-13 NOTE — PROGRESS NOTES
"[facility]   Physical Therapy Treatment Note      Name: Manju Aranda  Clinic Number: [id number]    Therapy Diagnosis:   Encounter Diagnoses   Name Primary?    Primary osteoarthritis of left knee Yes    Decreased range of motion of left knee     Chronic pain of left knee        Physician: Mack Torre III, *    Visit Date: 1/13/2025    Physician Orders: PT Eval and Treat   Medical Diagnosis from Referral:  Primary osteoarthritis of left knee [M17.12]   Evaluation Date: 12/26/2024  Authorization Period Expiration: 5/24/2025  Plan of Care Expiration: 2/14/2025  Visit # / Visits authorized: 5/10  FOTO 1st follow up: complete  FOTO 2nd follow up:      Precautions: Standard    Time In: 10:00 am  Time Out: 10:55 am  Total Appointment Time: 23 minutes (Dbl Book)    Subjective     Patient reports: that she is doing well with HEP and bending knee more.   She was compliant with home exercise program.  Response to previous treatment: improved mobility and gait  Functional change: ongoing    Pain: 4/10  Location: left knee      Objective    Asterisk Signs:   Observation: patient with joint effusion on the Left > Right      Posture: genu valgus noted during static standing      Functional tests:   Five Times Sit to Stand Test:  How long the patient takes to completed 5 sit to stand form chair with arms folded across chest: 15.5 "   "Inability to perform test in less than 13.6 seconds indicates increased disability and Morbidity." (Sneha 2000).  Normative values for community dwelling elderly:   60-68y/o: 11.4 seconds  70-80y/o: 12.6 seconds  80-88y/o: 14.8 seconds        Timed Up and Go      Average seconds: 17.5''      Seconds Rating   <10 Freely mobile   <20 Mostly independent   20-29 Variable mobility   >20 Impaired mobility      Range of Motion (Passive):   Knee Right  Left    Flexion 125 91   Extension -2 2      Range of Motion (Active):   Knee Right  Left    Flexion 123 90   Extension " "-7 1      Edema: + Stroke Test    Objective Measures updated at progress report unless specified.     Treatment     Manju received the treatments listed below:      Manju received the following manual therapy techniques: Joint mobilizations were applied to the: Left Knee for 10 minutes, including:  Inferior Patellar Glides Grade II  Flexion/Extension Mobilizations Grade II    neuromuscular re-education activities to improve: Balance, Coordination, Kinesthetic, Sense, Proprioception, and Posture for 20 minutes. The following activities were included:  Quad Set 5'   Straight Leg Raise 30x's   TKE with Band 30x's [place]   Short Arc Quad 30x's with strap hold   Long Arc Quad 30x's     therapeutic activities to improve functional performance for 23 minutes, including:  NuStep 10' L3   Heel Prop 5' 5#   Heel Slides 3'   Sit to Stand 3 x 5 reps   Step Up 6" 2 x 10 reps   Standing Lateral Walks on Plinth with Upper Extremity support 3 laps     Patient Education and Home Exercises       Education provided:   - Patient was provided education on for continued compliance with HEP for continued functional mobility and strength gains for return to prior level of function.      Written Home Exercises Provided: Patient instructed to cont prior HEP. Exercises were reviewed and Manju was able to demonstrate them prior to the end of the session.  Manju demonstrated good  understanding of the education provided. See Electronic Medical Record under Patient Instructions for exercises provided during therapy sessions    Assessment   Patient presents to the clinic with mild symptom irritability pre-session and minimal irritability post-session. Manual therapy was performed to left knee to improve joint play and allow for optimal movement during corrective exercises.   Patient demonstrates improving range of motion, quadriceps motor control, and gait efficiency.   Patient demonstrated improvements in Straight Leg Raise  within " session.       Manju Is progressing well towards her goals.   Patient prognosis is Good.     Patient's spiritual, cultural and educational needs considered and pt agreeable to plan of care and goals.     Anticipated barriers to physical therapy: None    Goals:   Short Term Goals (3 weeks):  1. Patient will have improved AROM of the left knee to 0-120 degrees for pre-operative mobility gains. progressing  2. Patient will have decreased complaints of pain to 3/10 with prolonged walking/sitting.MET  3. Patient will be independent and compliant with issued HEP.MET     Long Term Goals (6 weeks):   Patient will have improved AROM of the left knee to 0-122 degrees for pre-operative mobility gains.  2. Patient will have improved strength of the left knee to >90% of the Right hamstring for improved force production.  3. Patient will be able to resume prior functional level with no deficits.   4. Patient will have overall improvement in condition to have increased score on KOOS to 60% to show clinically important difference in patient perceived functional ability.  5. Pt will improve FOTO limitation score to less than or equal to 49% for improved self perception of functional performance with daily activities.  Plan   Plan of care Certification: 12/26/24-2/1/2025    Focus on ROM, strength and functional performance      Outpatient Physical Therapy 3 times weekly for 6 weeks to include the following interventions: Electrical Stimulation as needed, Gait Training, Manual Therapy, Moist Heat/ Ice, Neuromuscular Re-ed, Orthotic Management and Training, Patient Education, Self Care, Therapeutic Activities, and Therapeutic Exercise, Blood Flow Restriction Training, Trigger Point Dry Needling as needed.    Kevin Fernandez, PT, DPT

## 2025-01-15 ENCOUNTER — CLINICAL SUPPORT (OUTPATIENT)
Dept: REHABILITATION | Facility: OTHER | Age: 75
End: 2025-01-15
Payer: MEDICARE

## 2025-01-15 DIAGNOSIS — M17.12 PRIMARY OSTEOARTHRITIS OF LEFT KNEE: Primary | ICD-10-CM

## 2025-01-15 DIAGNOSIS — M25.562 CHRONIC PAIN OF LEFT KNEE: ICD-10-CM

## 2025-01-15 DIAGNOSIS — G89.29 CHRONIC PAIN OF LEFT KNEE: ICD-10-CM

## 2025-01-15 DIAGNOSIS — M25.662 DECREASED RANGE OF MOTION OF LEFT KNEE: ICD-10-CM

## 2025-01-15 PROCEDURE — 97530 THERAPEUTIC ACTIVITIES: CPT | Mod: HCNC,PN

## 2025-01-15 PROCEDURE — 97140 MANUAL THERAPY 1/> REGIONS: CPT | Mod: HCNC,PN

## 2025-01-15 PROCEDURE — 97112 NEUROMUSCULAR REEDUCATION: CPT | Mod: HCNC,PN

## 2025-01-15 NOTE — PROGRESS NOTES
"[facility]   Physical Therapy Treatment Note      Name: Manju Aranda  Clinic Number: [id number]    Therapy Diagnosis:   Encounter Diagnoses   Name Primary?    Primary osteoarthritis of left knee Yes    Decreased range of motion of left knee     Chronic pain of left knee          Physician: Mack Torre III, *    Visit Date: 1/15/2025    Physician Orders: PT Eval and Treat   Medical Diagnosis from Referral:  Primary osteoarthritis of left knee [M17.12]   Evaluation Date: 12/26/2024  Authorization Period Expiration: 5/24/2025  Plan of Care Expiration: 2/14/2025  Visit # / Visits authorized: 6/10  FOTO 1st follow up: complete  FOTO 2nd follow up:       Precautions: Standard    Time In: 9:45 am  Time Out: 10:40 am  Total Appointment Time: 55 minutes     Subjective     Patient reports: that she was able to come down the 13 steps at home.   She was compliant with home exercise program.  Response to previous treatment: improved mobility and gait  Functional change: ongoing    Pain: 4/10  Location: left knee      Objective    Asterisk Signs: Updated 1/15/2025  Observation: patient with joint effusion on the Left > Right      Posture: genu valgus noted during static standing      Functional tests:   Five Times Sit to Stand Test:  How long the patient takes to completed 5 sit to stand form chair with arms folded across chest: 15.5 "   "Inability to perform test in less than 13.6 seconds indicates increased disability and Morbidity." (Sneha 2000).  Normative values for community dwelling elderly:   60-70y/o: 11.4 seconds  70-78y/o: 12.6 seconds  80-90y/o: 14.8 seconds        Timed Up and Go      Average seconds: 17.5''      Seconds Rating   <10 Freely mobile   <20 Mostly independent   20-29 Variable mobility   >20 Impaired mobility      Range of Motion (Passive):   Knee Right  Left    Flexion 125 105   Extension 0 2      Range of Motion (Active):   Knee Right  Left    Flexion 123 103 " "  Extension -2 1      Edema: - Stroke Test    Objective Measures updated at progress report unless specified.     Treatment     Manju received the treatments listed below:      Manju received the following manual therapy techniques: Joint mobilizations were applied to the: Left Knee for 10 minutes, including:  Inferior Patellar Glides Grade II  Flexion/Extension Mobilizations Grade II    neuromuscular re-education activities to improve: Balance, Coordination, Kinesthetic, Sense, Proprioception, and Posture for 20 minutes. The following activities were included:  Quad Set 5'   Straight Leg Raise 30x's   TKE with Band 30x's [place]   Short Arc Quad 30x's with strap hold   Long Arc Quad 30x's     therapeutic activities to improve functional performance for 25 minutes, including:  Recumbent Bike 8' (1/2 cycles to start)  Heel Prop 5' 5#   Shuttle Squats 2 cords 3'   Sit to Stand 3 x 5 reps   Step Up 6" 2 x 10 reps   Standing Lateral Walks on Plinth with Upper Extremity support 3 laps     Patient Education and Home Exercises       Education provided:   - Patient was provided education on for continued compliance with HEP for continued functional mobility and strength gains for return to prior level of function.      Written Home Exercises Provided: Patient instructed to cont prior HEP. Exercises were reviewed and Manju was able to demonstrate them prior to the end of the session.  Manju demonstrated good  understanding of the education provided. See Electronic Medical Record under Patient Instructions for exercises provided during therapy sessions    Assessment   Patient presents to the clinic with mild symptom irritability pre-session and minimal irritability post-session. Manual therapy was performed to left knee to improve joint play and allow for optimal movement during corrective exercises.   Patient demonstrates improving range of motion, quadriceps motor control, and gait efficiency.   Patient " demonstrated improvements in Straight Leg Raise  within session.       Manju Is progressing well towards her goals.   Patient prognosis is Good.     Patient's spiritual, cultural and educational needs considered and pt agreeable to plan of care and goals.     Anticipated barriers to physical therapy: None    Goals:   Short Term Goals (3 weeks):  1. Patient will have improved AROM of the left knee to 0-120 degrees for pre-operative mobility gains. progressing  2. Patient will have decreased complaints of pain to 3/10 with prolonged walking/sitting.MET  3. Patient will be independent and compliant with issued HEP.MET     Long Term Goals (6 weeks):   Patient will have improved AROM of the left knee to 0-122 degrees for pre-operative mobility gains.  2. Patient will have improved strength of the left knee to >90% of the Right hamstring for improved force production.  3. Patient will be able to resume prior functional level with no deficits.   4. Patient will have overall improvement in condition to have increased score on KOOS to 60% to show clinically important difference in patient perceived functional ability.  5. Pt will improve FOTO limitation score to less than or equal to 49% for improved self perception of functional performance with daily activities.  Plan   Plan of care Certification: 12/26/24-2/1/2025    Focus on ROM, strength and functional performance      Outpatient Physical Therapy 3 times weekly for 6 weeks to include the following interventions: Electrical Stimulation as needed, Gait Training, Manual Therapy, Moist Heat/ Ice, Neuromuscular Re-ed, Orthotic Management and Training, Patient Education, Self Care, Therapeutic Activities, and Therapeutic Exercise, Blood Flow Restriction Training, Trigger Point Dry Needling as needed.    Kevin Fernandez, PT, DPT

## 2025-01-17 ENCOUNTER — CLINICAL SUPPORT (OUTPATIENT)
Dept: REHABILITATION | Facility: OTHER | Age: 75
End: 2025-01-17
Payer: MEDICARE

## 2025-01-17 DIAGNOSIS — M25.562 CHRONIC PAIN OF LEFT KNEE: ICD-10-CM

## 2025-01-17 DIAGNOSIS — M17.12 PRIMARY OSTEOARTHRITIS OF LEFT KNEE: Primary | ICD-10-CM

## 2025-01-17 DIAGNOSIS — G89.29 CHRONIC PAIN OF LEFT KNEE: ICD-10-CM

## 2025-01-17 DIAGNOSIS — M25.662 DECREASED RANGE OF MOTION OF LEFT KNEE: ICD-10-CM

## 2025-01-17 PROCEDURE — 97140 MANUAL THERAPY 1/> REGIONS: CPT | Mod: HCNC,PN

## 2025-01-17 PROCEDURE — 97530 THERAPEUTIC ACTIVITIES: CPT | Mod: HCNC,PN

## 2025-01-17 PROCEDURE — 97112 NEUROMUSCULAR REEDUCATION: CPT | Mod: HCNC,PN

## 2025-01-17 NOTE — PROGRESS NOTES
"[facility]   Physical Therapy Treatment Note      Name: Manju Aranda  Clinic Number: [id number]    Therapy Diagnosis:   Encounter Diagnoses   Name Primary?    Primary osteoarthritis of left knee Yes    Decreased range of motion of left knee     Chronic pain of left knee            Physician: Mack Torre III, *    Visit Date: 1/17/2025    Physician Orders: PT Eval and Treat   Medical Diagnosis from Referral:  Primary osteoarthritis of left knee [M17.12]   Evaluation Date: 12/26/2024  Authorization Period Expiration: 5/24/2025  Plan of Care Expiration: 2/14/2025  Visit # / Visits authorized: 7/10  FOTO 1st follow up: complete  FOTO 2nd follow up:       Precautions: Standard    Time In: 1030am  Time Out: 1131am  Total Appointment Time: 61 minutes     Subjective     Patient reports: that she is doing great today. Pt states that she is able to use her cane less at home. She feels much more confident with her balance.   She was compliant with home exercise program.  Response to previous treatment: improved mobility and gait  Functional change: ongoing    Pain: 4/10  Location: left knee      Objective    Asterisk Signs: Updated 1/15/2025  Observation: patient with joint effusion on the Left > Right      Posture: genu valgus noted during static standing      Functional tests:   Five Times Sit to Stand Test:  How long the patient takes to completed 5 sit to stand form chair with arms folded across chest: 15.5 "   "Inability to perform test in less than 13.6 seconds indicates increased disability and Morbidity." (Sneha 2000).  Normative values for community dwelling elderly:   60-70y/o: 11.4 seconds  70-80y/o: 12.6 seconds  80-88y/o: 14.8 seconds        Timed Up and Go      Average seconds: 17.5''      Seconds Rating   <10 Freely mobile   <20 Mostly independent   20-29 Variable mobility   >20 Impaired mobility      Range of Motion (Passive):   Knee Right  Left    Flexion 125 105 " "  Extension 0 2      Range of Motion (Active):   Knee Right  Left    Flexion 123 103   Extension -2 1      Edema: - Stroke Test    Objective Measures updated at progress report unless specified.     Treatment     Manju received the treatments listed below:      Manju received the following manual therapy techniques: Joint mobilizations were applied to the: Left Knee for 10 minutes, including:  Inferior Patellar Glides Grade II  Flexion/Extension Mobilizations Grade II    neuromuscular re-education activities to improve: Balance, Coordination, Kinesthetic, Sense, Proprioception, and Posture for 20 minutes. The following activities were included:  Quad Set 5' with towel under heel  Straight Leg Raise 30x's   TKE with Band 30x's Rush   Short Arc Quad 30x's with strap hold   Long Arc Quad 30x's     therapeutic activities to improve functional performance for 31 minutes, including:  Recumbent Bike 8'   Heel Prop 5' 5#   Shuttle Squats 2 cords 3'   Sit to Stand 3 x 5 reps   Step Up 6" 2 x 10 reps   Standing Lateral Walks on Plinth with Upper Extremity support 3 laps   +Mini squats with B HR 2x10 repetitions  +Heel raises with B HR support 3x10 repetitions    Patient Education and Home Exercises       Education provided:   - Patient was provided education on for continued compliance with HEP for continued functional mobility and strength gains for return to prior level of function.      Written Home Exercises Provided: Patient instructed to cont prior HEP. Exercises were reviewed and Manju was able to demonstrate them prior to the end of the session.  Manju demonstrated good  understanding of the education provided. See Electronic Medical Record under Patient Instructions for exercises provided during therapy sessions    Assessment   Pt tolerated treatment session well today with no increase in knee pain. Pt demonstrates improved terminal knee extension. Progressed weight bearing exercises this visit with " good response from pt. Pt is more confident walking with no AD and is at decreased risk of falling at this time. Continue PT POC.      Manju Is progressing well towards her goals.   Patient prognosis is Good.     Patient's spiritual, cultural and educational needs considered and pt agreeable to plan of care and goals.     Anticipated barriers to physical therapy: None    Goals:   Short Term Goals (3 weeks):  1. Patient will have improved AROM of the left knee to 0-120 degrees for pre-operative mobility gains. progressing  2. Patient will have decreased complaints of pain to 3/10 with prolonged walking/sitting.MET  3. Patient will be independent and compliant with issued HEP.MET     Long Term Goals (6 weeks):   Patient will have improved AROM of the left knee to 0-122 degrees for pre-operative mobility gains.  2. Patient will have improved strength of the left knee to >90% of the Right hamstring for improved force production.  3. Patient will be able to resume prior functional level with no deficits.   4. Patient will have overall improvement in condition to have increased score on KOOS to 60% to show clinically important difference in patient perceived functional ability.  5. Pt will improve FOTO limitation score to less than or equal to 49% for improved self perception of functional performance with daily activities.  Plan   Plan of care Certification: 12/26/24-2/1/2025    Focus on ROM, strength and functional performance      Outpatient Physical Therapy 3 times weekly for 6 weeks to include the following interventions: Electrical Stimulation as needed, Gait Training, Manual Therapy, Moist Heat/ Ice, Neuromuscular Re-ed, Orthotic Management and Training, Patient Education, Self Care, Therapeutic Activities, and Therapeutic Exercise, Blood Flow Restriction Training, Trigger Point Dry Needling as needed.    Humaira Harley, PT

## 2025-01-27 ENCOUNTER — CLINICAL SUPPORT (OUTPATIENT)
Dept: REHABILITATION | Facility: OTHER | Age: 75
End: 2025-01-27
Payer: MEDICARE

## 2025-01-27 ENCOUNTER — TELEPHONE (OUTPATIENT)
Dept: OPHTHALMOLOGY | Facility: CLINIC | Age: 75
End: 2025-01-27
Payer: MEDICARE

## 2025-01-27 DIAGNOSIS — G89.29 CHRONIC PAIN OF LEFT KNEE: ICD-10-CM

## 2025-01-27 DIAGNOSIS — M25.662 DECREASED RANGE OF MOTION OF LEFT KNEE: ICD-10-CM

## 2025-01-27 DIAGNOSIS — M17.12 PRIMARY OSTEOARTHRITIS OF LEFT KNEE: Primary | ICD-10-CM

## 2025-01-27 DIAGNOSIS — M25.562 CHRONIC PAIN OF LEFT KNEE: ICD-10-CM

## 2025-01-27 PROCEDURE — 97112 NEUROMUSCULAR REEDUCATION: CPT | Mod: HCNC,PN

## 2025-01-27 PROCEDURE — 97530 THERAPEUTIC ACTIVITIES: CPT | Mod: HCNC,PN

## 2025-01-27 PROCEDURE — 97140 MANUAL THERAPY 1/> REGIONS: CPT | Mod: HCNC,PN

## 2025-01-27 NOTE — PROGRESS NOTES
Outpatient Rehab    Physical Therapy Visit    Patient Name: Manju Aranda  MRN: [id number]  YOB: 1950  Today's Date: 1/28/2025    Therapy Diagnosis:   Encounter Diagnoses   Name Primary?    Primary osteoarthritis of left knee Yes    Decreased range of motion of left knee     Chronic pain of left knee      Physician: Mack Torre III, *    Physician Orders: PT Eval and Treat   Medical Diagnosis from Referral:  Primary osteoarthritis of left knee [M17.12]   Evaluation Date: 12/26/2024  Authorization Period Expiration: 5/24/2025  Plan of Care Expiration: 2/14/2025  Visit # / Visits authorized: 8/10  FOTO 1st follow up: complete  FOTO 2nd follow up:      Time In: 0953   Time Out: 1048  Total Time: 55 minutes  Total Billable Time: 55 minutes         Subjective   Patient states that she had a good week last week and was able to do her HEP..  Pain reported as 2. Medial knee    Past Medical History/Physical Systems Review:   Manju Aranda  has a past medical history of Allergy, Anemia, Arthritis, Cholelithiases, Cyst of kidney, acquired, Diverticulitis, Elevated TSH, Family history of Graves' disease: daughter, maternal aunt, maternal uncle, Glaucoma suspect, Graves disease, colonic polyps, Hypertension, Liver cyst, MGUS (monoclonal gammopathy of unknown significance), Migraine headache, Mitral valve problem, Obesity, Paraproteinemia, Sleep apnea, Smoldering multiple myeloma, and Tricuspid valve disease.    Manju Aranda  has a past surgical history that includes Appendectomy; Hysterectomy (1978 or 1979); Colonoscopy (N/A, 10/23/2015); Colonoscopy (N/A, 11/05/2018); Cystoscopy; Colonoscopy (N/A, 6/19/2023); and arthroplasty, knee, total, using computer-assisted navigation (Left, 12/23/2024).    Manju has a current medication list which includes the following prescription(s): acetaminophen, ascorbic acid (vitamin c), aspirin, cholecalciferol (vitamin d3), eszopiclone, loratadine,  "magnesium oxide, methocarbamol, multivit with min-folic acid, olmesartan, oxycodone, pantoprazole, psyllium, rizatriptan, senna-docusate 8.6-50 mg, and trazodone.    Review of patient's allergies indicates:  No Known Allergies     Objective       Knee Range of Motion   Left Knee   Active (deg) Passive (deg) Pain   Flexion 108 110     Extension 0 3                   Treatment:  Manual Therapy  Manual Therapy Activity 1: Patellofemoral joint mobilization Grade III  Balance/Neuromuscular Re-Education  Balance/Neuromuscular Re-Education Activity 1: Standing Lateral Walks on Plinth with Upper Extremity support 3 laps  Balance/Neuromuscular Re-Education Activity 2: Quad Set 5' with towel under heel  Balance/Neuromuscular Re-Education Activity 3: Straight Leg Raise 30x's  Balance/Neuromuscular Re-Education Activity 4: TKE with Band 30x's Orange  Balance/Neuromuscular Re-Education Activity 5: Long Arc Quad 30x's  Balance/Neuromuscular Re-Education Activity 6: Short Arc Quad 30x's with strap hold    Therapeutic Activity  Therapeutic Activity 1: Recumbent Bike 8'  Therapeutic Activity 2: Heel Prop 5' 5#  Therapeutic Activity 3: Shuttle Squats 2 cords 3'  Therapeutic Activity 4: Sit to Stand 3 x 5 reps  Therapeutic Activity 5: Step Up 6" 2 x 10 reps  Therapeutic Activity 6: Mini squats with B HR 2x10 repetitions  Therapeutic Activity 7: +Heel raises with B HR support 3x10 repetitions    Patient's spiritual, cultural, and educational needs considered and patient agreeable to plan of care and goals.     Assessment & Plan   Assessment: Patient will continue to benefit from skilled physical therapy services to address physical impairments, movement impairment syndromes, and functional deficits to reach established short and long term goals to improve participation in recreational tasks and activities of daily living.  Evaluation/Treatment Tolerance: Patient tolerated treatment well        Plan: Patient will be continually assessed " and progressed as appropriate to meet all short and long term goals.    Goals:   Short Term Goals (3 weeks):  1. Patient will have improved AROM of the left knee to 0-120 degrees for pre-operative mobility gains. progressing  2. Patient will have decreased complaints of pain to 3/10 with prolonged walking/sitting.MET  3. Patient will be independent and compliant with issued HEP.MET     Long Term Goals (6 weeks):   Patient will have improved AROM of the left knee to 0-122 degrees for pre-operative mobility gains.   2. Patient will have improved strength of the left knee to >90% of the Right hamstring for improved force production. MET  3. Patient will be able to resume prior functional level with no deficits.  MET  4. Patient will have overall improvement in condition to have increased score on KOOS to 60% to show clinically important difference in patient perceived functional ability.  5. Pt will improve FOTO limitation score to less than or equal to 49% for improved self perception of functional performance with daily activities.    Plan of care Certification: 12/26/24-2/1/2025     Focus on ROM, strength and functional performance      Outpatient Physical Therapy 3 times weekly for 6 weeks to include the following interventions: Electrical Stimulation as needed, Gait Training, Manual Therapy, Moist Heat/ Ice, Neuromuscular Re-ed, Orthotic Management and Training, Patient Education, Self Care, Therapeutic Activities, and Therapeutic Exercise, Blood Flow Restriction Training, Trigger Point Dry Needling as needed.

## 2025-01-29 ENCOUNTER — CLINICAL SUPPORT (OUTPATIENT)
Dept: REHABILITATION | Facility: OTHER | Age: 75
End: 2025-01-29
Payer: MEDICARE

## 2025-01-29 DIAGNOSIS — M17.12 PRIMARY OSTEOARTHRITIS OF LEFT KNEE: Primary | ICD-10-CM

## 2025-01-29 DIAGNOSIS — M25.562 CHRONIC PAIN OF LEFT KNEE: ICD-10-CM

## 2025-01-29 DIAGNOSIS — G89.29 CHRONIC PAIN OF LEFT KNEE: ICD-10-CM

## 2025-01-29 DIAGNOSIS — M25.662 DECREASED RANGE OF MOTION OF LEFT KNEE: ICD-10-CM

## 2025-01-29 PROCEDURE — 97530 THERAPEUTIC ACTIVITIES: CPT | Mod: HCNC,PN,CQ

## 2025-01-29 PROCEDURE — 97112 NEUROMUSCULAR REEDUCATION: CPT | Mod: HCNC,PN,CQ

## 2025-01-29 NOTE — PROGRESS NOTES
Outpatient Rehab    Physical Therapy Visit    Patient Name: Manju Aranda  MRN: [id number]  YOB: 1950  Today's Date: 1/29/2025    Therapy Diagnosis:   No diagnosis found.    Physician: Mack Torre III, *    Physician Orders: PT Eval and Treat   Medical Diagnosis from Referral:  Primary osteoarthritis of left knee [M17.12]   Evaluation Date: 12/26/2024  Authorization Period Expiration: 5/24/2025  Plan of Care Expiration: 2/14/2025  Visit # / Visits authorized: 8/10  FOTO 1st follow up: complete  FOTO 2nd follow up:      Time In:     Time Out:    Total Time:   minutes  Total Billable Time: 55 minutes         Subjective             Past Medical History/Physical Systems Review:   Manju Aranda  has a past medical history of Allergy, Anemia, Arthritis, Cholelithiases, Cyst of kidney, acquired, Diverticulitis, Elevated TSH, Family history of Graves' disease: daughter, maternal aunt, maternal uncle, Glaucoma suspect, Graves disease, colonic polyps, Hypertension, Liver cyst, MGUS (monoclonal gammopathy of unknown significance), Migraine headache, Mitral valve problem, Obesity, Paraproteinemia, Sleep apnea, Smoldering multiple myeloma, and Tricuspid valve disease.    Manju Aranda  has a past surgical history that includes Appendectomy; Hysterectomy (1978 or 1979); Colonoscopy (N/A, 10/23/2015); Colonoscopy (N/A, 11/05/2018); Cystoscopy; Colonoscopy (N/A, 6/19/2023); and arthroplasty, knee, total, using computer-assisted navigation (Left, 12/23/2024).    Majnu has a current medication list which includes the following prescription(s): acetaminophen, ascorbic acid (vitamin c), aspirin, cholecalciferol (vitamin d3), eszopiclone, loratadine, magnesium oxide, methocarbamol, multivit with min-folic acid, olmesartan, oxycodone, pantoprazole, psyllium, rizatriptan, senna-docusate 8.6-50 mg, and trazodone.    Review of patient's allergies indicates:  No Known Allergies     Objective           Treatment:               Patient's spiritual, cultural, and educational needs considered and patient agreeable to plan of care and goals.     Assessment & Plan   Assessment:             Plan:      Goals:   Short Term Goals (3 weeks):  1. Patient will have improved AROM of the left knee to 0-120 degrees for pre-operative mobility gains. progressing  2. Patient will have decreased complaints of pain to 3/10 with prolonged walking/sitting.MET  3. Patient will be independent and compliant with issued HEP.MET     Long Term Goals (6 weeks):   Patient will have improved AROM of the left knee to 0-122 degrees for pre-operative mobility gains.   2. Patient will have improved strength of the left knee to >90% of the Right hamstring for improved force production. MET  3. Patient will be able to resume prior functional level with no deficits.  MET  4. Patient will have overall improvement in condition to have increased score on KOOS to 60% to show clinically important difference in patient perceived functional ability.  5. Pt will improve FOTO limitation score to less than or equal to 49% for improved self perception of functional performance with daily activities.    Plan of care Certification: 12/26/24-2/1/2025     Focus on ROM, strength and functional performance      Outpatient Physical Therapy 3 times weekly for 6 weeks to include the following interventions: Electrical Stimulation as needed, Gait Training, Manual Therapy, Moist Heat/ Ice, Neuromuscular Re-ed, Orthotic Management and Training, Patient Education, Self Care, Therapeutic Activities, and Therapeutic Exercise, Blood Flow Restriction Training, Trigger Point Dry Needling as needed.

## 2025-01-29 NOTE — PROGRESS NOTES
Outpatient Rehab    Physical Therapy Visit    Patient Name: Manju Aranda  MRN: [id number]  YOB: 1950  Today's Date: 1/29/2025    Therapy Diagnosis:   Encounter Diagnoses   Name Primary?    Primary osteoarthritis of left knee Yes    Decreased range of motion of left knee     Chronic pain of left knee        Physician: Mack Torre III, *    Physician Orders: PT Eval and Treat   Medical Diagnosis from Referral:  Primary osteoarthritis of left knee [M17.12]   Evaluation Date: 12/26/2024  Authorization Period Expiration: 5/24/2025  Plan of Care Expiration: 2/14/2025  Visit # / Visits authorized: 10/10  FOTO 1st follow up: complete  FOTO 2nd follow up:      Time In:   0950  Time Out:  1105  Total Time:   75 minutes  Total Billable Time: 30 minutes         Subjective   She has been a little stiff but is doing better since starting PT. Her knee is bending better.  Pain reported as 2.      Past Medical History/Physical Systems Review:   Manju Aranda  has a past medical history of Allergy, Anemia, Arthritis, Cholelithiases, Cyst of kidney, acquired, Diverticulitis, Elevated TSH, Family history of Graves' disease: daughter, maternal aunt, maternal uncle, Glaucoma suspect, Graves disease, colonic polyps, Hypertension, Liver cyst, MGUS (monoclonal gammopathy of unknown significance), Migraine headache, Mitral valve problem, Obesity, Paraproteinemia, Sleep apnea, Smoldering multiple myeloma, and Tricuspid valve disease.    Manju Aranda  has a past surgical history that includes Appendectomy; Hysterectomy (1978 or 1979); Colonoscopy (N/A, 10/23/2015); Colonoscopy (N/A, 11/05/2018); Cystoscopy; Colonoscopy (N/A, 6/19/2023); and arthroplasty, knee, total, using computer-assisted navigation (Left, 12/23/2024).    Manju has a current medication list which includes the following prescription(s): acetaminophen, ascorbic acid (vitamin c), aspirin, cholecalciferol (vitamin d3), eszopiclone,  "loratadine, magnesium oxide, methocarbamol, multivit with min-folic acid, olmesartan, oxycodone, pantoprazole, psyllium, rizatriptan, senna-docusate 8.6-50 mg, and trazodone.    Review of patient's allergies indicates:  No Known Allergies     Objective          Treatment:  Manual Therapy  Manual Therapy Activity 1: Patellofemoral joint mobilization Grade III  Balance/Neuromuscular Re-Education  Balance/Neuromuscular Re-Education Activity 1: Standing Lateral Walks on Plinth with Upper Extremity support 3 laps  Balance/Neuromuscular Re-Education Activity 2: Quad Set 5' with towel under heel  Balance/Neuromuscular Re-Education Activity 3: Straight Leg Raise 30x's  Balance/Neuromuscular Re-Education Activity 4: TKE with Band 30x's Orange  Balance/Neuromuscular Re-Education Activity 5: Long Arc Quad 30x's  Balance/Neuromuscular Re-Education Activity 6: Short Arc Quad 30x's with strap hold    Therapeutic Activity  Therapeutic Activity 1: Recumbent Bike 8'  Therapeutic Activity 2: Heel Prop 5' 5#  Therapeutic Activity 3: Shuttle Squats 2 cords 3'  Therapeutic Activity 4: Sit to Stand 3 x 5 reps  Therapeutic Activity 5: Step Up 6" 2 x 10 reps  Therapeutic Activity 6: Mini squats with B HR 2x10 repetitions  Therapeutic Activity 7: +Heel raises with B HR support 3x10 repetitions    Patient's spiritual, cultural, and educational needs considered and patient agreeable to plan of care and goals.     Assessment & Plan   Assessment: Manju tolerated tx well today. She demonstrated improved TKE with SLR today. She contures to have quad weakness and decreased ROM as well as functional performance with ADLs.           Plan: Continue with LE strength and ROM with emphasis on ADL performance    Goals:   Short Term Goals (3 weeks):  1. Patient will have improved AROM of the left knee to 0-120 degrees for pre-operative mobility gains. progressing  2. Patient will have decreased complaints of pain to 3/10 with prolonged " walking/sitting.MET  3. Patient will be independent and compliant with issued HEP.MET     Long Term Goals (6 weeks):   Patient will have improved AROM of the left knee to 0-122 degrees for pre-operative mobility gains.   2. Patient will have improved strength of the left knee to >90% of the Right hamstring for improved force production. MET  3. Patient will be able to resume prior functional level with no deficits.  MET  4. Patient will have overall improvement in condition to have increased score on KOOS to 60% to show clinically important difference in patient perceived functional ability.  5. Pt will improve FOTO limitation score to less than or equal to 49% for improved self perception of functional performance with daily activities.    Plan of care Certification: 12/26/24-2/1/2025     Focus on ROM, strength and functional performance      Outpatient Physical Therapy 3 times weekly for 6 weeks to include the following interventions: Electrical Stimulation as needed, Gait Training, Manual Therapy, Moist Heat/ Ice, Neuromuscular Re-ed, Orthotic Management and Training, Patient Education, Self Care, Therapeutic Activities, and Therapeutic Exercise, Blood Flow Restriction Training, Trigger Point Dry Needling as needed.

## 2025-01-31 ENCOUNTER — CLINICAL SUPPORT (OUTPATIENT)
Dept: REHABILITATION | Facility: OTHER | Age: 75
End: 2025-01-31
Payer: MEDICARE

## 2025-01-31 DIAGNOSIS — M25.562 CHRONIC PAIN OF LEFT KNEE: ICD-10-CM

## 2025-01-31 DIAGNOSIS — G89.29 CHRONIC PAIN OF LEFT KNEE: ICD-10-CM

## 2025-01-31 DIAGNOSIS — M25.662 DECREASED RANGE OF MOTION OF LEFT KNEE: ICD-10-CM

## 2025-01-31 DIAGNOSIS — M17.12 PRIMARY OSTEOARTHRITIS OF LEFT KNEE: Primary | ICD-10-CM

## 2025-01-31 PROCEDURE — 97530 THERAPEUTIC ACTIVITIES: CPT | Mod: HCNC,PN

## 2025-01-31 PROCEDURE — 97112 NEUROMUSCULAR REEDUCATION: CPT | Mod: HCNC,PN

## 2025-01-31 PROCEDURE — 97140 MANUAL THERAPY 1/> REGIONS: CPT | Mod: HCNC,PN

## 2025-02-03 ENCOUNTER — CLINICAL SUPPORT (OUTPATIENT)
Dept: REHABILITATION | Facility: OTHER | Age: 75
End: 2025-02-03
Payer: MEDICARE

## 2025-02-03 DIAGNOSIS — G89.29 CHRONIC PAIN OF LEFT KNEE: ICD-10-CM

## 2025-02-03 DIAGNOSIS — M25.562 CHRONIC PAIN OF LEFT KNEE: ICD-10-CM

## 2025-02-03 DIAGNOSIS — M17.12 PRIMARY OSTEOARTHRITIS OF LEFT KNEE: Primary | ICD-10-CM

## 2025-02-03 DIAGNOSIS — M25.662 DECREASED RANGE OF MOTION OF LEFT KNEE: ICD-10-CM

## 2025-02-03 PROCEDURE — 97140 MANUAL THERAPY 1/> REGIONS: CPT | Mod: HCNC,PN

## 2025-02-03 PROCEDURE — 97530 THERAPEUTIC ACTIVITIES: CPT | Mod: HCNC,PN

## 2025-02-03 PROCEDURE — 97112 NEUROMUSCULAR REEDUCATION: CPT | Mod: HCNC,PN

## 2025-02-03 NOTE — PROGRESS NOTES
Outpatient Rehab    Physical Therapy Visit    Patient Name: Manju Aranda  MRN: [id number]  YOB: 1950  Today's Date: 2/3/2025    Therapy Diagnosis:   Encounter Diagnoses   Name Primary?    Primary osteoarthritis of left knee Yes    Decreased range of motion of left knee     Chronic pain of left knee      Physician: Mack Torre III, *    Physician Orders: PT Eval and Treat   Medical Diagnosis from Referral:  Primary osteoarthritis of left knee [M17.12]   Evaluation Date: 12/26/2024  Authorization Period Expiration: 5/24/2025  Plan of Care Expiration: 2/14/2025  Visit # / Visits authorized: 10/10  FOTO 1st follow up: complete  FOTO 2nd follow up: complete      Time In: 0955   Time Out: 1050  Total Time: 55   Total Billable Time: 55 minutes         Subjective   That she felt like her knee was much loser last week..  Pain reported as 0. Medial knee    Objective       Knee Range of Motion   Right Knee   Active (deg) Passive (deg) Pain   Flexion 120 122     Extension -2 0         Left Knee   Active (deg) Passive (deg) Pain   Flexion 110 115     Extension 3 5                        Knee Strength   Right Strength Right Pain Left Strength Left  Pain   Flexion (S2) 5   4+     Prone Flexion 5   4+     Extension (L3) 5   4+                   Treatment:  Manual Therapy  Manual Therapy Activity 1: Patellofemoral joint mobilization Grade III    Balance/Neuromuscular Re-Education  Balance/Neuromuscular Re-Education Activity 1: Standing Lateral Walks on Plinth with Upper Extremity support 3 laps  Balance/Neuromuscular Re-Education Activity 2: Quad Set 5' with towel under heel  Balance/Neuromuscular Re-Education Activity 3: Straight Leg Raise 30x's  Balance/Neuromuscular Re-Education Activity 4: TKE with Band 30x's Orange  Balance/Neuromuscular Re-Education Activity 5: Long Arc Quad 30x's  Balance/Neuromuscular Re-Education Activity 6: Short Arc Quad 30x's with strap hold    Therapeutic Activity  Therapeutic  "Activity 1: Recumbent Bike 8'  Therapeutic Activity 2: Heel Prop 5' 5#  Therapeutic Activity 3: Shuttle Squats 2 cords 3'  Therapeutic Activity 4: Sit to Stand 3 x 5 reps  Therapeutic Activity 5: Step Up 6" 2 x 10 reps  Therapeutic Activity 6: Mini squats with B HR 2x10 repetitions  Therapeutic Activity 7: +Heel raises with B HR support 3x10 repetitions    Patient's spiritual, cultural, and educational needs considered and patient agreeable to plan of care and goals.     Assessment & Plan   Assessment: Patient will benefit from skilled physical therapy services to address physical impairments, movement impairment syndromes, and functional deficits to reach established short and long term goals to improve participation in recreational tasks and activities of daily living. Patient was given ample opportunity to ask questions about through a shared decision making process to establish a plan of care and patient verbally agreed and consented to initiation of treatment at this time.  Evaluation/Treatment Tolerance: Patient tolerated treatment well        Plan: Patient will be continually assessed and progressed as appropriate to meet all short and long term goals.    Goals:   Active       Functional outcome       Patient will show a significant change in FOTO patient-reported outcome tool to demonstrate subjective improvement (Progressing)       Start:  02/03/25    Expected End:  02/17/25               Range of Motion       Patient will achieve Left knee ROM of  0-120 degrees  (Progressing)       Start:  02/03/25    Expected End:  02/17/25               Strength       Patient will achieve left knee flexion strength of 4+/5 (Met)       Start:  02/03/25    Expected End:  02/17/25    Resolved:  02/03/25         Patient will achieve left knee extension strength of 4+/5 (Met)       Start:  02/03/25    Expected End:  02/17/25    Resolved:  02/03/25           Resolved       Pain       Patient will report pain of 0/10 " demonstrating a reduction of overall pain (Met)       Start:  02/03/25    Expected End:  02/17/25    Resolved:  02/03/25         Patient will report a 2 point reduction in pain while performing sit to stand and walking. (Met)       Start:  02/03/25    Expected End:  02/17/25    Resolved:  02/03/25

## 2025-02-04 ENCOUNTER — HOSPITAL ENCOUNTER (OUTPATIENT)
Dept: RADIOLOGY | Facility: HOSPITAL | Age: 75
Discharge: HOME | End: 2025-02-04
Payer: MEDICARE

## 2025-02-04 ENCOUNTER — OFFICE VISIT (OUTPATIENT)
Dept: ORTHOPEDICS | Facility: CLINIC | Age: 75
End: 2025-02-04
Payer: MEDICARE

## 2025-02-04 DIAGNOSIS — Z96.652 S/P TOTAL KNEE REPLACEMENT, LEFT: Primary | ICD-10-CM

## 2025-02-04 DIAGNOSIS — Z96.652 S/P TOTAL KNEE REPLACEMENT, LEFT: ICD-10-CM

## 2025-02-04 DIAGNOSIS — M17.11 PRIMARY OSTEOARTHRITIS OF RIGHT KNEE: ICD-10-CM

## 2025-02-04 PROCEDURE — 4010F ACE/ARB THERAPY RXD/TAKEN: CPT | Mod: CPTII,S$GLB,,

## 2025-02-04 PROCEDURE — 99999 PR PBB SHADOW E&M-EST. PATIENT-LVL III: CPT | Mod: PBBFAC,HCNC,,

## 2025-02-04 PROCEDURE — 99024 POSTOP FOLLOW-UP VISIT: CPT | Mod: S$GLB,,,

## 2025-02-04 PROCEDURE — 73562 X-RAY EXAM OF KNEE 3: CPT | Mod: 26,HCNC,LT, | Performed by: RADIOLOGY

## 2025-02-04 PROCEDURE — 1159F MED LIST DOCD IN RCRD: CPT | Mod: CPTII,S$GLB,,

## 2025-02-04 PROCEDURE — 1101F PT FALLS ASSESS-DOCD LE1/YR: CPT | Mod: CPTII,S$GLB,,

## 2025-02-04 PROCEDURE — 1160F RVW MEDS BY RX/DR IN RCRD: CPT | Mod: CPTII,S$GLB,,

## 2025-02-04 PROCEDURE — 3288F FALL RISK ASSESSMENT DOCD: CPT | Mod: CPTII,S$GLB,,

## 2025-02-04 PROCEDURE — 1125F AMNT PAIN NOTED PAIN PRSNT: CPT | Mod: CPTII,S$GLB,,

## 2025-02-04 PROCEDURE — 73562 X-RAY EXAM OF KNEE 3: CPT | Mod: TC,HCNC,LT

## 2025-02-04 RX ORDER — PREGABALIN 75 MG/1
75 CAPSULE ORAL 2 TIMES DAILY
Qty: 60 CAPSULE | Refills: 0 | Status: SHIPPED | OUTPATIENT
Start: 2025-02-04

## 2025-02-04 RX ORDER — PREDNISONE 10 MG/1
TABLET ORAL
Qty: 35 TABLET | Refills: 0 | Status: SHIPPED | OUTPATIENT
Start: 2025-02-04 | End: 2025-02-19

## 2025-02-04 NOTE — PROGRESS NOTES
HPI:  Manju Aranda presents for 6-week post-operative visit following a left total knee arthroplasty performed by Dr. Torre on 12/23/2024. Patient reports mild stiffness and pain but endorses an overall satisfactory recovery.   Medications:  Tylenol 2-3x/day and occasional oxycodone at night    Assistive Devices:  Cane   PT:  Ongoing at [facility]  Limitations:  None    Exam:   There were no vitals taken for this visit.   Gait: normal with assistive device  Incision: healed, clean, and dry without drainage or erythema   Stability:  Knee stable anterior-posterior varus and valgus stresses, no extensor lag    Current ROM:  0-105 with guarding  Pre-op ROM:  0-115  PT ROM: 3-115    Imaging:  Radiographs taken today and reviewed revealing a well fixed and aligned prosthesis.    A/P:  6 weeks s/p left total knee arthroplasty    - Patient's recovery is progressing well with ROM of 0-115 and a well healing incision.   - Outpatient PT: ongoing at [facility]  - Pain medication:  No refill needed  - Due to current stiffness, HSS steroid protocol and Lyrica sent to pharmacy.    - Patient requested to receive right knee intra-articular Synvisc 1 injection.  She currently has Kellgren Johann grade 3 osteoarthritis of the right knee.  She has failed conservative treatment with last right knee intra-articular CSI prior to her left total knee replacement with little to no relief.  - Synvisc 1 injection ordered and we will be administered in 2 weeks following insurance approval.  - Follow up in 2 weeks with myself for right knee Synvisc 1 injection. Pt will call clinic with any problems/concerns.

## 2025-02-05 ENCOUNTER — CLINICAL SUPPORT (OUTPATIENT)
Dept: REHABILITATION | Facility: OTHER | Age: 75
End: 2025-02-05
Payer: MEDICARE

## 2025-02-05 DIAGNOSIS — M25.662 DECREASED RANGE OF MOTION OF LEFT KNEE: ICD-10-CM

## 2025-02-05 DIAGNOSIS — G89.29 CHRONIC PAIN OF LEFT KNEE: ICD-10-CM

## 2025-02-05 DIAGNOSIS — M17.12 PRIMARY OSTEOARTHRITIS OF LEFT KNEE: Primary | ICD-10-CM

## 2025-02-05 DIAGNOSIS — M25.562 CHRONIC PAIN OF LEFT KNEE: ICD-10-CM

## 2025-02-05 PROCEDURE — 97530 THERAPEUTIC ACTIVITIES: CPT | Mod: HCNC,PN

## 2025-02-05 PROCEDURE — 97140 MANUAL THERAPY 1/> REGIONS: CPT | Mod: HCNC,PN

## 2025-02-05 PROCEDURE — 97112 NEUROMUSCULAR REEDUCATION: CPT | Mod: HCNC,PN

## 2025-02-05 NOTE — PROGRESS NOTES
"  Outpatient Rehab    Physical Therapy Visit    Patient Name: Manju Aranda  MRN: [id number]  YOB: 1950  Today's Date: 2/5/2025    Therapy Diagnosis:   Encounter Diagnoses   Name Primary?    Primary osteoarthritis of left knee Yes    Decreased range of motion of left knee     Chronic pain of left knee      Physician: Mack Torre III, *    Physician Orders: PT Eval and Treat   Medical Diagnosis from Referral:  Primary osteoarthritis of left knee [M17.12]   Evaluation Date: 12/26/2024  Authorization Period Expiration: 5/24/2025  Plan of Care Expiration: 2/14/2025  Visit # / Visits authorized: 10/10  FOTO 1st follow up: complete  FOTO 2nd follow up: complete            Time In: 0953   Time Out: 1048  Total Time: 55   Total Billable Time: 55 minutes         Subjective   Patient will be ready for discharge at 6 weeks on Friday 2/7. Patient was seen by Orthopedics yesterday and they were pleased with her progress..  Pain reported as 0/10. Medial knee    Objective   Treatment:  Manual Therapy  Manual Therapy Activity 1: Patellofemoral joint mobilization Grade III    Balance/Neuromuscular Re-Education  Balance/Neuromuscular Re-Education Activity 1: Standing Lateral Walks on Plinth with Upper Extremity support 3 laps  Balance/Neuromuscular Re-Education Activity 2: Quad Set 5' with towel under heel  Balance/Neuromuscular Re-Education Activity 3: Straight Leg Raise 30x's  Balance/Neuromuscular Re-Education Activity 4: TKE with Band 30x's Orange  Balance/Neuromuscular Re-Education Activity 5: Long Arc Quad 30x's  Balance/Neuromuscular Re-Education Activity 6: Short Arc Quad 30x's with strap hold    Therapeutic Activity  Therapeutic Activity 1: Recumbent Bike 8'  Therapeutic Activity 2: Heel Prop 5' 5#  Therapeutic Activity 3: Shuttle Squats 2 cords 3'  Therapeutic Activity 4: Sit to Stand 3 x 5 reps  Therapeutic Activity 5: Step Up 6" 2 x 10 reps  Therapeutic Activity 6: Mini squats with B HR 2x10 " repetitions  Therapeutic Activity 7: +Heel raises with B HR support 3x10 repetitions    Patient's spiritual, cultural, and educational needs considered and patient agreeable to plan of care and goals.     Assessment & Plan   Assessment: Patient will benefit from skilled physical therapy services to address deficits listed in initial evaluation and will be continually re-assessed and progressed as tolerated. Pateint and therapist, through shared decision making; determined that physical therapy plan of care should be continued and patient verbally agreed to continue treatment. Patient will be ready for discharge on friday 2/7 with extensive education and HEP issued at that time.  Evaluation/Treatment Tolerance: Patient tolerated treatment well        Plan: Patient will be continually assessed and progressed as appropriate to achieve long and short term goals.    Goals:   Active       Range of Motion       Patient will achieve Left knee ROM of  0-120 degrees  (Progressing)       Start:  02/03/25    Expected End:  02/17/25               Strength       Patient will achieve left knee flexion strength of 4+/5 (Met)       Start:  02/03/25    Expected End:  02/17/25    Resolved:  02/03/25         Patient will achieve left knee extension strength of 4+/5 (Met)       Start:  02/03/25    Expected End:  02/17/25    Resolved:  02/03/25           Resolved       Functional outcome       Patient will show a significant change in FOTO patient-reported outcome tool to demonstrate subjective improvement (Met)       Start:  02/03/25    Expected End:  02/17/25    Resolved:  02/05/25            Pain       Patient will report pain of 0/10 demonstrating a reduction of overall pain (Met)       Start:  02/03/25    Expected End:  02/17/25    Resolved:  02/03/25         Patient will report a 2 point reduction in pain while performing sit to stand and walking. (Met)       Start:  02/03/25    Expected End:  02/17/25    Resolved:  02/03/25              Kevin Fernandez, PT, DPT

## 2025-02-06 NOTE — PROGRESS NOTES
Outpatient Rehab    Physical Therapy Visit    Patient Name: Manju Aranda  MRN: [id number]  YOB: 1950  Today's Date: 2/5/2025    Therapy Diagnosis: No diagnosis found.  Physician: Mack Torre III, *    Physician Orders: PT Eval and Treat   Medical Diagnosis from Referral:  Primary osteoarthritis of left knee [M17.12]   Evaluation Date: 12/26/2024  Authorization Period Expiration: 5/24/2025  Plan of Care Expiration: 2/14/2025  Visit # / Visits authorized: 10/10  FOTO 1st follow up: complete  FOTO 2nd follow up: complete     Time In:     Time Out:    Total Time:     Total Billable Time: ***         Subjective             Objective            Treatment:  {PT Treatments:26550}    Patient's spiritual, cultural, and educational needs considered and patient agreeable to plan of care and goals.     Assessment & Plan   Assessment:             Plan:      Goals:   Active       Range of Motion       Patient will achieve Left knee ROM of  0-120 degrees  (Progressing)       Start:  02/03/25    Expected End:  02/17/25               Strength       Patient will achieve left knee flexion strength of 4+/5 (Met)       Start:  02/03/25    Expected End:  02/17/25    Resolved:  02/03/25         Patient will achieve left knee extension strength of 4+/5 (Met)       Start:  02/03/25    Expected End:  02/17/25    Resolved:  02/03/25           Resolved       Functional outcome       Patient will show a significant change in FOTO patient-reported outcome tool to demonstrate subjective improvement (Met)       Start:  02/03/25    Expected End:  02/17/25    Resolved:  02/05/25            Pain       Patient will report pain of 0/10 demonstrating a reduction of overall pain (Met)       Start:  02/03/25    Expected End:  02/17/25    Resolved:  02/03/25         Patient will report a 2 point reduction in pain while performing sit to stand and walking. (Met)       Start:  02/03/25    Expected End:  02/17/25    Resolved:   02/03/25             Kevin Fernandez PT, DPT

## 2025-02-07 ENCOUNTER — CLINICAL SUPPORT (OUTPATIENT)
Dept: REHABILITATION | Facility: OTHER | Age: 75
End: 2025-02-07
Payer: MEDICARE

## 2025-02-07 DIAGNOSIS — M25.662 DECREASED RANGE OF MOTION OF LEFT KNEE: ICD-10-CM

## 2025-02-07 DIAGNOSIS — G89.29 CHRONIC PAIN OF LEFT KNEE: ICD-10-CM

## 2025-02-07 DIAGNOSIS — M25.562 CHRONIC PAIN OF LEFT KNEE: ICD-10-CM

## 2025-02-07 DIAGNOSIS — M17.12 PRIMARY OSTEOARTHRITIS OF LEFT KNEE: Primary | ICD-10-CM

## 2025-02-07 PROCEDURE — 97530 THERAPEUTIC ACTIVITIES: CPT | Mod: PN

## 2025-02-07 NOTE — PROGRESS NOTES
Outpatient Rehab    Physical Therapy Discharge    Patient Name: Manju Aranda  MRN: [id number]  YOB: 1950  Today's Date: 2/7/2025    Therapy Diagnosis:   Encounter Diagnoses   Name Primary?    Primary osteoarthritis of left knee Yes    Decreased range of motion of left knee     Chronic pain of left knee      Physician: Mack Torre III, *    Physician Orders: PT Eval and Treat   Medical Diagnosis from Referral:  Primary osteoarthritis of left knee [M17.12]   Evaluation Date: 12/26/2024  Authorization Period Expiration: 5/24/2025  Plan of Care Expiration: 2/14/2025  Visit # / Visits authorized: 10/10  FOTO 1st follow up: complete  FOTO 2nd follow up: complete     Time In: 0955   Time Out: 1040  Total Time: 45   Total Billable Time: 40         Subjective      Pain     Patient reports a current pain level of 0/10.        Medial knee        Objective   Knee Observations  Right Knee Observations  Not Present: Straight Leg Raise Extensor Lag  Left Knee Observations  Not Present: Straight Leg Raise Extensor Lag             Knee Range of Motion   Right Knee   Active (deg) Passive (deg) Pain   Flexion 120 122     Extension -2 0         Left Knee   Active (deg) Passive (deg) Pain   Flexion 110 115     Extension 3 5                        Knee Strength   Right Strength Right Pain Left Strength Left  Pain   Flexion (S2) 5   4+     Prone Flexion 5   4+     Extension (L3) 5   4+       Knee Extensor Lag  No Lag: Right and Left              Fall Risk  Functional mobility test results suggest the patient is not: At Risk for Falls  Timed Up & Go (TUG)  Time: 12.3 seconds  Observations: Slow tentative pace  An older adult who takes >=12 seconds to complete the TUG is at risk for falling.       Sit to Stand Testing      The patient completed 10 repetitions of a sit to stand transfer in 30 seconds. UE support      Unremarkable Mobility Test Results  Unremarkable: Right Forward Step Up 6 Inches and Left Forward  "Step Up 6 Inches    Six Inch Forward Step Up - Right  Trunk Forward lean is present with this activity.            Ambulation Assistance Required  Surface With  Assistive Device Without Assistive Device Details   Level Independent Independent      Uneven Independent Independent     Curb Independent Independent       Stairs Assistance Required   Assistance Level Upper Extremity Support Pattern   Ascending Independent Two rails Reciprocal   Descending Independent Two rails Reciprocal             Intake Outcome Measure for FOTO Survey    Therapist reviewed FOTO scores for Manju Aranda on 2/7/2025.   FOTO report - see Media section or FOTO account episode details.     Intake Score:  %  Survey Score 1:  %  Survey Score 2:  %    Treatment:  Manual Therapy  Manual Therapy Activity 1: Patellofemoral joint mobilization Grade III    Balance/Neuromuscular Re-Education  Balance/Neuromuscular Re-Education Activity 1: Standing Lateral Walks on Plinth with Upper Extremity support 3 laps  Balance/Neuromuscular Re-Education Activity 2: Quad Set 5' with towel under heel  Balance/Neuromuscular Re-Education Activity 3: Straight Leg Raise 30x's  Balance/Neuromuscular Re-Education Activity 4: TKE with Band 30x's Orange  Balance/Neuromuscular Re-Education Activity 5: Long Arc Quad 30x's  Balance/Neuromuscular Re-Education Activity 6: Short Arc Quad 30x's with strap hold    Therapeutic Activity  Therapeutic Activity 1: Recumbent Bike 8'  Therapeutic Activity 2: Heel Prop 5' 5#  Therapeutic Activity 3: Shuttle Squats 2 cords 3'  Therapeutic Activity 4: Sit to Stand 3 x 5 reps  Therapeutic Activity 5: Step Up 6" 2 x 10 reps  Therapeutic Activity 6: Mini squats with B HR 2x10 repetitions  Therapeutic Activity 7: +Heel raises with B HR support 3x10 repetitions    Patient's spiritual, cultural, and educational needs considered and patient agreeable to plan of care and goals.     Assessment & Plan   Assessment: Patient will benefit from " skilled physical therapy services to address deficits listed in initial evaluation and will be continually re-assessed and progressed as tolerated. Pateint and therapist, through shared decision making; determined that physical therapy plan of care should be discharged at this time and patient verbally agreed to continue treatment. Patient with good knee strength, ROM, and functional mobility.  Evaluation/Treatment Tolerance: Patient tolerated treatment well        Plan: Discharge and follow up as needed.    Goals:   Active       Range of Motion       Patient will achieve Left knee ROM of  0-120 degrees  (Progressing)       Start:  02/03/25    Expected End:  02/17/25               Strength       Patient will achieve left knee flexion strength of 4+/5 (Met)       Start:  02/03/25    Expected End:  02/17/25    Resolved:  02/03/25         Patient will achieve left knee extension strength of 4+/5 (Met)       Start:  02/03/25    Expected End:  02/17/25    Resolved:  02/03/25           Resolved       Functional outcome       Patient will show a significant change in FOTO patient-reported outcome tool to demonstrate subjective improvement (Met)       Start:  02/03/25    Expected End:  02/17/25    Resolved:  02/05/25            Pain       Patient will report pain of 0/10 demonstrating a reduction of overall pain (Met)       Start:  02/03/25    Expected End:  02/17/25    Resolved:  02/03/25         Patient will report a 2 point reduction in pain while performing sit to stand and walking. (Met)       Start:  02/03/25    Expected End:  02/17/25    Resolved:  02/03/25             Kevin Fernandez PT, DPT

## 2025-02-10 ENCOUNTER — HOSPITAL ENCOUNTER (OUTPATIENT)
Dept: RADIOLOGY | Facility: OTHER | Age: 75
Discharge: HOME | End: 2025-02-10
Attending: STUDENT IN AN ORGANIZED HEALTH CARE EDUCATION/TRAINING PROGRAM
Payer: MEDICARE

## 2025-02-10 DIAGNOSIS — Z12.31 ENCOUNTER FOR SCREENING MAMMOGRAM FOR MALIGNANT NEOPLASM OF BREAST: ICD-10-CM

## 2025-02-10 PROCEDURE — 77063 BREAST TOMOSYNTHESIS BI: CPT | Mod: 26,,, | Performed by: RADIOLOGY

## 2025-02-10 PROCEDURE — 77067 SCR MAMMO BI INCL CAD: CPT | Mod: 26,,, | Performed by: RADIOLOGY

## 2025-02-10 PROCEDURE — 77063 BREAST TOMOSYNTHESIS BI: CPT | Mod: TC,HCNC

## 2025-02-12 ENCOUNTER — LAB VISIT (OUTPATIENT)
Dept: LAB | Facility: OTHER | Age: 75
End: 2025-02-12
Payer: MEDICARE

## 2025-02-12 DIAGNOSIS — D47.2 SMOLDERING MULTIPLE MYELOMA (SMM): ICD-10-CM

## 2025-02-12 DIAGNOSIS — D72.819 CHRONIC LEUKOPENIA: ICD-10-CM

## 2025-02-12 DIAGNOSIS — D63.8 ANEMIA OF CHRONIC DISEASE: ICD-10-CM

## 2025-02-12 LAB
ALBUMIN SERPL BCP-MCNC: 3.7 G/DL (ref 3.5–5.2)
ALP SERPL-CCNC: 101 U/L (ref 40–150)
ALT SERPL W/O P-5'-P-CCNC: 16 U/L (ref 10–44)
ANION GAP SERPL CALC-SCNC: 8 MMOL/L (ref 8–16)
AST SERPL-CCNC: 14 U/L (ref 10–40)
BASOPHILS # BLD AUTO: 0.01 K/UL (ref 0–0.2)
BASOPHILS NFR BLD: 0.2 % (ref 0–1.9)
BILIRUB SERPL-MCNC: 0.6 MG/DL (ref 0.1–1)
BUN SERPL-MCNC: 17 MG/DL (ref 8–23)
CALCIUM SERPL-MCNC: 10.1 MG/DL (ref 8.7–10.5)
CHLORIDE SERPL-SCNC: 105 MMOL/L (ref 95–110)
CO2 SERPL-SCNC: 27 MMOL/L (ref 23–29)
CREAT SERPL-MCNC: 0.9 MG/DL (ref 0.5–1.4)
DIFFERENTIAL METHOD BLD: ABNORMAL
EOSINOPHIL # BLD AUTO: 0 K/UL (ref 0–0.5)
EOSINOPHIL NFR BLD: 0 % (ref 0–8)
ERYTHROCYTE [DISTWIDTH] IN BLOOD BY AUTOMATED COUNT: 11.5 % (ref 11.5–14.5)
EST. GFR  (NO RACE VARIABLE): >60 ML/MIN/1.73 M^2
GLUCOSE SERPL-MCNC: 97 MG/DL (ref 70–110)
HCT VFR BLD AUTO: 36.9 % (ref 37–48.5)
HGB BLD-MCNC: 11.5 G/DL (ref 12–16)
IGA SERPL-MCNC: 114 MG/DL (ref 40–350)
IGG SERPL-MCNC: 1747 MG/DL (ref 650–1600)
IGM SERPL-MCNC: 41 MG/DL (ref 50–300)
IMM GRANULOCYTES # BLD AUTO: 0.04 K/UL (ref 0–0.04)
IMM GRANULOCYTES NFR BLD AUTO: 0.7 % (ref 0–0.5)
LYMPHOCYTES # BLD AUTO: 0.7 K/UL (ref 1–4.8)
LYMPHOCYTES NFR BLD: 12.4 % (ref 18–48)
MCH RBC QN AUTO: 31.2 PG (ref 27–31)
MCHC RBC AUTO-ENTMCNC: 31.2 G/DL (ref 32–36)
MCV RBC AUTO: 100 FL (ref 82–98)
MONOCYTES # BLD AUTO: 0.3 K/UL (ref 0.3–1)
MONOCYTES NFR BLD: 4.4 % (ref 4–15)
NEUTROPHILS # BLD AUTO: 4.7 K/UL (ref 1.8–7.7)
NEUTROPHILS NFR BLD: 82.3 % (ref 38–73)
NRBC BLD-RTO: 0 /100 WBC
PLATELET # BLD AUTO: 265 K/UL (ref 150–450)
PMV BLD AUTO: 9.8 FL (ref 9.2–12.9)
POTASSIUM SERPL-SCNC: 4.6 MMOL/L (ref 3.5–5.1)
PROT SERPL-MCNC: 7.8 G/DL (ref 6–8.4)
RBC # BLD AUTO: 3.69 M/UL (ref 4–5.4)
SODIUM SERPL-SCNC: 140 MMOL/L (ref 136–145)
WBC # BLD AUTO: 5.66 K/UL (ref 3.9–12.7)

## 2025-02-12 PROCEDURE — 85025 COMPLETE CBC W/AUTO DIFF WBC: CPT

## 2025-02-12 PROCEDURE — 86334 IMMUNOFIX E-PHORESIS SERUM: CPT

## 2025-02-12 PROCEDURE — 36415 COLL VENOUS BLD VENIPUNCTURE: CPT

## 2025-02-12 PROCEDURE — 83521 IG LIGHT CHAINS FREE EACH: CPT | Mod: 59

## 2025-02-12 PROCEDURE — 82784 ASSAY IGA/IGD/IGG/IGM EACH: CPT | Mod: 59

## 2025-02-12 PROCEDURE — 84165 PROTEIN E-PHORESIS SERUM: CPT

## 2025-02-12 PROCEDURE — 80053 COMPREHEN METABOLIC PANEL: CPT

## 2025-02-13 LAB
ALBUMIN SERPL ELPH-MCNC: 3.73 G/DL (ref 3.35–5.55)
ALPHA1 GLOB SERPL ELPH-MCNC: 0.32 G/DL (ref 0.17–0.41)
ALPHA2 GLOB SERPL ELPH-MCNC: 0.69 G/DL (ref 0.43–0.99)
B-GLOBULIN SERPL ELPH-MCNC: 0.74 G/DL (ref 0.5–1.1)
GAMMA GLOB SERPL ELPH-MCNC: 1.52 G/DL (ref 0.67–1.58)
INTERPRETATION SERPL IFE-IMP: NORMAL
KAPPA LC SER QL IA: 8.2 MG/DL (ref 0.33–1.94)
KAPPA LC/LAMBDA SER IA: 6.26 (ref 0.26–1.65)
LAMBDA LC SER QL IA: 1.31 MG/DL (ref 0.57–2.63)
PROT SERPL-MCNC: 7 G/DL (ref 6–8.4)

## 2025-02-16 LAB
PATHOLOGIST INTERPRETATION IFE: NORMAL
PATHOLOGIST INTERPRETATION SPE: NORMAL

## 2025-02-17 ENCOUNTER — RESULTS FOLLOW-UP (OUTPATIENT)
Dept: HEMATOLOGY/ONCOLOGY | Facility: CLINIC | Age: 75
End: 2025-02-17

## 2025-02-18 ENCOUNTER — OFFICE VISIT (OUTPATIENT)
Dept: ORTHOPEDICS | Facility: CLINIC | Age: 75
End: 2025-02-18
Payer: MEDICARE

## 2025-02-18 DIAGNOSIS — Z96.652 S/P TOTAL KNEE REPLACEMENT, LEFT: Primary | ICD-10-CM

## 2025-02-18 DIAGNOSIS — M17.11 PRIMARY OSTEOARTHRITIS OF RIGHT KNEE: ICD-10-CM

## 2025-02-18 NOTE — PROCEDURES
Large Joint Aspiration/Injection:: R knee    Date/Time: 2/18/2025 7:40 AM    Performed by: Viktor Pappas PA-C  Authorized by: Viktor Pappas PA-C    Consent Done?:  Yes (Verbal)  Indications: Right knee pain.  Timeout: prior to procedure the correct patient, procedure, and site was verified    Prep: patient was prepped and draped in usual sterile fashion    Local anesthetic:  Topical anesthetic    Details:  Needle Size:  22 G  Approach:  Anterolateral  Location:  Knee  Site:  R knee  Medications:  48 mg hylan g-f 20 48 mg/6 mL; 48 mg Synvisc One  Patient tolerance:  Patient tolerated the procedure well with no immediate complications

## 2025-02-18 NOTE — PROGRESS NOTES
HPI:  Manju Aranda presents for an 8 week post-operative ROM check following a left total knee arthroplasty performed by Dr. Torre on 12/23/2024. Patient reports an overall satisfactory recovery.  Patient was last seen 2 weeks ago in which her ROM was 0-105.  Patient also presents today for a right knee intra-articular Synvisc-One injection.    Today, patient is still endorses pain of the left knee for which she is taking acetaminophen 3 times daily.  She endorses that her pain has continued due to increased activity.    Medications:  HSS steroid protocol and Tylenol 3x/day    Assistive Devices:  Cane for long distances   PT:  Discharged from [facility] on 02/07/2025   Limitations:  None    Exam:   There were no vitals taken for this visit.   Gait: normal with assistive device  Incision: healed, clean, and dry without drainage or erythema   Stability:  Knee stable anterior-posterior varus and valgus stresses, no extensor lag    Current ROM:  0-112  Pre-op ROM:  0-115  PT ROM:  3-115    A/P:  8 weeks s/p left total knee arthroplasty  Right knee primary osteoarthritis    - Patient's recovery is progressing well with ROM of 0-112 and a well healing incision.   - Outpatient PT: Discharged from [facility] on 02/07/2025   - Pain medication:  No refill needed  - Continue taking acetaminophen 650 mg as needed for pain.    Right knee intra-articular Synvisc-One injection administered today.  - Follow up in 4 weeks with Dr. Torre. Pt will call clinic with any problems/concerns.

## 2025-03-06 ENCOUNTER — OFFICE VISIT (OUTPATIENT)
Dept: OPHTHALMOLOGY | Facility: CLINIC | Age: 75
End: 2025-03-06
Payer: MEDICARE

## 2025-03-06 ENCOUNTER — CLINICAL SUPPORT (OUTPATIENT)
Dept: OPHTHALMOLOGY | Facility: CLINIC | Age: 75
End: 2025-03-06
Payer: MEDICARE

## 2025-03-06 DIAGNOSIS — H43.813 VITREOUS DEGENERATION OF BOTH EYES: ICD-10-CM

## 2025-03-06 DIAGNOSIS — H25.13 AGE-RELATED NUCLEAR CATARACT OF BOTH EYES: ICD-10-CM

## 2025-03-06 DIAGNOSIS — H35.341 MACULAR HOLE, RIGHT: Primary | ICD-10-CM

## 2025-03-06 PROCEDURE — 4010F ACE/ARB THERAPY RXD/TAKEN: CPT | Mod: HCNC,CPTII,S$GLB, | Performed by: OPHTHALMOLOGY

## 2025-03-06 PROCEDURE — 1159F MED LIST DOCD IN RCRD: CPT | Mod: HCNC,CPTII,S$GLB, | Performed by: OPHTHALMOLOGY

## 2025-03-06 PROCEDURE — 1160F RVW MEDS BY RX/DR IN RCRD: CPT | Mod: HCNC,CPTII,S$GLB, | Performed by: OPHTHALMOLOGY

## 2025-03-06 PROCEDURE — 99999 PR PBB SHADOW E&M-EST. PATIENT-LVL II: CPT | Mod: PBBFAC,HCNC,, | Performed by: OPHTHALMOLOGY

## 2025-03-06 PROCEDURE — 92134 CPTRZ OPH DX IMG PST SGM RTA: CPT | Mod: HCNC,S$GLB,, | Performed by: OPHTHALMOLOGY

## 2025-03-06 PROCEDURE — 99213 OFFICE O/P EST LOW 20 MIN: CPT | Mod: HCNC,S$GLB,, | Performed by: OPHTHALMOLOGY

## 2025-03-06 PROCEDURE — 92202 OPSCPY EXTND ON/MAC DRAW: CPT | Mod: 59,HCNC,S$GLB, | Performed by: OPHTHALMOLOGY

## 2025-03-06 PROCEDURE — 1126F AMNT PAIN NOTED NONE PRSNT: CPT | Mod: HCNC,CPTII,S$GLB, | Performed by: OPHTHALMOLOGY

## 2025-03-06 NOTE — PROGRESS NOTES
HPI    DLS:12/10/2024 Imani    Here for 3 month DFE/OCTm.  No changes since last seen.  Last edited by Michela Pyle MA on 3/6/2025  9:54 AM.         A/P    ICD-10-CM ICD-9-CM   1. Macular hole, right  H35.341 362.54   2. Vitreous degeneration of both eyes  H43.813 379.21   3. Age-related nuclear cataract of both eyes  H25.13 366.16         1. Macular hole, right  Here for retina f/u    Exam notable for stable mac hole OD with vitreous attachment     Plan: continue with plan for march 10th under general anesthesia      2. Vitreous degeneration of both eyes  No RT/RD but has mac hole attachment OD  Plan: Observation OS for now, counseled on risk future mac hole, plan as above OD    3. Age-related nuclear cataract of both eyes  Mod NS/CC  Plan: Observation for now    RTC post-op        I saw and examined the patient and reviewed in detail the findings documented. The final examination findings, image interpretations which have been independently interpreted, and plan as documented in the record represent my personal judgment and conclusions.    Victoriano Diallo MD  Vitreoretinal Surgery   [facility]

## 2025-03-06 NOTE — PRE-PROCEDURE INSTRUCTIONS
PreOp Instructions given:   - Verbal medication information (what to hold and what to take)   - NPO guidelines 2300  - Arrival place directions given; time to be given the day before procedure by the   Surgeon's Office [facility]  - Bathing with antibacterial soap   - Don't wear any jewelry or bring any valuables AM of surgery   - No makeup or moisturizer to face   - No perfume/cologne, powder, lotions or aftershave   Pt. verbalized understanding.   Pt denies any h/o Anesthesia/Sedation complications or side effects.  Patient does not know arrival time.  Explained that this information comes from the surgeon's office and if they haven't heard from them by 2 or 3 pm to call the office.  Patient stated an understanding.

## 2025-03-07 ENCOUNTER — TELEPHONE (OUTPATIENT)
Dept: OPHTHALMOLOGY | Facility: CLINIC | Age: 75
End: 2025-03-07
Payer: MEDICARE

## 2025-03-08 RX ORDER — TETRACAINE HYDROCHLORIDE 5 MG/ML
1 SOLUTION OPHTHALMIC
OUTPATIENT
Start: 2025-03-08

## 2025-03-08 RX ORDER — PHENYLEPHRINE HYDROCHLORIDE 25 MG/ML
1 SOLUTION/ DROPS OPHTHALMIC
OUTPATIENT
Start: 2025-03-08

## 2025-03-08 RX ORDER — SODIUM CHLORIDE 0.9 % (FLUSH) 0.9 %
10 SYRINGE (ML) INJECTION
Status: SHIPPED | OUTPATIENT
Start: 2025-03-08

## 2025-03-08 RX ORDER — CYCLOPENTOLATE HYDROCHLORIDE 10 MG/ML
1 SOLUTION/ DROPS OPHTHALMIC
OUTPATIENT
Start: 2025-03-08

## 2025-03-08 RX ORDER — MOXIFLOXACIN 5 MG/ML
1 SOLUTION/ DROPS OPHTHALMIC
OUTPATIENT
Start: 2025-03-08

## 2025-03-08 RX ORDER — PREDNISOLONE ACETATE 10 MG/ML
1 SUSPENSION/ DROPS OPHTHALMIC
OUTPATIENT
Start: 2025-03-08

## 2025-03-10 ENCOUNTER — ANESTHESIA EVENT (OUTPATIENT)
Dept: SURGERY | Facility: HOSPITAL | Age: 75
End: 2025-03-10
Payer: MEDICARE

## 2025-03-10 ENCOUNTER — HOSPITAL ENCOUNTER (OUTPATIENT)
Facility: HOSPITAL | Age: 75
Discharge: HOME | End: 2025-03-10
Attending: OPHTHALMOLOGY | Admitting: OPHTHALMOLOGY
Payer: MEDICARE

## 2025-03-10 ENCOUNTER — ANESTHESIA (OUTPATIENT)
Dept: SURGERY | Facility: HOSPITAL | Age: 75
End: 2025-03-10
Payer: MEDICARE

## 2025-03-10 VITALS
DIASTOLIC BLOOD PRESSURE: 56 MMHG | TEMPERATURE: 97 F | WEIGHT: 252 LBS | HEIGHT: 70 IN | BODY MASS INDEX: 36.08 KG/M2 | HEART RATE: 76 BPM | SYSTOLIC BLOOD PRESSURE: 103 MMHG | OXYGEN SATURATION: 93 % | RESPIRATION RATE: 13 BRPM

## 2025-03-10 DIAGNOSIS — H35.341 MACULAR HOLE, RIGHT: ICD-10-CM

## 2025-03-10 PROCEDURE — 67042 VIT FOR MACULAR HOLE: CPT | Mod: HCNC,RT,, | Performed by: OPHTHALMOLOGY

## 2025-03-10 PROCEDURE — 63600175 PHARM REV CODE 636 W HCPCS: Mod: HCNC | Performed by: NURSE ANESTHETIST, CERTIFIED REGISTERED

## 2025-03-10 PROCEDURE — D9220A PRA ANESTHESIA: Mod: HCNC,CRNA,, | Performed by: NURSE ANESTHETIST, CERTIFIED REGISTERED

## 2025-03-10 PROCEDURE — 25000003 PHARM REV CODE 250: Mod: HCNC | Performed by: OPHTHALMOLOGY

## 2025-03-10 PROCEDURE — D9220A PRA ANESTHESIA: Mod: HCNC,ANES,, | Performed by: STUDENT IN AN ORGANIZED HEALTH CARE EDUCATION/TRAINING PROGRAM

## 2025-03-10 PROCEDURE — 27201423 OPTIME MED/SURG SUP & DEVICES STERILE SUPPLY: Mod: HCNC | Performed by: OPHTHALMOLOGY

## 2025-03-10 PROCEDURE — 36000708 HC OR TIME LEV III 1ST 15 MIN: Mod: HCNC | Performed by: OPHTHALMOLOGY

## 2025-03-10 PROCEDURE — 37000009 HC ANESTHESIA EA ADD 15 MINS: Mod: HCNC | Performed by: OPHTHALMOLOGY

## 2025-03-10 PROCEDURE — 63600175 PHARM REV CODE 636 W HCPCS: Mod: HCNC | Performed by: OPHTHALMOLOGY

## 2025-03-10 PROCEDURE — 36000709 HC OR TIME LEV III EA ADD 15 MIN: Mod: HCNC | Performed by: OPHTHALMOLOGY

## 2025-03-10 PROCEDURE — 71000015 HC POSTOP RECOV 1ST HR: Mod: HCNC | Performed by: OPHTHALMOLOGY

## 2025-03-10 PROCEDURE — 37000008 HC ANESTHESIA 1ST 15 MINUTES: Mod: HCNC | Performed by: OPHTHALMOLOGY

## 2025-03-10 PROCEDURE — 25000003 PHARM REV CODE 250: Mod: HCNC | Performed by: NURSE ANESTHETIST, CERTIFIED REGISTERED

## 2025-03-10 PROCEDURE — 71000044 HC DOSC ROUTINE RECOVERY FIRST HOUR: Mod: HCNC | Performed by: OPHTHALMOLOGY

## 2025-03-10 RX ORDER — FENTANYL CITRATE 50 UG/ML
INJECTION, SOLUTION INTRAMUSCULAR; INTRAVENOUS
Status: DISCONTINUED | OUTPATIENT
Start: 2025-03-10 | End: 2025-03-10

## 2025-03-10 RX ORDER — VANCOMYCIN HYDROCHLORIDE 500 MG/10ML
INJECTION, POWDER, LYOPHILIZED, FOR SOLUTION INTRAVENOUS
Status: DISCONTINUED
Start: 2025-03-10 | End: 2025-03-10 | Stop reason: HOSPADM

## 2025-03-10 RX ORDER — PREDNISOLONE ACETATE 10 MG/ML
1 SUSPENSION/ DROPS OPHTHALMIC
Status: DISCONTINUED | OUTPATIENT
Start: 2025-03-10 | End: 2025-03-10 | Stop reason: HOSPADM

## 2025-03-10 RX ORDER — NEOMYCIN SULFATE, POLYMYXIN B SULFATE, AND DEXAMETHASONE 3.5; 10000; 1 MG/G; [USP'U]/G; MG/G
OINTMENT OPHTHALMIC
Status: DISCONTINUED | OUTPATIENT
Start: 2025-03-10 | End: 2025-03-10 | Stop reason: HOSPADM

## 2025-03-10 RX ORDER — SODIUM CHLORIDE 0.9 % (FLUSH) 0.9 %
10 SYRINGE (ML) INJECTION
Status: DISCONTINUED | OUTPATIENT
Start: 2025-03-10 | End: 2025-03-10 | Stop reason: HOSPADM

## 2025-03-10 RX ORDER — CYCLOPENTOLATE HYDROCHLORIDE 10 MG/ML
1 SOLUTION/ DROPS OPHTHALMIC
Status: DISCONTINUED | OUTPATIENT
Start: 2025-03-10 | End: 2025-03-10 | Stop reason: HOSPADM

## 2025-03-10 RX ORDER — FENTANYL CITRATE 50 UG/ML
25 INJECTION, SOLUTION INTRAMUSCULAR; INTRAVENOUS EVERY 5 MIN PRN
Status: DISCONTINUED | OUTPATIENT
Start: 2025-03-10 | End: 2025-03-10 | Stop reason: HOSPADM

## 2025-03-10 RX ORDER — GLUCAGON 1 MG
1 KIT INJECTION
Status: DISCONTINUED | OUTPATIENT
Start: 2025-03-10 | End: 2025-03-10 | Stop reason: HOSPADM

## 2025-03-10 RX ORDER — ROCURONIUM BROMIDE 10 MG/ML
INJECTION, SOLUTION INTRAVENOUS
Status: DISCONTINUED | OUTPATIENT
Start: 2025-03-10 | End: 2025-03-10

## 2025-03-10 RX ORDER — ACETAZOLAMIDE 500 MG/5ML
INJECTION, POWDER, LYOPHILIZED, FOR SOLUTION INTRAVENOUS
Status: DISCONTINUED
Start: 2025-03-10 | End: 2025-03-10 | Stop reason: HOSPADM

## 2025-03-10 RX ORDER — PROPOFOL 10 MG/ML
VIAL (ML) INTRAVENOUS
Status: DISCONTINUED | OUTPATIENT
Start: 2025-03-10 | End: 2025-03-10

## 2025-03-10 RX ORDER — TRIAMCINOLONE ACETONIDE 40 MG/ML
INJECTION, SUSPENSION INTRA-ARTICULAR; INTRAMUSCULAR
Status: DISCONTINUED | OUTPATIENT
Start: 2025-03-10 | End: 2025-03-10 | Stop reason: HOSPADM

## 2025-03-10 RX ORDER — TETRACAINE HYDROCHLORIDE 5 MG/ML
1 SOLUTION OPHTHALMIC
Status: DISCONTINUED | OUTPATIENT
Start: 2025-03-10 | End: 2025-03-10 | Stop reason: HOSPADM

## 2025-03-10 RX ORDER — DEXAMETHASONE SODIUM PHOSPHATE 4 MG/ML
INJECTION, SOLUTION INTRA-ARTICULAR; INTRALESIONAL; INTRAMUSCULAR; INTRAVENOUS; SOFT TISSUE
Status: DISCONTINUED | OUTPATIENT
Start: 2025-03-10 | End: 2025-03-10

## 2025-03-10 RX ORDER — LIDOCAINE HYDROCHLORIDE 10 MG/ML
1 INJECTION, SOLUTION EPIDURAL; INFILTRATION; INTRACAUDAL; PERINEURAL ONCE AS NEEDED
Status: COMPLETED | OUTPATIENT
Start: 2025-03-10 | End: 2025-03-10

## 2025-03-10 RX ORDER — TRIAMCINOLONE ACETONIDE 40 MG/ML
INJECTION, SUSPENSION INTRA-ARTICULAR; INTRAMUSCULAR
Status: DISCONTINUED
Start: 2025-03-10 | End: 2025-03-10 | Stop reason: HOSPADM

## 2025-03-10 RX ORDER — DEXAMETHASONE SODIUM PHOSPHATE 4 MG/ML
INJECTION, SOLUTION INTRA-ARTICULAR; INTRALESIONAL; INTRAMUSCULAR; INTRAVENOUS; SOFT TISSUE
Status: DISCONTINUED
Start: 2025-03-10 | End: 2025-03-10 | Stop reason: HOSPADM

## 2025-03-10 RX ORDER — LIDOCAINE HYDROCHLORIDE 20 MG/ML
INJECTION INTRAVENOUS
Status: DISCONTINUED | OUTPATIENT
Start: 2025-03-10 | End: 2025-03-10

## 2025-03-10 RX ORDER — EPINEPHRINE 1 MG/ML
INJECTION, SOLUTION, CONCENTRATE INTRAVENOUS
Status: DISCONTINUED | OUTPATIENT
Start: 2025-03-10 | End: 2025-03-10 | Stop reason: HOSPADM

## 2025-03-10 RX ORDER — ACETAZOLAMIDE 500 MG/5ML
INJECTION, POWDER, LYOPHILIZED, FOR SOLUTION INTRAVENOUS
Status: DISCONTINUED | OUTPATIENT
Start: 2025-03-10 | End: 2025-03-10

## 2025-03-10 RX ORDER — DEXAMETHASONE SODIUM PHOSPHATE 4 MG/ML
INJECTION, SOLUTION INTRA-ARTICULAR; INTRALESIONAL; INTRAMUSCULAR; INTRAVENOUS; SOFT TISSUE
Status: DISCONTINUED | OUTPATIENT
Start: 2025-03-10 | End: 2025-03-10 | Stop reason: HOSPADM

## 2025-03-10 RX ORDER — INDOCYANINE GREEN AND WATER 25 MG
KIT INJECTION
Status: DISCONTINUED
Start: 2025-03-10 | End: 2025-03-10 | Stop reason: HOSPADM

## 2025-03-10 RX ORDER — ONDANSETRON HYDROCHLORIDE 2 MG/ML
INJECTION, SOLUTION INTRAVENOUS
Status: DISCONTINUED | OUTPATIENT
Start: 2025-03-10 | End: 2025-03-10

## 2025-03-10 RX ORDER — PHENYLEPHRINE HYDROCHLORIDE 25 MG/ML
1 SOLUTION/ DROPS OPHTHALMIC
Status: DISCONTINUED | OUTPATIENT
Start: 2025-03-10 | End: 2025-03-10 | Stop reason: HOSPADM

## 2025-03-10 RX ORDER — MOXIFLOXACIN 5 MG/ML
1 SOLUTION/ DROPS OPHTHALMIC
Status: DISCONTINUED | OUTPATIENT
Start: 2025-03-10 | End: 2025-03-10 | Stop reason: HOSPADM

## 2025-03-10 RX ORDER — EPINEPHRINE 1 MG/ML
INJECTION, SOLUTION, CONCENTRATE INTRAVENOUS
Status: DISCONTINUED
Start: 2025-03-10 | End: 2025-03-10 | Stop reason: HOSPADM

## 2025-03-10 RX ORDER — LIDOCAINE HYDROCHLORIDE 20 MG/ML
INJECTION, SOLUTION EPIDURAL; INFILTRATION; INTRACAUDAL; PERINEURAL
Status: DISCONTINUED
Start: 2025-03-10 | End: 2025-03-10 | Stop reason: WASHOUT

## 2025-03-10 RX ORDER — INDOCYANINE GREEN AND WATER 25 MG
KIT INJECTION
Status: DISCONTINUED | OUTPATIENT
Start: 2025-03-10 | End: 2025-03-10 | Stop reason: HOSPADM

## 2025-03-10 RX ORDER — NEOMYCIN SULFATE, POLYMYXIN B SULFATE, AND DEXAMETHASONE 3.5; 10000; 1 MG/G; [USP'U]/G; MG/G
OINTMENT OPHTHALMIC
Status: DISCONTINUED
Start: 2025-03-10 | End: 2025-03-10 | Stop reason: HOSPADM

## 2025-03-10 RX ORDER — SODIUM CHLORIDE 9 MG/ML
INJECTION, SOLUTION INTRAVENOUS CONTINUOUS
Status: DISCONTINUED | OUTPATIENT
Start: 2025-03-10 | End: 2025-03-10 | Stop reason: HOSPADM

## 2025-03-10 RX ORDER — BUPIVACAINE HYDROCHLORIDE 7.5 MG/ML
INJECTION, SOLUTION EPIDURAL; RETROBULBAR
Status: DISCONTINUED
Start: 2025-03-10 | End: 2025-03-10 | Stop reason: WASHOUT

## 2025-03-10 RX ORDER — DEXMEDETOMIDINE HYDROCHLORIDE 100 UG/ML
INJECTION, SOLUTION INTRAVENOUS
Status: DISCONTINUED | OUTPATIENT
Start: 2025-03-10 | End: 2025-03-10

## 2025-03-10 RX ADMIN — PROPOFOL 180 MG: 10 INJECTION, EMULSION INTRAVENOUS at 07:03

## 2025-03-10 RX ADMIN — MOXIFLOXACIN OPHTHALMIC 1 DROP: 5 SOLUTION/ DROPS OPHTHALMIC at 06:03

## 2025-03-10 RX ADMIN — ONDANSETRON 4 MG: 2 INJECTION INTRAMUSCULAR; INTRAVENOUS at 08:03

## 2025-03-10 RX ADMIN — LIDOCAINE HYDROCHLORIDE 100 MG: 20 INJECTION INTRAVENOUS at 07:03

## 2025-03-10 RX ADMIN — SUGAMMADEX 200 MG: 100 INJECTION, SOLUTION INTRAVENOUS at 08:03

## 2025-03-10 RX ADMIN — SODIUM CHLORIDE: 9 INJECTION, SOLUTION INTRAVENOUS at 06:03

## 2025-03-10 RX ADMIN — CYCLOPENTOLATE HYDROCHLORIDE 1 DROP: 10 SOLUTION/ DROPS OPHTHALMIC at 06:03

## 2025-03-10 RX ADMIN — PREDNISOLONE ACETATE 1 DROP: 10 SUSPENSION/ DROPS OPHTHALMIC at 06:03

## 2025-03-10 RX ADMIN — FENTANYL CITRATE 50 MCG: 50 INJECTION, SOLUTION INTRAMUSCULAR; INTRAVENOUS at 07:03

## 2025-03-10 RX ADMIN — TETRACAINE HYDROCHLORIDE 1 DROP: 5 SOLUTION OPHTHALMIC at 06:03

## 2025-03-10 RX ADMIN — PHENYLEPHRINE HYDROCHLORIDE 1 DROP: 25 SOLUTION/ DROPS OPHTHALMIC at 06:03

## 2025-03-10 RX ADMIN — LIDOCAINE HYDROCHLORIDE 10 MG: 10 INJECTION, SOLUTION EPIDURAL; INFILTRATION; INTRACAUDAL; PERINEURAL at 06:03

## 2025-03-10 RX ADMIN — DEXMEDETOMIDINE 4 MCG: 100 INJECTION, SOLUTION, CONCENTRATE INTRAVENOUS at 07:03

## 2025-03-10 RX ADMIN — ROCURONIUM BROMIDE 50 MG: 10 INJECTION, SOLUTION INTRAVENOUS at 07:03

## 2025-03-10 RX ADMIN — ACETAZOLAMIDE 500 MG: 500 INJECTION, POWDER, LYOPHILIZED, FOR SOLUTION INTRAVENOUS at 08:03

## 2025-03-10 RX ADMIN — DEXAMETHASONE SODIUM PHOSPHATE 4 MG: 4 INJECTION, SOLUTION INTRAMUSCULAR; INTRAVENOUS at 07:03

## 2025-03-10 NOTE — H&P
Pre-Operative History & Physical  Ophthalmology      SUBJECTIVE:     History of Present Illness:  Patient is a 74 y.o. female presents with Macular hole of right eye [H35.341]  Macular hole, right [H35.341].    MEDICATIONS:   Facility-Administered Medications Prior to Admission   Medication    sodium chloride 0.9% flush 10 mL     PTA Medications   Medication Sig    acetaminophen (TYLENOL) 650 MG TbSR Take 1 tablet (650 mg total) by mouth every 8 (eight) hours.    ascorbic acid, vitamin C, (VITAMIN C) 500 MG tablet Take 500 mg by mouth once daily.    aspirin (ECOTRIN) 81 MG EC tablet Take 1 tablet (81 mg total) by mouth 2 (two) times a day. (Patient taking differently: Take 81 mg by mouth once daily.)    cholecalciferol, vitamin D3, (VITAMIN D3) 50 mcg (2,000 unit) Cap capsule Take 1 capsule (2,000 Units total) by mouth once daily.    eszopiclone (LUNESTA) 2 MG Tab TAKE 1 TABLET BY MOUTH EVERY EVENING    loratadine (CLARITIN) 10 mg tablet Take 1 tablet (10 mg total) by mouth once daily.    magnesium oxide (MAG-OX) 400 mg tablet Take 400 mg by mouth once daily.    multivit with min-folic acid 0.4 mg Tab Take 0.4 mg by mouth once daily.    olmesartan (BENICAR) 40 MG tablet Take 1 tablet (40 mg total) by mouth once daily.    oxyCODONE (ROXICODONE) 5 MG immediate release tablet Take 1-2 tablets every 4-6 hours as needed for pain    pantoprazole (PROTONIX) 40 MG tablet Take 1 tablet (40 mg total) by mouth once daily.    pregabalin (LYRICA) 75 MG capsule Take 1 capsule (75 mg total) by mouth 2 (two) times daily.    rizatriptan (MAXALT) 10 MG tablet TAKE 1 TABLET(10 MG) BY MOUTH EVERY 2 HOURS AS NEEDED FOR MIGRAINE. MAX 30 MG/ 24 AT BEDTIME    senna-docusate 8.6-50 mg (SENNA WITH DOCUSATE SODIUM) 8.6-50 mg per tablet Take 1 tablet by mouth once daily.    methocarbamoL (ROBAXIN) 750 MG Tab Take 1 tablet (750 mg total) by mouth 4 (four) times daily as needed (for muscle spasms).    psyllium (METAMUCIL) powder Take 1 packet by  mouth Daily.    traZODone (DESYREL) 100 MG tablet Take 1 tablet (100 mg total) by mouth nightly as needed for Insomnia.       ALLERGIES: Review of patient's allergies indicates:  No Known Allergies    PAST MEDICAL HISTORY:   Past Medical History:   Diagnosis Date    Allergy     Anemia     Arthritis     Cholelithiases     Cyst of kidney, acquired     Diverticulitis     Elevated TSH     Family history of Graves' disease: daughter, maternal aunt, maternal uncle 09/13/2016    Glaucoma suspect     Graves disease     Hx of colonic polyps     Hypertension 1981    Liver cyst     MGUS (monoclonal gammopathy of unknown significance)     Migraine headache     Mitral valve problem     leakage    Obesity     Paraproteinemia     Sleep apnea     + CPAP    Smoldering multiple myeloma 2013    Tricuspid valve disease     leakage     PAST SURGICAL HISTORY:   Past Surgical History:   Procedure Laterality Date    APPENDECTOMY      ARTHROPLASTY, KNEE, TOTAL, USING COMPUTER-ASSISTED NAVIGATION Left 12/23/2024    Procedure: ARTHROPLASTY, KNEE, TOTAL, USING Flowgram[employer] COMPUTER-ASSISTED NAVIGATION: LEFT: DEPUY - ATTUNE;  Surgeon: Mack Torre III, MD;  Location: [facility];  Service: Orthopedics;  Laterality: Left;    COLONOSCOPY N/A 10/23/2015    Procedure: COLONOSCOPY;  Surgeon: Brandon Ruelas MD;  Location: [address]);  Service: Endoscopy;  Laterality: N/A;  Had divertiulitis in 5/29/2015 with a recommendation for colonoscopy in 8 weeks    Dr. Ruelas is her GI physician    COLONOSCOPY N/A 11/05/2018    Procedure: COLONOSCOPY;  Surgeon: Brandon Ruelas MD;  [facility]  Service: Endoscopy;  Laterality: N/A;  Dr. Ruelas did the last one multiple polyps, patient had recent diverticulitis should not schedule before Oct 10th    COLONOSCOPY N/A 6/19/2023    Procedure: COLONOSCOPY;  Surgeon: Lu Lewis MD;  [facility]  Service: Endoscopy;  Laterality: N/A;  pt offered earlier dates  but stated she is out town and will be returning the evening of 6/4  cardiac clearance recieved-see tele encounter 5/22/23 5/22 referred by Dr. Lewis/LOLITA/instr. mailed and to portal-  6/13 pre-call no answer; MB    CYSTOSCOPY      HYSTERECTOMY  1978 or 1979    menorrhagia     PAST FAMILY HISTORY:   Family History   Problem Relation Name Age of Onset    Heart disease Mother          MI    Heart attack Mother      Hypertension Father      Irregular heart beat Sister          fast heart rate    Cataracts Sister      Glaucoma Sister      No Known Problems Sister      Graves' disease Daughter Sanita     No Known Problems Daughter Edith     Heart disease Maternal Aunt          MI    Heart disease Maternal Aunt          MI    Colon cancer Maternal Uncle      Cancer Maternal Uncle          colon ca    Breast cancer Paternal Grandmother      Cancer Paternal Grandmother          GYN cancer - unknown cancer    No Known Problems Son Jus     No Known Problems Son Denoid     Melanoma Neg Hx      Ovarian cancer Neg Hx      Colon polyps Neg Hx      Rectal cancer Neg Hx      Stomach cancer Neg Hx      Esophageal cancer Neg Hx      Ulcerative colitis Neg Hx      Crohn's disease Neg Hx      Amblyopia Neg Hx      Blindness Neg Hx      Macular degeneration Neg Hx      Strabismus Neg Hx      Retinal detachment Neg Hx       SOCIAL HISTORY: Social History[1]     MENTAL STATUS: Alert    REVIEW OF SYSTEMS: Negative    OBJECTIVE:     Vital Signs (Most Recent)  Temp: 97.8 °F (36.6 °C) (03/10/25 0622)  Pulse: 64 (03/10/25 0622)  Resp: 20 (03/10/25 0622)  BP: (!) 112/58 (03/10/25 0622)  SpO2: 98 % (03/10/25 0622)    Physical Exam:  General: NAD  HEENT: AT/NC  Lungs: Adequate respirations  Heart: + pulses  Abdomen: Soft    ASSESSMENT/PLAN:     Patient is a 74 y.o. female with Macular hole of right eye [H35.341]      - Risks/benefits/alternatives of the procedure including, but not limited to, infection, bleeding, pain, ptosis, macular  edema, corneal edema, persistent corneal defect, retinal tears, retinal detachment, epiretinal membrane, elevated intraocular pressure, hypotony, cataract formation, possible need for strict post-op head positioning, possible temporary avoidance of air travel, loss of vision, loss of the eye, paralysis, and death were discussed with the patient and/or family. The patient/family voiced good understanding, the informed consent was signed, witnessed, and placed in chart. All patient and family questions were answered.   - Will proceed with Mac Hole repair Right eye  - Plan for general anesthesia   - Allergies reviewed: Review of patient's allergies indicates:  No Known Allergies      Supplies Needed  25 gauge vitrectomy, Dilute Triamcinolone (1:4), Tissue Blue, ILM Forceps, and Soft tip cannula    In the room but not open  - Gas Pack  - asymmetrical forceps          [1]   Social History  Tobacco Use    Smoking status: Former     Current packs/day: 0.00     Types: Cigarettes     Start date: 1992     Quit date: 1995     Years since quittin.2     Passive exposure: Never    Smokeless tobacco: Never   Substance Use Topics    Alcohol use: No    Drug use: No

## 2025-03-10 NOTE — DISCHARGE INSTRUCTIONS
Post-Operative Instructions:    - Maintain eye shield & dressing until seen tomorrow in eye clinic.  - It is all right to watch television or to read.   - Tylenol as needed for general discomfort.  - You cannot fly until the gas bubble in your eye disperses.  - Maintain head in a FACE DOWN or EITHER SIDE DOWN position  - Keep your face out of water for five days after surgery - NO SHOWERS.  - No excessive exercise.    - No bending, lifting or straining.   - Call MD if significant pain or nausea / vomiting uncontrolled by medications  - Call MD if temperature in excess of 101' F  - Return to eye clinic for post op examination tomorrow morning.  - Bring medicine bag to tomorrow's appointment.    FOR EMERGENCIES:  If any unusual problems or difficulties occur, contact Dr. Looney  or the eye resident on call at [facility]

## 2025-03-10 NOTE — TRANSFER OF CARE
"Anesthesia Transfer of Care Note    Patient: Manju Aranda    Procedure(s) Performed: Procedure(s) (LRB):  REPAIR, HOLE, MACULA (Right)    Patient location: PACU    Anesthesia Type: general    Transport from OR: Transported from OR on 6-10 L/min O2 by face mask with adequate spontaneous ventilation    Post pain: adequate analgesia    Post assessment: no apparent anesthetic complications and tolerated procedure well    Post vital signs: stable    Level of consciousness: awake    Nausea/Vomiting: no nausea/vomiting    Complications: none    Transfer of care protocol was followed      Last vitals: Visit Vitals  /60 (BP Location: Left arm, Patient Position: Lying)   Pulse 70   Temp 36.3 °C (97.3 °F) (Temporal)   Resp (!) 23   Ht 5' 10" (1.778 m)   Wt 114.3 kg (252 lb)   SpO2 100%   Breastfeeding No   BMI 36.16 kg/m²     "

## 2025-03-10 NOTE — PROGRESS NOTES
Recovery care complete. Pt tolerated well. Discharge instructions and handouts provided. Pt and family verbalized understanding. Pt in NAD, VSS. Pt discharge home to self care.

## 2025-03-10 NOTE — ANESTHESIA PREPROCEDURE EVALUATION
03/10/2025  Manju Aranda is a 74 y.o., female with a PMHx that includes HTN, migraines, polycystic kidney disease, and BECKY who presents for R eye macular hole repair      Pre-op Assessment    I have reviewed the Patient Summary Reports.     I have reviewed the Nursing Notes. I have reviewed the NPO Status.   I have reviewed the Medications.     Review of Systems  Anesthesia Hx:  No problems with previous Anesthesia               Denies Personal Hx of Anesthesia complications.                    Social:  Non-Smoker, No Alcohol Use       Hematology/Oncology:  Hematology Normal   Oncology Normal                                   Cardiovascular:     Hypertension                                          Pulmonary:        Sleep Apnea                Renal/:  Chronic Renal Disease                Hepatic/GI:  Hepatic/GI Normal                    Musculoskeletal:  Arthritis               Neurological:      Headaches                                 Psych:  Psychiatric Normal                    Physical Exam  General: Well nourished, Cooperative, Alert and Oriented    Airway:  Mallampati: I   Mouth Opening: Normal  TM Distance: Normal  Tongue: Normal  Neck ROM: Normal ROM    Dental:  Intact    Chest/Lungs:  Clear to auscultation, Normal Respiratory Rate    Heart:  Rate: Normal  Rhythm: Regular Rhythm        Anesthesia Plan  Type of Anesthesia, risks & benefits discussed:    Anesthesia Type: Gen ETT  Intra-op Monitoring Plan: Standard ASA Monitors  Post Op Pain Control Plan: multimodal analgesia and IV/PO Opioids PRN  Induction:  IV  Airway Plan: Direct, Post-Induction  Informed Consent: Informed consent signed with the Patient and all parties understand the risks and agree with anesthesia plan.  All questions answered.   ASA Score: 3  Day of Surgery Review of History & Physical: H&P Update referred to the  surgeon/provider.    Ready For Surgery From Anesthesia Perspective.     .

## 2025-03-10 NOTE — ANESTHESIA PROCEDURE NOTES
Intubation    Date/Time: 3/10/2025 7:15 AM    Performed by: Deb Ray CRNA  Authorized by: Fran Marie MD    Intubation:     Induction:  Intravenous    Intubated:  Postinduction    Mask Ventilation:  Easy mask    Attempts:  1    Attempted By:  CRNA    Method of Intubation:  Video laryngoscopy    Blade:  Park 3    Laryngeal View Grade: Grade I - full view of cords      Difficult Airway Encountered?: No      Complications:  None    Airway Device:  Oral endotracheal tube    Airway Device Size:  7.5    Style/Cuff Inflation:  Cuffed (inflated to minimal occlusive pressure)    Inflation Amount (mL):  7    Tube secured:  22    Secured at:  The lips    Placement Verified By:  Capnometry    Complicating Factors:  None    Findings Post-Intubation:  BS equal bilateral and atraumatic/condition of teeth unchanged

## 2025-03-10 NOTE — BRIEF OP NOTE
Brief Operative Note  Ophthalmology     Pre-Op Dx: macular hole right     Post Op Dx: same     Procedure Performed: vitrectomy, membrane peel, gas     Attending Surgeon: Victoriano Diallo MD     Assistant Surgeon:      Anesthesia: GENERAL    Estimated blood loss: Minimal    Complications: None    Specimen: None    Disposition: Stable to recovery    Findings/Outcome: retina attached, membrane peeled     Date of Discharge: 3/10/2025     Discharge Disposition: home    F/U: tomorrow AM with Dr Diallo

## 2025-03-10 NOTE — ANESTHESIA POSTPROCEDURE EVALUATION
Anesthesia Post Evaluation    Patient: Manju Aranda    Procedure(s) Performed: Procedure(s) (LRB):  REPAIR, HOLE, MACULA (Right)    Final Anesthesia Type: general      Patient location during evaluation: PACU  Patient participation: Yes- Able to Participate  Level of consciousness: awake and alert  Post-procedure vital signs: reviewed and stable  Pain management: adequate  Airway patency: patent    PONV status at discharge: No PONV  Anesthetic complications: no      Cardiovascular status: blood pressure returned to baseline and hemodynamically stable  Respiratory status: spontaneous ventilation  Hydration status: euvolemic  Follow-up not needed.              Vitals Value Taken Time   /56 03/10/25 09:31   Temp 36.3 °C (97.3 °F) 03/10/25 08:19   Pulse 76 03/10/25 09:38   Resp 26 03/10/25 09:38   SpO2 95 % 03/10/25 09:37   Vitals shown include unfiled device data.      No case tracking events are documented in the log.      Pain/Kayleigh Score: Kayleigh Score: 10 (3/10/2025  8:45 AM)

## 2025-03-10 NOTE — OP NOTE
Date of Surgery 3/10/2025     ATTENDING SURGEON: Victoriano Diallo MD (A)     ASSISTANT SURGEON: Jerman Ledezma MD (R)    PREOPERATIVE DIAGNOSIS: Macular Hole of the right eye.     OPERATION: Pars plana vitrectomy, membrane peel, air fluid exchange, 20% SF6 gas injection of the right eye.     POSTOPERATIVE DIAGNOSIS: Same     ANESTHESIA: GENERAL.     COMPLICATIONS: None.    SPECIMENS:    None.    EBL:    Minimal    Indications for Surgery    Manju Aranda is a 74 y.o. female with a history of decreased vision and macular hole in the right eye. After the risks, benefits and alternatives were discussed with Manju Aranda  the patient consented to the procedure and was scheduled for surgery.    Description of Procedure: The patient, Manju Aranda gave informed consent for the following operation which was performed under monitored anesthesia care. The patient was examined with an indirect ophthalmoscope and we decided to proceed with surgery. A time out was performed and confirmed by all in the room that the right eye was the correct operative eye. Following an induction of general anesthesia, the eye was prepped and draped in the usual sterile fashion for vitrectomy surgery.    The cornea was kept moist with OVD placed on the surface throughout the operation. A 25 gauge vitrectomy system was used. Initial entry into the eye was gained with 3 trocars placed 4 mm from the limbus. In the inferotemporal location, the infusion cannula was secured in position. The open end of the cannula was visualized through the dilated pupil and the infusion was turned on when it was in the correct position. The 2 superior sclerotomies were used for entry of a light pipe and a vitrectomy cutter. A core vitrectomy was conducted followed by peripheral vitrectomy out towards the vitreous base.    We inspected the retinal periphery with scleral depression. There were no retinal holes or tears noted. Dilute triamcinolone was place  on the macula to identify the epiretinal membrane. The membrane was peeled with retinal forceps, no retinal breaks were made. Dilute indocyanine green was place on the macula to identify the internal limiting membrane. The ICG was removed with the vitreous cutter, leaving an adequate stain on the macula. The membrane was peeled with retinal forceps, no retinal breaks were made. We inspected the periphery and no breaks were noted. A complete air fluid exchange was performed.    The superonasal trocar was removed and found to be watertight.    A sterile preparation of 20% SF6 gas was brought to the field and connected to the infusion line. The gas was then rinsed through the eye several times, venting the excess gas through the superotemporal port. The eye was then inflated to physiologic pressure and the remaining ports were removed and found to be watertight.    The intraocular pressure remained normal. Betadine was place on the eye. Vancomycin and Decadron were injected into the sub-Tenon space. The eye was irrigated with BSS. A drop of atropine and Maxitrol ointment were placed on the eye, which was then patched and shielded. There were no complications.    The patient returned to the PACU in stable condition and will follow up tomorrow in the eye clinic.

## 2025-03-11 ENCOUNTER — OFFICE VISIT (OUTPATIENT)
Dept: OPHTHALMOLOGY | Facility: CLINIC | Age: 75
End: 2025-03-11
Payer: MEDICARE

## 2025-03-11 DIAGNOSIS — H35.341 MACULAR HOLE, RIGHT: Primary | ICD-10-CM

## 2025-03-11 PROCEDURE — 1125F AMNT PAIN NOTED PAIN PRSNT: CPT | Mod: HCNC,CPTII,S$GLB, | Performed by: OPHTHALMOLOGY

## 2025-03-11 PROCEDURE — 99999 PR PBB SHADOW E&M-EST. PATIENT-LVL III: CPT | Mod: PBBFAC,HCNC,, | Performed by: OPHTHALMOLOGY

## 2025-03-11 PROCEDURE — 1159F MED LIST DOCD IN RCRD: CPT | Mod: HCNC,CPTII,S$GLB, | Performed by: OPHTHALMOLOGY

## 2025-03-11 PROCEDURE — 99024 POSTOP FOLLOW-UP VISIT: CPT | Mod: HCNC,S$GLB,, | Performed by: OPHTHALMOLOGY

## 2025-03-11 PROCEDURE — 3288F FALL RISK ASSESSMENT DOCD: CPT | Mod: HCNC,CPTII,S$GLB, | Performed by: OPHTHALMOLOGY

## 2025-03-11 PROCEDURE — 4010F ACE/ARB THERAPY RXD/TAKEN: CPT | Mod: HCNC,CPTII,S$GLB, | Performed by: OPHTHALMOLOGY

## 2025-03-11 PROCEDURE — 1101F PT FALLS ASSESS-DOCD LE1/YR: CPT | Mod: HCNC,CPTII,S$GLB, | Performed by: OPHTHALMOLOGY

## 2025-03-11 NOTE — PROGRESS NOTES
HPI    Here for one day post op for right eye. Pt c/o pain OD rated at a 4 . Pt   reports that she took some tylenol around 5:00 am .  Patch removed area   cleaned.  Eye meds: Cyc QID                     VIGAMOX QID                    Pred QID         MAXITROL QHS   Last edited by Lu Edwards on 3/11/2025  9:43 AM.         A/P    ICD-10-CM ICD-9-CM   1. Macular hole, right  H35.341 362.54           1. Macular hole, right  Here for retina f/u    Pre-op Exam notable for stable mac hole OD with vitreous attachment     Postop: s/p PPV/MP/Afx/20% SF6 (Date 3/10/25 by Imani/Brisa) for mac hole od  Positioning: Face down  Duration: 2 days    3/11/2025 [facility] as expected, IOP ok, retina flat, good gas fill    Instructions reviewed   - RD precautions  - Return for increasing pain/decreasing vision  - No getting the eye wet, no rubbing the eye, no heavy lifting for 1 month after surgery  Drops:  Vigamox  - 4   Pred - 4(start 3/11/2025)  Cyc -4  Maxitrol - QHS       2. Vitreous degeneration of both eyes  No RT/RD but has mac hole attachment OD  Plan: Observation OS for now, counseled on risk future mac hole, plan as above OD    3. Age-related nuclear cataract of both eyes  Mod NS/CC  Plan: Observation for now    RTC POW1 DFE/OCTm OD        I saw and examined the patient and reviewed in detail the findings documented. The final examination findings, image interpretations which have been independently interpreted, and plan as documented in the record represent my personal judgment and conclusions.    Victoriano Diallo MD  Vitreoretinal Surgery   [facility]

## 2025-03-18 ENCOUNTER — CLINICAL SUPPORT (OUTPATIENT)
Dept: OPHTHALMOLOGY | Facility: CLINIC | Age: 75
End: 2025-03-18
Payer: MEDICARE

## 2025-03-18 ENCOUNTER — OFFICE VISIT (OUTPATIENT)
Dept: ORTHOPEDICS | Facility: CLINIC | Age: 75
End: 2025-03-18
Payer: MEDICARE

## 2025-03-18 ENCOUNTER — OFFICE VISIT (OUTPATIENT)
Dept: OPHTHALMOLOGY | Facility: CLINIC | Age: 75
End: 2025-03-18
Payer: MEDICARE

## 2025-03-18 VITALS — WEIGHT: 248.69 LBS | BODY MASS INDEX: 35.6 KG/M2 | HEIGHT: 70 IN

## 2025-03-18 DIAGNOSIS — H35.341 MACULAR HOLE, RIGHT: Primary | ICD-10-CM

## 2025-03-18 DIAGNOSIS — Z96.652 S/P TOTAL KNEE REPLACEMENT, LEFT: Primary | ICD-10-CM

## 2025-03-18 PROCEDURE — 92134 CPTRZ OPH DX IMG PST SGM RTA: CPT | Mod: HCNC,S$GLB,, | Performed by: OPHTHALMOLOGY

## 2025-03-18 PROCEDURE — 4010F ACE/ARB THERAPY RXD/TAKEN: CPT | Mod: HCNC,CPTII,S$GLB, | Performed by: OPHTHALMOLOGY

## 2025-03-18 PROCEDURE — 99024 POSTOP FOLLOW-UP VISIT: CPT | Mod: HCNC,S$GLB,, | Performed by: ORTHOPAEDIC SURGERY

## 2025-03-18 PROCEDURE — 4010F ACE/ARB THERAPY RXD/TAKEN: CPT | Mod: HCNC,CPTII,S$GLB, | Performed by: ORTHOPAEDIC SURGERY

## 2025-03-18 PROCEDURE — 3288F FALL RISK ASSESSMENT DOCD: CPT | Mod: HCNC,CPTII,S$GLB, | Performed by: ORTHOPAEDIC SURGERY

## 2025-03-18 PROCEDURE — 99024 POSTOP FOLLOW-UP VISIT: CPT | Mod: HCNC,S$GLB,, | Performed by: OPHTHALMOLOGY

## 2025-03-18 PROCEDURE — 3288F FALL RISK ASSESSMENT DOCD: CPT | Mod: HCNC,CPTII,S$GLB, | Performed by: OPHTHALMOLOGY

## 2025-03-18 PROCEDURE — 1101F PT FALLS ASSESS-DOCD LE1/YR: CPT | Mod: HCNC,CPTII,S$GLB, | Performed by: OPHTHALMOLOGY

## 2025-03-18 PROCEDURE — 1125F AMNT PAIN NOTED PAIN PRSNT: CPT | Mod: HCNC,CPTII,S$GLB, | Performed by: ORTHOPAEDIC SURGERY

## 2025-03-18 PROCEDURE — 1126F AMNT PAIN NOTED NONE PRSNT: CPT | Mod: HCNC,CPTII,S$GLB, | Performed by: OPHTHALMOLOGY

## 2025-03-18 PROCEDURE — 99999 PR PBB SHADOW E&M-EST. PATIENT-LVL III: CPT | Mod: PBBFAC,HCNC,, | Performed by: ORTHOPAEDIC SURGERY

## 2025-03-18 PROCEDURE — 1101F PT FALLS ASSESS-DOCD LE1/YR: CPT | Mod: HCNC,CPTII,S$GLB, | Performed by: ORTHOPAEDIC SURGERY

## 2025-03-18 PROCEDURE — 1159F MED LIST DOCD IN RCRD: CPT | Mod: HCNC,CPTII,S$GLB, | Performed by: OPHTHALMOLOGY

## 2025-03-18 PROCEDURE — 99999 PR PBB SHADOW E&M-EST. PATIENT-LVL III: CPT | Mod: PBBFAC,HCNC,, | Performed by: OPHTHALMOLOGY

## 2025-03-18 NOTE — PROGRESS NOTES
Subjective:     HPI:   Manju Aranda is a 74 y.o. female who presents 12 weeks out from left TKA    Date of surgery:   L VTKA 12/23/24 (F8 T8)  MGUS: ASA ok per heme for DVT proph    History of Present Illness    CHIEF COMPLAINT:  - Three-month follow-up status post left total knee arthroplasty (TKA).    HPI:  Ms. Aranda presents for a follow-up visit approximately three months after left knee surgery. She reports using Tylenol for pain management. She uses a cane or walker for long distances outside the house, stating it is not terrible. She confirms completing therapy for the left knee. X-rays from the six-week post-operative visit show good alignment and placement of the knee replacement components.    Her right knee is reported as not good. She expresses interest in receiving another injection for the right knee. The last gel shot for the right knee was administered six weeks ago during a visit with Viktor. A steroid injection for the right knee was last received in the past year or two.    PREVIOUS TREATMENTS:  - Right knee gel shot injection at 6-week post-op visit  - Right knee steroid injection in the past year or two    MEDICATIONS:  - Tylenol    SURGICAL HISTORY:  - Left knee replacement: 3 months ago         Medications: tylenol     Assistive Devices: cane out of house long distances    PT: finished    Doing ok making progress       Objective:   Body mass index is 35.68 kg/m².  Exam:    Gait: limp/antalgic none - walking well    Incision: healed    Stability:  Knee stable anterior-posterior varus and valgus stresses, no extensor lag    Extension: 0    Flexion: 110    6 week ROM:  0-112  Pre-op ROM:  0-115  PT ROM:  3-115    Physical Exam    Musculoskeletal: Good quadriceps strength. Good knee stability. Incision healed. Good range of motion.  IMAGING:  - X-rays of left knee:  - 1. Pre-surgery: Shows bone-on-bone changes, worse on the outside of the knee  - 2. Day of surgery: Shows metal cap,  polyethylene tray, and kneecap in place  - 3. Six-week post-op visit: Shows metal components matching the bone nicely, good overall alignment         Imaging:  None today    Results                Assessment:       ICD-10-CM ICD-9-CM   1. S/P total knee replacement, left  Z96.652 V43.65      Doing well    R knee OA - got synvisc one HA at 6 week visit      Plan:       Patient is doing very well with their total knee arthroplasty.  They will continue with their routine care of the knee replacement and see me back for their follow-up at the routine interval.  If there are problems in the interim they will see me back sooner. Prophylactic antibiotic protocol given and explained to patient.     Assessment & Plan    LEFT KNEE OSTEOARTHRITIS:   Ms. Aranda opted to wait for gel shot with Viktor in 6 weeks.   Resume normal activities as tolerated.   Apply ice or heat to the left knee as needed for comfort.    LEFT KNEE REPLACEMENT:   Use padding when kneeling to protect the incision site.   Take antibiotics before any future surgeries or dental work to prevent infection in the knee replacement.    PAIN MANAGEMENT:   Take Tylenol, Advil, or Aleve as needed for pain.    FOLLOW-UP:   Follow up in 9 months for left knee XRs.          F/u in 6 weeks with Viktor for R knee CSI     9 month follow up for annual xrays    This note was generated with the assistance of ambient listening technology. Verbal consent was obtained by the patient and accompanying visitor(s) for the recording of patient appointment to facilitate this note. I attest to having reviewed and edited the generated note for accuracy, though some syntax or spelling errors may persist. Please contact the author of this note for any clarification.      No orders of the defined types were placed in this encounter.            Past Medical History:   Diagnosis Date    Allergy     Anemia     Arthritis     Cholelithiases     Cyst of kidney, acquired     Diverticulitis      Elevated TSH     Family history of Graves' disease: daughter, maternal aunt, maternal uncle 09/13/2016    Glaucoma suspect     Graves disease     Hx of colonic polyps     Hypertension 1981    Liver cyst     MGUS (monoclonal gammopathy of unknown significance)     Migraine headache     Mitral valve problem     leakage    Obesity     Paraproteinemia     Sleep apnea     + CPAP    Smoldering multiple myeloma 2013    Tricuspid valve disease     leakage       Past Surgical History:   Procedure Laterality Date    APPENDECTOMY      ARTHROPLASTY, KNEE, TOTAL, USING COMPUTER-ASSISTED NAVIGATION Left 12/23/2024    Procedure: ARTHROPLASTY, KNEE, TOTAL, USING VELYS COMPUTER-ASSISTED NAVIGATION: LEFT: DEPUY - ATTUNE;  Surgeon: Mack Torre III, MD;  Location: [facility] OR;  Service: Orthopedics;  Laterality: Left;    COLONOSCOPY N/A 10/23/2015    Procedure: COLONOSCOPY;  Surgeon: Brandon Ruelas MD;  Location: [facility] (4TH FLR);  Service: Endoscopy;  Laterality: N/A;  Had divertiulitis in 5/29/2015 with a recommendation for colonoscopy in 8 weeks    Dr. Ruelas is her GI physician    COLONOSCOPY N/A 11/05/2018    Procedure: COLONOSCOPY;  Surgeon: Brandon Ruelas MD;  Location: [facility] (4TH FLR);  Service: Endoscopy;  Laterality: N/A;  Dr. Ruelas did the last one multiple polyps, patient had recent diverticulitis should not schedule before Oct 10th    COLONOSCOPY N/A 6/19/2023    Procedure: COLONOSCOPY;  Surgeon: Lu Lewis MD;  Location: [facility] (4TH FLR);  Service: Endoscopy;  Laterality: N/A;  pt offered earlier dates but stated she is out town and will be returning the evening of 6/4  cardiac clearance recieved-see tele encounter 5/22/23 5/22 referred by Dr. Lewis/PEG/instr. mailed and to portal-st  6/13 pre-call no answer; MB    CYSTOSCOPY      HYSTERECTOMY  1978 or 1979    menorrhagia    REPAIR OF HOLE IN MACULA Right 3/10/2025    Procedure: REPAIR, HOLE, MACULA;  Surgeon: Victoriano Diallo MD;   Location: [facility] OR [address] [place] [address];  Service: Ophthalmology;  Laterality: Right;       Family History   Problem Relation Name Age of Onset    Heart disease Mother          MI    Heart attack Mother      Hypertension Father      Irregular heart beat Sister          fast heart rate    Cataracts Sister      Glaucoma Sister      No Known Problems Sister      Graves' disease Daughter Sanita     No Known Problems Daughter Edith     Heart disease Maternal Aunt          MI    Heart disease Maternal Aunt          MI    Colon cancer Maternal Uncle      Cancer Maternal Uncle          colon ca    Breast cancer Paternal Grandmother      Cancer Paternal Grandmother          GYN cancer - unknown cancer    No Known Problems Son Jus     No Known Problems Son Denoid     Melanoma Neg Hx      Ovarian cancer Neg Hx      Colon polyps Neg Hx      Rectal cancer Neg Hx      Stomach cancer Neg Hx      Esophageal cancer Neg Hx      Ulcerative colitis Neg Hx      Crohn's disease Neg Hx      Amblyopia Neg Hx      Blindness Neg Hx      Macular degeneration Neg Hx      Strabismus Neg Hx      Retinal detachment Neg Hx         Social History     Socioeconomic History    Marital status:     Number of children: 4   Occupational History    Occupation: RETIRED   Tobacco Use    Smoking status: Former     Current packs/day: 0.00     Types: Cigarettes     Start date: 1992     Quit date: 1995     Years since quittin.2     Passive exposure: Never    Smokeless tobacco: Never   Substance and Sexual Activity    Alcohol use: No    Drug use: No    Sexual activity: Not Currently     Partners: Male     Birth control/protection: Post-menopausal   Social History Narrative    Lives with sister.         Walks most AM in [employer] [place] - 1.5miles.      Social Drivers of Health     Financial Resource Strain: Patient Declined (2024)    Overall Financial Resource Strain (CARDIA)     Difficulty of Paying Living Expenses: Patient declined   Food Insecurity:  Patient Declined (12/23/2024)    Hunger Vital Sign     Worried About Running Out of Food in the Last Year: Patient declined     Ran Out of Food in the Last Year: Patient declined   Transportation Needs: Patient Declined (12/23/2024)    TRANSPORTATION NEEDS     Transportation : Patient declined   Physical Activity: Unknown (12/3/2024)    Exercise Vital Sign     Days of Exercise per Week: 3 days   Stress: Patient Declined (12/23/2024)    [employer] - Occupational Stress Questionnaire     Feeling of Stress : Patient declined   Housing Stability: Patient Declined (12/23/2024)    Housing Stability Vital Sign     Unable to Pay for Housing in the Last Year: Patient declined     Homeless in the Last Year: Patient declined

## 2025-03-18 NOTE — PROGRESS NOTES
"HPI    Here for 1 week post op evaluation OD.  Pt states no longer having pain in OD. Pt. States VA in OD is slightly   blurred, but is improving.   Pt. States no FOL, but does have floaters present in OD. "Looks like water   droplets."  Eye meds: Cyc QID                     VIGAMOX QID                    Pred QID                    MAXITROL QHS   Last edited by Avelino Cardona MA on 3/18/2025 12:45 PM.          A/P    ICD-10-CM ICD-9-CM   1. Macular hole, right  H35.341 362.54       1. Macular hole, right  Here for retina f/u    Pre-op Exam notable for stable mac hole OD with vitreous attachment     Postop: s/p PPV/MP/Afx/20% SF6 (Date 3/10/25 by Imani/Brisa) for mac hole od  Positioning: Face down  Duration: 2 days    3/18/2025 VA 20/150 (was HM), IOP ok, gas 40% left, smaller hole today    Plan: continue face down 10-20 min per hour for two more days, recheck 3-4 weeks to see if hole smaller, may need repeat PPV in future     Instructions reviewed   - RD precautions  - Return for increasing pain/decreasing vision  - No getting the eye wet, no rubbing the eye, no heavy lifting for 1 month after surgery  Drops:  Vigamox  - 4 stop  Pred - 4(start weekly taper   Cyc -4 stop  Maxitrol - QHS stop     ----------    2. Vitreous degeneration of both eyes  No RT/RD but has mac hole attachment OD  Plan: Observation OS for now, counseled on risk future mac hole, plan as above OD    3. Age-related nuclear cataract of both eyes  Mod NS/CC  Plan: Observation for now    RTC POM1 DFE/OCTm OD        I saw and examined the patient and reviewed in detail the findings documented. The final examination findings, image interpretations which have been independently interpreted, and plan as documented in the record represent my personal judgment and conclusions.    Victoriano Diallo MD  Vitreoretinal Surgery   [facility]   "

## 2025-03-31 ENCOUNTER — PATIENT MESSAGE (OUTPATIENT)
Dept: ADMINISTRATIVE | Facility: HOSPITAL | Age: 75
End: 2025-03-31
Payer: MEDICARE

## 2025-04-01 DIAGNOSIS — D47.2 SMOLDERING MULTIPLE MYELOMA (SMM): Primary | ICD-10-CM

## 2025-04-13 NOTE — PROGRESS NOTES
Problem List: 73 yo woman with:  Smoldering Multiple Myeloma (IgG Kappa)   - Initial M protein noted    - BMBx: 2013: 6% plasma cells;  (rising light chain) 13% plasma cells   - Negative for lytic disease  Autosomal Dominant Polycystic Kidney Disease   - Nepphrology follow up  HTN  Migraine Headaches  Macular Hole R eye   - S/p closure 2025  Osteoarthitis   - S/p L knee replacement Dec 2024  Borderline Macrocytosis   - Normal TSH, B12, Folate, MMA ([id number])    HPI: Mrs Aranda returns for follow  up of smoldering multiple myeloma/MGUIS. She comes in today stating that she is feeling ok. In December, she had L knee replacement and she continues to report pain and decreased mobility in that knee. InJanuaruy, shehad repair of a macular hole  which may require redo. These issues have taken much of toll on her recently.Her last heme follow up was a year ago. She has no p[ropensity for infections. Her appetite has been good. Her weight has been stable. Aside from knee arthritis, there are no other sites of bone or  joint pains. Her migraines aave have been quiescent but she did have one a week ago. She reports having light headedness while walkingt o clinic from parking lot. No other lightheadedness. No CP or palpiations. She has been staying hydrated.     Past Medical History: See Problem List  Social History: She was born and raised in [place] and has lived here throughout her life. She is  and resides in [facility] with her sister. 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 She smoked in her 30s for 2-3 years. She does not drink alcolhol or use illicit drugs  Family History: Her mother is  of an MI in her 40s. Her father is  with dementia and SDH at 91. She has 2 sisters   one of whom with multiple medical problems. She has 2 sons and 2 daughters all healthy. There is no FH of myeloma or known plasma cell dyscrasias.     ROS: See HPI. Last  "colonoscopy was 1 year ago. She recently had her screening mnammogram    VS: BP (!) 91/52 (BP Location: Right arm, Patient Position: Sitting)   Pulse 66   Temp 98 °F (36.7 °C) (Oral)   Resp 14   Ht 5' 9" (1.753 m)   Wt 109.2 kg (240 lb 11.9 oz)   SpO2 98%   BMI 35.55 kg/m²    Repeat BP 90/55 by machine; 80/50 by me  Gen: Awake and Alert, No Apparent Distress  HEENT: NCAT, PERRLA, EOMI, Moist oral, nasal and ocular mucus membranes, OP Clear  Neck: Supple, No JVD or Adenopathy. Trachea is Midline, No Thyromegaly, No thyroid masses  Heart: Regular Rhythm and rate, No murmurs, gallops or rubs  Lungs: Symmetric chest excursions bilaterally. No use of accessory respiratory muscles. Lungs are clear to auscultation bilaterally  Abdomen: Soft, Nontender/Nondistended. There are normoactive bowel sounds. There is no hepatosplenomegaly  Extremities:  There is no peripheral edema. No joint deformities, joint effusions or joint tenderness in the ankles, knees, hips, shoulders, elbows, wristst and digits. There is no cyanosis. 2+ radial and dorsalis pedis pulses.   Back: Straight, No paraspinal tenderness or CVA tenderness  Skin: Intact. No sites of breakdown. No subcutaneous nodules. No rashes. No petechiae. No ecchymoses.  Lymphatics: No cervical, supraclavicular, axillary or inguinal adenopathy  Neurologic: Awake, alert and fully oriented. Cranial nerves II-XII intact.    Results for orders placed or performed in visit on 02/12/25   CBC W/ AUTO DIFFERENTIAL    Collection Time: 02/12/25  7:57 AM   Result Value Ref Range    WBC 5.66 3.90 - 12.70 K/uL    RBC 3.69 (L) 4.00 - 5.40 M/uL    Hemoglobin 11.5 (L) 12.0 - 16.0 g/dL    Hematocrit 36.9 (L) 37.0 - 48.5 %     (H) 82 - 98 fL    MCH 31.2 (H) 27.0 - 31.0 pg    MCHC 31.2 (L) 32.0 - 36.0 g/dL    RDW 11.5 11.5 - 14.5 %    Platelets 265 150 - 450 K/uL    MPV 9.8 9.2 - 12.9 fL    Immature Granulocytes 0.7 (H) 0.0 - 0.5 %    Gran # (ANC) 4.7 1.8 - 7.7 K/uL    Immature " Grans (Abs) 0.04 0.00 - 0.04 K/uL    Lymph # 0.7 (L) 1.0 - 4.8 K/uL    Mono # 0.3 0.3 - 1.0 K/uL    Eos # 0.0 0.0 - 0.5 K/uL    Baso # 0.01 0.00 - 0.20 K/uL    nRBC 0 0 /100 WBC    Gran % 82.3 (H) 38.0 - 73.0 %    Lymph % 12.4 (L) 18.0 - 48.0 %    Mono % 4.4 4.0 - 15.0 %    Eosinophil % 0.0 0.0 - 8.0 %    Basophil % 0.2 0.0 - 1.9 %    Differential Method Automated    CMP    Collection Time: 02/12/25  7:57 AM   Result Value Ref Range    Sodium 140 136 - 145 mmol/L    Potassium 4.6 3.5 - 5.1 mmol/L    Chloride 105 95 - 110 mmol/L    CO2 27 23 - 29 mmol/L    Glucose 97 70 - 110 mg/dL    BUN 17 8 - 23 mg/dL    Creatinine 0.9 0.5 - 1.4 mg/dL    Calcium 10.1 8.7 - 10.5 mg/dL    Total Protein 7.8 6.0 - 8.4 g/dL    Albumin 3.7 3.5 - 5.2 g/dL    Total Bilirubin 0.6 0.1 - 1.0 mg/dL    Alkaline Phosphatase 101 40 - 150 U/L    AST 14 10 - 40 U/L    ALT 16 10 - 44 U/L    eGFR >60 >60 mL/min/1.73 m^2    Anion Gap 8 8 - 16 mmol/L   Immunoglobulins (IgG, IgA, IgM) Quantitative    Collection Time: 02/12/25  7:57 AM   Result Value Ref Range    IgG 1747 (H) 650 - 1600 mg/dL    IgA 114 40 - 350 mg/dL    IgM 41 (L) 50 - 300 mg/dL   FREE LT CHAIN ANAL    Collection Time: 02/12/25  7:57 AM   Result Value Ref Range    Kappa Free Light Chains 8.20 (H) 0.33 - 1.94 mg/dL    Lambda Free Light Chains 1.31 0.57 - 2.63 mg/dL    Kappa/Lambda FLC Ratio 6.26 (H) 0.26 - 1.65   PROTEIN ELECTROPHORESIS, SERUM    Collection Time: 02/12/25  7:57 AM   Result Value Ref Range    Protein, Serum 7.0 6.0 - 8.4 g/dL    Albumin 3.73 3.35 - 5.55 g/dL    Alpha-1 0.32 0.17 - 0.41 g/dL    Alpha-2 0.69 0.43 - 0.99 g/dL    Beta 0.74 0.50 - 1.10 g/dL    Gamma 1.52 0.67 - 1.58 g/dL   IMMUNOFIXATION ELECTROPHORESIS, SERUM    Collection Time: 02/12/25  7:57 AM   Result Value Ref Range    Immunofix Interp. SEE COMMENT    Pathologist Interpretation DELROY    Collection Time: 02/12/25  7:57 AM   Result Value Ref Range    Pathologist Interpretation DELROY REVIEWED    Pathologist  Interpretation SPE    Collection Time: 02/12/25  7:57 AM   Result Value Ref Range    Pathologist Interpretation SPE REVIEWED      *Note: Due to a large number of results and/or encounters for the requested time period, some results have not been displayed. A complete set of results can be found in Results Review.       A/P  Smoldering Myeloma (IgG kappa)   - Igg level stable. Await SPEP and free light chains  Hypotension   - IVF today. Notify PCP.

## 2025-04-14 ENCOUNTER — OFFICE VISIT (OUTPATIENT)
Dept: HEMATOLOGY/ONCOLOGY | Facility: CLINIC | Age: 75
End: 2025-04-14
Payer: MEDICARE

## 2025-04-14 ENCOUNTER — LAB VISIT (OUTPATIENT)
Dept: LAB | Facility: OTHER | Age: 75
End: 2025-04-14
Attending: INTERNAL MEDICINE
Payer: MEDICARE

## 2025-04-14 ENCOUNTER — INFUSION (OUTPATIENT)
Dept: INFUSION THERAPY | Facility: OTHER | Age: 75
End: 2025-04-14
Attending: INTERNAL MEDICINE
Payer: MEDICARE

## 2025-04-14 VITALS
WEIGHT: 240.75 LBS | OXYGEN SATURATION: 98 % | TEMPERATURE: 98 F | HEIGHT: 69 IN | BODY MASS INDEX: 35.66 KG/M2 | HEART RATE: 66 BPM | SYSTOLIC BLOOD PRESSURE: 91 MMHG | RESPIRATION RATE: 14 BRPM | DIASTOLIC BLOOD PRESSURE: 52 MMHG

## 2025-04-14 VITALS — HEART RATE: 74 BPM | DIASTOLIC BLOOD PRESSURE: 63 MMHG | RESPIRATION RATE: 16 BRPM | SYSTOLIC BLOOD PRESSURE: 135 MMHG

## 2025-04-14 DIAGNOSIS — I95.0 IDIOPATHIC HYPOTENSION: Primary | ICD-10-CM

## 2025-04-14 DIAGNOSIS — D47.2 SMOLDERING MULTIPLE MYELOMA (SMM): ICD-10-CM

## 2025-04-14 LAB
ABSOLUTE EOSINOPHIL (OHS): 0.01 K/UL
ABSOLUTE MONOCYTE (OHS): 0.34 K/UL (ref 0.3–1)
ABSOLUTE NEUTROPHIL COUNT (OHS): 2.04 K/UL (ref 1.8–7.7)
ALBUMIN SERPL BCP-MCNC: 3.8 G/DL (ref 3.5–5.2)
ALP SERPL-CCNC: 101 UNIT/L (ref 40–150)
ALT SERPL W/O P-5'-P-CCNC: 10 UNIT/L (ref 10–44)
ANION GAP (OHS): 9 MMOL/L (ref 8–16)
AST SERPL-CCNC: 16 UNIT/L (ref 11–45)
BASOPHILS # BLD AUTO: 0.01 K/UL
BASOPHILS NFR BLD AUTO: 0.3 %
BETA 2 MICROGLOBILIN (OHS): 2.3 UG/ML (ref 0–2.5)
BILIRUB SERPL-MCNC: 0.6 MG/DL (ref 0.1–1)
BUN SERPL-MCNC: 24 MG/DL (ref 8–23)
CALCIUM SERPL-MCNC: 10.4 MG/DL (ref 8.7–10.5)
CHLORIDE SERPL-SCNC: 105 MMOL/L (ref 95–110)
CO2 SERPL-SCNC: 27 MMOL/L (ref 23–29)
CREAT SERPL-MCNC: 1.2 MG/DL (ref 0.5–1.4)
ERYTHROCYTE [DISTWIDTH] IN BLOOD BY AUTOMATED COUNT: 11.3 % (ref 11.5–14.5)
GFR SERPLBLD CREATININE-BSD FMLA CKD-EPI: 48 ML/MIN/1.73/M2
GLUCOSE SERPL-MCNC: 82 MG/DL (ref 70–110)
HCT VFR BLD AUTO: 36.5 % (ref 37–48.5)
HGB BLD-MCNC: 11.5 GM/DL (ref 12–16)
IGA SERPL-MCNC: 114 MG/DL (ref 40–350)
IGG SERPL-MCNC: 1717 MG/DL (ref 650–1600)
IGM SERPL-MCNC: 44 MG/DL (ref 50–300)
IMM GRANULOCYTES # BLD AUTO: 0.01 K/UL (ref 0–0.04)
IMM GRANULOCYTES NFR BLD AUTO: 0.3 % (ref 0–0.5)
LYMPHOCYTES # BLD AUTO: 1.44 K/UL (ref 1–4.8)
MCH RBC QN AUTO: 30.3 PG (ref 27–31)
MCHC RBC AUTO-ENTMCNC: 31.5 G/DL (ref 32–36)
MCV RBC AUTO: 96 FL (ref 82–98)
NUCLEATED RBC (/100WBC) (OHS): 0 /100 WBC
PLATELET # BLD AUTO: 227 K/UL (ref 150–450)
PMV BLD AUTO: 9.7 FL (ref 9.2–12.9)
POTASSIUM SERPL-SCNC: 4.6 MMOL/L (ref 3.5–5.1)
PROT SERPL-MCNC: 7.8 GM/DL (ref 6–8.4)
RBC # BLD AUTO: 3.8 M/UL (ref 4–5.4)
RELATIVE EOSINOPHIL (OHS): 0.3 %
RELATIVE LYMPHOCYTE (OHS): 37.4 % (ref 18–48)
RELATIVE MONOCYTE (OHS): 8.8 % (ref 4–15)
RELATIVE NEUTROPHIL (OHS): 52.9 % (ref 38–73)
SODIUM SERPL-SCNC: 141 MMOL/L (ref 136–145)
WBC # BLD AUTO: 3.85 K/UL (ref 3.9–12.7)

## 2025-04-14 PROCEDURE — 82784 ASSAY IGA/IGD/IGG/IGM EACH: CPT | Mod: 59,HCNC

## 2025-04-14 PROCEDURE — 82232 ASSAY OF BETA-2 PROTEIN: CPT | Mod: HCNC

## 2025-04-14 PROCEDURE — 85025 COMPLETE CBC W/AUTO DIFF WBC: CPT | Mod: HCNC

## 2025-04-14 PROCEDURE — 99999 PR PBB SHADOW E&M-EST. PATIENT-LVL III: CPT | Mod: PBBFAC,HCNC,, | Performed by: INTERNAL MEDICINE

## 2025-04-14 PROCEDURE — 84165 PROTEIN E-PHORESIS SERUM: CPT | Mod: HCNC

## 2025-04-14 PROCEDURE — 80053 COMPREHEN METABOLIC PANEL: CPT | Mod: HCNC

## 2025-04-14 PROCEDURE — 36415 COLL VENOUS BLD VENIPUNCTURE: CPT | Mod: HCNC

## 2025-04-14 PROCEDURE — 25000003 PHARM REV CODE 250: Mod: HCNC | Performed by: INTERNAL MEDICINE

## 2025-04-14 PROCEDURE — 96360 HYDRATION IV INFUSION INIT: CPT | Mod: HCNC

## 2025-04-14 PROCEDURE — 83521 IG LIGHT CHAINS FREE EACH: CPT | Mod: HCNC

## 2025-04-14 RX ORDER — SODIUM CHLORIDE 0.9 % (FLUSH) 0.9 %
10 SYRINGE (ML) INJECTION
OUTPATIENT
Start: 2025-04-14

## 2025-04-14 RX ORDER — SODIUM CHLORIDE 0.9 % (FLUSH) 0.9 %
10 SYRINGE (ML) INJECTION
Status: CANCELLED | OUTPATIENT
Start: 2025-04-14

## 2025-04-14 RX ORDER — SODIUM CHLORIDE 0.9 % (FLUSH) 0.9 %
10 SYRINGE (ML) INJECTION
Status: DISCONTINUED | OUTPATIENT
Start: 2025-04-14 | End: 2025-04-14 | Stop reason: HOSPADM

## 2025-04-14 RX ORDER — HEPARIN 100 UNIT/ML
500 SYRINGE INTRAVENOUS
OUTPATIENT
Start: 2025-04-14

## 2025-04-14 RX ORDER — HEPARIN 100 UNIT/ML
500 SYRINGE INTRAVENOUS
Status: DISCONTINUED | OUTPATIENT
Start: 2025-04-14 | End: 2025-04-14 | Stop reason: HOSPADM

## 2025-04-14 RX ORDER — HEPARIN 100 UNIT/ML
500 SYRINGE INTRAVENOUS
Status: CANCELLED | OUTPATIENT
Start: 2025-04-14

## 2025-04-14 RX ADMIN — SODIUM CHLORIDE 1000 ML: 9 INJECTION, SOLUTION INTRAVENOUS at 01:04

## 2025-04-14 NOTE — PLAN OF CARE
1L IVF infusion complete. Pt states she is feeling better since arriving to MD appointment.  BP increased to 135/63  Dr. Armstrong at chairside. NAD. IV to right AC DC'd.  Pt verbalized understanding of discharge instructions before leaving.

## 2025-04-15 ENCOUNTER — OFFICE VISIT (OUTPATIENT)
Dept: OPHTHALMOLOGY | Facility: CLINIC | Age: 75
End: 2025-04-15
Payer: MEDICARE

## 2025-04-15 ENCOUNTER — CLINICAL SUPPORT (OUTPATIENT)
Dept: OPHTHALMOLOGY | Facility: CLINIC | Age: 75
End: 2025-04-15
Payer: MEDICARE

## 2025-04-15 DIAGNOSIS — H35.341 MACULAR HOLE, RIGHT: Primary | ICD-10-CM

## 2025-04-15 LAB
ALBUMIN, SPE (OHS): 3.94 G/DL (ref 3.35–5.55)
ALPHA 1 GLOB (OHS): 0.27 GM/DL (ref 0.17–0.41)
ALPHA 2 GLOB (OHS): 0.6 GM/DL (ref 0.43–0.99)
BETA GLOB (OHS): 0.71 GM/DL (ref 0.5–1.1)
GAMMA GLOBULIN (OHS): 1.48 GM/DL (ref 0.67–1.58)
KAPPA LC FREE SER-MCNC: 6.73 MG/L (ref 0.26–1.65)
KAPPA LC FREE/LAMBDA FREE SER: 10.29 MG/DL (ref 0.33–1.94)
LAMBDA LC FREE SERPL-MCNC: 1.53 MG/DL (ref 0.57–2.63)
PROT SERPL-MCNC: 7 GM/DL (ref 6–8.4)

## 2025-04-15 PROCEDURE — 1159F MED LIST DOCD IN RCRD: CPT | Mod: HCNC,CPTII,S$GLB, | Performed by: OPHTHALMOLOGY

## 2025-04-15 PROCEDURE — 99999 PR PBB SHADOW E&M-EST. PATIENT-LVL III: CPT | Mod: PBBFAC,HCNC,, | Performed by: OPHTHALMOLOGY

## 2025-04-15 PROCEDURE — 4010F ACE/ARB THERAPY RXD/TAKEN: CPT | Mod: HCNC,CPTII,S$GLB, | Performed by: OPHTHALMOLOGY

## 2025-04-15 PROCEDURE — 99024 POSTOP FOLLOW-UP VISIT: CPT | Mod: HCNC,S$GLB,, | Performed by: OPHTHALMOLOGY

## 2025-04-15 PROCEDURE — 1101F PT FALLS ASSESS-DOCD LE1/YR: CPT | Mod: HCNC,CPTII,S$GLB, | Performed by: OPHTHALMOLOGY

## 2025-04-15 PROCEDURE — 1126F AMNT PAIN NOTED NONE PRSNT: CPT | Mod: HCNC,CPTII,S$GLB, | Performed by: OPHTHALMOLOGY

## 2025-04-15 PROCEDURE — 3288F FALL RISK ASSESSMENT DOCD: CPT | Mod: HCNC,CPTII,S$GLB, | Performed by: OPHTHALMOLOGY

## 2025-04-15 PROCEDURE — 92134 CPTRZ OPH DX IMG PST SGM RTA: CPT | Mod: HCNC,S$GLB,, | Performed by: OPHTHALMOLOGY

## 2025-04-15 RX ORDER — DORZOLAMIDE HCL 20 MG/ML
1 SOLUTION/ DROPS OPHTHALMIC 3 TIMES DAILY
Qty: 10 ML | Refills: 3 | Status: SHIPPED | OUTPATIENT
Start: 2025-04-15 | End: 2026-04-15

## 2025-04-15 RX ORDER — KETOROLAC TROMETHAMINE 5 MG/ML
1 SOLUTION OPHTHALMIC 4 TIMES DAILY
Qty: 5 ML | Refills: 3 | Status: SHIPPED | OUTPATIENT
Start: 2025-04-15 | End: 2026-04-15

## 2025-04-15 RX ORDER — PREDNISOLONE ACETATE 10 MG/ML
1 SUSPENSION/ DROPS OPHTHALMIC 4 TIMES DAILY
Qty: 5 ML | Refills: 3 | Status: SHIPPED | OUTPATIENT
Start: 2025-04-15 | End: 2026-04-15

## 2025-04-15 NOTE — PROGRESS NOTES
HPI    Pt. Presents for POM, Macular hole in ODF. DFE/OCTm OD.     Pt. States vision has not changed/improved since she was last here. VA   still appears to be blurred in OD.   Denies F/F/pain.  Eye med: Cyc QID OD                  Pred: QID OD                  Maxitrol: QHS OD  Last edited by Avelino Cardona MA on 4/15/2025  1:58 PM.     HPI    Pt. Presents for POM, Macular hole in ODF. DFE/OCTm OD.     Pt. States vision has not changed/improved since she was last here. VA   still appears to be blurred in OD.   Denies F/F/pain.  Eye med: Cyc QID OD                  Pred: QID OD                  Maxitrol: QHS OD  Last edited by Avelino Cardona MA on 4/15/2025  1:58 PM.         A/P    ICD-10-CM ICD-9-CM   1. Macular hole, right  H35.341 362.54       1. Macular hole, right  Here for retina f/u    Pre-op Exam notable for stable mac hole OD with vitreous attachment     Postop: s/p PPV/MP/Afx/20% SF6 (Date 3/10/25 by Imani/Brisa) for mac hole od  Positioning: Face down  Duration: 2 days    4/15/2025 VA 20/500 (was 20/150), IOP ok, hole still open slightly larger    Plan: discussed option of observation vs topical gtt vs vitrectomy. Pt wishes to try drops, recommend to start PF/Ket QID and Dorz TID, recheck 1 mo     Instructions reviewed   - RD precautions  - Return for increasing pain/decreasing vision     ----------    2. Vitreous degeneration of both eyes  No RT/RD but has mac hole attachment OD  Plan: Observation OS for now, counseled on risk future mac hole, plan as above OD    3. Age-related nuclear cataract of both eyes  Mod NS/CC  Plan: Observation for now    RTC 1 mo DFE/OCTm OD        I saw and examined the patient and reviewed in detail the findings documented. The final examination findings, image interpretations which have been independently interpreted, and plan as documented in the record represent my personal judgment and conclusions.    Victoriano Diallo MD  Vitreoretinal Surgery   [facility]

## 2025-04-17 LAB — PATHOLOGIST REVIEW - SPE (OHS): NORMAL

## 2025-04-28 ENCOUNTER — HOSPITAL ENCOUNTER (OUTPATIENT)
Dept: RADIOLOGY | Facility: HOSPITAL | Age: 75
Discharge: HOME | End: 2025-04-28
Payer: MEDICARE

## 2025-04-28 ENCOUNTER — OFFICE VISIT (OUTPATIENT)
Dept: ORTHOPEDICS | Facility: CLINIC | Age: 75
End: 2025-04-28
Payer: MEDICARE

## 2025-04-28 DIAGNOSIS — Z96.652 S/P TOTAL KNEE REPLACEMENT, LEFT: Primary | ICD-10-CM

## 2025-04-28 DIAGNOSIS — M17.11 PRIMARY OSTEOARTHRITIS OF RIGHT KNEE: ICD-10-CM

## 2025-04-28 DIAGNOSIS — Z96.652 S/P TOTAL KNEE REPLACEMENT, LEFT: ICD-10-CM

## 2025-04-28 PROCEDURE — 1125F AMNT PAIN NOTED PAIN PRSNT: CPT | Mod: CPTII,HCNC,S$GLB,

## 2025-04-28 PROCEDURE — 4010F ACE/ARB THERAPY RXD/TAKEN: CPT | Mod: CPTII,HCNC,S$GLB,

## 2025-04-28 PROCEDURE — 1159F MED LIST DOCD IN RCRD: CPT | Mod: CPTII,HCNC,S$GLB,

## 2025-04-28 PROCEDURE — 73562 X-RAY EXAM OF KNEE 3: CPT | Mod: TC,50,HCNC

## 2025-04-28 PROCEDURE — 1100F PTFALLS ASSESS-DOCD GE2>/YR: CPT | Mod: CPTII,HCNC,S$GLB,

## 2025-04-28 PROCEDURE — 73562 X-RAY EXAM OF KNEE 3: CPT | Mod: 26,50,HCNC, | Performed by: RADIOLOGY

## 2025-04-28 PROCEDURE — 99214 OFFICE O/P EST MOD 30 MIN: CPT | Mod: HCNC,S$GLB,,

## 2025-04-28 PROCEDURE — 3288F FALL RISK ASSESSMENT DOCD: CPT | Mod: CPTII,HCNC,S$GLB,

## 2025-04-28 PROCEDURE — 1160F RVW MEDS BY RX/DR IN RCRD: CPT | Mod: CPTII,HCNC,S$GLB,

## 2025-04-28 PROCEDURE — 99999 PR PBB SHADOW E&M-EST. PATIENT-LVL III: CPT | Mod: PBBFAC,HCNC,,

## 2025-04-28 RX ORDER — DEXTROMETHORPHAN HYDROBROMIDE, GUAIFENESIN 5; 100 MG/5ML; MG/5ML
650 LIQUID ORAL EVERY 8 HOURS
Qty: 120 TABLET | Refills: 0 | Status: SHIPPED | OUTPATIENT
Start: 2025-04-28

## 2025-04-28 NOTE — PROGRESS NOTES
SUBJECTIVE:     History of Present Illness    CHIEF COMPLAINT:  - Left knee pain after fall    HPI:  Ms. Aranda presents to the clinic following a fall yesterday. She fell after her sister had mopped the floor, landing on her left knee. She reports attempting to break her fall but still impacted her left leg during the incident.    Her left knee pain is reported to be slightly improved today compared to yesterday. She describes tenderness on the medial aspect of the left knee, particularly anteriorly, which is likely the point of impact. Pain is noted with palpation of this area. She has taken Tylenol 650 mg for pain relief, which has been effective.    She also reports some discomfort in her right knee, which she attributes to bearing her full weight on it while attempting to stand after the fall. She used the side of the laundry room for support. When asked which knee is more painful today, she indicates it is the left knee, while the right knee is mildly sore.    She received a gel injection in her right knee onto [id number] by myself, which she reports has been beneficial. She expresses concern about potential knee damage from the fall.    PREVIOUS TREATMENTS:  - Gel injection: Right knee, on 02/18/2025, provided benefit as the knee was not painful before the recent fall.    MEDICATIONS:  - Tylenol: 650 mg as needed for pain, helpful, recently finished.            Past Medical History:   Diagnosis Date    Allergy     Anemia     Arthritis     Cholelithiases     Cyst of kidney, acquired     Diverticulitis     Elevated TSH     Family history of Graves' disease: daughter, maternal aunt, maternal uncle 09/13/2016    Glaucoma suspect     Graves disease     Hx of colonic polyps     Hypertension 1981    Liver cyst     MGUS (monoclonal gammopathy of unknown significance)     Migraine headache     Mitral valve problem     leakage    Obesity     Paraproteinemia     Sleep apnea     + CPAP    Smoldering multiple myeloma 2013     Tricuspid valve disease     leakage       Past Surgical History:   Procedure Laterality Date    APPENDECTOMY      ARTHROPLASTY, KNEE, TOTAL, USING COMPUTER-ASSISTED NAVIGATION Left 12/23/2024    Procedure: ARTHROPLASTY, KNEE, TOTAL, USING VELYS COMPUTER-ASSISTED NAVIGATION: LEFT: DEPUY - ATTUNE;  Surgeon: Mack Torre III, MD;  Location: [facility];  Service: Orthopedics;  Laterality: Left;    COLONOSCOPY N/A 10/23/2015    Procedure: COLONOSCOPY;  Surgeon: Brandon Ruelas MD;  Location: [facility] (4TH FLR);  Service: Endoscopy;  Laterality: N/A;  Had divertiulitis in 5/29/2015 with a recommendation for colonoscopy in 8 weeks    Dr. Ruelas is her GI physician    COLONOSCOPY N/A 11/05/2018    Procedure: COLONOSCOPY;  Surgeon: Brandon Ruelas MD;  Location: [facility] (4TH FLR);  Service: Endoscopy;  Laterality: N/A;  Dr. Ruelas did the last one multiple polyps, patient had recent diverticulitis should not schedule before Oct 10th    COLONOSCOPY N/A 6/19/2023    Procedure: COLONOSCOPY;  Surgeon: Lu Lewis MD;  Location: [facility] (4TH FLR);  Service: Endoscopy;  Laterality: N/A;  pt offered earlier dates but stated she is out town and will be returning the evening of 6/4  cardiac clearance recieved-see tele encounter 5/22/23 5/22 referred by Dr. Lewis/LOLITA/instr. mailed and to [facility]  6/13 pre-call no answer; MB    CYSTOSCOPY      HYSTERECTOMY  1978 or 1979    menorrhagia    REPAIR OF HOLE IN [place] Right 3/10/2025    Procedure: REPAIR, HOLE, [place];  Surgeon: Victoriano Diallo MD;  [address];  Service: Ophthalmology;  Laterality: Right;       Family History   Problem Relation Name Age of Onset    Heart disease Mother          MI    Heart attack Mother      Hypertension Father      Irregular heart beat Sister          fast heart rate    Cataracts Sister      Glaucoma Sister      No Known Problems Sister      Graves' disease Daughter Sanita     No Known Problems Daughter Edith      Heart disease Maternal Aunt          MI    Heart disease Maternal Aunt          MI    Colon cancer Maternal Uncle      Cancer Maternal Uncle          colon ca    Breast cancer Paternal Grandmother      Cancer Paternal Grandmother          GYN cancer - unknown cancer    No Known Problems Son Jus     No Known Problems Son Denoid     Melanoma Neg Hx      Ovarian cancer Neg Hx      Colon polyps Neg Hx      Rectal cancer Neg Hx      Stomach cancer Neg Hx      Esophageal cancer Neg Hx      Ulcerative colitis Neg Hx      Crohn's disease Neg Hx      Amblyopia Neg Hx      Blindness Neg Hx      Macular degeneration Neg Hx      Strabismus Neg Hx      Retinal detachment Neg Hx         Review of patient's allergies indicates:  No Known Allergies      Current Medications[1]      Review of Systems:  ROS:  The updated medical history is in the chart and has been reviewed. A review of systems is updated and there is no reported vision changes, ear/nose/mouth/throat complaints, chest pain, shortness of breath, abdominal pain, urological complaints, fevers or chills, psychiatric complaints. Musculoskeletal and neurologcial symptoms are as documented. All other systems are negative.      OBJECTIVE:     PHYSICAL EXAM:  There were no vitals taken for this visit.  General: Pleasant, cooperative, NAD.  HEENT: NCAT, sclera nonicteric.  Lungs: Respirations are equal and unlabored.   Abdomen: Soft and non-tender.  CV: 2+ bilateral upper and lower extremity pulses.  Neuro: Sensation intact to light touch.  Skin: Intact throughout LE with no rashes, erythema, or lesions.  Extremities: No LE edema, NVI lower extremities.  Mildly antalgic gait.    left Knee Exam:  Knee Range of Motion:  0-100, limited by pain   Effusion: none  Condition of skin: intact  Location of tenderness: None   Strength: 5/5 quadriceps strength, 5/5 gastroc-soleus strength, 5/5 hamstring strength, and 5/5 tibialis anterior strength  Stability: stable to testing  Knee  Alignment: normal    right Knee Exam:  Knee Range of Motion:  0-110   Effusion: none  Condition of skin: intact  Location of tenderness:  Mild lateral joint line   Strength: 5/5 quadriceps strength, 5/5 gastroc-soleus strength, 5/5 hamstring strength, and 5/5 tibialis anterior strength  Knee alignment: normal  Stability: stable to testing    RADIOGRAPHS:  X-rays of the bilateral knee taken today personally reviewed. Imaging reveals well-aligned and positioned prosthesis of the left knee with no acute fractures, dislocations, or subsidence.  Right knee reveals tricompartmental joint space narrowing worse in the lateral tibiofemoral joint space with osteophyte formation.      ASSESSMENT:       ICD-10-CM ICD-9-CM   1. S/P total knee replacement, left  Z96.652 V43.65   2. Primary osteoarthritis of right knee  M17.11 715.16       PLAN:     We discussed with the patient at length all the different treatment options available including anti-inflammatories, acetaminophen, rest, ice, knee strengthening exercise, occasional cortisone injections for temporary relief, Viscosupplimentation injections, and surgical intervention.       MEDICATIONS:  - Tylenol 650mg.    PROCEDURES:  - Discussed potential for future steroid injection in the left knee if conservative treatment does not provide relief within 2 weeks.    FOLLOW UP:  - Follow up in about 2 weeks if no improvement.    PATIENT INSTRUCTIONS:  - Ice left knee 2-3 times daily for 30 minutes at a time for about 2 weeks.  - Rest and elevate lower extremities.          This note was generated with the assistance of ambient listening technology. Verbal consent was obtained by the patient and accompanying visitor(s) for the recording of patient appointment to facilitate this note. I attest to having reviewed and edited the generated note for accuracy, though some syntax or spelling errors may persist. Please contact the author of this note for any clarification.      Viktor  Mian Morris PA-C           [1]   Current Outpatient Medications:     acetaminophen (TYLENOL) 650 MG TbSR, Take 1 tablet (650 mg total) by mouth every 8 (eight) hours., Disp: 120 tablet, Rfl: 0    ascorbic acid, vitamin C, (VITAMIN C) 500 MG tablet, Take 500 mg by mouth once daily., Disp: , Rfl:     cholecalciferol, vitamin D3, (VITAMIN D3) 50 mcg (2,000 unit) Cap capsule, Take 1 capsule (2,000 Units total) by mouth once daily., Disp: , Rfl:     dorzolamide (TRUSOPT) 2 % ophthalmic solution, Place 1 drop into the right eye 3 (three) times daily., Disp: 10 mL, Rfl: 3    eszopiclone (LUNESTA) 2 MG Tab, TAKE 1 TABLET BY MOUTH EVERY EVENING, Disp: 30 tablet, Rfl: 5    ketorolac 0.5% (ACULAR) 0.5 % Drop, Place 1 drop into the right eye 4 (four) times daily., Disp: 5 mL, Rfl: 3    loratadine (CLARITIN) 10 mg tablet, Take 1 tablet (10 mg total) by mouth once daily., Disp: 30 tablet, Rfl: 2    magnesium oxide (MAG-OX) 400 mg tablet, Take 400 mg by mouth once daily., Disp: , Rfl:     methocarbamoL (ROBAXIN) 750 MG Tab, Take 1 tablet (750 mg total) by mouth 4 (four) times daily as needed (for muscle spasms)., Disp: 40 tablet, Rfl: 0    multivit with min-folic acid 0.4 mg Tab, Take 0.4 mg by mouth once daily., Disp: , Rfl:     olmesartan (BENICAR) 40 MG tablet, Take 1 tablet (40 mg total) by mouth once daily., Disp: 90 tablet, Rfl: 3    prednisoLONE acetate (PRED FORTE) 1 % DrpS, Place 1 drop into the right eye 4 (four) times daily., Disp: 5 mL, Rfl: 3    psyllium (METAMUCIL) powder, Take 1 packet by mouth Daily., Disp: , Rfl:     rizatriptan (MAXALT) 10 MG tablet, TAKE 1 TABLET(10 MG) BY MOUTH EVERY 2 HOURS AS NEEDED FOR MIGRAINE. MAX 30 MG/ 24 AT BEDTIME, Disp: 12 tablet, Rfl: 11    senna-docusate 8.6-50 mg (SENNA WITH DOCUSATE SODIUM) 8.6-50 mg per tablet, Take 1 tablet by mouth once daily., Disp: 30 tablet, Rfl: 0    traZODone (DESYREL) 100 MG tablet, Take 1 tablet (100 mg total) by mouth nightly as needed for Insomnia.,  Disp: 90 tablet, Rfl: 1

## 2025-05-05 PROBLEM — N18.31 STAGE 3A CHRONIC KIDNEY DISEASE: Status: ACTIVE | Noted: 2024-12-03

## 2025-05-05 NOTE — PROGRESS NOTES
Subjective:       Patient ID: Manju Aranda is a 74 y.o. female with history of migraines, Graves disease, HTN, aortic atherosclerosis, ascending aortic aneurysm, ADPKD, CKD stage 3a, smoldering multiple myeloma, vitamin-D deficiency, diverticulosis, BECKY on CPAP, OA of both knees.    Chief Complaint: Health maintenance examination [Z00.00]    Patient is new to me, PCP is Dr. Jazzy Charles. Here today for the following:    Health maintenance -   Colonoscopy performed 06/2023, with Dr. Lewis. Told to repeat in 3 years.  Family history of colorectal cancer.  Mammogram BI-RADS 1 in 02/2025. Due for repeat 1 year.  Denies history of abnormal mammogram.  Family history of breast cancer.  Denies family history of ovarian cancer.  S/p hyst due to menorrhagia  Denies history of abnormal pap smear prior to hyst.  Denies family history of osteoporosis.  Last DXA 07/24, normal BMD, repeat 5 years.  Family history of cardiac disease.  Due for COVID,  vaccinations.  Started smoking at in early 20's, at most 1 pack every 8 weeks. Stopped smoking in her late 20's.   Denies current alcohol use.   Denies drug use.  Completed hepatitis C screening.  Due for lipid screening.  Lab Results   Component Value Date    LDLCALC 78.8 04/17/2024     The 10-year ASCVD risk score (Elsie GUIDO, et al., 2019) is: 10%    Values used to calculate the score:      Age: 74 years      Sex: Female      Is Non- : Yes      Diabetic: No      Tobacco smoker: No      Systolic Blood Pressure: 124 mmHg      Is BP treated: Yes      HDL Cholesterol: 54 mg/dL      Total Cholesterol: 147 mg/dL  Due for diabetes screening.  Lab Results   Component Value Date    HGBA1C 4.3 04/17/2024     Endorses overall healthy diet.   Eating plenty of fresh vegetables, fruits.  Eating mostly lean proteins.   Endorses exercising routinely.  Endorses active lifestyle.    History of Present Illness    CHIEF COMPLAINT:  Patient presents today with loss of  appetite and dizziness    HISTORY OF PRESENT ILLNESS:  She reports loss of appetite for approximately 2 months with a weight decrease from 248 to 239 lbs since 03/2025. She experiences sharp abdominal pain in the middle of her abdomen for the past couple weeks, primarily occurring in the mornings lasting less than 10 minutes, with improvement noted after eating and no recurrence for the rest of the day.  No BM changes, blood in stool.   No history of ulcers. Has history of diverticulitis, avoids NSAIDs.   She uses metamucil regularly.    BLOOD PRESSURE:  She experienced low blood pressure requiring IV fluids at a hematology appointment two weeks ago. She has been monitoring her blood pressure 3 times daily: before morning medications, afternoon at [facility], and later in the day. She reports consistently low readings, as low as 82/52 mmHg, which is unusual for her. She reports associated dizziness and experienced a fall two weeks ago after tripping on a recently mopped floor.    INSOMNIA/BECKY:  Takes trazodone 100 mg QHS. She thinks it has been causing migraines and she would like a dose reduction.  Uses CPAP  Last saw sleep medicine 06/24.   Referred to [facility], considered starting lunesta. RTC 3 mos recommended.    MIGRAINES:  Takes rizatriptan PRN with good effect.  Takes magnesium nightly.  Triggered by weather changes or certain foods.  Occurs once every few months.    OSTEOARTHRITIS:  Walks 3 times weekly and pain occurs mid walk.  No difficulty going up/down stairs.  Has tried voltaren with no relief.    Left TKA 12/24.  Last XR 04/25 following a fall at home showing good alignment of left knee arthroplasty and DJD of right knee.    MULTIPLE MYELOMA/ANEMIA:  Follows with Dr. Alba/Dr. Armstrong routinely in hematology.  Last saw 04/25. Had hypotension, given IVF.   Ordered SPEP and free light chains, she has not completed.    ADPKD:  Follows with Dr. Bui for urology and microscopic hematuria.   Cystoscopy  "and completed workup in 2017.  Follow up PRN.  Previously followed nephro, last visit 2019.   MRI abdomen showed stable multilobulated cystic lesion inferior pole right kidney that could not be fully characterized. Stable minimally complex cyst of right kidney also noted in 2022.     HEPATIC CYSTS:  Follows with Dr. De La Cruz every 2 years.  Last saw 08/24. MRI abdomen showed 2 tiny cysts of right hepatic lobe. No follow up recommended.  Any follow up needed?    ATHEROSCLEROSIS:  Has followed with Dr. Beverly in [place], now advised follow up PRN.   Last echo 06/23 with EF 55%  Stress echo 06/23 no ischemia  Has tried rosuvastatin, but D/C due to side effects.     HTN:  Takes olmesartan 40 mg    GRAVES DISEASE:  Not needing methimazole currently.  Follows with Dr. Rajput for endocrinology.  Last saw 04/24  TSH yearly.  RTC every 2 years.    AORTIC ANEURYSM:  CTA 06/23 with "Mild fusiform dilation of the ascending aorta reaching maximum 4.0 cm"   Follows with Dr. Demarco in CT surgery    MACULAR HOLE:  Follows with Dr. Diallo in ophthalmology, last seen 04/25.  Repaired 01/25.  Using eyedrops.  Told to follow up 1 month.     VITAMIN D DEFICIENCY:  Takes 2000 IU vitamin D3 daily.      ROS:  General: -fever, -chills, -fatigue, -weight gain, -weight loss, +loss of appetite  Eyes: -vision changes, -redness, -discharge  ENT: -ear pain, -nasal congestion, -sore throat  Cardiovascular: -chest pain, -palpitations, -lower extremity edema  Respiratory: -cough, -shortness of breath, +exertional dyspnea  Gastrointestinal: +abdominal pain, -nausea, -vomiting, -diarrhea, -constipation, -blood in stool  Genitourinary: -dysuria, -hematuria, -frequency  Musculoskeletal: -joint pain, -muscle pain  Skin: -rash, -lesion  Neurological: -headache, +dizziness, -numbness, -tingling, +migraines  Psychiatric: -anxiety, -depression, +sleep difficulty       Current Outpatient Medications   Medication Instructions    acetaminophen (TYLENOL) 650 mg, " "Oral, Every 8 hours    ascorbic acid (vitamin C) (VITAMIN C) 500 mg, Daily    cholecalciferol (vitamin D3) (VITAMIN D3) 2,000 Units, Oral, Daily    dorzolamide (TRUSOPT) 2 % ophthalmic solution 1 drop, Right Eye, 3 times daily    eszopiclone (LUNESTA) 2 mg, Oral, Nightly    ketorolac 0.5% (ACULAR) 0.5 % Drop 1 drop, Right Eye, 4 times daily    loratadine (CLARITIN) 10 mg, Oral, Daily    magnesium oxide (MAG-OX) 400 mg, Daily    methocarbamoL (ROBAXIN) 750 mg, Oral, 4 times daily PRN    multivit with min-folic acid 0.4 mg Tab 0.4 mg, Daily    olmesartan (BENICAR) 40 mg, Oral, Daily    prednisoLONE acetate (PRED FORTE) 1 % DrpS 1 drop, Right Eye, 4 times daily    psyllium (METAMUCIL) powder 1 packet, Daily    rizatriptan (MAXALT) 10 MG tablet TAKE 1 TABLET(10 MG) BY MOUTH EVERY 2 HOURS AS NEEDED FOR MIGRAINE. MAX 30 MG/ 24 AT BEDTIME    senna-docusate 8.6-50 mg (SENNA WITH DOCUSATE SODIUM) 8.6-50 mg per tablet 1 tablet, Oral, Daily    traZODone (DESYREL) 50 mg, Oral, Nightly PRN     Objective:      Vitals:    05/06/25 1359   BP: 124/82   Pulse: 74   SpO2: 99%   Weight: 108.6 kg (239 lb 6.7 oz)   Height: 5' 9" (1.753 m)   PainSc: 0-No pain     Body mass index is 35.36 kg/m².      Physical Exam  Constitutional:       General: She is not in acute distress.     Appearance: Normal appearance. She is obese. She is not ill-appearing.   HENT:      Head: Normocephalic and atraumatic.      Right Ear: Tympanic membrane, ear canal and external ear normal. There is no impacted cerumen.      Left Ear: Tympanic membrane, ear canal and external ear normal. There is no impacted cerumen.      Ears:      Comments: Cerumen noted in left ear canal, partially obstructive of TM.     Nose: Nose normal. No congestion or rhinorrhea.      Mouth/Throat:      Mouth: Mucous membranes are moist.      Pharynx: Oropharynx is clear. No oropharyngeal exudate or posterior oropharyngeal erythema.   Eyes:      General: No scleral icterus.        Right " eye: No discharge.         Left eye: No discharge.      Extraocular Movements: Extraocular movements intact.      Conjunctiva/sclera: Conjunctivae normal.   Cardiovascular:      Rate and Rhythm: Normal rate and regular rhythm.      Pulses: Normal pulses.      Heart sounds: Normal heart sounds. No murmur heard.     No friction rub. No gallop.   Pulmonary:      Effort: Pulmonary effort is normal. No respiratory distress.      Breath sounds: Normal breath sounds. No stridor. No wheezing, rhonchi or rales.   Chest:      Chest wall: No tenderness.   Abdominal:      General: Abdomen is flat. Bowel sounds are normal. There is no distension.      Palpations: Abdomen is soft. There is no mass.      Tenderness: There is abdominal tenderness. There is no guarding or rebound.      Hernia: No hernia is present.      Comments: Tenderness to the epigastric region with slight RUQ tenderness.   No masses or bruits appreciated in this region.   Musculoskeletal:      Cervical back: Normal range of motion.      Right lower leg: No edema.      Left lower leg: No edema.   Skin:     General: Skin is warm and dry.      Capillary Refill: Capillary refill takes less than 2 seconds.   Neurological:      General: No focal deficit present.      Mental Status: She is alert and oriented to person, place, and time. Mental status is at baseline.      Gait: Gait normal.   Psychiatric:         Mood and Affect: Mood normal.         Behavior: Behavior normal.         Thought Content: Thought content normal.         Judgment: Judgment normal.         Assessment:       1. Health maintenance examination    2. Severe obesity with body mass index (BMI) of 35.0 to 39.9 with comorbidity    3. Stage 3a chronic kidney disease    4. Vitamin B12 deficiency    5. Vitamin D deficiency    6. Primary hypertension    7. Other abnormal glucose    8. Screening for cardiovascular condition    9. Insomnia, unspecified type    10. Graves disease    11. Dyspnea on exertion         IMPRESSION:  - Concerned about abdominal pain, weight loss, and lack of appetite without clear cause.  - Considered possible ulcers, malignancy, gastritis, GERD, dyspepsia, chronic pancreatitis.  - Evaluated need to adjust BP medication dosage due to episodes of low BP and associated dizziness.  - Assessed cardiac function due to reported shortness of breath on exertion.  - Will consult with Dr. Charles regarding further workup, potentially including imaging or referral to gastroenterology.  - Will review lab results and determine if GI referral or further workup is necessary.    Plan:     PLAN SUMMARY:  - Order comprehensive labs: CBC, CMP, TSH, vitamin D level, vitamin B12 level  - Decrease trazodone from 100 mg to 50 mg daily as needed for insomnia  - Consider reducing Olmesartan dose to manage hypotension  - Ordered CT chest abd pelvis with contrast to better rule out certain etiologies of symptoms.  - Continue prescribed eye drops for post-surgical care  - Refer to nephrology for kidney health follow-up  - Communicate any medication changes or further recommendations via patient portal after consulting with Dr. Charles  - Follow up if experiencing any red flag symptoms as discussed    Health maintenance examination  -     Vitamin B12; Future; Expected date: 05/06/2025  -     Vitamin D; Future; Expected date: 05/06/2025  -     CBC Auto Differential; Future; Expected date: 05/06/2025  -     Hemoglobin A1C; Future; Expected date: 05/06/2025  -     Lipid Panel; Future; Expected date: 05/06/2025  -     Comprehensive Metabolic Panel; Future; Expected date: 05/06/2025  -     Microalbumin/Creatinine Ratio, Urine; Future; Expected date: 04/30/2026    Severe obesity with body mass index (BMI) of 35.0 to 39.9 with comorbidity  - Unintentional weight loss in context of anorexia is concerning despite patients comorbidities and elevated BMI. Will pursue further workup.  - Patient maintains regular exercise with  biking every morning and evening.  - Noted significant family history of heart attacks (mother passed at 43 from massive heart attack) and other cardiac conditions in relatives.  - Advised to continue current diet rich in vegetables and lean proteins (chicken and fish) and maintain current exercise regimen.    Stage 3a chronic kidney disease  - Monitored kidney condition, noting cyst on liver and kidney with last nephrology visit in 2019.  - Ordered comprehensive metabolic panel and urinalysis to assess protein levels in urine and check for proteinuria.  - Referred patient to nephrology for kidney health follow-up.  -     Microalbumin/Creatinine Ratio, Urine; Future; Expected date: 04/30/2026  -     Ambulatory referral/consult to Nephrology; Future; Expected date: 05/13/2025    Vitamin B12 deficiency  -     Vitamin B12; Future; Expected date: 05/06/2025    Vitamin D deficiency  -     Vitamin D; Future; Expected date: 05/06/2025    Primary hypertension  - Monitored patient's reports of dizziness and low blood pressure readings (91/52 and 100/58), particularly in afternoons and evenings.  - Assessed relationship between medication intake and BP fluctuations.  - Will continue Olmesartan for BP management with potential dose adjustment pending.  -     CBC Auto Differential; Future; Expected date: 05/06/2025  -     Comprehensive Metabolic Panel; Future; Expected date: 05/06/2025  -     olmesartan (BENICAR) 40 MG tablet; Take 1 tablet (40 mg total) by mouth once daily.  Dispense: 90 tablet; Refill: 3    Other abnormal glucose  -     Hemoglobin A1C; Future; Expected date: 05/06/2025    Screening for cardiovascular condition  -     Lipid Panel; Future; Expected date: 05/06/2025    Insomnia, unspecified type  - Monitored insomnia and adverse effects from trazodone, specifically migraines after taking 100 mg dosage for 3 days.  - Evaluated temporal relationship between medication intake and symptoms.  - Decreased trazodone  from 100 mg to 50 mg daily as needed for insomnia to address both sleep issues and medication-induced headaches.  -     traZODone (DESYREL) 50 MG tablet; Take 1 tablet (50 mg total) by mouth nightly as needed for Insomnia.  Dispense: 30 tablet; Refill: 0    Graves disease  -     TSH; Future; Expected date: 05/06/2025    Dyspnea on exertion  Patient has recent noted grade II diastolic dysfunction without significant change in symptoms.   Repeated lab to see if any obvious progression in heart failure.   -     BNP; Future; Expected date: 05/06/2025    Unintentional weight loss  - Monitored weight loss from 248 lbs in March to 239 lbs currently, with decreased appetite over past 2 months.  - This is concerning given patient's age.  - Ordered comprehensive labs to assess overall health status: CBC, CMP, TSH, vitamin D level, vitamin B12 level.  - Discussed red flag symptoms including significant fevers, night sweats, or inability to eat or drink.  - Ordered CT chest abd pelv with contrast.         -     CT Chest Abdomen Pelvis With IV Contrast (XPD) Routine Oral Contrast; Future    Epigastric abdominal pain  - Monitored sharp abdominal pain in middle stomach area, worse in mornings and lasting less than 10 minutes.  - Pain improves after eating, suggesting possible ulcer. Considering GERD, gastritis, dyspepsia, malignancy, chronic pancreatitis.  - Discussed red flag symptoms warranting immediate medical attention (nausea with blood in vomit, significant bowel changes, black stools).  - Ordered complete blood count to rule out acute blood loss.  - Consider referral of patient to gastroenterologist for further evaluation of abdominal pain and possible ulcer.        -     CT Chest Abdomen Pelvis With IV Contrast (XPD) Routine Oral Contrast; Future    ## IMPACTED CERUMEN IN RIGHT EAR:  - Monitored impacted cerumen in right ear, with more wax on right side.  - Eardrum appears normal despite impaction.  - Educated on proper  ear hygiene, advising against Q-tip use and recommending Debrox for earwax removal if needed.    ## FOLLOW-UP:  - Patient to maintain current level of physical activity (biking).  - Will communicate any medication changes or further recommendations via patient portal after consulting with Dr. Charles.  - Patient instructed to contact office if experiencing any red flag symptoms as discussed.         This note was generated with the assistance of ambient listening technology. Verbal consent was obtained by the patient and accompanying visitor(s) for the recording of patient appointment to facilitate this note. I attest to having reviewed and edited the generated note for accuracy, though some syntax or spelling errors may persist. Please contact the author of this note for any clarification.    Irwin Camp PA-C    5/6/2025

## 2025-05-06 ENCOUNTER — RESULTS FOLLOW-UP (OUTPATIENT)
Dept: INTERNAL MEDICINE | Facility: CLINIC | Age: 75
End: 2025-05-06

## 2025-05-06 ENCOUNTER — OFFICE VISIT (OUTPATIENT)
Dept: INTERNAL MEDICINE | Facility: CLINIC | Age: 75
End: 2025-05-06
Payer: MEDICARE

## 2025-05-06 ENCOUNTER — LAB VISIT (OUTPATIENT)
Dept: LAB | Facility: OTHER | Age: 75
End: 2025-05-06
Attending: COUNSELOR
Payer: MEDICARE

## 2025-05-06 VITALS
SYSTOLIC BLOOD PRESSURE: 124 MMHG | BODY MASS INDEX: 35.46 KG/M2 | DIASTOLIC BLOOD PRESSURE: 82 MMHG | HEIGHT: 69 IN | OXYGEN SATURATION: 99 % | HEART RATE: 74 BPM | WEIGHT: 239.44 LBS

## 2025-05-06 DIAGNOSIS — I10 PRIMARY HYPERTENSION: ICD-10-CM

## 2025-05-06 DIAGNOSIS — E05.00 GRAVES DISEASE: ICD-10-CM

## 2025-05-06 DIAGNOSIS — E55.9 VITAMIN D DEFICIENCY: ICD-10-CM

## 2025-05-06 DIAGNOSIS — R63.4 UNINTENTIONAL WEIGHT LOSS: ICD-10-CM

## 2025-05-06 DIAGNOSIS — R06.09 DYSPNEA ON EXERTION: ICD-10-CM

## 2025-05-06 DIAGNOSIS — Z00.00 HEALTH MAINTENANCE EXAMINATION: Primary | ICD-10-CM

## 2025-05-06 DIAGNOSIS — R10.13 EPIGASTRIC ABDOMINAL PAIN: ICD-10-CM

## 2025-05-06 DIAGNOSIS — R73.09 OTHER ABNORMAL GLUCOSE: ICD-10-CM

## 2025-05-06 DIAGNOSIS — N18.31 STAGE 3A CHRONIC KIDNEY DISEASE: ICD-10-CM

## 2025-05-06 DIAGNOSIS — Z13.6 SCREENING FOR CARDIOVASCULAR CONDITION: ICD-10-CM

## 2025-05-06 DIAGNOSIS — E53.8 VITAMIN B12 DEFICIENCY: ICD-10-CM

## 2025-05-06 DIAGNOSIS — R79.89 ELEVATED SERUM CREATININE: Primary | ICD-10-CM

## 2025-05-06 DIAGNOSIS — G47.00 INSOMNIA, UNSPECIFIED TYPE: ICD-10-CM

## 2025-05-06 DIAGNOSIS — E66.01 SEVERE OBESITY WITH BODY MASS INDEX (BMI) OF 35.0 TO 39.9 WITH COMORBIDITY: ICD-10-CM

## 2025-05-06 DIAGNOSIS — Z00.00 HEALTH MAINTENANCE EXAMINATION: ICD-10-CM

## 2025-05-06 LAB
ALBUMIN/CREAT UR: 7 UG/MG
CREAT UR-MCNC: 229 MG/DL (ref 15–325)
MICROALBUMIN UR-MCNC: 16 UG/ML (ref ?–5000)

## 2025-05-06 PROCEDURE — 82570 ASSAY OF URINE CREATININE: CPT | Mod: HCNC

## 2025-05-06 PROCEDURE — 99999 PR PBB SHADOW E&M-EST. PATIENT-LVL IV: CPT | Mod: PBBFAC,HCNC,, | Performed by: COUNSELOR

## 2025-05-06 RX ORDER — OLMESARTAN MEDOXOMIL 40 MG/1
40 TABLET ORAL DAILY
Qty: 90 TABLET | Refills: 3 | Status: SHIPPED | OUTPATIENT
Start: 2025-05-06

## 2025-05-06 RX ORDER — TRAZODONE HYDROCHLORIDE 50 MG/1
50 TABLET ORAL NIGHTLY PRN
Qty: 30 TABLET | Refills: 0 | Status: SHIPPED | OUTPATIENT
Start: 2025-05-06 | End: 2025-06-05

## 2025-05-06 NOTE — PROGRESS NOTES
Called patient after appointment after discussing with PCP explaining to continue taking BP before taking olmesartan. If BP is <110 systolic she should skip the dose for that day. Advised her to alert the clinic if the BP gets too high or if she continues to experience low BP's with dizziness or other symptoms.   Also, discussed that she should have the CT scan done for her abdominal symptoms.    Answered all questions. Patient expressed understanding and agreeable with the plan.

## 2025-05-07 ENCOUNTER — TELEPHONE (OUTPATIENT)
Dept: INTERNAL MEDICINE | Facility: CLINIC | Age: 75
End: 2025-05-07
Payer: MEDICARE

## 2025-05-07 ENCOUNTER — TELEPHONE (OUTPATIENT)
Dept: NEPHROLOGY | Facility: CLINIC | Age: 75
End: 2025-05-07
Payer: MEDICARE

## 2025-05-07 NOTE — TELEPHONE ENCOUNTER
----- Message from Matthew Camp PA-C sent at 5/6/2025  6:54 PM CDT -----  Please have patient schedule lab appointment for repeat renal function panel in 2-3 days. Thank you!  ----- Message -----  From: Lab, Background User  Sent: 5/6/2025   4:26 PM CDT  To: Matthew Camp PA-C

## 2025-05-07 NOTE — TELEPHONE ENCOUNTER
Rescheduled the pt's lab appointment.     Appointment Information   Name: KEESHA DUARTE KATHI     Date: 5/9/2025     Appt Time: 10:45 AM     Visit Type: NON FASTING LAB [2194]     Provider: LAB, [facility] Dept: [facility], Lab       Dept Address: [address] [place] [address] [place] [address]       Dept Phone Number: [phone]

## 2025-05-09 ENCOUNTER — LAB VISIT (OUTPATIENT)
Dept: LAB | Facility: OTHER | Age: 75
End: 2025-05-09
Attending: COUNSELOR
Payer: MEDICARE

## 2025-05-09 ENCOUNTER — RESULTS FOLLOW-UP (OUTPATIENT)
Dept: INTERNAL MEDICINE | Facility: CLINIC | Age: 75
End: 2025-05-09
Payer: MEDICARE

## 2025-05-09 DIAGNOSIS — R79.89 ELEVATED SERUM CREATININE: ICD-10-CM

## 2025-05-09 LAB
ALBUMIN SERPL BCP-MCNC: 3.8 G/DL (ref 3.5–5.2)
ANION GAP (OHS): 7 MMOL/L (ref 8–16)
BUN SERPL-MCNC: 20 MG/DL (ref 8–23)
CALCIUM SERPL-MCNC: 10.4 MG/DL (ref 8.7–10.5)
CHLORIDE SERPL-SCNC: 107 MMOL/L (ref 95–110)
CO2 SERPL-SCNC: 26 MMOL/L (ref 23–29)
CREAT SERPL-MCNC: 1.2 MG/DL (ref 0.5–1.4)
GFR SERPLBLD CREATININE-BSD FMLA CKD-EPI: 48 ML/MIN/1.73/M2
GLUCOSE SERPL-MCNC: 84 MG/DL (ref 70–110)
PHOSPHATE SERPL-MCNC: 2.8 MG/DL (ref 2.7–4.5)
POTASSIUM SERPL-SCNC: 4.4 MMOL/L (ref 3.5–5.1)
SODIUM SERPL-SCNC: 140 MMOL/L (ref 136–145)

## 2025-05-09 PROCEDURE — 36415 COLL VENOUS BLD VENIPUNCTURE: CPT

## 2025-05-09 PROCEDURE — 82374 ASSAY BLOOD CARBON DIOXIDE: CPT

## 2025-05-14 ENCOUNTER — HOSPITAL ENCOUNTER (OUTPATIENT)
Dept: RADIOLOGY | Facility: OTHER | Age: 75
Discharge: HOME | End: 2025-05-14
Attending: COUNSELOR
Payer: MEDICARE

## 2025-05-14 DIAGNOSIS — R63.4 UNINTENTIONAL WEIGHT LOSS: ICD-10-CM

## 2025-05-14 DIAGNOSIS — R10.13 EPIGASTRIC ABDOMINAL PAIN: ICD-10-CM

## 2025-05-14 PROCEDURE — 71260 CT THORAX DX C+: CPT | Mod: TC

## 2025-05-14 PROCEDURE — 74177 CT ABD & PELVIS W/CONTRAST: CPT | Mod: 26,,, | Performed by: RADIOLOGY

## 2025-05-14 PROCEDURE — 71260 CT THORAX DX C+: CPT | Mod: 26,,, | Performed by: RADIOLOGY

## 2025-05-14 PROCEDURE — 25500020 PHARM REV CODE 255: Performed by: COUNSELOR

## 2025-05-14 PROCEDURE — A9698 NON-RAD CONTRAST MATERIALNOC: HCPCS | Performed by: COUNSELOR

## 2025-05-14 RX ADMIN — IOHEXOL 1000 ML: 12 SOLUTION ORAL at 11:05

## 2025-05-14 RX ADMIN — IOHEXOL 100 ML: 350 INJECTION, SOLUTION INTRAVENOUS at 12:05

## 2025-05-16 ENCOUNTER — TELEPHONE (OUTPATIENT)
Dept: ENDOSCOPY | Facility: HOSPITAL | Age: 75
End: 2025-05-16
Payer: MEDICARE

## 2025-05-16 VITALS — BODY MASS INDEX: 35.4 KG/M2 | WEIGHT: 239 LBS | HEIGHT: 69 IN

## 2025-05-16 DIAGNOSIS — K21.9 GASTROESOPHAGEAL REFLUX DISEASE, UNSPECIFIED WHETHER ESOPHAGITIS PRESENT: Primary | ICD-10-CM

## 2025-05-16 NOTE — TELEPHONE ENCOUNTER
"----- Message from Brandon Ruelas MD sent at 5/16/2025 12:18 PM CDT -----  Procedure: EGD, discomfort with eating and Weight loss.Referring Physician: Beckie Marsh M.D.Diagnosis: GERDProcedure Timing: Within 1-2 weeks (Urgent)#If within 4 weeks selected, please jessie as high priority##If greater than 12 weeks, please select "5-12 weeks" and delay sending until 3 months prior to requested date# Location: Any SiteAdditional Scheduling Information: No scheduling concernsPrep Specifications:Standard prepIs the patient taking a GLP-1 Agonist:no but please askHave you attached a patient to this message: yes  "

## 2025-05-19 ENCOUNTER — HOSPITAL ENCOUNTER (OUTPATIENT)
Facility: HOSPITAL | Age: 75
Discharge: HOME | End: 2025-05-19
Attending: INTERNAL MEDICINE | Admitting: INTERNAL MEDICINE
Payer: MEDICARE

## 2025-05-19 ENCOUNTER — DOCUMENTATION ONLY (OUTPATIENT)
Dept: ENDOSCOPY | Facility: HOSPITAL | Age: 75
End: 2025-05-19
Payer: MEDICARE

## 2025-05-19 ENCOUNTER — ANESTHESIA EVENT (OUTPATIENT)
Dept: ENDOSCOPY | Facility: HOSPITAL | Age: 75
End: 2025-05-19
Payer: MEDICARE

## 2025-05-19 ENCOUNTER — ANESTHESIA (OUTPATIENT)
Dept: ENDOSCOPY | Facility: HOSPITAL | Age: 75
End: 2025-05-19
Payer: MEDICARE

## 2025-05-19 VITALS
DIASTOLIC BLOOD PRESSURE: 63 MMHG | SYSTOLIC BLOOD PRESSURE: 114 MMHG | RESPIRATION RATE: 16 BRPM | BODY MASS INDEX: 34.29 KG/M2 | OXYGEN SATURATION: 97 % | WEIGHT: 231.5 LBS | HEIGHT: 69 IN | TEMPERATURE: 98 F | HEART RATE: 73 BPM

## 2025-05-19 DIAGNOSIS — K21.9 GERD (GASTROESOPHAGEAL REFLUX DISEASE): ICD-10-CM

## 2025-05-19 DIAGNOSIS — K31.84 GASTROPARESIS: Primary | ICD-10-CM

## 2025-05-19 PROCEDURE — 37000009 HC ANESTHESIA EA ADD 15 MINS: Performed by: INTERNAL MEDICINE

## 2025-05-19 PROCEDURE — 37000008 HC ANESTHESIA 1ST 15 MINUTES: Performed by: INTERNAL MEDICINE

## 2025-05-19 PROCEDURE — 43235 EGD DIAGNOSTIC BRUSH WASH: CPT | Mod: ,,, | Performed by: INTERNAL MEDICINE

## 2025-05-19 PROCEDURE — 27201012 HC FORCEPS, HOT/COLD, DISP: Performed by: INTERNAL MEDICINE

## 2025-05-19 PROCEDURE — E9220 PRA ENDO ANESTHESIA: HCPCS | Mod: ,,, | Performed by: NURSE ANESTHETIST, CERTIFIED REGISTERED

## 2025-05-19 PROCEDURE — 43235 EGD DIAGNOSTIC BRUSH WASH: CPT | Performed by: INTERNAL MEDICINE

## 2025-05-19 PROCEDURE — 63600175 PHARM REV CODE 636 W HCPCS: Performed by: NURSE ANESTHETIST, CERTIFIED REGISTERED

## 2025-05-19 RX ORDER — LIDOCAINE HYDROCHLORIDE 20 MG/ML
INJECTION INTRAVENOUS
Status: DISCONTINUED | OUTPATIENT
Start: 2025-05-19 | End: 2025-05-19

## 2025-05-19 RX ORDER — ONDANSETRON HYDROCHLORIDE 2 MG/ML
INJECTION, SOLUTION INTRAVENOUS
Status: DISCONTINUED | OUTPATIENT
Start: 2025-05-19 | End: 2025-05-19

## 2025-05-19 RX ORDER — SODIUM CHLORIDE 9 MG/ML
INJECTION, SOLUTION INTRAVENOUS CONTINUOUS
Status: DISCONTINUED | OUTPATIENT
Start: 2025-05-19 | End: 2025-05-19 | Stop reason: HOSPADM

## 2025-05-19 RX ORDER — PROPOFOL 10 MG/ML
VIAL (ML) INTRAVENOUS
Status: DISCONTINUED | OUTPATIENT
Start: 2025-05-19 | End: 2025-05-19

## 2025-05-19 RX ORDER — PROPOFOL 10 MG/ML
VIAL (ML) INTRAVENOUS CONTINUOUS PRN
Status: DISCONTINUED | OUTPATIENT
Start: 2025-05-19 | End: 2025-05-19

## 2025-05-19 RX ADMIN — LIDOCAINE HYDROCHLORIDE 100 MG: 20 INJECTION INTRAVENOUS at 12:05

## 2025-05-19 RX ADMIN — ONDANSETRON 4 MG: 2 INJECTION INTRAMUSCULAR; INTRAVENOUS at 12:05

## 2025-05-19 RX ADMIN — PROPOFOL 70 MG: 10 INJECTION, EMULSION INTRAVENOUS at 12:05

## 2025-05-19 RX ADMIN — PROPOFOL 200 MCG/KG/MIN: 10 INJECTION, EMULSION INTRAVENOUS at 12:05

## 2025-05-19 NOTE — PLAN OF CARE
Pt to have EGD rescheduled for 2nd floor with a Colonoscopy within the next 4 weeks. Pt will be contacted to schedule this on the 2nd floor. Pt verbalized understanding

## 2025-05-19 NOTE — PROGRESS NOTES
Referral placed to endoscopy  for     Procedure: EGD/Colonoscopy  Referring Physician: Beckie Marsh M.D.    Diagnosis: Em loss, Anorexia, Abdominal pain, Surveillance colonoscopy - Hx of colon polyps    Procedure Timing: Within 4 weeks (Urgent)    Location: Hospital Based ([address]) for airway protection    Additional Scheduling Information: History of stomach full of food at EGD on 5/19/2025    Prep Specifications:Extended/Constipation prep    Is the patient taking a GLP-1 Agonist:no but please ask    Have you attached a patient to this message: yes

## 2025-05-19 NOTE — H&P
[facility] 4th [facility]  History & Physical    Subjective:      Chief Complaint/Reason for Admission:     EGD for GERD    Manju Aranda is a 74 y.o. female.    Past Medical History:   Diagnosis Date    Allergy     Anemia     Arthritis     Cholelithiases     Cyst of kidney, acquired     Diverticulitis     Elevated TSH     Family history of Graves' disease: daughter, maternal aunt, maternal uncle 09/13/2016    Glaucoma suspect     Graves disease     Hx of colonic polyps     Hypertension 1981    Liver cyst     MGUS (monoclonal gammopathy of unknown significance)     Migraine headache     Mitral valve problem     leakage    Obesity     Paraproteinemia     Sleep apnea     + CPAP    Smoldering multiple myeloma 2013    Tricuspid valve disease     leakage     Past Surgical History:   Procedure Laterality Date    APPENDECTOMY      ARTHROPLASTY, KNEE, TOTAL, USING COMPUTER-ASSISTED NAVIGATION Left 12/23/2024    Procedure: ARTHROPLASTY, KNEE, TOTAL, USING JDF COMPUTER-ASSISTED NAVIGATION: LEFT: DEPUY - ATTUNE;  Surgeon: Mack Torre III, MD;  Location: [facility];  Service: Orthopedics;  Laterality: Left;    COLONOSCOPY N/A 10/23/2015    Procedure: COLONOSCOPY;  Surgeon: Brandon Ruelas MD;  Location: [address]);  Service: Endoscopy;  Laterality: N/A;  Had divertiulitis in 5/29/2015 with a recommendation for colonoscopy in 8 weeks    Dr. Ruelas is her GI physician    COLONOSCOPY N/A 11/05/2018    Procedure: COLONOSCOPY;  Surgeon: Brandon Ruelas MD;  Location: [address]);  Service: Endoscopy;  Laterality: N/A;  Dr. Ruelas did the last one multiple polyps, patient had recent diverticulitis should not schedule before Oct 10th    COLONOSCOPY N/A 6/19/2023    Procedure: COLONOSCOPY;  Surgeon: Lu Lewis MD;  Location: [facility] ([address] [place] [address]);  Service: Endoscopy;  Laterality: N/A;  pt offered earlier dates but stated she is out town and will be returning the evening of 6/4  cardiac  clearance recieved-see tele encounter 5/22/23 5/22 referred by Dr. Lewis/LOLITA/instr. mailed and to portal-  6/13 pre-call no answer; MB    CYSTOSCOPY      HYSTERECTOMY  1978 or 1979    menorrhagia    REPAIR OF HOLE IN MACULA Right 3/10/2025    Procedure: REPAIR, HOLE, MACULA;  Surgeon: Victoriano Diallo MD;  Location: [facility] OR [address] [place] [address];  Service: Ophthalmology;  Laterality: Right;     Social History[1]    PTA Medications   Medication Sig    acetaminophen (TYLENOL) 650 MG TbSR Take 1 tablet (650 mg total) by mouth every 8 (eight) hours.    ascorbic acid, vitamin C, (VITAMIN C) 500 MG tablet Take 500 mg by mouth once daily.    cholecalciferol, vitamin D3, (VITAMIN D3) 50 mcg (2,000 unit) Cap capsule Take 1 capsule (2,000 Units total) by mouth once daily.    eszopiclone (LUNESTA) 2 MG Tab TAKE 1 TABLET BY MOUTH EVERY EVENING    loratadine (CLARITIN) 10 mg tablet Take 1 tablet (10 mg total) by mouth once daily.    magnesium oxide (MAG-OX) 400 mg tablet Take 400 mg by mouth once daily.    methocarbamoL (ROBAXIN) 750 MG Tab Take 1 tablet (750 mg total) by mouth 4 (four) times daily as needed (for muscle spasms).    multivit with min-folic acid 0.4 mg Tab Take 0.4 mg by mouth once daily.    olmesartan (BENICAR) 40 MG tablet Take 1 tablet (40 mg total) by mouth once daily.    psyllium (METAMUCIL) powder Take 1 packet by mouth Daily.    rizatriptan (MAXALT) 10 MG tablet TAKE 1 TABLET(10 MG) BY MOUTH EVERY 2 HOURS AS NEEDED FOR MIGRAINE. MAX 30 MG/ 24 AT BEDTIME    dorzolamide (TRUSOPT) 2 % ophthalmic solution Place 1 drop into the right eye 3 (three) times daily.    ketorolac 0.5% (ACULAR) 0.5 % Drop Place 1 drop into the right eye 4 (four) times daily.    prednisoLONE acetate (PRED FORTE) 1 % DrpS Place 1 drop into the right eye 4 (four) times daily.    senna-docusate 8.6-50 mg (SENNA WITH DOCUSATE SODIUM) 8.6-50 mg per tablet Take 1 tablet by mouth once daily.    traZODone (DESYREL) 50 MG tablet Take 1 tablet (50 mg total)  by mouth nightly as needed for Insomnia.     Review of patient's allergies indicates:  No Known Allergies     Review of Systems   Constitutional:  Negative for fever.       Objective:      Vital Signs (Most Recent)  Temp: 97.4 °F (36.3 °C) (25 1202)  Pulse: 90 (25 1202)  Resp: 16 (25 1202)  BP: 124/73 (25 1202)  SpO2: 97 % (25 1202)    Vital Signs Range (Last 24H):  Temp:  [97.4 °F (36.3 °C)]   Pulse:  [90]   Resp:  [16]   BP: (124)/(73)   SpO2:  [97 %]     Physical Exam  Cardiovascular:      Rate and Rhythm: Normal rate.   Pulmonary:      Effort: Pulmonary effort is normal.   Neurological:      Mental Status: She is alert and oriented to person, place, and time.           Assessment:        EGD for GERD    Plan:    EGD for GERD         [1]   Social History  Tobacco Use    Smoking status: Former     Current packs/day: 0.00     Types: Cigarettes     Start date: 1992     Quit date: 1995     Years since quittin.4     Passive exposure: Never    Smokeless tobacco: Never   Substance Use Topics    Alcohol use: No    Drug use: No

## 2025-05-19 NOTE — TRANSFER OF CARE
"Anesthesia Transfer of Care Note    Patient: Manju Aranda    Procedure(s) Performed: Procedure(s) (LRB):  EGD (ESOPHAGOGASTRODUODENOSCOPY) (N/A)    Patient location: PACU    Anesthesia Type: general    Transport from OR: Transported from OR on room air with adequate spontaneous ventilation    Post pain: adequate analgesia    Post assessment: no apparent anesthetic complications    Post vital signs: stable    Level of consciousness: awake    Nausea/Vomiting: no nausea/vomiting    Complications: none    Transfer of care protocol was followed      Last vitals: Visit Vitals  BP (!) 99/51 (BP Location: Left arm, Patient Position: Lying)   Pulse 77   Temp 36.4 °C (97.5 °F) (Temporal)   Resp 16   Ht 5' 9" (1.753 m)   Wt 105 kg (231 lb 7.7 oz)   SpO2 99%   Breastfeeding No   BMI 34.18 kg/m²     "

## 2025-05-19 NOTE — ANESTHESIA PREPROCEDURE EVALUATION
[facility]  Anesthesia Pre-Operative Evaluation     Patient Name: Manju Aranda  YOB: 1950  MRN: [id number]  [facility]: [id number]       Admit Date: 5/19/2025   Admit Team: Networked reference to record PCT   Hospital Day: 1  Date of Procedure: 5/19/2025  Anesthesia: Choice Procedure: Procedure(s) (LRB):  EGD (ESOPHAGOGASTRODUODENOSCOPY) (N/A)  Pre-Operative Diagnosis: Gastroesophageal reflux disease, unspecified whether esophagitis present [K21.9]  Proceduralist:Surgeons and Role:     * Brandon Ruelas MD - Primary  Code Status: Prior   Advanced Directive: Not Received  Isolation Precautions: No active isolations  Capacity: Full capacity     SUBJECTIVE:   Manju Aranda is a 74 y.o. female who  has a past medical history of Allergy, Anemia, Arthritis, Cholelithiases, Cyst of kidney, acquired, Diverticulitis, Elevated TSH, Family history of Graves' disease: daughter, maternal aunt, maternal uncle (09/13/2016), Glaucoma suspect, Graves disease, colonic polyps, Hypertension (1981), Liver cyst, MGUS (monoclonal gammopathy of unknown significance), Migraine headache, Mitral valve problem, Obesity, Paraproteinemia, Sleep apnea, Smoldering multiple myeloma (2013), and Tricuspid valve disease.  No notes on file   0.9% NaCl   Intravenous Continuous         Hospital LOS: 0 days  ICU LOS: Patient does not have an ICU stay during this admission.    she has a current medication list which includes the following long-term medication(s): dorzolamide, loratadine, olmesartan, rizatriptan, and trazodone.   Current Outpatient Medications   Medication Instructions    acetaminophen (TYLENOL) 650 mg, Oral, Every 8 hours    ascorbic acid (vitamin C) (VITAMIN C) 500 mg, Daily    cholecalciferol (vitamin D3) (VITAMIN D3) 2,000 Units, Oral, Daily    dorzolamide (TRUSOPT) 2 % ophthalmic solution 1 drop, Right Eye, 3 times daily    eszopiclone (LUNESTA) 2 mg, Oral, Nightly    ketorolac 0.5% (ACULAR) 0.5 %  Drop 1 drop, Right Eye, 4 times daily    loratadine (CLARITIN) 10 mg, Oral, Daily    magnesium oxide (MAG-OX) 400 mg, Daily    methocarbamoL (ROBAXIN) 750 mg, Oral, 4 times daily PRN    multivit with min-folic acid 0.4 mg Tab 0.4 mg, Daily    olmesartan (BENICAR) 40 mg, Oral, Daily    prednisoLONE acetate (PRED FORTE) 1 % DrpS 1 drop, Right Eye, 4 times daily    psyllium (METAMUCIL) powder 1 packet, Daily    rizatriptan (MAXALT) 10 MG tablet TAKE 1 TABLET(10 MG) BY MOUTH EVERY 2 HOURS AS NEEDED FOR MIGRAINE. MAX 30 MG/ 24 AT BEDTIME    senna-docusate 8.6-50 mg (SENNA WITH DOCUSATE SODIUM) 8.6-50 mg per tablet 1 tablet, Oral, Daily    traZODone (DESYREL) 50 mg, Oral, Nightly PRN     ALLERGIES:   Review of patient's allergies indicates:  No Known Allergies  LDA:   AIRWAY:         [unfilled]     Lines/Drains/Airways       None                  Anesthesia Evaluation      Airway   Mallampati: II  TM distance: Normal  Neck ROM: Normal ROM  Dental          Pulmonary    (+) sleep apnea on CPAP  Cardiovascular   Exercise tolerance: poor  (+) hypertension well controlled    Neuro/Psych    (+) neuromuscular disease, headaches    GI/Hepatic/Renal    (+) liver disease, chronic renal disease CKD    Endo/Other    (+) arthritis  Abdominal                    MEDICATIONS:     Current Outpatient Medications on File Prior to Encounter   Medication Sig Dispense Refill Last Dose/Taking    acetaminophen (TYLENOL) 650 MG TbSR Take 1 tablet (650 mg total) by mouth every 8 (eight) hours. 120 tablet 0     ascorbic acid, vitamin C, (VITAMIN C) 500 MG tablet Take 500 mg by mouth once daily.       cholecalciferol, vitamin D3, (VITAMIN D3) 50 mcg (2,000 unit) Cap capsule Take 1 capsule (2,000 Units total) by mouth once daily.       dorzolamide (TRUSOPT) 2 % ophthalmic solution Place 1 drop into the right eye 3 (three) times daily. 10 mL 3     eszopiclone (LUNESTA) 2 MG Tab TAKE 1 TABLET BY MOUTH EVERY EVENING 30 tablet 5     ketorolac 0.5%  (ACULAR) 0.5 % Drop Place 1 drop into the right eye 4 (four) times daily. 5 mL 3     loratadine (CLARITIN) 10 mg tablet Take 1 tablet (10 mg total) by mouth once daily. 30 tablet 2     magnesium oxide (MAG-OX) 400 mg tablet Take 400 mg by mouth once daily.       methocarbamoL (ROBAXIN) 750 MG Tab Take 1 tablet (750 mg total) by mouth 4 (four) times daily as needed (for muscle spasms). 40 tablet 0     multivit with min-folic acid 0.4 mg Tab Take 0.4 mg by mouth once daily.       olmesartan (BENICAR) 40 MG tablet Take 1 tablet (40 mg total) by mouth once daily. 90 tablet 3     prednisoLONE acetate (PRED FORTE) 1 % DrpS Place 1 drop into the right eye 4 (four) times daily. 5 mL 3     psyllium (METAMUCIL) powder Take 1 packet by mouth Daily.       rizatriptan (MAXALT) 10 MG tablet TAKE 1 TABLET(10 MG) BY MOUTH EVERY 2 HOURS AS NEEDED FOR MIGRAINE. MAX 30 MG/ 24 AT BEDTIME 12 tablet 11     senna-docusate 8.6-50 mg (SENNA WITH DOCUSATE SODIUM) 8.6-50 mg per tablet Take 1 tablet by mouth once daily. 30 tablet 0     traZODone (DESYREL) 50 MG tablet Take 1 tablet (50 mg total) by mouth nightly as needed for Insomnia. 30 tablet 0       Inpatient Medications:  Antibiotics (From admission, onward)      None          VTE Risk Mitigation (From admission, onward)      None              Current Medications[1]       History:   There are no hospital problems to display for this patient.    Surgical History:    has a past surgical history that includes Appendectomy; Hysterectomy (1978 or 1979); Colonoscopy (N/A, 10/23/2015); Colonoscopy (N/A, 11/05/2018); Cystoscopy; Colonoscopy (N/A, 6/19/2023); arthroplasty, knee, total, using computer-assisted navigation (Left, 12/23/2024); and Repair of hole in macula (Right, 3/10/2025).   Social History:    reports that she is not currently sexually active and has had partner(s) who are male. She reports using the following method of birth control/protection: Post-menopausal.  reports that she  "quit smoking about 30 years ago. Her smoking use included cigarettes. She started smoking about 33 years ago. She has never been exposed to tobacco smoke. She has never used smokeless tobacco. She reports that she does not drink alcohol and does not use drugs.    There were no vitals filed for this visit.  Vital Signs Range (Last 24H):       There is no height or weight on file to calculate BMI.  Wt Readings from Last 4 Encounters:   05/16/25 108.4 kg (239 lb)   05/06/25 108.6 kg (239 lb 6.7 oz)   04/14/25 109.2 kg (240 lb 11.9 oz)   03/18/25 112.8 kg (248 lb 10.9 oz)        Intake/Output - Last 3 Shifts       None          Lab Results   Component Value Date    WBC 4.30 05/06/2025    HGB 10.7 (L) 05/06/2025    HCT 34.3 (L) 05/06/2025     05/06/2025     05/09/2025    K 4.4 05/09/2025     05/09/2025    CREATININE 1.2 05/09/2025    BUN 20 05/09/2025    CO2 26 05/09/2025    GLU 84 05/09/2025    CALCIUM 10.4 05/09/2025    MG 1.9 05/02/2020    PHOS 2.8 05/09/2025    ALKPHOS 96 05/06/2025    ALT 13 05/06/2025    AST 18 05/06/2025    ALBUMIN 3.8 05/09/2025    INR 1.0 12/03/2024    APTT 26.1 05/01/2013    HGBA1C 4.3 05/06/2025    LACTATE 1.9 09/07/2016     04/30/2013    CPKMB 0.8 04/30/2013    TROPONINI 0.011 05/02/2020    MB 0.7 04/30/2013    BNP 16 05/06/2025     No results found for this or any previous visit (from the past 12 hours).  No results for input(s): "WBC", "HGB", "HCT", "PLT", "NA", "K", "CREATININE", "GLU", "INR", "LACTATE", "5HIAAPLASMA", "5HIAAURINT", "5HIAA", "4ORAD84GG" in the last 168 hours.  No LMP recorded. Patient has had a hysterectomy.    EKG:   Results for orders placed or performed during the hospital encounter of 12/03/24   EKG 12-lead    Collection Time: 12/03/24  8:26 AM   Result Value Ref Range    QRS Duration 90 ms    [facility] QTC Calculation 412 ms    Narrative    Test Reason : Z01.818,    Vent. Rate :  78 BPM     Atrial Rate :  78 BPM     P-R Int : 200 ms          QRS " Dur :  90 ms      QT Int : 362 ms       P-R-T Axes :  55  -3  18 degrees    QTcB Int : 412 ms    Sinus rhythm with occasional Premature ventricular complexes and Premature  atrial complexes  Otherwise normal ECG  When compared with ECG of 17-Apr-2024 10:28,  Premature ventricular complexes are now Present  Confirmed by Stacey Broderick ([id number]) on 12/3/2024 12:51:02 PM    Referred By: VINCE CLARK           Confirmed By: Stacey Broderick     TTE:  Results for orders placed during the hospital encounter of 05/22/24    Echo    Interpretation Summary    Left Ventricle: The left ventricle is normal in size. Normal wall thickness. There is normal systolic function with a visually estimated ejection fraction of 55 - 60%. Grade I diastolic dysfunction.    Right Ventricle: Normal right ventricular cavity size. Wall thickness is normal. Systolic function is normal.    Left Atrium: Left atrium is mildly dilated.    Right Atrium: Right atrium is mildly dilated.    IVC/SVC: Normal venous pressure at 3 mmHg.    Pericardium: There is a trivial effusion. No indication of cardiac tamponade.    ANANDA:  No results found for this or any previous visit.    Stress Test:  No results found for this or any previous visit.    Results for orders placed during the hospital encounter of 06/05/23    Stress Echo Which stress agent will be used? Treadmill Exercise; Color Flow Doppler? No    Interpretation Summary  · There were no arrhythmias during stress.  · Normal systolic function.  · The estimated ejection fraction is 52%.  · The stress echo portion of this study is negative for myocardial ischemia.  · The patient's exercise capacity was average.  · The test was stopped because the patient experienced fatigue and shortness of breath.  · The ECG portion of this study is negative for myocardial ischemia.    LHC:  No results found for this or any previous visit.    Cardiac Device Check   No results found for this or any previous visit.    No results found  for this or any previous visit.                                                                                                               05/19/2025  Manju Aranda is a 74 y.o., female.      Pre-op Assessment    I have reviewed the Patient Summary Reports.       I have reviewed the Medications.     Review of Systems  Anesthesia Hx:  No problems with previous Anesthesia   History of prior surgery of interest to airway management or planning:  Previous anesthesia: General           Social:  Former Smoker, No Alcohol Use       Hematology/Oncology:       -- Anemia:                                  Cardiovascular:  Exercise tolerance: poor   Hypertension, well controlled               Aneurysm of ascending aorta without rupture                     Hypertension         Pulmonary:        Sleep Apnea, CPAP     Obstructive Sleep Apnea (BECKY).           Renal/:  Chronic Renal Disease, CKD   ADPKD, stage 3a     Kidney Function/Disease             Hepatic/GI:      Liver Disease,            Liver Disease        Musculoskeletal:  Arthritis        Arthritis          Neurological:    Neuromuscular Disease,  Headaches      Dx of Headaches   Arthritis                         Neuromuscular Disease   Endocrine:     Graves disease      Obesity / BMI > 30      Physical Exam  General: Well nourished, Cooperative, Alert and Oriented    Airway:  Mallampati: II   Mouth Opening: Normal  TM Distance: Normal  Tongue: Normal  Neck ROM: Normal ROM    Dental:      Anesthesia Plan  Type of Anesthesia, risks & benefits discussed:    Anesthesia Type: Gen Natural Airway, Gen ETT  Intra-op Monitoring Plan: Standard ASA Monitors  Post Op Pain Control Plan: multimodal analgesia and IV/PO Opioids PRN  Induction:  IV  Informed Consent: Informed consent signed with the Patient and all parties understand the risks and agree with anesthesia plan.  All questions answered.   ASA Score: 3  Day of Surgery Review of History & Physical: H&P Update  referred to the surgeon/provider.    Ready For Surgery From Anesthesia Perspective.     .           [1]   Current Facility-Administered Medications   Medication Dose Route Frequency Provider Last Rate Last Admin    0.9% NaCl infusion   Intravenous Continuous Brandon Ruelas MD

## 2025-05-19 NOTE — PROVATION PATIENT INSTRUCTIONS
Discharge Summary/Instructions after an Endoscopic Procedure  Patient Name: Manju Aranda  Patient MRN: [id number]  Patient YOB: 1950  Monday, May 19, 2025  Brandon Ruelas MD  Dear patient,  As a result of recent federal legislation (The Federal Cures Act), you may   receive lab or pathology results from your procedure in your [facility]   account before your physician is able to contact you. Your physician or   their representative will relay the results to you with their   recommendations at their soonest availability.  Thank you,  RESTRICTIONS:  During your procedure today, you received medications for sedation.  These   medications may affect your judgment, balance and coordination.  Therefore,   for 24 hours, you have the following restrictions:   - DO NOT drive a car, operate machinery, make legal/financial decisions,   sign important papers or drink alcohol.    ACTIVITY:  Today: no heavy lifting, straining or running due to procedural   sedation/anesthesia.  The following day: return to full activity including work.  DIET:  Eat and drink normally unless instructed otherwise.     TREATMENT FOR COMMON SIDE EFFECTS:  - Mild abdominal pain, nausea, belching, bloating or excessive gas:  rest,   eat lightly and use a heating pad.  - Sore Throat: treat with throat lozenges and/or gargle with warm salt   water.  - Because air was used during the procedure, expelling large amounts of air   from your rectum or belching is normal.  - If a bowel prep was taken, you may not have a bowel movement for 1-3 days.    This is normal.  SYMPTOMS TO WATCH FOR AND REPORT TO YOUR PHYSICIAN:  1. Abdominal pain or bloating, other than gas cramps.  2. Chest pain.  3. Back pain.  4. Signs of infection such as: chills or fever occurring within 24 hours   after the procedure.  5. Rectal bleeding, which would show as bright red, maroon, or black stools.   (A tablespoon of blood from the rectum is not serious, especially if    hemorrhoids are present.)  6. Vomiting.  7. Weakness or dizziness.  GO DIRECTLY TO THE NEAREST EMERGENCY ROOM IF YOU HAVE ANY OF THE FOLLOWING:      Difficulty breathing              Chills and/or fever over 101 F   Persistent vomiting and/or vomiting blood   Severe abdominal pain   Severe chest pain   Black, tarry stools   Bleeding- more than one tablespoon   Any other symptom or condition that you feel may need urgent attention  Your doctor recommends these additional instructions:  If any biopsies were taken, your doctors clinic will contact you in 1 to 2   weeks with any results.  - Discharge patient to home.   - Repeat upper endoscopy at the next available appointment because the   preparation was poor and per protocol. Referral placed.  - Perform a colonoscopy at the next available appointment. Referral placed.  - Return to referring physician.   - The findings and recommendations were discussed with the patient.  For questions, problems or results please call your physician - Brandon Ruelas MD at Work:  [phone].  [facility], EMERGENCY ROOM PHONE NUMBER: [phone]  IF A COMPLICATION OR EMERGENCY SITUATION ARISES AND YOU ARE UNABLE TO REACH   YOUR PHYSICIAN - GO DIRECTLY TO THE EMERGENCY ROOM.  Brandon Ruelas MD  5/19/2025 1:03:13 PM  This report has been verified and signed electronically.  Dear patient,  As a result of recent federal legislation (The Federal Cures Act), you may   receive lab or pathology results from your procedure in your [facility]   account before your physician is able to contact you. Your physician or   their representative will relay the results to you with their   recommendations at their soonest availability.  Thank you,  PROVATION

## 2025-05-20 ENCOUNTER — CLINICAL SUPPORT (OUTPATIENT)
Dept: OPHTHALMOLOGY | Facility: CLINIC | Age: 75
End: 2025-05-20
Payer: MEDICARE

## 2025-05-20 ENCOUNTER — OFFICE VISIT (OUTPATIENT)
Dept: OPHTHALMOLOGY | Facility: CLINIC | Age: 75
End: 2025-05-20
Payer: MEDICARE

## 2025-05-20 DIAGNOSIS — H35.341 MACULAR HOLE, RIGHT: Primary | ICD-10-CM

## 2025-05-20 PROCEDURE — 92134 CPTRZ OPH DX IMG PST SGM RTA: CPT | Mod: S$GLB,,, | Performed by: OPHTHALMOLOGY

## 2025-05-20 PROCEDURE — 1126F AMNT PAIN NOTED NONE PRSNT: CPT | Mod: CPTII,S$GLB,, | Performed by: OPHTHALMOLOGY

## 2025-05-20 PROCEDURE — 1101F PT FALLS ASSESS-DOCD LE1/YR: CPT | Mod: CPTII,S$GLB,, | Performed by: OPHTHALMOLOGY

## 2025-05-20 PROCEDURE — 1159F MED LIST DOCD IN RCRD: CPT | Mod: CPTII,S$GLB,, | Performed by: OPHTHALMOLOGY

## 2025-05-20 PROCEDURE — 99024 POSTOP FOLLOW-UP VISIT: CPT | Mod: S$GLB,,, | Performed by: OPHTHALMOLOGY

## 2025-05-20 PROCEDURE — 3061F NEG MICROALBUMINURIA REV: CPT | Mod: CPTII,S$GLB,, | Performed by: OPHTHALMOLOGY

## 2025-05-20 PROCEDURE — 4010F ACE/ARB THERAPY RXD/TAKEN: CPT | Mod: CPTII,S$GLB,, | Performed by: OPHTHALMOLOGY

## 2025-05-20 PROCEDURE — 3288F FALL RISK ASSESSMENT DOCD: CPT | Mod: CPTII,S$GLB,, | Performed by: OPHTHALMOLOGY

## 2025-05-20 PROCEDURE — 99999 PR PBB SHADOW E&M-EST. PATIENT-LVL III: CPT | Mod: PBBFAC,,, | Performed by: OPHTHALMOLOGY

## 2025-05-20 PROCEDURE — 3044F HG A1C LEVEL LT 7.0%: CPT | Mod: CPTII,S$GLB,, | Performed by: OPHTHALMOLOGY

## 2025-05-20 PROCEDURE — 3066F NEPHROPATHY DOC TX: CPT | Mod: CPTII,S$GLB,, | Performed by: OPHTHALMOLOGY

## 2025-05-20 NOTE — PROGRESS NOTES
HPI    Pt is here for 1 month PO. DFE/OCT OD    Pt states that vision in OD is still really blurry and can't tell if   there's any improvement. No eye pain or discomfort. No flashes or   floaters. No other complaints.  Last edited by Zaid Alberto MA on 5/20/2025 12:45 PM.           A/P    ICD-10-CM ICD-9-CM   1. Macular hole, right  H35.341 362.54         1. Macular hole, right  Here for retina f/u    Pre-op Exam notable for stable mac hole OD with vitreous attachment     Postop: s/p PPV/MP/Afx/20% SF6 (Date 3/10/25 by Imani/Brisa) for mac hole od  Positioning: Face down  Duration: 2 days    5/20/2025 [facility] 20/400 (was 20/500), IOP ok, hole still open     Plan: discussed option of observation vs vitrectomy. Pt wishes to try  repeat surgery, aiming for June 9 under general anesthesia. Can stop drops    Risks, benefits and alternatives to surgery and anesthesia including no treatment were discussed with the patient including: death, coma, blindness, bleeding, retinal detachment, cataract, need for more surgeries and glaucoma. The patient understands and accepts risks All questions were answered and the patient wishes to proceed with surgery    Recommend Pars Plana Vitrectomy / Membrane Peel/ Gas Right Eye   R/B/A to surgery and anesthesia discussed with patient including: death, coma, blindness, bleeding, retinal detachment, cataract, and glaucoma Patient understands and accepts risks All questions answered Patient wishes to proceed with surgery      Instructions reviewed   - RD precautions  - Return for increasing pain/decreasing vision     ----------    2. Vitreous degeneration of both eyes  No RT/RD but has mac hole attachment OD  Plan: Observation OS for now, counseled on risk future mac hole, plan as above OD    3. Age-related nuclear cataract of both eyes  Mod NS/CC  Plan: Observation for now    RTC post-op        I saw and examined the patient and reviewed in detail the findings documented. The final  examination findings, image interpretations which have been independently interpreted, and plan as documented in the record represent my personal judgment and conclusions.    Victoriano Diallo MD  Vitreoretinal Surgery   [facility]

## 2025-05-20 NOTE — ANESTHESIA POSTPROCEDURE EVALUATION
Anesthesia Post Evaluation    Patient: Manju Aranda    Procedure(s) Performed: Procedure(s) (LRB):  EGD (ESOPHAGOGASTRODUODENOSCOPY) (N/A)    Final Anesthesia Type: general      Patient location during evaluation: PACU  Patient participation: Yes- Able to Participate  Level of consciousness: awake and alert  Post-procedure vital signs: reviewed and stable  Pain management: adequate  Airway patency: patent    PONV status at discharge: No PONV  Anesthetic complications: no      Cardiovascular status: blood pressure returned to baseline  Respiratory status: unassisted  Hydration status: euvolemic  Follow-up not needed.              Vitals Value Taken Time   /63 05/19/25 13:18   Temp 36.4 °C (97.5 °F) 05/19/25 12:48   Pulse 73 05/19/25 13:18   Resp 16 05/19/25 13:18   SpO2 97 % 05/19/25 13:18         Event Time   Out of Recovery 13:24:08         Pain/Kayleigh Score: Kayleigh Score: 8 (5/19/2025 12:48 PM)

## 2025-05-21 ENCOUNTER — TELEPHONE (OUTPATIENT)
Dept: OPHTHALMOLOGY | Facility: CLINIC | Age: 75
End: 2025-05-21
Payer: MEDICARE

## 2025-05-21 DIAGNOSIS — H35.341 MACULAR HOLE, RIGHT: Primary | ICD-10-CM

## 2025-05-22 ENCOUNTER — LAB VISIT (OUTPATIENT)
Dept: LAB | Facility: OTHER | Age: 75
End: 2025-05-22
Attending: INTERNAL MEDICINE
Payer: MEDICARE

## 2025-05-22 DIAGNOSIS — R93.89 ABNORMAL FINDINGS ON DIAGNOSTIC IMAGING OF OTHER SPECIFIED BODY STRUCTURES: ICD-10-CM

## 2025-05-22 DIAGNOSIS — K31.84 GASTROPARESIS: ICD-10-CM

## 2025-05-22 DIAGNOSIS — K31.84 GASTROPARESIS: Primary | ICD-10-CM

## 2025-05-22 LAB
ABSOLUTE EOSINOPHIL (OHS): 0.01 K/UL
ABSOLUTE MONOCYTE (OHS): 0.33 K/UL (ref 0.3–1)
ABSOLUTE NEUTROPHIL COUNT (OHS): 1.88 K/UL (ref 1.8–7.7)
BASOPHILS # BLD AUTO: 0.02 K/UL
BASOPHILS NFR BLD AUTO: 0.6 %
ERYTHROCYTE [DISTWIDTH] IN BLOOD BY AUTOMATED COUNT: 11.5 % (ref 11.5–14.5)
FERRITIN SERPL-MCNC: 254 NG/ML (ref 20–300)
HCT VFR BLD AUTO: 36.8 % (ref 37–48.5)
HGB BLD-MCNC: 11.5 GM/DL (ref 12–16)
IMM GRANULOCYTES # BLD AUTO: 0.01 K/UL (ref 0–0.04)
IMM GRANULOCYTES NFR BLD AUTO: 0.3 % (ref 0–0.5)
IRON SATN MFR SERPL: 20 % (ref 20–50)
IRON SERPL-MCNC: 71 UG/DL (ref 30–160)
LIPASE SERPL-CCNC: 23 U/L (ref 4–60)
LYMPHOCYTES # BLD AUTO: 1.31 K/UL (ref 1–4.8)
MCH RBC QN AUTO: 30.4 PG (ref 27–31)
MCHC RBC AUTO-ENTMCNC: 31.3 G/DL (ref 32–36)
MCV RBC AUTO: 97 FL (ref 82–98)
NUCLEATED RBC (/100WBC) (OHS): 0 /100 WBC
PLATELET # BLD AUTO: 253 K/UL (ref 150–450)
PMV BLD AUTO: 9.9 FL (ref 9.2–12.9)
RBC # BLD AUTO: 3.78 M/UL (ref 4–5.4)
RELATIVE EOSINOPHIL (OHS): 0.3 %
RELATIVE LYMPHOCYTE (OHS): 36.8 % (ref 18–48)
RELATIVE MONOCYTE (OHS): 9.3 % (ref 4–15)
RELATIVE NEUTROPHIL (OHS): 52.7 % (ref 38–73)
T4 FREE SERPL-MCNC: 0.92 NG/DL (ref 0.71–1.51)
T4 FREE SERPL-MCNC: 0.92 NG/DL (ref 0.71–1.51)
TIBC SERPL-MCNC: 364 UG/DL (ref 250–450)
TRANSFERRIN SERPL-MCNC: 246 MG/DL (ref 200–375)
TSH SERPL-ACNC: 2.27 UIU/ML (ref 0.4–4)
WBC # BLD AUTO: 3.56 K/UL (ref 3.9–12.7)

## 2025-05-22 PROCEDURE — 85025 COMPLETE CBC W/AUTO DIFF WBC: CPT

## 2025-05-22 PROCEDURE — 83690 ASSAY OF LIPASE: CPT

## 2025-05-22 PROCEDURE — 36415 COLL VENOUS BLD VENIPUNCTURE: CPT

## 2025-05-22 PROCEDURE — 84466 ASSAY OF TRANSFERRIN: CPT

## 2025-05-22 PROCEDURE — 82728 ASSAY OF FERRITIN: CPT

## 2025-05-22 PROCEDURE — 84439 ASSAY OF FREE THYROXINE: CPT

## 2025-05-26 ENCOUNTER — CLINICAL SUPPORT (OUTPATIENT)
Dept: ENDOSCOPY | Facility: HOSPITAL | Age: 75
End: 2025-05-26
Attending: INTERNAL MEDICINE
Payer: MEDICARE

## 2025-05-26 DIAGNOSIS — Z86.0100 HISTORY OF COLON POLYPS: ICD-10-CM

## 2025-05-26 DIAGNOSIS — R63.4 WEIGHT LOSS: ICD-10-CM

## 2025-05-26 DIAGNOSIS — R63.0 ANOREXIA: Primary | ICD-10-CM

## 2025-05-26 DIAGNOSIS — R63.0 ANOREXIA: ICD-10-CM

## 2025-05-26 DIAGNOSIS — Z12.11 COLON CANCER SCREENING: Primary | ICD-10-CM

## 2025-05-26 NOTE — PLAN OF CARE
Patient is scheduled for a Colonoscopy/EGD on 7/23/25 with Dr. SHIRAZ Ruelas  Referral for procedure from PAT appointment

## 2025-05-26 NOTE — PATIENT INSTRUCTIONS
EGD and Colonoscopy    Date of procedure: 7/23/25 Arrive at: 7:15 AM    Location of Department:    [facility] [address] [place] [address]  Take the [facility] Elevators to 2nd Floor Outpatient Surgery    You will be on a special diet 4 days prior to your procedure.      Your Full liquid diet begins on:  7/19/25    NO SOLID FOOD.   Begin full liquid diet.   Continue to drink liquids from the time you wake up until bedtime to avoid dehydration.     What do I need to know about a full liquid diet?  You may have any liquid.  You may have any food that becomes a liquid at room temperature. The food is considered a liquid if it can be poured off a spoon at room temperature.  Drink one serving of citrus or vitamin C-enriched fruit juice daily.  For more information on a full liquid diet please continue to the end of the instructions.     Your Clear Liquid diet begins on:  7/21/25    NO SOLID FOOD.   Begin clear liquid diet.  Continue drinking clear liquids from the time you wake up until bedtime to avoid dehydration.    Clear Liquids That Are OK to Drink:   Water  Sports drinks (Gatorade, Power-Aid)  Coffee or tea (no cream or nondairy creamer)  Clear juices without pulp (apple, white grape)  Gelatin desserts (no fruit or toppings)  Clear soda (sprite, coke, ginger ale)  Chicken broth (until 12 midnight the night before procedure)    What You CANNOT do:   Do not EAT, drink milk or anything colored red.  Do not drink alcohol.    Date of your procedure:  7/23/25    Take your instructed AM medications by 6:00 am with a small sip of water or as instructed by your .  You may have water/clear liquids for up to 4 hours prior to your procedure as instructed by your  until 4:15AM.    Do not EAT, drink milk or anything colored red.  Do not drink alcohol.  No gum chewing or candy morning of procedure      Full Liquid Diet   A full liquid diet may be used:  To help you transition from a  clear liquid diet to a soft diet.  When your body is healing and can only tolerate foods that are easy to digest.  Before or after certain a procedure, test, or surgery (such as stomach or intestinal surgeries).  If you have trouble swallowing or chewing.  A full liquid diet includes fluids and foods that are liquid or will become liquid at room temperature. The full liquid diet gives you the proteins, fluids, salts, and minerals that you need for energy.  If you continue this diet for more than 72 hours, talk to your health care provider about how many calories you need to consume. If you continue the diet for more than 5 days, talk to your health care provider about taking a multivitamin or a nutritional supplement.    What foods can I eat?  Grains  Any grain food that can be pureed in soup (such as crackers, pasta, and rice). Hot cereal (such as farina or oatmeal) that has been blended. Talk to your health care provider or dietitian about these foods.  Vegetables  Pulp-free tomato or vegetable juice. Vegetables pureed in soup.  Fruits  Fruit juice, including nectars and juices with pulp.  Meats and Other Protein Sources  Eggs in custard, eggnog mix, and eggs used in ice cream or pudding. Strained meats, like in baby food, may be allowed. Consult your health care provider.  Dairy  Milk and milk-based beverages, including milk shakes and instant breakfast mixes. Smooth yogurt. Pureed cottage cheese. Avoid these foods if they are not well tolerated.  Beverages  All beverages, including liquid nutritional supplements. Ask your health care provider if you can have carbonated beverages. They may not be well tolerated.  Condiments  Iodized salt, pepper, spices, and flavorings. Cocoa powder. Vinegar, ketchup, yellow mustard, smooth sauces (such as hollandaise, cheese sauce, or white sauce), and soy sauce.    Sweets and Desserts  Custard, smooth pudding. Flavored gelatin. Tapioca, junket. Plain ice cream, sherbet, fruit  ices. Ice pops, frozen fudge pops, pudding pops, and other frozen bars with cream. Syrups, including chocolate syrup. Sugar, honey, jelly.  Fats and Oils  Margarine, butter, cream, sour cream, and oils.  Other  Broth and cream soups. Strained, broth-based soups.  The items listed above may not be a complete list of recommended foods or beverages. Contact your dietitian for more options.    What foods can I not eat?  Grains  All breads. Grains are not allowed unless they are pureed into soup.  Vegetables  Vegetables are not allowed unless they are juiced, or cooked and pureed into soup.  Fruits  Fruits are not allowed unless they are juiced.  Meats and Other Protein Sources  Any meat or fish. Cooked or raw eggs. Nut butters.  Dairy  Cheese.  Condiments  Stone ground mustards.  Fats and Oils  Fats that are coarse or chunky.  Sweets and Desserts  Ice cream or other frozen desserts that have any solids in them or on top, such as nuts, chocolate chips, and pieces of cookies. Cakes. Cookies. Candy.  Others  Soups with chunks or pieces in them.  The items listed above may not be a complete list of foods and beverages to avoid. Contact your dietitian for more information.  Document Released: 12/18/2006 Document Revised: 5/25/2017 Document Reviewed: 10/23/2014  ExitCare® Patient Information ©2018 [employer].         Comments:        PEG (polyethylene glycol) Instructions for Colonoscopy   Common Brands: Golytely, Colyte, Nulytely, Gavilyte, Trilyte      Date of procedure: 7/23/25  Arrive at: 7:15 AM    Location of Department:   [facility] [address] [place] [address]  Take the [facility] Elevators to 2nd Floor Outpatient Surgery    As soon as possible:   your prep from pharmacy and over the counter DULCOLAX LAXATIVE TABLETS               What You CAN do:   You may have clear liquids ONLY -see below for list.     What You CANNOT do:   Do not EAT solid food, drink milk or anything colored  red.  Do not drink alcohol.  Do not take oral medications within 1 hour of starting   each dose of prep.  No gum chewing or candy morning of procedure    Liquids That Are OK to Drink:   Water  Sports drinks (Gatorade, Power-Aid)  Coffee or tea (no cream or nondairy creamer)  Clear juices without pulp (apple, white grape)  Gelatin desserts (no fruit or toppings)  Clear soda (sprite, coke, ginger ale)  Chicken broth (until 12 midnight the night before procedure)                                Note:     (Please disregard the insert instructions from pharmacy).  PEG Bowel Prep is indicated for cleansing of the colon as a preparation for colonoscopy in adults.   Be sure to tell your doctor about all the medicines you take, including prescription and non-prescription medicines, vitamins, and herbal supplements. PEG Bowel Prep may affect how other medicines work.  Medication taken by mouth may not be absorbed properly when taken within 1 hour before the start of each dose of PEG Bowel Prep.      It is not uncommon to experience some abdominal cramping, nausea and/or vomiting when taking the prep. If you have nausea and/or vomiting while taking the prep, stop drinking for 20 to 30 minutes then continue.      How to take prep:    PEG Bowel Prep-Two (2) gallon containers.    Two (2) containers of prep are required for a complete preparation for colonoscopy. Dilute the solution concentrate as directed prior to use. You must drink water with each dose of prep, and additional water after each dose.    IMPORTANT: If you experience preparation-related symptoms (for example, nausea, bloating, or cramping), stop or slow the rate of drinking until your symptoms decrease.        DOSE 1--2 Days Before Colonoscopy 7/21/25     Drink at least 6 to 8 glasses of clear liquids from time you wake up until you begin your prep and then continue until bedtime to avoid dehydration.     You may have clear liquids ONLY.    Step 1-In the morning-Mix  your entire container of prep with lukewarm water and refrigerate.      Step 2-12:00 pm (NOON) Take four (4) Dulcolax (Bisacodyl) tablets with at least 8 ounces or more of clear liquids.    Step 3-6:00 pm- Drink one 8 oz. glass of the prep every 10-15 minutes until the mixture is gone.   Set a timer as a reminder.        DOSE 2--Day Before Colonoscopy 7/22/25     Drink at least 6 to 8 glasses of clear liquids from time you wake up until you begin your prep and then continue until bedtime to avoid dehydration.     You may have clear liquids ONLY.    Step 1-In the morning- Mix your entire container of prep with lukewarm water and refrigerate.             Step 2-6:00 pm- Drink half the liquid in the container within one (1) hour. Refrigerate the remaining half of the prep for dose 3. See below when to begin this step.       DOSE 3--Day of the Colonoscopy 7/23/25  at 2-3 AM.      Step 1-Drink the remaining half of the prep within 1 hour.  Step 2- You may continue drinking water/clear liquids until        4  hours before your colonoscopy or as directed by the scheduling nurse 4:15 AM.      For more information about your procedure, please watch this informational video. It is important to watch this animated consent video prior to your arrival.   If you haven't watched the video prior to arriving, you are required to watch it during admission which can cause delays.     Options for viewing:  Using a keyboard:  press and hold the control tab (Ctrl) and left mouse click to follow link                  For information about your procedure, two (2) things to view prior to colonoscopy:  Please watch this informational video. It is important to watch this animated consent video prior to your arrival. If you haven't watched the video prior to arriving, you are required to watch it during admission which can causes delays.    Options for viewing:   Using a keyboard:  press and hold the control tab (Ctrl) and left mouse click  to follow links.           Colonoscopy Instructional Video                                                                                   OR    Type link address into your web browser's address bar:  [email or web address]://[email or web address] [email or web address]/watch?v=[email or web address]      Educational Booklet with pictures:      Colonoscopy Prep - Liquid      Comments:                                                           OR    Type link address into your web browser's address bar:  https://www.Safehis.com/watch?v=XZdo-[email or web address]    Using a mobile phone: tap on web address/link.       Comments:          IMPORTANT INFORMATION TO KNOW BEFORE YOUR PROCEDURE    [facility] [place] 2nd Floor         If your procedure requires the administration of anesthesia, it is necessary for a responsible adult to drive you home. (Medical Transportation, Uber, Lyft, Taxi, etc. may ONLY be used if a responsible adult is present to accompany you home.  The responsible adult CAN'T be the  of the service).      person must be available to return to pick you up within 15 minutes of being notified of discharge.       Please bring a picture ID, insurance card, & copayment      Take Medications as directed below:      If you begin taking any blood thinning medications, injectable weight loss/diabetes medications (other than insulin) , Adipex (Phentermine) , please contact the endoscopy scheduling department listed below as soon as possible.    If you are diabetic see the attached instruction sheet regarding your medication.     If you take HEART, BLOOD PRESSURE, SEIZURE, PAIN, LUNG (including inhalers/nebulizers), ANTI-REJECTION (transplant patients), or PSYCHIATRIC medications, please take at your regular times with a sip of water or as directed by the scheduling nurse.     Important contact information:    Endoscopy [phone] Hours of operation Monday-Friday 8:00-4:30pm.    Questions about insurance or financial obligations call [phone] or [phone].    If you have questions regarding the prep or need to reschedule, please call [phone]. After hours questions requiring immediate assistance, contact [facility] nurse line at [phone] or [phone].   NOTE:     On occasion, unforeseen circumstances may cause a delay in your procedure start time. We respect your time and appreciate your patience during these circumstances.      Comments:

## 2025-05-28 DIAGNOSIS — K31.84 GASTROPARESIS: Primary | ICD-10-CM

## 2025-05-28 RX ORDER — METOCLOPRAMIDE 5 MG/1
5 TABLET ORAL 3 TIMES DAILY
Qty: 90 TABLET | Refills: 1 | Status: SHIPPED | OUTPATIENT
Start: 2025-05-28 | End: 2026-05-28

## 2025-05-28 RX ORDER — DICYCLOMINE HYDROCHLORIDE 10 MG/1
10 CAPSULE ORAL
Qty: 120 CAPSULE | Refills: 0 | Status: SHIPPED | OUTPATIENT
Start: 2025-05-28 | End: 2025-06-27

## 2025-06-02 ENCOUNTER — TELEPHONE (OUTPATIENT)
Dept: ENDOSCOPY | Facility: HOSPITAL | Age: 75
End: 2025-06-02
Payer: MEDICARE

## 2025-06-02 ENCOUNTER — TELEPHONE (OUTPATIENT)
Dept: NEPHROLOGY | Facility: CLINIC | Age: 75
End: 2025-06-02
Payer: MEDICARE

## 2025-06-02 DIAGNOSIS — R93.89 ABNORMAL FINDING ON IMAGING: Primary | ICD-10-CM

## 2025-06-04 ENCOUNTER — ANESTHESIA EVENT (OUTPATIENT)
Dept: ENDOSCOPY | Facility: HOSPITAL | Age: 75
End: 2025-06-04
Payer: MEDICARE

## 2025-06-04 ENCOUNTER — HOSPITAL ENCOUNTER (OUTPATIENT)
Facility: HOSPITAL | Age: 75
Discharge: HOME | End: 2025-06-04
Attending: INTERNAL MEDICINE | Admitting: INTERNAL MEDICINE
Payer: MEDICARE

## 2025-06-04 ENCOUNTER — ANESTHESIA (OUTPATIENT)
Dept: ENDOSCOPY | Facility: HOSPITAL | Age: 75
End: 2025-06-04
Payer: MEDICARE

## 2025-06-04 VITALS
BODY MASS INDEX: 35.55 KG/M2 | WEIGHT: 240 LBS | TEMPERATURE: 98 F | SYSTOLIC BLOOD PRESSURE: 130 MMHG | HEART RATE: 62 BPM | DIASTOLIC BLOOD PRESSURE: 66 MMHG | OXYGEN SATURATION: 98 % | HEIGHT: 69 IN | RESPIRATION RATE: 17 BRPM

## 2025-06-04 DIAGNOSIS — R93.89 ABNORMAL FINDING ON IMAGING: ICD-10-CM

## 2025-06-04 DIAGNOSIS — R93.3 ABNORMAL FINDING ON GI TRACT IMAGING: ICD-10-CM

## 2025-06-04 PROCEDURE — 25000003 PHARM REV CODE 250: Performed by: STUDENT IN AN ORGANIZED HEALTH CARE EDUCATION/TRAINING PROGRAM

## 2025-06-04 PROCEDURE — 37000008 HC ANESTHESIA 1ST 15 MINUTES: Performed by: INTERNAL MEDICINE

## 2025-06-04 PROCEDURE — 43239 EGD BIOPSY SINGLE/MULTIPLE: CPT | Performed by: INTERNAL MEDICINE

## 2025-06-04 PROCEDURE — 88305 TISSUE EXAM BY PATHOLOGIST: CPT | Mod: 26,,, | Performed by: PATHOLOGY

## 2025-06-04 PROCEDURE — 43259 EGD US EXAM DUODENUM/JEJUNUM: CPT | Mod: ,,, | Performed by: INTERNAL MEDICINE

## 2025-06-04 PROCEDURE — 88305 TISSUE EXAM BY PATHOLOGIST: CPT | Mod: TC | Performed by: INTERNAL MEDICINE

## 2025-06-04 PROCEDURE — 27201012 HC FORCEPS, HOT/COLD, DISP: Performed by: INTERNAL MEDICINE

## 2025-06-04 PROCEDURE — 37000009 HC ANESTHESIA EA ADD 15 MINS: Performed by: INTERNAL MEDICINE

## 2025-06-04 PROCEDURE — 43259 EGD US EXAM DUODENUM/JEJUNUM: CPT | Performed by: INTERNAL MEDICINE

## 2025-06-04 PROCEDURE — 25000003 PHARM REV CODE 250: Performed by: INTERNAL MEDICINE

## 2025-06-04 PROCEDURE — 88342 IMHCHEM/IMCYTCHM 1ST ANTB: CPT | Mod: 26,,, | Performed by: PATHOLOGY

## 2025-06-04 PROCEDURE — 63600175 PHARM REV CODE 636 W HCPCS: Performed by: STUDENT IN AN ORGANIZED HEALTH CARE EDUCATION/TRAINING PROGRAM

## 2025-06-04 PROCEDURE — 43239 EGD BIOPSY SINGLE/MULTIPLE: CPT | Mod: 51,,, | Performed by: INTERNAL MEDICINE

## 2025-06-04 RX ORDER — ONDANSETRON HYDROCHLORIDE 2 MG/ML
4 INJECTION, SOLUTION INTRAVENOUS DAILY PRN
Status: DISCONTINUED | OUTPATIENT
Start: 2025-06-04 | End: 2025-06-04 | Stop reason: HOSPADM

## 2025-06-04 RX ORDER — HYDROMORPHONE HYDROCHLORIDE 1 MG/ML
0.2 INJECTION, SOLUTION INTRAMUSCULAR; INTRAVENOUS; SUBCUTANEOUS EVERY 5 MIN PRN
Status: DISCONTINUED | OUTPATIENT
Start: 2025-06-04 | End: 2025-06-04 | Stop reason: HOSPADM

## 2025-06-04 RX ORDER — ONDANSETRON HYDROCHLORIDE 2 MG/ML
INJECTION, SOLUTION INTRAVENOUS
Status: DISCONTINUED | OUTPATIENT
Start: 2025-06-04 | End: 2025-06-04

## 2025-06-04 RX ORDER — PROPOFOL 10 MG/ML
VIAL (ML) INTRAVENOUS
Status: DISCONTINUED | OUTPATIENT
Start: 2025-06-04 | End: 2025-06-04

## 2025-06-04 RX ORDER — LORAZEPAM 2 MG/ML
0.25 INJECTION INTRAMUSCULAR ONCE AS NEEDED
Status: DISCONTINUED | OUTPATIENT
Start: 2025-06-04 | End: 2025-06-04 | Stop reason: HOSPADM

## 2025-06-04 RX ORDER — SODIUM CHLORIDE 9 MG/ML
INJECTION, SOLUTION INTRAVENOUS CONTINUOUS
Status: DISCONTINUED | OUTPATIENT
Start: 2025-06-04 | End: 2025-06-04 | Stop reason: HOSPADM

## 2025-06-04 RX ORDER — SODIUM CHLORIDE 0.9 % (FLUSH) 0.9 %
10 SYRINGE (ML) INJECTION
Status: DISCONTINUED | OUTPATIENT
Start: 2025-06-04 | End: 2025-06-04 | Stop reason: HOSPADM

## 2025-06-04 RX ORDER — MEPERIDINE HYDROCHLORIDE 50 MG/ML
12.5 INJECTION INTRAMUSCULAR; INTRAVENOUS; SUBCUTANEOUS ONCE AS NEEDED
Status: DISCONTINUED | OUTPATIENT
Start: 2025-06-04 | End: 2025-06-04 | Stop reason: HOSPADM

## 2025-06-04 RX ORDER — GLUCAGON 1 MG
1 KIT INJECTION
Status: DISCONTINUED | OUTPATIENT
Start: 2025-06-04 | End: 2025-06-04 | Stop reason: HOSPADM

## 2025-06-04 RX ORDER — LIDOCAINE HYDROCHLORIDE 20 MG/ML
INJECTION INTRAVENOUS
Status: DISCONTINUED | OUTPATIENT
Start: 2025-06-04 | End: 2025-06-04

## 2025-06-04 RX ORDER — PHENYLEPHRINE HCL IN 0.9% NACL 1 MG/10 ML
SYRINGE (ML) INTRAVENOUS
Status: DISCONTINUED | OUTPATIENT
Start: 2025-06-04 | End: 2025-06-04

## 2025-06-04 RX ADMIN — LIDOCAINE HYDROCHLORIDE 100 MG: 20 INJECTION INTRAVENOUS at 08:06

## 2025-06-04 RX ADMIN — PROPOFOL 40 MG: 10 INJECTION, EMULSION INTRAVENOUS at 08:06

## 2025-06-04 RX ADMIN — Medication 150 MCG: at 08:06

## 2025-06-04 RX ADMIN — SODIUM CHLORIDE: 0.9 INJECTION, SOLUTION INTRAVENOUS at 08:06

## 2025-06-04 RX ADMIN — ONDANSETRON 4 MG: 2 INJECTION INTRAMUSCULAR; INTRAVENOUS at 08:06

## 2025-06-04 RX ADMIN — PROPOFOL 20 MG: 10 INJECTION, EMULSION INTRAVENOUS at 08:06

## 2025-06-04 RX ADMIN — PROPOFOL 200 MCG/KG/MIN: 10 INJECTION, EMULSION INTRAVENOUS at 08:06

## 2025-06-04 NOTE — PROVATION PATIENT INSTRUCTIONS
Discharge Summary/Instructions after an Endoscopic Procedure  Patient Name: Manju Aranda  Patient MRN: [id number]  Patient YOB: 1950  Wednesday, June 4, 2025  Yessica Wu MD  Dear patient,  As a result of recent federal legislation (The Federal Cures Act), you may   receive lab or pathology results from your procedure in your [facility]   account before your physician is able to contact you. Your physician or   their representative will relay the results to you with their   recommendations at their soonest availability.  Thank you,  RESTRICTIONS:  During your procedure today, you received medications for sedation.  These   medications may affect your judgment, balance and coordination.  Therefore,   for 24 hours, you have the following restrictions:   - DO NOT drive a car, operate machinery, make legal/financial decisions,   sign important papers or drink alcohol.    ACTIVITY:  Today: no heavy lifting, straining or running due to procedural   sedation/anesthesia.  The following day: return to full activity including work.  DIET:  Eat and drink normally unless instructed otherwise.     TREATMENT FOR COMMON SIDE EFFECTS:  - Mild abdominal pain, nausea, belching, bloating or excessive gas:  rest,   eat lightly and use a heating pad.  - Sore Throat: treat with throat lozenges and/or gargle with warm salt   water.  - Because air was used during the procedure, expelling large amounts of air   from your rectum or belching is normal.  - If a bowel prep was taken, you may not have a bowel movement for 1-3 days.    This is normal.  SYMPTOMS TO WATCH FOR AND REPORT TO YOUR PHYSICIAN:  1. Abdominal pain or bloating, other than gas cramps.  2. Chest pain.  3. Back pain.  4. Signs of infection such as: chills or fever occurring within 24 hours   after the procedure.  5. Rectal bleeding, which would show as bright red, maroon, or black stools.   (A tablespoon of blood from the rectum is not serious, especially if    hemorrhoids are present.)  6. Vomiting.  7. Weakness or dizziness.  GO DIRECTLY TO THE NEAREST EMERGENCY ROOM IF YOU HAVE ANY OF THE FOLLOWING:      Difficulty breathing              Chills and/or fever over 101 F   Persistent vomiting and/or vomiting blood   Severe abdominal pain   Severe chest pain   Black, tarry stools   Bleeding- more than one tablespoon   Any other symptom or condition that you feel may need urgent attention  Your doctor recommends these additional instructions:  If any biopsies were taken, your doctors clinic will contact you in 1 to 2   weeks with any results.  - Discharge patient to home (ambulatory).   - Patient has a contact number available for emergencies.  The signs and   symptoms of potential delayed complications were discussed with the   patient.  Return to normal activities tomorrow.  Written discharge   instructions were provided to the patient.   - Resume previous diet.   - Await path results from stomach and duodenum - to review with referring   providers on follow up.  - Return to referring physicians.  For questions, problems or results please call your physician - Yessica Wu MD at Work:  [phone].  [facility] [place], EMERGENCY ROOM PHONE NUMBER: [phone]  IF A COMPLICATION OR EMERGENCY SITUATION ARISES AND YOU ARE UNABLE TO REACH   YOUR PHYSICIAN - GO DIRECTLY TO THE EMERGENCY ROOM.  Yessica Wu MD  6/4/2025 9:07:28 AM  This report has been verified and signed electronically.  Dear patient,  As a result of recent federal legislation (The Federal Cures Act), you may   receive lab or pathology results from your procedure in your [facility]   account before your physician is able to contact you. Your physician or   their representative will relay the results to you with their   recommendations at their soonest availability.  Thank you,  PROVATION

## 2025-06-04 NOTE — H&P
Short Stay Endoscopy History and Physical    PCP - Jazzy Charles MD  Referring Physician - Beckie Marsh MD  [address] [place] [address]    Procedure - EGD/EUS  ASA - per anesthesia  Mallampati - per anesthesia  History of Anesthesia problems - per anesthesia  Family history Anesthesia problems -  per anesthesia   Plan of anesthesia - per anesthesia    HPI:  This is a 74 y.o. female here for evaluation of: EGD/EUS for abnormal recent endoscopy with retained food contents, suspected gastroparesis, weight loss, abdominal pains    Reflux - no  Dysphagia - no  Abdominal pain - no  Diarrhea - no    ROS:  Constitutional: No fevers, chills  CV: No chest pain  Pulm: No cough, No shortness of breath  Ophtho: No vision changes  GI: see HPI  Derm: No rash    Medical History:  has a past medical history of Allergy, Anemia, Arthritis, Cholelithiases, Cyst of kidney, acquired, Diverticulitis, Elevated TSH, Family history of Graves' disease: daughter, maternal aunt, maternal uncle (09/13/2016), Glaucoma suspect, Graves disease, colonic polyps, Hypertension (1981), Liver cyst, MGUS (monoclonal gammopathy of unknown significance), Migraine headache, Mitral valve problem, Obesity, Paraproteinemia, Sleep apnea, Smoldering multiple myeloma (2013), and Tricuspid valve disease.    Surgical History:  has a past surgical history that includes Appendectomy; Hysterectomy (1978 or 1979); Colonoscopy (N/A, 10/23/2015); Colonoscopy (N/A, 11/05/2018); Cystoscopy; Colonoscopy (N/A, 6/19/2023); arthroplasty, knee, total, using computer-assisted navigation (Left, 12/23/2024); Repair of hole in macula (Right, 3/10/2025); and Esophagogastroduodenoscopy (N/A, 5/19/2025).    Family History: family history includes Breast cancer in her paternal grandmother; Cancer in her maternal uncle and paternal grandmother; Cataracts in her sister; Colon cancer in her maternal uncle; Glaucoma in her sister; Graves' disease in her daughter; Heart  attack in her mother; Heart disease in her maternal aunt, maternal aunt, and mother; Hypertension in her father; Irregular heart beat in her sister; No Known Problems in her daughter, sister, son, and son..    Social History:  reports that she quit smoking about 30 years ago. Her smoking use included cigarettes. She started smoking about 33 years ago. She has never been exposed to tobacco smoke. She has never used smokeless tobacco. She reports that she does not drink alcohol and does not use drugs.    Review of patient's allergies indicates:  No Known Allergies    Medications:   Prescriptions Prior to Admission[1]    Physical Exam:    Vital Signs:   Vitals:    06/04/25 0735   BP: 136/67   Pulse: 72   Resp: 18   Temp: 98.2 °F (36.8 °C)       General Appearance: Well appearing in no acute distress    Labs:  Lab Results   Component Value Date    WBC 3.56 (L) 05/22/2025    HGB 11.5 (L) 05/22/2025    HCT 36.8 (L) 05/22/2025     05/22/2025    CHOL 145 05/06/2025    TRIG 79 05/06/2025    HDL 55 05/06/2025    ALT 13 05/06/2025    AST 18 05/06/2025     05/09/2025    K 4.4 05/09/2025     05/09/2025    CREATININE 1.2 05/09/2025    BUN 20 05/09/2025    CO2 26 05/09/2025    TSH 2.272 05/22/2025    INR 1.0 12/03/2024    HGBA1C 4.3 05/06/2025       I have explained the risks and benefits of this endoscopic procedure to the patient including but not limited to bleeding, pancreatitis, inflammation, infection, perforation, missing a lesion and death.      Yessica Wu MD         [1]   Medications Prior to Admission   Medication Sig Dispense Refill Last Dose/Taking    ascorbic acid, vitamin C, (VITAMIN C) 500 MG tablet Take 500 mg by mouth once daily.   6/3/2025    cholecalciferol, vitamin D3, (VITAMIN D3) 50 mcg (2,000 unit) Cap capsule Take 1 capsule (2,000 Units total) by mouth once daily.   6/3/2025    eszopiclone (LUNESTA) 2 MG Tab TAKE 1 TABLET BY MOUTH EVERY EVENING 30 tablet 5 Past Week    loratadine  (CLARITIN) 10 mg tablet Take 1 tablet (10 mg total) by mouth once daily. 30 tablet 2 Past Week    magnesium oxide (MAG-OX) 400 mg tablet Take 400 mg by mouth once daily.   6/3/2025    olmesartan (BENICAR) 40 MG tablet Take 1 tablet (40 mg total) by mouth once daily. 90 tablet 3 6/3/2025    rizatriptan (MAXALT) 10 MG tablet TAKE 1 TABLET(10 MG) BY MOUTH EVERY 2 HOURS AS NEEDED FOR MIGRAINE. MAX 30 MG/ 24 AT BEDTIME 12 tablet 11 Past Month    traZODone (DESYREL) 50 MG tablet Take 1 tablet (50 mg total) by mouth nightly as needed for Insomnia. 30 tablet 0 Past Week    acetaminophen (TYLENOL) 650 MG TbSR Take 1 tablet (650 mg total) by mouth every 8 (eight) hours. 120 tablet 0     dicyclomine (BENTYL) 10 MG capsule Take 1 capsule (10 mg total) by mouth 4 (four) times daily before meals and nightly. 120 capsule 0     dorzolamide (TRUSOPT) 2 % ophthalmic solution Place 1 drop into the right eye 3 (three) times daily. 10 mL 3     ketorolac 0.5% (ACULAR) 0.5 % Drop Place 1 drop into the right eye 4 (four) times daily. 5 mL 3     methocarbamoL (ROBAXIN) 750 MG Tab Take 1 tablet (750 mg total) by mouth 4 (four) times daily as needed (for muscle spasms). 40 tablet 0     metoclopramide HCl (REGLAN) 5 MG tablet Take 1 tablet (5 mg total) by mouth 3 (three) times daily. 90 tablet 1     multivit with min-folic acid 0.4 mg Tab Take 0.4 mg by mouth once daily.       prednisoLONE acetate (PRED FORTE) 1 % DrpS Place 1 drop into the right eye 4 (four) times daily. 5 mL 3     psyllium (METAMUCIL) powder Take 1 packet by mouth Daily.       senna-docusate 8.6-50 mg (SENNA WITH DOCUSATE SODIUM) 8.6-50 mg per tablet Take 1 tablet by mouth once daily. 30 tablet 0

## 2025-06-04 NOTE — PRE-PROCEDURE INSTRUCTIONS
PreOp Instructions given:     -- Medication information (what to hold and what to take)   -- NPO guidelines as follows: (or as per your Surgeon)  Stop ALL solid food, gum, candy 8 hours before arrival time.  Stop all CLOUDY liquids: coffee with creamer, cloudy juices, 8 hours prior to arrival time.  The patient should be ENCOURAGED to drink carbohydrate-rich clear liquids (sports drinks, clear juices) until 2 hours prior to arrival time.  Stop clear liquids 2 hours prior to arrival time.  CLEAR liquids include water, black coffee NO creamer, clear oral rehydration drinks, clear sports drinks and clear fruit juices (no orange juice, no pulpy juices, no apple cider).   IF IN DOUBT, drink water instead.   NOTHING TO DRINK 2 hours before to surgery/procedure  time. If you are told to take medication on the morning of surgery, it may be taken with a sip of water.   -- *Arrival place and directions given*.  Time to be given the day before procedure by the Surgeon's Office   -- Bathe with antibacterial soap (dial or Hibiclens as instructed)  -- Don't wear any jewelry or valuables and not metals on skin or hair AM of surgery   -- No makeup or moisturizer to face   -- No perfume/cologne, powder, lotions, aftershave or deodorant     Pt verbalized understanding.            *If going to , see below:      Directions and Instructions for [facility] [place]   At [facility], we have an outstanding team of physicians, anesthesiologists, CRNAs, Registered Nurses, Surgical Technologists, and other ancillary team members all focused on your surgical and procedural care.   Before Your Procedure:   The physician's office will call you with a specific arrival time and directions a day or two before your scheduled procedure. You may also receive these instructions through your [facility] portal.   Day of Procedure:   Please be sure to arrive at the arrival time given or you may risk your surgery being delayed  or canceled. The arrival time is earlier than your scheduled surgery or procedure time. In the winter months please dress warm and bring blankets for you or your child as the waiting room may be cold. If you have difficulty locating the facility, please give us a call at [phone].   Directions:   The [facility] is located on the 1st floor of the hospital building near the [place] entrance.   Parking:   You will park in the [facility] Parking [facility]age (note location on map). [facility] opens at 5:00 a.m. and has a drop off area by the entrance.  parking is available starting at 7:00 a.m. Please see below for further  parking instructions.   Directions from the parking garage elevators   [facility] Elevators: From the parking garage, take the blue [place] [place] elevators (located in the center of the parking garage) to the 1st floor of the garage. You will then take a right once off the elevators then another right to the outside of the parking garage. You will be across from the [facility]. You will walk down the sidewalk, pass the  curve at the [place] entrance and continue to follow the sidewalk. You will pass the radiation oncology entrance on your right. Continue to follow the sidewalk to the [facility] glass door entrance.   Hospital Entrance (Inside Route): If a mostly inside route is preferred: Take the inside elevator bank (located at the far north end of the garage) from the parking garage to the 1st floor. On the 1st floor walk past [employer]. Keep walking down the center of the hallway towards the hospital elevators. Once you reach the red brick flo, take a left and go past the hospital elevators. Take another left and follow the blue and white [place] signs around the hallway to the end. Go outside of the door. You will see the [facility] entrance to your right.   Drop Off:   There is a drop off area  at the doors of the [facility] for your convenience. If utilized for pediatric patients, an adult must accompany the patient into the surgery center while another adult duvall the vehicle.   Francisco J (at 7:00 a.m.):   Upon check-in, please let the  know that you are utilizing [employer] parking which is free. The . will then call  for your car to be picked up. Your keys and phone number will be collected and given to [employer] services. You will then be given a ticket. Upon discharge,  will be notified to bring your vehicle back when you are ready.           Directions to [facility] [place]:  [phone]     From 1st floor garage elevators: go past [facility] [employer] shop. Look for Black piano on side of coffee shop. Take Atrium (gold) elevator by piano up to 2nd floor. When you exit elevator follow long hallway (you should see a sign hanging from the ceiling that says Day of Surgery Family Waiting Room. When hallway ends you will be entering the day of surgery waiting area. Check in at the desk for your procedure.      From [facility] entrance: Make a right after you enter the door to the hospital. On your Left, take [place] elevator to 2nd floor. Check in at desk      From Lab desk on 2nd floor: Exit lab area toward hospital atrium. You should see a sign that says Day of Surgery Family Waiting Area. Make a Left and follow long hallway (you should see a sign hanging from the ceiling that says Day of Surgery Family Waiting Room. When hallway ends you will be entering the day of surgery waiting area. Check in at the desk for your procedure.

## 2025-06-04 NOTE — PROGRESS NOTES
Recovery care complete. Pt tolerated well. Discharge instructions and handouts provided. Pt and daughter verbalized understanding. Pt given po fluids and tolerated well. Pt in NAD, VSS. Pt discharge home to self or family care.

## 2025-06-06 ENCOUNTER — TELEPHONE (OUTPATIENT)
Dept: OPHTHALMOLOGY | Facility: CLINIC | Age: 75
End: 2025-06-06
Payer: MEDICARE

## 2025-06-06 DIAGNOSIS — E55.9 VITAMIN D DEFICIENCY: Primary | ICD-10-CM

## 2025-06-06 LAB
ESTRIOL SERPL-MCNC: NORMAL NG/ML
ESTROGEN SERPL-MCNC: NORMAL PG/ML
INSULIN SERPL-ACNC: NORMAL U[IU]/ML
LAB AP CLINICAL INFORMATION: NORMAL
LAB AP GROSS DESCRIPTION: NORMAL
LAB AP PERFORMING LOCATION(S): NORMAL
LAB AP REPORT FOOTNOTES: NORMAL

## 2025-06-06 RX ORDER — ERGOCALCIFEROL 1.25 MG/1
50000 CAPSULE ORAL
Qty: 12 CAPSULE | Refills: 0 | Status: SHIPPED | OUTPATIENT
Start: 2025-06-06 | End: 2025-08-23

## 2025-06-09 ENCOUNTER — ANESTHESIA (OUTPATIENT)
Dept: SURGERY | Facility: HOSPITAL | Age: 75
End: 2025-06-09
Payer: MEDICARE

## 2025-06-09 ENCOUNTER — ANESTHESIA EVENT (OUTPATIENT)
Dept: SURGERY | Facility: HOSPITAL | Age: 75
End: 2025-06-09
Payer: MEDICARE

## 2025-06-09 ENCOUNTER — HOSPITAL ENCOUNTER (OUTPATIENT)
Facility: HOSPITAL | Age: 75
Discharge: HOME | End: 2025-06-09
Attending: OPHTHALMOLOGY | Admitting: OPHTHALMOLOGY
Payer: MEDICARE

## 2025-06-09 VITALS
TEMPERATURE: 98 F | RESPIRATION RATE: 16 BRPM | HEIGHT: 69 IN | HEART RATE: 82 BPM | DIASTOLIC BLOOD PRESSURE: 78 MMHG | OXYGEN SATURATION: 97 % | SYSTOLIC BLOOD PRESSURE: 148 MMHG | WEIGHT: 240 LBS | BODY MASS INDEX: 35.55 KG/M2

## 2025-06-09 DIAGNOSIS — H35.341 MACULAR HOLE, RIGHT: ICD-10-CM

## 2025-06-09 PROCEDURE — 37000009 HC ANESTHESIA EA ADD 15 MINS: Performed by: OPHTHALMOLOGY

## 2025-06-09 PROCEDURE — 71000015 HC POSTOP RECOV 1ST HR: Performed by: OPHTHALMOLOGY

## 2025-06-09 PROCEDURE — 71000044 HC DOSC ROUTINE RECOVERY FIRST HOUR: Performed by: OPHTHALMOLOGY

## 2025-06-09 PROCEDURE — 67042 VIT FOR MACULAR HOLE: CPT | Mod: RT,,, | Performed by: OPHTHALMOLOGY

## 2025-06-09 PROCEDURE — 27201423 OPTIME MED/SURG SUP & DEVICES STERILE SUPPLY: Performed by: OPHTHALMOLOGY

## 2025-06-09 PROCEDURE — 36000709 HC OR TIME LEV III EA ADD 15 MIN: Performed by: OPHTHALMOLOGY

## 2025-06-09 PROCEDURE — 63600175 PHARM REV CODE 636 W HCPCS: Performed by: OPHTHALMOLOGY

## 2025-06-09 PROCEDURE — 25000003 PHARM REV CODE 250

## 2025-06-09 PROCEDURE — 36000708 HC OR TIME LEV III 1ST 15 MIN: Performed by: OPHTHALMOLOGY

## 2025-06-09 PROCEDURE — 63600175 PHARM REV CODE 636 W HCPCS

## 2025-06-09 PROCEDURE — 37000008 HC ANESTHESIA 1ST 15 MINUTES: Performed by: OPHTHALMOLOGY

## 2025-06-09 PROCEDURE — 25000003 PHARM REV CODE 250: Performed by: OPHTHALMOLOGY

## 2025-06-09 RX ORDER — ONDANSETRON HYDROCHLORIDE 2 MG/ML
INJECTION, SOLUTION INTRAVENOUS
Status: DISCONTINUED | OUTPATIENT
Start: 2025-06-09 | End: 2025-06-09

## 2025-06-09 RX ORDER — DEXMEDETOMIDINE HYDROCHLORIDE 100 UG/ML
INJECTION, SOLUTION INTRAVENOUS
Status: DISCONTINUED | OUTPATIENT
Start: 2025-06-09 | End: 2025-06-09

## 2025-06-09 RX ORDER — ROCURONIUM BROMIDE 10 MG/ML
INJECTION, SOLUTION INTRAVENOUS
Status: DISCONTINUED | OUTPATIENT
Start: 2025-06-09 | End: 2025-06-09

## 2025-06-09 RX ORDER — PHENYLEPHRINE HYDROCHLORIDE 25 MG/ML
1 SOLUTION/ DROPS OPHTHALMIC
Status: DISCONTINUED | OUTPATIENT
Start: 2025-06-09 | End: 2025-06-09 | Stop reason: HOSPADM

## 2025-06-09 RX ORDER — PREDNISOLONE ACETATE 10 MG/ML
1 SUSPENSION/ DROPS OPHTHALMIC
Status: DISCONTINUED | OUTPATIENT
Start: 2025-06-09 | End: 2025-06-09 | Stop reason: HOSPADM

## 2025-06-09 RX ORDER — BUPIVACAINE HYDROCHLORIDE 7.5 MG/ML
INJECTION, SOLUTION EPIDURAL; RETROBULBAR
Status: DISCONTINUED
Start: 2025-06-09 | End: 2025-06-09 | Stop reason: WASHOUT

## 2025-06-09 RX ORDER — INDOCYANINE GREEN AND WATER 25 MG
KIT INJECTION
Status: DISCONTINUED | OUTPATIENT
Start: 2025-06-09 | End: 2025-06-09 | Stop reason: HOSPADM

## 2025-06-09 RX ORDER — LIDOCAINE HYDROCHLORIDE 20 MG/ML
INJECTION, SOLUTION EPIDURAL; INFILTRATION; INTRACAUDAL; PERINEURAL
Status: DISCONTINUED
Start: 2025-06-09 | End: 2025-06-09 | Stop reason: WASHOUT

## 2025-06-09 RX ORDER — NEOMYCIN SULFATE, POLYMYXIN B SULFATE, AND DEXAMETHASONE 3.5; 10000; 1 MG/G; [USP'U]/G; MG/G
OINTMENT OPHTHALMIC
Status: DISCONTINUED
Start: 2025-06-09 | End: 2025-06-09 | Stop reason: HOSPADM

## 2025-06-09 RX ORDER — MOXIFLOXACIN 5 MG/ML
1 SOLUTION/ DROPS OPHTHALMIC
Status: DISCONTINUED | OUTPATIENT
Start: 2025-06-09 | End: 2025-06-09 | Stop reason: HOSPADM

## 2025-06-09 RX ORDER — SODIUM CHLORIDE 9 MG/ML
INJECTION, SOLUTION INTRAVENOUS CONTINUOUS
Status: DISCONTINUED | OUTPATIENT
Start: 2025-06-09 | End: 2025-06-09 | Stop reason: HOSPADM

## 2025-06-09 RX ORDER — EPINEPHRINE 1 MG/ML
INJECTION, SOLUTION, CONCENTRATE INTRAVENOUS
Status: DISCONTINUED
Start: 2025-06-09 | End: 2025-06-09 | Stop reason: HOSPADM

## 2025-06-09 RX ORDER — CYCLOPENTOLATE HYDROCHLORIDE 10 MG/ML
1 SOLUTION/ DROPS OPHTHALMIC
Status: DISCONTINUED | OUTPATIENT
Start: 2025-06-09 | End: 2025-06-09 | Stop reason: HOSPADM

## 2025-06-09 RX ORDER — PROPOFOL 10 MG/ML
VIAL (ML) INTRAVENOUS
Status: DISCONTINUED | OUTPATIENT
Start: 2025-06-09 | End: 2025-06-09

## 2025-06-09 RX ORDER — EPINEPHRINE 1 MG/ML
INJECTION, SOLUTION, CONCENTRATE INTRAVENOUS
Status: DISCONTINUED | OUTPATIENT
Start: 2025-06-09 | End: 2025-06-09 | Stop reason: HOSPADM

## 2025-06-09 RX ORDER — LIDOCAINE HYDROCHLORIDE 20 MG/ML
INJECTION, SOLUTION EPIDURAL; INFILTRATION; INTRACAUDAL; PERINEURAL
Status: DISCONTINUED | OUTPATIENT
Start: 2025-06-09 | End: 2025-06-09

## 2025-06-09 RX ORDER — TETRACAINE HYDROCHLORIDE 5 MG/ML
1 SOLUTION OPHTHALMIC
Status: DISCONTINUED | OUTPATIENT
Start: 2025-06-09 | End: 2025-06-09 | Stop reason: HOSPADM

## 2025-06-09 RX ORDER — SODIUM CHLORIDE 0.9 % (FLUSH) 0.9 %
3 SYRINGE (ML) INJECTION
Status: DISCONTINUED | OUTPATIENT
Start: 2025-06-09 | End: 2025-06-09 | Stop reason: HOSPADM

## 2025-06-09 RX ORDER — NEOMYCIN SULFATE, POLYMYXIN B SULFATE, AND DEXAMETHASONE 3.5; 10000; 1 MG/G; [USP'U]/G; MG/G
OINTMENT OPHTHALMIC
Status: DISCONTINUED | OUTPATIENT
Start: 2025-06-09 | End: 2025-06-09 | Stop reason: HOSPADM

## 2025-06-09 RX ORDER — DEXAMETHASONE SODIUM PHOSPHATE 4 MG/ML
INJECTION, SOLUTION INTRA-ARTICULAR; INTRALESIONAL; INTRAMUSCULAR; INTRAVENOUS; SOFT TISSUE
Status: DISCONTINUED
Start: 2025-06-09 | End: 2025-06-09 | Stop reason: HOSPADM

## 2025-06-09 RX ORDER — HALOPERIDOL LACTATE 5 MG/ML
0.5 INJECTION, SOLUTION INTRAMUSCULAR EVERY 10 MIN PRN
Status: DISCONTINUED | OUTPATIENT
Start: 2025-06-09 | End: 2025-06-09 | Stop reason: HOSPADM

## 2025-06-09 RX ORDER — DEXAMETHASONE SODIUM PHOSPHATE 4 MG/ML
INJECTION, SOLUTION INTRA-ARTICULAR; INTRALESIONAL; INTRAMUSCULAR; INTRAVENOUS; SOFT TISSUE
Status: DISCONTINUED | OUTPATIENT
Start: 2025-06-09 | End: 2025-06-09

## 2025-06-09 RX ORDER — TRIAMCINOLONE ACETONIDE 40 MG/ML
INJECTION, SUSPENSION INTRA-ARTICULAR; INTRAMUSCULAR
Status: DISCONTINUED
Start: 2025-06-09 | End: 2025-06-09 | Stop reason: HOSPADM

## 2025-06-09 RX ORDER — VANCOMYCIN HYDROCHLORIDE 500 MG/10ML
INJECTION, POWDER, LYOPHILIZED, FOR SOLUTION INTRAVENOUS
Status: DISCONTINUED
Start: 2025-06-09 | End: 2025-06-09 | Stop reason: HOSPADM

## 2025-06-09 RX ORDER — FENTANYL CITRATE 50 UG/ML
INJECTION, SOLUTION INTRAMUSCULAR; INTRAVENOUS
Status: DISCONTINUED | OUTPATIENT
Start: 2025-06-09 | End: 2025-06-09

## 2025-06-09 RX ADMIN — LIDOCAINE HYDROCHLORIDE 100 MG: 20 INJECTION, SOLUTION EPIDURAL; INFILTRATION; INTRACAUDAL; PERINEURAL at 07:06

## 2025-06-09 RX ADMIN — SUGAMMADEX 100 MG: 100 INJECTION, SOLUTION INTRAVENOUS at 08:06

## 2025-06-09 RX ADMIN — PHENYLEPHRINE HYDROCHLORIDE 1 DROP: 25 SOLUTION/ DROPS OPHTHALMIC at 07:06

## 2025-06-09 RX ADMIN — PREDNISOLONE ACETATE 1 DROP: 10 SUSPENSION/ DROPS OPHTHALMIC at 07:06

## 2025-06-09 RX ADMIN — MOXIFLOXACIN OPHTHALMIC 1 DROP: 5 SOLUTION/ DROPS OPHTHALMIC at 07:06

## 2025-06-09 RX ADMIN — PROPOFOL 20 MG: 10 INJECTION, EMULSION INTRAVENOUS at 08:06

## 2025-06-09 RX ADMIN — TETRACAINE HYDROCHLORIDE 1 DROP: 5 SOLUTION OPHTHALMIC at 07:06

## 2025-06-09 RX ADMIN — ROCURONIUM BROMIDE 50 MG: 10 INJECTION, SOLUTION INTRAVENOUS at 07:06

## 2025-06-09 RX ADMIN — ONDANSETRON 4 MG: 2 INJECTION INTRAMUSCULAR; INTRAVENOUS at 08:06

## 2025-06-09 RX ADMIN — SODIUM CHLORIDE: 0.9 INJECTION, SOLUTION INTRAVENOUS at 07:06

## 2025-06-09 RX ADMIN — CYCLOPENTOLATE HYDROCHLORIDE 1 DROP: 10 SOLUTION/ DROPS OPHTHALMIC at 07:06

## 2025-06-09 RX ADMIN — PROPOFOL 40 MG: 10 INJECTION, EMULSION INTRAVENOUS at 08:06

## 2025-06-09 RX ADMIN — FENTANYL CITRATE 50 MCG: 50 INJECTION, SOLUTION INTRAMUSCULAR; INTRAVENOUS at 07:06

## 2025-06-09 RX ADMIN — DEXMEDETOMIDINE 4 MCG: 100 INJECTION, SOLUTION, CONCENTRATE INTRAVENOUS at 08:06

## 2025-06-09 RX ADMIN — SODIUM CHLORIDE: 9 INJECTION, SOLUTION INTRAVENOUS at 07:06

## 2025-06-09 RX ADMIN — PROPOFOL 160 MG: 10 INJECTION, EMULSION INTRAVENOUS at 07:06

## 2025-06-09 RX ADMIN — DEXAMETHASONE SODIUM PHOSPHATE 4 MG: 4 INJECTION, SOLUTION INTRAMUSCULAR; INTRAVENOUS at 07:06

## 2025-06-09 RX ADMIN — SUGAMMADEX 200 MG: 100 INJECTION, SOLUTION INTRAVENOUS at 08:06

## 2025-06-09 NOTE — H&P
Pre-Operative History & Physical  Ophthalmology      SUBJECTIVE:     History of Present Illness:  Patient is a 74 y.o. female presents with Macular hole, right [H35.341].    MEDICATIONS:   Facility-Administered Medications Prior to Admission   Medication    sodium chloride 0.9% flush 10 mL     PTA Medications   Medication Sig    ascorbic acid, vitamin C, (VITAMIN C) 500 MG tablet Take 500 mg by mouth once daily.    cholecalciferol, vitamin D3, (VITAMIN D3) 50 mcg (2,000 unit) Cap capsule Take 1 capsule (2,000 Units total) by mouth once daily.    eszopiclone (LUNESTA) 2 MG Tab TAKE 1 TABLET BY MOUTH EVERY EVENING    magnesium oxide (MAG-OX) 400 mg tablet Take 400 mg by mouth once daily.    methocarbamoL (ROBAXIN) 750 MG Tab Take 1 tablet (750 mg total) by mouth 4 (four) times daily as needed (for muscle spasms).    multivit with min-folic acid 0.4 mg Tab Take 0.4 mg by mouth once daily.    olmesartan (BENICAR) 40 MG tablet Take 1 tablet (40 mg total) by mouth once daily.    psyllium (METAMUCIL) powder Take 1 packet by mouth Daily.    rizatriptan (MAXALT) 10 MG tablet TAKE 1 TABLET(10 MG) BY MOUTH EVERY 2 HOURS AS NEEDED FOR MIGRAINE. MAX 30 MG/ 24 AT BEDTIME    acetaminophen (TYLENOL) 650 MG TbSR Take 1 tablet (650 mg total) by mouth every 8 (eight) hours.    dicyclomine (BENTYL) 10 MG capsule Take 1 capsule (10 mg total) by mouth 4 (four) times daily before meals and nightly. (Patient not taking: Reported on 6/4/2025)    dorzolamide (TRUSOPT) 2 % ophthalmic solution Place 1 drop into the right eye 3 (three) times daily.    ketorolac 0.5% (ACULAR) 0.5 % Drop Place 1 drop into the right eye 4 (four) times daily.    loratadine (CLARITIN) 10 mg tablet Take 1 tablet (10 mg total) by mouth once daily.    metoclopramide HCl (REGLAN) 5 MG tablet Take 1 tablet (5 mg total) by mouth 3 (three) times daily. (Patient not taking: Reported on 6/4/2025)    prednisoLONE acetate (PRED FORTE) 1 % DrpS Place 1 drop into the right eye 4  (four) times daily.    senna-docusate 8.6-50 mg (SENNA WITH DOCUSATE SODIUM) 8.6-50 mg per tablet Take 1 tablet by mouth once daily. (Patient not taking: Reported on 6/4/2025)    traZODone (DESYREL) 50 MG tablet Take 1 tablet (50 mg total) by mouth nightly as needed for Insomnia. (Patient not taking: Reported on 6/4/2025)    VITAMIN D2 1,250 mcg (50,000 unit) capsule Take 1 capsule (50,000 Units total) by mouth every 7 days. for 12 doses       ALLERGIES: Review of patient's allergies indicates:  No Known Allergies    PAST MEDICAL HISTORY:   Past Medical History:   Diagnosis Date    Allergy     Anemia     Arthritis     Cholelithiases     Cyst of kidney, acquired     Diverticulitis     Elevated TSH     Family history of Graves' disease: daughter, maternal aunt, maternal uncle 09/13/2016    Glaucoma suspect     Graves disease     Hx of colonic polyps     Hypertension 1981    Liver cyst     MGUS (monoclonal gammopathy of unknown significance)     Migraine headache     Mitral valve problem     leakage    Obesity     Paraproteinemia     Sleep apnea     + CPAP    Smoldering multiple myeloma 2013    Tricuspid valve disease     leakage     PAST SURGICAL HISTORY:   Past Surgical History:   Procedure Laterality Date    APPENDECTOMY      ARTHROPLASTY, KNEE, TOTAL, USING COMPUTER-ASSISTED NAVIGATION Left 12/23/2024    Procedure: ARTHROPLASTY, KNEE, TOTAL, USING [employer] COMPUTER-ASSISTED NAVIGATION: LEFT: DEPUY - ATTUNE;  Surgeon: Mack Torre III, MD;  Location: [facility];  Service: Orthopedics;  Laterality: Left;    COLONOSCOPY N/A 10/23/2015    Procedure: COLONOSCOPY;  Surgeon: Brandon Ruelas MD;  Location: [facility] ([address] [place] [address]);  Service: Endoscopy;  Laterality: N/A;  Had divertiulitis in 5/29/2015 with a recommendation for colonoscopy in 8 weeks    Dr. Ruelas is her GI physician    COLONOSCOPY N/A 11/05/2018    Procedure: COLONOSCOPY;  Surgeon: Brandon Ruelas MD;  Location: [facility] ([address] [place] [address]);  Service: Endoscopy;   Laterality: N/A;  Dr. Ruelas did the last one multiple polyps, patient had recent diverticulitis should not schedule before Oct 10th    COLONOSCOPY N/A 6/19/2023    Procedure: COLONOSCOPY;  Surgeon: Lu Lewis MD;  Location: [facility] (4TH FLR);  Service: Endoscopy;  Laterality: N/A;  pt offered earlier dates but stated she is out town and will be returning the evening of 6/4  cardiac clearance recieved-see tele encounter 5/22/23 5/22 referred by Dr. Lewis/LOLITA/instr. mailed and to portal-st  6/13 pre-call no answer; MB    CYSTOSCOPY      ENDOSCOPIC ULTRASOUND OF UPPER GASTROINTESTINAL TRACT N/A 6/4/2025    Procedure: ULTRASOUND, UPPER GI TRACT, ENDOSCOPIC;  Surgeon: Yessica Waters MD;  Location: [facility] (2ND FLR);  Service: Endoscopy;  Laterality: N/A;    ESOPHAGOGASTRODUODENOSCOPY N/A 5/19/2025    Procedure: EGD (ESOPHAGOGASTRODUODENOSCOPY);  Surgeon: Brandon Ruelas MD;  Location: [facility] (4TH FLR);  Service: Endoscopy;  Laterality: N/A;  jmportal/anastasia larned    ESOPHAGOGASTRODUODENOSCOPY N/A 6/4/2025    Procedure: EGD (ESOPHAGOGASTRODUODENOSCOPY);  Surgeon: Yessica Waters MD;  Location: [facility] (2ND FLR);  Service: Endoscopy;  Laterality: N/A;  6/2 portal-please try to arrange for an expedited EGD/EUS next week. No need for colonoscopy yet. She should be advised to take a mechanical soft diet 2 days prior to procedure and a liquid diet only the day prior to procedure. [id number]    HYSTERECTOMY  1978 or 1979    menorrhagia    REPAIR OF HOLE IN MACULA Right 3/10/2025    Procedure: REPAIR, HOLE, MACULA;  Surgeon: Victoriano Diallo MD;  Location: [address];  Service: Ophthalmology;  Laterality: Right;     PAST FAMILY HISTORY:   Family History   Problem Relation Name Age of Onset    Heart disease Mother          MI    Heart attack Mother      Hypertension Father      Irregular heart beat Sister          fast heart rate    Cataracts Sister      Glaucoma Sister      No Known Problems Sister       Graves' disease Daughter Sanita     No Known Problems Daughter Edith     Heart disease Maternal Aunt          MI    Heart disease Maternal Aunt          MI    Colon cancer Maternal Uncle      Cancer Maternal Uncle          colon ca    Breast cancer Paternal Grandmother      Cancer Paternal Grandmother          GYN cancer - unknown cancer    No Known Problems Son Jus     No Known Problems Son Denoid     Melanoma Neg Hx      Ovarian cancer Neg Hx      Colon polyps Neg Hx      Rectal cancer Neg Hx      Stomach cancer Neg Hx      Esophageal cancer Neg Hx      Ulcerative colitis Neg Hx      Crohn's disease Neg Hx      Amblyopia Neg Hx      Blindness Neg Hx      Macular degeneration Neg Hx      Strabismus Neg Hx      Retinal detachment Neg Hx       SOCIAL HISTORY: Social History[1]     MENTAL STATUS: Alert    REVIEW OF SYSTEMS: Negative    OBJECTIVE:     Vital Signs (Most Recent)       Physical Exam:  General: NAD  HEENT: AT/NC  Lungs: Adequate respirations  Heart: + pulses  Abdomen: Soft    ASSESSMENT/PLAN:     Patient is a 74 y.o. female with Macular hole, right [H35.341]      - Risks/benefits/alternatives of the procedure including, but not limited to, infection, bleeding, pain, ptosis, macular edema, corneal edema, persistent corneal defect, retinal tears, retinal detachment, epiretinal membrane, elevated intraocular pressure, hypotony, cataract formation, possible need for strict post-op head positioning, possible temporary avoidance of air travel, loss of vision, loss of the eye, paralysis, and death were discussed with the patient and/or family. The patient/family voiced good understanding, the informed consent was signed, witnessed, and placed in chart. All patient and family questions were answered.   - Will proceed with PPV right eye/ Air fluid exchange/ membrane peel/ gas right eye  - Plan for general anesthesia   - Allergies reviewed: Review of patient's allergies indicates:  No Known Allergies      Roger  MD Aman  [employer] [facility], PGY2         [1]   Social History  Tobacco Use    Smoking status: Former     Current packs/day: 0.00     Types: Cigarettes     Start date: 1992     Quit date: 1995     Years since quittin.4     Passive exposure: Never    Smokeless tobacco: Never   Substance Use Topics    Alcohol use: No    Drug use: No

## 2025-06-09 NOTE — PROGRESS NOTES
MD Diallo & Aure at bedside    1051 Discharge instructions given and explained to patient and family with verbalization of understanding all instructions. Patients v/s stable, denies n/v and tolerating po, rates pain level tolerable, IV removed, and family at bedside for patient discharge home.

## 2025-06-09 NOTE — TRANSFER OF CARE
"Anesthesia Transfer of Care Note    Patient: Manju Aranda    Procedure(s) Performed: Procedure(s) (LRB):  REPAIR, HOLE, MACULA (Right)    Patient location: PACU    Anesthesia Type: general    Transport from OR: Transported from OR on 6-10 L/min O2 by face mask with adequate spontaneous ventilation    Post pain: adequate analgesia    Post assessment: no apparent anesthetic complications and tolerated procedure well    Post vital signs: stable    Level of consciousness: sedated    Nausea/Vomiting: no nausea/vomiting    Complications: none    Transfer of care protocol was followed      Last vitals: Visit Vitals  /65 (BP Location: Right arm, Patient Position: Lying)   Pulse 71   Temp 36.5 °C (97.7 °F) (Skin)   Resp 16   Ht 5' 9" (1.753 m)   Wt 108.9 kg (240 lb)   SpO2 100%   Breastfeeding No   BMI 35.44 kg/m²     "

## 2025-06-09 NOTE — DISCHARGE INSTRUCTIONS
Post-Operative Instructions:    - Maintain eye shield & dressing until seen tomorrow in eye clinic.  - It is all right to watch television or to read.   - Tylenol as needed for general discomfort.  - You cannot fly until the gas bubble in your eye disperses.  - Maintain head in a FACE DOWN or EITHER SIDE NOSE DOWN  position  - Keep your face out of water for five days after surgery - NO SHOWERS.  - No excessive exercise.    - No bending, lifting or straining.   - Call MD if significant pain or nausea / vomiting uncontrolled by medications  - Call MD if temperature in excess of 101' F  - Return to eye clinic for post op examination tomorrow morning.  - Bring medicine bag to tomorrow's appointment.    FOR EMERGENCIES:  If any unusual problems or difficulties occur, contact Dr. Looney  or the eye resident on call at [facility]

## 2025-06-09 NOTE — BRIEF OP NOTE
Brief Operative Note  Ophthalmology     Pre-Op Dx: macular hole right eye     Post Op Dx: same     Procedure Performed: vitrectomy, membrane peel, gas right eye     Attending Surgeon: Victoriano Diallo MD     Assistant Surgeon: Delvis Looney MD    Anesthesia: GENERAL    Estimated blood loss: Minimal    Complications: None    Specimen: None    Disposition: Stable to recovery    Findings/Outcome: retina attached, membrane peeled     Date of Discharge: 6/9/2025     Discharge Disposition: home    F/U: tomorrow AM with Dr Diallo

## 2025-06-09 NOTE — ANESTHESIA PREPROCEDURE EVALUATION
06/09/2025  Manju Aranda is a 74 y.o., female.    [facility]  Anesthesia Pre-Operative Evaluation       Patient Name: Manju Aranda  YOB: 1950  MRN: [id number]  [facility]: [id number]      Code Status: Prior   Date of Procedure: 6/9/2025  Anesthesia: General Procedure: Procedure(s) (LRB):  REPAIR, HOLE, MACULA (Right)  Pre-Operative Diagnosis: Macular hole, right [H35.341]  Proceduralist: Surgeons and Role:     * Victoriano Diallo MD - Primary     * Lee Looney MD - Fellow        SUBJECTIVE:   Manju Aranda is a 74 y.o. female who  has a past medical history of Allergy, Anemia, Arthritis, Cholelithiases, Cyst of kidney, acquired, Diverticulitis, Elevated TSH, Family history of Graves' disease: daughter, maternal aunt, maternal uncle (09/13/2016), Glaucoma suspect, Graves disease, colonic polyps, Hypertension (1981), Liver cyst, MGUS (monoclonal gammopathy of unknown significance), Migraine headache, Mitral valve problem, Obesity, Paraproteinemia, Sleep apnea, Smoldering multiple myeloma (2013), and Tricuspid valve disease. No notes on file    Anticoagulants   Medication Route Frequency       she has a current medication list which includes the following long-term medication(s): olmesartan, rizatriptan, dorzolamide, loratadine, and trazodone.   ALLERGIES:   Review of patient's allergies indicates:  No Known Allergies  LDA:          Lines/Drains/Airways       None                 MEDICATIONS:     Antibiotics (From admission, onward)      Start     Stop Route Frequency Ordered    06/09/25 0634  moxifloxacin 0.5 % ophthalmic solution 1 drop         -- RIGHT EYE On Call Procedure 06/09/25 0634          VTE Risk Mitigation (From admission, onward)      None          Current Medications[1]       History:   There are no hospital problems to display for this  patient.    Surgical History:    has a past surgical history that includes Appendectomy; Hysterectomy (1978 or 1979); Colonoscopy (N/A, 10/23/2015); Colonoscopy (N/A, 11/05/2018); Cystoscopy; Colonoscopy (N/A, 6/19/2023); arthroplasty, knee, total, using computer-assisted navigation (Left, 12/23/2024); Repair of hole in macula (Right, 3/10/2025); Esophagogastroduodenoscopy (N/A, 5/19/2025); Esophagogastroduodenoscopy (N/A, 6/4/2025); and Endoscopic ultrasound of upper gastrointestinal tract (N/A, 6/4/2025).   Social History:    reports that she is not currently sexually active and has had partner(s) who are male. She reports using the following method of birth control/protection: Post-menopausal.  reports that she quit smoking about 30 years ago. Her smoking use included cigarettes. She started smoking about 33 years ago. She has never been exposed to tobacco smoke. She has never used smokeless tobacco. She reports that she does not drink alcohol and does not use drugs.     OBJECTIVE:     Vital Signs (Most Recent):  Pulse: 73 (06/09/25 0702) Vital Signs Range (Last 24H):  Pulse:  [73]        There is no height or weight on file to calculate BMI.   Wt Readings from Last 4 Encounters:   06/04/25 108.9 kg (240 lb)   05/19/25 105 kg (231 lb 7.7 oz)   05/16/25 108.4 kg (239 lb)   05/06/25 108.6 kg (239 lb 6.7 oz)     Significant Labs:  Lab Results   Component Value Date    WBC 3.56 (L) 05/22/2025    HGB 11.5 (L) 05/22/2025    HCT 36.8 (L) 05/22/2025     05/22/2025     05/09/2025    K 4.4 05/09/2025     05/09/2025    CREATININE 1.2 05/09/2025    BUN 20 05/09/2025    CO2 26 05/09/2025    GLU 84 05/09/2025    CALCIUM 10.4 05/09/2025    MG 1.9 05/02/2020    PHOS 2.8 05/09/2025    ALKPHOS 96 05/06/2025    ALT 13 05/06/2025    AST 18 05/06/2025    ALBUMIN 3.8 05/09/2025    INR 1.0 12/03/2024    APTT 26.1 05/01/2013    HGBA1C 4.3 05/06/2025     04/30/2013    CPKMB 0.8 04/30/2013    TROPONINI 0.011  05/02/2020    MB 0.7 04/30/2013    BNP 16 05/06/2025     No LMP recorded. Patient has had a hysterectomy.  No results found for this or any previous visit (from the past 72 hours).    EKG:   Results for orders placed or performed during the hospital encounter of 12/03/24   EKG 12-lead    Collection Time: 12/03/24  8:26 AM   Result Value Ref Range    QRS Duration 90 ms    [facility] QTC Calculation 412 ms    Narrative    Test Reason : Z01.818,    Vent. Rate :  78 BPM     Atrial Rate :  78 BPM     P-R Int : 200 ms          QRS Dur :  90 ms      QT Int : 362 ms       P-R-T Axes :  55  -3  18 degrees    QTcB Int : 412 ms    Sinus rhythm with occasional Premature ventricular complexes and Premature  atrial complexes  Otherwise normal ECG  When compared with ECG of 17-Apr-2024 10:28,  Premature ventricular complexes are now Present  Confirmed by Stacey Broderick ([id number]) on 12/3/2024 12:51:02 PM    Referred By: VINCE CLARK           Confirmed By: Stacey Broderick       TTE:  Results for orders placed or performed during the hospital encounter of 05/22/24   Echo   Result Value Ref Range    BSA 2.39 m2    LVOT stroke volume 76.58 cm3    LVIDd 5.01 3.5 - 6.0 cm    LV Systolic Volume 56.18 mL    LV Systolic Volume Index 24.4 mL/m2    LVIDs 3.65 2.1 - 4.0 cm    LV Diastolic Volume 118.98 mL    LV Diastolic Volume Index 51.73 mL/m2    IVS 1.01 0.6 - 1.1 cm    LVOT diameter 2.26 cm    LVOT area 4.0 cm2    FS 27 (A) 28 - 44 %    Left Ventricle Relative Wall Thickness 0.40 cm    PW 1.01 0.6 - 1.1 cm    LV mass 185.04 g    LV Mass Index 80 g/m2    MV Peak E Mai 0.48 m/s    TDI LATERAL 0.08 m/s    TDI SEPTAL 0.04 m/s    E/E' ratio 8.00 m/s    MV Peak A Mai 0.86 m/s    TR Max Mai 2.19 m/s    E/A ratio 0.56     IVRT 129.40 msec    E wave deceleration time 278.22 msec    LV SEPTAL E/E' RATIO 12.00 m/s    LV LATERAL E/E' RATIO 6.00 m/s    PV Peak S Mai 0.54 m/s    PV Peak D Mai 0.36 m/s    Pulm vein S/D ratio 1.50     LVOT peak mai 1.14 m/s    Left  Ventricular Outflow Tract Mean Velocity 0.76 cm/s    Left Ventricular Outflow Tract Mean Gradient 2.60 mmHg    RV S' 12.94 cm/s    RVOT peak VTI 15.7 cm    LA size 4.28 cm    Left Atrium Minor Axis 6.13 cm    Left Atrium Major Axis 5.02 cm    LA Vol (MOD) 63.32 cm3    NOLVIA (MOD) 27.5 mL/m2    RA Major Axis 5.94 cm    AV regurgitation pressure 1/2 time 463.0 ms    AR Max Mai 4.40 m/s    AV mean gradient 7 mmHg    AV peak gradient 12 mmHg    Ao peak mai 1.73 m/s    Ao VTI 30.80 cm    LVOT peak VTI 19.10 cm    AV valve area 2.49 cm²    AV Velocity Ratio 0.66     AV index (prosthetic) 0.62     MONICA by Velocity Ratio 2.64 cm²    Mr max mai 5.36 m/s    MV stenosis pressure 1/2 time 80.68 ms    MV valve area p 1/2 method 2.73 cm2    Triscuspid Valve Regurgitation Peak Gradient 19 mmHg    PV mean gradient 2 mmHg    PV PEAK VELOCITY 1.03 m/s    PV peak gradient 4 mmHg    RVOT peak mai 0.91 m/s    STJ 3.37 cm    Ascending aorta 3.63 cm    IVC diameter 1.51 cm    Mean e' 0.06 m/s    ZLVIDS -3.06     ZLVIDD -5.76     NOLVIA 37.5 mL/m2    LA Vol 86.35 cm3    RVDD 3.40 cm    TAPSE 2.20 cm    RV/LV Ratio 0.68 cm    LA WIDTH 4.3 cm    RA Width 3.5 cm    Sinus 3.2 cm    TV resting pulmonary artery pressure 22 mmHg    RV TB RVSP 5 mmHg    Est. RA pres 3 mmHg    Narrative      Left Ventricle: The left ventricle is normal in size. Normal wall   thickness. There is normal systolic function with a visually estimated   ejection fraction of 55 - 60%. Grade I diastolic dysfunction.    Right Ventricle: Normal right ventricular cavity size. Wall thickness   is normal. Systolic function is normal.    Left Atrium: Left atrium is mildly dilated.    Right Atrium: Right atrium is mildly dilated.    IVC/SVC: Normal venous pressure at 3 mmHg.    Pericardium: There is a trivial effusion. No indication of cardiac   tamponade.       EF   Date Value Ref Range Status   06/06/2023 55 % Final   06/05/2023 52 % Final      ASSESSMENT/PLAN:         Pre-op  Assessment    I have reviewed the Patient Summary Reports.     I have reviewed the Nursing Notes. I have reviewed the NPO Status.      Review of Systems      Physical Exam  General: Well nourished and Cooperative    Airway:  Mallampati: II   Mouth Opening: Normal  TM Distance: Normal  Tongue: Normal    Dental:  Dentures, Intact    Chest/Lungs:  Normal Respiratory Rate    Heart:  Rate: Normal  Rhythm: Regular Rhythm        Anesthesia Plan  Type of Anesthesia, risks & benefits discussed:    Anesthesia Type: Gen ETT  Intra-op Monitoring Plan: Standard ASA Monitors  Post Op Pain Control Plan: IV/PO Opioids PRN and multimodal analgesia  Induction:  IV  Informed Consent: Informed consent signed with the Patient and all parties understand the risks and agree with anesthesia plan.  All questions answered.   ASA Score: 3    Ready For Surgery From Anesthesia Perspective.     .           [1]   Current Facility-Administered Medications   Medication Dose Route Frequency Provider Last Rate Last Admin    cyclopentolate 1% ophthalmic solution 1 drop  1 drop Right Eye On Call Procedure Victoriano Diallo MD   1 drop at 06/09/25 0702    moxifloxacin 0.5 % ophthalmic solution 1 drop  1 drop Right Eye On Call Procedure Victoriano Diallo MD   1 drop at 06/09/25 0702    phenylephrine HCL 2.5% ophthalmic solution 1 drop  1 drop Right Eye On Call Procedure Victoriano Diallo MD   1 drop at 06/09/25 0702    prednisoLONE acetate 1 % ophthalmic suspension 1 drop  1 drop Right Eye On Call Procedure Victoriano Diallo MD   1 drop at 06/09/25 0702    TETRAcaine HCl (PF) 0.5 % Drop 1 drop  1 drop Right Eye On Call Procedure Victoriano Diallo MD   1 drop at 06/09/25 0702

## 2025-06-09 NOTE — OP NOTE
Date of Surgery 6/9/2025     ATTENDING SURGEON: Victoriano Diallo MD (A)     ASSISTANT SURGEON: Delvis Looney MD (F)    PREOPERATIVE DIAGNOSIS: Macular Hole of the right eye.     OPERATION: Pars plana vitrectomy, membrane peel, air fluid exchange, 10% C3F8 gas injection of the right eye.     POSTOPERATIVE DIAGNOSIS: Same     ANESTHESIA: MAC.     COMPLICATIONS: None.    SPECIMENS:    None.    EBL:    Minimal    Indications for Surgery    Manju Aranda is a 74 y.o. female with a history of macular hole repair who presents decreased vision and open macular hole in the right eye. After the risks, benefits and alternatives were discussed with Manju Aranda  the patient consented to the procedure and was scheduled for surgery.    Description of Procedure: The patient, Manju Aranda gave informed consent for the following operation which was performed under monitored anesthesia care. The patient was examined with an indirect ophthalmoscope and we decided to proceed with surgery. A time out was performed and confirmed by all in the room that the right eye was the correct operative eye. Following an initial infusion of propofol, the patient was given a retrobulbar injection of an equal mixture of 0.75% Marcaine and 4% lidocaine. The eye was prepped and draped in the usual sterile fashion for vitrectomy surgery.    The cornea was kept moist with OVD placed on the surface throughout the operation. A 25 gauge vitrectomy system was used. Initial entry into the eye was gained with 3 trocars placed 4 mm from the limbus. In the inferotemporal location, the infusion cannula was secured in position. The open end of the cannula was visualized through the dilated pupil and the infusion was turned on when it was in the correct position. The 2 superior sclerotomies were used for entry of a light pipe and a vitrectomy cutter. Additional peripheral vitrectomy was performed. We inspected the retinal periphery with scleral  depression. There were no retinal holes or tears noted.     Dilute indocyanine green was place on the macula to identify the internal limiting membrane. The ICG was removed with the vitreous cutter, leaving an adequate stain on the macula. The membrane was peeled with retinal forceps, no retinal breaks were made. We inspected the periphery and no breaks were noted. A complete air fluid exchange was performed.    The superonasal trocar was removed and found to be watertight.    A sterile preparation of 10% C3F8 gas was brought to the field and connected to the infusion line. The gas was then rinsed through the eye several times, venting the excess gas through the superotemporal port. The eye was then inflated to physiologic pressure and the remaining ports were removed and found to be watertight.    The intraocular pressure remained normal. Betadine was place on the eye. Vancomycin and Decadron were injected into the sub-Tenon space. The eye was irrigated with BSS. A drop of atropine and Maxitrol ointment were placed on the eye, which was then patched and shielded. There were no complications.    The patient returned to the PACU in stable condition and will follow up tomorrow in the eye clinic.

## 2025-06-09 NOTE — ANESTHESIA POSTPROCEDURE EVALUATION
Anesthesia Post Evaluation    Patient: Manju Aranda    Procedure(s) Performed: Procedure(s) (LRB):  REPAIR, HOLE, MACULA (Right)    Final Anesthesia Type: general      Patient location during evaluation: PACU  Patient participation: Yes- Able to Participate  Level of consciousness: awake and alert  Post-procedure vital signs: reviewed and stable  Pain management: adequate  Airway patency: patent  BECKY mitigation strategies: Multimodal analgesia  PONV status at discharge: No PONV  Anesthetic complications: no      Cardiovascular status: blood pressure returned to baseline  Respiratory status: unassisted  Hydration status: euvolemic  Follow-up not needed.              Vitals Value Taken Time   /73 06/09/25 10:15   Temp 36.5 °C (97.7 °F) 06/09/25 08:45   Pulse 67 06/09/25 10:15   Resp 16 06/09/25 10:15   SpO2 95 % 06/09/25 10:15         No case tracking events are documented in the log.      Pain/Kayleigh Score: Kayleigh Score: 10 (6/9/2025 10:15 AM)

## 2025-06-09 NOTE — ANESTHESIA PROCEDURE NOTES
Intubation    Date/Time: 6/9/2025 7:45 AM    Performed by: Cheyanne Rodriguez CRNA  Authorized by: Isela Gallardo MD    Intubation:     Induction:  Intravenous    Intubated:  Postinduction    Mask Ventilation:  Easy mask    Attempts:  1    Attempted By:  CRNA    Method of Intubation:  Video laryngoscopy    Blade:  Park 3    Laryngeal View Grade: Grade I - full view of cords      Difficult Airway Encountered?: No      Complications:  None    Airway Device:  Oral endotracheal tube    Airway Device Size:  7.5    Style/Cuff Inflation:  Cuffed (inflated to minimal occlusive pressure)    Tube secured:  21    Secured at:  The lips    Placement Verified By:  Capnometry    Complicating Factors:  None    Findings Post-Intubation:  BS equal bilateral and atraumatic/condition of teeth unchanged

## 2025-06-10 ENCOUNTER — OFFICE VISIT (OUTPATIENT)
Dept: OPHTHALMOLOGY | Facility: CLINIC | Age: 75
End: 2025-06-10
Payer: MEDICARE

## 2025-06-10 DIAGNOSIS — K57.92 DIVERTICULITIS: Primary | ICD-10-CM

## 2025-06-10 DIAGNOSIS — H35.341 MACULAR HOLE, RIGHT: Primary | ICD-10-CM

## 2025-06-10 PROCEDURE — 99999 PR PBB SHADOW E&M-EST. PATIENT-LVL III: CPT | Mod: PBBFAC,,, | Performed by: OPHTHALMOLOGY

## 2025-06-10 PROCEDURE — 3066F NEPHROPATHY DOC TX: CPT | Mod: CPTII,S$GLB,, | Performed by: OPHTHALMOLOGY

## 2025-06-10 PROCEDURE — 99024 POSTOP FOLLOW-UP VISIT: CPT | Mod: S$GLB,,, | Performed by: OPHTHALMOLOGY

## 2025-06-10 PROCEDURE — 1101F PT FALLS ASSESS-DOCD LE1/YR: CPT | Mod: CPTII,S$GLB,, | Performed by: OPHTHALMOLOGY

## 2025-06-10 PROCEDURE — 3044F HG A1C LEVEL LT 7.0%: CPT | Mod: CPTII,S$GLB,, | Performed by: OPHTHALMOLOGY

## 2025-06-10 PROCEDURE — 1126F AMNT PAIN NOTED NONE PRSNT: CPT | Mod: CPTII,S$GLB,, | Performed by: OPHTHALMOLOGY

## 2025-06-10 PROCEDURE — 4010F ACE/ARB THERAPY RXD/TAKEN: CPT | Mod: CPTII,S$GLB,, | Performed by: OPHTHALMOLOGY

## 2025-06-10 PROCEDURE — 3061F NEG MICROALBUMINURIA REV: CPT | Mod: CPTII,S$GLB,, | Performed by: OPHTHALMOLOGY

## 2025-06-10 PROCEDURE — 1159F MED LIST DOCD IN RCRD: CPT | Mod: CPTII,S$GLB,, | Performed by: OPHTHALMOLOGY

## 2025-06-10 PROCEDURE — 3288F FALL RISK ASSESSMENT DOCD: CPT | Mod: CPTII,S$GLB,, | Performed by: OPHTHALMOLOGY

## 2025-06-10 RX ORDER — METRONIDAZOLE 500 MG/1
500 TABLET ORAL 3 TIMES DAILY
Qty: 21 TABLET | Refills: 0 | Status: SHIPPED | OUTPATIENT
Start: 2025-06-10 | End: 2025-06-17

## 2025-06-10 RX ORDER — CIPROFLOXACIN 500 MG/1
500 TABLET, FILM COATED ORAL 2 TIMES DAILY
Qty: 14 TABLET | Refills: 0 | Status: SHIPPED | OUTPATIENT
Start: 2025-06-10 | End: 2025-06-17

## 2025-06-10 RX ORDER — FLUCONAZOLE 150 MG/1
150 TABLET ORAL ONCE
Qty: 1 TABLET | Refills: 0 | Status: SHIPPED | OUTPATIENT
Start: 2025-06-10 | End: 2025-06-10

## 2025-06-10 NOTE — PROGRESS NOTES
HPI    Pt. Presents for post op evaluation OD.    Pt. States OD is only seeing lights currently, very blurry still for OD.   Occasional floaters OD.   Denies FOL.pain. .   Eye meds: Neomycin                     Moxi                     Pred                     Cyclo      Last edited by Avelino Cardona MA on 6/10/2025 10:12 AM.          A/P    ICD-10-CM ICD-9-CM   1. Macular hole, right  H35.341 362.54       1. Macular hole, right  Here for retina f/u    Pre-op Exam notable for stable mac hole OD with vitreous attachment     Postop: s/p PPV/MP/Afx/20% SF6 (Date 3/10/25 by Imani/Brisa) for mac hole od    Postop: s/p PPV/MP/Afx/14% C3F8 (Date 6/9/25 by Imani/Aure/Aman) for mac hole OD  Positioning: Face down  Duration: 2 days    6/10/2025 [facility] as expected, IOP 14, retina flat, good gas fill    Instructions reviewed   - RD precautions  - Return for increasing pain/decreasing vision  - No getting the eye wet, no rubbing the eye, no heavy lifting for 1 month after surgery  Drops:  Vigamox  - 4   Pred - 4(start 6/10/2025)  Cyc -4  Maxitrol - QHS           ----------    2. Vitreous degeneration of both eyes  No RT/RD but has mac hole attachment OD  Plan: Observation OS for now, counseled on risk future mac hole, plan as above OD    3. Age-related nuclear cataract of both eyes  Mod NS/CC  Plan: Observation for now    RTC POW1 DFE OD        I saw and examined the patient and reviewed in detail the findings documented. The final examination findings, image interpretations which have been independently interpreted, and plan as documented in the record represent my personal judgment and conclusions.    Victoriano Diallo MD  Vitreoretinal Surgery   [facility]

## 2025-06-11 ENCOUNTER — TELEPHONE (OUTPATIENT)
Dept: ENDOSCOPY | Facility: HOSPITAL | Age: 75
End: 2025-06-11
Payer: MEDICARE

## 2025-06-11 NOTE — TELEPHONE ENCOUNTER
Contacted pt and informed her she would just be having the Colonoscopy.   EGD removed. Prep instructions reviewed with pt.       Asmita Rodas MA [place], [facility] received this referral a couple of days ago. She does not need AES and currently schedule with Dr. Ruelas 7/23. Dr Hsu asked me to forward this message regarding her procedure. Thanks      Yue- She is scheduled for an EGD/col with Dr. Ruelas on 7/23, but she should now only need a colon. Please let the appropriate  know.

## 2025-06-18 ENCOUNTER — TELEPHONE (OUTPATIENT)
Dept: NEPHROLOGY | Facility: CLINIC | Age: 75
End: 2025-06-18
Payer: MEDICARE

## 2025-06-19 ENCOUNTER — OFFICE VISIT (OUTPATIENT)
Dept: NEPHROLOGY | Facility: CLINIC | Age: 75
End: 2025-06-19
Payer: MEDICARE

## 2025-06-19 ENCOUNTER — OFFICE VISIT (OUTPATIENT)
Dept: OPHTHALMOLOGY | Facility: CLINIC | Age: 75
End: 2025-06-19
Payer: MEDICARE

## 2025-06-19 VITALS
DIASTOLIC BLOOD PRESSURE: 56 MMHG | WEIGHT: 236.13 LBS | BODY MASS INDEX: 34.87 KG/M2 | OXYGEN SATURATION: 98 % | SYSTOLIC BLOOD PRESSURE: 87 MMHG | HEART RATE: 88 BPM

## 2025-06-19 DIAGNOSIS — N18.31 STAGE 3A CHRONIC KIDNEY DISEASE: ICD-10-CM

## 2025-06-19 DIAGNOSIS — H35.341 MACULAR HOLE, RIGHT: Primary | ICD-10-CM

## 2025-06-19 PROCEDURE — 1101F PT FALLS ASSESS-DOCD LE1/YR: CPT | Mod: CPTII,HCNC,S$GLB, | Performed by: OPHTHALMOLOGY

## 2025-06-19 PROCEDURE — 99999 PR PBB SHADOW E&M-EST. PATIENT-LVL IV: CPT | Mod: PBBFAC,GC,, | Performed by: STUDENT IN AN ORGANIZED HEALTH CARE EDUCATION/TRAINING PROGRAM

## 2025-06-19 PROCEDURE — 3061F NEG MICROALBUMINURIA REV: CPT | Mod: CPTII,HCNC,S$GLB, | Performed by: OPHTHALMOLOGY

## 2025-06-19 PROCEDURE — 3066F NEPHROPATHY DOC TX: CPT | Mod: CPTII,HCNC,S$GLB, | Performed by: OPHTHALMOLOGY

## 2025-06-19 PROCEDURE — 3288F FALL RISK ASSESSMENT DOCD: CPT | Mod: CPTII,HCNC,S$GLB, | Performed by: OPHTHALMOLOGY

## 2025-06-19 PROCEDURE — 1159F MED LIST DOCD IN RCRD: CPT | Mod: CPTII,HCNC,S$GLB, | Performed by: OPHTHALMOLOGY

## 2025-06-19 PROCEDURE — 99024 POSTOP FOLLOW-UP VISIT: CPT | Mod: HCNC,S$GLB,, | Performed by: OPHTHALMOLOGY

## 2025-06-19 PROCEDURE — 4010F ACE/ARB THERAPY RXD/TAKEN: CPT | Mod: CPTII,HCNC,S$GLB, | Performed by: OPHTHALMOLOGY

## 2025-06-19 PROCEDURE — 3044F HG A1C LEVEL LT 7.0%: CPT | Mod: CPTII,HCNC,S$GLB, | Performed by: OPHTHALMOLOGY

## 2025-06-19 PROCEDURE — 99999 PR PBB SHADOW E&M-EST. PATIENT-LVL III: CPT | Mod: PBBFAC,,, | Performed by: OPHTHALMOLOGY

## 2025-06-19 PROCEDURE — 1126F AMNT PAIN NOTED NONE PRSNT: CPT | Mod: CPTII,HCNC,S$GLB, | Performed by: OPHTHALMOLOGY

## 2025-06-19 NOTE — PROGRESS NOTES
HPI    Pt. Presents for Post op evaluation.     Pt. States was in some pain after procedure but has subsided. Pt. Has   Translucent gas bubble on OD and can see through it. No other issues   currently. Has occasional floaters OU, mostly OD.     Denies FOL/Pain.  Eye meds: Neomycin QHS nightly with patch on when going to sleep.                     PF QID OD                    Moxi QID OD                    Cyclo QID OD    Last edited by Avelino Cardona MA on 6/19/2025 12:49 PM.          A/P    ICD-10-CM ICD-9-CM   1. Macular hole, right  H35.341 362.54         1. Macular hole, right  Here for retina f/u    Pre-op Exam notable for stable mac hole OD with vitreous attachment     Postop: s/p PPV/MP/Afx/20% SF6 (Date 3/10/25 by Imani/Brisa) for mac hole od    Postop: s/p PPV/MP/Afx/14% C3F8 (Date 6/9/25 by Imani/Aure/Aman) for mac hole OD  Positioning: Face down  Duration: 2 days    6/19/2025 [facility] 20/500 (Was CF), IOP 12, retina flat, good gas fill still    Instructions reviewed   - RD precautions  - Return for increasing pain/decreasing vision  - No getting the eye wet, no rubbing the eye, no heavy lifting for 1 month after surgery  Drops:  Vigamox  - 4 stop  Pred - 4(start every 3 day taper)  Cyc -4 stop  Maxitrol - QHS stop           ----------    2. Vitreous degeneration of both eyes  No RT/RD but has mac hole attachment OD  Plan: Observation OS for now, counseled on risk future mac hole, plan as above OD    3. Age-related nuclear cataract of both eyes  Mod NS/CC  Plan: Observation for now    RTC POM1 DFE/OCTm OD        I saw and examined the patient and reviewed in detail the findings documented. The final examination findings, image interpretations which have been independently interpreted, and plan as documented in the record represent my personal judgment and conclusions.    Victoriano Diallo MD  Vitreoretinal Surgery   [facility]

## 2025-06-19 NOTE — PROGRESS NOTES
New Consultation Report  [facility]  Nephrology Clinic        Patient Name: Manju Aranda   MRN: [id number]   Current Provider: No att. providers found  Primary Care Provider: Jazzy Charles MD     Principal Problem: CKD, ADPKD           Consult Requested By: No att. providers found  Reason for Consult: CKD, ADPKD    History of Present Illness:  Patient is a 74 y.o. female presents for a new consultation for evaluation of elevated serum creatinine and presumed chronic kidney disease and for follow up of ADPKD. She has PH CKD 3, ADPKD, HTN, smoldering MM, paraproteinemia, migrain, microhemat, impaired mobility, Aortic aneurysm rupture, Graves eyes disease.    The patient was found to have an elevated serum creatinine during routine laboratory testing, at 1.2 mg/dL. She had low BP on the office visit 87/56. She takes Olmesartan 40mg OD. Took her medications this morning. Her usually BP at home between 120-130. She currently has no symptoms, denied dizziness, chest pain, SOB, blackouts. She keeps herself well hydrated. Never had low Bps before.     She had a GI procedure last week and was on antibiotics for 7 days (ciprofloxacin and metronidazol). She had Contrast CT abdomin on 5/14/25 But renal functions remained stable.     MRI on 8/18/2024 showed Minimally complex cyst in the BL kidney cysts, stable from 2022. Multilobulated cystic lesion at the inferior pole of the right kidney which cannot be fully characterized due to incomplete visualization, but likely represents a minimally complex cyst with internal proteinaceous material.  No enhancing nodular or mass component identified.    She is under oncology /hematology for the Multiple myeloma and last visit in 4/2025    The patient denies SOB, LE edema, hematuria or foamy urine.     The patient denies taking NSAIDs or new antibiotics, recreational drugs, recent episode of dehydration, diarrhea, nausea or vomiting, acute illness,  hospitalization or exposure to IV radiocontrast.     Home meds:  Home meds: cholecalcalciferol, eszopiclon, loratadin, Prednisone, rizatriptan, trazadone,   For high BP: Olmesartan 40    Last Change: 6/19/2025 (today) changed the dose of Olmesartan to 20mg. Advised to check the BP regularly.     Lab Results   Component Value Date    MICALBCREAT 7.0 05/06/2025    CREATININE 1.2 05/09/2025         Past Medical History:   Diagnosis Date    Allergy     Anemia     Arthritis     Cholelithiases     Cyst of kidney, acquired     Diverticulitis     Elevated TSH     Family history of Graves' disease: daughter, maternal aunt, maternal uncle 09/13/2016    Glaucoma suspect     Graves disease     Hx of colonic polyps     Hypertension 1981    Liver cyst     MGUS (monoclonal gammopathy of unknown significance)     Migraine headache     Mitral valve problem     leakage    Obesity     Paraproteinemia     Sleep apnea     + CPAP    Smoldering multiple myeloma 2013    Tricuspid valve disease     leakage       Current Medications[1]  Review of patient's allergies indicates:  No Known Allergies     Past Surgical History:   Procedure Laterality Date    APPENDECTOMY      ARTHROPLASTY, KNEE, TOTAL, USING COMPUTER-ASSISTED NAVIGATION Left 12/23/2024    Procedure: ARTHROPLASTY, KNEE, TOTAL, USING [employer] COMPUTER-ASSISTED NAVIGATION: LEFT: DEPUY - ATTUNE;  Surgeon: Mack Torre III, MD;  Location: [facility];  Service: Orthopedics;  Laterality: Left;    COLONOSCOPY N/A 10/23/2015    Procedure: COLONOSCOPY;  Surgeon: Brandon Ruelas MD;  Location: [address]);  Service: Endoscopy;  Laterality: N/A;  Had divertiulitis in 5/29/2015 with a recommendation for colonoscopy in 8 weeks    Dr. Ruelas is her GI physician    COLONOSCOPY N/A 11/05/2018    Procedure: COLONOSCOPY;  Surgeon: Brandon Ruelas MD;  Location: [facility] ([address] [place] [address]);  Service: Endoscopy;  Laterality: N/A;  Dr. Ruelas did the last one multiple polyps, patient had recent  diverticulitis should not schedule before Oct 10th    COLONOSCOPY N/A 6/19/2023    Procedure: COLONOSCOPY;  Surgeon: Lu Lewis MD;  Location: [facility] (4TH FLR);  Service: Endoscopy;  Laterality: N/A;  pt offered earlier dates but stated she is out town and will be returning the evening of 6/4  cardiac clearance recieved-see tele encounter 5/22/23 5/22 referred by Dr. Lewis/LOLITA/instr. mailed and to portal-  6/13 pre-call no answer; MB    CYSTOSCOPY      ENDOSCOPIC ULTRASOUND OF UPPER GASTROINTESTINAL TRACT N/A 6/4/2025    Procedure: ULTRASOUND, UPPER GI TRACT, ENDOSCOPIC;  Surgeon: Yessica Waters MD;  Location: [facility] (2ND FLR);  Service: Endoscopy;  Laterality: N/A;    ESOPHAGOGASTRODUODENOSCOPY N/A 5/19/2025    Procedure: EGD (ESOPHAGOGASTRODUODENOSCOPY);  Surgeon: Brandon Ruelas MD;  Location: [facility] ENDO (4TH FLR);  Service: Endoscopy;  Laterality: N/A;  jmportal/anastasia larned    ESOPHAGOGASTRODUODENOSCOPY N/A 6/4/2025    Procedure: EGD (ESOPHAGOGASTRODUODENOSCOPY);  Surgeon: Yessica Waters MD;  Location: [facility] (2ND FLR);  Service: Endoscopy;  Laterality: N/A;  6/2 portal-please try to arrange for an expedited EGD/EUS next week. No need for colonoscopy yet. She should be advised to take a mechanical soft diet 2 days prior to procedure and a liquid diet only the day prior to procedure. [id number]    HYSTERECTOMY  1978 or 1979    menorrhagia    REPAIR OF HOLE IN MACULA Right 3/10/2025    Procedure: REPAIR, HOLE, MACULA;  Surgeon: Victoriano Diallo MD;  Location: [facility] OR 1ST FLR;  Service: Ophthalmology;  Laterality: Right;    REPAIR OF HOLE IN MACULA Right 6/9/2025    Procedure: REPAIR, HOLE, MACULA;  Surgeon: Victoriano Diallo MD;  Location: [facility] OR 1ST FLR;  Service: Ophthalmology;  Laterality: Right;     Family History   Problem Relation Name Age of Onset    Heart disease Mother          MI    Heart attack Mother      Hypertension Father      Irregular heart beat Sister          fast heart rate     Cataracts Sister      Glaucoma Sister      No Known Problems Sister      Graves' disease Daughter Sanita     No Known Problems Daughter Edith     Heart disease Maternal Aunt          MI    Heart disease Maternal Aunt          MI    Colon cancer Maternal Uncle      Cancer Maternal Uncle          colon ca    Breast cancer Paternal Grandmother      Cancer Paternal Grandmother          GYN cancer - unknown cancer    No Known Problems Son Jus     No Known Problems Son Denoid     Melanoma Neg Hx      Ovarian cancer Neg Hx      Colon polyps Neg Hx      Rectal cancer Neg Hx      Stomach cancer Neg Hx      Esophageal cancer Neg Hx      Ulcerative colitis Neg Hx      Crohn's disease Neg Hx      Amblyopia Neg Hx      Blindness Neg Hx      Macular degeneration Neg Hx      Strabismus Neg Hx      Retinal detachment Neg Hx       Social History[2]    ROS    Vitals:    06/19/25 1351   BP: (!) 87/56   Pulse: 88       PHYSICAL EXAMINATION:  General: no distress, well nourished  Skin: color, texture, turgor normal. No rash or lesions  HEENT: Eyes: reactive pupils, normal conjunctiva.   Neck: supple, symmetrical, trachea midline, no JVD, no carotid bruit  Lungs: clear to auscultation bilaterally and normal respiratory effort  Cardiovascular: Heart: regular rate and rhythm, S1, S2 normal, no murmur, rub or gallop. Pulses: 2+ and symmetric.  Abdomen: bowel sounds present,   Musculoskeletal: no pitting edema in lower extremities, no clubbing or cyanosis  Neurologic: AAOx3,       LABORATORY DATA:  Lab Results   Component Value Date    CREATININE 1.2 05/09/2025       Prot/Creat Ratio, Urine   Date Value Ref Range Status   04/17/2024 0.08 0.00 - 0.20 Final   03/26/2018 0.08 0.00 - 0.20 Final       Lab Results   Component Value Date     05/09/2025    K 4.4 05/09/2025    CO2 26 05/09/2025       Lab Results   Component Value Date    PTH 65.0 08/20/2019    CALCIUM 10.4 05/09/2025    PHOS 2.8 05/09/2025       Lab Results   Component  Value Date    HGB 11.5 (L) 05/22/2025        Lab Results   Component Value Date    HGBA1C 4.3 05/06/2025       Lab Results   Component Value Date    LDLCALC 74.2 05/06/2025         IMAGING STUDIES  Kidney US: Reviewed  Echocardiogram: Reviewed    IMPRESSION / RECOMMENDATIONS:     1. Stage 3a chronic kidney disease  Ambulatory referral/consult to Nephrology          Kidney Failure Risk Equation (Tangri)    Kidney Failure Risk at 2 years: 0.1%    Kidney Failure Risk at 5 years: 0.4%    Lab Results   Component Value Date    MICALBCREAT 7.0 05/06/2025    CREATININE 1.2 05/09/2025        Manju Aranda 74 y.o. female, PH of CKD 3, ADPKD, HTN, smoldering MM/MGUIS, paraproteinemia, migrain, microhemat, impaired mobility, Aortic aneurysm rupture, Graves eyes disease.    Her BL Cr is betwween 0.9-1.2. Current Cr 1.2 and baseline eGFR 48-53. Last UA was in April showed no protein. But UPCR was 0.08.      ASSESSMENT AND PLAN:    CKD stage 3  Etiology: ADPKD, MGUIS/Smoldering multiple myeloma  Baseline Cr: 0.9-1.2 mg/dl  eGFR trend: 48-53  Proteinuria: ACR 0.08 mg/gm  ACEi/ARB: Olmesartan  SGLT2i: No  MRA: NO  GLP1a: NO  RFP results reviewed   Kidney Failure Risk at 2 years: 0.1%  Kidney Failure Risk at 5 years: 0.4%    Plan:  Review in 6 month with bloods       HTN (target SBP <120 mm Hg)  Not well controlled   Current medications: Olmesartan 40 QD    Plan:  Reduced the Olmesartan to 20 mg as the Office BP was 87/56 and her home BP usuallay at the softer side  Patient will continue to monitor the BP and note that in the diary and will bring the diary to the next visit   Current asymptomatic - advised if noted any symptoms including but not limited to dizziness, chest pain - should come to the ED   Advised to keep self well hydrated        Anemia in CKD   Hb not at target (11.5)  Fe % not at target   Ferritin not at target   CBC and iron study results reviewed     CKD-MBD  PTH 65, Ca 10.4, P 2.8 all at target      Acid-Base  status   HCO3 at target. No evidence of acid-base disturbance.    Electrolytes   Basic metabolic panel reviewed. electrolytes stable and within normal limits.      Lifestyle factors:  Limit sodium intake to less than 2 g per day (about 1 teaspoon of salt).  avoid high-potassium foods such as bananas, tomatoes, potatoes, and salt substitutes.  Limit protein intake to 0.8 g/Kg body weight/day.  Aim for 150 minutes of moderate activity (like walking, swimming, or biking) per week.  Target an optimal body mass index (BMI) if possible.  Do not take NSAIDs (e.g., ibuprofen, naproxen), use acetaminophen (Tylenol) if you need pain relief, but check with your PCP first.  Avoid tobacco products.  Take your medications as prescribed.        RTC in 6 months    Julian Mayfield MD.  Clinical Nephrology Fellow, PGY-4  [facility] [address]         [1]   Current Outpatient Medications:     acetaminophen (TYLENOL) 650 MG TbSR, Take 1 tablet (650 mg total) by mouth every 8 (eight) hours., Disp: 120 tablet, Rfl: 0    ascorbic acid, vitamin C, (VITAMIN C) 500 MG tablet, Take 500 mg by mouth once daily., Disp: , Rfl:     cholecalciferol, vitamin D3, (VITAMIN D3) 50 mcg (2,000 unit) Cap capsule, Take 1 capsule (2,000 Units total) by mouth once daily., Disp: , Rfl:     dicyclomine (BENTYL) 10 MG capsule, Take 1 capsule (10 mg total) by mouth 4 (four) times daily before meals and nightly., Disp: 120 capsule, Rfl: 0    dorzolamide (TRUSOPT) 2 % ophthalmic solution, Place 1 drop into the right eye 3 (three) times daily., Disp: 10 mL, Rfl: 3    eszopiclone (LUNESTA) 2 MG Tab, TAKE 1 TABLET BY MOUTH EVERY EVENING, Disp: 30 tablet, Rfl: 5    ketorolac 0.5% (ACULAR) 0.5 % Drop, Place 1 drop into the right eye 4 (four) times daily., Disp: 5 mL, Rfl: 3    loratadine (CLARITIN) 10 mg tablet, Take 1 tablet (10 mg total) by mouth once daily., Disp: 30 tablet, Rfl: 2    magnesium oxide (MAG-OX) 400 mg tablet, Take 400 mg by mouth  once daily., Disp: , Rfl:     methocarbamoL (ROBAXIN) 750 MG Tab, Take 1 tablet (750 mg total) by mouth 4 (four) times daily as needed (for muscle spasms)., Disp: 40 tablet, Rfl: 0    metoclopramide HCl (REGLAN) 5 MG tablet, Take 1 tablet (5 mg total) by mouth 3 (three) times daily., Disp: 90 tablet, Rfl: 1    multivit with min-folic acid 0.4 mg Tab, Take 0.4 mg by mouth once daily., Disp: , Rfl:     olmesartan (BENICAR) 40 MG tablet, Take 1 tablet (40 mg total) by mouth once daily., Disp: 90 tablet, Rfl: 3    prednisoLONE acetate (PRED FORTE) 1 % DrpS, Place 1 drop into the right eye 4 (four) times daily., Disp: 5 mL, Rfl: 3    psyllium (METAMUCIL) powder, Take 1 packet by mouth Daily., Disp: , Rfl:     rizatriptan (MAXALT) 10 MG tablet, TAKE 1 TABLET(10 MG) BY MOUTH EVERY 2 HOURS AS NEEDED FOR MIGRAINE. MAX 30 MG/ 24 AT BEDTIME, Disp: 12 tablet, Rfl: 11    senna-docusate 8.6-50 mg (SENNA WITH DOCUSATE SODIUM) 8.6-50 mg per tablet, Take 1 tablet by mouth once daily., Disp: 30 tablet, Rfl: 0    VITAMIN D2 1,250 mcg (50,000 unit) capsule, Take 1 capsule (50,000 Units total) by mouth every 7 days. for 12 doses, Disp: 12 capsule, Rfl: 0    Current Facility-Administered Medications:     sodium chloride 0.9% flush 10 mL, 10 mL, Intravenous, PRN, Victoriano Diallo MD  [2]   Social History  Tobacco Use    Smoking status: Former     Current packs/day: 0.00     Types: Cigarettes     Start date: 1992     Quit date: 1995     Years since quittin.4     Passive exposure: Never    Smokeless tobacco: Never   Substance Use Topics    Alcohol use: No    Drug use: No

## 2025-06-20 ENCOUNTER — HOSPITAL ENCOUNTER (OUTPATIENT)
Dept: RADIOLOGY | Facility: HOSPITAL | Age: 75
Discharge: HOME | End: 2025-06-20
Attending: INTERNAL MEDICINE
Payer: MEDICARE

## 2025-06-20 DIAGNOSIS — K31.84 GASTROPARESIS: ICD-10-CM

## 2025-06-20 PROCEDURE — A9541 TC99M SULFUR COLLOID: HCPCS | Mod: HCNC | Performed by: INTERNAL MEDICINE

## 2025-06-20 PROCEDURE — 78264 GASTRIC EMPTYING IMG STUDY: CPT | Mod: 26,HCNC,, | Performed by: NUCLEAR MEDICINE

## 2025-06-20 PROCEDURE — 78264 GASTRIC EMPTYING IMG STUDY: CPT | Mod: TC,HCNC

## 2025-06-20 RX ORDER — TECHNETIUM TC 99M SULFUR COLLOID 2 MG
1 KIT MISCELLANEOUS
Status: COMPLETED | OUTPATIENT
Start: 2025-06-20 | End: 2025-06-20

## 2025-06-20 RX ADMIN — TECHNETIUM TC 99M SULFUR COLLOID KIT 1 MILLICURIE: KIT at 08:06

## 2025-06-20 NOTE — PROGRESS NOTES
Subjective:       Patient ID: Manju Aranda is a 74 y.o. female with history of migraines, Graves disease, HTN, aortic atherosclerosis, ascending aortic aneurysm, ADPKD, CKD stage 3a, smoldering multiple myeloma, vitamin-D deficiency, diverticulosis, BECKY on CPAP, OA of both knees.     Chief Complaint: Primary hypertension [I10]    Patient is known to me, PCP is Dr. Jazzy Charles. Here today for the following:    History of Present Illness    CHIEF COMPLAINT:  Patient presents today for follow up of low blood pressure and GI issues.    HYPOTENSION:  She saw Nephrology 06/19/2025 for CKD and it was noted her blood pressure was 87/56, accompanied by significant dizziness and lightheadedness that persisted for approximately one week.  She takes olmesartan 40 mg.  Home BPs reported there usually have systolics between 120-130.    They reduced the olmesartan at 20 mg. She continued to have low blood pressures with associated dizziness described as orthostatic. She discontinued olmesartan about 3 days ago due to this. She has been asymptomatic since.  Her pressure has remained stable between 103-116/60-67, but this morning was 136/78.  She denies CP, blurry vision, HA, SOB, peripheral edema.     GASTROINTESTINAL:  She reports ongoing GI issues with multiple diagnostic tests performed including abdominal scan, two endoscopic procedures, and gastric emptying scan. Gastric emptying scan revealed abnormal food digestion, with food remaining in stomach without proper processing. She experiences severe abdominal pain associated with impaired gastric motility. She reports significant loss of appetite and early satiety, consuming only yogurt and Ensure in a day. She is currently on a bland diet. The etiology of gastric dysfunction remains. She takes Metamucil twice daily for bowel regularity with some improvement in bowel movement consistency. Her stools are formed.    WEIGHT:  She reports unintentional weight loss of  approximately 20 lbs over four months, associated with decreased appetite and reduced food intake due to ongoing medical condition.    CARDIAC:  She reports occasional premature ventricular complexes (PVCs) and premature atrial complexes (PACs). She has a long-standing history of these irregular heartbeats. Previous cardiology workup was unremarkable.    ROS:  General: -fever, -chills, -fatigue, -weight gain, +weight loss  Eyes: -vision changes, -redness, -discharge  ENT: -ear pain, -nasal congestion, -sore throat  Cardiovascular: -chest pain, -palpitations, -lower extremity edema, +feeling of skipped beats  Respiratory: -cough, -shortness of breath  Gastrointestinal: +abdominal pain, -nausea, -vomiting, -diarrhea, -constipation, -blood in stool, +loss of appetite  Genitourinary: -dysuria, -hematuria, -frequency  Musculoskeletal: -joint pain, -muscle pain  Skin: -rash, -lesion  Neurological: -headache, -dizziness, -numbness, -tingling, +lightheadedness  Psychiatric: -anxiety, -depression, -sleep difficulty          Current Outpatient Medications   Medication Instructions    acetaminophen (TYLENOL) 650 mg, Oral, Every 8 hours    ascorbic acid (vitamin C) (VITAMIN C) 500 mg, Daily    cholecalciferol (vitamin D3) (VITAMIN D3) 2,000 Units, Oral, Daily    dicyclomine (BENTYL) 10 mg, Oral, Before meals & nightly    dorzolamide (TRUSOPT) 2 % ophthalmic solution 1 drop, Right Eye, 3 times daily    eszopiclone (LUNESTA) 2 mg, Oral, Nightly    fluconazole (DIFLUCAN) 150 mg, Once    GAVILYTE-C 240-22.72-6.72 -5.84 gram SolR TAKE 8000 ML BY MOUTH 1 TIME FOR 1 DOSE    ketorolac 0.5% (ACULAR) 0.5 % Drop 1 drop, Right Eye, 4 times daily    loratadine (CLARITIN) 10 mg, Oral, Daily    magnesium oxide (MAG-OX) 400 mg, Daily    methocarbamoL (ROBAXIN) 750 mg, Oral, 4 times daily PRN    metoclopramide HCl (REGLAN) 5 mg, Oral, 3 times daily    multivit with min-folic acid 0.4 mg Tab 0.4 mg, Daily    olmesartan (BENICAR) 20 mg, Oral,  Daily    prednisoLONE acetate (PRED FORTE) 1 % DrpS 1 drop, Right Eye, 4 times daily    psyllium (METAMUCIL) powder 1 packet, Daily    rizatriptan (MAXALT) 10 MG tablet TAKE 1 TABLET(10 MG) BY MOUTH EVERY 2 HOURS AS NEEDED FOR MIGRAINE. MAX 30 MG/ 24 AT BEDTIME    senna-docusate 8.6-50 mg (SENNA WITH DOCUSATE SODIUM) 8.6-50 mg per tablet 1 tablet, Oral, Daily    VITAMIN D2 50,000 Units, Oral, Every 7 days     Objective:      Vitals:    06/23/25 1359   BP: 128/81   Pulse: 87   SpO2: 98%   Weight: 105.8 kg (233 lb 4 oz)   PainSc:   5   PainLoc: Generalized     Body mass index is 34.44 kg/m².    Physical Exam  Constitutional:       General: She is not in acute distress.     Appearance: Normal appearance. She is well-developed. She is obese. She is not ill-appearing, toxic-appearing or diaphoretic.   HENT:      Head: Normocephalic and atraumatic.      Nose: Nose normal.   Eyes:      General: No scleral icterus.        Right eye: No discharge.         Left eye: No discharge.   Neck:      Thyroid: No thyromegaly.      Trachea: No tracheal deviation.   Cardiovascular:      Rate and Rhythm: Normal rate. Rhythm irregular.      Pulses: Normal pulses.      Heart sounds: Normal heart sounds. No murmur heard.     No friction rub. No gallop.      Comments: Sinus rhythm with occasional early beats and pauses consistent with PVCs/PACs  Pulmonary:      Effort: Pulmonary effort is normal. No respiratory distress.      Breath sounds: Normal breath sounds. No stridor. No wheezing, rhonchi or rales.   Musculoskeletal:         General: No deformity.      Cervical back: Neck supple.      Right lower leg: No edema.      Left lower leg: No edema.   Lymphadenopathy:      Cervical: No cervical adenopathy.   Skin:     General: Skin is warm and dry.      Findings: No erythema.   Neurological:      Mental Status: She is alert and oriented to person, place, and time. Mental status is at baseline.      Comments: Using cane to ambulate.    Psychiatric:         Mood and Affect: Mood normal.         Behavior: Behavior normal.         Thought Content: Thought content normal.         Judgment: Judgment normal.         Assessment:       1. Primary hypertension    2. Stage 3a chronic kidney disease    3. Gastroparesis        IMPRESSION:  - Assessed BP management in context of CKD.  - Considered recent low BP readings and associated symptoms.  - Evaluated impact of recent weight loss on BP medication requirements.  - Started olmesartan 20 mg daily to be taken in the morning to balance kidney protection and avoid hypotension.  - Noted occasional premature ventricular or atrial complexes, previously evaluated by cardiology.  - Awaiting further information from GI regarding ongoing digestive issues and weight loss.    Plan:     PLAN SUMMARY:  - Maintain current bland diet as recommended by GI specialist  - Prescribe olmesartan 20 mg daily (morning dose)  - Continue Metamucil twice daily  - Follow up in 3-4 weeks    Primary hypertension  - Monitored patient's dizziness and low blood pressure readings (87/56) when taking full dose of olmesartan, with improvement after discontinuation.   - Blood pressure changes likely related to weight loss due to ongoing GI issues.  - Prescribed olmesartan 20 mg daily to be taken in the morning to balance kidney protection while avoiding hypotension.  - Instructed patient to contact office if BP readings are consistently in the 90s/60s range or lower, or if experiencing dizziness, especially when standing up.  -     olmesartan (BENICAR) 20 MG tablet; Take 1 tablet (20 mg total) by mouth once daily.  Dispense: 90 tablet; Refill: 3    Stage 3a chronic kidney disease  - Monitored blood pressure with target of less than 130/80 mmHg for kidney preservation.  - Today's reading of 128/81 is within target range.  - Prescribed olmesartan 20 mg daily (morning dose) to balance kidney protection while avoiding  hypotension.    Gastroparesis  - Monitored patient's severe abdominal pain and food not digesting, confirmed by gastric emptying scan showing abnormal digestion similar to diabetic gastroparesis.  - Instructed patient to maintain current bland diet as recommended by GI specialist, avoiding rice and pasta while focusing on soups and oatmeal.  - Continue Metamucil twice daily.    ## VENTRICULAR PREMATURE COMPLEXES:  - Monitored occasional odd beats, previously documented as premature ventricular complexes.  - Previous workup with cardiologist found nothing concerning regarding these complexes.    ## ABNORMAL WEIGHT LOSS:  - Monitored weight loss of 20 lbs over 4 months attributed to lack of appetite and abdominal pain, possibly related to gastroparesis.  - Discussed relationship between weight loss and decreased BP medication requirements.  - Recommend continuing bland diet to manage symptoms, though this may contribute to weight loss.    ## FOLLOW-UP:  - Patient to continue monitoring and recording BP readings.  - Follow up in 3-4 weeks.        This note was generated with the assistance of ambient listening technology. Verbal consent was obtained by the patient and accompanying visitor(s) for the recording of patient appointment to facilitate this note. I attest to having reviewed and edited the generated note for accuracy, though some syntax or spelling errors may persist. Please contact the author of this note for any clarification.    Irwin Camp PA-C    6/23/2025

## 2025-06-22 ENCOUNTER — RESULTS FOLLOW-UP (OUTPATIENT)
Dept: GASTROENTEROLOGY | Facility: CLINIC | Age: 75
End: 2025-06-22

## 2025-06-23 ENCOUNTER — OFFICE VISIT (OUTPATIENT)
Dept: INTERNAL MEDICINE | Facility: CLINIC | Age: 75
End: 2025-06-23
Payer: MEDICARE

## 2025-06-23 VITALS
DIASTOLIC BLOOD PRESSURE: 81 MMHG | WEIGHT: 233.25 LBS | SYSTOLIC BLOOD PRESSURE: 128 MMHG | HEART RATE: 87 BPM | BODY MASS INDEX: 34.44 KG/M2 | OXYGEN SATURATION: 98 %

## 2025-06-23 DIAGNOSIS — N18.31 STAGE 3A CHRONIC KIDNEY DISEASE: ICD-10-CM

## 2025-06-23 DIAGNOSIS — K31.84 GASTROPARESIS: ICD-10-CM

## 2025-06-23 DIAGNOSIS — I10 PRIMARY HYPERTENSION: Primary | ICD-10-CM

## 2025-06-23 PROCEDURE — 3288F FALL RISK ASSESSMENT DOCD: CPT | Mod: CPTII,HCNC,S$GLB, | Performed by: COUNSELOR

## 2025-06-23 PROCEDURE — 1159F MED LIST DOCD IN RCRD: CPT | Mod: CPTII,HCNC,S$GLB, | Performed by: COUNSELOR

## 2025-06-23 PROCEDURE — 3079F DIAST BP 80-89 MM HG: CPT | Mod: CPTII,HCNC,S$GLB, | Performed by: COUNSELOR

## 2025-06-23 PROCEDURE — 3074F SYST BP LT 130 MM HG: CPT | Mod: CPTII,HCNC,S$GLB, | Performed by: COUNSELOR

## 2025-06-23 PROCEDURE — 3008F BODY MASS INDEX DOCD: CPT | Mod: CPTII,HCNC,S$GLB, | Performed by: COUNSELOR

## 2025-06-23 PROCEDURE — 3061F NEG MICROALBUMINURIA REV: CPT | Mod: CPTII,HCNC,S$GLB, | Performed by: COUNSELOR

## 2025-06-23 PROCEDURE — 1101F PT FALLS ASSESS-DOCD LE1/YR: CPT | Mod: CPTII,HCNC,S$GLB, | Performed by: COUNSELOR

## 2025-06-23 PROCEDURE — 1125F AMNT PAIN NOTED PAIN PRSNT: CPT | Mod: CPTII,HCNC,S$GLB, | Performed by: COUNSELOR

## 2025-06-23 PROCEDURE — 3044F HG A1C LEVEL LT 7.0%: CPT | Mod: CPTII,HCNC,S$GLB, | Performed by: COUNSELOR

## 2025-06-23 PROCEDURE — 99999 PR PBB SHADOW E&M-EST. PATIENT-LVL III: CPT | Mod: PBBFAC,HCNC,, | Performed by: COUNSELOR

## 2025-06-23 PROCEDURE — 4010F ACE/ARB THERAPY RXD/TAKEN: CPT | Mod: CPTII,HCNC,S$GLB, | Performed by: COUNSELOR

## 2025-06-23 PROCEDURE — 3066F NEPHROPATHY DOC TX: CPT | Mod: CPTII,HCNC,S$GLB, | Performed by: COUNSELOR

## 2025-06-23 PROCEDURE — 99214 OFFICE O/P EST MOD 30 MIN: CPT | Mod: HCNC,S$GLB,, | Performed by: COUNSELOR

## 2025-06-23 RX ORDER — OLMESARTAN MEDOXOMIL 20 MG/1
20 TABLET ORAL DAILY
Qty: 90 TABLET | Refills: 3 | Status: SHIPPED | OUTPATIENT
Start: 2025-06-23

## 2025-06-23 RX ORDER — POLYETHYLENE GLYCOL-3350 AND ELECTROLYTES WITH FLAVOR PACK 240; 5.84; 2.98; 6.72; 22.72 G/278.26G; G/278.26G; G/278.26G; G/278.26G; G/278.26G
POWDER, FOR SOLUTION ORAL
COMMUNITY
Start: 2025-05-27

## 2025-06-23 RX ORDER — FLUCONAZOLE 150 MG/1
150 TABLET ORAL ONCE
COMMUNITY
Start: 2025-06-10

## 2025-06-30 PROCEDURE — 88313 SPECIAL STAINS GROUP 2: CPT | Mod: TC,HCNC | Performed by: INTERNAL MEDICINE

## 2025-07-01 NOTE — PROGRESS NOTES
Gastroenterology Clinic Consultation Note    Patient ID: Manju Aranda is a 74 y.o. female.        Chief Complaint: Abdominal Pain and Weight Loss     CHIEF COMPLAINT:  Patient presents today for follow up of stomach problems that started two months ago.    HISTORY OF PRESENT ILLNESS:  Referred by PCP for epigastric abdominal pain and unintentional weight loss.  Upper endoscopy on May 19, 2025 was stopped due to food residue in the entire stomach and duodenum.  Upper EUS done on June 4th showed normal esophagus, normal stomach, normal duodenal bulb and 2nd portion of the duodenum.  Recent Gastric emptying study revealed significant delayed gastric emptying with retention at 74% at four hours (normal is 10%). She has been on a liquid diet for the past 3 weeks, consisting of broths, oatmeal, mashed potatoes, rotisserie chicken broth, Ensure, and yogurt. She reports feeling satiated after consuming one cup of broth and has difficulty initiating eating, particularly in the morning. She continues Reglan 5mg 3 times daily for gastric motility but is uncertain about its effectiveness.    Her abdominal pain has decreased in severity compared to two months ago. Previously experienced intermittent nausea which has now resolved and denies current vomiting. She reports chronic constipation with minimal bowel movements, managed with stool softeners and occasionally requiring additional medication. She is attempting to increase fluid intake with water and juices to help alleviate constipation. Her weight has decreased from 276 lbs, which she attributes to decreased appetite     BLOOD PRESSURE:  Blood pressure fluctuates throughout the day, ranging from 94/58 in the morning/afternoon to 107/66 and 120/80. She experiences significant dizziness and lightheadedness with low blood pressure, particularly when it drops into the 80s range. This causes anxiety about leaving her house. Her blood pressure  medication was recently decreased from 40mg to 20mg. She is monitoring her blood pressure and tracking symptoms with planned physician follow up in two weeks.      ROS:  Review of Systems   Constitutional:  Positive for weight loss. Negative for chills and fever.   Respiratory:  Negative for cough and shortness of breath.    Cardiovascular:  Negative for chest pain.   Gastrointestinal:  Positive for abdominal pain and constipation. Negative for blood in stool, diarrhea, heartburn, melena, nausea and vomiting.   Skin:  Negative for rash.   Neurological:  Positive for dizziness. Negative for seizures, loss of consciousness and weakness.        Medical History:  has a past medical history of Allergy, Anemia, Arthritis, Cholelithiases, Cyst of kidney, acquired, Diverticulitis, Elevated TSH, Family history of Graves' disease: daughter, maternal aunt, maternal uncle (09/13/2016), Glaucoma suspect, Graves disease, colonic polyps, Hypertension (1981), Liver cyst, MGUS (monoclonal gammopathy of unknown significance), Migraine headache, Mitral valve problem, Obesity, Paraproteinemia, Sleep apnea, Smoldering multiple myeloma (2013), and Tricuspid valve disease.    Surgical History:  has a past surgical history that includes Appendectomy; Hysterectomy (1978 or 1979); Colonoscopy (N/A, 10/23/2015); Colonoscopy (N/A, 11/05/2018); Cystoscopy; Colonoscopy (N/A, 6/19/2023); arthroplasty, knee, total, using computer-assisted navigation (Left, 12/23/2024); Repair of hole in macula (Right, 3/10/2025); Esophagogastroduodenoscopy (N/A, 5/19/2025); Esophagogastroduodenoscopy (N/A, 6/4/2025); Endoscopic ultrasound of upper gastrointestinal tract (N/A, 6/4/2025); and Repair of hole in macula (Right, 6/9/2025).    Family History: family history includes Breast cancer in her paternal grandmother; Cancer in her maternal uncle and paternal grandmother; Cataracts in her sister; Colon cancer in her maternal uncle; Glaucoma in her sister; Graves'  "disease in her daughter; Heart attack in her mother; Heart disease in her maternal aunt, maternal aunt, and mother; Hypertension in her father; Irregular heart beat in her sister; No Known Problems in her daughter, sister, son, and son..       Review of patient's allergies indicates:  No Known Allergies    Medications Ordered Prior to Encounter[1]    Labs:  Lab Results   Component Value Date    WBC 3.56 (L) 05/22/2025    HGB 11.5 (L) 05/22/2025    HCT 36.8 (L) 05/22/2025     05/22/2025    CRP 1.4 02/25/2022    CHOL 145 05/06/2025    TRIG 79 05/06/2025    HDL 55 05/06/2025    ALKPHOS 96 05/06/2025    LIPASE 23 05/22/2025    ALT 13 05/06/2025    AST 18 05/06/2025     05/09/2025    K 4.4 05/09/2025     05/09/2025    CREATININE 1.2 05/09/2025    BUN 20 05/09/2025    CO2 26 05/09/2025    TSH 2.272 05/22/2025    INR 1.0 12/03/2024    HGBA1C 4.3 05/06/2025       Vital Signs:  /66   Pulse 76   Ht 5' 9" (1.753 m)   Wt 104.7 kg (230 lb 13.2 oz)   SpO2 100%   BMI 34.09 kg/m²   Body mass index is 34.09 kg/m².    Physical Exam:  Physical Exam  Vitals and nursing note reviewed.   Constitutional:       General: She is not in acute distress.     Appearance: Normal appearance. She is not ill-appearing.   HENT:      Head: Normocephalic and atraumatic.   Eyes:      Extraocular Movements: Extraocular movements intact.   Cardiovascular:      Rate and Rhythm: Normal rate and regular rhythm.   Pulmonary:      Effort: Pulmonary effort is normal. No respiratory distress.   Abdominal:      General: Bowel sounds are normal. There is no distension.      Palpations: Abdomen is soft.      Tenderness: There is no abdominal tenderness.   Neurological:      General: No focal deficit present.      Mental Status: She is alert and oriented to person, place, and time.   Psychiatric:         Mood and Affect: Mood normal.         Behavior: Behavior normal.         Imaging reviewed: Gastric Emptying Study " 06/20/2025  FINDINGS:  At 4 hour(s) the percentage of retention is 74 % (normal retention at 4 hours is 10% and lower).     Impression:     Delayed radionuclide solid gastric emptying study with 4 hour gastric retention of 74 %.        Electronically signed by:Elma Cruz  Date:                                            06/20/2025  Time:                                           13:08    CT Abdomen Pelvis 05/14/2025  FINDINGS:  Limited evaluation of structures at the base of the neck show no concerning masses or lymph nodes.     The heart is normal in size.  No pericardial effusion.  No mediastinal or hilar adenopathy.     Lungs are essentially clear.  No masses, nodules, or consolidation.  Benign micronodule in the left lung apex.  No effusion or pneumothorax.  Airways are patent without endoluminal lesion.     The liver is normal in size.  No solid mass or biliary ductal dilatation.  Tiny cyst in the right hepatic lobe are unchanged from prior MRI.  Gallbladder is unremarkable.     Spleen, adrenal glands, and pancreas show no focal abnormalities.     Bilateral kidneys are normal in size and position.  Numerous bilateral renal cysts are again identified, grossly similar in comparison to the prior MRI.  No hydronephrosis or nephrolithiasis.  The urinary bladder shows no focal wall thickening.  The uterus is absent.     Gastrointestinal tract shows no wall thickening or obstruction.  There is pancolonic diverticulosis without evidence of acute diverticulitis.  No free fluid or free gas in the visualized abdomen.  No concerning lymphadenopathy.     There is mild atherosclerosis without aneurysm.  Degenerative changes of the spine without an acute fracture or destructive osseous lesion.     Impression:     Numerous incidental findings as detailed above.  No acute process.  No findings to explain patient's unintentional weight loss.        Electronically signed by:Bakari Zavaleta MD  Date:                                             05/14/2025  Time:                                           13:20    MRI Abdomen 08/18/2024  FINDINGS:  Inferior Thorax: Normal.     Liver: 2 tiny cysts in the right hepatic lobe (4-22 and 4-23).  Liver is normal in size.  Portal vein is patent.     Gallbladder: Unremarkable.     Bile Ducts: No dilatation.     Pancreas: No mass. No peripancreatic fat stranding.     Spleen: Small splenic cyst.     Adrenals: Normal.     Kidneys/Ureters: Numerous bilateral renal cysts, most of which are simple appearing.  Other lesions are detailed below:     *Minimally complex 9.0 cm cyst with internal debris and a thickened enhancing rim arising from the right kidney, stable in size dating back to 2022.     *3.7 cm multilobulated cystic lesion at the inferior portion of the right kidney seen on coronal images without enhancing mass component.  Finding is minimally enlarged from 2022 and may contain internal proteinaceous material or calcification, incompletely characterized.     *Bilateral intrinsically T1 bright hemorrhagic versus proteinaceous cysts, the largest in the right kidney measures 1.8 cm (9-76 and the largest in the left kidney measures 1.0 cm (9-63)..     GI Tract/Mesentery (imaged portion): No evidence of bowel obstruction or inflammation.  Numerous colonic diverticuli.     Peritoneal Space: No ascites or free air.     Retroperitoneum: No pathologically enlarged nodes.     Abdominal wall: Normal.     Vasculature: No aneurysm.     Bones: Normal marrow signal.     Impression:     No suspicious hepatic lesion.  Two tiny simple cysts for which no follow-up is recommended.     Minimally complex cyst in the right kidney, stable from 2022.     Multilobulated cystic lesion at the inferior pole of the right kidney which cannot be fully characterized due to incomplete visualization, but likely represents a minimally complex cyst with internal proteinaceous material.  No enhancing nodular or mass component  identified.     Bilateral proteinaceous versus hemorrhagic renal cysts.     Electronically signed by resident: Sasha Gómez  Date:                                            08/18/2024  Time:                                           10:04     Electronically signed by:Francisco Oates MD  Date:                                            08/18/2024  Time:                                           13:58    Endoscopy reviewed: EUS 06/04/2025  Findings:       ENDOSCOPIC FINDING: :        The examined esophagus was normal.        Esophagogastric landmarks were identified: the Z-line was found at        43 cm, the gastroesophageal junction was found at 43 cm and the site        of hiatal narrowing was found at 43 cm from the incisors.        The entire examined stomach was normal. Biopsies were taken with a        cold forceps for Helicobacter pylori testing. Verification of        patient identification for the specimen was done by the physician        and nurse using the patient's name and birth date. Estimated blood        loss was minimal.        The duodenal bulb and second portion of the duodenum were normal.        Biopsies for histology were taken with a cold forceps for evaluation        of celiac disease. Estimated blood loss was minimal.        The area of the papilla was normal.        ENDOSONOGRAPHIC FINDING: :        The esophagus, stomach and duodenum and adjacent structures were        visualized endosonographically.        There was no sign of significant endosonographic abnormality in the        common bile duct. The maximum diameter of the duct was 9.4 mm        without an obvious obstructive process identified. No stones were        identified in the visualized bile duct.        Minimal hyperechoic material consistent with scant, thin sludge was        visualized endosonographically in the gallbladder.        There was no sign of significant endosonographic abnormality in the        entire pancreas.  The pancreatic duct measured up to 3.8mm in head,        3mm in neck, 2mm in body and 1.5mm in tail. No obvious masses, the        pancreatic duct was well visualized from ampulla to tail.        There was no sign of significant endosonographic abnormality in the        visualized portion of the left lobe of the liver.        No lymphadenopathy seen.        There was no sign of significant endosonographic abnormality        involving the celiac trunk.        No obvious source/etiology for recently detected retained gastric        contents, chronic abdo pains, or complaints of weight loss were        identified on today's EGD/EUS exam. Await path results from stomach        and duodenum - to review with referring providers.   Impression:            - Normal esophagus.                          - Esophagogastric landmarks identified.                          - Normal stomach. Biopsied to eval for H. pylori.                          - Normal duodenal bulb and second portion of the                          duodenum. Biopsied to eval for celiac.                          - Normal area of the papilla.                          - There was no sign of significant pathology in                          the visualized common bile duct.                          - Minimal hyperechoic material consistent with                          scant, thin sludge was visualized                          endosonographically in the gallbladder.                          - There was no sign of significant pathology in                          the entire visualized pancreas.                          - There was no evidence of significant pathology                          in the visualized portion of the left lobe of the                          liver.                          - The celiac trunk was endosonographically normal.                          - No obvious lymphadenopathy visualized.   Recommendation:        - Discharge patient to home  (ambulatory).                          - Patient has a contact number available for                          emergencies. The signs and symptoms of potential                          delayed complications were discussed with the                          patient. Return to normal activities tomorrow.                          Written discharge instructions were provided to                          the patient.                          - Resume previous diet.                          - Await path results from stomach and duodenum -                          to review with referring providers on follow up.                          - Return to referring physicians.   Attending Participation:        I personally performed the entire procedure.   Yessica Wu MD   6/4/2025 9:07:28 AM     EGD 05/19/2025  Findings:       Food (residue) was found in the duodenum.        A medium amount of food (residue) was found in the entire examined        stomach.        The examined esophagus was normal.   Impression:            - Retained food in the duodenum.                          - A medium amount of food (residue) in the stomach.                          - Normal esophagus.                          - No specimens collected.   Recommendation:        - Discharge patient to home.                          - Repeat upper endoscopy at the next available                          appointment because the preparation was poor and                          per protocol. Referral placed.                          - Perform a colonoscopy at the next available                          appointment. Referral placed.                          - Return to referring physician.                          - The findings and recommendations were discussed                          with the patient.   Attending Participation:        I personally performed the entire procedure.   Brandon Ruelas MD   5/19/2025 1:03:13 PM     Colonoscopy  06/19/2023  Findings:       The perianal and digital rectal examinations were normal.        Four sessile polyps were found in the transverse colon. The polyps        were 3 to 5 mm in size. These polyps were removed with a cold snare.        Resection and retrieval were complete. Verification of patient        identification for the specimen was done. Estimated blood loss was        minimal.        Two sessile polyps were found in the rectum. The polyps were 2 to 4        mm in size. These polyps were removed with a cold snare. Resection        and retrieval were complete. Verification of patient identification        for the specimen was done. Estimated blood loss was minimal.        Many diverticula were found in the entire colon.        The exam was otherwise without abnormality on direct and        retroflexion views.   Impression:            - Four 3 to 5 mm polyps in the transverse colon,                          removed with a cold snare. Resected and retrieved.                          - Two 2 to 4 mm polyps in the rectum, removed with                          a cold snare. Resected and retrieved.                          - Diverticulosis in the entire examined colon.                          - The examination was otherwise normal on direct                          and retroflexion views.   Recommendation:        - Discharge patient to home.                          - Resume previous diet.                          - Continue present medications.                          - Await pathology results.                          - Repeat colonoscopy date to be determined after                          pending pathology results are reviewed for                          surveillance.                          - Written discharge instructions were provided to                          the patient.                          - The signs and symptoms of potential delayed                          complications were discussed  with the patient.                          - Patient has a contact number available for                          emergencies.                          - Return to normal activities tomorrow.   Attending Participation:        I personally performed the entire procedure.   uL Lewis MD   6/19/2023 11:14:46 AM     Assessment & Plan  1. Gastroparesis    2. Epigastric abdominal pain    3. Unintentional weight loss      Orders Placed This Encounter    Ambulatory referral/consult to General Surgery     GASTROPARESIS AND EPIGASTRIC PAIN:  - Reviewed recent upper endoscopy and upper EUS.  - Diagnosed with gastroparesis based on gastric emptying study showing 74% retention at 4 hours (normal is 10%).  - Considered potential need for gastric stimulator if Reglan proves ineffective long-term.  - Explained gastroparesis and its effects on digestion, purpose of Reglan in increasing gastric motility, and gastric stimulator as a potential future treatment option.  - Advised can start introducing more foods beyond liquid diet with gradual introduction of soft foods, but to avoid heavy meals.  - Reglan (metoclopramide) 5 mg TID initiated by PCP appears to be providing symptom relief; continue 3 times daily before meals.  - Referred to GI dietitian for gastroparesis diet guidance.  - Referred to [facility] gastroparesis specialist, for further evaluation and management.    WEIGHT LOSS:  - Recent weight loss likely due to gastroparesis and reduced appetite.  - Colonoscopy scheduled for July 23, 2025.    HYPOTENSION:  - Low BP readings known by PCP who recently decreased dose of BP med  - Explained the risks of very low BP (80s systolic) and importance of seeking medical attention.  - Patient to continue logging BP readings.  - Patient to contact PCP if BP drops to 80s systolic.    FOLLOW-UP:  - Follow up after appointments with GI dietitian and gastroparesis specialist   - Contact office via patient portal with any questions.   -  with GI physician.        FRANCA MONTESINOS-BRIAN  Gastroenterology Department  [facility]  [phone]     This note was generated with the assistance of ambient listening technology. Verbal consent was obtained by the patient and accompanying visitor(s) for the recording of patient appointment to facilitate this note. I attest to having reviewed and edited the generated note for accuracy, though some syntax or spelling errors may persist. Please contact the author of this note for any clarification.          [1]   Current Outpatient Medications on File Prior to Visit   Medication Sig Dispense Refill    acetaminophen (TYLENOL) 650 MG TbSR Take 1 tablet (650 mg total) by mouth every 8 (eight) hours. 120 tablet 0    ascorbic acid, vitamin C, (VITAMIN C) 500 MG tablet Take 500 mg by mouth once daily.      cholecalciferol, vitamin D3, (VITAMIN D3) 50 mcg (2,000 unit) Cap capsule Take 1 capsule (2,000 Units total) by mouth once daily.      dorzolamide (TRUSOPT) 2 % ophthalmic solution Place 1 drop into the right eye 3 (three) times daily. 10 mL 3    eszopiclone (LUNESTA) 2 MG Tab TAKE 1 TABLET BY MOUTH EVERY EVENING 30 tablet 5    fluconazole (DIFLUCAN) 150 MG Tab Take 150 mg by mouth once.      GAVILYTE-C 240-22.72-6.72 -5.84 gram SolR TAKE 8000 ML BY MOUTH 1 TIME FOR 1 DOSE (Patient not taking: Reported on 7/2/2025)      ketorolac 0.5% (ACULAR) 0.5 % Drop Place 1 drop into the right eye 4 (four) times daily. 5 mL 3    loratadine (CLARITIN) 10 mg tablet Take 1 tablet (10 mg total) by mouth once daily. 30 tablet 2    magnesium oxide (MAG-OX) 400 mg tablet Take 400 mg by mouth once daily.      methocarbamoL (ROBAXIN) 750 MG Tab Take 1 tablet (750 mg total) by mouth 4 (four) times daily as needed (for muscle spasms). 40 tablet 0    metoclopramide HCl (REGLAN) 5 MG tablet Take 1 tablet (5 mg total) by mouth 3 (three) times daily. 90 tablet 1    multivit with min-folic acid 0.4 mg Tab Take 0.4 mg  by mouth once daily.      olmesartan (BENICAR) 20 MG tablet Take 1 tablet (20 mg total) by mouth once daily. 90 tablet 3    prednisoLONE acetate (PRED FORTE) 1 % DrpS Place 1 drop into the right eye 4 (four) times daily. 5 mL 3    psyllium (METAMUCIL) powder Take 1 packet by mouth Daily.      rizatriptan (MAXALT) 10 MG tablet TAKE 1 TABLET(10 MG) BY MOUTH EVERY 2 HOURS AS NEEDED FOR MIGRAINE. MAX 30 MG/ 24 AT BEDTIME 12 tablet 11    senna-docusate 8.6-50 mg (SENNA WITH DOCUSATE SODIUM) 8.6-50 mg per tablet Take 1 tablet by mouth once daily. 30 tablet 0    VITAMIN D2 1,250 mcg (50,000 unit) capsule Take 1 capsule (50,000 Units total) by mouth every 7 days. for 12 doses 12 capsule 0     Current Facility-Administered Medications on File Prior to Visit   Medication Dose Route Frequency Provider Last Rate Last Admin    sodium chloride 0.9% flush 10 mL  10 mL Intravenous PRN Victoriano Diallo MD

## 2025-07-02 ENCOUNTER — OFFICE VISIT (OUTPATIENT)
Dept: GASTROENTEROLOGY | Facility: CLINIC | Age: 75
End: 2025-07-02
Payer: MEDICARE

## 2025-07-02 ENCOUNTER — TELEPHONE (OUTPATIENT)
Dept: INTERNAL MEDICINE | Facility: CLINIC | Age: 75
End: 2025-07-02
Payer: MEDICARE

## 2025-07-02 ENCOUNTER — PATIENT MESSAGE (OUTPATIENT)
Dept: GASTROENTEROLOGY | Facility: CLINIC | Age: 75
End: 2025-07-02

## 2025-07-02 VITALS
HEIGHT: 69 IN | WEIGHT: 230.81 LBS | HEART RATE: 76 BPM | BODY MASS INDEX: 34.18 KG/M2 | DIASTOLIC BLOOD PRESSURE: 66 MMHG | OXYGEN SATURATION: 100 % | SYSTOLIC BLOOD PRESSURE: 107 MMHG

## 2025-07-02 VITALS — SYSTOLIC BLOOD PRESSURE: 110 MMHG | DIASTOLIC BLOOD PRESSURE: 57 MMHG

## 2025-07-02 DIAGNOSIS — R10.13 EPIGASTRIC ABDOMINAL PAIN: ICD-10-CM

## 2025-07-02 DIAGNOSIS — R63.4 UNINTENTIONAL WEIGHT LOSS: ICD-10-CM

## 2025-07-02 DIAGNOSIS — K31.84 GASTROPARESIS: Primary | ICD-10-CM

## 2025-07-02 PROCEDURE — 99999 PR PBB SHADOW E&M-EST. PATIENT-LVL V: CPT | Mod: PBBFAC,HCNC,,

## 2025-07-02 NOTE — TELEPHONE ENCOUNTER
Telephone Ms. Aranda, she is inquiring about her blood pressure reading,verbalized concern with her reading being low is it okay or can she discontinue blood pressure medication,also verbalized she is suppose to hear from the provider in three weeks about the blood pressure medication.

## 2025-07-08 ENCOUNTER — TELEPHONE (OUTPATIENT)
Dept: CARDIOLOGY | Facility: CLINIC | Age: 75
End: 2025-07-08
Payer: MEDICARE

## 2025-07-08 NOTE — TELEPHONE ENCOUNTER
Pt called and scheduled for an appt with Dr. Broderick 7/24.  Patient agreed with appt date, time, and location.

## 2025-07-09 ENCOUNTER — PATIENT MESSAGE (OUTPATIENT)
Dept: INTERNAL MEDICINE | Facility: CLINIC | Age: 75
End: 2025-07-09
Payer: MEDICARE

## 2025-07-10 ENCOUNTER — OFFICE VISIT (OUTPATIENT)
Dept: INTERNAL MEDICINE | Facility: CLINIC | Age: 75
End: 2025-07-10
Payer: MEDICARE

## 2025-07-10 ENCOUNTER — PATIENT MESSAGE (OUTPATIENT)
Dept: INTERNAL MEDICINE | Facility: CLINIC | Age: 75
End: 2025-07-10
Payer: MEDICARE

## 2025-07-10 VITALS
HEART RATE: 83 BPM | BODY MASS INDEX: 34.32 KG/M2 | OXYGEN SATURATION: 98 % | HEIGHT: 69 IN | DIASTOLIC BLOOD PRESSURE: 68 MMHG | SYSTOLIC BLOOD PRESSURE: 106 MMHG | WEIGHT: 231.69 LBS

## 2025-07-10 DIAGNOSIS — I10 PRIMARY HYPERTENSION: Primary | ICD-10-CM

## 2025-07-10 PROCEDURE — 1101F PT FALLS ASSESS-DOCD LE1/YR: CPT | Mod: CPTII,HCNC,S$GLB, | Performed by: COUNSELOR

## 2025-07-10 PROCEDURE — 3074F SYST BP LT 130 MM HG: CPT | Mod: CPTII,HCNC,S$GLB, | Performed by: COUNSELOR

## 2025-07-10 PROCEDURE — 3066F NEPHROPATHY DOC TX: CPT | Mod: CPTII,HCNC,S$GLB, | Performed by: COUNSELOR

## 2025-07-10 PROCEDURE — 99214 OFFICE O/P EST MOD 30 MIN: CPT | Mod: HCNC,S$GLB,, | Performed by: COUNSELOR

## 2025-07-10 PROCEDURE — 1126F AMNT PAIN NOTED NONE PRSNT: CPT | Mod: CPTII,HCNC,S$GLB, | Performed by: COUNSELOR

## 2025-07-10 PROCEDURE — 3044F HG A1C LEVEL LT 7.0%: CPT | Mod: CPTII,HCNC,S$GLB, | Performed by: COUNSELOR

## 2025-07-10 PROCEDURE — 4010F ACE/ARB THERAPY RXD/TAKEN: CPT | Mod: CPTII,HCNC,S$GLB, | Performed by: COUNSELOR

## 2025-07-10 PROCEDURE — 1159F MED LIST DOCD IN RCRD: CPT | Mod: CPTII,HCNC,S$GLB, | Performed by: COUNSELOR

## 2025-07-10 PROCEDURE — 3008F BODY MASS INDEX DOCD: CPT | Mod: CPTII,HCNC,S$GLB, | Performed by: COUNSELOR

## 2025-07-10 PROCEDURE — 3078F DIAST BP <80 MM HG: CPT | Mod: CPTII,HCNC,S$GLB, | Performed by: COUNSELOR

## 2025-07-10 PROCEDURE — 3061F NEG MICROALBUMINURIA REV: CPT | Mod: CPTII,HCNC,S$GLB, | Performed by: COUNSELOR

## 2025-07-10 PROCEDURE — 3288F FALL RISK ASSESSMENT DOCD: CPT | Mod: CPTII,HCNC,S$GLB, | Performed by: COUNSELOR

## 2025-07-10 PROCEDURE — 99999 PR PBB SHADOW E&M-EST. PATIENT-LVL IV: CPT | Mod: PBBFAC,HCNC,, | Performed by: COUNSELOR

## 2025-07-10 RX ORDER — OLMESARTAN MEDOXOMIL 5 MG/1
5 TABLET, FILM COATED ORAL DAILY
Qty: 30 TABLET | Refills: 11 | Status: SHIPPED | OUTPATIENT
Start: 2025-07-10 | End: 2026-07-10

## 2025-07-10 NOTE — TELEPHONE ENCOUNTER
"Pt message: " A week ago the nurse call and got blood pressure reading when I take the 20 mil.pill it still has my pressure low and I am still having dizziness I don't want to fall at my age can you get with Dr curry and come up with a plan this is not a good feeling I certainly don't know what to do,I am still being told not to take the pill,and to go to the ER.can you please get back with me with a answer thanks I have so much going on with my health it's really getting overwhelming adding the pressure problem to it"      Scheduled the pt for an appointment for today to see MARILEE Camp.  Appointment Information   Name: KEESHA DUARTE     Date: 7/10/2025     Appt Time: 3:00 PM     Visit Type: EP - PRIMARY CARE ([facility]) [486]     Provider: Matthew Camp PA-C Dept: [facility], Internal Medicine       Dept Address: [address] [place] [id number]       Dept Phone Number: [phone]     "

## 2025-07-10 NOTE — PROGRESS NOTES
Subjective:       Patient ID: Manju Aranda is a 74 y.o. female.    Chief Complaint: Primary hypertension [I10]    Patient is known to me, PCP is Dr. Jazzy Charles. Here today for the following:    History of Present Illness    CHIEF COMPLAINT:  Patient presents today for follow up of low blood pressure and dizziness.    HYPOTENSION AND ASSOCIATED SYMPTOMS:  She reports persistent low blood pressure with symptomatic dizziness. Her current blood pressure is 90/58. She experiences dizziness primarily when standing up and moving around, requiring her to hold onto walls for support. She denies falls, shortness of breath, headaches, or blurry vision. The dizziness occurs intermittently throughout daily activities. She is actively monitoring her blood pressure and tracking symptoms.    CARDIAC:  She reports occasional skipped heartbeats without associated shortness of breath, falls, headaches, or blurry vision. A cardiac evaluation is scheduled.    GASTROINTESTINAL:  She reports significant ongoing weight loss with early satiety, feeling extremely full after minimal food intake. She has known gastroparesis after gastric emptying study. Her diet primarily consists of yogurt, fruit, smoothies, and Ensure. She is unable to drink water due to stomach fullness. She reports minimal benefit from Reglan. A surgical consultation is scheduled next week to discuss potential interventions including a gastric stimulator.    NEUROLOGICAL:  She experienced a single episode of facial twitching/quivering yesterday, which has not recurred today. She believes this may be stress-related.    MEDICAL HISTORY:  She has chronic kidney disease. She denies diabetes and reports normal cholesterol levels.    ROS:  General: -fever, -chills, -fatigue, -weight gain, +weight loss  Eyes: -vision changes, -redness, -discharge  ENT: -ear pain, -nasal congestion, -sore throat  Cardiovascular: -chest pain, +palpitations, -lower extremity edema,  "+feeling of skipped beats  Respiratory: -cough, -shortness of breath  Gastrointestinal: -abdominal pain, -nausea, -vomiting, -diarrhea, -constipation, -blood in stool, +indigestion  Genitourinary: -dysuria, -hematuria, -frequency  Musculoskeletal: -joint pain, -muscle pain  Skin: -rash, -lesion  Neurological: -headache, +dizziness, -numbness, -tingling, +twitch/tic  Psychiatric: -anxiety, -depression, -sleep difficulty          Current Outpatient Medications   Medication Instructions    acetaminophen (TYLENOL) 650 mg, Oral, Every 8 hours    ascorbic acid (vitamin C) (VITAMIN C) 500 mg, Daily    cholecalciferol (vitamin D3) (VITAMIN D3) 2,000 Units, Oral, Daily    dorzolamide (TRUSOPT) 2 % ophthalmic solution 1 drop, Right Eye, 3 times daily    eszopiclone (LUNESTA) 2 mg, Oral, Nightly    fluconazole (DIFLUCAN) 150 mg, Once    GAVILYTE-C 240-22.72-6.72 -5.84 gram SolR     ketorolac 0.5% (ACULAR) 0.5 % Drop 1 drop, Right Eye, 4 times daily    loratadine (CLARITIN) 10 mg, Oral, Daily    magnesium oxide (MAG-OX) 400 mg, Daily    methocarbamoL (ROBAXIN) 750 mg, Oral, 4 times daily PRN    metoclopramide HCl (REGLAN) 5 mg, Oral, 3 times daily    multivit with min-folic acid 0.4 mg Tab 0.4 mg, Daily    olmesartan (BENICAR) 5 mg, Oral, Daily    prednisoLONE acetate (PRED FORTE) 1 % DrpS 1 drop, Right Eye, 4 times daily    psyllium (METAMUCIL) powder 1 packet, Daily    rizatriptan (MAXALT) 10 MG tablet TAKE 1 TABLET(10 MG) BY MOUTH EVERY 2 HOURS AS NEEDED FOR MIGRAINE. MAX 30 MG/ 24 AT BEDTIME    senna-docusate 8.6-50 mg (SENNA WITH DOCUSATE SODIUM) 8.6-50 mg per tablet 1 tablet, Oral, Daily    VITAMIN D2 50,000 Units, Oral, Every 7 days     Objective:      Vitals:    07/10/25 1443   BP: (!) 90/58   Pulse: 83   SpO2: 98%   Weight: 105.1 kg (231 lb 11.3 oz)   Height: 5' 9" (1.753 m)   PainSc: 0-No pain     Body mass index is 34.22 kg/m².    Physical Exam  Constitutional:       General: She is not in acute distress.     " Appearance: Normal appearance. She is well-developed. She is obese. She is not ill-appearing, toxic-appearing or diaphoretic.   HENT:      Head: Normocephalic and atraumatic.      Nose: Nose normal.   Eyes:      General: No scleral icterus.        Right eye: No discharge.         Left eye: No discharge.   Neck:      Thyroid: No thyromegaly.      Trachea: No tracheal deviation.   Cardiovascular:      Rate and Rhythm: Normal rate. Rhythm irregular.      Pulses: Normal pulses.      Heart sounds: Normal heart sounds. No murmur heard.     No friction rub. No gallop.      Comments: Occasional extrasystoles  Pulmonary:      Effort: Pulmonary effort is normal. No respiratory distress.      Breath sounds: Normal breath sounds. No stridor. No wheezing, rhonchi or rales.   Musculoskeletal:         General: No deformity.      Right lower leg: No edema.      Left lower leg: No edema.   Skin:     General: Skin is warm and dry.      Findings: No erythema.   Neurological:      Mental Status: She is alert and oriented to person, place, and time. Mental status is at baseline.      Comments: Walks with cane.    Psychiatric:         Mood and Affect: Mood normal.         Behavior: Behavior normal.         Thought Content: Thought content normal.         Judgment: Judgment normal.         Assessment:       1. Primary hypertension        IMPRESSION:  - Assessed BP management, noting consistently low readings (90/58) causing dizziness and impacting quality of life.  - Evaluated benefits of olmesartan for kidney protection in context of current low BP.  - Reduced olmesartan dose from 20 mg to 5 mg     Plan:     PLAN SUMMARY:  - Adjusted olmesartan dose to 5 mg for kidney protection and blood pressure management  - Take 5 mg olmesartan in morning only if BP is greater than 100 mmHg systolic; skip if less than 100 mmHg  - Patient to see surgeon next week for consultation, considering gastric stimulator as treatment option  - Contact office if  still experiencing dizziness after starting new dosing regimen  - Follow-up in 2 weeks (July 21st) to reassess symptoms and BP    Primary hypertension  - Monitored the patient's blood pressure which is 90/58, causing dizziness when standing up.  - Blood pressure was 106/68 after recheck.  - Advised to take 5 mg olmesartan in morning only if BP is greater than 100 mmHg systolic, and to skip dose if BP is less than 100 mmHg systolic.  - Advised to contact office if still experiencing dizziness after starting new dosing regimen.  -     olmesartan (BENICAR) 5 MG Tab; Take 1 tablet (5 mg total) by mouth once daily.  Dispense: 30 tablet; Refill: 11    FOLLOW-UP:  - Scheduled follow up in 2 weeks (on July 21st) to reassess symptoms and BP.         This note was generated with the assistance of ambient listening technology. Verbal consent was obtained by the patient and accompanying visitor(s) for the recording of patient appointment to facilitate this note. I attest to having reviewed and edited the generated note for accuracy, though some syntax or spelling errors may persist. Please contact the author of this note for any clarification.    Irwin Camp PA-C    7/10/2025

## 2025-07-10 NOTE — TELEPHONE ENCOUNTER
"Answered this same topic in another encounter in result I've scheduled the pt to be seen today 07/10/2025 at 3 pm by MARILEE Camp.    Appointment Information   Name: KEESHA DUARTE     Date: 7/10/2025     Appt Time: 3:00 PM     Visit Type: EP - PRIMARY CARE ([facility]) [486]     Provider: Matthew Camp PA-C Dept: [facility], Internal Medicine       Dept Address: [address] [place] [id number]       Dept Phone Number: [phone]       Pt message for this encounter:     " I send a message on yesterday one important thing I forgot to add should there be a perimeter with this medicine I mean if I take the pressure and it's 100/83 or 98/85 or 103/64 question is should I take the med? because for lunch 91/57 then the lightheaded and dizziness starts I can't leave the house and I have the fear of falling please get back to me"   "

## 2025-07-17 ENCOUNTER — TELEPHONE (OUTPATIENT)
Dept: INTERNAL MEDICINE | Facility: CLINIC | Age: 75
End: 2025-07-17
Payer: MEDICARE

## 2025-07-17 NOTE — TELEPHONE ENCOUNTER
Received another paper Rx for Trazodone 50 mg  Per the pt's chart it states this med has been discontinued.     Will place on the provider's desk for a signature.

## 2025-07-18 ENCOUNTER — TELEPHONE (OUTPATIENT)
Dept: SLEEP MEDICINE | Facility: CLINIC | Age: 75
End: 2025-07-18
Payer: MEDICARE

## 2025-07-18 DIAGNOSIS — G47.00 INSOMNIA, UNSPECIFIED TYPE: ICD-10-CM

## 2025-07-18 RX ORDER — ESZOPICLONE 2 MG/1
2 TABLET, FILM COATED ORAL NIGHTLY
Qty: 30 TABLET | Refills: 1 | Status: SHIPPED | OUTPATIENT
Start: 2025-07-18

## 2025-07-21 ENCOUNTER — PATIENT MESSAGE (OUTPATIENT)
Dept: INTERNAL MEDICINE | Facility: CLINIC | Age: 75
End: 2025-07-21
Payer: MEDICARE

## 2025-07-23 ENCOUNTER — HOSPITAL ENCOUNTER (OUTPATIENT)
Facility: HOSPITAL | Age: 75
Discharge: HOME | End: 2025-07-23
Attending: INTERNAL MEDICINE | Admitting: INTERNAL MEDICINE
Payer: MEDICARE

## 2025-07-23 ENCOUNTER — ANESTHESIA (OUTPATIENT)
Dept: ENDOSCOPY | Facility: HOSPITAL | Age: 75
End: 2025-07-23
Payer: MEDICARE

## 2025-07-23 ENCOUNTER — ANESTHESIA EVENT (OUTPATIENT)
Dept: ENDOSCOPY | Facility: HOSPITAL | Age: 75
End: 2025-07-23
Payer: MEDICARE

## 2025-07-23 VITALS
SYSTOLIC BLOOD PRESSURE: 131 MMHG | BODY MASS INDEX: 33.62 KG/M2 | OXYGEN SATURATION: 100 % | RESPIRATION RATE: 11 BRPM | DIASTOLIC BLOOD PRESSURE: 68 MMHG | WEIGHT: 227 LBS | HEART RATE: 63 BPM | TEMPERATURE: 98 F | HEIGHT: 69 IN

## 2025-07-23 DIAGNOSIS — R63.4 WEIGHT LOSS: ICD-10-CM

## 2025-07-23 DIAGNOSIS — R10.9 ABDOMINAL PAIN, UNSPECIFIED ABDOMINAL LOCATION: ICD-10-CM

## 2025-07-23 DIAGNOSIS — Z12.11 COLON CANCER SCREENING: ICD-10-CM

## 2025-07-23 DIAGNOSIS — R63.0 ANOREXIA: ICD-10-CM

## 2025-07-23 DIAGNOSIS — D12.6 COLON ADENOMAS: ICD-10-CM

## 2025-07-23 DIAGNOSIS — Z86.0100 HISTORY OF COLON POLYPS: ICD-10-CM

## 2025-07-23 PROCEDURE — 45385 COLONOSCOPY W/LESION REMOVAL: CPT | Mod: PT,HCNC | Performed by: INTERNAL MEDICINE

## 2025-07-23 PROCEDURE — 37000008 HC ANESTHESIA 1ST 15 MINUTES: Mod: HCNC | Performed by: INTERNAL MEDICINE

## 2025-07-23 PROCEDURE — 88305 TISSUE EXAM BY PATHOLOGIST: CPT | Mod: TC,HCNC | Performed by: INTERNAL MEDICINE

## 2025-07-23 PROCEDURE — 45385 COLONOSCOPY W/LESION REMOVAL: CPT | Mod: PT,HCNC,, | Performed by: INTERNAL MEDICINE

## 2025-07-23 PROCEDURE — 25000003 PHARM REV CODE 250: Mod: HCNC

## 2025-07-23 PROCEDURE — 37000009 HC ANESTHESIA EA ADD 15 MINS: Mod: HCNC | Performed by: INTERNAL MEDICINE

## 2025-07-23 PROCEDURE — 63600175 PHARM REV CODE 636 W HCPCS: Mod: HCNC

## 2025-07-23 PROCEDURE — 88305 TISSUE EXAM BY PATHOLOGIST: CPT | Mod: 26,HCNC,, | Performed by: PATHOLOGY

## 2025-07-23 PROCEDURE — 27201089 HC SNARE, DISP (ANY): Mod: HCNC | Performed by: INTERNAL MEDICINE

## 2025-07-23 RX ORDER — SUCCINYLCHOLINE CHLORIDE 20 MG/ML
INJECTION INTRAMUSCULAR; INTRAVENOUS
Status: DISCONTINUED | OUTPATIENT
Start: 2025-07-23 | End: 2025-07-23

## 2025-07-23 RX ORDER — ONDANSETRON HYDROCHLORIDE 2 MG/ML
INJECTION, SOLUTION INTRAVENOUS
Status: DISCONTINUED | OUTPATIENT
Start: 2025-07-23 | End: 2025-07-23

## 2025-07-23 RX ORDER — ROCURONIUM BROMIDE 10 MG/ML
INJECTION, SOLUTION INTRAVENOUS
Status: DISCONTINUED | OUTPATIENT
Start: 2025-07-23 | End: 2025-07-23

## 2025-07-23 RX ORDER — SODIUM CHLORIDE 9 MG/ML
INJECTION, SOLUTION INTRAVENOUS CONTINUOUS
Status: DISCONTINUED | OUTPATIENT
Start: 2025-07-23 | End: 2025-07-23 | Stop reason: HOSPADM

## 2025-07-23 RX ORDER — PHENYLEPHRINE HYDROCHLORIDE 10 MG/ML
INJECTION INTRAVENOUS
Status: DISCONTINUED | OUTPATIENT
Start: 2025-07-23 | End: 2025-07-23

## 2025-07-23 RX ORDER — PROPOFOL 10 MG/ML
VIAL (ML) INTRAVENOUS
Status: DISCONTINUED | OUTPATIENT
Start: 2025-07-23 | End: 2025-07-23

## 2025-07-23 RX ORDER — LIDOCAINE HYDROCHLORIDE 20 MG/ML
INJECTION INTRAVENOUS
Status: DISCONTINUED | OUTPATIENT
Start: 2025-07-23 | End: 2025-07-23

## 2025-07-23 RX ADMIN — PHENYLEPHRINE HYDROCHLORIDE 50 MCG: 10 INJECTION INTRAVENOUS at 09:07

## 2025-07-23 RX ADMIN — ROCURONIUM BROMIDE 30 MG: 10 INJECTION INTRAVENOUS at 09:07

## 2025-07-23 RX ADMIN — LIDOCAINE HYDROCHLORIDE 100 MG: 20 INJECTION INTRAVENOUS at 08:07

## 2025-07-23 RX ADMIN — SUGAMMADEX 200 MG: 100 INJECTION, SOLUTION INTRAVENOUS at 09:07

## 2025-07-23 RX ADMIN — ONDANSETRON 4 MG: 2 INJECTION INTRAMUSCULAR; INTRAVENOUS at 09:07

## 2025-07-23 RX ADMIN — PROPOFOL 140 MG: 10 INJECTION, EMULSION INTRAVENOUS at 08:07

## 2025-07-23 RX ADMIN — SUCCINYLCHOLINE CHLORIDE 120 MG: 20 INJECTION, SOLUTION INTRAMUSCULAR; INTRAVENOUS; PARENTERAL at 08:07

## 2025-07-23 RX ADMIN — SODIUM CHLORIDE: 0.9 INJECTION, SOLUTION INTRAVENOUS at 08:07

## 2025-07-23 NOTE — PROGRESS NOTES
Subjective:   Patient ID:  Manju Aranda is a 74 y.o. female who presents for evaluation of hypotensive episodes  and Establish Care    PROBLEM LIST:  Smoldering myeloma  Hypotension  CKD3  BECKY    HPI: Cardio-oncology consultation  Referred today by Dr. Marsh to establish care with Cardio-Oncology clinic. She has smoldering myeloma and recent onset of hypotension. A gastric biopsy was done with reported possible amyloidosis on congo red staining. She developed severe gastroparesis about 2 months ago associated with a 20 lb weight loss. During that time, her BP was dropping into the 80's and she was very symptomatic. She has had treated HTN for about 20 years and was previously on accupril. Her BP reduced her dose of olmesartan from 20 mg down to 5 mg, and she has not had any hypotension since reducing the dose. She denies any history of spinal stenosis or carpal tunnel.  She does have CKD3 without significant proteinuria. BNP in May was 16.    ECG 12/3/24: Personally reviewed by me shows NSR with PAC, PVC    Echo 5/24:    Summary  Show Result Comparison     Left Ventricle: The left ventricle is normal in size. Normal wall thickness. There is normal systolic function with a visually estimated ejection fraction of 55 - 60%. Grade I diastolic dysfunction.    Right Ventricle: Normal right ventricular cavity size. Wall thickness is normal. Systolic function is normal.    Left Atrium: Left atrium is mildly dilated.    Right Atrium: Right atrium is mildly dilated.    IVC/SVC: Normal venous pressure at 3 mmHg.    Pericardium: There is a trivial effusion. No indication of cardiac tamponade.    DARRIUS 6/23:    Summary  Show Result ComparisonThere were no arrhythmias during stress.  Normal systolic function.  The estimated ejection fraction is 52%.  The stress echo portion of this study is negative for myocardial ischemia.  The patient's exercise capacity was average.  The test was stopped because the patient experienced  fatigue and shortness of breath.  The ECG portion of this study is negative for myocardial ischemia.    Past Medical History:   Diagnosis Date    Allergy     Anemia     Arthritis     Cholelithiases     Cyst of kidney, acquired     Diverticulitis     Elevated TSH     Family history of Graves' disease: daughter, maternal aunt, maternal uncle 09/13/2016    Glaucoma suspect     Graves disease     Hx of colonic polyps     Hypertension 1981    Liver cyst     MGUS (monoclonal gammopathy of unknown significance)     Migraine headache     Mitral valve problem     leakage    Obesity     Paraproteinemia     Sleep apnea     + CPAP    Smoldering multiple myeloma 2013    Tricuspid valve disease     leakage       Past Surgical History:   Procedure Laterality Date    APPENDECTOMY      ARTHROPLASTY, KNEE, TOTAL, USING COMPUTER-ASSISTED NAVIGATION Left 12/23/2024    Procedure: ARTHROPLASTY, KNEE, TOTAL, USING VELYS COMPUTER-ASSISTED NAVIGATION: LEFT: DEPUY - ATTUNE;  Surgeon: Mack Torre III, MD;  Location: [facility];  Service: Orthopedics;  Laterality: Left;    COLONOSCOPY N/A 10/23/2015    Procedure: COLONOSCOPY;  Surgeon: Brandon Ruelas MD;  Location: [facility] (4TH FLR);  Service: Endoscopy;  Laterality: N/A;  Had divertiulitis in 5/29/2015 with a recommendation for colonoscopy in 8 weeks    Dr. Ruelas is her GI physician    COLONOSCOPY N/A 11/05/2018    Procedure: COLONOSCOPY;  Surgeon: Brandon Ruelas MD;  Location: [facility] (4TH FLR);  Service: Endoscopy;  Laterality: N/A;  Dr. Ruelas did the last one multiple polyps, patient had recent diverticulitis should not schedule before Oct 10th    COLONOSCOPY N/A 6/19/2023    Procedure: COLONOSCOPY;  Surgeon: Lu Lewis MD;  Location: [facility] (4TH FLR);  Service: Endoscopy;  Laterality: N/A;  pt offered earlier dates but stated she is out town and will be returning the evening of 6/4  cardiac clearance recieved-see tele encounter 5/22/23 5/22 referred by   Paruch/PEG/instr. mailed and to portal-st  6/13 pre-call no answer; MB    COLONOSCOPY N/A 7/23/2025    Procedure: COLONOSCOPY;  Surgeon: Brandon Ruelas MD;  Location: [facility] (2ND FLR);  Service: Endoscopy;  Laterality: N/A;  Pt only wants Dr. Ruelas / vaughn first available with Dr. Ruelas / food previously in stomach / full /cl liquid diet with extended PEG prep / inst portal - LW  6/11 EGD removed per Dr. Hsu see tele enc 6/11 - LW  7/16 precall complete. instructions reviewed thoroughly.    CYSTOSCOPY      ENDOSCOPIC ULTRASOUND OF UPPER GASTROINTESTINAL TRACT N/A 6/4/2025    Procedure: ULTRASOUND, UPPER GI TRACT, ENDOSCOPIC;  Surgeon: Yessica Waters MD;  Location: [facility] (2ND FLR);  Service: Endoscopy;  Laterality: N/A;    ESOPHAGOGASTRODUODENOSCOPY N/A 5/19/2025    Procedure: EGD (ESOPHAGOGASTRODUODENOSCOPY);  Surgeon: Brandon Ruelas MD;  Location: [facility] (4TH FLR);  Service: Endoscopy;  Laterality: N/A;  jmportal/anastasia larned    ESOPHAGOGASTRODUODENOSCOPY N/A 6/4/2025    Procedure: EGD (ESOPHAGOGASTRODUODENOSCOPY);  Surgeon: Yessica Waters MD;  Location: [facility] (2ND FLR);  Service: Endoscopy;  Laterality: N/A;  6/2 portal-please try to arrange for an expedited EGD/EUS next week. No need for colonoscopy yet. She should be advised to take a mechanical soft diet 2 days prior to procedure and a liquid diet only the day prior to procedure. [id number]    HYSTERECTOMY  1978 or 1979    menorrhagia    REPAIR OF HOLE IN MACULA Right 3/10/2025    Procedure: REPAIR, HOLE, MACULA;  Surgeon: Victoriano Diallo MD;  Location: [facility] OR 1ST FLR;  Service: Ophthalmology;  Laterality: Right;    REPAIR OF HOLE IN MACULA Right 6/9/2025    Procedure: REPAIR, HOLE, MACULA;  Surgeon: Victoriano Diallo MD;  Location: [facility] OR 1ST FLR;  Service: Ophthalmology;  Laterality: Right;       Social History     Socioeconomic History    Marital status:     Number of children: 4   Occupational History    Occupation: RETIRED    Tobacco Use    Smoking status: Former     Current packs/day: 0.00     Types: Cigarettes     Start date: 1992     Quit date: 1995     Years since quittin.5     Passive exposure: Never    Smokeless tobacco: Never   Vaping Use    Vaping status: Never Used   Substance and Sexual Activity    Alcohol use: No    Drug use: No    Sexual activity: Not Currently     Partners: Male     Birth control/protection: Post-menopausal   Social History Narrative    Lives with sister.         [place]      Social Drivers of Health     Financial Resource Strain: Low Risk  (2025)    Overall Financial Resource Strain (CARDIA)     Difficulty of Paying Living Expenses: Not hard at all   Food Insecurity: No Food Insecurity (2025)    Hunger Vital Sign     Worried About Running Out of Food in the Last Year: Never true     Ran Out of Food in the Last Year: Never true   Transportation Needs: No Transportation Needs (2025)    PRAPARE - Transportation     Lack of Transportation (Medical): No     Lack of Transportation (Non-Medical): No   Physical Activity: Unknown (2025)    Exercise Vital Sign     Days of Exercise per Week: 2 days   Stress: Stress Concern Present (2025)    [employer] - Occupational Stress Questionnaire     Feeling of Stress : Rather much   Housing Stability: Low Risk  (2025)    Housing Stability Vital Sign     Unable to Pay for Housing in the Last Year: No     Homeless in the Last Year: No       Family History   Problem Relation Name Age of Onset    Heart disease Mother age 43         MI    Heart attack Mother age 43     Hypertension Father      Irregular heart beat Sister          fast heart rate    Cataracts Sister      Glaucoma Sister      No Known Problems Sister      Heart disease Maternal Aunt          MI    Heart disease Maternal Aunt          MI    Colon cancer Maternal Uncle      Cancer Maternal Uncle          colon  ca    Breast cancer Paternal Grandmother      Cancer Paternal Grandmother          GYN cancer - unknown cancer    Graves' disease Daughter Ara     No Known Problems Daughter Edith     No Known Problems Son Jus     No Known Problems Son Denoid     Melanoma Neg Hx      Ovarian cancer Neg Hx      Colon polyps Neg Hx      Rectal cancer Neg Hx      Stomach cancer Neg Hx      Esophageal cancer Neg Hx      Ulcerative colitis Neg Hx      Crohn's disease Neg Hx      Amblyopia Neg Hx      Blindness Neg Hx      Macular degeneration Neg Hx      Strabismus Neg Hx      Retinal detachment Neg Hx         Patient's Medications   New Prescriptions    No medications on file   Previous Medications    ACETAMINOPHEN (TYLENOL) 650 MG TBSR    Take 1 tablet (650 mg total) by mouth every 8 (eight) hours.    ASCORBIC ACID, VITAMIN C, (VITAMIN C) 500 MG TABLET    Take 500 mg by mouth once daily.    CHOLECALCIFEROL, VITAMIN D3, (VITAMIN D3) 50 MCG (2,000 UNIT) CAP CAPSULE    Take 1 capsule (2,000 Units total) by mouth once daily.    DORZOLAMIDE (TRUSOPT) 2 % OPHTHALMIC SOLUTION    Place 1 drop into the right eye 3 (three) times daily.    ESZOPICLONE (LUNESTA) 2 MG TAB    TAKE 1 TABLET BY MOUTH EVERY EVENING    FLUCONAZOLE (DIFLUCAN) 150 MG TAB    Take 150 mg by mouth once.    GAVILYTE-C 240-22.72-6.72 -5.84 GRAM SOLR        KETOROLAC 0.5% (ACULAR) 0.5 % DROP    Place 1 drop into the right eye 4 (four) times daily.    LORATADINE (CLARITIN) 10 MG TABLET    Take 1 tablet (10 mg total) by mouth once daily.    MAGNESIUM OXIDE (MAG-OX) 400 MG TABLET    Take 400 mg by mouth once daily.    METHOCARBAMOL (ROBAXIN) 750 MG TAB    Take 1 tablet (750 mg total) by mouth 4 (four) times daily as needed (for muscle spasms).    METOCLOPRAMIDE HCL (REGLAN) 5 MG TABLET    Take 1 tablet (5 mg total) by mouth 3 (three) times daily.    MULTIVIT WITH MIN-FOLIC ACID 0.4 MG TAB    Take 0.4 mg by mouth once daily.    OLMESARTAN (BENICAR) 5 MG TAB    Take 1 tablet (5  mg total) by mouth once daily.    PREDNISOLONE ACETATE (PRED FORTE) 1 % DRPS    Place 1 drop into the right eye 4 (four) times daily.    PSYLLIUM (METAMUCIL) POWDER    Take 1 packet by mouth Daily.    RIZATRIPTAN (MAXALT) 10 MG TABLET    TAKE 1 TABLET(10 MG) BY MOUTH EVERY 2 HOURS AS NEEDED FOR MIGRAINE. MAX 30 MG/ 24 AT BEDTIME    SENNA-DOCUSATE 8.6-50 MG (SENNA WITH DOCUSATE SODIUM) 8.6-50 MG PER TABLET    Take 1 tablet by mouth once daily.    VITAMIN D2 1,250 MCG (50,000 UNIT) CAPSULE    Take 1 capsule (50,000 Units total) by mouth every 7 days. for 12 doses   Modified Medications    No medications on file   Discontinued Medications    No medications on file       Review of Systems   Constitutional: Negative for malaise/fatigue and weight gain.   HENT:  Negative for hearing loss.    Eyes:  Negative for visual disturbance.   Cardiovascular:  Negative for chest pain, claudication, dyspnea on exertion, leg swelling, near-syncope, orthopnea, palpitations, paroxysmal nocturnal dyspnea and syncope.   Respiratory:  Negative for cough, shortness of breath, sleep disturbances due to breathing, snoring and wheezing.    Endocrine: Negative for cold intolerance, heat intolerance, polydipsia, polyphagia and polyuria.   Hematologic/Lymphatic: Negative for bleeding problem. Does not bruise/bleed easily.   Skin:  Negative for rash and suspicious lesions.   Musculoskeletal:  Negative for arthritis, falls, joint pain, muscle weakness and myalgias.   Gastrointestinal:  Negative for abdominal pain, change in bowel habit, constipation, diarrhea, heartburn, hematochezia, melena and nausea.   Genitourinary:  Negative for hematuria and nocturia.   Neurological:  Negative for excessive daytime sleepiness, dizziness, headaches, light-headedness, loss of balance and weakness.   Psychiatric/Behavioral:  Negative for depression. The patient is not nervous/anxious.    Allergic/Immunologic: Negative for environmental allergies.       /82  "(BP Location: Left arm, Patient Position: Sitting)   Pulse 73   Ht 5' 9" (1.753 m)   Wt 105.6 kg (232 lb 12.9 oz)   SpO2 99%   BMI 34.38 kg/m²     Objective:   Physical Exam  Constitutional:       Appearance: She is well-developed.      Comments:      HENT:      Head: Normocephalic and atraumatic.   Eyes:      General: No scleral icterus.     Conjunctiva/sclera: Conjunctivae normal.      Pupils: Pupils are equal, round, and reactive to light.   Neck:      Thyroid: No thyromegaly.      Vascular: No hepatojugular reflux or JVD.      Trachea: No tracheal deviation.   Cardiovascular:      Rate and Rhythm: Normal rate and regular rhythm.      Chest Wall: PMI is not displaced.      Pulses: Intact distal pulses.           Carotid pulses are 2+ on the right side and 2+ on the left side.       Radial pulses are 2+ on the right side and 2+ on the left side.        Dorsalis pedis pulses are 2+ on the right side and 2+ on the left side.        Posterior tibial pulses are 2+ on the right side and 2+ on the left side.      Heart sounds: Normal heart sounds.   Pulmonary:      Effort: Pulmonary effort is normal.      Breath sounds: Normal breath sounds.   Abdominal:      General: Bowel sounds are normal. There is no distension.      Palpations: Abdomen is soft. There is no mass.      Tenderness: There is no abdominal tenderness.   Musculoskeletal:         General: No tenderness.      Cervical back: Normal range of motion and neck supple.   Lymphadenopathy:      Cervical: No cervical adenopathy.   Skin:     General: Skin is warm and dry.      Findings: No erythema or rash.      Nails: There is no clubbing.   Neurological:      Mental Status: She is alert and oriented to person, place, and time.   Psychiatric:         Speech: Speech normal.         Behavior: Behavior normal.         Lab Results   Component Value Date     05/09/2025     02/12/2025    K 4.4 05/09/2025    K 4.6 02/12/2025     05/09/2025     " 02/12/2025    CO2 26 05/09/2025    CO2 27 02/12/2025    BUN 20 05/09/2025    CREATININE 1.2 05/09/2025    GLU 84 05/09/2025    GLU 97 02/12/2025    HGBA1C 4.3 05/06/2025    HGBA1C 4.3 04/17/2024    MG 1.9 05/02/2020    AST 18 05/06/2025    AST 14 02/12/2025    ALT 13 05/06/2025    ALT 16 02/12/2025    ALBUMIN 3.8 05/09/2025    ALBUMIN 3.7 02/12/2025    PROT 7.7 05/06/2025    PROT 7.8 02/12/2025    BILITOT 0.5 05/06/2025    BILITOT 0.6 02/12/2025    WBC 3.56 (L) 05/22/2025    HGB 11.5 (L) 05/22/2025    HGB 11.5 (L) 02/12/2025    HCT 36.8 (L) 05/22/2025    HCT 36.9 (L) 02/12/2025    MCV 97 05/22/2025     (H) 02/12/2025     05/22/2025     02/12/2025    INR 1.0 12/03/2024    TSH 2.272 05/22/2025    TSH 0.772 12/03/2024    CHOL 145 05/06/2025    CHOL 147 04/17/2024    HDL 55 05/06/2025    LDLCALC 74.2 05/06/2025    TRIG 79 05/06/2025    TRIG 71 04/17/2024    BNP 16 05/06/2025       Assessment:     1. Stage 3a chronic kidney disease    2. Smoldering multiple myeloma (SMM)    3. BECKY on CPAP    4. Aortic atherosclerosis    5. Idiopathic hypotension      Her hypotension resolved after reducing the dose of her olmesartan and may be secondary to her recent weight loss, however given her smoldering myeloma and questionable presence of amyloid on a recent gastric biopsy, I am repeating her echo with strain and checking cardiac biomarkers. She was orthostatic in the office today with a supine /72, HR 65, standing 1 min /58, HR 83, and standing 3 min /76, HR 73. We reviewed counter pressure maneuvers she can use when standing up to mitigate the drop in BP as well as the use of an abdominal binder. If hypotension persists, we discussed stopping the olmesartan.    Plan:     Manju was seen today for hypotensive episodes  and establish care.    Diagnoses and all orders for this visit:    Stage 3a chronic kidney disease    Smoldering multiple myeloma (SMM)  -     Echo; Future  -     Troponin T;  Future  -     NT-Pro Natriuretic Peptide; Future    BECKY on CPAP    Aortic atherosclerosis    Idiopathic hypotension  -     Echo; Future  -     Troponin T; Future  -     NT-Pro Natriuretic Peptide; Future        Thank you for allowing me to participate in this patient's care. Please do not hesitate to contact me with any questions or concerns.

## 2025-07-23 NOTE — ANESTHESIA PROCEDURE NOTES
Intubation    Date/Time: 7/23/2025 8:56 AM    Performed by: Iris Rodriguez CRNA  Authorized by: Kumar Dooley Jr., MD    Intubation:     Induction:  Rapid sequence induction    Mask Ventilation:  Not attempted    Attempts:  1    Attempted By:  CRNA    Method of Intubation:  Video laryngoscopy    Blade:  Park 3    Laryngeal View Grade: Grade I - full view of cords      Difficult Airway Encountered?: No      Complications:  None    Airway Device:  Oral endotracheal tube    Airway Device Size:  7.5    Style/Cuff Inflation:  Cuffed (inflated to minimal occlusive pressure)    Tube secured:  21    Secured at:  The lips    Placement Verified By:  Capnometry    Complicating Factors:  None    Findings Post-Intubation:  BS equal bilateral and atraumatic/condition of teeth unchanged  Notes:      Maintained neutral c spine

## 2025-07-23 NOTE — ANESTHESIA PREPROCEDURE EVALUATION
[id number]                                                                                                             07/23/2025  Manju Aranda is a 74 y.o., female.    Pre-operative evaluation for Procedure(s) (LRB):  COLONOSCOPY (N/A)      Encounter Diagnosis   Name Primary?    Colon adenomas        Review of patient's allergies indicates:  No Known Allergies    Facility-Administered Medications Prior to Admission   Medication Dose Route Frequency Provider Last Rate Last Admin    sodium chloride 0.9% flush 10 mL  10 mL Intravenous PRN Victoriano Diallo MD         Medications Prior to Admission   Medication Sig Dispense Refill Last Dose/Taking    acetaminophen (TYLENOL) 650 MG TbSR Take 1 tablet (650 mg total) by mouth every 8 (eight) hours. 120 tablet 0     ascorbic acid, vitamin C, (VITAMIN C) 500 MG tablet Take 500 mg by mouth once daily.       cholecalciferol, vitamin D3, (VITAMIN D3) 50 mcg (2,000 unit) Cap capsule Take 1 capsule (2,000 Units total) by mouth once daily.       dorzolamide (TRUSOPT) 2 % ophthalmic solution Place 1 drop into the right eye 3 (three) times daily. 10 mL 3     eszopiclone (LUNESTA) 2 MG Tab TAKE 1 TABLET BY MOUTH EVERY EVENING 30 tablet 1     fluconazole (DIFLUCAN) 150 MG Tab Take 150 mg by mouth once.       GAVILYTE-C 240-22.72-6.72 -5.84 gram SolR        ketorolac 0.5% (ACULAR) 0.5 % Drop Place 1 drop into the right eye 4 (four) times daily. 5 mL 3     loratadine (CLARITIN) 10 mg tablet Take 1 tablet (10 mg total) by mouth once daily. 30 tablet 2     magnesium oxide (MAG-OX) 400 mg tablet Take 400 mg by mouth once daily.       methocarbamoL (ROBAXIN) 750 MG Tab Take 1 tablet (750 mg total) by mouth 4 (four) times daily as needed (for muscle spasms). 40 tablet 0     metoclopramide HCl (REGLAN) 5 MG tablet Take 1 tablet (5 mg total) by mouth 3 (three) times daily. 90 tablet 1     multivit with min-folic acid 0.4 mg Tab Take 0.4 mg by mouth once daily.        olmesartan (BENICAR) 5 MG Tab Take 1 tablet (5 mg total) by mouth once daily. 30 tablet 11     prednisoLONE acetate (PRED FORTE) 1 % DrpS Place 1 drop into the right eye 4 (four) times daily. 5 mL 3     psyllium (METAMUCIL) powder Take 1 packet by mouth Daily.       rizatriptan (MAXALT) 10 MG tablet TAKE 1 TABLET(10 MG) BY MOUTH EVERY 2 HOURS AS NEEDED FOR MIGRAINE. MAX 30 MG/ 24 AT BEDTIME 12 tablet 11     senna-docusate 8.6-50 mg (SENNA WITH DOCUSATE SODIUM) 8.6-50 mg per tablet Take 1 tablet by mouth once daily. 30 tablet 0     VITAMIN D2 1,250 mcg (50,000 unit) capsule Take 1 capsule (50,000 Units total) by mouth every 7 days. for 12 doses 12 capsule 0             No current facility-administered medications on file prior to encounter.     Current Outpatient Medications on File Prior to Encounter   Medication Sig Dispense Refill    acetaminophen (TYLENOL) 650 MG TbSR Take 1 tablet (650 mg total) by mouth every 8 (eight) hours. 120 tablet 0    ascorbic acid, vitamin C, (VITAMIN C) 500 MG tablet Take 500 mg by mouth once daily.      cholecalciferol, vitamin D3, (VITAMIN D3) 50 mcg (2,000 unit) Cap capsule Take 1 capsule (2,000 Units total) by mouth once daily.      dorzolamide (TRUSOPT) 2 % ophthalmic solution Place 1 drop into the right eye 3 (three) times daily. 10 mL 3    ketorolac 0.5% (ACULAR) 0.5 % Drop Place 1 drop into the right eye 4 (four) times daily. 5 mL 3    loratadine (CLARITIN) 10 mg tablet Take 1 tablet (10 mg total) by mouth once daily. 30 tablet 2    magnesium oxide (MAG-OX) 400 mg tablet Take 400 mg by mouth once daily.      methocarbamoL (ROBAXIN) 750 MG Tab Take 1 tablet (750 mg total) by mouth 4 (four) times daily as needed (for muscle spasms). 40 tablet 0    multivit with min-folic acid 0.4 mg Tab Take 0.4 mg by mouth once daily.      prednisoLONE acetate (PRED FORTE) 1 % DrpS Place 1 drop into the right eye 4 (four) times daily. 5 mL 3    psyllium (METAMUCIL) powder Take 1 packet by  mouth Daily.      rizatriptan (MAXALT) 10 MG tablet TAKE 1 TABLET(10 MG) BY MOUTH EVERY 2 HOURS AS NEEDED FOR MIGRAINE. MAX 30 MG/ 24 AT BEDTIME 12 tablet 11    senna-docusate 8.6-50 mg (SENNA WITH DOCUSATE SODIUM) 8.6-50 mg per tablet Take 1 tablet by mouth once daily. 30 tablet 0       Past Medical History:  No date: Allergy  No date: Anemia  No date: Arthritis  No date: Cholelithiases  No date: Cyst of kidney, acquired  No date: Diverticulitis  No date: Elevated TSH  09/13/2016: Family history of Graves' disease: daughter, maternal   aunt, maternal uncle  No date: Glaucoma suspect  No date: Graves disease  No date: Hx of colonic polyps  1981: Hypertension  No date: Liver cyst  No date: MGUS (monoclonal gammopathy of unknown significance)  No date: Migraine headache  No date: Mitral valve problem      Comment:  leakage  No date: Obesity  No date: Paraproteinemia  No date: Sleep apnea      Comment:  + CPAP  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  No date: Tricuspid valve disease      Comment:  leakage    Past Surgical History:   Procedure Laterality Date    APPENDECTOMY      ARTHROPLASTY, KNEE, TOTAL, USING COMPUTER-ASSISTED NAVIGATION Left 12/23/2024    Procedure: ARTHROPLASTY, KNEE, TOTAL, USING [employer] COMPUTER-ASSISTED NAVIGATION: LEFT: DEPUY - ATTUNE;  Surgeon: Mack Torre III, MD;  Location: [facility];  Service: Orthopedics;  Laterality: Left;    COLONOSCOPY N/A 10/23/2015    Procedure: COLONOSCOPY;  Surgeon: Brandon Ruelas MD;  Location: [facility] ([address] [place] [address]);  Service: Endoscopy;  Laterality: N/A;  Had divertiulitis in 5/29/2015 with a recommendation for colonoscopy in 8 weeks    Dr. Ruelas is her GI physician    COLONOSCOPY N/A 11/05/2018    Procedure: COLONOSCOPY;  Surgeon: Brandon Ruelas MD;  [facility]  Service: Endoscopy;  Laterality: N/A;  Dr. Ruelas did the last one multiple polyps, patient had recent diverticulitis should not schedule before Oct 10th    COLONOSCOPY N/A  2023    Procedure: COLONOSCOPY;  Surgeon: Lu Lewis MD;  Location: [facility] ENDO (4TH FLR);  Service: Endoscopy;  Laterality: N/A;  pt offered earlier dates but stated she is out town and will be returning the evening of   cardiac clearance recieved-see tele encounter 23 referred by Dr. Lewis/LOLITA/instr. mailed and to portal-st   pre-call no answer; MB    CYSTOSCOPY      ENDOSCOPIC ULTRASOUND OF UPPER GASTROINTESTINAL TRACT N/A 2025    Procedure: ULTRASOUND, UPPER GI TRACT, ENDOSCOPIC;  Surgeon: Yessica Waters MD;  Location: [facility] ENDO (2ND FLR);  Service: Endoscopy;  Laterality: N/A;    ESOPHAGOGASTRODUODENOSCOPY N/A 2025    Procedure: EGD (ESOPHAGOGASTRODUODENOSCOPY);  Surgeon: Brandon Ruelas MD;  Location: [facility] ENDO (4TH FLR);  Service: Endoscopy;  Laterality: N/A;  jmportal/anastasia larned    ESOPHAGOGASTRODUODENOSCOPY N/A 2025    Procedure: EGD (ESOPHAGOGASTRODUODENOSCOPY);  Surgeon: Yessica Waters MD;  Location: [facility] ENDO (2ND FLR);  Service: Endoscopy;  Laterality: N/A;   portal-please try to arrange for an expedited EGD/EUS next week. No need for colonoscopy yet. She should be advised to take a mechanical soft diet 2 days prior to procedure and a liquid diet only the day prior to procedure. waters-tt    HYSTERECTOMY   or     menorrhagia    REPAIR OF HOLE IN MACULA Right 3/10/2025    Procedure: REPAIR, HOLE, MACULA;  Surgeon: Victoriano Diallo MD;  Location: [facility] OR 1ST FLR;  Service: Ophthalmology;  Laterality: Right;    REPAIR OF HOLE IN MACULA Right 2025    Procedure: REPAIR, HOLE, MACULA;  Surgeon: Victoriano Diallo MD;  [facility];  Service: Ophthalmology;  Laterality: Right;       Social History     Tobacco Use   Smoking Status Former    Current packs/day: 0.00    Types: Cigarettes    Start date: 1992    Quit date: 1995    Years since quittin.5    Passive exposure: Never   Smokeless Tobacco Never       Social History  "    Substance and Sexual Activity   Alcohol Use No       Physical Activity: Unknown (12/3/2024)    Exercise Vital Sign     Days of Exercise per Week: 3 days     Minutes of Exercise per Session: Not on file       No birth history on file.  No results for input(s): "HCT" in the last 72 hours.  No results for input(s): "PLT" in the last 72 hours.  No results for input(s): "K" in the last 72 hours.  No results for input(s): "CREATININE" in the last 72 hours.  No results for input(s): "GLU" in the last 72 hours.  No results for input(s): "PT" in the last 72 hours.  There were no vitals filed for this visit.                  '  Pre-op Assessment          Review of Systems  Anesthesia Hx:  No problems with previous Anesthesia                Hematology/Oncology:  Hematology Normal   Oncology Normal                                   Cardiovascular:     Hypertension, well controlled   Denies MI.        Denies Angina.                                    Pulmonary:    Denies COPD.  Denies Asthma.   Denies Shortness of breath.  Sleep Apnea, CPAP                Renal/:   Denies Chronic Renal Disease.                Hepatic/GI:      Denies Liver Disease.               Neurological:  Denies TIA.  Denies CVA.    Denies Seizures.                                Endocrine:  Denies Diabetes.         Obesity / BMI > 30      Physical Exam  General: Well nourished, Cooperative, Alert and Oriented    Airway:  Mallampati: II   Mouth Opening: Normal  TM Distance: Normal  Tongue: Normal  Neck ROM: Normal ROM        Anesthesia Plan  Type of Anesthesia, risks & benefits discussed:    Anesthesia Type: Gen ETT  Intra-op Monitoring Plan: Standard ASA Monitors  Post Op Pain Control Plan: IV/PO Opioids PRN  Induction:  IV  Informed Consent: Informed consent signed with the Patient and all parties understand the risks and agree with anesthesia plan.  All questions answered.   ASA Score: 3  Day of Surgery Review of History & Physical: H&P Update " referred to the surgeon/provider.    Ready For Surgery From Anesthesia Perspective.     .    Intubation     Date/Time: 6/9/2025 7:45 AM     Performed by: Cheyanne Rodriguez CRNA  Authorized by: Isela Gallardo MD    Intubation:     Induction:  Intravenous    Intubated:  Postinduction    Mask Ventilation:  Easy mask    Attempts:  1    Attempted By:  CRNA    Method of Intubation:  Video laryngoscopy    Blade:  Park 3    Laryngeal View Grade: Grade I - full view of cords      Difficult Airway Encountered?: No      Complications:  None    Airway Device:  Oral endotracheal tube    Airway Device Size:  7.5    Style/Cuff Inflation:  Cuffed (inflated to minimal occlusive pressure)    Tube secured:  21    Secured at:  The lips    Placement Verified By:  Capnometry    Complicating Factors:  None    Findings Post-Intubation:  BS equal bilateral and atraumatic/condition of teeth unchanged

## 2025-07-23 NOTE — TRANSFER OF CARE
"Anesthesia Transfer of Care Note    Patient: Manju Aranda    Procedure(s) Performed: Procedure(s) (LRB):  COLONOSCOPY (N/A)    Patient location: PACU    Anesthesia Type: general    Transport from OR: Transported from OR on room air with adequate spontaneous ventilation    Post pain: adequate analgesia    Post assessment: no apparent anesthetic complications and tolerated procedure well    Post vital signs: stable    Level of consciousness: lethargic    Nausea/Vomiting: no nausea/vomiting    Complications: none    Transfer of care protocol was followed      Last vitals: Visit Vitals  /64   Pulse 66   Temp 36.6 °C (97.9 °F) (Temporal)   Resp 16   Ht 5' 9" (1.753 m)   Wt 103 kg (227 lb)   SpO2 97%   Breastfeeding No   BMI 33.52 kg/m²     "

## 2025-07-23 NOTE — ANESTHESIA POSTPROCEDURE EVALUATION
Anesthesia Post Evaluation    Patient: Manju Aranda    Procedure(s) Performed: Procedure(s) (LRB):  COLONOSCOPY (N/A)    Final Anesthesia Type: general      Patient location during evaluation: PACU  Patient participation: Yes- Able to Participate  Level of consciousness: awake and alert and oriented  Post-procedure vital signs: reviewed and stable  Pain management: adequate  Airway patency: patent    PONV status at discharge: No PONV  Anesthetic complications: no      Cardiovascular status: stable  Respiratory status: unassisted, spontaneous ventilation and room air  Hydration status: euvolemic  Follow-up not needed.              Vitals Value Taken Time   /68 07/23/25 10:17   Temp 36.3 °C (97.3 °F) 07/23/25 09:34   Pulse 63 07/23/25 10:19   Resp 14 07/23/25 10:19   SpO2 99 % 07/23/25 10:19   Vitals shown include unfiled device data.      No case tracking events are documented in the log.      Pain/Kayleigh Score: Kayleigh Score: 9 (7/23/2025 10:11 AM)

## 2025-07-23 NOTE — PROVATION PATIENT INSTRUCTIONS
Discharge Summary/Instructions after an Endoscopic Procedure  Patient Name: Manju Aranda  Patient MRN: [id number]  Patient YOB: 1950  Wednesday, July 23, 2025  Brandon Ruelas MD  Dear patient,  As a result of recent federal legislation (The Federal Cures Act), you may   receive lab or pathology results from your procedure in your [facility]   account before your physician is able to contact you. Your physician or   their representative will relay the results to you with their   recommendations at their soonest availability.  Thank you,  RESTRICTIONS:  During your procedure today, you received medications for sedation.  These   medications may affect your judgment, balance and coordination.  Therefore,   for 24 hours, you have the following restrictions:   - DO NOT drive a car, operate machinery, make legal/financial decisions,   sign important papers or drink alcohol.    ACTIVITY:  Today: no heavy lifting, straining or running due to procedural   sedation/anesthesia.  The following day: return to full activity including work.  DIET:  Eat and drink normally unless instructed otherwise.     TREATMENT FOR COMMON SIDE EFFECTS:  - Mild abdominal pain, nausea, belching, bloating or excessive gas:  rest,   eat lightly and use a heating pad.  - Sore Throat: treat with throat lozenges and/or gargle with warm salt   water.  - Because air was used during the procedure, expelling large amounts of air   from your rectum or belching is normal.  - If a bowel prep was taken, you may not have a bowel movement for 1-3 days.    This is normal.  SYMPTOMS TO WATCH FOR AND REPORT TO YOUR PHYSICIAN:  1. Abdominal pain or bloating, other than gas cramps.  2. Chest pain.  3. Back pain.  4. Signs of infection such as: chills or fever occurring within 24 hours   after the procedure.  5. Rectal bleeding, which would show as bright red, maroon, or black stools.   (A tablespoon of blood from the rectum is not serious, especially  if   hemorrhoids are present.)  6. Vomiting.  7. Weakness or dizziness.  GO DIRECTLY TO THE NEAREST EMERGENCY ROOM IF YOU HAVE ANY OF THE FOLLOWING:      Difficulty breathing              Chills and/or fever over 101 F   Persistent vomiting and/or vomiting blood   Severe abdominal pain   Severe chest pain   Black, tarry stools   Bleeding- more than one tablespoon   Any other symptom or condition that you feel may need urgent attention  Your doctor recommends these additional instructions:  If any biopsies were taken, your doctors clinic will contact you in 1 to 2   weeks with any results.  - Discharge patient to home.   - Await pathology results.   - Telephone endoscopist for pathology results in 3 weeks.   - Repeat colonoscopy in 3 years for surveillance based on pathology results.     - Return to referring physician.   - The findings and recommendations were discussed with the patient.  For questions, problems or results please call your physician - Brandon Ruelas MD at Work:  [phone].  [facility], EMERGENCY ROOM PHONE NUMBER: [phone]  IF A COMPLICATION OR EMERGENCY SITUATION ARISES AND YOU ARE UNABLE TO REACH   YOUR PHYSICIAN - GO DIRECTLY TO THE EMERGENCY ROOM.  Brandon Ruelas MD  7/23/2025 9:32:47 AM  This report has been verified and signed electronically.  Dear patient,  As a result of recent federal legislation (The Federal Cures Act), you may   receive lab or pathology results from your procedure in your [facility]   account before your physician is able to contact you. Your physician or   their representative will relay the results to you with their   recommendations at their soonest availability.  Thank you,  PROVATION

## 2025-07-23 NOTE — H&P
[facility] - Endoscopy  History & Physical    Subjective:      Chief Complaint/Reason for Admission:     Colonoscopy for history of adenomas colon polyps  Diagnoses  Anorexia [R63.0]  Weight loss [R63.4]  History of colon polyps [Z86.0100]  Colon cancer screening [Z12.11]     Manju Aranda is a 74 y.o. female.    Past Medical History:   Diagnosis Date    Allergy     Anemia     Arthritis     Cholelithiases     Cyst of kidney, acquired     Diverticulitis     Elevated TSH     Family history of Graves' disease: daughter, maternal aunt, maternal uncle 09/13/2016    Glaucoma suspect     Graves disease     Hx of colonic polyps     Hypertension 1981    Liver cyst     MGUS (monoclonal gammopathy of unknown significance)     Migraine headache     Mitral valve problem     leakage    Obesity     Paraproteinemia     Sleep apnea     + CPAP    Smoldering multiple myeloma 2013    Tricuspid valve disease     leakage     Past Surgical History:   Procedure Laterality Date    APPENDECTOMY      ARTHROPLASTY, KNEE, TOTAL, USING COMPUTER-ASSISTED NAVIGATION Left 12/23/2024    Procedure: ARTHROPLASTY, KNEE, TOTAL, USING [employer] COMPUTER-ASSISTED NAVIGATION: LEFT: DEPUY - ATTUNE;  Surgeon: Mack Torre III, MD;  Location: [facility];  Service: Orthopedics;  Laterality: Left;    COLONOSCOPY N/A 10/23/2015    Procedure: COLONOSCOPY;  Surgeon: Brandon Ruelas MD;  Location: [facility] (4TH FLR);  Service: Endoscopy;  Laterality: N/A;  Had divertiulitis in 5/29/2015 with a recommendation for colonoscopy in 8 weeks    Dr. Ruelas is her GI physician    COLONOSCOPY N/A 11/05/2018    Procedure: COLONOSCOPY;  Surgeon: Brandon Ruelas MD;  Location: [facility] ENDO (4TH FLR);  Service: Endoscopy;  Laterality: N/A;  Dr. Ruelas did the last one multiple polyps, patient had recent diverticulitis should not schedule before Oct 10th    COLONOSCOPY N/A 6/19/2023    Procedure: COLONOSCOPY;  Surgeon: Lu Lewis MD;  Location: [facility] ([facility]);  Service: Endoscopy;  Laterality: N/A;  pt offered earlier dates but stated she is out town and will be returning the evening of 6/4  cardiac clearance recieved-see tele encounter 5/22/23 5/22 referred by Dr. Lewis/LOLITA/instr. mailed and to portal-st  6/13 pre-call no answer; MB    CYSTOSCOPY      ENDOSCOPIC ULTRASOUND OF UPPER GASTROINTESTINAL TRACT N/A 6/4/2025    Procedure: ULTRASOUND, UPPER GI TRACT, ENDOSCOPIC;  Surgeon: Yessica Waters MD;  Location: [facility] (2ND FLR);  Service: Endoscopy;  Laterality: N/A;    ESOPHAGOGASTRODUODENOSCOPY N/A 5/19/2025    Procedure: EGD (ESOPHAGOGASTRODUODENOSCOPY);  Surgeon: Brandon Ruelas MD;  Location: [facility] (4TH FLR);  Service: Endoscopy;  Laterality: N/A;  jmportal/anastasia larned    ESOPHAGOGASTRODUODENOSCOPY N/A 6/4/2025    Procedure: EGD (ESOPHAGOGASTRODUODENOSCOPY);  Surgeon: Yessica Waters MD;  Location: [facility] (2ND FLR);  Service: Endoscopy;  Laterality: N/A;  6/2 portal-please try to arrange for an expedited EGD/EUS next week. No need for colonoscopy yet. She should be advised to take a mechanical soft diet 2 days prior to procedure and a liquid diet only the day prior to procedure. [id number]    HYSTERECTOMY  1978 or 1979    menorrhagia    REPAIR OF HOLE IN MACULA Right 3/10/2025    Procedure: REPAIR, HOLE, MACULA;  Surgeon: Victoriano Diallo MD;  Location: [facility] OR 1ST FLR;  Service: Ophthalmology;  Laterality: Right;    REPAIR OF HOLE IN MACULA Right 6/9/2025    Procedure: REPAIR, HOLE, MACULA;  Surgeon: Victoriano Diallo MD;  [facility];  Service: Ophthalmology;  Laterality: Right;     Social History[1]    Facility-Administered Medications Prior to Admission   Medication    sodium chloride 0.9% flush 10 mL     PTA Medications   Medication Sig    acetaminophen (TYLENOL) 650 MG TbSR Take 1 tablet (650 mg total) by mouth every 8 (eight) hours.    ascorbic acid, vitamin C, (VITAMIN C) 500 MG tablet Take 500 mg by mouth once daily.     cholecalciferol, vitamin D3, (VITAMIN D3) 50 mcg (2,000 unit) Cap capsule Take 1 capsule (2,000 Units total) by mouth once daily.    dorzolamide (TRUSOPT) 2 % ophthalmic solution Place 1 drop into the right eye 3 (three) times daily.    eszopiclone (LUNESTA) 2 MG Tab TAKE 1 TABLET BY MOUTH EVERY EVENING    fluconazole (DIFLUCAN) 150 MG Tab Take 150 mg by mouth once.    GAVILYTE-C 240-22.72-6.72 -5.84 gram SolR     ketorolac 0.5% (ACULAR) 0.5 % Drop Place 1 drop into the right eye 4 (four) times daily.    loratadine (CLARITIN) 10 mg tablet Take 1 tablet (10 mg total) by mouth once daily.    magnesium oxide (MAG-OX) 400 mg tablet Take 400 mg by mouth once daily.    methocarbamoL (ROBAXIN) 750 MG Tab Take 1 tablet (750 mg total) by mouth 4 (four) times daily as needed (for muscle spasms).    metoclopramide HCl (REGLAN) 5 MG tablet Take 1 tablet (5 mg total) by mouth 3 (three) times daily.    multivit with min-folic acid 0.4 mg Tab Take 0.4 mg by mouth once daily.    olmesartan (BENICAR) 5 MG Tab Take 1 tablet (5 mg total) by mouth once daily.    prednisoLONE acetate (PRED FORTE) 1 % DrpS Place 1 drop into the right eye 4 (four) times daily.    psyllium (METAMUCIL) powder Take 1 packet by mouth Daily.    rizatriptan (MAXALT) 10 MG tablet TAKE 1 TABLET(10 MG) BY MOUTH EVERY 2 HOURS AS NEEDED FOR MIGRAINE. MAX 30 MG/ 24 AT BEDTIME    senna-docusate 8.6-50 mg (SENNA WITH DOCUSATE SODIUM) 8.6-50 mg per tablet Take 1 tablet by mouth once daily.    VITAMIN D2 1,250 mcg (50,000 unit) capsule Take 1 capsule (50,000 Units total) by mouth every 7 days. for 12 doses     Review of patient's allergies indicates:  No Known Allergies     Review of Systems   Constitutional:  Negative for fever.       Objective:      Vital Signs (Most Recent)       Vital Signs Range (Last 24H):       Physical Exam  Neurological:      Mental Status: She is alert and oriented to person, place, and time.             Assessment:      Colonoscopy for history  of adenomas colon polyps  Diagnoses  Anorexia [R63.0]  Weight loss [R63.4]  History of colon polyps [Z86.0100]  Colon cancer screening [Z12.11]       Plan:      Colonoscopy for history of adenomas colon polyps  Diagnoses  Anorexia [R63.0]  Weight loss [R63.4]  History of colon polyps [Z86.0100]  Colon cancer screening [Z12.11]          [1]   Social History  Tobacco Use    Smoking status: Former     Current packs/day: 0.00     Types: Cigarettes     Start date: 1992     Quit date: 1995     Years since quittin.5     Passive exposure: Never    Smokeless tobacco: Never   Substance Use Topics    Alcohol use: No    Drug use: No

## 2025-07-24 ENCOUNTER — OFFICE VISIT (OUTPATIENT)
Dept: OPHTHALMOLOGY | Facility: CLINIC | Age: 75
End: 2025-07-24
Payer: MEDICARE

## 2025-07-24 ENCOUNTER — OFFICE VISIT (OUTPATIENT)
Dept: CARDIOLOGY | Facility: CLINIC | Age: 75
End: 2025-07-24
Payer: MEDICARE

## 2025-07-24 ENCOUNTER — CLINICAL SUPPORT (OUTPATIENT)
Dept: OPHTHALMOLOGY | Facility: CLINIC | Age: 75
End: 2025-07-24
Payer: MEDICARE

## 2025-07-24 VITALS
SYSTOLIC BLOOD PRESSURE: 135 MMHG | HEIGHT: 69 IN | BODY MASS INDEX: 34.48 KG/M2 | OXYGEN SATURATION: 99 % | DIASTOLIC BLOOD PRESSURE: 82 MMHG | WEIGHT: 232.81 LBS | HEART RATE: 73 BPM

## 2025-07-24 DIAGNOSIS — D47.2 SMOLDERING MULTIPLE MYELOMA (SMM): ICD-10-CM

## 2025-07-24 DIAGNOSIS — I95.0 IDIOPATHIC HYPOTENSION: ICD-10-CM

## 2025-07-24 DIAGNOSIS — H35.341 MACULAR HOLE, RIGHT: Primary | ICD-10-CM

## 2025-07-24 DIAGNOSIS — I70.0 AORTIC ATHEROSCLEROSIS: ICD-10-CM

## 2025-07-24 DIAGNOSIS — G47.33 OSA ON CPAP: ICD-10-CM

## 2025-07-24 DIAGNOSIS — N18.31 STAGE 3A CHRONIC KIDNEY DISEASE: Primary | ICD-10-CM

## 2025-07-24 LAB
ESTROGEN SERPL-MCNC: NORMAL PG/ML
INSULIN SERPL-ACNC: NORMAL U[IU]/ML
LAB AP CLINICAL INFORMATION: NORMAL
LAB AP GROSS DESCRIPTION: NORMAL
LAB AP PERFORMING LOCATION(S): NORMAL
LAB AP REPORT FOOTNOTES: NORMAL

## 2025-07-24 PROCEDURE — 3061F NEG MICROALBUMINURIA REV: CPT | Mod: CPTII,HCNC,S$GLB, | Performed by: OPHTHALMOLOGY

## 2025-07-24 PROCEDURE — 99999 PR PBB SHADOW E&M-EST. PATIENT-LVL III: CPT | Mod: PBBFAC,HCNC,, | Performed by: OPHTHALMOLOGY

## 2025-07-24 PROCEDURE — 3066F NEPHROPATHY DOC TX: CPT | Mod: CPTII,HCNC,S$GLB, | Performed by: OPHTHALMOLOGY

## 2025-07-24 PROCEDURE — 1126F AMNT PAIN NOTED NONE PRSNT: CPT | Mod: CPTII,HCNC,S$GLB, | Performed by: OPHTHALMOLOGY

## 2025-07-24 PROCEDURE — 3288F FALL RISK ASSESSMENT DOCD: CPT | Mod: CPTII,HCNC,S$GLB, | Performed by: OPHTHALMOLOGY

## 2025-07-24 PROCEDURE — 1101F PT FALLS ASSESS-DOCD LE1/YR: CPT | Mod: CPTII,HCNC,S$GLB, | Performed by: OPHTHALMOLOGY

## 2025-07-24 PROCEDURE — 1159F MED LIST DOCD IN RCRD: CPT | Mod: CPTII,HCNC,S$GLB, | Performed by: OPHTHALMOLOGY

## 2025-07-24 PROCEDURE — 99024 POSTOP FOLLOW-UP VISIT: CPT | Mod: HCNC,S$GLB,, | Performed by: OPHTHALMOLOGY

## 2025-07-24 PROCEDURE — 99999 PR PBB SHADOW E&M-EST. PATIENT-LVL IV: CPT | Mod: PBBFAC,HCNC,, | Performed by: INTERNAL MEDICINE

## 2025-07-24 PROCEDURE — 92134 CPTRZ OPH DX IMG PST SGM RTA: CPT | Mod: HCNC,S$GLB,, | Performed by: OPHTHALMOLOGY

## 2025-07-24 PROCEDURE — 3044F HG A1C LEVEL LT 7.0%: CPT | Mod: CPTII,HCNC,S$GLB, | Performed by: OPHTHALMOLOGY

## 2025-07-24 PROCEDURE — 4010F ACE/ARB THERAPY RXD/TAKEN: CPT | Mod: CPTII,HCNC,S$GLB, | Performed by: OPHTHALMOLOGY

## 2025-07-25 ENCOUNTER — PATIENT MESSAGE (OUTPATIENT)
Dept: GASTROENTEROLOGY | Facility: CLINIC | Age: 75
End: 2025-07-25
Payer: MEDICARE

## 2025-07-28 ENCOUNTER — OFFICE VISIT (OUTPATIENT)
Dept: ORTHOPEDICS | Facility: CLINIC | Age: 75
End: 2025-07-28
Payer: MEDICARE

## 2025-07-28 ENCOUNTER — PATIENT MESSAGE (OUTPATIENT)
Dept: ORTHOPEDICS | Facility: CLINIC | Age: 75
End: 2025-07-28

## 2025-07-28 ENCOUNTER — HOSPITAL ENCOUNTER (OUTPATIENT)
Dept: RADIOLOGY | Facility: HOSPITAL | Age: 75
Discharge: HOME | End: 2025-07-28
Payer: MEDICARE

## 2025-07-28 VITALS — WEIGHT: 235.88 LBS | HEIGHT: 69 IN | BODY MASS INDEX: 34.94 KG/M2

## 2025-07-28 DIAGNOSIS — Z96.652 S/P TOTAL KNEE REPLACEMENT, LEFT: ICD-10-CM

## 2025-07-28 DIAGNOSIS — Z96.652 S/P TOTAL KNEE REPLACEMENT, LEFT: Primary | ICD-10-CM

## 2025-07-28 DIAGNOSIS — M17.11 PRIMARY OSTEOARTHRITIS OF RIGHT KNEE: ICD-10-CM

## 2025-07-28 PROCEDURE — 73560 X-RAY EXAM OF KNEE 1 OR 2: CPT | Mod: 26,HCNC,RT, | Performed by: RADIOLOGY

## 2025-07-28 PROCEDURE — 1125F AMNT PAIN NOTED PAIN PRSNT: CPT | Mod: CPTII,HCNC,S$GLB,

## 2025-07-28 PROCEDURE — 3066F NEPHROPATHY DOC TX: CPT | Mod: CPTII,HCNC,S$GLB,

## 2025-07-28 PROCEDURE — 3061F NEG MICROALBUMINURIA REV: CPT | Mod: CPTII,HCNC,S$GLB,

## 2025-07-28 PROCEDURE — 4010F ACE/ARB THERAPY RXD/TAKEN: CPT | Mod: CPTII,HCNC,S$GLB,

## 2025-07-28 PROCEDURE — 73562 X-RAY EXAM OF KNEE 3: CPT | Mod: 26,HCNC,LT, | Performed by: RADIOLOGY

## 2025-07-28 PROCEDURE — 73560 X-RAY EXAM OF KNEE 1 OR 2: CPT | Mod: TC,HCNC,RT

## 2025-07-28 PROCEDURE — 1160F RVW MEDS BY RX/DR IN RCRD: CPT | Mod: CPTII,HCNC,S$GLB,

## 2025-07-28 PROCEDURE — 99999 PR PBB SHADOW E&M-EST. PATIENT-LVL III: CPT | Mod: PBBFAC,HCNC,,

## 2025-07-28 PROCEDURE — 99214 OFFICE O/P EST MOD 30 MIN: CPT | Mod: HCNC,S$GLB,,

## 2025-07-28 PROCEDURE — 3044F HG A1C LEVEL LT 7.0%: CPT | Mod: CPTII,HCNC,S$GLB,

## 2025-07-28 PROCEDURE — 3008F BODY MASS INDEX DOCD: CPT | Mod: CPTII,HCNC,S$GLB,

## 2025-07-28 PROCEDURE — 1159F MED LIST DOCD IN RCRD: CPT | Mod: CPTII,HCNC,S$GLB,

## 2025-07-28 NOTE — PROGRESS NOTES
SUBJECTIVE:     History of Present Illness    CHIEF COMPLAINT:  - Bilateral knee pain, primarily left knee, following a fall.    HPI:  Ms. Aranda presents for evaluation of bilateral knee pain, with the left knee being more symptomatic following a recent fall. She fell backwards on Thursday, four days prior to this visit, while ascending twelve flights of stairs. She fell back approximately four steps when she became dizzy. She attempted to avoid falling on her left knee, which had previous surgery, but lost her  on the railing.    Her left knee pain is primarily on the front, which she describes as more severe than the right.  Patient has a history of left total knee replacement by Dr. Torre on 12/23/2024.  Pain is characterized as aching and throbbing. She is able to walk but uses a cane for support. She notes increased stiffness with prolonged sitting and occasional popping sounds. She has temporarily stopped her usual exercise routine, including using a stationary bike and walking around the block, due to the pain. She has been taking Tylenol for pain relief, which she reports helps.    Regarding the right knee, she received a Synvisc-One gel injection on February 18th, which provided relief for about 3-4 months. She reports both knees are experiencing bone-on-bone friction and mentions that Dr. Torre has suggested surgery for the right knee.    She also reports a history of gastroparesis, resulting in a 20-pound weight loss. She mentions difficulties with blood pressure regulation, stating that her PCP is having trouble regulating her high blood pressure medication, and describes episodes of dizziness and blurred vision when her blood pressure drops.  Patient attributes this to being the reason for her recent falls.    She denies any weakness, numbness, tingling, instability, or giving out of either knee.    PREVIOUS TREATMENTS:  - Synvisc-One gel injection in the right knee on February 18th, 2025, which  provided relief for 3-4 months    MEDICATIONS:  - Tylenol: as needed for pain, helps    SURGICAL HISTORY:  - Left total knee replacement: 12/23/2024 by Dr. Torre           Past Medical History:   Diagnosis Date    Allergy     Anemia     Arthritis     Cholelithiases     Cyst of kidney, acquired     Diverticulitis     Elevated TSH     Family history of Graves' disease: daughter, maternal aunt, maternal uncle 09/13/2016    Glaucoma suspect     Graves disease     Hx of colonic polyps     Hypertension 1981    Liver cyst     MGUS (monoclonal gammopathy of unknown significance)     Migraine headache     Mitral valve problem     leakage    Obesity     Paraproteinemia     Sleep apnea     + CPAP    Smoldering multiple myeloma 2013    Tricuspid valve disease     leakage       Past Surgical History:   Procedure Laterality Date    APPENDECTOMY      ARTHROPLASTY, KNEE, TOTAL, USING COMPUTER-ASSISTED NAVIGATION Left 12/23/2024    Procedure: ARTHROPLASTY, KNEE, TOTAL, USING [email or web address] COMPUTER-ASSISTED NAVIGATION: LEFT: DEPUY - ATTUNE;  Surgeon: Mack Torre III, MD;  Location: [facility];  Service: Orthopedics;  Laterality: Left;    COLONOSCOPY N/A 10/23/2015    Procedure: COLONOSCOPY;  Surgeon: Brandon Ruelas MD;  Location: [address]);  Service: Endoscopy;  Laterality: N/A;  Had divertiulitis in 5/29/2015 with a recommendation for colonoscopy in 8 weeks    Dr. Ruelas is her GI physician    COLONOSCOPY N/A 11/05/2018    Procedure: COLONOSCOPY;  Surgeon: Brandon Ruelas MD;  Location: [facility] ([address] [place] [address]);  Service: Endoscopy;  Laterality: N/A;  Dr. Ruelas did the last one multiple polyps, patient had recent diverticulitis should not schedule before Oct 10th    COLONOSCOPY N/A 6/19/2023    Procedure: COLONOSCOPY;  Surgeon: Lu Lewis MD;  Location: [facility] ([address] [place] [address]);  Service: Endoscopy;  Laterality: N/A;  pt offered earlier dates but stated she is out town and will be returning the evening of 6/4  cardiac  clearance recieved-see tele encounter 5/22/23 5/22 referred by Dr. Lewis/PEG/instr. mailed and to portal-st  6/13 pre-call no answer; MB    COLONOSCOPY N/A 7/23/2025    Procedure: COLONOSCOPY;  Surgeon: Brandon Ruelas MD;  Location: [facility] ENDO (2ND FLR);  Service: Endoscopy;  Laterality: N/A;  Pt only wants Dr. Ruelas / vaughn first available with Dr. Ruelas / food previously in stomach / full /cl liquid diet with extended PEG prep / inst portal - LW  6/11 EGD removed per Dr. Hsu see tele enc 6/11 - LW  7/16 precall complete. instructions reviewed thoroughly.    CYSTOSCOPY      ENDOSCOPIC ULTRASOUND OF UPPER GASTROINTESTINAL TRACT N/A 6/4/2025    Procedure: ULTRASOUND, UPPER GI TRACT, ENDOSCOPIC;  Surgeon: Yessica Wu MD;  Location: [facility] (2ND FLR);  Service: Endoscopy;  Laterality: N/A;    ESOPHAGOGASTRODUODENOSCOPY N/A 5/19/2025    Procedure: EGD (ESOPHAGOGASTRODUODENOSCOPY);  Surgeon: Brandon Ruelas MD;  Location: [facility] (4TH FLR);  Service: Endoscopy;  Laterality: N/A;  jmportal/anastasia larned    ESOPHAGOGASTRODUODENOSCOPY N/A 6/4/2025    Procedure: EGD (ESOPHAGOGASTRODUODENOSCOPY);  Surgeon: Yessica Wu MD;  Location: [facility] (2ND FLR);  Service: Endoscopy;  Laterality: N/A;  6/2 portal-please try to arrange for an expedited EGD/EUS next week. No need for colonoscopy yet. She should be advised to take a mechanical soft diet 2 days prior to procedure and a liquid diet only the day prior to procedure. [id number]    HYSTERECTOMY  1978 or 1979    menorrhagia    REPAIR OF HOLE IN MACULA Right 3/10/2025    Procedure: REPAIR, HOLE, MACULA;  Surgeon: Victoriano Diallo MD;  Location: [facility] OR 1ST FLR;  Service: Ophthalmology;  Laterality: Right;    REPAIR OF HOLE IN MACULA Right 6/9/2025    Procedure: REPAIR, HOLE, MACULA;  Surgeon: Victoriano Diallo MD;  Location: [facility] OR 1ST FLR;  Service: Ophthalmology;  Laterality: Right;       Family History   Problem Relation Name Age of Onset    Heart disease  "Mother age 43         MI    Heart attack Mother age 43     Hypertension Father      Irregular heart beat Sister          fast heart rate    Cataracts Sister      Glaucoma Sister      No Known Problems Sister      Heart disease Maternal Aunt          MI    Heart disease Maternal Aunt          MI    Colon cancer Maternal Uncle      Cancer Maternal Uncle          colon ca    Breast cancer Paternal Grandmother      Cancer Paternal Grandmother          GYN cancer - unknown cancer    Graves' disease Daughter Ara     No Known Problems Daughter Edith     No Known Problems Son Jus     No Known Problems Son Denoid     Melanoma Neg Hx      Ovarian cancer Neg Hx      Colon polyps Neg Hx      Rectal cancer Neg Hx      Stomach cancer Neg Hx      Esophageal cancer Neg Hx      Ulcerative colitis Neg Hx      Crohn's disease Neg Hx      Amblyopia Neg Hx      Blindness Neg Hx      Macular degeneration Neg Hx      Strabismus Neg Hx      Retinal detachment Neg Hx         Review of patient's allergies indicates:  No Known Allergies      Current Medications[1]      Review of Systems:  ROS:  The updated medical history is in the chart and has been reviewed. A review of systems is updated and there is no reported vision changes, ear/nose/mouth/throat complaints, chest pain, shortness of breath, abdominal pain, urological complaints, fevers or chills, psychiatric complaints. Musculoskeletal and neurologcial symptoms are as documented. All other systems are negative.      OBJECTIVE:     PHYSICAL EXAM:  Ht 5' 9" (1.753 m)   Wt 107 kg (235 lb 14.3 oz)   BMI 34.84 kg/m²   General: Pleasant, cooperative, NAD.  HEENT: NCAT, sclera nonicteric.  Lungs: Respirations are equal and unlabored.   Abdomen: Soft and non-tender.  CV: 2+ bilateral upper and lower extremity pulses.  Neuro: Sensation intact to light touch.  Skin: Intact throughout LE with no rashes, erythema, or lesions.  Extremities: No LE edema, NVI lower extremities.  Mildly antalgic " gait.    left Knee Exam:  Knee Range of Motion:  5-110   Effusion:  Mild  Condition of skin: intact  Location of tenderness:  Diffuse tenderness   Strength: 5/5 quadriceps strength, 5/5 gastroc-soleus strength, 5/5 hamstring strength, and 5/5 tibialis anterior strength  Stability: stable to testing  Knee Alignment: normal    right Knee Exam:  Knee Range of Motion:  0-110   Effusion: none  Condition of skin: intact  Location of tenderness:  Mild lateral joint line   Strength: 5/5 quadriceps strength, 5/5 gastroc-soleus strength, 5/5 hamstring strength, and 5/5 tibialis anterior strength  Knee alignment: normal  Stability: stable to testing    RADIOGRAPHS:  X-rays of the left knee taken today personally reviewed. Imaging reveals well-aligned and positioned prosthesis with no acute fractures, dislocations, or subsidence of the left total knee replacement.  Right knee reveals moderate tricompartmental joint space narrowing worse in the lateral tibiofemoral joint space with osteophyte formation correlating with Kellgren Johann grade 3.      ASSESSMENT:       ICD-10-CM ICD-9-CM   1. S/P total knee replacement, left  Z96.652 V43.65   2. Primary osteoarthritis of right knee  M17.11 715.16       PLAN:     We discussed with the patient at length all the different treatment options available including anti-inflammatories, acetaminophen, rest, ice, knee strengthening exercise, occasional cortisone injections for temporary relief, Viscosupplimentation injections, and surgical intervention.       MEDICATIONS:  - Take Tylenol arthritis as needed for pain.    PROCEDURES:  - Synvisc-One gel injection for the right knee planned and scheduled for August 18th.    FOLLOW UP:  - Will call patient with X-ray results.    PATIENT INSTRUCTIONS:  - Rest left knee, avoiding excessive activity.  - Apply ice to left knee.          This note was generated with the assistance of ambient listening technology. Verbal consent was obtained by the  patient and accompanying visitor(s) for the recording of patient appointment to facilitate this note. I attest to having reviewed and edited the generated note for accuracy, though some syntax or spelling errors may persist. Please contact the author of this note for any clarification.      Viktor Pappas Jr., PA-C           [1]   Current Outpatient Medications:     acetaminophen (TYLENOL) 650 MG TbSR, Take 1 tablet (650 mg total) by mouth every 8 (eight) hours., Disp: 120 tablet, Rfl: 0    ascorbic acid, vitamin C, (VITAMIN C) 500 MG tablet, Take 500 mg by mouth once daily., Disp: , Rfl:     cholecalciferol, vitamin D3, (VITAMIN D3) 50 mcg (2,000 unit) Cap capsule, Take 1 capsule (2,000 Units total) by mouth once daily., Disp: , Rfl:     dorzolamide (TRUSOPT) 2 % ophthalmic solution, Place 1 drop into the right eye 3 (three) times daily., Disp: 10 mL, Rfl: 3    eszopiclone (LUNESTA) 2 MG Tab, TAKE 1 TABLET BY MOUTH EVERY EVENING, Disp: 30 tablet, Rfl: 1    fluconazole (DIFLUCAN) 150 MG Tab, Take 150 mg by mouth once., Disp: , Rfl:     GAVILYTE-C 240-22.72-6.72 -5.84 gram SolR, , Disp: , Rfl:     ketorolac 0.5% (ACULAR) 0.5 % Drop, Place 1 drop into the right eye 4 (four) times daily., Disp: 5 mL, Rfl: 3    loratadine (CLARITIN) 10 mg tablet, Take 1 tablet (10 mg total) by mouth once daily., Disp: 30 tablet, Rfl: 2    magnesium oxide (MAG-OX) 400 mg tablet, Take 400 mg by mouth once daily., Disp: , Rfl:     methocarbamoL (ROBAXIN) 750 MG Tab, Take 1 tablet (750 mg total) by mouth 4 (four) times daily as needed (for muscle spasms)., Disp: 40 tablet, Rfl: 0    metoclopramide HCl (REGLAN) 5 MG tablet, Take 1 tablet (5 mg total) by mouth 3 (three) times daily., Disp: 90 tablet, Rfl: 1    multivit with min-folic acid 0.4 mg Tab, Take 0.4 mg by mouth once daily., Disp: , Rfl:     olmesartan (BENICAR) 5 MG Tab, Take 1 tablet (5 mg total) by mouth once daily., Disp: 30 tablet, Rfl: 11    prednisoLONE acetate (PRED FORTE) 1 %  DrpS, Place 1 drop into the right eye 4 (four) times daily., Disp: 5 mL, Rfl: 3    psyllium (METAMUCIL) powder, Take 1 packet by mouth Daily., Disp: , Rfl:     rizatriptan (MAXALT) 10 MG tablet, TAKE 1 TABLET(10 MG) BY MOUTH EVERY 2 HOURS AS NEEDED FOR MIGRAINE. MAX 30 MG/ 24 AT BEDTIME, Disp: 12 tablet, Rfl: 11    senna-docusate 8.6-50 mg (SENNA WITH DOCUSATE SODIUM) 8.6-50 mg per tablet, Take 1 tablet by mouth once daily., Disp: 30 tablet, Rfl: 0    VITAMIN D2 1,250 mcg (50,000 unit) capsule, Take 1 capsule (50,000 Units total) by mouth every 7 days. for 12 doses, Disp: 12 capsule, Rfl: 0    Current Facility-Administered Medications:     sodium chloride 0.9% flush 10 mL, 10 mL, Intravenous, PRN, Victoriano Diallo MD

## 2025-07-30 ENCOUNTER — OFFICE VISIT (OUTPATIENT)
Dept: SURGERY | Facility: CLINIC | Age: 75
End: 2025-07-30
Payer: MEDICARE

## 2025-07-30 VITALS
HEIGHT: 69 IN | BODY MASS INDEX: 34.94 KG/M2 | SYSTOLIC BLOOD PRESSURE: 120 MMHG | OXYGEN SATURATION: 96 % | DIASTOLIC BLOOD PRESSURE: 80 MMHG | HEART RATE: 71 BPM | WEIGHT: 235.88 LBS

## 2025-07-30 DIAGNOSIS — K31.84 GASTROPARESIS: ICD-10-CM

## 2025-07-30 DIAGNOSIS — R10.13 EPIGASTRIC ABDOMINAL PAIN: ICD-10-CM

## 2025-07-30 PROCEDURE — 99999 PR PBB SHADOW E&M-EST. PATIENT-LVL IV: CPT | Mod: PBBFAC,HCNC,, | Performed by: STUDENT IN AN ORGANIZED HEALTH CARE EDUCATION/TRAINING PROGRAM

## 2025-07-30 NOTE — PROGRESS NOTES
Patient ID: Manju Aranda is a 74 y.o. female.    Chief Complaint: No chief complaint on file.      HPI:  HPI  74f with decreased appetite, lost about 20 pounds over the past few weeks. Ws having some vage ab pain but resolved since she started reglan a few weeks ago. Denies NV      Review of Systems   Constitutional:  Negative for fever.   HENT:  Negative for trouble swallowing.    Respiratory:  Negative for shortness of breath.    Cardiovascular:  Negative for chest pain.   Gastrointestinal:  Negative for abdominal pain, blood in stool, nausea and vomiting.   Genitourinary:  Negative for dysuria.   Musculoskeletal:  Negative for gait problem.   Skin:  Negative for rash and wound.   Allergic/Immunologic: Negative for immunocompromised state.   Neurological:  Negative for weakness.   Hematological:  Does not bruise/bleed easily.   Psychiatric/Behavioral:  Negative for agitation.        Current Medications[1]    Review of patient's allergies indicates:  No Known Allergies    Past Medical History:   Diagnosis Date    Allergy     Anemia     Arthritis     Cholelithiases     Cyst of kidney, acquired     Diverticulitis     Elevated TSH     Family history of Graves' disease: daughter, maternal aunt, maternal uncle 09/13/2016    Glaucoma suspect     Graves disease     Hx of colonic polyps     Hypertension 1981    Liver cyst     MGUS (monoclonal gammopathy of unknown significance)     Migraine headache     Mitral valve problem     leakage    Obesity     Paraproteinemia     Sleep apnea     + CPAP    Smoldering multiple myeloma 2013    Tricuspid valve disease     leakage       Past Surgical History:   Procedure Laterality Date    APPENDECTOMY      ARTHROPLASTY, KNEE, TOTAL, USING COMPUTER-ASSISTED NAVIGATION Left 12/23/2024    Procedure: ARTHROPLASTY, KNEE, TOTAL, USING [employer] COMPUTER-ASSISTED NAVIGATION[facility];  Surgeon: Mack Torre III, MD;  Location: [facility];  Service: Orthopedics;  Laterality:  Left;    COLONOSCOPY N/A 10/23/2015    Procedure: COLONOSCOPY;  Surgeon: Brandon Ruelas MD;  Location: [facility] (4TH FLR);  Service: Endoscopy;  Laterality: N/A;  Had divertiulitis in 5/29/2015 with a recommendation for colonoscopy in 8 weeks    Dr. Ruelas is her GI physician    COLONOSCOPY N/A 11/05/2018    Procedure: COLONOSCOPY;  Surgeon: Brandon Ruelas MD;  Location: [facility] ENDO (4TH FLR);  Service: Endoscopy;  Laterality: N/A;  Dr. Ruelas did the last one multiple polyps, patient had recent diverticulitis should not schedule before Oct 10th    COLONOSCOPY N/A 6/19/2023    Procedure: COLONOSCOPY;  Surgeon: Lu Lewis MD;  Location: [facility] (4TH FLR);  Service: Endoscopy;  Laterality: N/A;  pt offered earlier dates but stated she is out town and will be returning the evening of 6/4  cardiac clearance recieved-see tele encounter 5/22/23 5/22 referred by Dr. Lewis/PEG/instr. mailed and to portal-st  6/13 pre-call no answer; MB    COLONOSCOPY N/A 7/23/2025    Procedure: COLONOSCOPY;  Surgeon: Brandon Ruelas MD;  Location: [facility] (2ND FLR);  Service: Endoscopy;  Laterality: N/A;  Pt only wants Dr. Ruelas / vaughn first available with Dr. Ruelas / food previously in stomach / full /cl liquid diet with extended PEG prep / inst portal - LW  6/11 EGD removed per Dr. Hsu see tele enc 6/11 - LW  7/16 precall complete. instructions reviewed thoroughly.    CYSTOSCOPY      ENDOSCOPIC ULTRASOUND OF UPPER GASTROINTESTINAL TRACT N/A 6/4/2025    Procedure: ULTRASOUND, UPPER GI TRACT, ENDOSCOPIC;  Surgeon: Yessica Wu MD;  Location: [facility] (2ND FLR);  Service: Endoscopy;  Laterality: N/A;    ESOPHAGOGASTRODUODENOSCOPY N/A 5/19/2025    Procedure: EGD (ESOPHAGOGASTRODUODENOSCOPY);  Surgeon: Brandon Ruelas MD;  Location: [facility] (4TH FLR);  Service: Endoscopy;  Laterality: N/A;  jmportal/anastasia larned    ESOPHAGOGASTRODUODENOSCOPY N/A 6/4/2025    Procedure: EGD (ESOPHAGOGASTRODUODENOSCOPY);   Surgeon: Yessica Waters MD;  Location: [facility] (2ND FLR);  Service: Endoscopy;  Laterality: N/A;  6/2 portal-please try to arrange for an expedited EGD/EUS next week. No need for colonoscopy yet. She should be advised to take a mechanical soft diet 2 days prior to procedure and a liquid diet only the day prior to procedure. [id number]    HYSTERECTOMY  1978 or 1979    menorrhagia    REPAIR OF HOLE IN MACULA Right 3/10/2025    Procedure: REPAIR, HOLE, MACULA;  Surgeon: Victoriano Diallo MD;  [facility];  Service: Ophthalmology;  Laterality: Right;    REPAIR OF HOLE IN MACULA Right 6/9/2025    Procedure: REPAIR, HOLE, MACULA;  Surgeon: Victoriano Diallo MD;  Location: [facility] OR [address] [place] [address];  Service: Ophthalmology;  Laterality: Right;       Social History[2]    There were no vitals filed for this visit.    Physical Exam  Constitutional:       General: She is not in acute distress.     Appearance: She is well-developed.   HENT:      Head: Normocephalic and atraumatic.   Eyes:      General: No scleral icterus.  Cardiovascular:      Rate and Rhythm: Normal rate.   Pulmonary:      Effort: Pulmonary effort is normal.      Breath sounds: No stridor.   Abdominal:      General: There is no distension.      Palpations: Abdomen is soft.      Tenderness: There is no abdominal tenderness.   Lymphadenopathy:      Cervical: No cervical adenopathy.   Skin:     General: Skin is warm.      Findings: No erythema.   Neurological:      Mental Status: She is alert and oriented to person, place, and time.   Psychiatric:         Behavior: Behavior normal.       BMI 34  GES 74% gastric retention  EUS last month mostly unremarkable, small amount of possible gallbladder sludge?  NT pro BNP 1700  Hga1c 4.3  MRI last year gallbladder unremarkable      Assessment & Plan:  74F with gastroparesis  Follow up GI  Doesn't appear to have any general surgical issues  Consider referral to dr friedman at [facility]?           [1]   Current Outpatient  Medications   Medication Sig Dispense Refill    acetaminophen (TYLENOL) 650 MG TbSR Take 1 tablet (650 mg total) by mouth every 8 (eight) hours. 120 tablet 0    ascorbic acid, vitamin C, (VITAMIN C) 500 MG tablet Take 500 mg by mouth once daily.      cholecalciferol, vitamin D3, (VITAMIN D3) 50 mcg (2,000 unit) Cap capsule Take 1 capsule (2,000 Units total) by mouth once daily.      dorzolamide (TRUSOPT) 2 % ophthalmic solution Place 1 drop into the right eye 3 (three) times daily. 10 mL 3    eszopiclone (LUNESTA) 2 MG Tab TAKE 1 TABLET BY MOUTH EVERY EVENING 30 tablet 1    fluconazole (DIFLUCAN) 150 MG Tab Take 150 mg by mouth once.      GAVILYTE-C 240-22.72-6.72 -5.84 gram SolR       ketorolac 0.5% (ACULAR) 0.5 % Drop Place 1 drop into the right eye 4 (four) times daily. 5 mL 3    loratadine (CLARITIN) 10 mg tablet Take 1 tablet (10 mg total) by mouth once daily. 30 tablet 2    magnesium oxide (MAG-OX) 400 mg tablet Take 400 mg by mouth once daily.      methocarbamoL (ROBAXIN) 750 MG Tab Take 1 tablet (750 mg total) by mouth 4 (four) times daily as needed (for muscle spasms). 40 tablet 0    metoclopramide HCl (REGLAN) 5 MG tablet Take 1 tablet (5 mg total) by mouth 3 (three) times daily. 90 tablet 1    multivit with min-folic acid 0.4 mg Tab Take 0.4 mg by mouth once daily.      olmesartan (BENICAR) 5 MG Tab Take 1 tablet (5 mg total) by mouth once daily. 30 tablet 11    prednisoLONE acetate (PRED FORTE) 1 % DrpS Place 1 drop into the right eye 4 (four) times daily. 5 mL 3    psyllium (METAMUCIL) powder Take 1 packet by mouth Daily.      rizatriptan (MAXALT) 10 MG tablet TAKE 1 TABLET(10 MG) BY MOUTH EVERY 2 HOURS AS NEEDED FOR MIGRAINE. MAX 30 MG/ 24 AT BEDTIME 12 tablet 11    senna-docusate 8.6-50 mg (SENNA WITH DOCUSATE SODIUM) 8.6-50 mg per tablet Take 1 tablet by mouth once daily. 30 tablet 0    VITAMIN D2 1,250 mcg (50,000 unit) capsule Take 1 capsule (50,000 Units total) by mouth every 7 days. for 12 doses  12 capsule 0     Current Facility-Administered Medications   Medication Dose Route Frequency Provider Last Rate Last Admin    sodium chloride 0.9% flush 10 mL  10 mL Intravenous PRN Victoriano Diallo MD       [2]   Social History  Socioeconomic History    Marital status:     Number of children: 4   Occupational History    Occupation: RETIRED   Tobacco Use    Smoking status: Former     Current packs/day: 0.00     Types: Cigarettes     Start date: 1992     Quit date: 1995     Years since quittin.5     Passive exposure: Never    Smokeless tobacco: Never   Vaping Use    Vaping status: Never Used   Substance and Sexual Activity    Alcohol use: No    Drug use: No    Sexual activity: Not Currently     Partners: Male     Birth control/protection: Post-menopausal   Social History Narrative    Lives with sister.         Walks most AM in [place] - 1.5miles.      Social Drivers of Health     Financial Resource Strain: Low Risk  (2025)    Overall Financial Resource Strain (CARDIA)     Difficulty of Paying Living Expenses: Not hard at all   Food Insecurity: No Food Insecurity (2025)    Hunger Vital Sign     Worried About Running Out of Food in the Last Year: Never true     Ran Out of Food in the Last Year: Never true   Transportation Needs: No Transportation Needs (2025)    PRAPARE - Transportation     Lack of Transportation (Medical): No     Lack of Transportation (Non-Medical): No   Physical Activity: Unknown (2025)    Exercise Vital Sign     Days of Exercise per Week: 2 days   Stress: Stress Concern Present (2025)    [employer] - Occupational Stress Questionnaire     Feeling of Stress : Rather much   Housing Stability: Low Risk  (2025)    Housing Stability Vital Sign     Unable to Pay for Housing in the Last Year: No     Homeless in the Last Year: No

## 2025-07-31 DIAGNOSIS — K31.84 GASTROPARESIS: ICD-10-CM

## 2025-07-31 RX ORDER — METOCLOPRAMIDE 5 MG/1
5 TABLET ORAL 3 TIMES DAILY
Qty: 90 TABLET | Refills: 1 | Status: SHIPPED | OUTPATIENT
Start: 2025-07-31 | End: 2026-07-31

## 2025-08-04 ENCOUNTER — HOSPITAL ENCOUNTER (OUTPATIENT)
Dept: CARDIOLOGY | Facility: HOSPITAL | Age: 75
Discharge: HOME | End: 2025-08-04
Attending: INTERNAL MEDICINE
Payer: MEDICARE

## 2025-08-04 VITALS
SYSTOLIC BLOOD PRESSURE: 112 MMHG | HEART RATE: 80 BPM | WEIGHT: 235.88 LBS | HEIGHT: 69 IN | DIASTOLIC BLOOD PRESSURE: 68 MMHG | BODY MASS INDEX: 34.94 KG/M2

## 2025-08-04 DIAGNOSIS — M17.11 PRIMARY OSTEOARTHRITIS OF RIGHT KNEE: Primary | ICD-10-CM

## 2025-08-04 DIAGNOSIS — I95.0 IDIOPATHIC HYPOTENSION: ICD-10-CM

## 2025-08-04 DIAGNOSIS — D47.2 SMOLDERING MULTIPLE MYELOMA (SMM): ICD-10-CM

## 2025-08-04 LAB
AORTIC SIZE INDEX (SOV): 1.6 CM/M2
AORTIC SIZE INDEX: 1.8 CM/M2
ASCENDING AORTA: 4 CM
AV AREA BY CONTINUOUS VTI: 2.7 CM2
AV INDEX (PROSTH): 0.65
AV LVOT MEAN GRADIENT: 2 MMHG
AV LVOT PEAK GRADIENT: 3 MMHG
AV MEAN GRADIENT: 5 MMHG
AV PEAK GRADIENT: 9 MMHG
AV REGURGITATION PRESSURE HALF TIME: 705 MS
AV VALVE AREA BY VELOCITY RATIO: 2.5 CM²
AV VALVE AREA: 2.7 CM2
AV VELOCITY RATIO: 0.6
BSA FOR ECHO PROCEDURE: 2.28 M2
CV ECHO LV RWT: 0.33 CM
DOP CALC AO PEAK VEL: 1.5 M/S
DOP CALC AO VTI: 34 CM
DOP CALC LVOT AREA: 4.2 CM2
DOP CALC LVOT DIAMETER: 2.3 CM
DOP CALC LVOT PEAK VEL: 0.9 M/S
DOP CALCLVOT PEAK VEL VTI: 22.1 CM
E WAVE DECELERATION TIME: 238 MS
E/A RATIO: 0.51
E/E' RATIO: 7 M/S
ECHO EF ESTIMATED: 52 %
ECHO LV POSTERIOR WALL: 0.9 CM (ref 0.6–1.1)
FRACTIONAL SHORTENING: 27.3 % (ref 28–44)
GLOBAL LONGITUIDAL STRAIN: 17 %
INTERVENTRICULAR SEPTUM: 0.8 CM (ref 0.6–1.1)
IVC DIAMETER: 1.21 CM
LA MAJOR: 7.1 CM
LA MINOR: 6.7 CM
LA WIDTH: 4.4 CM
LEFT ATRIUM SIZE: 4.5 CM
LEFT ATRIUM VOLUME INDEX MOD: 37 ML/M2
LEFT ATRIUM VOLUME INDEX: 52 ML/M2
LEFT ATRIUM VOLUME MOD: 82 ML
LEFT ATRIUM VOLUME: 116 CM3
LEFT INTERNAL DIMENSION IN SYSTOLE: 4 CM (ref 2.1–4)
LEFT VENTRICLE DIASTOLIC VOLUME INDEX: 64.86 ML/M2
LEFT VENTRICLE DIASTOLIC VOLUME: 144 ML
LEFT VENTRICLE MASS INDEX: 77.8 G/M2
LEFT VENTRICLE SYSTOLIC VOLUME INDEX: 31.1 ML/M2
LEFT VENTRICLE SYSTOLIC VOLUME: 69 ML
LEFT VENTRICULAR INTERNAL DIMENSION IN DIASTOLE: 5.5 CM (ref 3.5–6)
LEFT VENTRICULAR MASS: 172.7 G
LV LATERAL E/E' RATIO: 5.3 M/S
LV SEPTAL E/E' RATIO: 9.6 M/S
Lab: 2 CM/M
Lab: 2.3 CM/M
MV PEAK A VEL: 0.95 M/S
MV PEAK E VEL: 0.48 M/S
OHS CV CPX PATIENT HEIGHT IN: 69
OHS CV RV/LV RATIO: 0.76 CM
OHS LV EJECTION FRACTION SIMPSONS BIPLANE MOD: 55 %
PISA TR MAX VEL: 2.7 M/S
RA MAJOR: 5.67 CM
RA PRESSURE ESTIMATED: 3 MMHG
RA WIDTH: 4.97 CM
RIGHT ATRIAL AREA: 23.2 CM2
RIGHT ATRIUM END SYSTOLIC VOLUME APICAL 4 CHAMBER INDEX BSA: 34.23 ML/M2
RIGHT ATRIUM VOLUME AREA LENGTH APICAL 4 CHAMBER: 76 ML
RIGHT VENTRICLE DIASTOLIC BASEL DIMENSION: 4.2 CM
RV TB RVSP: 6 MMHG
RV TISSUE DOPPLER FREE WALL SYSTOLIC VELOCITY 1 (APICAL 4 CHAMBER VIEW): 10.55 CM/S
SINUS: 3.5 CM
STJ: 3.3 CM
TDI LATERAL: 0.09 M/S
TDI SEPTAL: 0.05 M/S
TDI: 0.07 M/S
TRICUSPID ANNULAR PLANE SYSTOLIC EXCURSION: 2.2 CM
TV PEAK GRADIENT: 30 MMHG
TV REST PULMONARY ARTERY PRESSURE: 32 MMHG
Z-SCORE OF LEFT VENTRICULAR DIMENSION IN END DIASTOLE: -3.43
Z-SCORE OF LEFT VENTRICULAR DIMENSION IN END SYSTOLE: -1.28

## 2025-08-04 PROCEDURE — 93356 MYOCRD STRAIN IMG SPCKL TRCK: CPT | Mod: HCNC,,, | Performed by: INTERNAL MEDICINE

## 2025-08-04 PROCEDURE — 93306 TTE W/DOPPLER COMPLETE: CPT | Mod: 26,HCNC,, | Performed by: INTERNAL MEDICINE

## 2025-08-04 PROCEDURE — 93356 MYOCRD STRAIN IMG SPCKL TRCK: CPT | Mod: HCNC

## 2025-08-04 NOTE — PROGRESS NOTES
Manju Aranda has primary osteoarthritis of right knee. Radiographs reveal Kellgren- Johann grade 2-3. Patient has had right knee Synvisc 1 in the past with excellent relief and would like to repeat. Will order and schedule Synvisc 1 once.

## 2025-08-05 ENCOUNTER — LAB VISIT (OUTPATIENT)
Dept: LAB | Facility: HOSPITAL | Age: 75
End: 2025-08-05
Payer: MEDICARE

## 2025-08-05 ENCOUNTER — OFFICE VISIT (OUTPATIENT)
Dept: SURGERY | Facility: CLINIC | Age: 75
End: 2025-08-05
Payer: MEDICARE

## 2025-08-05 VITALS
SYSTOLIC BLOOD PRESSURE: 128 MMHG | HEIGHT: 69 IN | HEART RATE: 79 BPM | DIASTOLIC BLOOD PRESSURE: 61 MMHG | WEIGHT: 231.06 LBS | BODY MASS INDEX: 34.22 KG/M2

## 2025-08-05 DIAGNOSIS — R74.8 ABNORMAL LEVELS OF OTHER SERUM ENZYMES: ICD-10-CM

## 2025-08-05 DIAGNOSIS — Z91.89 AT RISK FOR MALNUTRITION: Primary | ICD-10-CM

## 2025-08-05 DIAGNOSIS — E46 PROTEIN-CALORIE MALNUTRITION, UNSPECIFIED SEVERITY: ICD-10-CM

## 2025-08-05 DIAGNOSIS — Z91.89 AT RISK FOR MALNUTRITION: ICD-10-CM

## 2025-08-05 LAB
25(OH)D3+25(OH)D2 SERPL-MCNC: 37 NG/ML (ref 30–96)
ABSOLUTE EOSINOPHIL (OHS): 0.02 K/UL
ABSOLUTE MONOCYTE (OHS): 0.3 K/UL (ref 0.3–1)
ABSOLUTE NEUTROPHIL COUNT (OHS): 1.8 K/UL (ref 1.8–7.7)
ALBUMIN SERPL BCP-MCNC: 3.9 G/DL (ref 3.5–5.2)
ALP SERPL-CCNC: 94 UNIT/L (ref 40–150)
ALT SERPL W/O P-5'-P-CCNC: 12 UNIT/L (ref 0–55)
ANION GAP (OHS): 8 MMOL/L (ref 8–16)
AST SERPL-CCNC: 18 UNIT/L (ref 0–50)
BASOPHILS # BLD AUTO: 0.01 K/UL
BASOPHILS NFR BLD AUTO: 0.3 %
BILIRUB SERPL-MCNC: 0.6 MG/DL (ref 0.1–1)
BUN SERPL-MCNC: 20 MG/DL (ref 8–23)
CALCIUM SERPL-MCNC: 10.2 MG/DL (ref 8.7–10.5)
CHLORIDE SERPL-SCNC: 104 MMOL/L (ref 95–110)
CO2 SERPL-SCNC: 28 MMOL/L (ref 23–29)
CREAT SERPL-MCNC: 1.2 MG/DL (ref 0.5–1.4)
ERYTHROCYTE [DISTWIDTH] IN BLOOD BY AUTOMATED COUNT: 11.6 % (ref 11.5–14.5)
GFR SERPLBLD CREATININE-BSD FMLA CKD-EPI: 48 ML/MIN/1.73/M2
GLUCOSE SERPL-MCNC: 85 MG/DL (ref 70–110)
HCT VFR BLD AUTO: 37.7 % (ref 37–48.5)
HGB BLD-MCNC: 11.5 GM/DL (ref 12–16)
IMM GRANULOCYTES # BLD AUTO: 0.01 K/UL (ref 0–0.04)
IMM GRANULOCYTES NFR BLD AUTO: 0.3 % (ref 0–0.5)
LYMPHOCYTES # BLD AUTO: 1.25 K/UL (ref 1–4.8)
MCH RBC QN AUTO: 30.3 PG (ref 27–31)
MCHC RBC AUTO-ENTMCNC: 30.5 G/DL (ref 32–36)
MCV RBC AUTO: 100 FL (ref 82–98)
NUCLEATED RBC (/100WBC) (OHS): 0 /100 WBC
PHOSPHATE SERPL-MCNC: 2.8 MG/DL (ref 2.7–4.5)
PLATELET # BLD AUTO: 235 K/UL (ref 150–450)
PMV BLD AUTO: 10.1 FL (ref 9.2–12.9)
POTASSIUM SERPL-SCNC: 4.7 MMOL/L (ref 3.5–5.1)
PREALB SERPL-MCNC: 28 MG/DL (ref 20–43)
PROT SERPL-MCNC: 7.6 GM/DL (ref 6–8.4)
RBC # BLD AUTO: 3.79 M/UL (ref 4–5.4)
RELATIVE EOSINOPHIL (OHS): 0.6 %
RELATIVE LYMPHOCYTE (OHS): 36.9 % (ref 18–48)
RELATIVE MONOCYTE (OHS): 8.8 % (ref 4–15)
RELATIVE NEUTROPHIL (OHS): 53.1 % (ref 38–73)
SODIUM SERPL-SCNC: 140 MMOL/L (ref 136–145)
VIT B12 SERPL-MCNC: 546 PG/ML (ref 210–950)
WBC # BLD AUTO: 3.39 K/UL (ref 3.9–12.7)

## 2025-08-05 PROCEDURE — 36415 COLL VENOUS BLD VENIPUNCTURE: CPT | Mod: HCNC

## 2025-08-05 PROCEDURE — 82607 VITAMIN B-12: CPT | Mod: HCNC

## 2025-08-05 PROCEDURE — 99999 PR PBB SHADOW E&M-EST. PATIENT-LVL III: CPT | Mod: PBBFAC,HCNC,,

## 2025-08-05 PROCEDURE — 84425 ASSAY OF VITAMIN B-1: CPT | Mod: HCNC

## 2025-08-05 PROCEDURE — 84134 ASSAY OF PREALBUMIN: CPT | Mod: HCNC

## 2025-08-05 PROCEDURE — 84100 ASSAY OF PHOSPHORUS: CPT | Mod: HCNC

## 2025-08-05 PROCEDURE — 85025 COMPLETE CBC W/AUTO DIFF WBC: CPT | Mod: HCNC

## 2025-08-05 PROCEDURE — 82435 ASSAY OF BLOOD CHLORIDE: CPT | Mod: HCNC

## 2025-08-05 PROCEDURE — 82306 VITAMIN D 25 HYDROXY: CPT | Mod: HCNC

## 2025-08-05 PROCEDURE — 84207 ASSAY OF VITAMIN B-6: CPT | Mod: HCNC

## 2025-08-05 NOTE — PROGRESS NOTES
Minimally Invasive Surgery  Gastroparesis Consult      ASSESSMENT AND PLAN:     Ms. Aranda is not a good candidate for a surgical intervention for gastroparesis.       Gastroparesis   Symptoms are mild with medical treatment.  She was also having significant lower abdominal pain that the reglan has improved.    Medications: None new.  Continue current medications.   Testing ordered: None for gastroparesis.    Lifestyle and diet changes reviewed, including maintaining a care routine that includes stress management and treatment for any psychiatric diagnoses and asking for support from loved ones.    Gastroesophageal reflux disease   Symptoms are minimal without dysphagia.    Discussed possible connection between GERD and gastroparesis.  Discussed that if a reflux surgery is indicated, it should be done after gastroparesis surgery.   Medications: None new.  Continue current medications.   Testing ordered: None for GERD.     Constipation  Symptoms are moderate.  Medications: None new.  Continue current medications.    Lifestyle and diet changes reviewed for possible relief, including taking medications as prescribed, including suppositories and enemas and performing bowel massages.    Informed her that this is not part of my typical realm and that she should maintain a working relationship with her general gastroenterologist for long-term support.      Risk for malnutrition  She is likely to be malnourished.    Testing ordered: CBC, CMP, Mg, phos, TSH with reflex to free T4, A1c, vitamins B12, B1, B6, and D, and prealbumin.  Will follow up lab results and plan of care after conference with Dr. Brink.     Risk for depression and anxiety  PHQ-2 screening negative today  WHITNEY-7 screening normal today  Referral not indicated or desired            RTC with testing complete.      Ms. Aranda verbalized understanding to all information provided and voiced no further questions, comments, or concerns.        Due to  gastroparesis pathophysiology, this patient should not take GLP-1 agonists and opioids.      ______________________________________________________________________  SUBJECTIVE:     Ms. Aranda is a new patient of this clinic, referred by Dr. Beckie Marsh.  She provided the history.    Chief Complaint:  Consult (Positive GES)      HPI:  Manju Aranda is a 74 y.o. y/o female whose body mass index is 34.12 kg/m². (weight of 231 lbs).  History includes migraine, mitral valve disease, BECKY (with CPAP use, about 3-4 years), HTN, constipation, polyps, and diverticulitis, hypothyroidism (does not take synthroid or levothyroxine), obesity without intervention, hysterectomy (late 1970's), ckd st 3, smoldering multiple myeloma, and delayed gastric emptying.  She is retired.  She has lost 32 lbs since August 2024, which is 10% of her body weight.  She has visited an emergency facility 0 times in the last year.      She reports her most troubling symptom today is early satiety.  This is a new problem. The problem occurs constantly. The problem has been gradually worsening. Associated symptoms include anorexia, headaches (known migraines; approximately 1x/month or so) and vertigo. Pertinent negatives include no abdominal pain, change in bowel habit, chest pain, chills, coughing, fever, nausea, sore throat, vomiting or weakness. The symptoms are aggravated by eating. Treatments tried: reglan, which provided relief of lower abdominal pain. She woke up with severe stomach pain a few months ago and thought it was constipation.  She has consulted with her gastroenterologist, Dr. Marsh, who ordered two scopes and multiple imaging studies, noting food remaining in stomach and duodenum on EGD and GES positive at 74% retained at the 4 hour jessie.  She has lost 32 lbs in the last year without trying, which led to hypotension on her previous HTN medication regimen.  She does not have an appetite but makes herself eat.           Gastroparesis Assessments  GCSI 8/5/25:    Severity Frequency   Vomiting 0 0   Nausea 0 0   Early satiety 3 2   Bloating 0 0   Postprandial fullness 4 2   Epigastric pain 0 0   Epigastric burning 0 0   # episodes of vomiting in last 24 hours   0   Impression: Gastroparesis symptoms are mild overall with extremely severe fullness.       GERD Symptoms  - PPI   - Typical heartburn  - Regurgitation  - Dysphagia (no solids, no liquids).  If yes, see Eckardt score, below.  - Hoarseness  - Sore throat  + Cough  - Asthma  - Chest pain  - Water brash  - Globus  - Nausea  - Vomiting  Impression: GERD symptoms are minimal without dysphagia.    Diet:  She mostly eats smoothies, eggs, chicken broth made from rotisserie chicken.  She has stopped eating rice and vegetables (per dietician, no vegetables unless pureed).  [place] (water) has helped with electrolytes/room spinning.    Nutrition supplements: Ensure daily in the morning  Impression: PO intake is severely reduced.     Current Medications[1]    GP Med Review:   Currently taking: Reglan and miralax   Tried in the past: senna, metamucil   Allergies or contraindicated meds: Patient has no known allergies.   GLP-1: No  THC: No   Pain: Tylenol   reviewed 7/10/25: Oxy 5 in December 2024 and January 2025          Review of Systems   Constitutional:  Positive for appetite change and unexpected weight change (20lb loss since waking up with stomach pain). Negative for chills and fever.   HENT:  Negative for dental problem, mouth sores, sore throat, trouble swallowing and voice change.    Respiratory:  Negative for cough, choking, chest tightness and shortness of breath.    Cardiovascular:  Negative for chest pain.   Gastrointestinal:  Positive for abdominal distention and constipation. Negative for abdominal pain, blood in stool, change in bowel habit, diarrhea, nausea, vomiting and reflux.   Genitourinary:  Negative for difficulty urinating.   Integumentary:  Negative  for pallor.   Neurological:  Positive for dizziness, vertigo, light-headedness and headaches (known migraines; approximately 1x/month or so). Negative for syncope (can usually sit herself down before passing out) and weakness.   Hematological:  Bruises/bleeds easily.   Psychiatric/Behavioral:  Negative for depressed mood. The patient is nervous/anxious.          Past Medical History:  Past Medical History[2]       Past Surgical History:   Past Surgical History[3]       Social History:   Tobacco Use    Smoking status: Former     Current packs/day: 0.00     Types: Cigarettes     Start date: 1992     Quit date: 1995     Years since quittin.6     Passive exposure: Never    Smokeless tobacco: Never   Vaping Use    Vaping status: Never Used   Substance and Sexual Activity    Alcohol use: No    Drug use: No    Sexual activity: Not Currently     Partners: Male     Birth control/protection: Post-menopausal          Depression Screenin/5/2025     8:49 AM 7/10/2025     2:49 PM   PHQ2 & PHQ9 Depression Results   Over the last two weeks how often have you been bothered by little interest or pleasure in doing things 0 1   Over the last two weeks how often have you been bothered by feeling down, depressed or hopeless 0 1   PHQ-2 Total Score 0 2     Anxiety Screenin/5/2025     9:25 AM   WHITNEY-7   Was test performed? Yes   1. Feeling nervous, anxious, or on edge? Not at all   2. Not being able to stop or control worrying? Not at all   3. Worrying too much about different things? Several days   4. Trouble relaxing? Not at all   5. Being so restless that it is hard to sit still? Not at all   6. Becoming easily annoyed or irritable? Not at all   7. Feeling afraid as if something awful might happen? Not at all   8. If you checked off any problems, how difficult have these problems made it for you to do your work, take care of things at home, or get along with other people? Not difficult at all   WHITNEY-7  "Score 1   Number answered (out of first 7) 7   Interpretation Normal          OBJECTIVE:       Vitals:  Vitals:    08/05/25 0847   BP: 128/61   Pulse: 79   Weight: 104.8 kg (231 lb 0.7 oz)   Height: 5' 9" (1.753 m)   PainSc: 0-No pain         Physical Exam  Vitals reviewed.   Constitutional:       General: She is not in acute distress.     Appearance: Normal appearance. She is overweight. She is not ill-appearing.   HENT:      Mouth/Throat:      Lips: Pink. No lesions.   Eyes:      General: Vision grossly intact.   Cardiovascular:      Rate and Rhythm: Normal rate.      Comments: Sometimes heard additional beat versus dropped beat, not associated with inhalation or exhalation.   Pulmonary:      Effort: Pulmonary effort is normal. No respiratory distress.      Breath sounds: Examination of the left-middle field reveals rales. Examination of the left-lower field reveals rales. Rales present.   Abdominal:      General: Bowel sounds are normal. There is no distension or abdominal bruit. There are no signs of injury.      Palpations: Abdomen is soft. There is no hepatomegaly, splenomegaly, mass or pulsatile mass.      Tenderness: There is no abdominal tenderness. There is no guarding or rebound.   Skin:     General: Skin is warm and dry.      Coloration: Skin is not jaundiced or pale.   Neurological:      Mental Status: She is alert and oriented to person, place, and time.   Psychiatric:         Attention and Perception: Attention normal.         Mood and Affect: Mood normal.         Speech: Speech normal.         Behavior: Behavior normal.               Data:   Lab Results   Component Value Date    WBC 3.56 (L) 05/22/2025    RBC 3.78 (L) 05/22/2025    HGB 11.5 (L) 05/22/2025    HCT 36.8 (L) 05/22/2025    MCV 97 05/22/2025     05/22/2025     05/09/2025    K 4.4 05/09/2025     05/09/2025    CALCIUM 10.4 05/09/2025    MG 1.9 05/02/2020    PHOS 2.8 05/09/2025    CREATININE 1.2 05/09/2025    BUN 20 " 05/09/2025    EGFRNORACEVR 48 (L) 05/09/2025    ALBUMIN 3.8 05/09/2025    BILITOT 0.5 05/06/2025    ALKPHOS 96 05/06/2025    AST 18 05/06/2025    ALT 13 05/06/2025    LIPASE 23 05/22/2025    TSH 2.272 05/22/2025    FREET4 0.92 05/22/2025    FREET4 0.92 05/22/2025    ESTIMATEDAVG 77 05/06/2025     Above labs reviewed by Gabriela Archibald, MSN, APRN, FNP-C, remarkable for anemia, leukopenia, decreased eGFR.  Known multiple myeloma, history of iron deficiency anemia, and CKD st 3.   EGD/EUS 6/4/25.  Report and images reviewed by Dr. Cuba MD and myself.  Normal esophagus.  Normal duodenal bulb and second portion of duodenum (bx: no significant histopathologic abnormality).  Stomach normal, multiple biopsies: chronic gastritis; H pylori negative; congo red part 1 negative; congo red part 2 focal area suspicious for apple green birefringence, amyloid testing negative.  No sign of significant pathology in visualized common bile duct, entire visualized pancreas, visualized portion of left lobe of liver.  Celiac trunk endosonographically normal, no obvious lymphadenopathy.  Additional findings in report.   EGD 5/19/25.  Report reviewed by Dr. Cuba MD and myself.  Retained food in duodenum and medium amount retained in stomach, normal esophagus.  Additional findings in report.     CT chest/abd/pel with IV and PO contrast, 5/14/25.  Report and images reviewed by Dr. Cuba MD and myself.  Gallbladder unremarkable.  No wall thickening or obstruction of GI tract, pancolonic diverticulosis w/o evidence of acute diverticulitis, no free fluid or air.  Additional findings in report.  Possible dilated stomach and narrowed duodenum per my review.    CT abd/pel wo contrast 12/5/22.  Report and images reviewed by Dr. Cuba MD and myself.  Narrowed, thickened mid-transverse colon, unchanged from 12/2020, may be postinflammatory, nonobstructed narrowing, could also be focal contraction.    MRI abd w/wo contrast  8/18/24.  Report and images reviewed by Dr. Cuba MD and myself.  No evidence of bowel obstruction or inflammation of imaged portions.  Additional findings in report.   GES 5/20/25.  Report and images reviewed by Dr. Cuba MD and myself.  74% retained at 4 hours, delayed gastric emptying.     Colonoscopy 7/23/25.  Report and images reviewed by Dr. Cuba MD and myself.  4mm polyp in ascending colon (bx: tubular adenoma).  Diverticulosis in recto-sigmoid, sigmoid, descending, transverse, and ascending colon, and cecum.  Additional findings in report.   TTE 8/4/25.  Report reviewed by Dr. Cuba MD, and myself.  LV normal size, wall thickness, motion, systolic function, diastolic function, filling pressure.  EF estimated 55-60%.  RV normal size, wall thickness, systolic function.  LA/RA mildly dilated.  Mild aortic valve regurgitation.  Mild tricuspid valve regurgitation.  Normal CVP (3 mmHg).  Trivial circumferential effusion.  Additional findings in report.       Thank you for including me in the care of Ms. Aranda.  It was a pleasure speaking with her today.     Gabriela Archibald, MSN, APRN, FNP-C  General Surgery   [facility]      110 minutes of total time spent on the encounter, which includes face to face time and non-face to face time preparing to see the patient (e.g., review of tests), obtaining and/or reviewing separately obtained history, documenting clinical information in the electronic or other health record, independently interpreting results (not separately reported) and communicating results to the patient/family/caregiver, and/or care coordination (not separately reported).              [1]   acetaminophen (TYLENOL) 650 MG TbSR    ascorbic acid, vitamin C, (VITAMIN C) 500 MG tablet    cholecalciferol, vitamin D3, (VITAMIN D3) 50 mcg (2,000 unit) Cap capsule    eszopiclone (LUNESTA) 2 MG Tab    loratadine (CLARITIN) 10 mg tablet    magnesium oxide (MAG-OX) 400  mg tablet    metoclopramide HCl (REGLAN) 5 MG tablet    multivit with min-folic acid 0.4 mg Tab    olmesartan (BENICAR) 5 MG Tab    rizatriptan (MAXALT) 10 MG tablet    VITAMIN D2 1,250 mcg (50,000 unit) capsule    [START ON 8/18/2025] hylan g-f 20 (SYNVISC ONE) 48 mg/6 mL injection 48 mg  [2]   Past Medical History:  Diagnosis Date    Allergy     Anemia     Arthritis     Cholelithiases     Cyst of kidney, acquired     Diverticulitis     Elevated TSH     Family history of Graves' disease: daughter, maternal aunt, maternal uncle 09/13/2016    Glaucoma suspect     Graves disease     Hx of colonic polyps     Hypertension 1981    Liver cyst     MGUS (monoclonal gammopathy of unknown significance)     Migraine headache     Mitral valve problem     leakage    Obesity     Paraproteinemia     Sleep apnea     + CPAP    Smoldering multiple myeloma 2013    Tricuspid valve disease     leakage   [3]   Past Surgical History:  Procedure Laterality Date    APPENDECTOMY      ARTHROPLASTY, KNEE, TOTAL, USING COMPUTER-ASSISTED NAVIGATION Left 12/23/2024    Procedure: ARTHROPLASTY, KNEE, TOTAL, USING [employer] COMPUTER-ASSISTED NAVIGATION: LEFT: DEPUY - ATTUNE;  Surgeon: Mack Torre III, MD;  Location: [facility];  Service: Orthopedics;  Laterality: Left;    COLONOSCOPY N/A 10/23/2015    Procedure: COLONOSCOPY;  Surgeon: Brandon Ruelas MD;  [facility]  Service: Endoscopy;  Laterality: N/A;  Had divertiulitis in 5/29/2015 with a recommendation for colonoscopy in 8 weeks    Dr. Ruelas is her GI physician    COLONOSCOPY N/A 11/05/2018    Procedure: COLONOSCOPY;  Surgeon: Brandon Ruelas MD;  [facility]  Service: Endoscopy;  Laterality: N/A;  Dr. Ruelas did the last one multiple polyps, patient had recent diverticulitis should not schedule before Oct 10th    COLONOSCOPY N/A 6/19/2023    Procedure: COLONOSCOPY;  Surgeon: Lu Lewis MD;  [facility]  Service:  Endoscopy;  Laterality: N/A;  pt offered earlier dates but stated she is out town and will be returning the evening of 6/4  cardiac clearance recieved-see tele encounter 5/22/23 5/22 referred by Dr. Lewis/PEG/instr. mailed and to portal-st  6/13 pre-call no answer; MB    COLONOSCOPY N/A 7/23/2025    Procedure: COLONOSCOPY;  Surgeon: Brandon Ruelas MD;  Location: [facility] (2ND FLR);  Service: Endoscopy;  Laterality: N/A;  Pt only wants Dr. Ruelas / vaughn first available with Dr. Ruelas / food previously in stomach / full /cl liquid diet with extended PEG prep / inst portal - LW  6/11 EGD removed per Dr. Hsu see tele enc 6/11 - LW  7/16 precall complete. instructions reviewed thoroughly.    CYSTOSCOPY      ENDOSCOPIC ULTRASOUND OF UPPER GASTROINTESTINAL TRACT N/A 6/4/2025    Procedure: ULTRASOUND, UPPER GI TRACT, ENDOSCOPIC;  Surgeon: Yessica Waters MD;  Location: [facility] (2ND FLR);  Service: Endoscopy;  Laterality: N/A;    ESOPHAGOGASTRODUODENOSCOPY N/A 5/19/2025    Procedure: EGD (ESOPHAGOGASTRODUODENOSCOPY);  Surgeon: Brandon Ruelas MD;  Location: [facility] (4TH FLR);  Service: Endoscopy;  Laterality: N/A;  jmportal/anastasia larned    ESOPHAGOGASTRODUODENOSCOPY N/A 6/4/2025    Procedure: EGD (ESOPHAGOGASTRODUODENOSCOPY);  Surgeon: Yessica Waters MD;  Location: [facility] (2ND FLR);  Service: Endoscopy;  Laterality: N/A;  6/2 portal-please try to arrange for an expedited EGD/EUS next week. No need for colonoscopy yet. She should be advised to take a mechanical soft diet 2 days prior to procedure and a liquid diet only the day prior to procedure. [id number]    HYSTERECTOMY  1978 or 1979    menorrhagia    REPAIR OF HOLE IN MACULA Right 3/10/2025    Procedure: REPAIR, HOLE, MACULA;  Surgeon: Victoriano Diallo MD;  Location: [facility] OR 1ST FLR;  Service: Ophthalmology;  Laterality: Right;    REPAIR OF HOLE IN MACULA Right 6/9/2025    Procedure: REPAIR, HOLE, MACULA;  Surgeon: Victoriano Diallo MD;  Location: [facility] OR  1ST FLR;  Service: Ophthalmology;  Laterality: Right;

## 2025-08-06 ENCOUNTER — OFFICE VISIT (OUTPATIENT)
Dept: SURGERY | Facility: CLINIC | Age: 75
End: 2025-08-06
Attending: SPECIALIST
Payer: MEDICARE

## 2025-08-06 VITALS
OXYGEN SATURATION: 99 % | SYSTOLIC BLOOD PRESSURE: 118 MMHG | DIASTOLIC BLOOD PRESSURE: 68 MMHG | BODY MASS INDEX: 34.8 KG/M2 | HEART RATE: 74 BPM | HEIGHT: 69 IN | WEIGHT: 235 LBS

## 2025-08-06 DIAGNOSIS — E65 FAT PAD: Primary | ICD-10-CM

## 2025-08-06 DIAGNOSIS — D47.2 SMOLDERING MULTIPLE MYELOMA (SMM): ICD-10-CM

## 2025-08-06 PROCEDURE — 99999 PR PBB SHADOW E&M-EST. PATIENT-LVL V: CPT | Mod: PBBFAC,HCNC,, | Performed by: SPECIALIST

## 2025-08-06 NOTE — PROGRESS NOTES
History & Physical    Subjective     History of Present Illness:  Patient is a 74 y.o. female presents for abdominal fat pad biopsy.  Past medical history is significant for smoldering multiple myeloma, hypertension, tricuspid valve disease, mitral valve disorder, polycystic kidneys, Graves disease BECKY    Chief Complaint   Patient presents with    Biopsy       Review of patient's allergies indicates:  No Known Allergies    Current Medications[1]    Past Medical History:   Diagnosis Date    Allergy     Anemia     Arthritis     Cholelithiases     Cyst of kidney, acquired     Diverticulitis     Elevated TSH     Family history of Graves' disease: daughter, maternal aunt, maternal uncle 09/13/2016    Glaucoma suspect     Graves disease     Hx of colonic polyps     Hypertension 1981    Liver cyst     MGUS (monoclonal gammopathy of unknown significance)     Migraine headache     Mitral valve problem     leakage    Obesity     Paraproteinemia     Sleep apnea     + CPAP    Smoldering multiple myeloma 2013    Tricuspid valve disease     leakage     Past Surgical History:   Procedure Laterality Date    APPENDECTOMY      ARTHROPLASTY, KNEE, TOTAL, USING COMPUTER-ASSISTED NAVIGATION Left 12/23/2024    Procedure: ARTHROPLASTY, KNEE, TOTAL, USING VELYS COMPUTER-ASSISTED NAVIGATION: LEFT: [facility];  Surgeon: Mack Torre III, MD;  Location: [facility];  Service: Orthopedics;  Laterality: Left;    COLONOSCOPY N/A 10/23/2015    Procedure: COLONOSCOPY;  Surgeon: Brandon Ruelas MD;  Location: [facility] ([address] [place] [address]);  Service: Endoscopy;  Laterality: N/A;  Had divertiulitis in 5/29/2015 with a recommendation for colonoscopy in 8 weeks    Dr. Ruelas is her GI physician    COLONOSCOPY N/A 11/05/2018    Procedure: COLONOSCOPY;  Surgeon: Brandon Ruelas MD;  [facility]  Service: Endoscopy;  Laterality: N/A;  Dr. Ruelas did the last one multiple polyps, patient had recent diverticulitis should not schedule  before Oct 10th    COLONOSCOPY N/A 6/19/2023    Procedure: COLONOSCOPY;  Surgeon: Lu Lewis MD;  Location: [facility] (4TH FLR);  Service: Endoscopy;  Laterality: N/A;  pt offered earlier dates but stated she is out town and will be returning the evening of 6/4  cardiac clearance recieved-see tele encounter 5/22/23 5/22 referred by Dr. Lewis/PEG/instr. mailed and to portal-st  6/13 pre-call no answer; MB    COLONOSCOPY N/A 7/23/2025    Procedure: COLONOSCOPY;  Surgeon: Brandon Ruelas MD;  Location: [facility] (2ND FLR);  Service: Endoscopy;  Laterality: N/A;  Pt only wants Dr. Ruelas / vaughn first available with Dr. Ruelas / food previously in stomach / full /cl liquid diet with extended PEG prep / inst portal - LW  6/11 EGD removed per Dr. Hsu see tele enc 6/11 - LW  7/16 precall complete. instructions reviewed thoroughly.    CYSTOSCOPY      ENDOSCOPIC ULTRASOUND OF UPPER GASTROINTESTINAL TRACT N/A 6/4/2025    Procedure: ULTRASOUND, UPPER GI TRACT, ENDOSCOPIC;  Surgeon: Yessica Waters MD;  Location: [facility] (2ND FLR);  Service: Endoscopy;  Laterality: N/A;    ESOPHAGOGASTRODUODENOSCOPY N/A 5/19/2025    Procedure: EGD (ESOPHAGOGASTRODUODENOSCOPY);  Surgeon: Brandon Ruelas MD;  Location: [facility] (4TH FLR);  Service: Endoscopy;  Laterality: N/A;  jmportal/anastasia larned    ESOPHAGOGASTRODUODENOSCOPY N/A 6/4/2025    Procedure: EGD (ESOPHAGOGASTRODUODENOSCOPY);  Surgeon: Yessica Waters MD;  Location: [facility] (2ND FLR);  Service: Endoscopy;  Laterality: N/A;  6/2 portal-please try to arrange for an expedited EGD/EUS next week. No need for colonoscopy yet. She should be advised to take a mechanical soft diet 2 days prior to procedure and a liquid diet only the day prior to procedure. [id number]    HYSTERECTOMY  1978 or 1979    menorrhagia    REPAIR OF HOLE IN MACULA Right 3/10/2025    Procedure: REPAIR, HOLE, MACULA;  Surgeon: Victoriano Diallo MD;  Location: [facility] OR [address] [place] [address];  Service: Ophthalmology;   "Laterality: Right;    REPAIR OF HOLE IN MACULA Right 6/9/2025    Procedure: REPAIR, HOLE, MACULA;  Surgeon: Victoriano Diallo MD;  Location: [facility] OR [address] [place] [address];  Service: Ophthalmology;  Laterality: Right;     Family History   Problem Relation Name Age of Onset    Heart disease Mother age 43         MI    Heart attack Mother age 43     Hypertension Father      Irregular heart beat Sister          fast heart rate    Cataracts Sister      Glaucoma Sister      No Known Problems Sister      Heart disease Maternal Aunt          MI    Heart disease Maternal Aunt          MI    Colon cancer Maternal Uncle      Cancer Maternal Uncle          colon ca    Breast cancer Paternal Grandmother      Cancer Paternal Grandmother          GYN cancer - unknown cancer    Graves' disease Daughter Sankalia     No Known Problems Daughter Edith     No Known Problems Son Jus     No Known Problems Son Denoid     Melanoma Neg Hx      Ovarian cancer Neg Hx      Colon polyps Neg Hx      Rectal cancer Neg Hx      Stomach cancer Neg Hx      Esophageal cancer Neg Hx      Ulcerative colitis Neg Hx      Crohn's disease Neg Hx      Amblyopia Neg Hx      Blindness Neg Hx      Macular degeneration Neg Hx      Strabismus Neg Hx      Retinal detachment Neg Hx       Social History[2]     Review of Systems:  Review of Systems   Constitutional: Negative.  Negative for fatigue and fever.   HENT: Negative.  Negative for sore throat and trouble swallowing.    Eyes: Negative.    Respiratory: Negative.     Cardiovascular: Negative.  Negative for chest pain.   Gastrointestinal: Negative.    Genitourinary: Negative.    Musculoskeletal: Negative.    Skin: Negative.    Neurological: Negative.    Psychiatric/Behavioral: Negative.            Objective     Vital Signs (Most Recent)  Pulse: 74 (08/06/25 1259)  BP: 118/68 (08/06/25 1259)  SpO2: 99 % (08/06/25 1259)  5' 9" (1.753 m)  106.6 kg (235 lb)     Physical Exam:  Physical Exam  Constitutional:       General: She is not " in acute distress.     Appearance: She is well-developed. She is obese.   HENT:      Head: Normocephalic and atraumatic.      Right Ear: External ear normal.      Left Ear: External ear normal.   Eyes:      General: No scleral icterus.     Extraocular Movements: Extraocular movements intact.      Pupils: Pupils are equal, round, and reactive to light.   Cardiovascular:      Rate and Rhythm: Normal rate and regular rhythm.      Heart sounds: Normal heart sounds.   Pulmonary:      Breath sounds: Normal breath sounds.   Abdominal:      General: Bowel sounds are normal.      Palpations: Abdomen is soft.   Musculoskeletal:         General: Normal range of motion.      Cervical back: Neck supple.   Skin:     General: Skin is warm.   Neurological:      Mental Status: She is alert and oriented to person, place, and time.              Assessment and Plan   Patient for abdominal fat pad biopsy.  Consents reviewed in detail with patient and signed              [1]   Current Outpatient Medications   Medication Sig Dispense Refill    acetaminophen (TYLENOL) 650 MG TbSR Take 1 tablet (650 mg total) by mouth every 8 (eight) hours. 120 tablet 0    ascorbic acid, vitamin C, (VITAMIN C) 500 MG tablet Take 500 mg by mouth once daily.      cholecalciferol, vitamin D3, (VITAMIN D3) 50 mcg (2,000 unit) Cap capsule Take 1 capsule (2,000 Units total) by mouth once daily.      eszopiclone (LUNESTA) 2 MG Tab TAKE 1 TABLET BY MOUTH EVERY EVENING 30 tablet 1    loratadine (CLARITIN) 10 mg tablet Take 1 tablet (10 mg total) by mouth once daily. 30 tablet 2    magnesium oxide (MAG-OX) 400 mg tablet Take 400 mg by mouth once daily.      metoclopramide HCl (REGLAN) 5 MG tablet Take 1 tablet (5 mg total) by mouth 3 (three) times daily. 90 tablet 1    multivit with min-folic acid 0.4 mg Tab Take 0.4 mg by mouth once daily.      olmesartan (BENICAR) 5 MG Tab Take 1 tablet (5 mg total) by mouth once daily. 30 tablet 11    rizatriptan (MAXALT) 10 MG  tablet TAKE 1 TABLET(10 MG) BY MOUTH EVERY 2 HOURS AS NEEDED FOR MIGRAINE. MAX 30 MG/ 24 AT BEDTIME 12 tablet 11    VITAMIN D2 1,250 mcg (50,000 unit) capsule Take 1 capsule (50,000 Units total) by mouth every 7 days. for 12 doses 12 capsule 0     Current Facility-Administered Medications   Medication Dose Route Frequency Provider Last Rate Last Admin    [START ON 2025] hylan g-f 20 (SYNVISC ONE) 48 mg/6 mL injection 48 mg  48 mg Intra-articular 1 time in Clinic/HOD Viktor Pappas PA-C       [2]   Social History  Tobacco Use    Smoking status: Former     Current packs/day: 0.00     Types: Cigarettes     Start date: 1992     Quit date: 1995     Years since quittin.6     Passive exposure: Never    Smokeless tobacco: Never   Vaping Use    Vaping status: Never Used   Substance Use Topics    Alcohol use: No    Drug use: No

## 2025-08-08 ENCOUNTER — RESULTS FOLLOW-UP (OUTPATIENT)
Dept: SURGERY | Facility: CLINIC | Age: 75
End: 2025-08-08
Payer: MEDICARE

## 2025-08-11 LAB
W VITAMIN B1: 53 UG/L
W VITAMIN B6: 8 UG/L

## 2025-08-12 ENCOUNTER — OFFICE VISIT (OUTPATIENT)
Dept: OBSTETRICS AND GYNECOLOGY | Facility: CLINIC | Age: 75
End: 2025-08-12
Payer: MEDICARE

## 2025-08-12 VITALS
DIASTOLIC BLOOD PRESSURE: 78 MMHG | SYSTOLIC BLOOD PRESSURE: 122 MMHG | HEIGHT: 69 IN | WEIGHT: 229.94 LBS | BODY MASS INDEX: 34.06 KG/M2

## 2025-08-12 DIAGNOSIS — Z01.419 ENCOUNTER FOR BREAST AND PELVIC EXAMINATION: Primary | ICD-10-CM

## 2025-08-12 PROCEDURE — 99999 PR PBB SHADOW E&M-EST. PATIENT-LVL III: CPT | Mod: PBBFAC,HCNC,, | Performed by: STUDENT IN AN ORGANIZED HEALTH CARE EDUCATION/TRAINING PROGRAM

## 2025-08-14 ENCOUNTER — ANESTHESIA EVENT (OUTPATIENT)
Dept: SURGERY | Facility: OTHER | Age: 75
End: 2025-08-14
Payer: MEDICARE

## 2025-08-15 ENCOUNTER — ANESTHESIA (OUTPATIENT)
Dept: SURGERY | Facility: OTHER | Age: 75
End: 2025-08-15
Payer: MEDICARE

## 2025-08-15 ENCOUNTER — TELEPHONE (OUTPATIENT)
Dept: SURGERY | Facility: CLINIC | Age: 75
End: 2025-08-15
Payer: MEDICARE

## 2025-08-15 PROCEDURE — 63600175 PHARM REV CODE 636 W HCPCS: Mod: HCNC | Performed by: NURSE ANESTHETIST, CERTIFIED REGISTERED

## 2025-08-15 PROCEDURE — 63600175 PHARM REV CODE 636 W HCPCS: Mod: HCNC | Performed by: SPECIALIST

## 2025-08-15 PROCEDURE — 25000003 PHARM REV CODE 250: Mod: HCNC | Performed by: NURSE ANESTHETIST, CERTIFIED REGISTERED

## 2025-08-15 RX ORDER — DEXMEDETOMIDINE HYDROCHLORIDE 100 UG/ML
INJECTION, SOLUTION INTRAVENOUS
Status: DISCONTINUED | OUTPATIENT
Start: 2025-08-15 | End: 2025-08-15

## 2025-08-15 RX ORDER — PROPOFOL 10 MG/ML
VIAL (ML) INTRAVENOUS
Status: DISCONTINUED | OUTPATIENT
Start: 2025-08-15 | End: 2025-08-15

## 2025-08-15 RX ORDER — ONDANSETRON HYDROCHLORIDE 2 MG/ML
INJECTION, SOLUTION INTRAVENOUS
Status: DISCONTINUED | OUTPATIENT
Start: 2025-08-15 | End: 2025-08-15

## 2025-08-15 RX ORDER — LIDOCAINE HYDROCHLORIDE 10 MG/ML
INJECTION, SOLUTION INTRAVENOUS
Status: DISCONTINUED | OUTPATIENT
Start: 2025-08-15 | End: 2025-08-15

## 2025-08-15 RX ORDER — PROPOFOL 10 MG/ML
VIAL (ML) INTRAVENOUS CONTINUOUS PRN
Status: DISCONTINUED | OUTPATIENT
Start: 2025-08-15 | End: 2025-08-15

## 2025-08-15 RX ORDER — FENTANYL CITRATE 50 UG/ML
INJECTION, SOLUTION INTRAMUSCULAR; INTRAVENOUS
Status: DISCONTINUED | OUTPATIENT
Start: 2025-08-15 | End: 2025-08-15

## 2025-08-15 RX ADMIN — PROPOFOL 75 MCG/KG/MIN: 10 INJECTION, EMULSION INTRAVENOUS at 08:08

## 2025-08-15 RX ADMIN — GLYCOPYRROLATE 0.2 MG: 0.2 INJECTION, SOLUTION INTRAMUSCULAR; INTRAVITREAL at 08:08

## 2025-08-15 RX ADMIN — ONDANSETRON HYDROCHLORIDE 4 MG: 2 INJECTION INTRAMUSCULAR; INTRAVENOUS at 08:08

## 2025-08-15 RX ADMIN — DEXMEDETOMIDINE HYDROCHLORIDE 8 MCG: 100 INJECTION, SOLUTION, CONCENTRATE INTRAVENOUS at 08:08

## 2025-08-15 RX ADMIN — PROPOFOL 30 MG: 10 INJECTION, EMULSION INTRAVENOUS at 08:08

## 2025-08-15 RX ADMIN — FENTANYL CITRATE 50 MCG: 50 INJECTION, SOLUTION INTRAMUSCULAR; INTRAVENOUS at 08:08

## 2025-08-15 RX ADMIN — LIDOCAINE HYDROCHLORIDE 50 MG: 10 INJECTION, SOLUTION INTRAVENOUS at 08:08

## 2025-08-15 RX ADMIN — CEFAZOLIN 2 G: 2 INJECTION, POWDER, FOR SOLUTION INTRAMUSCULAR; INTRAVENOUS at 08:08

## 2025-08-15 RX ADMIN — SODIUM CHLORIDE, POTASSIUM CHLORIDE, SODIUM LACTATE AND CALCIUM CHLORIDE: 600; 310; 30; 20 INJECTION, SOLUTION INTRAVENOUS at 07:08

## 2025-08-18 ENCOUNTER — OFFICE VISIT (OUTPATIENT)
Dept: ORTHOPEDICS | Facility: CLINIC | Age: 75
End: 2025-08-18
Payer: MEDICARE

## 2025-08-18 DIAGNOSIS — M17.11 PRIMARY OSTEOARTHRITIS OF RIGHT KNEE: Primary | ICD-10-CM

## 2025-08-18 PROCEDURE — 99999 PR PBB SHADOW E&M-EST. PATIENT-LVL III: CPT | Mod: PBBFAC,,,

## 2025-08-18 PROCEDURE — 20610 DRAIN/INJ JOINT/BURSA W/O US: CPT | Mod: RT,S$GLB,,

## 2025-08-18 PROCEDURE — 99499 UNLISTED E&M SERVICE: CPT | Mod: S$GLB,,,

## 2025-08-21 ENCOUNTER — OFFICE VISIT (OUTPATIENT)
Dept: HEMATOLOGY/ONCOLOGY | Facility: CLINIC | Age: 75
End: 2025-08-21
Payer: MEDICARE

## 2025-08-21 ENCOUNTER — LAB VISIT (OUTPATIENT)
Dept: LAB | Facility: OTHER | Age: 75
End: 2025-08-21
Attending: INTERNAL MEDICINE
Payer: MEDICARE

## 2025-08-21 VITALS
RESPIRATION RATE: 12 BRPM | HEIGHT: 69 IN | OXYGEN SATURATION: 99 % | BODY MASS INDEX: 34.12 KG/M2 | SYSTOLIC BLOOD PRESSURE: 127 MMHG | TEMPERATURE: 99 F | DIASTOLIC BLOOD PRESSURE: 73 MMHG | HEART RATE: 75 BPM | WEIGHT: 230.38 LBS

## 2025-08-21 DIAGNOSIS — D47.2 SMOLDERING MULTIPLE MYELOMA (SMM): Primary | ICD-10-CM

## 2025-08-21 DIAGNOSIS — D47.2 SMOLDERING MULTIPLE MYELOMA (SMM): ICD-10-CM

## 2025-08-21 LAB
IGA SERPL-MCNC: 125 MG/DL (ref 40–350)
IGG SERPL-MCNC: 1894 MG/DL (ref 650–1600)
IGM SERPL-MCNC: 51 MG/DL (ref 50–300)
LDH SERPL-CCNC: 161 U/L (ref 110–260)
NT-PROBNP SERPL-MCNC: 118 PG/ML

## 2025-08-21 PROCEDURE — 83521 IG LIGHT CHAINS FREE EACH: CPT | Mod: HCNC

## 2025-08-21 PROCEDURE — 99999 PR PBB SHADOW E&M-EST. PATIENT-LVL III: CPT | Mod: PBBFAC,HCNC,, | Performed by: INTERNAL MEDICINE

## 2025-08-21 PROCEDURE — 82784 ASSAY IGA/IGD/IGG/IGM EACH: CPT | Mod: HCNC

## 2025-08-21 PROCEDURE — 82784 ASSAY IGA/IGD/IGG/IGM EACH: CPT | Mod: 59,HCNC

## 2025-08-21 PROCEDURE — 83880 ASSAY OF NATRIURETIC PEPTIDE: CPT | Mod: HCNC

## 2025-08-21 PROCEDURE — 36415 COLL VENOUS BLD VENIPUNCTURE: CPT | Mod: HCNC

## 2025-08-21 PROCEDURE — 84165 PROTEIN E-PHORESIS SERUM: CPT | Mod: HCNC

## 2025-08-21 PROCEDURE — 83615 LACTATE (LD) (LDH) ENZYME: CPT | Mod: HCNC

## 2025-08-22 LAB
ALBUMIN, SPE (OHS): 4.13 G/DL (ref 3.35–5.55)
ALPHA 1 GLOB (OHS): 0.29 GM/DL (ref 0.17–0.41)
ALPHA 2 GLOB (OHS): 0.68 GM/DL (ref 0.43–0.99)
BETA GLOB (OHS): 0.74 GM/DL (ref 0.5–1.1)
GAMMA GLOBULIN (OHS): 1.66 GM/DL (ref 0.67–1.58)
KAPPA LC FREE SER-MCNC: 6.71 MG/L (ref 0.26–1.65)
KAPPA LC FREE/LAMBDA FREE SER: 10.67 MG/DL (ref 0.33–1.94)
LAMBDA LC FREE SERPL-MCNC: 1.59 MG/DL (ref 0.57–2.63)
PATHOLOGIST REVIEW - SPE (OHS): NORMAL
PROT SERPL-MCNC: 7.5 GM/DL (ref 6–8.4)

## 2025-08-25 ENCOUNTER — OFFICE VISIT (OUTPATIENT)
Dept: SURGERY | Facility: CLINIC | Age: 75
End: 2025-08-25
Attending: SPECIALIST
Payer: MEDICARE

## 2025-08-25 VITALS — DIASTOLIC BLOOD PRESSURE: 68 MMHG | SYSTOLIC BLOOD PRESSURE: 115 MMHG | HEART RATE: 78 BPM | OXYGEN SATURATION: 100 %

## 2025-08-25 DIAGNOSIS — E65 FAT PAD: Primary | ICD-10-CM

## 2025-08-25 PROCEDURE — 3078F DIAST BP <80 MM HG: CPT | Mod: CPTII,HCNC,S$GLB, | Performed by: SPECIALIST

## 2025-08-25 PROCEDURE — 3044F HG A1C LEVEL LT 7.0%: CPT | Mod: CPTII,HCNC,S$GLB, | Performed by: SPECIALIST

## 2025-08-25 PROCEDURE — 1125F AMNT PAIN NOTED PAIN PRSNT: CPT | Mod: CPTII,HCNC,S$GLB, | Performed by: SPECIALIST

## 2025-08-25 PROCEDURE — 3066F NEPHROPATHY DOC TX: CPT | Mod: CPTII,HCNC,S$GLB, | Performed by: SPECIALIST

## 2025-08-25 PROCEDURE — 1101F PT FALLS ASSESS-DOCD LE1/YR: CPT | Mod: CPTII,HCNC,S$GLB, | Performed by: SPECIALIST

## 2025-08-25 PROCEDURE — 3061F NEG MICROALBUMINURIA REV: CPT | Mod: CPTII,HCNC,S$GLB, | Performed by: SPECIALIST

## 2025-08-25 PROCEDURE — 99999 PR PBB SHADOW E&M-EST. PATIENT-LVL III: CPT | Mod: PBBFAC,HCNC,, | Performed by: SPECIALIST

## 2025-08-25 PROCEDURE — 99024 POSTOP FOLLOW-UP VISIT: CPT | Mod: HCNC,S$GLB,, | Performed by: SPECIALIST

## 2025-08-25 PROCEDURE — 1160F RVW MEDS BY RX/DR IN RCRD: CPT | Mod: CPTII,HCNC,S$GLB, | Performed by: SPECIALIST

## 2025-08-25 PROCEDURE — 1159F MED LIST DOCD IN RCRD: CPT | Mod: CPTII,HCNC,S$GLB, | Performed by: SPECIALIST

## 2025-08-25 PROCEDURE — 3288F FALL RISK ASSESSMENT DOCD: CPT | Mod: CPTII,HCNC,S$GLB, | Performed by: SPECIALIST

## 2025-08-25 PROCEDURE — 3074F SYST BP LT 130 MM HG: CPT | Mod: CPTII,HCNC,S$GLB, | Performed by: SPECIALIST

## 2025-08-25 PROCEDURE — 4010F ACE/ARB THERAPY RXD/TAKEN: CPT | Mod: CPTII,HCNC,S$GLB, | Performed by: SPECIALIST

## 2025-08-26 ENCOUNTER — TELEPHONE (OUTPATIENT)
Dept: SURGERY | Facility: CLINIC | Age: 75
End: 2025-08-26
Payer: MEDICARE

## 2025-08-26 ENCOUNTER — PATIENT MESSAGE (OUTPATIENT)
Dept: HEMATOLOGY/ONCOLOGY | Facility: CLINIC | Age: 75
End: 2025-08-26
Payer: MEDICARE

## 2025-08-26 ENCOUNTER — TELEPHONE (OUTPATIENT)
Dept: HEMATOLOGY/ONCOLOGY | Facility: CLINIC | Age: 75
End: 2025-08-26
Payer: MEDICARE

## 2025-08-26 DIAGNOSIS — D47.2 SMOLDERING MULTIPLE MYELOMA (SMM): Primary | ICD-10-CM

## 2025-08-26 DIAGNOSIS — K31.84 GASTROPARESIS: Primary | ICD-10-CM

## 2025-08-29 ENCOUNTER — TELEPHONE (OUTPATIENT)
Dept: HEMATOLOGY/ONCOLOGY | Facility: CLINIC | Age: 75
End: 2025-08-29
Payer: MEDICARE

## 2025-08-29 ENCOUNTER — PATIENT MESSAGE (OUTPATIENT)
Dept: HEMATOLOGY/ONCOLOGY | Facility: CLINIC | Age: 75
End: 2025-08-29
Payer: MEDICARE

## 2025-08-31 DIAGNOSIS — G47.00 INSOMNIA, UNSPECIFIED TYPE: ICD-10-CM

## 2025-08-31 DIAGNOSIS — D47.2 SMOLDERING MULTIPLE MYELOMA (SMM): Primary | ICD-10-CM

## 2025-08-31 RX ORDER — TRAZODONE HYDROCHLORIDE 50 MG/1
50 TABLET ORAL NIGHTLY
Qty: 90 TABLET | Refills: 2 | Status: SHIPPED | OUTPATIENT
Start: 2025-08-31 | End: 2026-08-31

## (undated) DEVICE — SUT VICRYL 3-0 OB 36 CT-1

## (undated) DEVICE — SUT 7/0 18IN COATED VICRYL

## (undated) DEVICE — SPONGE COTTON TRAY 4X4IN

## (undated) DEVICE — SOL BALANCED SALT 500ML

## (undated) DEVICE — NEEDLE HYPODERMIC HUB LUER LOC

## (undated) DEVICE — SOL BETADINE 5%

## (undated) DEVICE — SYR 3CC 21 X 1 LUER LOCK

## (undated) DEVICE — NDL HYPO A BEVEL 30X1/2

## (undated) DEVICE — DRAPE THREE-QTR REINF 53X77IN

## (undated) DEVICE — DRAPE VELYS DEVICE STERILE

## (undated) DEVICE — SYRINGE 30CC LL W/O NDL

## (undated) DEVICE — SPONGE GAUZE 16PLY 4X4

## (undated) DEVICE — SUT 1 36IN COATED VICRYL UN

## (undated) DEVICE — KNIFE OPHTH MICRO UNITOME 5MM

## (undated) DEVICE — GOWN SURGICAL X-LARGE

## (undated) DEVICE — CLOSURE SKIN STERI STRIP 1/2X4

## (undated) DEVICE — PACK INSTRUMENT COVER DISPO

## (undated) DEVICE — BACKFLUSH 25GA SOFT-TIP DISP

## (undated) DEVICE — SOL NACL IRR 3000ML

## (undated) DEVICE — CANNULA OPTHALMIC SOF TIP 25G

## (undated) DEVICE — DRAPE STERI U-SHAPED 47X51IN

## (undated) DEVICE — Device

## (undated) DEVICE — NDL HYPO STD REG BVL 30G 0.5IN

## (undated) DEVICE — SOL WATER STRL IRR 1000ML

## (undated) DEVICE — HOOD T7 W/ PEEL AWAY LENS

## (undated) DEVICE — SOL BSS BALANCED SALT

## (undated) DEVICE — PACK AUTO GAS FILL

## (undated) DEVICE — HOLDER TUBE

## (undated) DEVICE — SUT 2/0 36IN COATED VICRYL

## (undated) DEVICE — COVER MAYO STND XL 30X57IN

## (undated) DEVICE — TAPE SILK 3IN

## (undated) DEVICE — TOWEL OR XRAY WHITE 17X26IN

## (undated) DEVICE — MARKER FN REG DUAL UTIL RULER

## (undated) DEVICE — BLADE RECIP DS OFST 70X1X12.5

## (undated) DEVICE — SYS KNEE EPAK PIN ATTUNE
Type: IMPLANTABLE DEVICE | Site: KNEE | Status: NON-FUNCTIONAL
Removed: 2024-12-23

## (undated) DEVICE — DRAPE INCISE IOBAN 2 23X33IN

## (undated) DEVICE — ADHESIVE DERMABOND ADVANCED

## (undated) DEVICE — FORCEP GRASPING 25GA SMOOTH

## (undated) DEVICE — SYR 50CC LL

## (undated) DEVICE — DRESSING TRANS 4X4 TEGADERM

## (undated) DEVICE — TOWEL OR DISP STRL BLUE 4/PK

## (undated) DEVICE — LENS VITRCTMY OPHTH 30DEG 59DE

## (undated) DEVICE — SYR DISP LL 5CC

## (undated) DEVICE — DRAPE VELYS SATELLITE STATION

## (undated) DEVICE — DRAPE SURG W/TWL 17 5/8X23

## (undated) DEVICE — PENCIL ROCKER SWITCH 10FT CORD

## (undated) DEVICE — WRAP KNEE ACCU THERM GEL PACK

## (undated) DEVICE — COVER PROXIMA MAYO STAND

## (undated) DEVICE — SYR 10CC LUER LOCK

## (undated) DEVICE — PACK TOTAL PLUS 25G VITRECTOMY

## (undated) DEVICE — BLADE VELYS OSCILLATING SAW

## (undated) DEVICE — NDL HYPO STD REG BVL 18GX1.5IN

## (undated) DEVICE — DRAPE T EXTRM SURG 121X128X90

## (undated) DEVICE — TUBE SUCTION FRAZIER VENT 10FR

## (undated) DEVICE — KIT TOTAL KNEE TKOFG OMC

## (undated) DEVICE — DRESSING TELFA N ADH 3X8

## (undated) DEVICE — CONTAINER SPECIMEN OR STER 4OZ

## (undated) DEVICE — GLOVE BIOGEL SKINSENSE PI 8.0

## (undated) DEVICE — KIT GREY EYE

## (undated) DEVICE — SYS REVOLUTION CEMENT MIXING

## (undated) DEVICE — MARKER SKIN RULER STERILE

## (undated) DEVICE — TRAY MUSCLE LID EYE

## (undated) DEVICE — SOL NACL IRR 1000ML BTL

## (undated) DEVICE — UNDERGLOVES BIOGEL PI SIZE 8.5

## (undated) DEVICE — BRUSH SCRUB HIBICLENS 4%

## (undated) DEVICE — SYS IRRISEPT 450ML0.05% CHG

## (undated) DEVICE — ALCOHOL 70% ANTISEPTIC ISO 4OZ

## (undated) DEVICE — CORD FOR BIPOLAR FORCEPS 12

## (undated) DEVICE — BLADE DUAL CUT SAG 35X64X.89MM

## (undated) DEVICE — SET VELYS PURESIGHT ARRAY KNEE

## (undated) DEVICE — GOWN SMARTGOWN 3XL XLONG

## (undated) DEVICE — SUT MCRYL PLUS 4-0 PS2 27IN

## (undated) DEVICE — SUT QUILL 2T11 36IN

## (undated) DEVICE — NDL HYPO STD REG BVL 22GX1.5IN

## (undated) DEVICE — DRESSING EYE OVAL LF

## (undated) DEVICE — DRESSING AQUACEL AG RBBN 2X45

## (undated) DEVICE — DRAPE TOP 53X102IN

## (undated) DEVICE — ELECTRODE REM PLYHSV RETURN 9